# Patient Record
Sex: MALE | Race: OTHER | HISPANIC OR LATINO | Employment: OTHER | URBAN - METROPOLITAN AREA
[De-identification: names, ages, dates, MRNs, and addresses within clinical notes are randomized per-mention and may not be internally consistent; named-entity substitution may affect disease eponyms.]

---

## 2017-01-19 ENCOUNTER — APPOINTMENT (OUTPATIENT)
Dept: LAB | Facility: CLINIC | Age: 72
End: 2017-01-19
Payer: COMMERCIAL

## 2017-01-19 DIAGNOSIS — R80.9 PROTEINURIA: ICD-10-CM

## 2017-01-19 DIAGNOSIS — E66.9 OBESITY: ICD-10-CM

## 2017-01-19 DIAGNOSIS — E11.65 TYPE 2 DIABETES MELLITUS WITH HYPERGLYCEMIA (HCC): ICD-10-CM

## 2017-01-19 DIAGNOSIS — I10 ESSENTIAL (PRIMARY) HYPERTENSION: ICD-10-CM

## 2017-01-19 DIAGNOSIS — R91.1 SOLITARY PULMONARY NODULE: ICD-10-CM

## 2017-01-19 LAB
ALBUMIN SERPL BCP-MCNC: 3.7 G/DL (ref 3.5–5)
ALP SERPL-CCNC: 55 U/L (ref 46–116)
ALT SERPL W P-5'-P-CCNC: 38 U/L (ref 12–78)
ANION GAP SERPL CALCULATED.3IONS-SCNC: 5 MMOL/L (ref 4–13)
AST SERPL W P-5'-P-CCNC: 18 U/L (ref 5–45)
BILIRUB SERPL-MCNC: 0.32 MG/DL (ref 0.2–1)
BUN SERPL-MCNC: 16 MG/DL (ref 5–25)
CALCIUM SERPL-MCNC: 9.1 MG/DL (ref 8.3–10.1)
CHLORIDE SERPL-SCNC: 102 MMOL/L (ref 100–108)
CO2 SERPL-SCNC: 32 MMOL/L (ref 21–32)
CREAT SERPL-MCNC: 0.71 MG/DL (ref 0.6–1.3)
EST. AVERAGE GLUCOSE BLD GHB EST-MCNC: 166 MG/DL
GFR SERPL CREATININE-BSD FRML MDRD: >60 ML/MIN/1.73SQ M
GLUCOSE SERPL-MCNC: 164 MG/DL (ref 65–140)
HBA1C MFR BLD: 7.4 % (ref 4.2–6.3)
POTASSIUM SERPL-SCNC: 3.8 MMOL/L (ref 3.5–5.3)
PROT SERPL-MCNC: 7.8 G/DL (ref 6.4–8.2)
SODIUM SERPL-SCNC: 139 MMOL/L (ref 136–145)

## 2017-01-19 PROCEDURE — 80053 COMPREHEN METABOLIC PANEL: CPT

## 2017-01-19 PROCEDURE — 36415 COLL VENOUS BLD VENIPUNCTURE: CPT

## 2017-01-19 PROCEDURE — 83036 HEMOGLOBIN GLYCOSYLATED A1C: CPT

## 2017-01-26 ENCOUNTER — ALLSCRIPTS OFFICE VISIT (OUTPATIENT)
Dept: OTHER | Facility: OTHER | Age: 72
End: 2017-01-26

## 2017-01-26 DIAGNOSIS — E11.65 TYPE 2 DIABETES MELLITUS WITH HYPERGLYCEMIA (HCC): ICD-10-CM

## 2017-01-26 DIAGNOSIS — E66.9 OBESITY: ICD-10-CM

## 2017-01-26 DIAGNOSIS — R80.9 PROTEINURIA: ICD-10-CM

## 2017-01-26 DIAGNOSIS — I10 ESSENTIAL (PRIMARY) HYPERTENSION: ICD-10-CM

## 2017-03-06 ENCOUNTER — ALLSCRIPTS OFFICE VISIT (OUTPATIENT)
Dept: OTHER | Facility: OTHER | Age: 72
End: 2017-03-06

## 2017-03-28 ENCOUNTER — GENERIC CONVERSION - ENCOUNTER (OUTPATIENT)
Dept: OTHER | Facility: OTHER | Age: 72
End: 2017-03-28

## 2017-04-21 ENCOUNTER — TRANSCRIBE ORDERS (OUTPATIENT)
Dept: LAB | Facility: CLINIC | Age: 72
End: 2017-04-21

## 2017-04-21 ENCOUNTER — APPOINTMENT (OUTPATIENT)
Dept: LAB | Facility: CLINIC | Age: 72
End: 2017-04-21
Payer: COMMERCIAL

## 2017-04-21 DIAGNOSIS — I10 ESSENTIAL (PRIMARY) HYPERTENSION: ICD-10-CM

## 2017-04-21 DIAGNOSIS — E66.9 OBESITY: ICD-10-CM

## 2017-04-21 DIAGNOSIS — E11.65 TYPE 2 DIABETES MELLITUS WITH HYPERGLYCEMIA (HCC): ICD-10-CM

## 2017-04-21 DIAGNOSIS — E78.5 OTHER AND UNSPECIFIED HYPERLIPIDEMIA: Primary | ICD-10-CM

## 2017-04-21 DIAGNOSIS — R80.9 PROTEINURIA: ICD-10-CM

## 2017-04-21 DIAGNOSIS — I69.990 APRAXIA, LATE EFFECT OF CARDIOVASCULAR DISEASE: ICD-10-CM

## 2017-04-21 DIAGNOSIS — I61.9 NONTRAUMATIC INTRACEREBRAL HEMORRHAGE, UNSPECIFIED CEREBRAL LOCATION, UNSPECIFIED LATERALITY (HCC): ICD-10-CM

## 2017-04-21 LAB
ALBUMIN SERPL BCP-MCNC: 3.8 G/DL (ref 3.5–5)
ALP SERPL-CCNC: 57 U/L (ref 46–116)
ALT SERPL W P-5'-P-CCNC: 36 U/L (ref 12–78)
ANION GAP SERPL CALCULATED.3IONS-SCNC: 5 MMOL/L (ref 4–13)
AST SERPL W P-5'-P-CCNC: 18 U/L (ref 5–45)
BILIRUB SERPL-MCNC: 0.53 MG/DL (ref 0.2–1)
BUN SERPL-MCNC: 12 MG/DL (ref 5–25)
CALCIUM SERPL-MCNC: 9 MG/DL (ref 8.3–10.1)
CHLORIDE SERPL-SCNC: 102 MMOL/L (ref 100–108)
CHOLEST SERPL-MCNC: 187 MG/DL (ref 50–200)
CO2 SERPL-SCNC: 30 MMOL/L (ref 21–32)
CREAT SERPL-MCNC: 0.75 MG/DL (ref 0.6–1.3)
EST. AVERAGE GLUCOSE BLD GHB EST-MCNC: 169 MG/DL
GFR SERPL CREATININE-BSD FRML MDRD: >60 ML/MIN/1.73SQ M
GLUCOSE P FAST SERPL-MCNC: 167 MG/DL (ref 65–99)
HBA1C MFR BLD: 7.5 % (ref 4.2–6.3)
HDLC SERPL-MCNC: 37 MG/DL (ref 40–60)
LDLC SERPL CALC-MCNC: 129 MG/DL (ref 0–100)
POTASSIUM SERPL-SCNC: 4 MMOL/L (ref 3.5–5.3)
PROT SERPL-MCNC: 7.7 G/DL (ref 6.4–8.2)
SODIUM SERPL-SCNC: 137 MMOL/L (ref 136–145)
TRIGL SERPL-MCNC: 106 MG/DL

## 2017-04-21 PROCEDURE — 80061 LIPID PANEL: CPT

## 2017-04-21 PROCEDURE — 36415 COLL VENOUS BLD VENIPUNCTURE: CPT

## 2017-04-21 PROCEDURE — 83036 HEMOGLOBIN GLYCOSYLATED A1C: CPT

## 2017-04-21 PROCEDURE — 80053 COMPREHEN METABOLIC PANEL: CPT

## 2017-04-24 ENCOUNTER — GENERIC CONVERSION - ENCOUNTER (OUTPATIENT)
Dept: OTHER | Facility: OTHER | Age: 72
End: 2017-04-24

## 2017-05-01 ENCOUNTER — ALLSCRIPTS OFFICE VISIT (OUTPATIENT)
Dept: OTHER | Facility: OTHER | Age: 72
End: 2017-05-01

## 2017-07-07 ENCOUNTER — GENERIC CONVERSION - ENCOUNTER (OUTPATIENT)
Dept: OTHER | Facility: OTHER | Age: 72
End: 2017-07-07

## 2017-08-07 ENCOUNTER — APPOINTMENT (OUTPATIENT)
Dept: LAB | Facility: CLINIC | Age: 72
End: 2017-08-07
Payer: COMMERCIAL

## 2017-08-07 ENCOUNTER — TRANSCRIBE ORDERS (OUTPATIENT)
Dept: LAB | Facility: CLINIC | Age: 72
End: 2017-08-07

## 2017-08-07 DIAGNOSIS — E11.8 UNCONTROLLED TYPE 2 DIABETES MELLITUS WITH COMPLICATION, UNSPECIFIED LONG TERM INSULIN USE STATUS: ICD-10-CM

## 2017-08-07 DIAGNOSIS — E11.8 UNCONTROLLED TYPE 2 DIABETES MELLITUS WITH COMPLICATION, UNSPECIFIED LONG TERM INSULIN USE STATUS: Primary | ICD-10-CM

## 2017-08-07 DIAGNOSIS — R80.9 PROTEINURIA, UNSPECIFIED TYPE: ICD-10-CM

## 2017-08-07 DIAGNOSIS — E11.65 UNCONTROLLED TYPE 2 DIABETES MELLITUS WITH COMPLICATION, UNSPECIFIED LONG TERM INSULIN USE STATUS: Primary | ICD-10-CM

## 2017-08-07 DIAGNOSIS — I10 ESSENTIAL HYPERTENSION, MALIGNANT: ICD-10-CM

## 2017-08-07 DIAGNOSIS — E11.65 UNCONTROLLED TYPE 2 DIABETES MELLITUS WITH COMPLICATION, UNSPECIFIED LONG TERM INSULIN USE STATUS: ICD-10-CM

## 2017-08-07 DIAGNOSIS — E66.9 OBESITY, UNSPECIFIED: ICD-10-CM

## 2017-08-07 LAB
ALBUMIN SERPL BCP-MCNC: 3.7 G/DL (ref 3.5–5)
ALP SERPL-CCNC: 58 U/L (ref 46–116)
ALT SERPL W P-5'-P-CCNC: 43 U/L (ref 12–78)
ANION GAP SERPL CALCULATED.3IONS-SCNC: 6 MMOL/L (ref 4–13)
AST SERPL W P-5'-P-CCNC: 32 U/L (ref 5–45)
BILIRUB SERPL-MCNC: 0.38 MG/DL (ref 0.2–1)
BUN SERPL-MCNC: 12 MG/DL (ref 5–25)
CALCIUM SERPL-MCNC: 9.3 MG/DL (ref 8.3–10.1)
CHLORIDE SERPL-SCNC: 99 MMOL/L (ref 100–108)
CO2 SERPL-SCNC: 32 MMOL/L (ref 21–32)
CREAT SERPL-MCNC: 0.7 MG/DL (ref 0.6–1.3)
CREAT UR-MCNC: 106 MG/DL
EST. AVERAGE GLUCOSE BLD GHB EST-MCNC: 157 MG/DL
GFR SERPL CREATININE-BSD FRML MDRD: 110 ML/MIN/1.73SQ M
GLUCOSE P FAST SERPL-MCNC: 173 MG/DL (ref 65–99)
HBA1C MFR BLD: 7.1 % (ref 4.2–6.3)
MICROALBUMIN UR-MCNC: 111 MG/L (ref 0–20)
MICROALBUMIN/CREAT 24H UR: 105 MG/G CREATININE (ref 0–30)
POTASSIUM SERPL-SCNC: 3.7 MMOL/L (ref 3.5–5.3)
PROT SERPL-MCNC: 7.6 G/DL (ref 6.4–8.2)
SODIUM SERPL-SCNC: 137 MMOL/L (ref 136–145)

## 2017-08-07 PROCEDURE — 82570 ASSAY OF URINE CREATININE: CPT

## 2017-08-07 PROCEDURE — 83036 HEMOGLOBIN GLYCOSYLATED A1C: CPT

## 2017-08-07 PROCEDURE — 82043 UR ALBUMIN QUANTITATIVE: CPT

## 2017-08-07 PROCEDURE — 80053 COMPREHEN METABOLIC PANEL: CPT

## 2017-08-07 PROCEDURE — 36415 COLL VENOUS BLD VENIPUNCTURE: CPT

## 2017-08-14 ENCOUNTER — ALLSCRIPTS OFFICE VISIT (OUTPATIENT)
Dept: OTHER | Facility: OTHER | Age: 72
End: 2017-08-14

## 2017-09-05 ENCOUNTER — HOSPITAL ENCOUNTER (OUTPATIENT)
Facility: HOSPITAL | Age: 72
Setting detail: OBSERVATION
DRG: 219 | End: 2017-09-06
Attending: EMERGENCY MEDICINE | Admitting: INTERNAL MEDICINE
Payer: COMMERCIAL

## 2017-09-05 ENCOUNTER — APPOINTMENT (EMERGENCY)
Dept: RADIOLOGY | Facility: HOSPITAL | Age: 72
DRG: 219 | End: 2017-09-05
Payer: COMMERCIAL

## 2017-09-05 DIAGNOSIS — I16.0 HYPERTENSIVE URGENCY: ICD-10-CM

## 2017-09-05 DIAGNOSIS — I71.00 AORTIC ANEURYSM WITH DISSECTION (HCC): ICD-10-CM

## 2017-09-05 DIAGNOSIS — R07.9 CHEST PAIN: Primary | ICD-10-CM

## 2017-09-05 DIAGNOSIS — I71.9 AORTIC ANEURYSM WITH DISSECTION (HCC): ICD-10-CM

## 2017-09-05 LAB
ALBUMIN SERPL BCP-MCNC: 3.5 G/DL (ref 3.5–5)
ALP SERPL-CCNC: 57 U/L (ref 46–116)
ALT SERPL W P-5'-P-CCNC: 33 U/L (ref 12–78)
ANION GAP SERPL CALCULATED.3IONS-SCNC: 4 MMOL/L (ref 4–13)
APTT PPP: 24 SECONDS (ref 23–35)
AST SERPL W P-5'-P-CCNC: 22 U/L (ref 5–45)
BASOPHILS # BLD AUTO: 0.02 THOUSANDS/ΜL (ref 0–0.1)
BASOPHILS NFR BLD AUTO: 0 % (ref 0–1)
BILIRUB SERPL-MCNC: 0.63 MG/DL (ref 0.2–1)
BUN SERPL-MCNC: 14 MG/DL (ref 5–25)
CALCIUM SERPL-MCNC: 9.5 MG/DL (ref 8.3–10.1)
CHLORIDE SERPL-SCNC: 100 MMOL/L (ref 100–108)
CO2 SERPL-SCNC: 36 MMOL/L (ref 21–32)
CREAT SERPL-MCNC: 0.88 MG/DL (ref 0.6–1.3)
EOSINOPHIL # BLD AUTO: 0.22 THOUSAND/ΜL (ref 0–0.61)
EOSINOPHIL NFR BLD AUTO: 3 % (ref 0–6)
ERYTHROCYTE [DISTWIDTH] IN BLOOD BY AUTOMATED COUNT: 13.6 % (ref 11.6–15.1)
GFR SERPL CREATININE-BSD FRML MDRD: 86 ML/MIN/1.73SQ M
GLUCOSE SERPL-MCNC: 197 MG/DL (ref 65–140)
GLUCOSE SERPL-MCNC: 210 MG/DL (ref 65–140)
GLUCOSE SERPL-MCNC: 251 MG/DL (ref 65–140)
HCT VFR BLD AUTO: 41.6 % (ref 36.5–49.3)
HGB BLD-MCNC: 14.2 G/DL (ref 12–17)
INR PPP: 0.93 (ref 0.86–1.16)
LYMPHOCYTES # BLD AUTO: 1.94 THOUSANDS/ΜL (ref 0.6–4.47)
LYMPHOCYTES NFR BLD AUTO: 23 % (ref 14–44)
MCH RBC QN AUTO: 30.2 PG (ref 26.8–34.3)
MCHC RBC AUTO-ENTMCNC: 34.1 G/DL (ref 31.4–37.4)
MCV RBC AUTO: 89 FL (ref 82–98)
MONOCYTES # BLD AUTO: 0.58 THOUSAND/ΜL (ref 0.17–1.22)
MONOCYTES NFR BLD AUTO: 7 % (ref 4–12)
NEUTROPHILS # BLD AUTO: 5.55 THOUSANDS/ΜL (ref 1.85–7.62)
NEUTS SEG NFR BLD AUTO: 67 % (ref 43–75)
NRBC BLD AUTO-RTO: 0 /100 WBCS
NT-PROBNP SERPL-MCNC: 989 PG/ML
PLATELET # BLD AUTO: 178 THOUSANDS/UL (ref 149–390)
PMV BLD AUTO: 10.3 FL (ref 8.9–12.7)
POTASSIUM SERPL-SCNC: 4.1 MMOL/L (ref 3.5–5.3)
PROT SERPL-MCNC: 7.7 G/DL (ref 6.4–8.2)
PROTHROMBIN TIME: 12.5 SECONDS (ref 12.1–14.4)
RBC # BLD AUTO: 4.7 MILLION/UL (ref 3.88–5.62)
SODIUM SERPL-SCNC: 140 MMOL/L (ref 136–145)
SPECIMEN SOURCE: NORMAL
TROPONIN I BLD-MCNC: 0 NG/ML (ref 0–0.08)
TROPONIN I SERPL-MCNC: <0.02 NG/ML
TROPONIN I SERPL-MCNC: <0.02 NG/ML
WBC # BLD AUTO: 8.31 THOUSAND/UL (ref 4.31–10.16)

## 2017-09-05 PROCEDURE — 96374 THER/PROPH/DIAG INJ IV PUSH: CPT

## 2017-09-05 PROCEDURE — 84484 ASSAY OF TROPONIN QUANT: CPT

## 2017-09-05 PROCEDURE — 84484 ASSAY OF TROPONIN QUANT: CPT | Performed by: PHYSICIAN ASSISTANT

## 2017-09-05 PROCEDURE — 80053 COMPREHEN METABOLIC PANEL: CPT | Performed by: EMERGENCY MEDICINE

## 2017-09-05 PROCEDURE — 96375 TX/PRO/DX INJ NEW DRUG ADDON: CPT

## 2017-09-05 PROCEDURE — 36415 COLL VENOUS BLD VENIPUNCTURE: CPT | Performed by: EMERGENCY MEDICINE

## 2017-09-05 PROCEDURE — 99285 EMERGENCY DEPT VISIT HI MDM: CPT

## 2017-09-05 PROCEDURE — 85610 PROTHROMBIN TIME: CPT | Performed by: EMERGENCY MEDICINE

## 2017-09-05 PROCEDURE — 85025 COMPLETE CBC W/AUTO DIFF WBC: CPT | Performed by: EMERGENCY MEDICINE

## 2017-09-05 PROCEDURE — 93005 ELECTROCARDIOGRAM TRACING: CPT | Performed by: EMERGENCY MEDICINE

## 2017-09-05 PROCEDURE — 85730 THROMBOPLASTIN TIME PARTIAL: CPT | Performed by: EMERGENCY MEDICINE

## 2017-09-05 PROCEDURE — 71020 HB CHEST X-RAY 2VW FRONTAL&LATL: CPT

## 2017-09-05 PROCEDURE — 83880 ASSAY OF NATRIURETIC PEPTIDE: CPT | Performed by: PHYSICIAN ASSISTANT

## 2017-09-05 PROCEDURE — 82948 REAGENT STRIP/BLOOD GLUCOSE: CPT

## 2017-09-05 PROCEDURE — 93005 ELECTROCARDIOGRAM TRACING: CPT | Performed by: PHYSICIAN ASSISTANT

## 2017-09-05 RX ORDER — ASPIRIN 81 MG/1
81 TABLET, CHEWABLE ORAL DAILY
Status: DISCONTINUED | OUTPATIENT
Start: 2017-09-06 | End: 2017-09-06

## 2017-09-05 RX ORDER — ASPIRIN 81 MG/1
324 TABLET, CHEWABLE ORAL ONCE
Status: COMPLETED | OUTPATIENT
Start: 2017-09-05 | End: 2017-09-05

## 2017-09-05 RX ORDER — NITROGLYCERIN 0.4 MG/1
0.4 TABLET SUBLINGUAL
Status: DISCONTINUED | OUTPATIENT
Start: 2017-09-05 | End: 2017-09-06 | Stop reason: HOSPADM

## 2017-09-05 RX ORDER — HYDRALAZINE HYDROCHLORIDE 20 MG/ML
10 INJECTION INTRAMUSCULAR; INTRAVENOUS ONCE
Status: COMPLETED | OUTPATIENT
Start: 2017-09-05 | End: 2017-09-05

## 2017-09-05 RX ORDER — ATENOLOL 50 MG/1
100 TABLET ORAL DAILY
Status: DISCONTINUED | OUTPATIENT
Start: 2017-09-06 | End: 2017-09-06 | Stop reason: HOSPADM

## 2017-09-05 RX ORDER — ATORVASTATIN CALCIUM 40 MG/1
40 TABLET, FILM COATED ORAL
Status: DISCONTINUED | OUTPATIENT
Start: 2017-09-05 | End: 2017-09-06 | Stop reason: HOSPADM

## 2017-09-05 RX ORDER — TRIAMTERENE CAPSULES 50 MG/1
50 CAPSULE ORAL DAILY
COMMUNITY
End: 2017-09-14 | Stop reason: HOSPADM

## 2017-09-05 RX ORDER — ATENOLOL 100 MG/1
100 TABLET ORAL DAILY
COMMUNITY
End: 2017-09-14 | Stop reason: HOSPADM

## 2017-09-05 RX ORDER — HYDROCHLOROTHIAZIDE 25 MG/1
25 TABLET ORAL DAILY
Status: DISCONTINUED | OUTPATIENT
Start: 2017-09-06 | End: 2017-09-06 | Stop reason: HOSPADM

## 2017-09-05 RX ORDER — VALSARTAN 160 MG/1
160 TABLET ORAL DAILY
Status: DISCONTINUED | OUTPATIENT
Start: 2017-09-06 | End: 2017-09-06 | Stop reason: HOSPADM

## 2017-09-05 RX ORDER — GLIMEPIRIDE 4 MG/1
4 TABLET ORAL
COMMUNITY
End: 2017-09-14 | Stop reason: HOSPADM

## 2017-09-05 RX ORDER — NITROGLYCERIN 0.4 MG/1
0.4 TABLET SUBLINGUAL ONCE
Status: COMPLETED | OUTPATIENT
Start: 2017-09-05 | End: 2017-09-05

## 2017-09-05 RX ORDER — HYDRALAZINE HYDROCHLORIDE 20 MG/ML
10 INJECTION INTRAMUSCULAR; INTRAVENOUS EVERY 6 HOURS PRN
Status: DISCONTINUED | OUTPATIENT
Start: 2017-09-05 | End: 2017-09-06 | Stop reason: HOSPADM

## 2017-09-05 RX ORDER — MORPHINE SULFATE 4 MG/ML
1 INJECTION, SOLUTION INTRAMUSCULAR; INTRAVENOUS EVERY 4 HOURS PRN
Status: DISCONTINUED | OUTPATIENT
Start: 2017-09-05 | End: 2017-09-06 | Stop reason: HOSPADM

## 2017-09-05 RX ORDER — AMLODIPINE BESYLATE AND BENAZEPRIL HYDROCHLORIDE 10; 40 MG/1; MG/1
1 CAPSULE ORAL DAILY
COMMUNITY
End: 2017-09-14 | Stop reason: HOSPADM

## 2017-09-05 RX ORDER — GLIMEPIRIDE 2 MG/1
4 TABLET ORAL
Status: DISCONTINUED | OUTPATIENT
Start: 2017-09-06 | End: 2017-09-06 | Stop reason: HOSPADM

## 2017-09-05 RX ORDER — ACETAMINOPHEN 325 MG/1
650 TABLET ORAL EVERY 4 HOURS PRN
Status: DISCONTINUED | OUTPATIENT
Start: 2017-09-05 | End: 2017-09-06 | Stop reason: HOSPADM

## 2017-09-05 RX ORDER — CHLORAL HYDRATE 500 MG
1000 CAPSULE ORAL DAILY
Status: DISCONTINUED | OUTPATIENT
Start: 2017-09-06 | End: 2017-09-06

## 2017-09-05 RX ORDER — MAGNESIUM HYDROXIDE/ALUMINUM HYDROXICE/SIMETHICONE 120; 1200; 1200 MG/30ML; MG/30ML; MG/30ML
30 SUSPENSION ORAL EVERY 4 HOURS PRN
Status: DISCONTINUED | OUTPATIENT
Start: 2017-09-05 | End: 2017-09-06 | Stop reason: HOSPADM

## 2017-09-05 RX ORDER — MORPHINE SULFATE 4 MG/ML
4 INJECTION, SOLUTION INTRAMUSCULAR; INTRAVENOUS ONCE
Status: COMPLETED | OUTPATIENT
Start: 2017-09-05 | End: 2017-09-05

## 2017-09-05 RX ORDER — VALSARTAN AND HYDROCHLOROTHIAZIDE 160; 25 MG/1; MG/1
1 TABLET ORAL DAILY
Status: DISCONTINUED | OUTPATIENT
Start: 2017-09-06 | End: 2017-09-05 | Stop reason: SDUPTHER

## 2017-09-05 RX ADMIN — MORPHINE SULFATE 4 MG: 4 INJECTION INTRAVENOUS at 13:53

## 2017-09-05 RX ADMIN — ATORVASTATIN CALCIUM 40 MG: 40 TABLET, FILM COATED ORAL at 18:28

## 2017-09-05 RX ADMIN — ALUMINUM HYDROXIDE, MAGNESIUM HYDROXIDE, AND SIMETHICONE 30 ML: 200; 200; 20 SUSPENSION ORAL at 23:18

## 2017-09-05 RX ADMIN — MORPHINE SULFATE 1 MG: 4 INJECTION, SOLUTION INTRAMUSCULAR; INTRAVENOUS at 18:29

## 2017-09-05 RX ADMIN — ASPIRIN 81 MG 324 MG: 81 TABLET ORAL at 12:24

## 2017-09-05 RX ADMIN — HYDRALAZINE HYDROCHLORIDE 10 MG: 20 INJECTION INTRAMUSCULAR; INTRAVENOUS at 19:25

## 2017-09-05 RX ADMIN — NITROGLYCERIN 0.4 MG: 0.4 TABLET SUBLINGUAL at 12:26

## 2017-09-05 RX ADMIN — HYDRALAZINE HYDROCHLORIDE 10 MG: 20 INJECTION INTRAMUSCULAR; INTRAVENOUS at 12:27

## 2017-09-05 RX ADMIN — INSULIN LISPRO 3 UNITS: 100 INJECTION, SOLUTION INTRAVENOUS; SUBCUTANEOUS at 21:56

## 2017-09-05 RX ADMIN — METFORMIN HYDROCHLORIDE 1000 MG: 500 TABLET, FILM COATED ORAL at 18:28

## 2017-09-05 NOTE — H&P
History and Physical - [facility]    Patient Information: Kiah Romeo  71 y o  male MRN: [id number]  Unit/Bed#: E4 -01 Encounter: [id number]  Admitting Physician: JAMES Potts PA-C  PCP: Moris Dawson DO  Date of Admission:  09/05/17    Assessment/Plan  Patient is a pleasant 78-year-old male with a history of hypertension diabetes and recent diagnosis of edema  The patient had a sudden episode of chest pain that started this afternoon  He denies any diaphoresis or nausea she points more to his epigastrium then his chest but he says it is radiating up to his chest     Upon arrival in the ER the patient was noted to be mildly bradycardic with a heart rate of 58 and a blood pressure of 234/105  The patient also had elevated random glucose at 210  The patient's troponin was negative and EKG showed sinus bradycardia with nonspecific ST changes  Chest x-ray showed no active Cardio pulmonary disease    Hospital Problem List:     Principal Problem:    Chest pain  Active Problems:    Prostate cancer    Hypertension    Diabetes    Hypertensive urgency      Plan for the Primary Problem(s):  1  Chest pain-rule out acute coronary syndrome  Fortunately his troponin is negative  His EKG has some nonspecific ST changes  We will continue to trend cardiac enzymes  Check a lipid profile and start a statin  The patient was already given aspirin which we will continue  We will also check a TSH  Given the chest pain in the setting of accelerated hypertension  We will also check an echocardiogram to rule out any heart strain    Plan for Additional Problems:   2  Hypertensive urgency-the patient's blood pressure was over 551 systolic present on admission  It came down with hydralazine  It is currently in the 170sThe patient is under the care of Dr Syeda Villegas    The patient had a routine office visit on 08/14/2017 it was noted that the patient was having edema particularly in his lower extremities  Dr Herber Sam thought it may have been due to his Norvasc and he discontinued that and also discontinued the benazepril HCTZ in favor of valsartan HCTZ 160/25  The patient tells me he still been taking his other prescribed medications of Triameterne 50 mg and atenolol 100 mg  We will cautiously continue the atenolol on in light of the mild bradycardia  As mentioned check an echo  Hold did triametere for now may consider Lasix pending the echocardiogram   And add hydralazine 10 mg IV q 6 hours p r n   3   Diabetes type 2 with hyperglycemia-the patient is on metformin and Amaryl  We will continue those medications check an A1c and add sliding scale insulin  4  History of prostate cancer-the patient tells me he is voiding without any dysuria urgency hesitation or retention  5  Edema-patient tells me it is improving but he still does have a 1+ pitting edema  No prior mention of congestive heart failure  As mentioned check an echo  Will also add a BNP    This case and plan was discussed in detail with Dr Annemarie Garcia internal Medicine attending and he agrees the plan    VTE Prophylaxis: Heparin   Code Status: full code    Anticipated Length of Stay:  Patient will be admitted on an Observation basis with an anticipated length of stay of  greter than 2 midnights  Total Time for Visit, including Counseling / Coordination of Care: 45 minutes  Greater than 50% of this total time spent on direct patient counseling and coordination of care  Chief Complaint:     Chest pain    History of Present Illness:    [place] Severe  is a 71 y o  male who presents with chest pain came on suddenly  No nausea vomiting diaphoresis  He does states radiating to his left arm  He denies any numbness or tingling  He is currently complaining of a headache  He claims that he has not been having ongoing headaches and he denies any sudden vision changes    The patient denies any recent illnesses no fevers chills coughing abdominal pain  Denies any dysuria or urinary retention constipation or diarrhea  Review of Systems:  A 12-point review of systems was done  Please see the HPI for the full details  All other systems negative  Past Medical and Surgical History:     Past Medical History:   Diagnosis Date    Diabetes     Hypertension     Prostate cancer        History reviewed  No pertinent surgical history  Meds/Allergies:    Current Facility-Administered Medications   Medication Dose Route Frequency Provider Last Rate Last Dose    [START ON 9/6/2017] fish oil capsule 1,000 mg  1,000 mg Oral Daily Nell Aguirre PA-C        [START ON 9/6/2017] glimepiride (AMARYL) tablet 4 mg  4 mg Oral Daily With Breakfast Nell Aguirre PA-C        metFORMIN (GLUCOPHAGE) tablet 1,000 mg  1,000 mg Oral BID With Meals JEREMIAS Cruz        [START ON 9/6/2017] valsartan-hydrochlorothiazide (DIOVAN-HCT) 160-25 MG per tablet 1 tablet  1 tablet Oral Daily Nell Aguirre PA-C         No Known Allergies    Social History:     Marital Status:      Substance Use History:   History   Alcohol Use No     History   Smoking Status    Never Smoker   Smokeless Tobacco    Not on file     History   Drug Use No       Family History:    History reviewed  No pertinent family history      Physical Exam:   Vitals:   Blood Pressure: 170/90 (09/05/17 1633)  Pulse: 57 (09/05/17 1633)  Temperature: (!) 96 7 °F (35 9 °C) (09/05/17 1633)  Temp Source: Tympanic (09/05/17 1633)  Respirations: 18 (09/05/17 1633)  Weight - Scale: 106 kg (234 lb 5 6 oz) (09/05/17 1633)  SpO2: 96 % (09/05/17 1633)    General Appearance:    Alert, cooperative, no distress, appears stated age   HEENT:    Atraumatic, EOMs intact, Mucous membranes moist without   exudates   Neck:   Supple, symmetrical, trachea midline, no adenopathy;     thyroid:  no enlargement/tenderness/nodules; no carotid    bruit or JVD   Lungs:     Clear to auscultation bilaterally, respirations unlabored   Chest Wall:    No tenderness or deformity    Heart:    Regular rate and rhythm, S1 and S2 normal, no murmur, rub    or gallop   Abdomen:     Soft, non-tender, bowel sounds active all four quadrants, rotund    no masses, no organomegaly   Extremities:   1+ pitting b/l LE edema   Pulses:   2+ and symmetric all extremities   Skin:   Skin color, texture, turgor normal, no rashes or lesions   Lymph nodes:   Cervical, supraclavicular, and axillary nodes normal   Neurologic:   CNII-XII intact, normal strength, sensation and reflexes     throughout       Lab Results: I have personally reviewed pertinent reports  Results from last 7 days  Lab Units 09/05/17  1219   WBC Thousand/uL 8 31   HEMOGLOBIN g/dL 14 2   HEMATOCRIT % 41 6   PLATELETS Thousands/uL 178   NEUTROS PCT % 67   LYMPHS PCT % 23   MONOS PCT % 7   EOS PCT % 3       Results from last 7 days  Lab Units 09/05/17  1219   SODIUM mmol/L 140   POTASSIUM mmol/L 4 1   CHLORIDE mmol/L 100   CO2 mmol/L 36*   BUN mg/dL 14   CREATININE mg/dL 0 88   CALCIUM mg/dL 9 5   TOTAL PROTEIN g/dL 7 7   BILIRUBIN TOTAL mg/dL 0 63   ALK PHOS U/L 57   ALT U/L 33   AST U/L 22   GLUCOSE RANDOM mg/dL 210*       Results from last 7 days  Lab Units 09/05/17  1219   INR  0 93     Trop 0 00    Imaging: I have personally reviewed pertinent reports  Xr Chest 2 Views    Result Date: 9/5/2017  Narrative: CHEST - DUAL ENERGY INDICATION:  Hypertension  COMPARISON:  April 18, 2014 VIEWS:  PA (including soft tissue/bone algorithms) and lateral projections IMAGES:  5 FINDINGS:     The heart remains mildly enlarged  Aorta is ectatic  There is widening of the superior mediastinum however it is similar to prior exam  The lungs are clear  No pneumothorax or pleural effusion  Generative changes seen throughout the thoracic spine  Impression: No active pulmonary disease   Workstation performed: [id number]       EKG, Pathology, and Other Studies Reviewed on Admission:   53bpm sinus bradycardia nonspecific st change    Allscripts Records Reviewed:  Yes

## 2017-09-05 NOTE — ED PROVIDER NOTES
History  Chief Complaint   Patient presents with    Chest Pain     per pt, chest pain started approx 30 minutes ago, reports left sided chest, denies any significant events precepitating it     Hypertension     pt took blood pressure at home and reports "it was just high"     C/o L sided chest pain for about 1/2 hour before he came to the ER  H/o diabetes and HTN - his bp upon arrival to ED was 234/105 - he said he took his bp meds this am and is taking them regularly  He's had a stress test in the past but last one was > 5 years ago  Nothing makes cp better or worse             Prior to Admission Medications   Prescriptions Last Dose Informant Patient Reported? Taking? Omega-3 Fatty Acids (FISH OIL OMEGA-3 PO)   Yes Yes   Sig: Take 1 tablet by mouth daily   Valsartan-Hydrochlorothiazide (DIOVAN HCT PO)   Yes Yes   Sig: Take 1 tablet by mouth daily   amLODIPine-benazepril (LOTREL) 10-40 MG per capsule   Yes Yes   Sig: Take 1 capsule by mouth daily   atenolol (TENORMIN) 100 mg tablet   Yes Yes   Sig: Take 100 mg by mouth daily   glimepiride (AMARYL) 4 mg tablet   Yes Yes   Sig: Take 4 mg by mouth every morning before breakfast   metFORMIN (GLUCOPHAGE) 1000 MG tablet   Yes Yes   Sig: Take 1,000 mg by mouth 2 (two) times a day with meals   triamterene (DYRENIUM) 50 mg capsule   Yes Yes   Sig: Take 50 mg by mouth daily      Facility-Administered Medications: None       Past Medical History:   Diagnosis Date    Diabetes     Hypertension     Prostate cancer        History reviewed  No pertinent surgical history  History reviewed  No pertinent family history  I have reviewed and agree with the history as documented  Social History   Substance Use Topics    Smoking status: Never Smoker    Smokeless tobacco: Not on file    Alcohol use No        Review of Systems   Constitutional: Negative for appetite change, fatigue and fever  HENT: Negative for sore throat      Respiratory: Negative for cough, shortness of breath and wheezing  Cardiovascular: Positive for chest pain  Negative for leg swelling  Gastrointestinal: Negative for abdominal pain, diarrhea and vomiting  Genitourinary: Negative for dysuria and flank pain  Musculoskeletal: Negative for back pain and neck pain  Skin: Negative for rash  Neurological: Negative for syncope and headaches  Psychiatric/Behavioral:        Mood normal       Physical Exam  ED Triage Vitals   Temperature Pulse Respirations Blood Pressure SpO2   09/05/17 1633 09/05/17 1208 09/05/17 1208 09/05/17 1208 09/05/17 1208   (!) 96 7 °F (35 9 °C) 58 18 (!) 234/105 96 %      Temp Source Heart Rate Source Patient Position BP Location FiO2 (%)   09/05/17 1633 09/05/17 1208 09/05/17 1208 09/05/17 1208 --   Tympanic Monitor Sitting Left arm       Pain Score       09/05/17 1216       6           Physical Exam   Constitutional: He is oriented to person, place, and time  He appears well-developed and well-nourished  HENT:   Head: Normocephalic and atraumatic  Neck: Normal range of motion  Neck supple  Cardiovascular: Normal rate and regular rhythm  Pulmonary/Chest: Effort normal and breath sounds normal    Abdominal: Soft  There is no tenderness  Musculoskeletal: Normal range of motion  Neurological: He is alert and oriented to person, place, and time  Skin: Skin is warm and dry  Nursing note and vitals reviewed        ED Medications  Medications   valsartan-hydrochlorothiazide (DIOVAN-HCT) 160-25 MG per tablet 1 tablet (not administered)   glimepiride (AMARYL) tablet 4 mg (not administered)   metFORMIN (GLUCOPHAGE) tablet 1,000 mg (not administered)   fish oil capsule 1,000 mg (not administered)   aspirin chewable tablet 324 mg (324 mg Oral Given 9/5/17 1224)   nitroglycerin (NITROSTAT) SL tablet 0 4 mg (0 4 mg Sublingual Given 9/5/17 1226)   hydrALAZINE (APRESOLINE) injection 10 mg (10 mg Intravenous Given 9/5/17 1227)   morphine (PF) 4 mg/mL injection 4 mg (4 mg Intravenous Given 9/5/17 4084)       Diagnostic Studies  Labs Reviewed   COMPREHENSIVE METABOLIC PANEL - Abnormal        Result Value Ref Range Status    CO2 36 (*) 21 - 32 mmol/L Final    Glucose 210 (*) 65 - 140 mg/dL Final    Comment:   If the patient is fasting, the ADA then defines impaired fasting glucose as > 100 mg/dL and diabetes as > or equal to 123 mg/dL  Specimen collection should occur prior to Sulfasalazine administration due to the potential for falsely depressed results  Specimen collection should occur prior to Sulfapyridine administration due to the potential for falsely elevated results  Sodium 140  136 - 145 mmol/L Final    Potassium 4 1  3 5 - 5 3 mmol/L Final    Chloride 100  100 - 108 mmol/L Final    Anion Gap 4  4 - 13 mmol/L Final    BUN 14  5 - 25 mg/dL Final    Creatinine 0 88  0 60 - 1 30 mg/dL Final    Comment: Standardized to IDMS reference method    Calcium 9 5  8 3 - 10 1 mg/dL Final    AST 22  5 - 45 U/L Final    Comment:   Specimen collection should occur prior to Sulfasalazine administration due to the potential for falsely depressed results  ALT 33  12 - 78 U/L Final    Comment:   Specimen collection should occur prior to Sulfasalazine administration due to the potential for falsely depressed results  Alkaline Phosphatase 57  46 - 116 U/L Final    Total Protein 7 7  6 4 - 8 2 g/dL Final    Albumin 3 5  3 5 - 5 0 g/dL Final    Total Bilirubin 0 63  0 20 - 1 00 mg/dL Final    eGFR 86  ml/min/1 73sq m Final    Narrative:     National Kidney Disease Education Program recommendations are as follows:  GFR calculation is accurate only with a steady state creatinine  Chronic Kidney disease less than 60 ml/min/1 73 sq  meters  Kidney failure less than 15 ml/min/1 73 sq  meters     CBC AND DIFFERENTIAL - Normal    WBC 8 31  4 31 - 10 16 Thousand/uL Final    RBC 4 70  3 88 - 5 62 Million/uL Final    Hemoglobin 14 2  12 0 - 17 0 g/dL Final    Hematocrit 41 6  36 5 - 49 3 % Final MCV 89  82 - 98 fL Final    MCH 30 2  26 8 - 34 3 pg Final    MCHC 34 1  31 4 - 37 4 g/dL Final    RDW 13 6  11 6 - 15 1 % Final    MPV 10 3  8 9 - 12 7 fL Final    Platelets 543  798 - 390 Thousands/uL Final    nRBC 0  /100 WBCs Final    Neutrophils Relative 67  43 - 75 % Final    Lymphocytes Relative 23  14 - 44 % Final    Monocytes Relative 7  4 - 12 % Final    Eosinophils Relative 3  0 - 6 % Final    Basophils Relative 0  0 - 1 % Final    Neutrophils Absolute 5 55  1 85 - 7 62 Thousands/µL Final    Lymphocytes Absolute 1 94  0 60 - 4 47 Thousands/µL Final    Monocytes Absolute 0 58  0 17 - 1 22 Thousand/µL Final    Eosinophils Absolute 0 22  0 00 - 0 61 Thousand/µL Final    Basophils Absolute 0 02  0 00 - 0 10 Thousands/µL Final   PROTIME-INR - Normal    Protime 12 5  12 1 - 14 4 seconds Final    INR 0 93  0 86 - 1 16 Final   APTT - Normal    PTT 24  23 - 35 seconds Final    Narrative: Therapeutic Heparin Range = 60-90 seconds   POCT TROPONIN - Normal    POC Troponin I 0 00  0 00 - 0 08 ng/ml Final    Specimen Type VENOUS   Final    Narrative:     [employer] i-Stat handheld analyzer 99% cutoff is > 0 08ng/mL in [facility]    o cTnI 99% cutoff is useful only when applied to patients in the clinical setting of myocardial ischemia  o cTnI 99% cutoff should be interpreted in the context of clinical history, ECG findings and possibly cardiac imaging to establish correct diagnosis  o cTnI 99% cutoff may be suggestive but clearly not indicative of a coronary event without the clinical setting of myocardial ischemia  XR chest 2 views   Final Result      No active pulmonary disease           Workstation performed: [id number]             Procedures  ECG 12 Lead Documentation  Date/Time: 9/5/2017 1:47 PM  Performed by: SALAZAR Angel  Authorized by: SALAZAR Angel     Rate:     ECG rate:  53    ECG rate assessment: normal    Rhythm:     Rhythm: sinus rhythm    ST segments: ST segments:  Non-specific          Phone Contacts  ED Phone Contact    ED Course  ED Course                       YOGESH Risk Score    Flowsheet Row Most Recent Value   Age >= 72  1 Filed at: 09/05/2017 1729   Known CAD (stenosis >= 50%)  0 Filed at: 09/05/2017 1729   Recent (<=24 hrs) Service Angina  1 Filed at: 09/05/2017 1729   ST Deviation >= 0 5 mm  0 Filed at: 09/05/2017 1729   3+ CAD Risk Factors (FHx, HTN, HLP, DM, Smoker)  1 Filed at: 09/05/2017 1729   Aspirin Use Past 7 Days  1 Filed at: 09/05/2017 1729   Elevated Cardiac Markers  0 Filed at: 09/05/2017 1729   YOGESH Risk Score (Calculated)  4 Filed at: 09/05/2017 1729              MDM  Number of Diagnoses or Management Options  Chest pain:   Hypertensive urgency:      Amount and/or Complexity of Data Reviewed  Clinical lab tests: ordered and reviewed  Tests in the radiology section of CPT®: ordered and reviewed    Risk of Complications, Morbidity, and/or Mortality  Presenting problems: moderate  General comments: bp improved, chest pain improved with asa, nitro, hydralazine, morphine    Admitted for further work up      [facility] Time    Disposition  Final diagnoses:   Chest pain   Hypertensive urgency     ED Disposition     ED Disposition Condition Comment    Admit  Case was discussed with CELINA and the patient's admission status was agreed to be observation/tele      Follow-up Information    None       Current Discharge Medication List      CONTINUE these medications which have NOT CHANGED    Details   amLODIPine-benazepril (LOTREL) 10-40 MG per capsule Take 1 capsule by mouth daily      atenolol (TENORMIN) 100 mg tablet Take 100 mg by mouth daily      glimepiride (AMARYL) 4 mg tablet Take 4 mg by mouth every morning before breakfast      metFORMIN (GLUCOPHAGE) 1000 MG tablet Take 1,000 mg by mouth 2 (two) times a day with meals      Omega-3 Fatty Acids (FISH OIL OMEGA-3 PO) Take 1 tablet by mouth daily      triamterene (DYRENIUM) 50 mg capsule Take 50 mg by mouth daily      Valsartan-Hydrochlorothiazide (DIOVAN HCT PO) Take 1 tablet by mouth daily           No discharge procedures on file      ED Provider  Electronically Signed by       Sendy Abraham MD  09/05/17 7818

## 2017-09-06 ENCOUNTER — APPOINTMENT (OUTPATIENT)
Dept: NON INVASIVE DIAGNOSTICS | Facility: HOSPITAL | Age: 72
DRG: 219 | End: 2017-09-06
Attending: INTERNAL MEDICINE
Payer: COMMERCIAL

## 2017-09-06 ENCOUNTER — APPOINTMENT (OUTPATIENT)
Dept: NON INVASIVE DIAGNOSTICS | Facility: HOSPITAL | Age: 72
DRG: 219 | End: 2017-09-06
Attending: ANESTHESIOLOGY
Payer: COMMERCIAL

## 2017-09-06 ENCOUNTER — ANESTHESIA (INPATIENT)
Dept: PERIOP | Facility: HOSPITAL | Age: 72
DRG: 219 | End: 2017-09-06
Payer: COMMERCIAL

## 2017-09-06 ENCOUNTER — HOSPITAL ENCOUNTER (INPATIENT)
Facility: HOSPITAL | Age: 72
LOS: 8 days | Discharge: HOME HEALTH | DRG: 219 | End: 2017-09-14
Attending: THORACIC SURGERY (CARDIOTHORACIC VASCULAR SURGERY) | Admitting: THORACIC SURGERY (CARDIOTHORACIC VASCULAR SURGERY)
Payer: COMMERCIAL

## 2017-09-06 ENCOUNTER — ANESTHESIA EVENT (INPATIENT)
Dept: PERIOP | Facility: HOSPITAL | Age: 72
DRG: 219 | End: 2017-09-06
Payer: COMMERCIAL

## 2017-09-06 ENCOUNTER — GENERIC CONVERSION - ENCOUNTER (OUTPATIENT)
Dept: OTHER | Facility: OTHER | Age: 72
End: 2017-09-06

## 2017-09-06 ENCOUNTER — APPOINTMENT (INPATIENT)
Dept: RADIOLOGY | Facility: HOSPITAL | Age: 72
DRG: 219 | End: 2017-09-06
Payer: COMMERCIAL

## 2017-09-06 ENCOUNTER — APPOINTMENT (OUTPATIENT)
Dept: CT IMAGING | Facility: HOSPITAL | Age: 72
DRG: 219 | End: 2017-09-06
Payer: COMMERCIAL

## 2017-09-06 VITALS
SYSTOLIC BLOOD PRESSURE: 115 MMHG | HEART RATE: 90 BPM | WEIGHT: 234.35 LBS | OXYGEN SATURATION: 100 % | RESPIRATION RATE: 34 BRPM | TEMPERATURE: 98.7 F | DIASTOLIC BLOOD PRESSURE: 71 MMHG

## 2017-09-06 DIAGNOSIS — I71.01 DISSECTION OF AORTA, THORACIC (HCC): Primary | ICD-10-CM

## 2017-09-06 DIAGNOSIS — I71.9 AORTIC ANEURYSM WITH DISSECTION (HCC): ICD-10-CM

## 2017-09-06 DIAGNOSIS — I71.00 AORTIC ANEURYSM WITH DISSECTION (HCC): ICD-10-CM

## 2017-09-06 DIAGNOSIS — Z98.890 HISTORY OF AORTIC ARCH REPAIR: ICD-10-CM

## 2017-09-06 DIAGNOSIS — E11.9 DIABETES (HCC): Chronic | ICD-10-CM

## 2017-09-06 DIAGNOSIS — I71.02 DISSECTION OF ABDOMINAL AORTA (HCC): ICD-10-CM

## 2017-09-06 PROBLEM — D62 ACUTE BLOOD LOSS ANEMIA: Status: ACTIVE | Noted: 2017-09-06

## 2017-09-06 PROBLEM — I71.019 ACUTE THORACIC AORTIC DISSECTION: Status: ACTIVE | Noted: 2017-09-06

## 2017-09-06 PROBLEM — E87.6 HYPOKALEMIA: Status: ACTIVE | Noted: 2017-09-06

## 2017-09-06 PROBLEM — E87.0 HYPERNATREMIA: Status: ACTIVE | Noted: 2017-09-06

## 2017-09-06 PROBLEM — D68.9 COAGULOPATHY (HCC): Status: ACTIVE | Noted: 2017-09-06

## 2017-09-06 PROBLEM — Z99.11 ON MECHANICALLY ASSISTED VENTILATION (HCC): Status: ACTIVE | Noted: 2017-09-06

## 2017-09-06 PROBLEM — N17.9 ACUTE KIDNEY INJURY (HCC): Status: ACTIVE | Noted: 2017-09-06

## 2017-09-06 LAB
ABO GROUP BLD: NORMAL
ABO GROUP BLD: NORMAL
ADDITIONAL SETTING: 0
ADDITIONAL SETTING: 0
ANCILLARY VALUES: ABNORMAL
ANCILLARY VALUES: ABNORMAL
ANION GAP SERPL CALCULATED.3IONS-SCNC: 10 MMOL/L (ref 4–13)
ANION GAP SERPL CALCULATED.3IONS-SCNC: 16 MMOL/L (ref 4–13)
ANION GAP SERPL CALCULATED.3IONS-SCNC: 20 MMOL/L (ref 4–13)
APTT PPP: 28 SECONDS (ref 23–35)
APTT PPP: 28 SECONDS (ref 23–35)
APTT PPP: 31 SECONDS (ref 23–35)
ARTERIAL PATENCY WRIST A: 0
ARTERIAL PATENCY WRIST A: 0
ATRIAL RATE: 53 BPM
ATRIAL RATE: 55 BPM
ATRIAL RATE: 79 BPM
BASE EXCESS BLDA CALC-SCNC: -1 MMOL/L (ref -2–3)
BASE EXCESS BLDA CALC-SCNC: -10 MMOL/L (ref -2–3)
BASE EXCESS BLDA CALC-SCNC: -2 MMOL/L (ref -2–3)
BASE EXCESS BLDA CALC-SCNC: -3 MMOL/L (ref -2–3)
BASE EXCESS BLDA CALC-SCNC: -8 MMOL/L (ref -2–3)
BASE EXCESS BLDA CALC-SCNC: 0 MMOL/L (ref -2–3)
BASE EXCESS BLDA CALC-SCNC: 1 MMOL/L (ref -2–3)
BASE EXCESS BLDA CALC-SCNC: 2 MMOL/L (ref -2–3)
BASE EXCESS BLDA CALC-SCNC: 2 MMOL/L (ref -2–3)
BASE EXCESS BLDA CALC-SCNC: 3 MMOL/L (ref -2–3)
BASE EXCESS BLDA CALC-SCNC: 6 MMOL/L (ref -2–3)
BASOPHILS # BLD AUTO: 0 THOUSANDS/ΜL (ref 0–0.1)
BASOPHILS NFR BLD AUTO: 0 % (ref 0–1)
BLD GP AB SCN SERPL QL: NEGATIVE
BLD GP AB SCN SERPL QL: NEGATIVE
BUN SERPL-MCNC: 13 MG/DL (ref 5–25)
CA-I BLD-SCNC: 0.67 MMOL/L (ref 1.12–1.32)
CA-I BLD-SCNC: 0.82 MMOL/L (ref 1.12–1.32)
CA-I BLD-SCNC: 0.83 MMOL/L (ref 1.12–1.32)
CA-I BLD-SCNC: 0.83 MMOL/L (ref 1.12–1.32)
CA-I BLD-SCNC: 0.91 MMOL/L (ref 1.12–1.32)
CA-I BLD-SCNC: 0.93 MMOL/L (ref 1.12–1.32)
CA-I BLD-SCNC: 0.94 MMOL/L (ref 1.12–1.32)
CA-I BLD-SCNC: 0.95 MMOL/L (ref 1.12–1.32)
CA-I BLD-SCNC: 0.96 MMOL/L (ref 1.12–1.32)
CA-I BLD-SCNC: 0.97 MMOL/L (ref 1.12–1.32)
CA-I BLD-SCNC: 0.99 MMOL/L (ref 1.12–1.32)
CA-I BLD-SCNC: 0.99 MMOL/L (ref 1.12–1.32)
CA-I BLD-SCNC: 1.02 MMOL/L (ref 1.12–1.32)
CA-I BLD-SCNC: 1.03 MMOL/L (ref 1.12–1.32)
CA-I BLD-SCNC: 1.03 MMOL/L (ref 1.12–1.32)
CA-I BLD-SCNC: 1.07 MMOL/L (ref 1.12–1.32)
CALCIUM SERPL-MCNC: 8 MG/DL (ref 8.3–10.1)
CALCIUM SERPL-MCNC: 8 MG/DL (ref 8.3–10.1)
CALCIUM SERPL-MCNC: 9 MG/DL (ref 8.3–10.1)
CHLORIDE SERPL-SCNC: 106 MMOL/L (ref 100–108)
CHLORIDE SERPL-SCNC: 110 MMOL/L (ref 100–108)
CHLORIDE SERPL-SCNC: 94 MMOL/L (ref 100–108)
CHOLEST SERPL-MCNC: 169 MG/DL (ref 50–200)
CO2 SERPL-SCNC: 21 MMOL/L (ref 21–32)
CO2 SERPL-SCNC: 21 MMOL/L (ref 21–32)
CO2 SERPL-SCNC: 28 MMOL/L (ref 21–32)
CREAT SERPL-MCNC: 1.05 MG/DL (ref 0.6–1.3)
CREAT SERPL-MCNC: 1.41 MG/DL (ref 0.6–1.3)
CREAT SERPL-MCNC: 1.61 MG/DL (ref 0.6–1.3)
DS:DELIVERY SYSTEM: AC
DS:DELIVERY SYSTEM: AC
EOSINOPHIL # BLD AUTO: 0 THOUSAND/ΜL (ref 0–0.61)
EOSINOPHIL NFR BLD AUTO: 0 % (ref 0–6)
ERYTHROCYTE [DISTWIDTH] IN BLOOD BY AUTOMATED COUNT: 14.5 % (ref 11.6–15.1)
ERYTHROCYTE [DISTWIDTH] IN BLOOD BY AUTOMATED COUNT: 14.6 % (ref 11.6–15.1)
ERYTHROCYTE [DISTWIDTH] IN BLOOD BY AUTOMATED COUNT: 14.9 % (ref 11.6–15.1)
ERYTHROCYTE [DISTWIDTH] IN BLOOD BY AUTOMATED COUNT: 14.9 % (ref 11.6–15.1)
EST. AVERAGE GLUCOSE BLD GHB EST-MCNC: 157 MG/DL
FIBRINOGEN PPP-MCNC: 316 MG/DL (ref 227–495)
FIBRINOGEN PPP-MCNC: 324 MG/DL (ref 227–495)
FIBRINOGEN PPP-MCNC: 382 MG/DL (ref 227–495)
FIO2 GAS DIL.REBREATH: 100 L
FIO2 GAS DIL.REBREATH: 80 L
GFR SERPL CREATININE-BSD FRML MDRD: 42 ML/MIN/1.73SQ M
GFR SERPL CREATININE-BSD FRML MDRD: 50 ML/MIN/1.73SQ M
GFR SERPL CREATININE-BSD FRML MDRD: 71 ML/MIN/1.73SQ M
GLUCOSE P FAST SERPL-MCNC: 334 MG/DL (ref 65–99)
GLUCOSE SERPL-MCNC: 123 MG/DL (ref 65–140)
GLUCOSE SERPL-MCNC: 128 MG/DL (ref 65–140)
GLUCOSE SERPL-MCNC: 130 MG/DL (ref 65–140)
GLUCOSE SERPL-MCNC: 140 MG/DL (ref 65–140)
GLUCOSE SERPL-MCNC: 147 MG/DL (ref 65–140)
GLUCOSE SERPL-MCNC: 148 MG/DL (ref 65–140)
GLUCOSE SERPL-MCNC: 160 MG/DL (ref 65–140)
GLUCOSE SERPL-MCNC: 207 MG/DL (ref 65–140)
GLUCOSE SERPL-MCNC: 211 MG/DL (ref 65–140)
GLUCOSE SERPL-MCNC: 216 MG/DL (ref 65–140)
GLUCOSE SERPL-MCNC: 216 MG/DL (ref 65–140)
GLUCOSE SERPL-MCNC: 217 MG/DL (ref 65–140)
GLUCOSE SERPL-MCNC: 233 MG/DL (ref 65–140)
GLUCOSE SERPL-MCNC: 236 MG/DL (ref 65–140)
GLUCOSE SERPL-MCNC: 238 MG/DL (ref 65–140)
GLUCOSE SERPL-MCNC: 250 MG/DL (ref 65–140)
GLUCOSE SERPL-MCNC: 257 MG/DL (ref 65–140)
GLUCOSE SERPL-MCNC: 269 MG/DL (ref 65–140)
GLUCOSE SERPL-MCNC: 270 MG/DL (ref 65–140)
GLUCOSE SERPL-MCNC: 273 MG/DL (ref 65–140)
GLUCOSE SERPL-MCNC: 286 MG/DL (ref 65–140)
GLUCOSE SERPL-MCNC: 296 MG/DL (ref 65–140)
GLUCOSE SERPL-MCNC: 297 MG/DL (ref 65–140)
GLUCOSE SERPL-MCNC: 305 MG/DL (ref 65–140)
GLUCOSE SERPL-MCNC: 334 MG/DL (ref 65–140)
GLUCOSE SERPL-MCNC: 359 MG/DL (ref 65–140)
GLUCOSE SERPL-MCNC: 366 MG/DL (ref 65–140)
HBA1C MFR BLD: 7.1 % (ref 4.2–6.3)
HCO3 BLDA-SCNC: 15.8 MMOL/L (ref 22–28)
HCO3 BLDA-SCNC: 18.3 MMOL/L (ref 22–28)
HCO3 BLDA-SCNC: 21.9 MMOL/L (ref 22–28)
HCO3 BLDA-SCNC: 22.5 MMOL/L (ref 22–28)
HCO3 BLDA-SCNC: 22.5 MMOL/L (ref 22–28)
HCO3 BLDA-SCNC: 22.8 MMOL/L (ref 22–28)
HCO3 BLDA-SCNC: 23 MMOL/L (ref 22–28)
HCO3 BLDA-SCNC: 23.8 MMOL/L (ref 22–28)
HCO3 BLDA-SCNC: 24.5 MMOL/L (ref 22–28)
HCO3 BLDA-SCNC: 24.9 MMOL/L (ref 22–28)
HCO3 BLDA-SCNC: 25.8 MMOL/L (ref 22–28)
HCO3 BLDA-SCNC: 26 MMOL/L (ref 24–30)
HCO3 BLDA-SCNC: 26.1 MMOL/L (ref 22–28)
HCO3 BLDA-SCNC: 26.2 MMOL/L (ref 22–28)
HCO3 BLDA-SCNC: 28.7 MMOL/L (ref 22–28)
HCO3 BLDA-SCNC: 30.1 MMOL/L (ref 22–28)
HCT VFR BLD AUTO: 27.7 % (ref 36.5–49.3)
HCT VFR BLD AUTO: 28.8 % (ref 36.5–49.3)
HCT VFR BLD AUTO: 29.6 % (ref 36.5–49.3)
HCT VFR BLD AUTO: 31.7 % (ref 36.5–49.3)
HCT VFR BLD CALC: 22 % (ref 36.5–49.3)
HCT VFR BLD CALC: 23 % (ref 36.5–49.3)
HCT VFR BLD CALC: 23 % (ref 36.5–49.3)
HCT VFR BLD CALC: 24 % (ref 36.5–49.3)
HCT VFR BLD CALC: 25 % (ref 36.5–49.3)
HCT VFR BLD CALC: 26 % (ref 36.5–49.3)
HCT VFR BLD CALC: 26 % (ref 36.5–49.3)
HCT VFR BLD CALC: 27 % (ref 36.5–49.3)
HCT VFR BLD CALC: 27 % (ref 36.5–49.3)
HCT VFR BLD CALC: 28 % (ref 36.5–49.3)
HCT VFR BLD CALC: 28 % (ref 36.5–49.3)
HCT VFR BLD CALC: 29 % (ref 36.5–49.3)
HCT VFR BLD CALC: 31 % (ref 36.5–49.3)
HCT VFR BLD CALC: 32 % (ref 36.5–49.3)
HCT VFR BLD CALC: 33 % (ref 36.5–49.3)
HCT VFR BLD CALC: 39 % (ref 36.5–49.3)
HDLC SERPL-MCNC: 40 MG/DL (ref 40–60)
HGB BLD-MCNC: 10.1 G/DL (ref 12–17)
HGB BLD-MCNC: 10.8 G/DL (ref 12–17)
HGB BLD-MCNC: 9.4 G/DL (ref 12–17)
HGB BLD-MCNC: 9.8 G/DL (ref 12–17)
HGB BLDA-MCNC: 10.5 G/DL (ref 12–17)
HGB BLDA-MCNC: 10.9 G/DL (ref 12–17)
HGB BLDA-MCNC: 11.2 G/DL (ref 12–17)
HGB BLDA-MCNC: 13.3 G/DL (ref 12–17)
HGB BLDA-MCNC: 7.5 G/DL (ref 12–17)
HGB BLDA-MCNC: 7.8 G/DL (ref 12–17)
HGB BLDA-MCNC: 7.8 G/DL (ref 12–17)
HGB BLDA-MCNC: 8.2 G/DL (ref 12–17)
HGB BLDA-MCNC: 8.5 G/DL (ref 12–17)
HGB BLDA-MCNC: 8.8 G/DL (ref 12–17)
HGB BLDA-MCNC: 8.8 G/DL (ref 12–17)
HGB BLDA-MCNC: 9.2 G/DL (ref 12–17)
HGB BLDA-MCNC: 9.2 G/DL (ref 12–17)
HGB BLDA-MCNC: 9.5 G/DL (ref 12–17)
HGB BLDA-MCNC: 9.5 G/DL (ref 12–17)
HGB BLDA-MCNC: 9.9 G/DL (ref 12–17)
INR PPP: 0.75 (ref 0.86–1.16)
INR PPP: 0.88 (ref 0.86–1.16)
INR PPP: 1.66 (ref 0.86–1.16)
KCT BLD-ACNC: 109 SEC (ref 89–137)
KCT BLD-ACNC: 115 SEC (ref 89–137)
KCT BLD-ACNC: 126 SEC (ref 89–137)
KCT BLD-ACNC: 126 SEC (ref 89–137)
KCT BLD-ACNC: 537 SEC (ref 89–137)
KCT BLD-ACNC: 565 SEC (ref 89–137)
KCT BLD-ACNC: 571 SEC (ref 89–137)
KCT BLD-ACNC: 627 SEC (ref 89–137)
KCT BLD-ACNC: 699 SEC (ref 89–137)
KCT BLD-ACNC: 70 SEC (ref 89–137)
KCT BLD-ACNC: 93 SEC (ref 89–137)
KCT BLD-ACNC: 93 SEC (ref 89–137)
LDLC SERPL CALC-MCNC: 108 MG/DL (ref 0–100)
LYMPHOCYTES # BLD AUTO: 0.63 THOUSANDS/ΜL (ref 0.6–4.47)
LYMPHOCYTES NFR BLD AUTO: 4 % (ref 14–44)
MCH RBC QN AUTO: 29.7 PG (ref 26.8–34.3)
MCH RBC QN AUTO: 29.8 PG (ref 26.8–34.3)
MCH RBC QN AUTO: 29.8 PG (ref 26.8–34.3)
MCH RBC QN AUTO: 30 PG (ref 26.8–34.3)
MCHC RBC AUTO-ENTMCNC: 33.9 G/DL (ref 31.4–37.4)
MCHC RBC AUTO-ENTMCNC: 34 G/DL (ref 31.4–37.4)
MCHC RBC AUTO-ENTMCNC: 34.1 G/DL (ref 31.4–37.4)
MCHC RBC AUTO-ENTMCNC: 34.1 G/DL (ref 31.4–37.4)
MCV RBC AUTO: 87 FL (ref 82–98)
MCV RBC AUTO: 88 FL (ref 82–98)
MONOCYTES # BLD AUTO: 1.91 THOUSAND/ΜL (ref 0.17–1.22)
MONOCYTES NFR BLD AUTO: 11 % (ref 4–12)
NEUTROPHILS # BLD AUTO: 14.93 THOUSANDS/ΜL (ref 1.85–7.62)
NEUTS SEG NFR BLD AUTO: 85 % (ref 43–75)
NRBC BLD AUTO-RTO: 0 /100 WBCS
P AXIS: 24 DEGREES
P AXIS: 34 DEGREES
P AXIS: 46 DEGREES
PCO2 BLD: 17 MMOL/L (ref 21–32)
PCO2 BLD: 19 MMOL/L (ref 21–32)
PCO2 BLD: 23 MMOL/L (ref 21–32)
PCO2 BLD: 24 MMOL/L (ref 21–32)
PCO2 BLD: 25 MMOL/L (ref 21–32)
PCO2 BLD: 26 MMOL/L (ref 21–32)
PCO2 BLD: 26 MMOL/L (ref 21–32)
PCO2 BLD: 27 MMOL/L (ref 21–32)
PCO2 BLD: 28 MMOL/L (ref 21–32)
PCO2 BLD: 30 MMOL/L (ref 21–32)
PCO2 BLD: 31.9 MM HG (ref 36–44)
PCO2 BLD: 32 MMOL/L (ref 21–32)
PCO2 BLD: 34.3 MM HG (ref 36–44)
PCO2 BLD: 35.2 MM HG (ref 36–44)
PCO2 BLD: 37 MM HG (ref 36–44)
PCO2 BLD: 37.5 MM HG (ref 36–44)
PCO2 BLD: 37.6 MM HG (ref 36–44)
PCO2 BLD: 38.4 MM HG (ref 36–44)
PCO2 BLD: 39.2 MM HG (ref 36–44)
PCO2 BLD: 40 MM HG (ref 36–44)
PCO2 BLD: 41.8 MM HG (ref 36–44)
PCO2 BLD: 41.8 MM HG (ref 36–44)
PCO2 BLD: 43.1 MM HG (ref 36–44)
PCO2 BLD: 45.1 MM HG (ref 36–44)
PCO2 BLD: 47.2 MM HG (ref 36–44)
PCO2 BLD: 50.4 MM HG (ref 42–50)
PCO2 BLD: 66.1 MM HG (ref 36–44)
PH BLD: 7.27 [PH] (ref 7.35–7.45)
PH BLD: 7.29 [PH] (ref 7.35–7.45)
PH BLD: 7.3 [PH] (ref 7.35–7.45)
PH BLD: 7.32 [PH] (ref 7.35–7.45)
PH BLD: 7.32 [PH] (ref 7.3–7.4)
PH BLD: 7.33 [PH] (ref 7.35–7.45)
PH BLD: 7.34 [PH] (ref 7.35–7.45)
PH BLD: 7.35 [PH] (ref 7.35–7.45)
PH BLD: 7.35 [PH] (ref 7.35–7.45)
PH BLD: 7.36 [PH] (ref 7.35–7.45)
PH BLD: 7.4 [PH] (ref 7.35–7.45)
PH BLD: 7.43 [PH] (ref 7.35–7.45)
PH BLD: 7.43 [PH] (ref 7.35–7.45)
PH BLD: 7.45 [PH] (ref 7.35–7.45)
PH BLD: 7.46 [PH] (ref 7.35–7.45)
PH BLD: 7.52 [PH] (ref 7.35–7.45)
PLATELET # BLD AUTO: 130 THOUSANDS/UL (ref 149–390)
PLATELET # BLD AUTO: 143 THOUSANDS/UL (ref 149–390)
PLATELET # BLD AUTO: 146 THOUSANDS/UL (ref 149–390)
PLATELET # BLD AUTO: 213 THOUSANDS/UL (ref 149–390)
PLATELET # BLD AUTO: 74 THOUSANDS/UL (ref 149–390)
PMV BLD AUTO: 10 FL (ref 8.9–12.7)
PMV BLD AUTO: 10 FL (ref 8.9–12.7)
PMV BLD AUTO: 9.3 FL (ref 8.9–12.7)
PMV BLD AUTO: 9.6 FL (ref 8.9–12.7)
PMV BLD AUTO: 9.7 FL (ref 8.9–12.7)
PO2 BLD: 125 MM HG (ref 75–129)
PO2 BLD: 146 MM HG (ref 75–129)
PO2 BLD: 178 MM HG (ref 75–129)
PO2 BLD: 216 MM HG (ref 75–129)
PO2 BLD: 243 MM HG (ref 75–129)
PO2 BLD: 246 MM HG (ref 75–129)
PO2 BLD: 259 MM HG (ref 75–129)
PO2 BLD: 290 MM HG (ref 75–129)
PO2 BLD: 296 MM HG (ref 75–129)
PO2 BLD: 306 MM HG (ref 75–129)
PO2 BLD: 319 MM HG (ref 75–129)
PO2 BLD: 327 MM HG (ref 75–129)
PO2 BLD: 67 MM HG (ref 75–129)
PO2 BLD: 71 MM HG (ref 35–45)
PO2 BLD: 94 MM HG (ref 75–129)
PO2 BLD: 99 MM HG (ref 75–129)
POTASSIUM BLD-SCNC: 2.7 MMOL/L (ref 3.5–5.3)
POTASSIUM BLD-SCNC: 2.8 MMOL/L (ref 3.5–5.3)
POTASSIUM BLD-SCNC: 2.8 MMOL/L (ref 3.5–5.3)
POTASSIUM BLD-SCNC: 3.1 MMOL/L (ref 3.5–5.3)
POTASSIUM BLD-SCNC: 3.3 MMOL/L (ref 3.5–5.3)
POTASSIUM BLD-SCNC: 3.3 MMOL/L (ref 3.5–5.3)
POTASSIUM BLD-SCNC: 3.4 MMOL/L (ref 3.5–5.3)
POTASSIUM BLD-SCNC: 3.6 MMOL/L (ref 3.5–5.3)
POTASSIUM BLD-SCNC: 3.8 MMOL/L (ref 3.5–5.3)
POTASSIUM BLD-SCNC: 4 MMOL/L (ref 3.5–5.3)
POTASSIUM BLD-SCNC: 5.2 MMOL/L (ref 3.5–5.3)
POTASSIUM SERPL-SCNC: 2.9 MMOL/L (ref 3.5–5.3)
POTASSIUM SERPL-SCNC: 3.4 MMOL/L (ref 3.5–5.3)
POTASSIUM SERPL-SCNC: 4.2 MMOL/L (ref 3.5–5.3)
PR INTERVAL: 180 MS
PR INTERVAL: 186 MS
PR INTERVAL: 188 MS
PRESSURE SETTING: 10
PRESSURE SETTING: 5
PROTHROMBIN TIME: 10.5 SECONDS (ref 12.1–14.4)
PROTHROMBIN TIME: 11.9 SECONDS (ref 12.1–14.4)
PROTHROMBIN TIME: 19.7 SECONDS (ref 12.1–14.4)
QRS AXIS: -17 DEGREES
QRS AXIS: -18 DEGREES
QRS AXIS: 74 DEGREES
QRSD INTERVAL: 88 MS
QRSD INTERVAL: 90 MS
QRSD INTERVAL: 98 MS
QT INTERVAL: 410 MS
QT INTERVAL: 452 MS
QT INTERVAL: 456 MS
QTC INTERVAL: 427 MS
QTC INTERVAL: 432 MS
QTC INTERVAL: 470 MS
RBC # BLD AUTO: 3.17 MILLION/UL (ref 3.88–5.62)
RBC # BLD AUTO: 3.29 MILLION/UL (ref 3.88–5.62)
RBC # BLD AUTO: 3.37 MILLION/UL (ref 3.88–5.62)
RBC # BLD AUTO: 3.62 MILLION/UL (ref 3.88–5.62)
RESPIRATORY RATE: 12
RESPIRATORY RATE: 17
RH BLD: POSITIVE
RH BLD: POSITIVE
SAMPLE SITE: 11
SAMPLE SITE: 13
SAO2 % BLD FROM PO2: 100 % (ref 95–98)
SAO2 % BLD FROM PO2: 92 % (ref 95–98)
SAO2 % BLD FROM PO2: 95 % (ref 95–98)
SAO2 % BLD FROM PO2: 97 % (ref 95–98)
SAO2 % BLD FROM PO2: 98 % (ref 95–98)
SAO2 % BLD FROM PO2: 99 % (ref 95–98)
SODIUM BLD-SCNC: 132 MMOL/L (ref 136–145)
SODIUM BLD-SCNC: 133 MMOL/L (ref 136–145)
SODIUM BLD-SCNC: 134 MMOL/L (ref 136–145)
SODIUM BLD-SCNC: 134 MMOL/L (ref 136–145)
SODIUM BLD-SCNC: 136 MMOL/L (ref 136–145)
SODIUM BLD-SCNC: 136 MMOL/L (ref 136–145)
SODIUM BLD-SCNC: 138 MMOL/L (ref 136–145)
SODIUM BLD-SCNC: 140 MMOL/L (ref 136–145)
SODIUM BLD-SCNC: 143 MMOL/L (ref 136–145)
SODIUM BLD-SCNC: 147 MMOL/L (ref 136–145)
SODIUM BLD-SCNC: 148 MMOL/L (ref 136–145)
SODIUM BLD-SCNC: 148 MMOL/L (ref 136–145)
SODIUM BLD-SCNC: 149 MMOL/L (ref 136–145)
SODIUM SERPL-SCNC: 131 MMOL/L (ref 136–145)
SODIUM SERPL-SCNC: 147 MMOL/L (ref 136–145)
SODIUM SERPL-SCNC: 148 MMOL/L (ref 136–145)
SPECIMEN EXPIRATION DATE: NORMAL
SPECIMEN EXPIRATION DATE: NORMAL
SPECIMEN SOURCE: ABNORMAL
SPECIMEN SOURCE: NORMAL
T WAVE AXIS: 16 DEGREES
T WAVE AXIS: 16 DEGREES
T WAVE AXIS: 19 DEGREES
TRIGL SERPL-MCNC: 103 MG/DL
TROPONIN I SERPL-MCNC: 0.02 NG/ML
TROPONIN I SERPL-MCNC: 0.03 NG/ML
TSH SERPL DL<=0.05 MIU/L-ACNC: 2.97 UIU/ML (ref 0.36–3.74)
VENT TYPE: 1
VENT TYPE: ABNORMAL
VENTILATION VALUE: 700
VENTILATION VALUE: 700
VENTRICULAR RATE: 53 BPM
VENTRICULAR RATE: 55 BPM
VENTRICULAR RATE: 79 BPM
WBC # BLD AUTO: 12.03 THOUSAND/UL (ref 4.31–10.16)
WBC # BLD AUTO: 16.24 THOUSAND/UL (ref 4.31–10.16)
WBC # BLD AUTO: 17.55 THOUSAND/UL (ref 4.31–10.16)
WBC # BLD AUTO: 9.07 THOUSAND/UL (ref 4.31–10.16)

## 2017-09-06 PROCEDURE — 85384 FIBRINOGEN ACTIVITY: CPT | Performed by: PHYSICIAN ASSISTANT

## 2017-09-06 PROCEDURE — 30233R1 TRANSFUSION OF NONAUTOLOGOUS PLATELETS INTO PERIPHERAL VEIN, PERCUTANEOUS APPROACH: ICD-10-PCS | Performed by: ANESTHESIOLOGY

## 2017-09-06 PROCEDURE — 85610 PROTHROMBIN TIME: CPT | Performed by: PHYSICIAN ASSISTANT

## 2017-09-06 PROCEDURE — 74177 CT ABD & PELVIS W/CONTRAST: CPT

## 2017-09-06 PROCEDURE — 85730 THROMBOPLASTIN TIME PARTIAL: CPT | Performed by: PHYSICIAN ASSISTANT

## 2017-09-06 PROCEDURE — 93005 ELECTROCARDIOGRAM TRACING: CPT

## 2017-09-06 PROCEDURE — 85027 COMPLETE CBC AUTOMATED: CPT | Performed by: NURSE PRACTITIONER

## 2017-09-06 PROCEDURE — 85347 COAGULATION TIME ACTIVATED: CPT

## 2017-09-06 PROCEDURE — 84295 ASSAY OF SERUM SODIUM: CPT

## 2017-09-06 PROCEDURE — 93005 ELECTROCARDIOGRAM TRACING: CPT | Performed by: PHYSICIAN ASSISTANT

## 2017-09-06 PROCEDURE — 88304 TISSUE EXAM BY PATHOLOGIST: CPT | Performed by: THORACIC SURGERY (CARDIOTHORACIC VASCULAR SURGERY)

## 2017-09-06 PROCEDURE — 5A1223Z PERFORMANCE OF CARDIAC PACING, CONTINUOUS: ICD-10-PCS | Performed by: THORACIC SURGERY (CARDIOTHORACIC VASCULAR SURGERY)

## 2017-09-06 PROCEDURE — 30233K1 TRANSFUSION OF NONAUTOLOGOUS FROZEN PLASMA INTO PERIPHERAL VEIN, PERCUTANEOUS APPROACH: ICD-10-PCS | Performed by: ANESTHESIOLOGY

## 2017-09-06 PROCEDURE — 02HV33Z INSERTION OF INFUSION DEVICE INTO SUPERIOR VENA CAVA, PERCUTANEOUS APPROACH: ICD-10-PCS | Performed by: ANESTHESIOLOGY

## 2017-09-06 PROCEDURE — 02RX0JZ REPLACEMENT OF THORACIC AORTA, ASCENDING/ARCH WITH SYNTHETIC SUBSTITUTE, OPEN APPROACH: ICD-10-PCS | Performed by: THORACIC SURGERY (CARDIOTHORACIC VASCULAR SURGERY)

## 2017-09-06 PROCEDURE — 85384 FIBRINOGEN ACTIVITY: CPT | Performed by: THORACIC SURGERY (CARDIOTHORACIC VASCULAR SURGERY)

## 2017-09-06 PROCEDURE — 82948 REAGENT STRIP/BLOOD GLUCOSE: CPT

## 2017-09-06 PROCEDURE — P9012 CRYOPRECIPITATE EACH UNIT: HCPCS

## 2017-09-06 PROCEDURE — 88311 DECALCIFY TISSUE: CPT | Performed by: THORACIC SURGERY (CARDIOTHORACIC VASCULAR SURGERY)

## 2017-09-06 PROCEDURE — C1894 INTRO/SHEATH, NON-LASER: HCPCS | Performed by: THORACIC SURGERY (CARDIOTHORACIC VASCULAR SURGERY)

## 2017-09-06 PROCEDURE — 83036 HEMOGLOBIN GLYCOSYLATED A1C: CPT | Performed by: PHYSICIAN ASSISTANT

## 2017-09-06 PROCEDURE — 82803 BLOOD GASES ANY COMBINATION: CPT

## 2017-09-06 PROCEDURE — 88342 IMHCHEM/IMCYTCHM 1ST ANTB: CPT | Performed by: THORACIC SURGERY (CARDIOTHORACIC VASCULAR SURGERY)

## 2017-09-06 PROCEDURE — 86920 COMPATIBILITY TEST SPIN: CPT

## 2017-09-06 PROCEDURE — 71010 HB CHEST X-RAY 1 VIEW FRONTAL (PORTABLE): CPT

## 2017-09-06 PROCEDURE — 85610 PROTHROMBIN TIME: CPT | Performed by: THORACIC SURGERY (CARDIOTHORACIC VASCULAR SURGERY)

## 2017-09-06 PROCEDURE — 30233M1 TRANSFUSION OF NONAUTOLOGOUS PLASMA CRYOPRECIPITATE INTO PERIPHERAL VEIN, PERCUTANEOUS APPROACH: ICD-10-PCS | Performed by: ANESTHESIOLOGY

## 2017-09-06 PROCEDURE — 80048 BASIC METABOLIC PNL TOTAL CA: CPT | Performed by: PHYSICIAN ASSISTANT

## 2017-09-06 PROCEDURE — 84443 ASSAY THYROID STIM HORMONE: CPT | Performed by: PHYSICIAN ASSISTANT

## 2017-09-06 PROCEDURE — 86901 BLOOD TYPING SEROLOGIC RH(D): CPT | Performed by: NURSE PRACTITIONER

## 2017-09-06 PROCEDURE — 80061 LIPID PANEL: CPT | Performed by: PHYSICIAN ASSISTANT

## 2017-09-06 PROCEDURE — 82947 ASSAY GLUCOSE BLOOD QUANT: CPT

## 2017-09-06 PROCEDURE — 94760 N-INVAS EAR/PLS OXIMETRY 1: CPT

## 2017-09-06 PROCEDURE — 5A1221Z PERFORMANCE OF CARDIAC OUTPUT, CONTINUOUS: ICD-10-PCS | Performed by: THORACIC SURGERY (CARDIOTHORACIC VASCULAR SURGERY)

## 2017-09-06 PROCEDURE — 84484 ASSAY OF TROPONIN QUANT: CPT | Performed by: PHYSICIAN ASSISTANT

## 2017-09-06 PROCEDURE — 86900 BLOOD TYPING SEROLOGIC ABO: CPT | Performed by: THORACIC SURGERY (CARDIOTHORACIC VASCULAR SURGERY)

## 2017-09-06 PROCEDURE — 71260 CT THORAX DX C+: CPT

## 2017-09-06 PROCEDURE — 88313 SPECIAL STAINS GROUP 2: CPT | Performed by: THORACIC SURGERY (CARDIOTHORACIC VASCULAR SURGERY)

## 2017-09-06 PROCEDURE — 85027 COMPLETE CBC AUTOMATED: CPT | Performed by: PHYSICIAN ASSISTANT

## 2017-09-06 PROCEDURE — 86850 RBC ANTIBODY SCREEN: CPT | Performed by: NURSE PRACTITIONER

## 2017-09-06 PROCEDURE — 85730 THROMBOPLASTIN TIME PARTIAL: CPT | Performed by: THORACIC SURGERY (CARDIOTHORACIC VASCULAR SURGERY)

## 2017-09-06 PROCEDURE — 0W3D0ZZ CONTROL BLEEDING IN PERICARDIAL CAVITY, OPEN APPROACH: ICD-10-PCS | Performed by: THORACIC SURGERY (CARDIOTHORACIC VASCULAR SURGERY)

## 2017-09-06 PROCEDURE — 86901 BLOOD TYPING SEROLOGIC RH(D): CPT | Performed by: THORACIC SURGERY (CARDIOTHORACIC VASCULAR SURGERY)

## 2017-09-06 PROCEDURE — 84132 ASSAY OF SERUM POTASSIUM: CPT

## 2017-09-06 PROCEDURE — 85025 COMPLETE CBC W/AUTO DIFF WBC: CPT | Performed by: NURSE PRACTITIONER

## 2017-09-06 PROCEDURE — 30233N1 TRANSFUSION OF NONAUTOLOGOUS RED BLOOD CELLS INTO PERIPHERAL VEIN, PERCUTANEOUS APPROACH: ICD-10-PCS | Performed by: ANESTHESIOLOGY

## 2017-09-06 PROCEDURE — P9016 RBC LEUKOCYTES REDUCED: HCPCS

## 2017-09-06 PROCEDURE — 93312 ECHO TRANSESOPHAGEAL: CPT

## 2017-09-06 PROCEDURE — 85014 HEMATOCRIT: CPT

## 2017-09-06 PROCEDURE — 94002 VENT MGMT INPAT INIT DAY: CPT

## 2017-09-06 PROCEDURE — 86900 BLOOD TYPING SEROLOGIC ABO: CPT | Performed by: NURSE PRACTITIONER

## 2017-09-06 PROCEDURE — 0WJC0ZZ INSPECTION OF MEDIASTINUM, OPEN APPROACH: ICD-10-PCS | Performed by: THORACIC SURGERY (CARDIOTHORACIC VASCULAR SURGERY)

## 2017-09-06 PROCEDURE — 30233N0 TRANSFUSION OF AUTOLOGOUS RED BLOOD CELLS INTO PERIPHERAL VEIN, PERCUTANEOUS APPROACH: ICD-10-PCS | Performed by: ANESTHESIOLOGY

## 2017-09-06 PROCEDURE — 0WCD0ZZ EXTIRPATION OF MATTER FROM PERICARDIAL CAVITY, OPEN APPROACH: ICD-10-PCS | Performed by: THORACIC SURGERY (CARDIOTHORACIC VASCULAR SURGERY)

## 2017-09-06 PROCEDURE — P9035 PLATELET PHERES LEUKOREDUCED: HCPCS

## 2017-09-06 PROCEDURE — C1768 GRAFT, VASCULAR: HCPCS | Performed by: THORACIC SURGERY (CARDIOTHORACIC VASCULAR SURGERY)

## 2017-09-06 PROCEDURE — 82330 ASSAY OF CALCIUM: CPT

## 2017-09-06 PROCEDURE — 86927 PLASMA FRESH FROZEN: CPT

## 2017-09-06 PROCEDURE — 86850 RBC ANTIBODY SCREEN: CPT | Performed by: THORACIC SURGERY (CARDIOTHORACIC VASCULAR SURGERY)

## 2017-09-06 PROCEDURE — 85027 COMPLETE CBC AUTOMATED: CPT | Performed by: THORACIC SURGERY (CARDIOTHORACIC VASCULAR SURGERY)

## 2017-09-06 PROCEDURE — P9017 PLASMA 1 DONOR FRZ W/IN 8 HR: HCPCS

## 2017-09-06 PROCEDURE — 85049 AUTOMATED PLATELET COUNT: CPT | Performed by: THORACIC SURGERY (CARDIOTHORACIC VASCULAR SURGERY)

## 2017-09-06 PROCEDURE — 93306 TTE W/DOPPLER COMPLETE: CPT

## 2017-09-06 DEVICE — BARD® PTFE FELT, 2.5 CM X 15.2 CM
Type: IMPLANTABLE DEVICE | Site: AORTA | Status: FUNCTIONAL
Brand: BARD® PTFE FELT

## 2017-09-06 DEVICE — GRAFT VASCULAR AROTIC ARCH 26MM TERUMO: Type: IMPLANTABLE DEVICE | Site: AORTA | Status: FUNCTIONAL

## 2017-09-06 RX ORDER — ROCURONIUM BROMIDE 10 MG/ML
INJECTION, SOLUTION INTRAVENOUS AS NEEDED
Status: DISCONTINUED | OUTPATIENT
Start: 2017-09-06 | End: 2017-09-06 | Stop reason: SURG

## 2017-09-06 RX ORDER — MAGNESIUM HYDROXIDE 1200 MG/15ML
LIQUID ORAL AS NEEDED
Status: DISCONTINUED | OUTPATIENT
Start: 2017-09-06 | End: 2017-09-06 | Stop reason: HOSPADM

## 2017-09-06 RX ORDER — METHYLPREDNISOLONE SODIUM SUCCINATE 125 MG/2ML
INJECTION, POWDER, LYOPHILIZED, FOR SOLUTION INTRAMUSCULAR; INTRAVENOUS AS NEEDED
Status: DISCONTINUED | OUTPATIENT
Start: 2017-09-06 | End: 2017-09-06 | Stop reason: SURG

## 2017-09-06 RX ORDER — EPHEDRINE SULFATE 50 MG/ML
INJECTION, SOLUTION INTRAVENOUS AS NEEDED
Status: DISCONTINUED | OUTPATIENT
Start: 2017-09-06 | End: 2017-09-06 | Stop reason: SURG

## 2017-09-06 RX ORDER — FUROSEMIDE 10 MG/ML
40 INJECTION INTRAMUSCULAR; INTRAVENOUS EVERY 6 HOURS PRN
Status: DISCONTINUED | OUTPATIENT
Start: 2017-09-06 | End: 2017-09-06

## 2017-09-06 RX ORDER — PROTAMINE SULFATE 10 MG/ML
INJECTION, SOLUTION INTRAVENOUS AS NEEDED
Status: DISCONTINUED | OUTPATIENT
Start: 2017-09-06 | End: 2017-09-06 | Stop reason: SURG

## 2017-09-06 RX ORDER — POLYETHYLENE GLYCOL 3350 17 G/17G
17 POWDER, FOR SOLUTION ORAL DAILY
Status: DISCONTINUED | OUTPATIENT
Start: 2017-09-07 | End: 2017-09-11

## 2017-09-06 RX ORDER — AMIODARONE HYDROCHLORIDE 200 MG/1
200 TABLET ORAL EVERY 8 HOURS SCHEDULED
Status: DISCONTINUED | OUTPATIENT
Start: 2017-09-07 | End: 2017-09-11

## 2017-09-06 RX ORDER — FENTANYL CITRATE 50 UG/ML
50 INJECTION, SOLUTION INTRAMUSCULAR; INTRAVENOUS ONCE
Status: DISCONTINUED | OUTPATIENT
Start: 2017-09-06 | End: 2017-09-06

## 2017-09-06 RX ORDER — ATORVASTATIN CALCIUM 40 MG/1
40 TABLET, FILM COATED ORAL
Status: CANCELLED | OUTPATIENT
Start: 2017-09-06

## 2017-09-06 RX ORDER — POTASSIUM CHLORIDE 14.9 MG/ML
20 INJECTION INTRAVENOUS ONCE AS NEEDED
Status: DISCONTINUED | OUTPATIENT
Start: 2017-09-06 | End: 2017-09-09

## 2017-09-06 RX ORDER — PROPOFOL 10 MG/ML
INJECTION, EMULSION INTRAVENOUS CONTINUOUS PRN
Status: DISCONTINUED | OUTPATIENT
Start: 2017-09-06 | End: 2017-09-06

## 2017-09-06 RX ORDER — CALCIUM CHLORIDE 100 MG/ML
INJECTION INTRAVENOUS; INTRAVENTRICULAR AS NEEDED
Status: DISCONTINUED | OUTPATIENT
Start: 2017-09-06 | End: 2017-09-06 | Stop reason: SURG

## 2017-09-06 RX ORDER — SODIUM CHLORIDE 9 MG/ML
INJECTION, SOLUTION INTRAVENOUS CONTINUOUS PRN
Status: DISCONTINUED | OUTPATIENT
Start: 2017-09-06 | End: 2017-09-06 | Stop reason: SURG

## 2017-09-06 RX ORDER — BISACODYL 10 MG
10 SUPPOSITORY, RECTAL RECTAL DAILY PRN
Status: DISCONTINUED | OUTPATIENT
Start: 2017-09-06 | End: 2017-09-11

## 2017-09-06 RX ORDER — OXYCODONE HYDROCHLORIDE AND ACETAMINOPHEN 5; 325 MG/1; MG/1
1 TABLET ORAL EVERY 4 HOURS PRN
Status: DISCONTINUED | OUTPATIENT
Start: 2017-09-06 | End: 2017-09-11

## 2017-09-06 RX ORDER — ACETAMINOPHEN 650 MG/1
650 SUPPOSITORY RECTAL EVERY 4 HOURS PRN
Status: DISCONTINUED | OUTPATIENT
Start: 2017-09-06 | End: 2017-09-10

## 2017-09-06 RX ORDER — FENTANYL CITRATE 50 UG/ML
50 INJECTION, SOLUTION INTRAMUSCULAR; INTRAVENOUS
Status: DISCONTINUED | OUTPATIENT
Start: 2017-09-06 | End: 2017-09-10

## 2017-09-06 RX ORDER — MAGNESIUM SULFATE HEPTAHYDRATE 40 MG/ML
2 INJECTION, SOLUTION INTRAVENOUS ONCE
Status: COMPLETED | OUTPATIENT
Start: 2017-09-06 | End: 2017-09-06

## 2017-09-06 RX ORDER — ACETAMINOPHEN 325 MG/1
650 TABLET ORAL EVERY 4 HOURS PRN
Status: DISCONTINUED | OUTPATIENT
Start: 2017-09-06 | End: 2017-09-11

## 2017-09-06 RX ORDER — DESMOPRESSIN ACETATE 4 UG/ML
1 INJECTION, SOLUTION INTRAVENOUS; SUBCUTANEOUS ONCE
Status: DISCONTINUED | OUTPATIENT
Start: 2017-09-06 | End: 2017-09-06

## 2017-09-06 RX ORDER — AMIODARONE HYDROCHLORIDE 200 MG/1
200 TABLET ORAL EVERY 8 HOURS SCHEDULED
Status: DISCONTINUED | OUTPATIENT
Start: 2017-09-06 | End: 2017-09-06

## 2017-09-06 RX ORDER — MIDAZOLAM HYDROCHLORIDE 1 MG/ML
INJECTION INTRAMUSCULAR; INTRAVENOUS AS NEEDED
Status: DISCONTINUED | OUTPATIENT
Start: 2017-09-06 | End: 2017-09-06 | Stop reason: SURG

## 2017-09-06 RX ORDER — SUCCINYLCHOLINE CHLORIDE 20 MG/ML
INJECTION INTRAMUSCULAR; INTRAVENOUS AS NEEDED
Status: DISCONTINUED | OUTPATIENT
Start: 2017-09-06 | End: 2017-09-06 | Stop reason: SURG

## 2017-09-06 RX ORDER — FENTANYL CITRATE 50 UG/ML
INJECTION, SOLUTION INTRAMUSCULAR; INTRAVENOUS AS NEEDED
Status: DISCONTINUED | OUTPATIENT
Start: 2017-09-06 | End: 2017-09-06 | Stop reason: SURG

## 2017-09-06 RX ORDER — PROPOFOL 10 MG/ML
INJECTION, EMULSION INTRAVENOUS AS NEEDED
Status: DISCONTINUED | OUTPATIENT
Start: 2017-09-06 | End: 2017-09-06 | Stop reason: SURG

## 2017-09-06 RX ORDER — OXYCODONE HYDROCHLORIDE AND ACETAMINOPHEN 5; 325 MG/1; MG/1
2 TABLET ORAL EVERY 6 HOURS PRN
Status: DISCONTINUED | OUTPATIENT
Start: 2017-09-06 | End: 2017-09-11

## 2017-09-06 RX ORDER — HEPARIN SODIUM 1000 [USP'U]/ML
INJECTION, SOLUTION INTRAVENOUS; SUBCUTANEOUS AS NEEDED
Status: DISCONTINUED | OUTPATIENT
Start: 2017-09-06 | End: 2017-09-06 | Stop reason: SURG

## 2017-09-06 RX ORDER — POTASSIUM CHLORIDE 14.9 MG/ML
20 INJECTION INTRAVENOUS
Status: DISCONTINUED | OUTPATIENT
Start: 2017-09-06 | End: 2017-09-09

## 2017-09-06 RX ORDER — MILRINONE LACTATE 0.2 MG/ML
INJECTION, SOLUTION INTRAVENOUS CONTINUOUS PRN
Status: DISCONTINUED | OUTPATIENT
Start: 2017-09-06 | End: 2017-09-06 | Stop reason: SURG

## 2017-09-06 RX ORDER — PANTOPRAZOLE SODIUM 40 MG/1
40 TABLET, DELAYED RELEASE ORAL
Status: DISCONTINUED | OUTPATIENT
Start: 2017-09-07 | End: 2017-09-07

## 2017-09-06 RX ORDER — FENTANYL CITRATE 50 UG/ML
50 INJECTION, SOLUTION INTRAMUSCULAR; INTRAVENOUS ONCE
Status: COMPLETED | OUTPATIENT
Start: 2017-09-06 | End: 2017-09-06

## 2017-09-06 RX ORDER — CHLORHEXIDINE GLUCONATE 0.12 MG/ML
15 RINSE ORAL EVERY 12 HOURS SCHEDULED
Status: DISCONTINUED | OUTPATIENT
Start: 2017-09-06 | End: 2017-09-09

## 2017-09-06 RX ORDER — CALCIUM CHLORIDE 100 MG/ML
1 INJECTION INTRAVENOUS; INTRAVENTRICULAR ONCE
Status: DISCONTINUED | OUTPATIENT
Start: 2017-09-06 | End: 2017-09-06

## 2017-09-06 RX ORDER — ATENOLOL 50 MG/1
100 TABLET ORAL DAILY
Status: CANCELLED | OUTPATIENT
Start: 2017-09-06

## 2017-09-06 RX ORDER — POTASSIUM CHLORIDE 29.8 MG/ML
40 INJECTION INTRAVENOUS ONCE
Status: COMPLETED | OUTPATIENT
Start: 2017-09-06 | End: 2017-09-06

## 2017-09-06 RX ORDER — SODIUM CHLORIDE, SODIUM LACTATE, POTASSIUM CHLORIDE, CALCIUM CHLORIDE 600; 310; 30; 20 MG/100ML; MG/100ML; MG/100ML; MG/100ML
INJECTION, SOLUTION INTRAVENOUS CONTINUOUS PRN
Status: DISCONTINUED | OUTPATIENT
Start: 2017-09-06 | End: 2017-09-06 | Stop reason: SURG

## 2017-09-06 RX ORDER — ALBUMIN, HUMAN INJ 5% 5 %
SOLUTION INTRAVENOUS CONTINUOUS PRN
Status: DISCONTINUED | OUTPATIENT
Start: 2017-09-06 | End: 2017-09-06 | Stop reason: SURG

## 2017-09-06 RX ORDER — DEXTROSE MONOHYDRATE 25 G/50ML
INJECTION, SOLUTION INTRAVENOUS
Status: DISCONTINUED
Start: 2017-09-06 | End: 2017-09-06 | Stop reason: WASHOUT

## 2017-09-06 RX ORDER — SODIUM CHLORIDE 450 MG/100ML
20 INJECTION, SOLUTION INTRAVENOUS CONTINUOUS
Status: DISCONTINUED | OUTPATIENT
Start: 2017-09-06 | End: 2017-09-11

## 2017-09-06 RX ORDER — ATORVASTATIN CALCIUM 80 MG/1
80 TABLET, FILM COATED ORAL
Status: DISCONTINUED | OUTPATIENT
Start: 2017-09-06 | End: 2017-09-11

## 2017-09-06 RX ORDER — FONDAPARINUX SODIUM 2.5 MG/.5ML
2.5 INJECTION SUBCUTANEOUS DAILY
Status: DISCONTINUED | OUTPATIENT
Start: 2017-09-07 | End: 2017-09-10

## 2017-09-06 RX ORDER — POTASSIUM CHLORIDE 29.8 MG/ML
INJECTION INTRAVENOUS
Status: COMPLETED
Start: 2017-09-06 | End: 2017-09-06

## 2017-09-06 RX ORDER — ACETAMINOPHEN 325 MG/1
650 TABLET ORAL EVERY 4 HOURS PRN
Status: CANCELLED | OUTPATIENT
Start: 2017-09-06

## 2017-09-06 RX ORDER — ASPIRIN 325 MG
325 TABLET ORAL DAILY
Status: DISCONTINUED | OUTPATIENT
Start: 2017-09-07 | End: 2017-09-11

## 2017-09-06 RX ORDER — POTASSIUM CHLORIDE 14.9 MG/ML
INJECTION INTRAVENOUS CONTINUOUS PRN
Status: DISCONTINUED | OUTPATIENT
Start: 2017-09-06 | End: 2017-09-06 | Stop reason: SURG

## 2017-09-06 RX ORDER — ONDANSETRON 2 MG/ML
4 INJECTION INTRAMUSCULAR; INTRAVENOUS EVERY 6 HOURS PRN
Status: DISCONTINUED | OUTPATIENT
Start: 2017-09-06 | End: 2017-09-11

## 2017-09-06 RX ORDER — LABETALOL HYDROCHLORIDE 5 MG/ML
10 INJECTION, SOLUTION INTRAVENOUS ONCE
Status: COMPLETED | OUTPATIENT
Start: 2017-09-06 | End: 2017-09-06

## 2017-09-06 RX ADMIN — PROPOFOL 100 MG: 10 INJECTION, EMULSION INTRAVENOUS at 16:04

## 2017-09-06 RX ADMIN — INSULIN HUMAN 20 UNITS: 100 INJECTION, SOLUTION PARENTERAL at 13:16

## 2017-09-06 RX ADMIN — VASOPRESSIN 0.02 UNITS/MIN: 20 INJECTION INTRAVENOUS at 14:13

## 2017-09-06 RX ADMIN — SODIUM BICARBONATE 50 MEQ: 84 INJECTION INTRAVENOUS at 15:51

## 2017-09-06 RX ADMIN — ALBUMIN HUMAN: 0.05 INJECTION, SOLUTION INTRAVENOUS at 09:20

## 2017-09-06 RX ADMIN — EPHEDRINE SULFATE 5 MG: 50 INJECTION, SOLUTION INTRAMUSCULAR; INTRAVENOUS; SUBCUTANEOUS at 19:39

## 2017-09-06 RX ADMIN — ROCURONIUM BROMIDE 100 MG: 10 INJECTION, SOLUTION INTRAVENOUS at 19:28

## 2017-09-06 RX ADMIN — SODIUM CHLORIDE 15 UNITS/HR: 9 INJECTION, SOLUTION INTRAVENOUS at 10:10

## 2017-09-06 RX ADMIN — IOHEXOL 100 ML: 350 INJECTION, SOLUTION INTRAVENOUS at 07:05

## 2017-09-06 RX ADMIN — SODIUM CHLORIDE: 0.9 INJECTION, SOLUTION INTRAVENOUS at 19:27

## 2017-09-06 RX ADMIN — HEPARIN SODIUM 42000 UNITS: 1000 INJECTION INTRAVENOUS; SUBCUTANEOUS at 10:42

## 2017-09-06 RX ADMIN — POTASSIUM CHLORIDE 40 MEQ: 400 INJECTION, SOLUTION INTRAVENOUS at 17:02

## 2017-09-06 RX ADMIN — ROCURONIUM BROMIDE 50 MG: 10 INJECTION, SOLUTION INTRAVENOUS at 09:16

## 2017-09-06 RX ADMIN — FENTANYL CITRATE 50 MCG: 50 INJECTION INTRAMUSCULAR; INTRAVENOUS at 19:20

## 2017-09-06 RX ADMIN — ALUMINUM HYDROXIDE, MAGNESIUM HYDROXIDE, AND SIMETHICONE 30 ML: 200; 200; 20 SUSPENSION ORAL at 06:11

## 2017-09-06 RX ADMIN — FENTANYL CITRATE 500 MCG: 50 INJECTION, SOLUTION INTRAMUSCULAR; INTRAVENOUS at 09:09

## 2017-09-06 RX ADMIN — AMINOCAPROIC ACID 1 G/HR: 250 INJECTION, SOLUTION INTRAVENOUS at 17:00

## 2017-09-06 RX ADMIN — MIDAZOLAM HYDROCHLORIDE 10 MG: 1 INJECTION, SOLUTION INTRAMUSCULAR; INTRAVENOUS at 09:09

## 2017-09-06 RX ADMIN — SODIUM CHLORIDE 10 MG/HR: 0.9 INJECTION, SOLUTION INTRAVENOUS at 10:09

## 2017-09-06 RX ADMIN — MIDAZOLAM HYDROCHLORIDE 5 MG: 1 INJECTION, SOLUTION INTRAMUSCULAR; INTRAVENOUS at 19:42

## 2017-09-06 RX ADMIN — INSULIN HUMAN 10 UNITS: 100 INJECTION, SOLUTION PARENTERAL at 10:10

## 2017-09-06 RX ADMIN — SODIUM BICARBONATE 50 MEQ: 84 INJECTION INTRAVENOUS at 15:49

## 2017-09-06 RX ADMIN — ALBUMIN HUMAN: 0.05 INJECTION, SOLUTION INTRAVENOUS at 09:05

## 2017-09-06 RX ADMIN — PROPOFOL 100 MG: 10 INJECTION, EMULSION INTRAVENOUS at 10:07

## 2017-09-06 RX ADMIN — POTASSIUM CHLORIDE: 200 INJECTION, SOLUTION INTRAVENOUS at 14:57

## 2017-09-06 RX ADMIN — MORPHINE SULFATE 1 MG: 4 INJECTION, SOLUTION INTRAMUSCULAR; INTRAVENOUS at 06:12

## 2017-09-06 RX ADMIN — DESMOPRESSIN ACETATE 1 MCG: 4 SOLUTION INTRAVENOUS at 16:30

## 2017-09-06 RX ADMIN — AMINOCAPROIC ACID 1000 MG/HR: 250 INJECTION, SOLUTION INTRAVENOUS at 09:51

## 2017-09-06 RX ADMIN — FENTANYL CITRATE 500 MCG: 50 INJECTION, SOLUTION INTRAMUSCULAR; INTRAVENOUS at 19:42

## 2017-09-06 RX ADMIN — DESMOPRESSIN ACETATE 32 MCG: 4 SOLUTION INTRAVENOUS at 17:15

## 2017-09-06 RX ADMIN — VASOPRESSIN 0.4 UNITS: 20 INJECTION, SOLUTION INTRAMUSCULAR; SUBCUTANEOUS at 09:15

## 2017-09-06 RX ADMIN — FENTANYL CITRATE 50 MCG: 50 INJECTION INTRAMUSCULAR; INTRAVENOUS at 23:38

## 2017-09-06 RX ADMIN — SODIUM CHLORIDE 11 MG/HR: 0.9 INJECTION, SOLUTION INTRAVENOUS at 19:50

## 2017-09-06 RX ADMIN — FENTANYL CITRATE 50 MCG: 50 INJECTION INTRAMUSCULAR; INTRAVENOUS at 19:09

## 2017-09-06 RX ADMIN — SODIUM CHLORIDE, SODIUM LACTATE, POTASSIUM CHLORIDE, AND CALCIUM CHLORIDE: .6; .31; .03; .02 INJECTION, SOLUTION INTRAVENOUS at 08:50

## 2017-09-06 RX ADMIN — FENTANYL CITRATE 100 MCG: 50 INJECTION INTRAMUSCULAR; INTRAVENOUS at 17:55

## 2017-09-06 RX ADMIN — SODIUM CHLORIDE, SODIUM LACTATE, POTASSIUM CHLORIDE, AND CALCIUM CHLORIDE: .6; .31; .03; .02 INJECTION, SOLUTION INTRAVENOUS at 19:27

## 2017-09-06 RX ADMIN — SODIUM BICARBONATE 50 MEQ: 84 INJECTION INTRAVENOUS at 15:05

## 2017-09-06 RX ADMIN — MILRINONE LACTATE IN DEXTROSE 0.5 MCG/KG/MIN: 200 INJECTION, SOLUTION INTRAVENOUS at 13:36

## 2017-09-06 RX ADMIN — INSULIN HUMAN 10 UNITS: 100 INJECTION, SOLUTION PARENTERAL at 11:25

## 2017-09-06 RX ADMIN — SODIUM BICARBONATE 50 MEQ: 84 INJECTION INTRAVENOUS at 15:50

## 2017-09-06 RX ADMIN — AMINOCAPROIC ACID 1000 MG/HR: 250 INJECTION, SOLUTION INTRAVENOUS at 19:27

## 2017-09-06 RX ADMIN — SODIUM CHLORIDE 5 MG/HR: 0.9 INJECTION, SOLUTION INTRAVENOUS at 16:00

## 2017-09-06 RX ADMIN — AMINOCAPROIC ACID 5 G: 250 INJECTION, SOLUTION INTRAVENOUS at 15:54

## 2017-09-06 RX ADMIN — COAGULATION FACTOR VIIA (RECOMBINANT) 5 MG: KIT at 17:45

## 2017-09-06 RX ADMIN — SODIUM CHLORIDE 20 ML/HR: 0.45 INJECTION, SOLUTION INTRAVENOUS at 18:00

## 2017-09-06 RX ADMIN — CEFAZOLIN SODIUM 2000 MG: 2 SOLUTION INTRAVENOUS at 19:45

## 2017-09-06 RX ADMIN — EPHEDRINE SULFATE 5 MG: 50 INJECTION, SOLUTION INTRAMUSCULAR; INTRAVENOUS; SUBCUTANEOUS at 19:38

## 2017-09-06 RX ADMIN — ALBUMIN HUMAN: 0.05 INJECTION, SOLUTION INTRAVENOUS at 09:14

## 2017-09-06 RX ADMIN — AMINOCAPROIC ACID 5 G: 250 INJECTION, SOLUTION INTRAVENOUS at 09:51

## 2017-09-06 RX ADMIN — PROTAMINE SULFATE 420 MG: 10 INJECTION, SOLUTION INTRAVENOUS at 14:00

## 2017-09-06 RX ADMIN — ROCURONIUM BROMIDE 50 MG: 10 INJECTION, SOLUTION INTRAVENOUS at 09:49

## 2017-09-06 RX ADMIN — LABETALOL HYDROCHLORIDE 10 MG: 5 INJECTION, SOLUTION INTRAVENOUS at 23:26

## 2017-09-06 RX ADMIN — MIDAZOLAM HYDROCHLORIDE 5 MG: 1 INJECTION, SOLUTION INTRAMUSCULAR; INTRAVENOUS at 19:28

## 2017-09-06 RX ADMIN — SUCCINYLCHOLINE CHLORIDE 200 MG: 20 INJECTION, SOLUTION INTRAMUSCULAR; INTRAVENOUS at 09:09

## 2017-09-06 RX ADMIN — EPHEDRINE SULFATE 5 MG: 50 INJECTION, SOLUTION INTRAMUSCULAR; INTRAVENOUS; SUBCUTANEOUS at 19:37

## 2017-09-06 RX ADMIN — INSULIN HUMAN 10 UNITS: 100 INJECTION, SOLUTION PARENTERAL at 10:55

## 2017-09-06 RX ADMIN — CEFAZOLIN SODIUM 2000 MG: 2 SOLUTION INTRAVENOUS at 09:36

## 2017-09-06 RX ADMIN — EPINEPHRINE 3 MCG/MIN: 1 INJECTION PARENTERAL at 16:00

## 2017-09-06 RX ADMIN — EPINEPHRINE 4 MCG/MIN: 1 INJECTION PARENTERAL at 13:36

## 2017-09-06 RX ADMIN — METHYLPREDNISOLONE SODIUM SUCCINATE 1000 MG: 1 INJECTION, POWDER, FOR SOLUTION INTRAMUSCULAR; INTRAVENOUS at 09:51

## 2017-09-06 RX ADMIN — CALCIUM CHLORIDE 1 G: 100 INJECTION INTRAVENOUS; INTRAVENTRICULAR at 15:47

## 2017-09-06 RX ADMIN — FENTANYL CITRATE 500 MCG: 50 INJECTION, SOLUTION INTRAMUSCULAR; INTRAVENOUS at 19:28

## 2017-09-06 RX ADMIN — CEFAZOLIN SODIUM 2000 MG: 2 SOLUTION INTRAVENOUS at 13:35

## 2017-09-06 RX ADMIN — SODIUM CHLORIDE: 0.9 INJECTION, SOLUTION INTRAVENOUS at 08:50

## 2017-09-06 RX ADMIN — SODIUM CHLORIDE 20 UNITS/HR: 9 INJECTION, SOLUTION INTRAVENOUS at 16:00

## 2017-09-06 RX ADMIN — POTASSIUM CHLORIDE 40 MEQ: 29.8 INJECTION INTRAVENOUS at 17:02

## 2017-09-06 RX ADMIN — MAGNESIUM SULFATE HEPTAHYDRATE 2 G: 40 INJECTION, SOLUTION INTRAVENOUS at 16:53

## 2017-09-06 RX ADMIN — VASOPRESSIN 0.04 UNITS/MIN: 20 INJECTION INTRAVENOUS at 09:13

## 2017-09-06 RX ADMIN — SODIUM BICARBONATE 50 MEQ: 84 INJECTION INTRAVENOUS at 15:03

## 2017-09-06 RX ADMIN — NOREPINEPHRINE BITARTRATE 4 MCG/MIN: 1 INJECTION, SOLUTION, CONCENTRATE INTRAVENOUS at 09:09

## 2017-09-06 RX ADMIN — VASOPRESSIN 0.2 UNITS: 20 INJECTION, SOLUTION INTRAMUSCULAR; SUBCUTANEOUS at 09:13

## 2017-09-06 RX ADMIN — FENTANYL CITRATE 500 MCG: 50 INJECTION, SOLUTION INTRAMUSCULAR; INTRAVENOUS at 09:11

## 2017-09-06 NOTE — PROGRESS NOTES
Case discussed with MAUREEN Alvarenga  72 yo M with chest/back/abd pain and CT scan showing acute ascending aortic (Type A) dissection, with significant pericardial effusion indicative of impending tamponade  PMHx notable for prostate CA, DM, HTN  Last A1C was 7 x  Other lab values are WNL's  Will arrange for emergent transfer to [facility] for operative repair      Patricia Montero MD  09/06/17  8:07 AM

## 2017-09-06 NOTE — PROGRESS NOTES
Around 0615 this morning the patient's bed alarm went off  I found the patient trying to sit up in bed  He stated "I don't feel good, I just need to sit up"  He was very diaphoretic, dizzy, and complaining of L lower abdominal pain  I palpated his abdomen and felt some tenderness and the patient complained of pain upon palpation  I grabbed his vital signs and checked his blood sugar as I paged the [facility]  His vitals were stable at the time /73, , O2 97% Blood sugar 286  She advised to give him his pain med and a fluid bolus and she would be up later on to examine him  His blood pressure then dropped to 95/76 and I got [facility] admitting Johanne Cruz MD to come and examine him while Marlon Love stayed with him in the room  Dr Derek Curiel ordered an EKG, troponins, CBC, Magnesium, and Phosphorus to be done and we started fluids at 125mL/hr per Dr Gio Manzanares orders  She was then concerned that he may have an abdominal anyerusm or dissection and ordered a CT of his abdomen  Myself and Domenico Appiah transported the patient down to CT and then to the ICU

## 2017-09-06 NOTE — PROGRESS NOTES
Patient presented with chest pain yesterday, overnight had left lower abdominal quadrant pain  Due to this nocturnist sent patient for a CT chest/abdomen to rule out dissection  On CT revealed a type A aortic dissection  Patient was emergently transferred to ICU  Call was placed to Cardiac surgeon Dr Silvana Lucas, plan is to send patient emergently to B  CT shows evidence of cardiac tamponade, will order stat echocardiogram and call cardiology

## 2017-09-06 NOTE — SIGNIFICANT EVENT
Called to see patient said to be diaphoretic and restless  Per nurse, patient's bed alarm went off and when she came to patients bed side, hewasvery restless, diaphoretic and complaining of abdominal pain  Patient was then given 1 mg of morphine IV by nurse  Pt seen and examined at bedside  Denies any more abdominal pain  BP 87/56  EKG shows some T wave flattening    General: Restless, diaphoretic  Abdomen: Obese, no TTP  Heart: RRR    A/P  Acute abdominal pain:   Pt hada CT A/P done earlier which was unremarkable for any acute finding  Will repeat CT A/P with and without contrast    Hypotension  IV fluid NSS bolus  Check troponin, BMP    Will want patient transferred to the ICU  Discussed with ICU NP about evaluation for possible transfer to ICU   She states she will see the patient immediately and evaluate

## 2017-09-06 NOTE — CONSULTS
Consultation - Cardiothoracic Surgery   Ivonne Marcial  71 y o  male MRN: [id number]  Unit/Bed#: OR [place] Encounter: [id number]    History of Present Illness   Physician Requesting Consult: Gregg Loja MD  Reason for Consult / Principal Problem: acute ascending aortic (type A) dissection    HPI: Ivonne Marcial  is a 71y o  year old male with a past medical history notable for hypertension and diabetes who presented to [facility] with complaints of chest/back/abdominal pain  In the ER he was found to be hypertensive with /105 and elevated glucose in the 200s  He had a CT scan of the chest, abdomen and pelvis noting a type A aortic dissection he was emergently transferred to [facility] in [place] for surgical intervention  Consults    Review of Systems: not able to be obtained, taken emergently to OR    Historical Information   Past Medical History:   Diagnosis Date    Diabetes     Hypertension     Prostate cancer      No past surgical history on file    History   Alcohol Use No     History   Drug Use No     History   Smoking Status    Never Smoker   Smokeless Tobacco    Not on file     Family History: non-contributory    Meds/Allergies   all current active meds have been reviewed  No Known Allergies    Objective   Vitals (preprocedure)  /71, HR 90, SpO2 100      Invasive Devices     Central Venous Catheter Line            CVC Central Lines 09/06/17 Triple less than 1 day    Introducer 09/06/17 less than 1 day          Peripheral Intravenous Line            Peripheral IV 09/05/17 Left Antecubital 1 day    Peripheral IV 09/06/17 Left Forearm less than 1 day    Peripheral IV 09/06/17 Right Forearm less than 1 day          Arterial Line            Arterial Line 09/06/17 Radial less than 1 day    Arterial Line 09/06/17 Right Femoral less than 1 day    Arterial Line 09/06/17 Right Femoral less than 1 day          Line            Pacer Wires less than 1 day    Pacer Wires less than 1 day Drain            Chest Tube 1 Posterior Mediastinal 32 Fr  less than 1 day    Chest Tube 2 Anterior Mediastinal 32 Fr  less than 1 day    Urethral Catheter Non-latex; Temperature probe 16 Fr  less than 1 day          Airway            ETT  Cuffed;Oral 8 mm less than 1 day                Physical Exam   Constitutional: He is oriented to person, place, and time  He appears well-developed and well-nourished  HENT:   Head: Normocephalic and atraumatic  Right Ear: External ear normal    Left Ear: External ear normal    Nose: Nose normal    Mouth/Throat: Oropharynx is clear and moist  No oropharyngeal exudate  Eyes: Conjunctivae are normal  Pupils are equal, round, and reactive to light  Right eye exhibits no discharge  Left eye exhibits no discharge  No scleral icterus  Neck: Normal range of motion  Neck supple  No tracheal deviation present  Cardiovascular: Normal rate and regular rhythm  Pulmonary/Chest: Effort normal and breath sounds normal  No respiratory distress  Abdominal: Soft  Bowel sounds are normal    Musculoskeletal: Normal range of motion  He exhibits edema  Neurological: He is alert and oriented to person, place, and time  Lab Results:   Results from last 7 days  Lab Units 09/06/17  1546 09/06/17  1516 09/06/17  1457 09/06/17  1408  09/05/17  1219   WBC Thousand/uL  --   --   --   --   --  8 31   HEMOGLOBIN g/dL  --   --   --   --   --  14 2   I STAT HEMOGLOBIN g/dl 7 5*  --  9 2* 9 2*  < >  --    HEMATOCRIT %  --   --   --   --   --  41 6   PLATELETS Thousands/uL  --  74*  --   --   --  178   < > = values in this interval not displayed      Results from last 7 days  Lab Units 09/06/17  1546  09/06/17  0633 09/05/17  1219   SODIUM mmol/L  --   --  131* 140   POTASSIUM mmol/L  --   --  2 9* 4 1   CHLORIDE mmol/L  --   --  94* 100   CO2 mmol/L  --   --  21 36*   BUN mg/dL  --   --  13 14   CREATININE mg/dL  --   --  1 05 0 88   GLUCOSE RANDOM mg/dL  --   --  334* 210*   GLUCOSE, ISTAT mg/dl 207*  < >  --   --    CALCIUM mg/dL  --   --  9 0 9 5   < > = values in this interval not displayed  Results from last 7 days  Lab Units 09/05/17  1219   INR  0 93   PTT seconds 24     Lab Results   Component Value Date    HGBA1C 7 1 (H) 09/06/2017     Lab Results   Component Value Date    CKTOTAL 72 01/15/2014    TROPONINI 0 03 09/06/2017       Imaging Studies:  Ct chest abdomen pelvis  Impression:  [place] A ([place] I) aortic dissection  Please see full discussion for details  Moderate probably bloody pericardial effusion  Edema in the middle mediastinal fat extending into the right pulmonary hilum, could not definitively exclude small amounts of blood in the mediastinum although no point of extravasation is seen  Narrowing of the right main pulmonary artery most likely due to mass effect from pericardial fluid rather than pulmonary embolism  Left renal cortex enhances very slightly less well than the right renal cortex, likely because the left renal artery arises from the false lumen of the dissected aorta      Assessment:  Patient Active Problem List    Diagnosis Date Noted    Acute thoracic aortic dissection 09/06/2017    Chest pain 09/05/2017    Hypertensive urgency 09/05/2017    Prostate cancer     Hypertension     Diabetes        Plan:  Emergent Aortic Surgery    Yessica Sanchez PA-C  4:01 PM  09/06/17

## 2017-09-06 NOTE — OP NOTE
OPERATIVE REPORT  PATIENT NAME: Ivonne Marcial  :  1945  MRN: [id number]  Pt Location: BE OR ROOM 10    SURGERY DATE: 2017    SURGEON: Gregg Loja MD     ASSISTANT: Halley Morgan PA-C    ADDITIONAL ASSISTANT: None    PREOPERATIVE DIAGNOSIS: Acute type A aortic dissection, Ascending aortic aneurysm    POSTOPERATIVE DIAGNOSIS: Acute type A aortic dissection, Ascending aortic aneurysm     NYHA CLASS: 4    CCS CLASS: 4    PROCEDURE: Replacement of ascending aorta with aggressive hemiarch reconstruction with a 26 mm Dacron graft  ANESTHESIA: General endotracheal anesthesia with transesophageal echocardiogram guidance, Dr Nba Sanford BLOOD LOSS: 200 mL  TRANSFUSIONS: 2u Plts, 2u FFP, 10u Cryoprecipitate     SPECIMENS: Ascending aorta  CARDIOPULMONARY BYPASS TIME: 170 minutes  CROSSCLAMP TIME: 142 minutes  ANTEGRADE CEREBRAL PERFUSION/CIRCULATORY ARREST TIME: 32 minutes    CHEST TUBES;  X 2     PACING WIRES: A x1, v x1  OPERATIVE TECHNIQUE: The patient was taken to the operating room and placed supine on the operating table  Following the satisfactory induction of general anesthesia and placement of monitoring lines, the patient was prepped and draped in the usual sterile fashion  A time-out procedure was performed  The patient underwent median sternotomy, pericardiotomy, and systemic heparinization  A large amount of free-flowing blood and clot was encountered in the pericardial space and was drained  The hemodynamics immediately improved  The ascending aorta was obviously dissected and had extensive adventitial hematoma  The innominate vein was mobilized and the innominate artery was identified and encircled with an umbilical tape  A 12 FR cannula was placed in the innominate artery  Using SIMON and epiaortic guidance, a needle and wire were passed into the true lumen of the distal ascending aorta    The position of the wire in the true lumen of the descending aorta was confirmed  The tract was serially dilated and an 18 FR cannula was placed  The two arterial cannulae were connected to the arterial side of the cardiopulmonary bypass circuit in "Y" fashion  The patient underwent right atrial cannulation and was initiated on bypass  A left ventricular vent was placed through the right superior pulmonary vein  Cooling was initiated to 28 degrees Celsius  The ascending aorta was crossclamped  Aortotomy was performed  The coronary ostia were directly visualized and antegrade [place] cardioplegia was delivered directly down the coronary ostia with an excellent arrest  Two additional doses of antegrade [place] cardioplegia were delivered throughout the remainder of the crossclamp period  The ascending aorta was transected and resected up to the level of the crossclamp  The proximal extent of the intimal tear could just be visualized at the level of the crossclamp  The aorta was mobilized above the level of the crossclamp to the extent possible with the cross-clamp in position  Attention was turned to the root  The aorta was resected down to the level of the sinotubular junction  The dissection flap did extend down into the root, however, there was no involvement of the coronary arteries and there was no intimal involvement in the root  The valve was a structurally normal trileaflet valve  I felt that the valve could be spared  The commissures were not involved and therefore did not require resuspension  A piece of felt was placed as a bobby-media between the intima and the adventitia and secured in place with a running 4-0 Prolene suture  At this point, the patient had reached a core temperature of 28 degrees  A 26 mm graft with a side-arm for perfusion was selected  The patient was placed in Trendelenburg position   The proximal innominate artery was snared, the aortic cannula was clamped, and the cardiopulmonary bypass flow was turned down to 1 liter per minute, thus establishing a period of systemic circulatory arrest with antegrade cerebral perfusion  The crossclamp and aortic cannula were removed from the ascending aorta  A very large intimal tear was identified on the lesser curve of the aortic arch which extended well into the lesser curve of the arch  The remaining ascending aorta and proximal arch were aggressively resected and beveled into the undersurface of the arch in order to completely resect the intimal tear  A piece of felt was placed as a bobby-media between the intima and the adventitia and secured in place with running 4-0 Prolene suture  The 26 mm graft was brought to the field, beveled appropriately, and sutured to the ascending aorta in an end-to-end fashion using running 4-0 Prolene suture  The side-arm of the graft was cannulated with the aortic cannula, the graft was de-aired and crossclamped  The snare was removed from the proximal innominate artery and the cardiopulmonary bypass flow was returned to normal  The circulatory arrest time with antegrade cerebral perfusion was 32 minutes  Bleeding points were secured with pledgeted 3-0 sutures  The graft was trimmed to the appropriate length  Rewarming was initiated  Attention was turned to the root  The 26 mm graft was anastomosed to the sinotubular junction in an end-to-end fashion using running 4-0 Prolene suture  The bobby-root was filled with antegrade cardioplegia and the anastomosis and the valve were tested  The anastomosis appeared to be hemostatic  The valve appeared to be competent  The proximal graft was trimmed to the appropriate length and anastomosed to the distal graft in end-to-end fashion using running 4-0 Prolene suture  A de-airing vent was placed in the neoaorta  The heart was extensively de-aired and the crossclamp was removed  Atrial and ventricular pacing wires were placed   Following a period of reperfusion, the patient was weaned from cardiopulmonary bypass and decannulated  Protamine was administered with normalization of the ACT  There was significant coagulopathy requiring the transfusion of multiple blood products  Hemostasis was then adequate  Thoracostomy tubes were placed  The sternum was closed with stainless steel wires  The fascia, subdermis, and skin were closed with multiple layers of running absorbable suture  As the attending surgeon, I was present and scrubbed for all critical portions of this procedure  There was no qualified surgical resident available  Sponge, needle, and instrument counts were reported as correct by the nursing staff  The assistance of a PA was required to complete this case, specifically for assistance with cannulation, decannulation, retraction, and exposure during ascending aorta replacement  Final transesophageal echocardiogram demonstrated normal biventricular function and normal aortic valve function         John Paul Bruno MD  DATE: September 6, 2017  TIME: 3:12 PM

## 2017-09-06 NOTE — PLAN OF CARE
CARDIOVASCULAR - ADULT     Maintains optimal cardiac output and hemodynamic stability Progressing     Absence of cardiac dysrhythmias or at baseline rhythm Progressing        DISCHARGE PLANNING     Discharge to home or other facility with appropriate resources Progressing        INFECTION - ADULT     Absence or prevention of progression during hospitalization Progressing     Absence of fever/infection during neutropenic period Progressing        Knowledge Deficit     Patient/family/caregiver demonstrates understanding of disease process, treatment plan, medications, and discharge instructions Progressing        METABOLIC, FLUID AND ELECTROLYTES - ADULT     Electrolytes maintained within normal limits Progressing     Fluid balance maintained Progressing        PAIN - ADULT     Verbalizes/displays adequate comfort level or baseline comfort level Progressing        Potential for Falls     Patient will remain free of falls Progressing        SAFETY ADULT     Maintain or return to baseline ADL function Progressing     Maintain or return mobility status to optimal level Progressing

## 2017-09-06 NOTE — CONSULTS
Consult - Cardiothoracic Surgery Critical Care    Ava Romeo  71 y o  male MRN: [id number]  Unit/Bed#: [facility] 416-01 Encounter: [id number]      Physician Requesting Consult: Kwaku Heart MD    Reason for Consult / Principal Problem: Acute thoracic aortic dissection    HPI: Ava Romeo  is a 71 y o  male  with PMH significant for HTN, HLD, DM II, obesity, microalbuminuria, LE edema, L basal ganglia hemorrhagic stroke in 2014, prostate CA, Bell's palsy that presented to [facility] ED on 9/5/2017 with sudden onset CP with radiation to his left arm and was found to be hypertensive 234/105 and bradycardic in the 50's (pt reported compliance to home antihypertensive medications)  On the morning of 9/6/2017 the patient had acute episode of feeling unwell and hypotension and sent for CT scan which showed: Type A dissection, moderate pericardial effusion     Pt presents to ICU post op Replacement of ascending aorta with aggressive hemiarch reconstruction with a 26 mm Dacron graft    CARDIOPULMONARY BYPASS TIME: 170 minutes  CROSSCLAMP TIME: 142 minutes  ANTEGRADE CEREBRAL PERFUSION/CIRCULATORY ARREST TIME: 32 minutes    Intraop  Easy intubation, grade 1 view  SIMON 80%  2 PLTS/2 FFP/ 2 Cryo/ 2 PRBC  1500 cellsaver/1500 NS/600 LR/  Bicarb 5 amps  CaCl 2 grams  Epi/Milrinone/NE/ Vasopressin    On arrival to ICU: 2 FFP/ 2 Plts/ 2 Cryo/ 1 PRBC/ DDAVP/ Novo7  100 ml via CT in 15 mins, 550 in first 1 hour pleuravac reconnected  Cardene started    History obtained from chart review due to patient being intubated and sedated  PMH:   Past Medical History:   Diagnosis Date    Diabetes     Hypertension     Prostate cancer        PSH: History reviewed  No pertinent surgical history  Family History: History reviewed  No pertinent family history      Social History:   History   Smoking Status    Never Smoker   Smokeless Tobacco    Not on file      History   Alcohol Use No      Marital Status:     ROS: ROS unable to be obtained secondary to intubation and sedation  Allergies: No Known Allergies    Home Medications:   Prior to Admission medications    Medication Sig Start Date End Date Taking?  Authorizing Provider   amLODIPine-benazepril (LOTREL) 10-40 MG per capsule Take 1 capsule by mouth daily    Historical Provider, MD   atenolol (TENORMIN) 100 mg tablet Take 100 mg by mouth daily    Historical Provider, MD   glimepiride (AMARYL) 4 mg tablet Take 4 mg by mouth every morning before breakfast    Historical Provider, MD   metFORMIN (GLUCOPHAGE) 1000 MG tablet Take 1,000 mg by mouth 2 (two) times a day with meals    Historical Provider, MD   Omega-3 Fatty Acids (FISH OIL OMEGA-3 PO) Take 1 tablet by mouth daily    Historical Provider, MD   triamterene (DYRENIUM) 50 mg capsule Take 50 mg by mouth daily    Historical Provider, MD   Valsartan-Hydrochlorothiazide (DIOVAN HCT PO) Take 1 tablet by mouth daily    Historical Provider, MD       Inpatient Medications:  Scheduled Meds:    amiodarone 200 mg Oral Q8H [facility] & assisted   [START ON 9/7/2017] aspirin 325 mg Oral Daily   atorvastatin 80 mg Oral HS   calcium chloride 1 g Intravenous Once   cefazolin 3,000 mg Intravenous Q8H   chlorhexidine 15 mL Swish & Spit Q12H [facility] & assisted   coagulation factor VIIa recombinant 5 mg Intravenous Once   desmopressin 0 3 mcg/kg Intravenous Once   fentanyl citrate (PF) 50 mcg Intravenous Once   [START ON 9/7/2017] fondaparinux 2 5 mg Subcutaneous Daily   magnesium sulfate 2 g Intravenous Once   mupirocin 1 application Nasal T67Y [facility] & assisted   [START ON 9/7/2017] pantoprazole 40 mg Oral Early Morning   [START ON 9/7/2017] polyethylene glycol 17 g Oral Daily   potassium chloride 40 mEq Intravenous Once     Continuous Infusions:    aminocaproic acid (AMICAR) IV 1 g/hr Last Rate: 1 g/hr (09/06/17 1700)   epinephrine 1-20 mcg/min    insulin regular (HumuLIN R,NovoLIN R) infusion 0 3-21 Units/hr    niCARdipine 1-15 mg/hr    sodium chloride 20 mL/hr PRN Meds:    acetaminophen 650 mg Q4H PRN   acetaminophen 650 mg Q4H PRN   bisacodyl 10 mg Daily PRN   fentanyl citrate (PF) 50 mcg Q1H PRN   furosemide 40 mg Q6H PRN   HYDROmorphone 0 5 mg Q1H PRN   HYDROmorphone 1 mg Q1H PRN   lactated ringers 500 mL Q30 Min PRN   lidocaine (cardiac) 100 mg Q30 Min PRN   ondansetron 4 mg Q6H PRN   oxyCODONE-acetaminophen 1 tablet Q4H PRN   oxyCODONE-acetaminophen 2 tablet Q6H PRN   potassium chloride 20 mEq Once PRN   potassium chloride 20 mEq Q1H PRN   potassium chloride 20 mEq Q30 Min PRN       VTE Pharmacologic Prophylaxis: Reason for no pharmacologic prophylaxis bleeding  VTE Mechanical Prophylaxis: sequential compression device    Invasive lines and devices: Invasive Devices     Central Venous Catheter Line            CVC Central Lines 17 Triple less than 1 day    Introducer 17 less than 1 day          Peripheral Intravenous Line            Peripheral IV 17 Left Antecubital 1 day    Peripheral IV 17 Left Forearm less than 1 day    Peripheral IV 17 Right Forearm less than 1 day          Arterial Line            Arterial Line 17 Radial less than 1 day    Arterial Line 17 Right Femoral less than 1 day    Arterial Line 17 Right Femoral less than 1 day          Line            Pacer Wires less than 1 day    Pacer Wires less than 1 day          Drain            Chest Tube 1 Posterior Mediastinal 32 Fr  less than 1 day    Chest Tube 2 Anterior Mediastinal 32 Fr  less than 1 day    Urethral Catheter Non-latex; Temperature probe 16 Fr  less than 1 day          Airway            ETT  Cuffed;Oral 8 mm less than 1 day                Vitals:   Vitals:    17 1620   Pulse: 82   Resp: 12   SpO2: 100%             Temperature: Temp (24hrs), Av 7 °F (36 5 °C), Min:96 4 °F (35 8 °C), Max:98 7 °F (37 1 °C)  Current:      Weights: There is no height or weight on file to calculate BMI      Hemodynamic Monitoring:    PA 34/20 CVP 16 CI 3 3,   Respiratory/Ventilator Settings:   Vent Mode: VC/AC  Resp Rate (Set): 12  Total Rate: 12  Vt (mech): 700 mL  Vt (observed, mL): 700 mL  FiO2 (%): 80  PEEP/CPAP (cm H2O): 10  SpO2: SpO2: 100 %    Physical Exam:    Constitutional: Appears stated age   HENT: Atraumatic  Intubated, pupils pinpoint sluggish reactive  Neck: Neck supple  Patient intubated  Cardiovascular:RRR no MRG  Pulmonary/Chest: Rhonchi, decreased bases, Mediastinal CT with large sanginous drainage, no airleak  Abdominal: Soft  No distension  PRICE tenderness, BS absent  Musculoskeletal: No edema  Neurological: Patient is sedated  Skin: Skin is warm and dry  Psychiatric: Patient is sedated and intubated  Labs/Imaging:     Results from last 7 days  Lab Units 09/06/17  1648 09/06/17  1624 09/06/17  1601  09/06/17  1536 09/06/17  1516  09/05/17  1219   WBC Thousand/uL  --  16 24*  --   --  12 03*  --   --  8 31   HEMOGLOBIN g/dL  --  10 1*  --   --  9 4*  --   --  14 2   I STAT HEMOGLOBIN g/dl 10 5*  --  8 8*  < >  --   --   < >  --    HEMATOCRIT %  --  29 6*  --   --  27 7*  --   --  41 6   PLATELETS Thousands/uL  --  130*  --   --  143* 74*  --  178   NEUTROS PCT %  --   --   --   --   --   --   --  67   MONOS PCT %  --   --   --   --   --   --   --  7   < > = values in this interval not displayed      Results from last 7 days  Lab Units 09/06/17  1648 09/06/17  1624 09/06/17  1601  09/06/17  6604 09/05/17  1219   SODIUM mmol/L  --  147*  --   --  131* 140   POTASSIUM mmol/L  --  3 4*  --   --  2 9* 4 1   CHLORIDE mmol/L  --  106  --   --  94* 100   CO2 mmol/L  --  21  --   --  21 36*   BUN mg/dL  --  13  --   --  13 14   CREATININE mg/dL  --  1 61*  --   --  1 05 0 88   CALCIUM mg/dL  --  8 0*  --   --  9 0 9 5   TOTAL PROTEIN g/dL  --   --   --   --   --  7 7   BILIRUBIN TOTAL mg/dL  --   --   --   --   --  0 63   ALK PHOS U/L  --   --   --   --   --  57   ALT U/L  --   --   --   --   --  33   AST U/L  --   --   --   --   -- 22   GLUCOSE RANDOM mg/dL  --  211*  --   --  334* 210*   GLUCOSE, ISTAT mg/dl 216*  --  216*  < >  --   --    < > = values in this interval not displayed  Results from last 7 days  Lab Units 17  0633   HEMOGLOBIN A1C % 7 1*     Component      Latest Ref Rng & Units 2017           4:00 PM   Activated Clotting Time, i-STAT      89 - 137 sec 93     Component      Latest Ref Rng & Units 2017   Protime      12 1 - 14 4 seconds 19 7 (H)   INR      0 86 - 1 16 1 66 (H)     Component      Latest Ref Rng & Units 2017   FIBRINOGEN LEVEL      227 - 495 mg/dL 316     Post-op AB ///  Post-op CXR: no PTX, tubes and lines in appropriate position; I have personally reviewed pertinent films in PACS  Post-op EKG: SR at 80 BPM, QTc 601 This was personally reviewed by myself  Impression:  Patient Active Problem List    Diagnosis Date Noted    Acute thoracic aortic dissection 2017    Acute blood loss anemia 2017    s/p aortic arch repair 2017    On mechanically assisted ventilation 2017    Coagulopathy 2017    Hypokalemia 2017    Chest pain 2017    Hypertensive urgency 2017    Prostate cancer     Hypertension     Diabetes        Plan:    Neuro: D/C sedation  PRN dilaudid and percocet for pain  CV: MAP goal 65-75  CI>2 2  Post-op medications: Epinephrine, 1 mcg/min  Cardene gtt Wean Epi and Cardene as able  Volume resuscitation as needed  Monitor rhythm  Epicardial pacing wires in place  Will pace as needed for bradycardia  Intra-op SIMON LVEF 80%    Lung: Check STAT post-op ABG and CXR  Wean vent to extubate once CT output decreased    GI: GI prophylaxis/PPI  Bowel regimen  FEN: NPO  Replete K >4 0 and Mag >2 0  Replete calcium as needed  : Wilson  Monitor UOP with goal >40/hour  Lasix prn  Volume resuscitate as needed  ID: Prophylactic post-op abx  Maintain normothermia  Heme: Check STAT post-op H/H and platelets  Send coags if CT outputs high  Monitor incision site, invasive lines, and chest tube outputs for bleeding  Transfused upon arrival to ICU PRBC/ FFP/Cryo/ Plts  Give DDAVP and Novo7 now and repeat labs    Endo: Insulin gtt for blood sugar control  Disposition: ICU Care    Counseling / Coordination of Care  Total time spent today 36 minutes  Greater than 50% of total time was spent with the patient and / or family counseling and / or coordination of care   A description of the counseling / coordination of care: anemia acute blood loss, blood transfusions, titration of vasoactive agents    SIGNATURE: MAUREEN Solo  DATE: September 6, 2017  TIME: 5:34 PM

## 2017-09-06 NOTE — DISCHARGE SUMMARY
Discharge Summary - Danay Napoles  71 y o  male MRN: [id number]    Unit/Bed#: ICU 02 Encounter: [id number]    Admission Date: 9/5/2017     Admitting Diagnosis: Chest pain [R07 9]  Hypertensive urgency [I16 0]    HPI:  71year old male that presented to the emergency department with a past medical history of hypertension, diabetes, and prostate cancer  The patient began with sudden episode of chest pain/epigastric pain that radiated up his chest   On presentation to the emergency department as found to be bradycardic with a systolic blood pressure in the 200's  Troponins were negative on admission    Procedures Performed:   Orders Placed This Encounter   Procedures    ED ECG Documentation Only       Hospital Course:  Was admitted to the floor with chest pain to rule out acute coronary syndrome with hypertensive urgency  Was placed on his home blood pressure medications in additional to PRN hydralazine  This morning the patient began complaining of left upper quadrant/lower chest pain that was new this morning  Blood pressure overnight systolicaly was in the 729-537'U, this morning blood pressure dropped to 110  Due to these findings the patient was sent for a stat CT chest/abdomen to rule out dissection  On CT was found to have a type A dissection  Spoke with Dr Katelynn Hooper from [facility], will arrange emergent transfer to [place]  Patient does show evidence of tamponade  Significant Findings, Care, Treatment and Services Provided:   9/6 CT abdomen pelvis- type a dissection  9/6 echocardiogram    Complications: None, found to have a type a dissection    Discharge Diagnosis: Type a aortic dissection    Condition at Discharge: critical     Discharge instructions/Information to patient and family:   See after visit summary for information provided to patient and family  Provisions for Follow-Up Care:  See after visit summary for information related to follow-up care and any pertinent home health orders  Disposition: SLB    Planned Readmission: No    Discharge Statement   I spent 27 minutes discharging the patient  This time was spent on the day of discharge  I had direct contact with the patient on the day of discharge  Additional documentation is required if more than 30 minutes were spent on discharge  Discharge Medications:  See after visit summary for reconciled discharge medications provided to patient and family

## 2017-09-06 NOTE — PROCEDURES
Arterial Line Insertion  Date/Time: 9/6/2017 7:49 AM  Performed by: Orion Silveira  Authorized by: Orion Silveira     Patient location:  Bedside  Consent:     Consent obtained:  Verbal    Consent given by:  Patient    Risks discussed:  Bleeding, infection and ischemia  Universal protocol:     Procedure explained and questions answered to patient or proxy's satisfaction: yes      Site/side marked: yes      Immediately prior to procedure a time out was called: yes      Patient identity confirmed:  Verbally with patient  Indications:     Indications: hemodynamic monitoring and continuous blood pressure monitoring    Pre-procedure details:     Skin preparation:  Chlorhexidine    Preparation: Patient was prepped and draped in sterile fashion    Anesthesia (see MAR for exact dosages): Anesthesia method:  None  Procedure details:     Location / Tip of Catheter:  Radial    Laterality:  Right    Ajit's test performed: yes      Ajit's test abnormal: no      Needle gauge:  20 G    Placement technique:  Percutaneous    Number of attempts:  1    Successful placement: yes      Transducer: waveform confirmed    Post-procedure details:     Post-procedure:  Secured with tape, sterile dressing applied and sutured    CMS:  Normal    Patient tolerance of procedure:   Tolerated well, no immediate complications

## 2017-09-07 ENCOUNTER — APPOINTMENT (INPATIENT)
Dept: RADIOLOGY | Facility: HOSPITAL | Age: 72
DRG: 219 | End: 2017-09-07
Payer: COMMERCIAL

## 2017-09-07 PROBLEM — I16.0 HYPERTENSIVE URGENCY: Status: RESOLVED | Noted: 2017-09-05 | Resolved: 2017-09-07

## 2017-09-07 PROBLEM — D68.9 COAGULOPATHY (HCC): Status: RESOLVED | Noted: 2017-09-06 | Resolved: 2017-09-07

## 2017-09-07 PROBLEM — G93.40 ENCEPHALOPATHY: Status: ACTIVE | Noted: 2017-09-07

## 2017-09-07 PROBLEM — N17.9 ACUTE KIDNEY INJURY (HCC): Status: RESOLVED | Noted: 2017-09-06 | Resolved: 2017-09-07

## 2017-09-07 PROBLEM — R07.9 CHEST PAIN: Status: RESOLVED | Noted: 2017-09-05 | Resolved: 2017-09-07

## 2017-09-07 PROBLEM — J98.4 PULMONARY INSUFFICIENCY: Status: ACTIVE | Noted: 2017-09-07

## 2017-09-07 PROBLEM — Z99.11 ON MECHANICALLY ASSISTED VENTILATION (HCC): Status: RESOLVED | Noted: 2017-09-06 | Resolved: 2017-09-07

## 2017-09-07 PROBLEM — E87.6 HYPOKALEMIA: Status: RESOLVED | Noted: 2017-09-06 | Resolved: 2017-09-07

## 2017-09-07 LAB
ABO GROUP BLD BPU: NORMAL
ALBUMIN SERPL BCP-MCNC: 2.8 G/DL (ref 3.5–5)
ALP SERPL-CCNC: 35 U/L (ref 46–116)
ALT SERPL W P-5'-P-CCNC: 40 U/L (ref 12–78)
ANION GAP SERPL CALCULATED.3IONS-SCNC: 5 MMOL/L (ref 4–13)
AST SERPL W P-5'-P-CCNC: 75 U/L (ref 5–45)
ATRIAL RATE: 62 BPM
ATRIAL RATE: 86 BPM
BASE EXCESS BLDA CALC-SCNC: 7.8 MMOL/L
BILIRUB DIRECT SERPL-MCNC: 1.1 MG/DL (ref 0–0.2)
BILIRUB SERPL-MCNC: 1.78 MG/DL (ref 0.2–1)
BPU ID: NORMAL
BUN SERPL-MCNC: 14 MG/DL (ref 5–25)
CALCIUM SERPL-MCNC: 7.7 MG/DL (ref 8.3–10.1)
CHLORIDE SERPL-SCNC: 111 MMOL/L (ref 100–108)
CO2 SERPL-SCNC: 33 MMOL/L (ref 21–32)
CREAT SERPL-MCNC: 1.14 MG/DL (ref 0.6–1.3)
CROSSMATCH: NORMAL
ERYTHROCYTE [DISTWIDTH] IN BLOOD BY AUTOMATED COUNT: 14.7 % (ref 11.6–15.1)
GFR SERPL CREATININE-BSD FRML MDRD: 64 ML/MIN/1.73SQ M
GLUCOSE SERPL-MCNC: 109 MG/DL (ref 65–140)
GLUCOSE SERPL-MCNC: 110 MG/DL (ref 65–140)
GLUCOSE SERPL-MCNC: 111 MG/DL (ref 65–140)
GLUCOSE SERPL-MCNC: 113 MG/DL (ref 65–140)
GLUCOSE SERPL-MCNC: 120 MG/DL (ref 65–140)
GLUCOSE SERPL-MCNC: 133 MG/DL (ref 65–140)
GLUCOSE SERPL-MCNC: 134 MG/DL (ref 65–140)
GLUCOSE SERPL-MCNC: 135 MG/DL (ref 65–140)
GLUCOSE SERPL-MCNC: 138 MG/DL (ref 65–140)
GLUCOSE SERPL-MCNC: 143 MG/DL (ref 65–140)
GLUCOSE SERPL-MCNC: 155 MG/DL (ref 65–140)
GLUCOSE SERPL-MCNC: 90 MG/DL (ref 65–140)
GLUCOSE SERPL-MCNC: 96 MG/DL (ref 65–140)
HCO3 BLDA-SCNC: 30.8 MMOL/L (ref 22–28)
HCT VFR BLD AUTO: 31.7 % (ref 36.5–49.3)
HGB BLD-MCNC: 10.8 G/DL (ref 12–17)
HOROWITZ INDEX BLDA+IHG-RTO: 60 MM[HG]
MAGNESIUM SERPL-MCNC: 2.6 MG/DL (ref 1.6–2.6)
MAGNESIUM SERPL-MCNC: 2.8 MG/DL (ref 1.6–2.6)
MCH RBC QN AUTO: 29.5 PG (ref 26.8–34.3)
MCHC RBC AUTO-ENTMCNC: 34.1 G/DL (ref 31.4–37.4)
MCV RBC AUTO: 87 FL (ref 82–98)
O2 CT BLDA-SCNC: 14.9 ML/DL (ref 16–23)
OXYHGB MFR BLDA: 95.3 % (ref 94–97)
P AXIS: 32 DEGREES
P AXIS: 51 DEGREES
PCO2 BLDA: 36.9 MM HG (ref 36–44)
PEEP RESPIRATORY: 8 CM[H2O]
PH BLDA: 7.54 [PH] (ref 7.35–7.45)
PLATELET # BLD AUTO: 131 THOUSANDS/UL (ref 149–390)
PMV BLD AUTO: 10.4 FL (ref 8.9–12.7)
PO2 BLDA: 76.3 MM HG (ref 75–129)
POTASSIUM SERPL-SCNC: 3.1 MMOL/L (ref 3.5–5.3)
POTASSIUM SERPL-SCNC: 4.3 MMOL/L (ref 3.5–5.3)
PR INTERVAL: 171 MS
PR INTERVAL: 178 MS
PROT SERPL-MCNC: 5.6 G/DL (ref 6.4–8.2)
QRS AXIS: -14 DEGREES
QRS AXIS: 75 DEGREES
QRSD INTERVAL: 92 MS
QRSD INTERVAL: 98 MS
QT INTERVAL: 413 MS
QT INTERVAL: 422 MS
QTC INTERVAL: 428 MS
QTC INTERVAL: 494 MS
RBC # BLD AUTO: 3.66 MILLION/UL (ref 3.88–5.62)
SODIUM SERPL-SCNC: 149 MMOL/L (ref 136–145)
SPECIMEN SOURCE: ABNORMAL
T WAVE AXIS: 14 DEGREES
T WAVE AXIS: 46 DEGREES
UNIT DISPENSE STATUS: NORMAL
UNIT PRODUCT CODE: NORMAL
UNIT RH: NORMAL
VENT AC: 12
VENT- AC: AC
VENTRICULAR RATE: 62 BPM
VENTRICULAR RATE: 86 BPM
VT SETTING VENT: 700 ML
WBC # BLD AUTO: 11.8 THOUSAND/UL (ref 4.31–10.16)

## 2017-09-07 PROCEDURE — 93005 ELECTROCARDIOGRAM TRACING: CPT | Performed by: PHYSICIAN ASSISTANT

## 2017-09-07 PROCEDURE — 82948 REAGENT STRIP/BLOOD GLUCOSE: CPT

## 2017-09-07 PROCEDURE — 93005 ELECTROCARDIOGRAM TRACING: CPT

## 2017-09-07 PROCEDURE — C9113 INJ PANTOPRAZOLE SODIUM, VIA: HCPCS | Performed by: NURSE PRACTITIONER

## 2017-09-07 PROCEDURE — 94003 VENT MGMT INPAT SUBQ DAY: CPT

## 2017-09-07 PROCEDURE — 80048 BASIC METABOLIC PNL TOTAL CA: CPT | Performed by: PHYSICIAN ASSISTANT

## 2017-09-07 PROCEDURE — 80076 HEPATIC FUNCTION PANEL: CPT | Performed by: NURSE PRACTITIONER

## 2017-09-07 PROCEDURE — 83735 ASSAY OF MAGNESIUM: CPT | Performed by: PHYSICIAN ASSISTANT

## 2017-09-07 PROCEDURE — 85027 COMPLETE CBC AUTOMATED: CPT | Performed by: PHYSICIAN ASSISTANT

## 2017-09-07 PROCEDURE — 94660 CPAP INITIATION&MGMT: CPT

## 2017-09-07 PROCEDURE — 94760 N-INVAS EAR/PLS OXIMETRY 1: CPT

## 2017-09-07 PROCEDURE — 84132 ASSAY OF SERUM POTASSIUM: CPT | Performed by: PHYSICIAN ASSISTANT

## 2017-09-07 PROCEDURE — 71010 HB CHEST X-RAY 1 VIEW FRONTAL (PORTABLE): CPT

## 2017-09-07 PROCEDURE — 82805 BLOOD GASES W/O2 SATURATION: CPT | Performed by: PHYSICIAN ASSISTANT

## 2017-09-07 RX ORDER — QUETIAPINE FUMARATE 25 MG/1
25 TABLET, FILM COATED ORAL
Status: DISCONTINUED | OUTPATIENT
Start: 2017-09-07 | End: 2017-09-08

## 2017-09-07 RX ORDER — PANTOPRAZOLE SODIUM 40 MG/1
40 INJECTION, POWDER, FOR SOLUTION INTRAVENOUS
Status: DISCONTINUED | OUTPATIENT
Start: 2017-09-07 | End: 2017-09-10

## 2017-09-07 RX ORDER — FENTANYL CITRATE 50 UG/ML
50 INJECTION, SOLUTION INTRAMUSCULAR; INTRAVENOUS ONCE
Status: COMPLETED | OUTPATIENT
Start: 2017-09-07 | End: 2017-09-07

## 2017-09-07 RX ORDER — FUROSEMIDE 10 MG/ML
20 INJECTION INTRAMUSCULAR; INTRAVENOUS ONCE
Status: COMPLETED | OUTPATIENT
Start: 2017-09-07 | End: 2017-09-07

## 2017-09-07 RX ORDER — FENTANYL CITRATE 50 UG/ML
INJECTION, SOLUTION INTRAMUSCULAR; INTRAVENOUS
Status: COMPLETED
Start: 2017-09-07 | End: 2017-09-07

## 2017-09-07 RX ADMIN — ATORVASTATIN CALCIUM 80 MG: 80 TABLET, FILM COATED ORAL at 21:33

## 2017-09-07 RX ADMIN — FENTANYL CITRATE 50 MCG: 50 INJECTION INTRAMUSCULAR; INTRAVENOUS at 04:48

## 2017-09-07 RX ADMIN — CEFAZOLIN SODIUM 3000 MG: 1 INJECTION, POWDER, FOR SOLUTION INTRAMUSCULAR; INTRAVENOUS at 03:11

## 2017-09-07 RX ADMIN — METOPROLOL TARTRATE 12.5 MG: 25 TABLET ORAL at 21:33

## 2017-09-07 RX ADMIN — CHLORHEXIDINE GLUCONATE 15 ML: 1.2 RINSE ORAL at 21:33

## 2017-09-07 RX ADMIN — FUROSEMIDE 20 MG: 10 INJECTION, SOLUTION INTRAMUSCULAR; INTRAVENOUS at 23:54

## 2017-09-07 RX ADMIN — AMIODARONE HYDROCHLORIDE 200 MG: 200 TABLET ORAL at 05:20

## 2017-09-07 RX ADMIN — DEXMEDETOMIDINE HYDROCHLORIDE 0.5 MCG/KG/HR: 100 INJECTION, SOLUTION INTRAVENOUS at 12:04

## 2017-09-07 RX ADMIN — CHLORHEXIDINE GLUCONATE 15 ML: 1.2 RINSE ORAL at 08:29

## 2017-09-07 RX ADMIN — CEFAZOLIN SODIUM 3000 MG: 1 INJECTION, POWDER, FOR SOLUTION INTRAMUSCULAR; INTRAVENOUS at 20:00

## 2017-09-07 RX ADMIN — MUPIROCIN 1 APPLICATION: 20 OINTMENT TOPICAL at 21:33

## 2017-09-07 RX ADMIN — SODIUM CHLORIDE 7 MG/HR: 0.9 INJECTION, SOLUTION INTRAVENOUS at 17:18

## 2017-09-07 RX ADMIN — AMIODARONE HYDROCHLORIDE 1 MG/MIN: 50 INJECTION, SOLUTION INTRAVENOUS at 22:55

## 2017-09-07 RX ADMIN — HYDROMORPHONE HYDROCHLORIDE 1 MG: 1 INJECTION, SOLUTION INTRAMUSCULAR; INTRAVENOUS; SUBCUTANEOUS at 00:22

## 2017-09-07 RX ADMIN — QUETIAPINE FUMARATE 25 MG: 25 TABLET, FILM COATED ORAL at 22:59

## 2017-09-07 RX ADMIN — FENTANYL CITRATE 50 MCG: 50 INJECTION INTRAMUSCULAR; INTRAVENOUS at 01:05

## 2017-09-07 RX ADMIN — FENTANYL CITRATE 50 MCG: 50 INJECTION INTRAMUSCULAR; INTRAVENOUS at 03:08

## 2017-09-07 RX ADMIN — AMIODARONE HYDROCHLORIDE 150 MG: 50 INJECTION, SOLUTION INTRAVENOUS at 22:40

## 2017-09-07 RX ADMIN — PANTOPRAZOLE SODIUM 40 MG: 40 TABLET, DELAYED RELEASE ORAL at 05:20

## 2017-09-07 RX ADMIN — CEFAZOLIN SODIUM 3000 MG: 1 INJECTION, POWDER, FOR SOLUTION INTRAMUSCULAR; INTRAVENOUS at 12:04

## 2017-09-07 RX ADMIN — SODIUM CHLORIDE 15 MG/HR: 0.9 INJECTION, SOLUTION INTRAVENOUS at 23:02

## 2017-09-07 RX ADMIN — DEXMEDETOMIDINE HYDROCHLORIDE 0.6 MCG/KG/HR: 100 INJECTION, SOLUTION INTRAVENOUS at 02:00

## 2017-09-07 RX ADMIN — AMIODARONE HYDROCHLORIDE 200 MG: 200 TABLET ORAL at 21:33

## 2017-09-07 RX ADMIN — FENTANYL CITRATE 50 MCG: 50 INJECTION INTRAMUSCULAR; INTRAVENOUS at 06:49

## 2017-09-07 RX ADMIN — FONDAPARINUX SODIUM 2.5 MG: 2.5 INJECTION, SOLUTION SUBCUTANEOUS at 08:29

## 2017-09-07 RX ADMIN — PANTOPRAZOLE SODIUM 40 MG: 40 INJECTION, POWDER, FOR SOLUTION INTRAVENOUS at 08:30

## 2017-09-07 RX ADMIN — FENTANYL CITRATE 50 MCG: 50 INJECTION INTRAMUSCULAR; INTRAVENOUS at 00:04

## 2017-09-07 RX ADMIN — SODIUM CHLORIDE 5 MG/HR: 0.9 INJECTION, SOLUTION INTRAVENOUS at 00:57

## 2017-09-07 RX ADMIN — ASPIRIN 325 MG: 325 TABLET ORAL at 08:29

## 2017-09-07 RX ADMIN — METOPROLOL TARTRATE 12.5 MG: 25 TABLET ORAL at 08:30

## 2017-09-07 RX ADMIN — MUPIROCIN 1 APPLICATION: 20 OINTMENT TOPICAL at 08:30

## 2017-09-07 RX ADMIN — DEXMEDETOMIDINE HYDROCHLORIDE 0.5 MCG/KG/HR: 100 INJECTION, SOLUTION INTRAVENOUS at 00:58

## 2017-09-07 NOTE — CASE MANAGEMENT
[address] [place] [address] in the [facility] by Brett Canales for 2017  Network Utilization Review Department  Phone: [phone]; Fax [phone]  ATTENTION: The Network Utilization Review Department is now centralized for our 7 Facilities  Make a note that we have a new phone and fax numbers for our Department  Please call with any questions or concerns to [phone] and carefully follow the prompts so that you are directed to the right person  All voicemails are confidential  Fax any determinations, approvals, denials, and requests for initial or continue stay review clinical to [phone]  Due to HIGH CALL volume, it would be easier if you could please send faxed requests to expedite your requests and in part, help us provide discharge notifications faster     ===================================================================================    Presented to the ED @ [address] (admitted to ICU), transferred to [facility] via [place] [facility] Course:  Was admitted to the floor with chest pain to rule out acute coronary syndrome with hypertensive urgency  Was placed on his home blood pressure medications in additional to PRN hydralazine  This morning the patient began complaining of left upper quadrant/lower chest pain that was new this morning  Blood pressure overnight systolicaly was in the 666-254'I, this morning blood pressure dropped to 110  Due to these findings the patient was sent for a stat CT chest/abdomen to rule out dissection  On CT was found to have a type A dissection  Spoke with Dr Kimberly Shanks from [facility], will arrange emergent transfer to [place]   Patient does show evidence of tamponade         Initial Clinical Review    Admission: Date/Time/Statement: 9/6/17 @ 1403  Orders Placed This Encounter   Procedures    Inpatient Admission     Standing Status:   Standing     Number of Occurrences:   1     Order Specific Question:   Admitting Physician Answer: Jerome Robbins [[id number]]     Order Specific Question:   Level of Care     Answer:   Critical Care [15]     Order Specific Question:   Estimated length of stay     Answer:   More than 2 Midnights     Order Specific Question:   Certification     Answer:   I certify that inpatient services are medically necessary for this patient for a duration of greater than two midnights  See H&P and MD Progress Notes for additional information about the patient's course of treatment  Chief Complaint: Chest Pain > tamponade / type A dissection  History of Illness:   71year old male that presented to the emergency department with a past medical history of hypertension, diabetes, and prostate cancer  The patient began with sudden episode of chest pain/epigastric pain that radiated up his chest     On presentation to the emergency department as found to be bradycardic with a systolic blood pressure in the 200's  Troponins were negative on admission       Vital Signs:   Temperature Pulse Respirations Blood Pressure SpO2   09/06/17 1620 09/06/17 1620 09/06/17 1620 09/06/17 1636 09/06/17 1620   (!) 94 4 °F (34 7 °C) 82 12 120/58 100 %      Temp Source Heart Rate Source Patient Position BP Location FiO2 (%)   09/06/17 1620 09/06/17 1620 -- -- 09/06/17 1620   Probe Monitor   80      Pain Score       09/06/17 1620       No Pain        Wt Readings from Last 1 Encounters:   09/07/17 113 kg (248 lb 10 9 oz)     Abnormal Labs/Diagnostic Test Results:     Past Medical/Surgical History:    Active Ambulatory Problems     Diagnosis Date Noted    Prostate cancer     Hypertension     Diabetes      Resolved Ambulatory Problems     Diagnosis Date Noted    Chest pain 09/05/2017    Hypertensive urgency 09/05/2017     Past Medical History:   Diagnosis Date    Diabetes     Hypertension     Prostate cancer        Admitting Diagnosis: Aortic dissection [I71 00]    Age/Sex: 71 y o  male    Assessment/Plan:    Acute thoracic aortic dissection 09/06/2017    Chest pain 09/05/2017    Hypertensive urgency 09/05/2017    Prostate cancer      Hypertension      Diabetes           Plan:  Emergent Aortic Surgery    Admission Orders:  Scheduled Meds:   amiodarone 200 mg Oral Q8H [address]   aspirin 325 mg Oral Daily   atorvastatin 80 mg Oral HS   cefazolin 3,000 mg Intravenous Q8H   chlorhexidine 15 mL Swish & Spit Q12H [address]   fondaparinux 2 5 mg Subcutaneous Daily   metoprolol tartrate 12 5 mg Oral Q12H [address]   mupirocin 1 application Nasal Y26I [address]   pantoprazole 40 mg Intravenous Q24H DEMETRIA   polyethylene glycol 17 g Oral Daily     Continuous Infusions:   dexmedetomidine 0 1-0 7 mcg/kg/hr Last Rate: 0 7 mcg/kg/hr (09/07/17 0600)   insulin regular (HumuLIN R,NovoLIN R) infusion 0 3-21 Units/hr Last Rate: 2 Units/hr (09/07/17 0400)   niCARdipine 1-15 mg/hr Last Rate: 2 mg/hr (09/07/17 1000)   sodium chloride 20 mL/hr Last Rate: 20 mL/hr (09/06/17 1800)     PRN Meds:    acetaminophen    bisacodyl    fentanyl citrate (PF)    HYDROmorphone    lactated ringers    lidocaine (cardiac)    ondansetron    oxyCODONE-acetaminophen    potassium chloride    A  Line right Femoral / A  Line radial / Triple CVC Line / Introducer - Lenorah  Chest Tubes #1,2, (-20cm)  Mediastinal Tubes #1,2, (-20cm)  NG Tube  Intubated / vented / weaning  Epicardial pacing wires A/V    --------------------------------------------------------------------------------------------------------------------------------------------------------    SURGERY DATE: 9/6/2017  PROCEDURE: Replacement of ascending aorta with aggressive hemiarch reconstruction with a 26 mm Dacron graft     ANESTHESIA: General endotracheal anesthesia with transesophageal echocardiogram guidance, Dr Mara Cerna     ------------------------------------------------------------------------------------------------------------------------------------------------------    SURGERY DATE: 9/6/2017  2nd trip  PROCEDURE Mediastinal reexploration and control of postoperative hemorrhage  ANESTHESIA General endotracheal anesthesia with transesophageal echocardiogram guidance, Dr Knight Sites

## 2017-09-07 NOTE — PROGRESS NOTES
Progress Note - Medical Critical Care   Leonela Huynh  71 y o  male MRN: [id number]  Unit/Bed#: [facility] 416-01 Encounter: [id number]      Attending Physician: Micha Do MD     24 Hour Events: POD # 1 s/p Replacement of ascending aorta with hemiarch reconstruction with 26mm Dacron graft with subsequent return to OR for mediastinal re-exploration    Precedex 0 7  Cardene 3mg/min for goal MAP 65-75  Insulin    Allergies: No Known Allergies    Medications:   Scheduled Meds:    amiodarone 200 mg Oral Q8H [address]   aspirin 325 mg Oral Daily   atorvastatin 80 mg Oral HS   cefazolin 3,000 mg Intravenous Q8H   chlorhexidine 15 mL Swish & Spit Q12H [address]   fondaparinux 2 5 mg Subcutaneous Daily   mupirocin 1 application Nasal A13P DEMETRIA   pantoprazole 40 mg Intravenous Q24H DEMETRIA   polyethylene glycol 17 g Oral Daily       VTE Pharmacologic Prophylaxis: Fondaparinux (Arixtra)  VTE Mechanical Prophylaxis: sequential compression device    Continuous Infusions:    dexmedetomidine 0 1-0 7 mcg/kg/hr Last Rate: 0 7 mcg/kg/hr (09/07/17 0600)   insulin regular (HumuLIN R,NovoLIN R) infusion 0 3-21 Units/hr Last Rate: 2 Units/hr (09/07/17 0400)   niCARdipine 1-15 mg/hr Last Rate: 3 mg/hr (09/07/17 0600)   sodium chloride 20 mL/hr Last Rate: 20 mL/hr (09/06/17 1800)     PRN Meds:    acetaminophen 650 mg Q4H PRN   acetaminophen 650 mg Q4H PRN   bisacodyl 10 mg Daily PRN   fentanyl citrate (PF) 50 mcg Q1H PRN   HYDROmorphone 0 5 mg Q1H PRN   HYDROmorphone 1 mg Q1H PRN   lactated ringers 500 mL Q30 Min PRN   lidocaine (cardiac) 100 mg Q30 Min PRN   ondansetron 4 mg Q6H PRN   oxyCODONE-acetaminophen 1 tablet Q4H PRN   oxyCODONE-acetaminophen 2 tablet Q6H PRN   potassium chloride 20 mEq Once PRN   potassium chloride 20 mEq Q1H PRN   potassium chloride 20 mEq Q30 Min PRN       Home Medications:   Prior to Admission medications    Medication Sig Start Date End Date Taking?  Authorizing Provider   amLODIPine-benazepril (LOTREL) 10-40 MG per capsule Take 1 capsule by mouth daily    Historical Provider, MD   atenolol (TENORMIN) 100 mg tablet Take 100 mg by mouth daily    Historical Provider, MD   glimepiride (AMARYL) 4 mg tablet Take 4 mg by mouth every morning before breakfast    Historical Provider, MD   metFORMIN (GLUCOPHAGE) 1000 MG tablet Take 1,000 mg by mouth 2 (two) times a day with meals    Historical Provider, MD   Omega-3 Fatty Acids (FISH OIL OMEGA-3 PO) Take 1 tablet by mouth daily    Historical Provider, MD   triamterene (DYRENIUM) 50 mg capsule Take 50 mg by mouth daily    Historical Provider, MD   Valsartan-Hydrochlorothiazide (DIOVAN HCT PO) Take 1 tablet by mouth daily    Historical Provider, MD     Temperature: Temp (24hrs), Av 5 °F (35 8 °C), Min:94 4 °F (34 7 °C), Max:99 °F (37 2 °C)  Current: Temperature: 98 6 °F (37 °C)    Vitals:   Vitals:    17 0300 17 0400 17 0500 17 0600   BP:       Pulse: 70 68 76 72   Resp: 12 12 15 12   Temp: 98 4 °F (36 9 °C) 98 1 °F (36 7 °C) 98 4 °F (36 9 °C) 98 6 °F (37 °C)   TempSrc: Probe Probe Probe Probe   SpO2: 95% 95% 98% 95%   Weight:    113 kg (248 lb 10 9 oz)     Arterial Line BP: 122/58  Arterial Line MAP (mmHg): 76 mmHg    Tele Rhythm: NSR This was personally reviewed by myself  Respiratory:   Vent Mode: VC/AC  FiO2 (%):  [] 60  S RR:  [12] 12  S VT:  [600 mL-700 mL] 600 mL  PEEP/CPAP (cm H2O):  [5-10] 5  SpO2: SpO2: 95 %         Weights:   Weight (last 2 days)     Date/Time   Weight    17 0600  113 (248 68)            IBW: -88 kg  There is no height or weight on file to calculate BMI      Hemodynamic Monitoring:  PAP: PAP: 30/15   CVP 8  PAP: (26-47)/(15-23) 30/15  CO:  [4 9 L/min-8 6 L/min] 6 5 L/min  CI:  [2 2 L/min/m2-3 8 L/min/m2] 2 9 L/min/m2 (2 3-3 7)    Intake and Outputs:    Intake/Output Summary (Last 24 hours) at 17 0726  Last data filed at 17 0600   Gross per 24 hour   Intake         18238 31 ml   Output             6200 ml   Net 5035 31 ml     I/O last 24 hours: In: 60958 3 [I V :4885 3; Blood:4000; IV Piggyback:850]  Out: 7171 [Urine:3400; Blood:1500; Chest Tube:1300]    UOP: /hour     Chest tube Output:  Mediastinal tubes: 200 mL/8 hours  300 mL/24 hours   Pleural tubes:        Labs:    Results from last 7 days  Lab Units 09/07/17  0408 09/06/17 2140 09/06/17 2130 09/06/17  1802  09/05/17  1219   WBC Thousand/uL 11 80*  --  9 07  --  17 55*  < > 8 31   HEMOGLOBIN g/dL 10 8*  --  10 8*  --  9 8*  < > 14 2   I STAT HEMOGLOBIN g/dl  --  9 9*  --   < >  --   < >  --    HEMATOCRIT % 31 7*  --  31 7*  --  28 8*  < > 41 6   PLATELETS Thousands/uL 131*  --  146*  --  213  < > 178   NEUTROS PCT %  --   --   --   --  85*  --  67   MONOS PCT %  --   --   --   --  11  --  7   < > = values in this interval not displayed  Results from last 7 days  Lab Units 09/07/17  0408 09/06/17 2140 09/06/17 2130 09/06/17  1624  09/05/17  1219   SODIUM mmol/L 149*  --  148*  --  147*  < > 140   POTASSIUM mmol/L 4 3  --  4 2  --  3 4*  < > 4 1   CHLORIDE mmol/L 111*  --  110*  --  106  < > 100   CO2 mmol/L 33*  --  28  --  21  < > 36*   BUN mg/dL 14  --  13  --  13  < > 14   CREATININE mg/dL 1 14  --  1 41*  --  1 61*  < > 0 88   CALCIUM mg/dL 7 7*  --  8 0*  --  8 0*  < > 9 5   TOTAL PROTEIN g/dL  --   --   --   --   --   --  7 7   BILIRUBIN TOTAL mg/dL  --   --   --   --   --   --  0 63   ALK PHOS U/L  --   --   --   --   --   --  57   ALT U/L  --   --   --   --   --   --  33   AST U/L  --   --   --   --   --   --  22   GLUCOSE RANDOM mg/dL 120  --  128  --  211*  < > 210*   GLUCOSE, ISTAT mg/dl  --  123  --   < >  --   < >  --    < > = values in this interval not displayed            Results from last 7 days  Lab Units 09/07/17  0408   MAGNESIUM mg/dL 2 8*       Results from last 7 days  Lab Units 09/06/17  2130 09/06/17  1801 09/06/17  1536   INR  0 88 0 75* 1 66*   PTT seconds 28 31 28       Results from last 7 days  Lab Units 09/06/17  0633   HEMOGLOBIN A1C % 7 1*     CXR: Rotated, ETT 4 cm above the geovanna, no PTX, slight vascular congestion This was personally reviewed by myself in PACS  EKG: SR at 69 bpm, QTc 509 This was personally reviewed by myself  Physical Exam:     Neck:  Supple  No JVD     Cardiac: RRR no MRG     Pulmonary:  Breath sounds scattered Rhonchi, decreased bases bilaterally     Abdomen:  Soft  ND, no grimace on palpation, hypoactive BS     Incisions: Sternum is stable  Incision dressed with Acticoat  No erythema or drainage  Lower extremities: No edema B/L  DP pulses 2+ bilaterally, warm, cap refill <3 seconds     Neuro: Pt opens eyes to voice, ALLAN spontaneously, does not follow commands, does not track examiner     Skin: Warm and dry  Invasive lines and devices: Invasive Devices     Central Venous Catheter Line            CVC Central Lines 09/06/17 Triple less than 1 day    Introducer 09/06/17 less than 1 day          Peripheral Intravenous Line            Peripheral IV 09/05/17 Left Antecubital 1 day    Peripheral IV 09/06/17 Left Forearm 1 day    Peripheral IV 09/06/17 Right Forearm 1 day          Arterial Line            Arterial Line 09/06/17 Radial less than 1 day    Arterial Line 09/06/17 Right Femoral less than 1 day          Line            Pacer Wires less than 1 day    Pacer Wires less than 1 day          Drain            Chest Tube 1 Posterior Mediastinal 36 Fr  less than 1 day    Chest Tube 2 Anterior Mediastinal 40 Fr  less than 1 day    NG/OG/Enteral Tube Orogastric less than 1 day    Urethral Catheter Non-latex; Temperature probe 16 Fr  less than 1 day          Airway            ETT  Cuffed;Oral 8 mm less than 1 day                Assessment:  Patient Active Problem List    Diagnosis Date Noted    Pulmonary insufficiency 09/07/2017    Encephalopathy 09/07/2017    Acute thoracic aortic dissection 09/06/2017    Acute blood loss anemia 09/06/2017    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/2017  Hypernatremia 09/06/2017    Prostate cancer     Hypertension     Diabetes    Encephalopathy    Plan:    Cardiac:   Cardiac infusions: Cardene, 3 mg/hour  Hemodynamic goals:   CI > 2 2 and MAP 65-75   Continue Kelso-Tanya catheter   D/C Femoral arterial line, maintain radial hayder  NSR; HR/BP well-controlled  Start lopressor 12 5 mg PO BID  Continue ASA, PO amiodarone, and statin therapy  Maintain epicardial pacing wires for PRN pacing  Continue DVT prophylaxis  Consult cardiology for postoperative medical management upon transfer to floor    Pulmonary:   Failed SBT this AM, reassess  Chest tube output remains persistently high; Continue chest tubes to suction today  Acute post-op hypoxic respiratory insufficiency:      Renal:   Discontinue IV potassium replacement scales  Post-op volume overload:    Lasix 40 IV x 1 dose for decreased UO    Maintain fry catheter     Start FWF 100ml Q4 hours for hypernatremia    Neuro:   Serial neuro exams   Continue precedex infusion   If neuro status not improved in next 24 hours consider head CT   Hold on haldol secondary to prolong QTC  Continue  IV narcotic therapy and continue with PO pain regimen    GI:  NPO, consider TF if not extubated in next 24 hours  Continue stool softeners and prn suppository  Continue GI prophylaxis with PPI          Endo: History of diabetes: Continue insulin infusion today  Consult Endocrinology for management    Hematology:   Post-operative acute blood loss anemia:   Trend hemoglobin and platelets  Transfuse as needed  Disposition:   Continue in ICU today    Counseling / Coordination of Care  Total time spent today 32 minutes  Greater than 50% of total time was spent with the patient and / or family counseling and / or coordination of care   A description of the counseling / coordination of care: sedation management, weaning trial from ventilator    Collaborative bedside rounds performed with cardiac surgery attending and bedside MAUREEN Mixon  DATE: September 7, 2017  TIME: 7:26 AM

## 2017-09-07 NOTE — PROGRESS NOTES
Progress Note - Cardiothoracic Surgery   Peg Ohs  71 y o  male MRN: [id number]  Unit/Bed#: [facility] 416-01 Encounter: [id number]      POD # 1 s/p Type A dissection repair with ascending aorta/hemiarch replacement  Pt seen/examined  Interval history and data reviewed with critical care team   Pt doing well  No specific complaints  Nicardipine gtt  Precedex gtt  Remains intubated  Medications:   Scheduled Meds:  amiodarone 200 mg Oral Q8H [address]   aspirin 325 mg Oral Daily   atorvastatin 80 mg Oral HS   cefazolin 3,000 mg Intravenous Q8H   chlorhexidine 15 mL Swish & Spit Q12H [address]   fondaparinux 2 5 mg Subcutaneous Daily   mupirocin 1 application Nasal H68X [address]   pantoprazole 40 mg Intravenous Q24H DEMETRIA   polyethylene glycol 17 g Oral Daily     Continuous Infusions:  dexmedetomidine 0 1-0 7 mcg/kg/hr Last Rate: 0 7 mcg/kg/hr (09/07/17 0600)   insulin regular (HumuLIN R,NovoLIN R) infusion 0 3-21 Units/hr Last Rate: 2 Units/hr (09/07/17 0400)   niCARdipine 1-15 mg/hr Last Rate: 3 mg/hr (09/07/17 0600)   sodium chloride 20 mL/hr Last Rate: 20 mL/hr (09/06/17 1800)     PRN Meds:   acetaminophen    acetaminophen    bisacodyl    fentanyl citrate (PF)    HYDROmorphone    HYDROmorphone    lactated ringers    lidocaine (cardiac)    ondansetron    oxyCODONE-acetaminophen    oxyCODONE-acetaminophen    potassium chloride    potassium chloride    potassium chloride    Vitals: Blood pressure 121/63, pulse 72, temperature 98 6 °F (37 °C), temperature source Probe, resp  rate 12, weight 113 kg (248 lb 10 9 oz), SpO2 95 %  ,There is no height or weight on file to calculate BMI  I/O last 24 hours: In: 85371 3 [I V :4885 3; Blood:4000; IV Piggyback:850]  Out: 2733 [Urine:3400; Blood:1500;  Chest Tube:1300]  Invasive Devices     Central Venous Catheter Line            CVC Central Lines 09/06/17 Triple less than 1 day    Introducer 09/06/17 less than 1 day          Peripheral Intravenous Line Peripheral IV 09/05/17 Left Antecubital 1 day    Peripheral IV 09/06/17 Left Forearm 1 day    Peripheral IV 09/06/17 Right Forearm 1 day          Arterial Line            Arterial Line 09/06/17 Radial 1 day    Arterial Line 09/06/17 Right Femoral less than 1 day          Line            Pacer Wires less than 1 day    Pacer Wires less than 1 day          Drain            Chest Tube 1 Posterior Mediastinal 36 Fr  less than 1 day    Chest Tube 2 Anterior Mediastinal 40 Fr  less than 1 day    NG/OG/Enteral Tube Orogastric less than 1 day    Urethral Catheter Non-latex; Temperature probe 16 Fr  less than 1 day          Airway            ETT  Cuffed;Oral 8 mm less than 1 day                  Lab, Imaging and other studies:     Results from last 7 days  Lab Units 09/07/17 0408 09/06/17 2140 09/06/17 2130 09/06/17  1802   WBC Thousand/uL 11 80*  --  9 07  --  17 55*   HEMOGLOBIN g/dL 10 8*  --  10 8*  --  9 8*   I STAT HEMOGLOBIN g/dl  --  9 9*  --   < >  --    HEMATOCRIT % 31 7*  --  31 7*  --  28 8*   PLATELETS Thousands/uL 131*  --  146*  --  213   < > = values in this interval not displayed  Results from last 7 days  Lab Units 09/07/17 0408 09/06/17 2130 09/06/17  1624   SODIUM mmol/L 149*  --  148*  --  147*   POTASSIUM mmol/L 4 3  --  4 2  --  3 4*   CHLORIDE mmol/L 111*  --  110*  --  106   CO2 mmol/L 33*  --  28  --  21   BUN mg/dL 14  --  13  --  13   CREATININE mg/dL 1 14  --  1 41*  --  1 61*   GLUCOSE RANDOM mg/dL 120  --  128  --  211*   GLUCOSE, ISTAT   --   < >  --   < >  --    CALCIUM mg/dL 7 7*  --  8 0*  --  8 0*   < > = values in this interval not displayed  Results from last 7 days  Lab Units 09/06/17 2130 09/06/17  1801 09/06/17  1536   INR  0 88 0 75* 1 66*   PTT seconds 28 31 28     Recent Labs      09/07/17   0408   PHART  7 539*   IRG9ILY  30 8*   PO2ART  76 3   [id number]  36 9   BEART  7 8           Plan:    Wean sedation, wean vent to hopeful extubation      Cont Rosalino/Cordis/Emily/Katie for now  Continue chest tubes, pacing wires  Gentle Diuresis  PO ASA/Statin/B blocker  DC femoral A-line  Continue ICU care        SIGNATUREAbe Prader, MD  DATE: September 7, 2017  TIME: 7:49 AM

## 2017-09-07 NOTE — OP NOTE
OPERATIVE REPORT  PATIENT NAME: Vipin Ledesma  :  1945  MRN: [id number]  Pt Location:  OR ROOM 16    SURGERY DATE: 2017    SURGEON: Selwyn Mendoza MD     ASSISTANT: Vic Johnson PA-C    PREOPERATIVE DIAGNOSIS: Postoperative mediastinal hemorrhage, status post ascending aorta/hemiarch (Type A dissection repair)    POSTOPERATIVE DIAGNOSIS: Postoperative mediastinal hemorrhage, status post ascending aorta/hemiarch (Type A dissection repair)     PROCEDURE Mediastinal reexploration and control of postoperative hemorrhage  ANESTHESIA General endotracheal anesthesia with transesophageal echocardiogram guidance, Dr Gal Meza  ESTIMATED BLOOD LOSS 200 mL    TRANSFUSIONS 3u RBC's     SPECIMENS None  FINDINGS Bleeding was identified at the distal aortic anastomosis  This was repaired with a pledgeted 3-0 Prolene suture  All other sites were explored and no other active bleeding sites were identified  The entire operative field was evacuated of clot and thoroughly irrigated  The patient tolerated the procedure well  OPERATIVE TECHNIQUE The patient was taken to the operating room and placed supine on the operating table  Following the satisfactory induction of general anesthesia and with monitoring lines already in place, the patient was prepped and draped in the usual sterile fashion  A time-out procedure was performed  The median sternotomy incision was reopened and the sternal wires were removed and the retractor was placed  Thorough exploration of the chest was performed  There was bleeding coming from the distal aortic anastomosis  This was controlled with a pledgeted 3-0 Prolene suture  An extensive amount of clot was removed from the entire chest including the anterior mediastinum and pericardial space  The chest was washed out and irrigated thoroughly  A thorough search of all surgical sites was performed  No other active bleeding sites were identified   The chest tubes had been removed prior to reopening the sternotomy and, therefore, new chest tubes were replaced  The sternum was subsequently closed with stainless steel wires  The fascia, subdermis, and skin were closed with multiple layers of running absorbable sutures  As the attending surgeon, I was present and scrubbed for all critical portions of this procedure  There was no qualified surgical resident available  Sponge, needle, and instrument counts were reported as correct by the nursing staff  Final transesophageal echocardiogram demonstrated no change from the prior study  The assistance of a PA was required to complete this case for assistance with exposure and evacuation of the postoperative hemorrhage         SIGNATURESamson Callas, MD  DATE: September 6, 2017  TIME: 8:51 PM

## 2017-09-07 NOTE — PROGRESS NOTES
Patient evaluated postoperatively take back to the OR secondary to bleeding  Status post mediastinal reexploration and control of postoperative hemorrhage  Patient received an additional 3 units of packed red blood cells  Postoperatively hemoglobin is appropriate  Patient does not have significant bleeding from chest tubes  Patient is intubated  He became agitated moved all extremities but has not followed commands yet  Presently patient is on Cardene  Continue to monitor hemodynamic parameters and attempt to wean the ventilator with spontaneous breathing trial when mental status has improved

## 2017-09-08 ENCOUNTER — APPOINTMENT (INPATIENT)
Dept: RADIOLOGY | Facility: HOSPITAL | Age: 72
DRG: 219 | End: 2017-09-08
Payer: COMMERCIAL

## 2017-09-08 ENCOUNTER — APPOINTMENT (INPATIENT)
Dept: NON INVASIVE DIAGNOSTICS | Facility: HOSPITAL | Age: 72
DRG: 219 | End: 2017-09-08
Payer: COMMERCIAL

## 2017-09-08 PROBLEM — R41.0 DELIRIUM: Status: ACTIVE | Noted: 2017-09-08

## 2017-09-08 PROBLEM — R45.1 AGITATION: Status: ACTIVE | Noted: 2017-09-08

## 2017-09-08 LAB
ABO GROUP BLD BPU: NORMAL
ANION GAP SERPL CALCULATED.3IONS-SCNC: 10 MMOL/L (ref 4–13)
ANION GAP SERPL CALCULATED.3IONS-SCNC: 8 MMOL/L (ref 4–13)
ANION GAP SERPL CALCULATED.3IONS-SCNC: 8 MMOL/L (ref 4–13)
ATRIAL RATE: 139 BPM
ATRIAL RATE: 69 BPM
ATRIAL RATE: 80 BPM
ATRIAL RATE: 87 BPM
ATRIAL RATE: 96 BPM
BPU ID: NORMAL
BUN SERPL-MCNC: 19 MG/DL (ref 5–25)
BUN SERPL-MCNC: 20 MG/DL (ref 5–25)
BUN SERPL-MCNC: 28 MG/DL (ref 5–25)
CALCIUM SERPL-MCNC: 7.2 MG/DL (ref 8.3–10.1)
CALCIUM SERPL-MCNC: 7.3 MG/DL (ref 8.3–10.1)
CALCIUM SERPL-MCNC: 7.7 MG/DL (ref 8.3–10.1)
CHLORIDE SERPL-SCNC: 108 MMOL/L (ref 100–108)
CHLORIDE SERPL-SCNC: 110 MMOL/L (ref 100–108)
CHLORIDE SERPL-SCNC: 111 MMOL/L (ref 100–108)
CO2 SERPL-SCNC: 25 MMOL/L (ref 21–32)
CO2 SERPL-SCNC: 28 MMOL/L (ref 21–32)
CO2 SERPL-SCNC: 29 MMOL/L (ref 21–32)
CREAT SERPL-MCNC: 0.97 MG/DL (ref 0.6–1.3)
CREAT SERPL-MCNC: 0.98 MG/DL (ref 0.6–1.3)
CREAT SERPL-MCNC: 1.08 MG/DL (ref 0.6–1.3)
ERYTHROCYTE [DISTWIDTH] IN BLOOD BY AUTOMATED COUNT: 15.3 % (ref 11.6–15.1)
GFR SERPL CREATININE-BSD FRML MDRD: 69 ML/MIN/1.73SQ M
GFR SERPL CREATININE-BSD FRML MDRD: 77 ML/MIN/1.73SQ M
GFR SERPL CREATININE-BSD FRML MDRD: 78 ML/MIN/1.73SQ M
GLUCOSE SERPL-MCNC: 101 MG/DL (ref 65–140)
GLUCOSE SERPL-MCNC: 133 MG/DL (ref 65–140)
GLUCOSE SERPL-MCNC: 136 MG/DL (ref 65–140)
GLUCOSE SERPL-MCNC: 138 MG/DL (ref 65–140)
GLUCOSE SERPL-MCNC: 147 MG/DL (ref 65–140)
GLUCOSE SERPL-MCNC: 148 MG/DL (ref 65–140)
GLUCOSE SERPL-MCNC: 150 MG/DL (ref 65–140)
GLUCOSE SERPL-MCNC: 151 MG/DL (ref 65–140)
GLUCOSE SERPL-MCNC: 152 MG/DL (ref 65–140)
GLUCOSE SERPL-MCNC: 155 MG/DL (ref 65–140)
GLUCOSE SERPL-MCNC: 163 MG/DL (ref 65–140)
GLUCOSE SERPL-MCNC: 165 MG/DL (ref 65–140)
GLUCOSE SERPL-MCNC: 77 MG/DL (ref 65–140)
GLUCOSE SERPL-MCNC: 99 MG/DL (ref 65–140)
GLUCOSE SERPL-MCNC: 99 MG/DL (ref 65–140)
HCT VFR BLD AUTO: 32.2 % (ref 36.5–49.3)
HGB BLD-MCNC: 10.8 G/DL (ref 12–17)
MAGNESIUM SERPL-MCNC: 2.5 MG/DL (ref 1.6–2.6)
MCH RBC QN AUTO: 29.7 PG (ref 26.8–34.3)
MCHC RBC AUTO-ENTMCNC: 33.5 G/DL (ref 31.4–37.4)
MCV RBC AUTO: 89 FL (ref 82–98)
P AXIS: 29 DEGREES
P AXIS: 29 DEGREES
P AXIS: 39 DEGREES
P AXIS: 46 DEGREES
PHOSPHATE SERPL-MCNC: 1.7 MG/DL (ref 2.3–4.1)
PLATELET # BLD AUTO: 139 THOUSANDS/UL (ref 149–390)
PMV BLD AUTO: 10.3 FL (ref 8.9–12.7)
POTASSIUM SERPL-SCNC: 3.1 MMOL/L (ref 3.5–5.3)
POTASSIUM SERPL-SCNC: 3.3 MMOL/L (ref 3.5–5.3)
POTASSIUM SERPL-SCNC: 4.1 MMOL/L (ref 3.5–5.3)
PR INTERVAL: 146 MS
PR INTERVAL: 150 MS
PR INTERVAL: 166 MS
PR INTERVAL: 171 MS
QRS AXIS: 13 DEGREES
QRS AXIS: 15 DEGREES
QRS AXIS: 28 DEGREES
QRS AXIS: 3 DEGREES
QRS AXIS: 52 DEGREES
QRSD INTERVAL: 83 MS
QRSD INTERVAL: 88 MS
QRSD INTERVAL: 88 MS
QRSD INTERVAL: 90 MS
QRSD INTERVAL: 92 MS
QT INTERVAL: 313 MS
QT INTERVAL: 388 MS
QT INTERVAL: 475 MS
QT INTERVAL: 496 MS
QT INTERVAL: 521 MS
QTC INTERVAL: 476 MS
QTC INTERVAL: 490 MS
QTC INTERVAL: 509 MS
QTC INTERVAL: 597 MS
QTC INTERVAL: 601 MS
RBC # BLD AUTO: 3.64 MILLION/UL (ref 3.88–5.62)
SODIUM SERPL-SCNC: 145 MMOL/L (ref 136–145)
SODIUM SERPL-SCNC: 145 MMOL/L (ref 136–145)
SODIUM SERPL-SCNC: 147 MMOL/L (ref 136–145)
T WAVE AXIS: 35 DEGREES
T WAVE AXIS: 37 DEGREES
T WAVE AXIS: 52 DEGREES
T WAVE AXIS: 54 DEGREES
T WAVE AXIS: 63 DEGREES
UNIT DISPENSE STATUS: NORMAL
UNIT PRODUCT CODE: NORMAL
UNIT RH: NORMAL
VENTRICULAR RATE: 139 BPM
VENTRICULAR RATE: 69 BPM
VENTRICULAR RATE: 80 BPM
VENTRICULAR RATE: 87 BPM
VENTRICULAR RATE: 96 BPM
WBC # BLD AUTO: 19.4 THOUSAND/UL (ref 4.31–10.16)

## 2017-09-08 PROCEDURE — 71010 HB CHEST X-RAY 1 VIEW FRONTAL (PORTABLE): CPT

## 2017-09-08 PROCEDURE — 94660 CPAP INITIATION&MGMT: CPT

## 2017-09-08 PROCEDURE — 83735 ASSAY OF MAGNESIUM: CPT | Performed by: PHYSICIAN ASSISTANT

## 2017-09-08 PROCEDURE — 93975 VASCULAR STUDY: CPT

## 2017-09-08 PROCEDURE — 80048 BASIC METABOLIC PNL TOTAL CA: CPT | Performed by: PHYSICIAN ASSISTANT

## 2017-09-08 PROCEDURE — 76770 US EXAM ABDO BACK WALL COMP: CPT

## 2017-09-08 PROCEDURE — 94760 N-INVAS EAR/PLS OXIMETRY 1: CPT

## 2017-09-08 PROCEDURE — 80048 BASIC METABOLIC PNL TOTAL CA: CPT | Performed by: NURSE PRACTITIONER

## 2017-09-08 PROCEDURE — 84100 ASSAY OF PHOSPHORUS: CPT | Performed by: PHYSICIAN ASSISTANT

## 2017-09-08 PROCEDURE — 85027 COMPLETE CBC AUTOMATED: CPT | Performed by: PHYSICIAN ASSISTANT

## 2017-09-08 PROCEDURE — C9113 INJ PANTOPRAZOLE SODIUM, VIA: HCPCS | Performed by: NURSE PRACTITIONER

## 2017-09-08 PROCEDURE — 82948 REAGENT STRIP/BLOOD GLUCOSE: CPT

## 2017-09-08 RX ORDER — OLANZAPINE 5 MG/1
5 TABLET, ORALLY DISINTEGRATING ORAL EVERY 12 HOURS
Status: DISCONTINUED | OUTPATIENT
Start: 2017-09-08 | End: 2017-09-08

## 2017-09-08 RX ORDER — FUROSEMIDE 10 MG/ML
20 SYRINGE (ML) INJECTION CONTINUOUS
Status: DISCONTINUED | OUTPATIENT
Start: 2017-09-08 | End: 2017-09-10

## 2017-09-08 RX ORDER — FUROSEMIDE 10 MG/ML
20 INJECTION INTRAMUSCULAR; INTRAVENOUS ONCE
Status: COMPLETED | OUTPATIENT
Start: 2017-09-08 | End: 2017-09-08

## 2017-09-08 RX ORDER — FUROSEMIDE 10 MG/ML
60 INJECTION INTRAMUSCULAR; INTRAVENOUS ONCE
Status: COMPLETED | OUTPATIENT
Start: 2017-09-08 | End: 2017-09-08

## 2017-09-08 RX ORDER — POTASSIUM CHLORIDE 29.8 MG/ML
40 INJECTION INTRAVENOUS ONCE
Status: COMPLETED | OUTPATIENT
Start: 2017-09-08 | End: 2017-09-08

## 2017-09-08 RX ORDER — FUROSEMIDE 10 MG/ML
40 INJECTION INTRAMUSCULAR; INTRAVENOUS DAILY
Status: DISCONTINUED | OUTPATIENT
Start: 2017-09-08 | End: 2017-09-08

## 2017-09-08 RX ORDER — OLANZAPINE 5 MG/1
2.5 TABLET, ORALLY DISINTEGRATING ORAL EVERY 12 HOURS
Status: DISCONTINUED | OUTPATIENT
Start: 2017-09-08 | End: 2017-09-10

## 2017-09-08 RX ORDER — ALBUMIN, HUMAN INJ 5% 5 %
12.5 SOLUTION INTRAVENOUS ONCE
Status: COMPLETED | OUTPATIENT
Start: 2017-09-08 | End: 2017-09-08

## 2017-09-08 RX ORDER — FUROSEMIDE 10 MG/ML
40 INJECTION INTRAMUSCULAR; INTRAVENOUS ONCE
Status: COMPLETED | OUTPATIENT
Start: 2017-09-08 | End: 2017-09-08

## 2017-09-08 RX ORDER — ALBUMIN, HUMAN INJ 5% 5 %
SOLUTION INTRAVENOUS
Status: COMPLETED
Start: 2017-09-08 | End: 2017-09-08

## 2017-09-08 RX ADMIN — DEXMEDETOMIDINE HYDROCHLORIDE 0.2 MCG/KG/HR: 100 INJECTION, SOLUTION INTRAVENOUS at 02:32

## 2017-09-08 RX ADMIN — PANTOPRAZOLE SODIUM 40 MG: 40 INJECTION, POWDER, FOR SOLUTION INTRAVENOUS at 08:55

## 2017-09-08 RX ADMIN — FUROSEMIDE 40 MG: 10 INJECTION, SOLUTION INTRAMUSCULAR; INTRAVENOUS at 08:55

## 2017-09-08 RX ADMIN — POTASSIUM CHLORIDE 40 MEQ: 400 INJECTION, SOLUTION INTRAVENOUS at 00:54

## 2017-09-08 RX ADMIN — FONDAPARINUX SODIUM 2.5 MG: 2.5 INJECTION, SOLUTION SUBCUTANEOUS at 08:55

## 2017-09-08 RX ADMIN — FUROSEMIDE 60 MG: 10 INJECTION, SOLUTION INTRAMUSCULAR; INTRAVENOUS at 19:30

## 2017-09-08 RX ADMIN — MUPIROCIN 1 APPLICATION: 20 OINTMENT TOPICAL at 08:56

## 2017-09-08 RX ADMIN — SODIUM CHLORIDE 7 MG/HR: 0.9 INJECTION, SOLUTION INTRAVENOUS at 03:28

## 2017-09-08 RX ADMIN — ALBUMIN HUMAN 12.5 G: 0.05 INJECTION, SOLUTION INTRAVENOUS at 12:11

## 2017-09-08 RX ADMIN — POTASSIUM CHLORIDE 40 MEQ: 400 INJECTION, SOLUTION INTRAVENOUS at 07:13

## 2017-09-08 RX ADMIN — SODIUM CHLORIDE 1 UNITS/HR: 9 INJECTION, SOLUTION INTRAVENOUS at 17:11

## 2017-09-08 RX ADMIN — ALBUMIN, HUMAN INJ 5% 12.5 G: 5 SOLUTION at 12:11

## 2017-09-08 RX ADMIN — ATORVASTATIN CALCIUM 80 MG: 80 TABLET, FILM COATED ORAL at 21:00

## 2017-09-08 RX ADMIN — PHENYLEPHRINE HYDROCHLORIDE 20 MCG/MIN: 10 INJECTION INTRAVENOUS at 14:52

## 2017-09-08 RX ADMIN — METOPROLOL TARTRATE 25 MG: 25 TABLET ORAL at 09:53

## 2017-09-08 RX ADMIN — ASPIRIN 325 MG: 325 TABLET ORAL at 09:53

## 2017-09-08 RX ADMIN — AMIODARONE HYDROCHLORIDE 200 MG: 200 TABLET ORAL at 21:00

## 2017-09-08 RX ADMIN — Medication 20 MG/HR: at 19:21

## 2017-09-08 RX ADMIN — METOPROLOL TARTRATE 25 MG: 25 TABLET ORAL at 20:58

## 2017-09-08 RX ADMIN — FUROSEMIDE 40 MG: 10 INJECTION, SOLUTION INTRAMUSCULAR; INTRAVENOUS at 14:54

## 2017-09-08 RX ADMIN — OLANZAPINE 5 MG: 5 TABLET, ORALLY DISINTEGRATING ORAL at 09:53

## 2017-09-08 RX ADMIN — FUROSEMIDE 20 MG: 10 INJECTION, SOLUTION INTRAMUSCULAR; INTRAVENOUS at 00:54

## 2017-09-08 RX ADMIN — OLANZAPINE 2.5 MG: 5 TABLET, ORALLY DISINTEGRATING ORAL at 20:36

## 2017-09-08 RX ADMIN — ALBUMIN HUMAN 12.5 G: 0.05 INJECTION, SOLUTION INTRAVENOUS at 03:22

## 2017-09-08 RX ADMIN — AMIODARONE HYDROCHLORIDE 0.5 MG/MIN: 50 INJECTION, SOLUTION INTRAVENOUS at 17:11

## 2017-09-08 RX ADMIN — MUPIROCIN 1 APPLICATION: 20 OINTMENT TOPICAL at 20:31

## 2017-09-08 RX ADMIN — CHLORHEXIDINE GLUCONATE 15 ML: 1.2 RINSE ORAL at 20:31

## 2017-09-08 NOTE — PROGRESS NOTES
Called by nursing patient went into rapid AFib, rate  130-160, blood pressure 119/54, appears asymptomatic resting at this time not agitated  Ordered electrolytes  Amiodarone bolus and drip  Filling pressures are up    CVP 18 pa 35/22   Increasing oxygen demand on high-flow up to 80%  Start gentle diuresis with Lasix 20 mg IV

## 2017-09-08 NOTE — PROGRESS NOTES
Procedure: Epicardial Wire Removal    09/08/17    Patient was returned to bed  EPW x 2 d/c'd in typical fashion  No immediate complications  Site dressed with dry sterile dressing  Patient and nurse aware of mandatory 1 hour bedrest protocol  Vital signs ordered Q 15 minutes for one hour as per protocol      MAUREEN Harris

## 2017-09-08 NOTE — SOCIAL WORK
CM attempted to meet w/ pt to obtain info, however pt presents as lethargic  CM attempted to contact pt's daughter [place] (c: [phone]) to obtain info, however the voicemail inbox for her phone is full and new messages cannot be left  CM attempted to contact pt's sister Anne Marie Monsalve (c: [phone]) to obtain info  No answer  CM left detailed message requesting a call back  CM to follow

## 2017-09-08 NOTE — CONSULTS
Consultation - Kanwal Wheeler  71 y o  male MRN: [id number]    Unit/Bed#: [facility] 416-01 Encounter: [id number]      Assessment/Plan     Assessment:  DM 2 with hyperglycemia  S/P Type A dissection repair    Plan:  DM 2 with hyperglycemia: Currently on insulin drip with blood sugars listed below  Continues on phenylephrine drip  Minimal PO intake as patient in 4 point restraints and confused and cannot provide history  Will suggest continue insulin drip for now and will continue to follow  S/P Type A dissection repair: POD 2  Per primary and CT surgery  CC: Diabetes Consult    History of Present Illness     HPI: Kanwal Wheeler  is a 71y o  year old male with hx of DM per chart and takes Glimeperide and Metformin  Patient currently encephalopathic and agitated and unable to provide further history  Consults    Review of Systems   Unable to perform ROS: Mental status change       Historical Information   Past Medical History:   Diagnosis Date    Diabetes     Hypertension     Prostate cancer      Past Surgical History:   Procedure Laterality Date    ND ASCEND AORTA GRAFT W ROOT REPLACMENT  VALVE CONDUIT/CORON RECONSTRUCT N/A 9/6/2017    Procedure: ASCENDING AORTIC REPAIR WITH A 26MM  GELWEAVE STRAIGHT GRAFT AND HEMIARCH WITH 8MM SIDE ARM BRANCH;  Surgeon: Cody Hernandez MD;  Location: [facility];  Service: Cardiac Surgery    ND EXPLOR POSTOP BLEED,INFEC,CLOT-CHST N/A 9/6/2017    Procedure: RE-EXPLORATION MEDIASTERNAL CAVITY FOR POST-OP BLEED (BRING BACK); Surgeon: Cody Hernandez MD;  Location: [facility];  Service: Cardiac Surgery     Social History   History   Alcohol Use No     History   Drug Use No     History   Smoking Status    Never Smoker   Smokeless Tobacco    Not on file     Family History: History reviewed  No pertinent family history      Meds/Allergies   Current Facility-Administered Medications   Medication Dose Route Frequency Provider Last Rate Last Dose    acetaminophen (TYLENOL) rectal suppository 650 mg  650 mg Rectal Q4H PRN [place] Coma, PA-C        acetaminophen (TYLENOL) tablet 650 mg  650 mg Oral Q4H PRN [place] Coma, PA-C        amiodarone (CORDARONE) 900 mg in dextrose 5 % 500 mL infusion  0 5 mg/min Intravenous Continuous Betty Marquez PA-C 16 7 mL/hr at 09/08/17 0500 0 5 mg/min at 09/08/17 0500    amiodarone tablet 200 mg  200 mg Oral [facility] Saint Coma, PA-C   200 mg at 09/07/17 2133    aspirin tablet 325 mg  325 mg Oral Daily Saint Coma, PA-C   325 mg at 09/08/17 8681    atorvastatin (LIPITOR) tablet 80 mg  80 mg Oral HS Saint Coma, PA-C   80 mg at 09/07/17 2133    bisacodyl (DULCOLAX) rectal suppository 10 mg  10 mg Rectal Daily PRN Saint Coma, PA-C        chlorhexidine (PERIDEX) 0 12 % oral rinse 15 mL  15 mL Swish & Spit Q12H [facility] [place], [place]   15 mL at 09/07/17 2133    dexmedetomidine (PRECEDEX) 200 mcg in sodium chloride 0 9 % 50 mL infusion  0 1-0 7 mcg/kg/hr Intravenous Titrated Betty Marquez PA-C   Stopped at 09/08/17 1235    fentanyl citrate (PF) 100 MCG/2ML 50 mcg  50 mcg Intravenous Q1H PRN [place] Coma, PA-C   50 mcg at 09/07/17 5148    fondaparinux (ARIXTRA) subcutaneous injection 2 5 mg  2 5 mg Subcutaneous Daily Saint Coma, PA-C   2 5 mg at 09/08/17 0855    furosemide (LASIX) injection 40 mg  40 mg Intravenous Daily MAUREEN Oneill   40 mg at 09/08/17 0855    insulin regular (HumuLIN R,NovoLIN R) 1 Units/mL in sodium chloride 0 9 % 100 mL infusion  0 3-21 Units/hr Intravenous Titrated Saint Coma, PA-C 0 5 mL/hr at 09/08/17 0631 0 5 Units/hr at 09/08/17 0631    lidocaine (cardiac) injection 100 mg  100 mg Intravenous Q30 Min PRN Saint Coma, PA-C        metoprolol tartrate (LOPRESSOR) tablet 25 mg  25 mg Oral Q12H [address] MAUREEN Oneill   25 mg at 09/08/17 0953    mupirocin (BACTROBAN) 2 % nasal ointment 1 application  1 application Nasal R12N [address] [place] [address]   1 application at 09/08/17 0856    niCARdipine (CARDENE) 50 mg (DOUBLE CONCENTRATION) IV in sodium chloride 0 9% 250 mL  1-15 mg/hr Intravenous Titrated Adam Lim PA-C 20 mL/hr at 09/08/17 0500 4 mg/hr at 09/08/17 0500    OLANZapine (ZyPREXA ZYDIS) dispersible tablet 2 5 mg  2 5 mg Oral Q12H ElMAUREEN Linares        [facility]etron [facility]) injection 4 mg  4 mg Intravenous Q6H PRN Devyn Baxter PA-C        oxyCODONE-acetaminophen (PERCOCET) 5-325 mg per tablet 1 tablet  1 tablet Oral Q4H PRN Devyn Baxter PA-C        oxyCODONE-acetaminophen (PERCOCET) 5-325 mg per tablet 2 tablet  2 tablet Oral Q6H PRN Devyn Baxter PA-C        pantoprazole (PROTONIX) injection 40 mg  40 mg Intravenous Q24H [address] El BEATRICE KiddNP   40 mg at 09/08/17 0855    phenylephrine (HUMERA-SYNEPHRINE) 50 mg (STANDARD CONCENTRATION) in sodium chloride 0 9% 250 mL   mcg/min Intravenous Titrated MAUREEN Castelan 6 mL/hr at 09/08/17 1452 20 mcg/min at 09/08/17 1452    polyethylene glycol (MIRALAX) packet 17 g  17 g Oral Daily Shane Anna        potassium chloride 20 mEq IVPB (premix)  20 mEq Intravenous Once PRN Devyn Baxter PA-C        potassium chloride 20 mEq IVPB (premix)  20 mEq Intravenous Q1H PRN Devyn Baxter PA-C        potassium chloride 20 mEq IVPB (premix)  20 mEq Intravenous Q30 Min PRN Devyn Baxter PA-C        sodium chloride infusion 0 45 %  20 mL/hr Intravenous Continuous Devyn Baxter PA-C 20 mL/hr at 09/06/17 1800 20 mL/hr at 09/06/17 1800     No Known Allergies    Objective   Vitals: Blood pressure 110/64, pulse 82, temperature 98 4 °F (36 9 °C), resp  rate 15, weight 115 kg (253 lb 4 9 oz), SpO2 98 %      Intake/Output Summary (Last 24 hours) at 09/08/17 1606  Last data filed at 09/08/17 1400   Gross per 24 hour   Intake          2703 18 ml   Output             1680 ml   Net          1023 18 ml     Invasive Devices     Central Venous Catheter Line            CVC Central Lines 09/06/17 Triple 2 days Peripheral Intravenous Line            Peripheral IV 09/05/17 Left Antecubital 3 days    Peripheral IV 09/06/17 Left Forearm 2 days    Peripheral IV 09/06/17 Right Forearm 2 days          Arterial Line            Arterial Line 09/06/17 Radial 2 days          Drain            Urethral Catheter Non-latex; Temperature probe 16 Fr  2 days    Chest Tube 1 Posterior Mediastinal 36 Fr  1 day    Chest Tube 2 Anterior Mediastinal 40 Fr  1 day          Airway            ETT  Cuffed;Oral 8 mm 2 days                Physical Exam   Constitutional: He appears well-developed and well-nourished  He appears distressed  HENT:   Head: Normocephalic and atraumatic  Eyes: EOM are normal  No scleral icterus  Neck: Neck supple  Cardiovascular: Normal rate and regular rhythm  Pulmonary/Chest: Effort normal  No respiratory distress  Abdominal: There is no tenderness  Musculoskeletal: He exhibits no edema  Lymphadenopathy:     He has no cervical adenopathy  Neurological:   Agitated  Skin: Skin is warm and dry  No rash noted  The history was obtained from the review of the chart      Lab Results:     Results from last 7 days  Lab Units 09/06/17  0633   HEMOGLOBIN A1C % 7 1*     Lab Results   Component Value Date    WBC 19 40 (H) 09/08/2017    HGB 10 8 (L) 09/08/2017    HCT 32 2 (L) 09/08/2017    MCV 89 09/08/2017     (L) 09/08/2017     Lab Results   Component Value Date/Time    BUN 20 09/08/2017 05:04 AM    BUN 12 10/07/2015 09:34 AM     (H) 09/08/2017 05:04 AM     10/07/2015 09:34 AM    K 3 3 (L) 09/08/2017 05:04 AM    K 3 6 10/07/2015 09:34 AM     (H) 09/08/2017 05:04 AM     10/07/2015 09:34 AM    CO2 28 09/08/2017 05:04 AM    CO2 32 10/07/2015 09:34 AM    CREATININE 1 08 09/08/2017 05:04 AM    CREATININE 0 65 10/07/2015 09:34 AM    AST 75 (H) 09/07/2017 04:08 AM    AST 30 10/07/2015 09:34 AM    ALT 40 09/07/2017 04:08 AM    ALT 57 10/07/2015 09:34 AM     Recent Labs 09/06/17   0633   CHOL  169   HDL  40   TRIG  103     No results found for: RULAALSHEREEN, [id number]  POC Glucose (mg/dl)   Date Value   09/08/2017 77   09/08/2017 152 (H)   09/08/2017 133   09/08/2017 138   09/08/2017 101   09/08/2017 136   09/08/2017 151 (H)   09/07/2017 143 (H)   09/07/2017 133   09/07/2017 138       Imaging Studies: I have personally reviewed pertinent reports  XR chest portable ICU [[id number]] Collected: 09/08/17 0845   Order Status: Completed Updated: 09/08/17 0853   Narrative:     CHEST     INDICATION:  Hypoxemia    COMPARISON:  Chest radiograph 9/7/2017    VIEWS:   AP frontal    IMAGES:  1    FINDINGS:  Right internal jugular [place] catheter tip is in the main pulmonary artery   Mediastinal drain is unchanged   Right internal jugular central venous catheter tip is obscured    Status post extubation   Nasogastric tube removed  Moderate cardiomegaly is unchanged   Width of the superior mediastinum is unchanged  Shallow inspiration   Left base opacity is unchanged   Slightly improved right lower lobe opacity         Visualized osseous structures appear within normal limits for the patient's age  Impression:     1   Lines and tubes as above  2   Unchanged left base opacity likely reflecting small effusion and atelectasis   Slightly improved right base opacity

## 2017-09-08 NOTE — PROGRESS NOTES
Progress Note - Medical Critical Care   Kwaku Rodriguez  71 y o  male MRN: [id number]  Unit/Bed#: [facility] 416-01 Encounter: [id number]      Attending Physician: Sanjay Bills MD    24 Hour Events:   POD # 2 s/p Type A dissection with replacement of ascending aorta with hemiarch reconstruction with 26mm Dacron graft and subsequent mediastinal re-exploration    Afib with RVR-> amio bolus and gtt  Hypoxemia->Bipap-> HFNC FiO2 increased, 20mg lasix given (MN at 1am)  Agitated, combative, Precedex, 4 point restraints, seroquel, 1:1 initiated  Refused to take PO amio this AM    Cardene 4mg/hr  Precedex 0 7  Amio 0 5  Insulin    Allergies: No Known Allergies    Medications:   Scheduled Meds:    amiodarone 200 mg Oral Q8H [facility]   aspirin 325 mg Oral Daily   atorvastatin 80 mg Oral HS   chlorhexidine 15 mL Swish & Spit Q12H [facility]   fondaparinux 2 5 mg Subcutaneous Daily   metoprolol tartrate 12 5 mg Oral Q12H [facility]   mupirocin 1 application Nasal X27F DEMETRIA   pantoprazole 40 mg Intravenous Q24H DEMETRIA   polyethylene glycol 17 g Oral Daily   potassium chloride 40 mEq Intravenous Once   QUEtiapine 25 mg Oral HS       VTE Pharmacologic Prophylaxis: Fondaparinux (Arixtra)  VTE Mechanical Prophylaxis: sequential compression device    Continuous Infusions:    amiodarone 0 5 mg/min Last Rate: 0 5 mg/min (09/08/17 0500)   dexmedetomidine 0 1-0 7 mcg/kg/hr Last Rate: 0 7 mcg/kg/hr (09/08/17 0400)   insulin regular (HumuLIN R,NovoLIN R) infusion 0 3-21 Units/hr Last Rate: 0 5 Units/hr (09/08/17 0631)   niCARdipine 1-15 mg/hr Last Rate: 10 mg/hr (09/08/17 0328)   sodium chloride 20 mL/hr Last Rate: 20 mL/hr (09/06/17 1800)     PRN Meds:    acetaminophen 650 mg Q4H PRN   acetaminophen 650 mg Q4H PRN   bisacodyl 10 mg Daily PRN   fentanyl citrate (PF) 50 mcg Q1H PRN   HYDROmorphone 0 5 mg Q1H PRN   HYDROmorphone 1 mg Q1H PRN   lidocaine (cardiac) 100 mg Q30 Min PRN   ondansetron 4 mg Q6H PRN   oxyCODONE-acetaminophen 1 tablet Q4H PRN oxyCODONE-acetaminophen 2 tablet Q6H PRN   potassium chloride 20 mEq Once PRN   potassium chloride 20 mEq Q1H PRN   potassium chloride 20 mEq Q30 Min PRN       Home Medications:   Prior to Admission medications    Medication Sig Start Date End Date Taking? Authorizing Provider   amLODIPine-benazepril (LOTREL) 10-40 MG per capsule Take 1 capsule by mouth daily    Historical Provider, MD   atenolol (TENORMIN) 100 mg tablet Take 100 mg by mouth daily    Historical Provider, MD   glimepiride (AMARYL) 4 mg tablet Take 4 mg by mouth every morning before breakfast    Historical Provider, MD   metFORMIN (GLUCOPHAGE) 1000 MG tablet Take 1,000 mg by mouth 2 (two) times a day with meals    Historical Provider, MD   Omega-3 Fatty Acids (FISH OIL OMEGA-3 PO) Take 1 tablet by mouth daily    Historical Provider, MD   triamterene (DYRENIUM) 50 mg capsule Take 50 mg by mouth daily    Historical Provider, MD   Valsartan-Hydrochlorothiazide (DIOVAN HCT PO) Take 1 tablet by mouth daily    Historical Provider, MD       Temperature: Temp (24hrs), Av 7 °F (37 1 °C), Min:97 9 °F (36 6 °C), Max:99 9 °F (37 7 °C)  Current: Temperature: 99 9 °F (37 7 °C)    Vitals:   Vitals:    17 0038 17 0100 17 0327 17 0446   BP:  120/73     Pulse:  (!) 118     Resp:  16     Temp:       TempSrc:       SpO2: 96% 96% 100% 100%   Weight:         Arterial Line BP: 104/56  Arterial Line MAP (mmHg): 70 mmHg    Tele Rhythm: Atrial Fibrillation This was personally reviewed by myself  Respiratory:  HFNC  SpO2: SpO2: 100 %  FiO2 (%): 60%  50 LPM        Weights:   Weight (last 2 days)     Date/Time   Weight    17 0600  113 (248 68)            IBW: -88 kg  There is no height or weight on file to calculate BMI      Hemodynamic Monitoring:  CVP: CVP (mean): 14 mmHg, SVR: SVR (dyne*sec)/cm5: 1908 (dyne*sec)/cm5  PAP: (26-39)/(9-20) 32/19  CO:  [5 9 L/min-11 6 L/min] 6 2 L/min  CI:  [2 6 L/min/m2-5 1 L/min/m2] 2 7 L/min/m2 (2 7-5 1)    Intake and Outputs:    Intake/Output Summary (Last 24 hours) at 09/08/17 0658  Last data filed at 09/08/17 0600   Gross per 24 hour   Intake          1923 37 ml   Output             1975 ml   Net           -51 63 ml     I/O last 24 hours: In: 4803 1 [I V :3403 1; Blood:700; IV Piggyback:700]  Out: 3636 [Urine:2450; Chest Tube:1370]    UOP: 40-75/hour     Chest tube Output:  Mediastinal tubes: 340 mL/8 hours  790 mL/24 hours   Pleural tubes:        Labs:    Results from last 7 days  Lab Units 09/08/17  0504 09/07/17  0408 09/06/17  2140 09/06/17  2130  09/06/17  1802  09/05/17  1219   WBC Thousand/uL 19 40* 11 80*  --  9 07  --  17 55*  < > 8 31   HEMOGLOBIN g/dL 10 8* 10 8*  --  10 8*  --  9 8*  < > 14 2   I STAT HEMOGLOBIN g/dl  --   --  9 9*  --   < >  --   < >  --    HEMATOCRIT % 32 2* 31 7*  --  31 7*  --  28 8*  < > 41 6   PLATELETS Thousands/uL 139* 131*  --  146*  --  213  < > 178   NEUTROS PCT %  --   --   --   --   --  85*  --  67   MONOS PCT %  --   --   --   --   --  11  --  7   < > = values in this interval not displayed  Results from last 7 days  Lab Units 09/08/17  0504 09/07/17  2325 09/07/17  0408  09/05/17  1219   SODIUM mmol/L 147* 145 149*  < > 140   POTASSIUM mmol/L 3 3* 3 1*  3 1* 4 3  < > 4 1   CHLORIDE mmol/L 111* 108 111*  < > 100   CO2 mmol/L 28 29 33*  < > 36*   BUN mg/dL 20 19 14  < > 14   CREATININE mg/dL 1 08 0 98 1 14  < > 0 88   CALCIUM mg/dL 7 3* 7 7* 7 7*  < > 9 5   ALBUMIN g/dL  --   --  2 8*  --  3 5   TOTAL PROTEIN g/dL  --   --  5 6*  --  7 7   BILIRUBIN TOTAL mg/dL  --   --  1 78*  --  0 63   ALK PHOS U/L  --   --  35*  --  57   ALT U/L  --   --  40  --  33   AST U/L  --   --  75*  --  22   GLUCOSE RANDOM mg/dL 99 155* 120  < > 210*   GLUCOSE, ISTAT   --   --   --   < >  --    < > = values in this interval not displayed            Results from last 7 days  Lab Units 09/08/17  0504 09/07/17  2325 09/07/17  0408   MAGNESIUM mg/dL 2 5 2 6 2 8*       Results from last 7 days  Lab Units 09/06/17  0633   HEMOGLOBIN A1C % 7 1*     CXR: Under penetrated and rotated, slight blunting of CPA This was personally reviewed by myself in PACS  EKG: afib rate 110 this AM    Physical Exam:     Neck:  Supple, no JVD     Cardiac: IRR, no mrg     Pulmonary:  Breath sounds rhonchi, decreased bases, no air leak on Chest tubes     Abdomen:  Soft, ND, NT, positive  bowel sounds     Incisions: Sternum is stable  Incision dressed with Acticoat  No erythema or drainage  Lower extremities: Trace edema B/L  DP pulses 2+     Neuro: Alert  Follows commands  Combative and refusing medications at times     Skin: Warm and dry  Invasive lines and devices: Invasive Devices     Central Venous Catheter Line            CVC Central Lines 09/06/17 Triple 1 day    Introducer 09/06/17 1 day          Peripheral Intravenous Line            Peripheral IV 09/05/17 Left Antecubital 2 days    Peripheral IV 09/06/17 Left Forearm 1 day    Peripheral IV 09/06/17 Right Forearm 1 day          Arterial Line            Arterial Line 09/06/17 Radial 1 day          Line            Pacer Wires 1 day    Pacer Wires 1 day          Drain            Chest Tube 1 Posterior Mediastinal 36 Fr  1 day    Chest Tube 2 Anterior Mediastinal 40 Fr  1 day    Urethral Catheter Non-latex; Temperature probe 16 Fr  1 day          Airway            ETT  Cuffed;Oral 8 mm 1 day                Assessment:  Principal Problem:    Acute thoracic aortic dissection  Active Problems:    Hypertension    Diabetes    Acute blood loss anemia    s/p aortic arch repair    Hypernatremia    Pulmonary insufficiency    Encephalopathy    Hypokalemia  Resolved Problems:     On mechanically assisted ventilation    Coagulopathy    Acute kidney injury      Plan:    Cardiac:   Cardiac infusions: Cardene, 4 mg/hour  Hemodynamic goals:   CI > 2 2 and MAP >65   D/c [place] catheter   continue arterial line  Atrial Fibrillation; HR/BP well-controlled  Increase lopressor 25 mg PO BID   Consider adding norvasc 5mg daily  Continue ASA, PO amiodarone, and statin therapy  Maintain epicardial pacing wires for PRN pacing  Continue DVT prophylaxis  Consult cardiology for postoperative medical management    Pulmonary:   Extubated  Chest tube output remains persistently high; Continue chest tubes to suction today  Acute post-op hypoxic respiratory insufficiency:  Continue incentive spirometry/coughing/deep breathing exercises  Wean supplemental oxygen as tolerated  Renal:   Discontinue IV potassium replacement scales  Post-op volume overload: Add Lasix 40 mg IV q daily  Add K-dur 20 mEq q Daily  Maintain  fry catheter    Change IVF to D5w at 30ml/hr for FWD    Neuro:   Continue Precedex, Seroquel 25mg HS given last night, consider BID dosing  Neurologically intact with no active issues  Incisional pain well-controlled  Discontinue IV narcotic therapy and continue with PO pain regimen    GI:  Initiate TLC 2 3 gm sodium diet with consistent carbohydrate modifier and 1800 mL fluid restriction  Continue stool softeners and prn suppository  Continue GI prophylaxis with PPI          Endo: History of diabetes: Continue insulin infusion today  Consult Endocrinology for management    Hematology:   Post-operative acute blood loss anemia:   Trend hemoglobin and platelets  Transfuse as needed  Disposition:   Continue ICU level of care for hypoxemia, agitation and precedex    Counseling / Coordination of Care  Total time spent today 34 minutes  Greater than 50% of total time was spent with the patient and / or family counseling and / or coordination of care  Collaborative bedside rounds performed with cardiac surgery attending and bedside RN      SIGNATURE: MAUREEN Bautista  DATE: September 8, 2017  TIME: 6:58 AM

## 2017-09-08 NOTE — PROGRESS NOTES
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  Pt seen/examined  Interval history and data reviewed with critical care team   Pt doing well  No specific complaints  Nicardipine gtt  Precedex gtt  Extubated to high flow nasal cannula  Rapid afib  Hemodynamics otherwise stable  Waxing and waning postop delirium  Medications:   Scheduled Meds:    amiodarone 200 mg Oral Q8H [address]   aspirin 325 mg Oral Daily   atorvastatin 80 mg Oral HS   chlorhexidine 15 mL Swish & Spit Q12H [address]   fondaparinux 2 5 mg Subcutaneous Daily   metoprolol tartrate 12 5 mg Oral Q12H [address]   mupirocin 1 application Nasal U36B [address]   pantoprazole 40 mg Intravenous Q24H DEMETRIA   polyethylene glycol 17 g Oral Daily   potassium chloride 40 mEq Intravenous Once   QUEtiapine 25 mg Oral HS     Continuous Infusions:    amiodarone 0 5 mg/min Last Rate: 0 5 mg/min (09/08/17 0500)   dexmedetomidine 0 1-0 7 mcg/kg/hr Last Rate: 0 7 mcg/kg/hr (09/08/17 0400)   insulin regular (HumuLIN R,NovoLIN R) infusion 0 3-21 Units/hr Last Rate: 0 5 Units/hr (09/08/17 0631)   niCARdipine 1-15 mg/hr Last Rate: 4 mg/hr (09/08/17 0500)   sodium chloride 20 mL/hr Last Rate: 20 mL/hr (09/06/17 1800)     PRN Meds:   acetaminophen    acetaminophen    bisacodyl    fentanyl citrate (PF)    HYDROmorphone    HYDROmorphone    lidocaine (cardiac)    ondansetron    oxyCODONE-acetaminophen    oxyCODONE-acetaminophen    potassium chloride    potassium chloride    potassium chloride    Vitals: Blood pressure 110/64, pulse (!) 114, temperature 99 °F (37 2 °C), temperature source Probe, resp  rate 17, weight 115 kg (253 lb 4 9 oz), SpO2 99 %  ,There is no height or weight on file to calculate BMI  I/O last 24 hours:   In: 2290 6 [I V :1690 6; IV Piggyback:600]  Out: 2105 [Urine:1315; Chest Tube:790]  Invasive Devices     Central Venous Catheter Line            CVC Central Lines 09/06/17 Triple 1 day    Introducer 09/06/17 1 day          Peripheral Intravenous Line            Peripheral IV 09/05/17 Left Antecubital 2 days    Peripheral IV 09/06/17 Left Forearm 2 days    Peripheral IV 09/06/17 Right Forearm 2 days          Arterial Line            Arterial Line 09/06/17 Radial 2 days          Line            Pacer Wires 1 day    Pacer Wires 1 day          Drain            Chest Tube 1 Posterior Mediastinal 36 Fr  1 day    Chest Tube 2 Anterior Mediastinal 40 Fr  1 day    Urethral Catheter Non-latex; Temperature probe 16 Fr  1 day          Airway            ETT  Cuffed;Oral 8 mm 1 day                  Lab, Imaging and other studies:     Results from last 7 days  Lab Units 09/08/17  0504 09/07/17  0408 09/06/17  2140 09/06/17  2130   WBC Thousand/uL 19 40* 11 80*  --  9 07   HEMOGLOBIN g/dL 10 8* 10 8*  --  10 8*   I STAT HEMOGLOBIN g/dl  --   --  9 9*  --    HEMATOCRIT % 32 2* 31 7*  --  31 7*   PLATELETS Thousands/uL 139* 131*  --  146*       Results from last 7 days  Lab Units 09/08/17  0504 09/07/17  2325 09/07/17  0408   SODIUM mmol/L 147* 145 149*   POTASSIUM mmol/L 3 3* 3 1*  3 1* 4 3   CHLORIDE mmol/L 111* 108 111*   CO2 mmol/L 28 29 33*   BUN mg/dL 20 19 14   CREATININE mg/dL 1 08 0 98 1 14   GLUCOSE RANDOM mg/dL 99 155* 120   CALCIUM mg/dL 7 3* 7 7* 7 7*       Results from last 7 days  Lab Units 09/06/17  2130 09/06/17  1801 09/06/17  1536   INR  0 88 0 75* 1 66*   PTT seconds 28 31 28     Recent Labs      09/07/17   0408   PHART  7 539*   [id number]  30 8*   PO2ART  76 3   [id number]  36 9   BEART  7 8           Plan:    DC [place]  Cont Emily/Wilson for now  Continue chest tubes  DC pacing wires  Gentle Diuresis  Check renal ultrasound if oliguria  PO ASA/Statin/B blocker  Seroquel  Wean nicardipine to off  Continue ICU care        Emma Giron MD  DATE: September 8, 2017  TIME: 8:00 AM

## 2017-09-09 LAB
ABO GROUP BLD BPU: NORMAL
ANION GAP SERPL CALCULATED.3IONS-SCNC: 11 MMOL/L (ref 4–13)
BPU ID: NORMAL
BUN SERPL-MCNC: 28 MG/DL (ref 5–25)
CALCIUM SERPL-MCNC: 7.3 MG/DL (ref 8.3–10.1)
CHLORIDE SERPL-SCNC: 107 MMOL/L (ref 100–108)
CO2 SERPL-SCNC: 27 MMOL/L (ref 21–32)
CREAT SERPL-MCNC: 0.93 MG/DL (ref 0.6–1.3)
CROSSMATCH: NORMAL
ERYTHROCYTE [DISTWIDTH] IN BLOOD BY AUTOMATED COUNT: 15.3 % (ref 11.6–15.1)
GFR SERPL CREATININE-BSD FRML MDRD: 82 ML/MIN/1.73SQ M
GLUCOSE SERPL-MCNC: 108 MG/DL (ref 65–140)
GLUCOSE SERPL-MCNC: 142 MG/DL (ref 65–140)
GLUCOSE SERPL-MCNC: 163 MG/DL (ref 65–140)
GLUCOSE SERPL-MCNC: 173 MG/DL (ref 65–140)
GLUCOSE SERPL-MCNC: 176 MG/DL (ref 65–140)
GLUCOSE SERPL-MCNC: 180 MG/DL (ref 65–140)
GLUCOSE SERPL-MCNC: 182 MG/DL (ref 65–140)
GLUCOSE SERPL-MCNC: 191 MG/DL (ref 65–140)
GLUCOSE SERPL-MCNC: 192 MG/DL (ref 65–140)
GLUCOSE SERPL-MCNC: 197 MG/DL (ref 65–140)
GLUCOSE SERPL-MCNC: 209 MG/DL (ref 65–140)
GLUCOSE SERPL-MCNC: 218 MG/DL (ref 65–140)
GLUCOSE SERPL-MCNC: 98 MG/DL (ref 65–140)
HCT VFR BLD AUTO: 35.9 % (ref 36.5–49.3)
HGB BLD-MCNC: 11.9 G/DL (ref 12–17)
INR PPP: 1.25 (ref 0.86–1.16)
MAGNESIUM SERPL-MCNC: 2.1 MG/DL (ref 1.6–2.6)
MCH RBC QN AUTO: 29.8 PG (ref 26.8–34.3)
MCHC RBC AUTO-ENTMCNC: 33.1 G/DL (ref 31.4–37.4)
MCV RBC AUTO: 90 FL (ref 82–98)
PLATELET # BLD AUTO: 177 THOUSANDS/UL (ref 149–390)
PMV BLD AUTO: 10.7 FL (ref 8.9–12.7)
POTASSIUM SERPL-SCNC: 2.7 MMOL/L (ref 3.5–5.3)
POTASSIUM SERPL-SCNC: 3.1 MMOL/L (ref 3.5–5.3)
POTASSIUM SERPL-SCNC: 3.1 MMOL/L (ref 3.5–5.3)
POTASSIUM SERPL-SCNC: 3.6 MMOL/L (ref 3.5–5.3)
PROTHROMBIN TIME: 15.8 SECONDS (ref 12.1–14.4)
RBC # BLD AUTO: 3.99 MILLION/UL (ref 3.88–5.62)
SODIUM SERPL-SCNC: 145 MMOL/L (ref 136–145)
UNIT DISPENSE STATUS: NORMAL
UNIT PRODUCT CODE: NORMAL
UNIT RH: NORMAL
WBC # BLD AUTO: 23.88 THOUSAND/UL (ref 4.31–10.16)

## 2017-09-09 PROCEDURE — 85027 COMPLETE CBC AUTOMATED: CPT | Performed by: NURSE PRACTITIONER

## 2017-09-09 PROCEDURE — 84132 ASSAY OF SERUM POTASSIUM: CPT | Performed by: PHYSICIAN ASSISTANT

## 2017-09-09 PROCEDURE — 83735 ASSAY OF MAGNESIUM: CPT | Performed by: NURSE PRACTITIONER

## 2017-09-09 PROCEDURE — 85610 PROTHROMBIN TIME: CPT | Performed by: NURSE PRACTITIONER

## 2017-09-09 PROCEDURE — 94760 N-INVAS EAR/PLS OXIMETRY 1: CPT

## 2017-09-09 PROCEDURE — C9113 INJ PANTOPRAZOLE SODIUM, VIA: HCPCS | Performed by: NURSE PRACTITIONER

## 2017-09-09 PROCEDURE — 84132 ASSAY OF SERUM POTASSIUM: CPT | Performed by: NURSE PRACTITIONER

## 2017-09-09 PROCEDURE — 80048 BASIC METABOLIC PNL TOTAL CA: CPT | Performed by: NURSE PRACTITIONER

## 2017-09-09 PROCEDURE — 82948 REAGENT STRIP/BLOOD GLUCOSE: CPT

## 2017-09-09 RX ORDER — WARFARIN SODIUM 2.5 MG/1
2.5 TABLET ORAL
Status: COMPLETED | OUTPATIENT
Start: 2017-09-09 | End: 2017-09-09

## 2017-09-09 RX ORDER — MAGNESIUM SULFATE HEPTAHYDRATE 40 MG/ML
2 INJECTION, SOLUTION INTRAVENOUS ONCE
Status: COMPLETED | OUTPATIENT
Start: 2017-09-09 | End: 2017-09-09

## 2017-09-09 RX ORDER — METOPROLOL TARTRATE 5 MG/5ML
5 INJECTION INTRAVENOUS ONCE
Status: COMPLETED | OUTPATIENT
Start: 2017-09-09 | End: 2017-09-09

## 2017-09-09 RX ORDER — POTASSIUM CHLORIDE 20 MEQ/1
40 TABLET, EXTENDED RELEASE ORAL ONCE
Status: COMPLETED | OUTPATIENT
Start: 2017-09-09 | End: 2017-09-09

## 2017-09-09 RX ORDER — POTASSIUM CHLORIDE 29.8 MG/ML
40 INJECTION INTRAVENOUS ONCE
Status: COMPLETED | OUTPATIENT
Start: 2017-09-09 | End: 2017-09-09

## 2017-09-09 RX ORDER — POTASSIUM CHLORIDE 29.8 MG/ML
INJECTION INTRAVENOUS
Status: COMPLETED
Start: 2017-09-09 | End: 2017-09-09

## 2017-09-09 RX ORDER — INSULIN GLARGINE 100 [IU]/ML
30 INJECTION, SOLUTION SUBCUTANEOUS
Status: DISCONTINUED | OUTPATIENT
Start: 2017-09-09 | End: 2017-09-10

## 2017-09-09 RX ORDER — METOPROLOL TARTRATE 50 MG/1
50 TABLET, FILM COATED ORAL EVERY 12 HOURS SCHEDULED
Status: DISCONTINUED | OUTPATIENT
Start: 2017-09-09 | End: 2017-09-10

## 2017-09-09 RX ADMIN — ASPIRIN 325 MG: 325 TABLET ORAL at 08:00

## 2017-09-09 RX ADMIN — MUPIROCIN 1 APPLICATION: 20 OINTMENT TOPICAL at 08:00

## 2017-09-09 RX ADMIN — ATORVASTATIN CALCIUM 80 MG: 80 TABLET, FILM COATED ORAL at 21:46

## 2017-09-09 RX ADMIN — INSULIN GLARGINE 30 UNITS: 100 INJECTION, SOLUTION SUBCUTANEOUS at 21:46

## 2017-09-09 RX ADMIN — OXYCODONE HYDROCHLORIDE AND ACETAMINOPHEN 1 TABLET: 5; 325 TABLET ORAL at 06:37

## 2017-09-09 RX ADMIN — PANTOPRAZOLE SODIUM 40 MG: 40 INJECTION, POWDER, FOR SOLUTION INTRAVENOUS at 08:00

## 2017-09-09 RX ADMIN — POTASSIUM CHLORIDE 40 MEQ: 400 INJECTION, SOLUTION INTRAVENOUS at 12:11

## 2017-09-09 RX ADMIN — POTASSIUM CHLORIDE 40 MEQ: 400 INJECTION, SOLUTION INTRAVENOUS at 19:26

## 2017-09-09 RX ADMIN — POTASSIUM CHLORIDE 40 MEQ: 400 INJECTION, SOLUTION INTRAVENOUS at 04:18

## 2017-09-09 RX ADMIN — POTASSIUM CHLORIDE 40 MEQ: 1500 TABLET, EXTENDED RELEASE ORAL at 13:01

## 2017-09-09 RX ADMIN — METOPROLOL TARTRATE 50 MG: 50 TABLET ORAL at 21:46

## 2017-09-09 RX ADMIN — AMIODARONE HYDROCHLORIDE 200 MG: 200 TABLET ORAL at 06:37

## 2017-09-09 RX ADMIN — AMIODARONE HYDROCHLORIDE 200 MG: 200 TABLET ORAL at 21:46

## 2017-09-09 RX ADMIN — OLANZAPINE 2.5 MG: 5 TABLET, ORALLY DISINTEGRATING ORAL at 21:45

## 2017-09-09 RX ADMIN — POTASSIUM CHLORIDE 40 MEQ: 400 INJECTION, SOLUTION INTRAVENOUS at 01:23

## 2017-09-09 RX ADMIN — FONDAPARINUX SODIUM 2.5 MG: 2.5 INJECTION, SOLUTION SUBCUTANEOUS at 08:00

## 2017-09-09 RX ADMIN — WARFARIN SODIUM 2.5 MG: 2.5 TABLET ORAL at 17:33

## 2017-09-09 RX ADMIN — METOPROLOL TARTRATE 5 MG: 5 INJECTION INTRAVENOUS at 02:36

## 2017-09-09 RX ADMIN — AMIODARONE HYDROCHLORIDE 200 MG: 200 TABLET ORAL at 13:01

## 2017-09-09 RX ADMIN — MAGNESIUM SULFATE HEPTAHYDRATE 2 G: 40 INJECTION, SOLUTION INTRAVENOUS at 07:20

## 2017-09-09 RX ADMIN — METOPROLOL TARTRATE 25 MG: 25 TABLET ORAL at 08:00

## 2017-09-09 RX ADMIN — POTASSIUM CHLORIDE 40 MEQ: 29.8 INJECTION INTRAVENOUS at 12:11

## 2017-09-09 RX ADMIN — POTASSIUM CHLORIDE 40 MEQ: 400 INJECTION, SOLUTION INTRAVENOUS at 07:20

## 2017-09-09 RX ADMIN — CHLORHEXIDINE GLUCONATE 15 ML: 1.2 RINSE ORAL at 08:00

## 2017-09-09 RX ADMIN — MUPIROCIN 1 APPLICATION: 20 OINTMENT TOPICAL at 21:45

## 2017-09-09 NOTE — PROGRESS NOTES
Procedure: Chest Tube Removal    09/09/17    Mediastinal CT x 2  d/c'd in typical fashion  Patient tolerated procedure well  No immediate complications  Site dressed with xeroform and dry sterile dressing  Patient's nurse was made aware       MAUREEN Balderrama

## 2017-09-09 NOTE — PROGRESS NOTES
Progress Note - Cardiothoracic Surgery   Vipin Ledesma  71 y o  male MRN: [id number]  Unit/Bed#: [facility] 416-01 Encounter: [id number]      POD # 3 s/p Type A dissection repair with ascending aorta/hemiarch replacement  Pt seen/examined  Interval history and data reviewed with critical care team   Pt doing well  No specific complaints  Delirium is improved this AM      Medications:   Scheduled Meds:    amiodarone 200 mg Oral Q8H [address]   aspirin 325 mg Oral Daily   atorvastatin 80 mg Oral HS   fondaparinux 2 5 mg Subcutaneous Daily   metoprolol tartrate 50 mg Oral Q12H [address]   mupirocin 1 application Nasal [id number] [address]   OLANZapine 2 5 mg Oral Q12H   pantoprazole 40 mg Intravenous Q24H [address]   polyethylene glycol 17 g Oral Daily   warfarin 2 5 mg Oral Once (warfarin)     Continuous Infusions:    amiodarone 0 5 mg/min Last Rate: 0 5 mg/min (09/08/17 1711)   furosemide 20 mg/hr Last Rate: 10 mg/hr (09/08/17 2200)   insulin regular (HumuLIN R,NovoLIN R) infusion 0 3-21 Units/hr Last Rate: 4 Units/hr (09/09/17 9908)   niCARdipine 1-15 mg/hr Last Rate: Stopped (09/09/17 0813)   sodium chloride 20 mL/hr Last Rate: 20 mL/hr (09/06/17 1800)     PRN Meds:   acetaminophen    acetaminophen    bisacodyl    fentanyl citrate (PF)    lidocaine (cardiac)    ondansetron    oxyCODONE-acetaminophen    oxyCODONE-acetaminophen    Vitals: Blood pressure 141/71, pulse (!) 124, temperature 100 4 °F (38 °C), resp  rate (!) 45, weight 115 kg (253 lb 4 9 oz), SpO2 95 %  ,There is no height or weight on file to calculate BMI  I/O last 24 hours: In: 2004 9 [I V :1504 9; IV Piggyback:500]  Out: 0476 [Urine:4330;  Chest Tube:330]  Invasive Devices     Central Venous Catheter Line            CVC Central Lines 09/06/17 Triple 3 days          Peripheral Intravenous Line            Peripheral IV 09/05/17 Left Antecubital 3 days    Peripheral IV 09/06/17 Left Forearm 3 days    Peripheral IV 09/06/17 Right Forearm 3 days          Arterial Line Arterial Line 09/06/17 Radial 3 days          Drain            Urethral Catheter Non-latex; Temperature probe 16 Fr  3 days    Chest Tube 1 Posterior Mediastinal 36 Fr  2 days    Chest Tube 2 Anterior Mediastinal 40 Fr  2 days          Airway            ETT  Cuffed;Oral 8 mm 3 days                  Lab, Imaging and other studies:     Results from last 7 days  Lab Units 09/09/17  0420 09/08/17  0504 09/07/17  0408   WBC Thousand/uL 23 88* 19 40* 11 80*   HEMOGLOBIN g/dL 11 9* 10 8* 10 8*   HEMATOCRIT % 35 9* 32 2* 31 7*   PLATELETS Thousands/uL 177 139* 131*       Results from last 7 days  Lab Units 09/09/17  0420 09/09/17  0026 09/08/17  1708 09/08/17  0504   SODIUM mmol/L 145  --  145 147*   POTASSIUM mmol/L 3 1* 2 7* 4 1 3 3*   CHLORIDE mmol/L 107  --  110* 111*   CO2 mmol/L 27  --  25 28   BUN mg/dL 28*  --  28* 20   CREATININE mg/dL 0 93  --  0 97 1 08   GLUCOSE RANDOM mg/dL 176*  --  148* 99   CALCIUM mg/dL 7 3*  --  7 2* 7 3*       Results from last 7 days  Lab Units 09/06/17  2130 09/06/17  1801 09/06/17  1536   INR  0 88 0 75* 1 66*   PTT seconds 28 31 28     Recent Labs      09/07/17   0408   PHART  7 539*   [id number]  30 8*   PO2ART  76 3   [id number]  36 9   BEART  7 8           Plan:    D/C Emily/Katie  D/C chest tubes  Diuresis with lasix drip for negative fluid balance again this 24 hours  PO ASA/Statin/B blocker  Seroquel  Continue ICU care        SIGNATURE: Terri Zuleta DO  DATE: September 9, 2017  TIME: 9:32 AM

## 2017-09-09 NOTE — SOCIAL WORK
78yo male was admitted for emergent thoracic aorta dissection  He was initially very confused but is much clearer and cooperative today, ambulated in preciado with assistance  He is alert and oriented, independent ADLS, retired  He lives alone in [facility] in 2 story row home with 3 NADIA and bathroom on second floor  His daughter is Marisel Stokes, [phone], and lives 2 doors away  She will transport at discharge  He reports no DME, no hx HHC, no hx rehab  [facility] [address]  Discussed available HHC with his insurance and he chose [facility], referral sent for nursing  Anticipate d/c in about 3 days  CM will follow for additional needs  CM reviewed d/c planning process including the following: identifying help at home, patient preference for d/c planning needs, Discharge Lounge, [facility] Meds to Bed program, availability of treatment team to discuss questions or concerns patient and/or family may have regarding understanding medications and recognizing signs and symptoms once discharged  CM also encouraged patient to follow up with all recommended appointments after discharge  Patient advised of importance for patient and family to participate in managing patients medical well being      Accepted by [facility]

## 2017-09-09 NOTE — PROGRESS NOTES
Progress Note - Jacy Section  71 y o  male MRN: [id number]    Unit/Bed#: [facility] 416-01 Encounter: [id number]      CC: diabetes f/u    Subjective:   Jacy Section  is a 71y o  year old male with type 2 diabetes  Feels well  No complaints  No hypoglycemia  Mental status has improved significantly  Objective:     Vitals: Blood pressure 141/71, pulse (!) 112, temperature 100 4 °F (38 °C), resp  rate 22, weight 115 kg (253 lb 4 9 oz), SpO2 92 %  ,There is no height or weight on file to calculate BMI  Intake/Output Summary (Last 24 hours) at 09/09/17 1540  Last data filed at 09/09/17 1400   Gross per 24 hour   Intake           1777 6 ml   Output             4945 ml   Net          -3167 4 ml       Physical Exam:  General Appearance: awake, appears stated age and cooperative  Head: Normocephalic, without obvious abnormality, atraumatic  Extremities: moves all extremities  Skin: Skin color and temperature normal    Pulm: no labored breathing    Lab, Imaging and other studies: I have personally reviewed pertinent reports  POC Glucose (mg/dl)   Date Value   09/09/2017 197 (H)   09/09/2017 182 (H)   09/09/2017 191 (H)   09/09/2017 209 (H)   09/09/2017 218 (H)   09/09/2017 180 (H)   09/09/2017 173 (H)   09/09/2017 163 (H)   09/08/2017 165 (H)   09/08/2017 147 (H)       Assessment:  diabetes with hyperglycemia and status post type a dissection repair    Plan:  1  Type 2 diabetes with hyperglycemia-transition to subcu insulin with Lantus 30 units at night and Humalog 10 units with meals  Discontinue IV insulin 1 hour after the Lantus injection  Continue to monitor blood sugar over time and make adjustments to his regimen if necessary  While on IV insulin, continue to monitor blood sugar every 2 hours  Due to being on intensive insulin therapy, the patient is at high risk of severe hypoglycemia and potential death  2   Status post type a dissection repair-management per CT surgery        Portions of the record may have been created with voice recognition software  Occasional wrong word or "sound a like" substitutions may have occurred due to the inherent limitations of voice recognition software  Read the chart carefully and recognize, using context, where substitutions have occurred

## 2017-09-09 NOTE — PROGRESS NOTES
Progress Note - Medical Critical Care   Carisa Cruz  71 y o  male MRN: [id number]  Unit/Bed#: [facility] 416-01 Encounter: [id number]      Attending Physician: Pearl Montoya MD    24 Hour Events:   POD # 3 Type A dissection with replacement of ascending aorta with hemiarch reconstruction with 26mm Dacron graft and subsequent mediastinal re-exploration    · EPW d/c'd yesterday  · Bblocker increased yesterday  · NIPPM overnight-> HFNC this AM  · Agitated, belligerent, towards staff earlier this AM, now improved  · Oliguira unresponsive to intermittent lasix dosing, volume challenge, Harry gtt to increase in MAP to 70-75, Lasix infusion started in evening with good response  · Renal duplex: limited study, unable to visualize right kidney or right renal artery; L kidney, parechymal flow noted to lower and mid pole of the kidney, unable to visualize the abdominal aorta    Harry off  Precedex off  Lasix gtt 10mg/hr  Amio gtt 0 5mg/min  Cardene 10mg/hr    Allergies: No Known Allergies    Medications:   Scheduled Meds:  amiodarone 200 mg Oral Q8H [address]   aspirin 325 mg Oral Daily   atorvastatin 80 mg Oral HS   chlorhexidine 15 mL Swish & Spit Q12H [address]   fondaparinux 2 5 mg Subcutaneous Daily   magnesium sulfate 2 g Intravenous Once   metoprolol tartrate 25 mg Oral Q12H [address]   mupirocin 1 application Nasal H61Z [address]   OLANZapine 2 5 mg Oral Q12H   pantoprazole 40 mg Intravenous Q24H [address]   polyethylene glycol 17 g Oral Daily   potassium chloride 40 mEq Intravenous Once   potassium chloride 40 mEq Intravenous Once       VTE Pharmacologic Prophylaxis: Fondaparinux (Arixtra)  VTE Mechanical Prophylaxis: sequential compression device    Continuous Infusions:    amiodarone 0 5 mg/min Last Rate: 0 5 mg/min (09/08/17 1711)   dexmedetomidine 0 1-0 7 mcg/kg/hr Last Rate: Stopped (09/08/17 1235)   furosemide 20 mg/hr Last Rate: 10 mg/hr (09/08/17 2200)   insulin regular (HumuLIN R,NovoLIN R) infusion 0 3-21 Units/hr Last Rate: 2 Units/hr (17 0419)   niCARdipine 1-15 mg/hr Last Rate: 5 mg/hr (17 0242)   phenylephine  mcg/min Last Rate: Stopped (17 0000)   sodium chloride 20 mL/hr Last Rate: 20 mL/hr (17 1800)     PRN Meds:    acetaminophen 650 mg Q4H PRN   acetaminophen 650 mg Q4H PRN   bisacodyl 10 mg Daily PRN   fentanyl citrate (PF) 50 mcg Q1H PRN   lidocaine (cardiac) 100 mg Q30 Min PRN   ondansetron 4 mg Q6H PRN   oxyCODONE-acetaminophen 1 tablet Q4H PRN   oxyCODONE-acetaminophen 2 tablet Q6H PRN   potassium chloride 20 mEq Once PRN   potassium chloride 20 mEq Q1H PRN   potassium chloride 20 mEq Q30 Min PRN       Home Medications:   Prior to Admission medications    Medication Sig Start Date End Date Taking?  Authorizing Provider   amLODIPine-benazepril (LOTREL) 10-40 MG per capsule Take 1 capsule by mouth daily    Historical Provider, MD   atenolol (TENORMIN) 100 mg tablet Take 100 mg by mouth daily    Historical Provider, MD   glimepiride (AMARYL) 4 mg tablet Take 4 mg by mouth every morning before breakfast    Historical Provider, MD   metFORMIN (GLUCOPHAGE) 1000 MG tablet Take 1,000 mg by mouth 2 (two) times a day with meals    Historical Provider, MD   Omega-3 Fatty Acids (FISH OIL OMEGA-3 PO) Take 1 tablet by mouth daily    Historical Provider, MD   triamterene (DYRENIUM) 50 mg capsule Take 50 mg by mouth daily    Historical Provider, MD   Valsartan-Hydrochlorothiazide (DIOVAN HCT PO) Take 1 tablet by mouth daily    Historical Provider, MD       Temperature: Temp (24hrs), Av 2 °F (36 8 °C), Min:97 8 °F (36 6 °C), Max:98 6 °F (37 °C)  Current: Temperature: 98 6 °F (37 °C)    Vitals:   Vitals:    17 0000 17 0100 17 0200 17 0319   BP:       Pulse: 96 94 96    Resp: 16 15 16    Temp: 98 2 °F (36 8 °C) 98 2 °F (36 8 °C) 98 6 °F (37 °C)    TempSrc:       SpO2: 98% 98% 98% 96%   Weight:         Arterial Line BP: 150/68  Arterial Line MAP (mmHg): 92 mmHg    Tele Rhythm: NSR This was personally reviewed by myself  Respiratory:  SpO2: SpO2: 96 %   HFNC    FiO2 (%): 40   50LPM          Weights:   Weight (last 2 days)     Date/Time   Weight    09/08/17 0600  115 (253 31)    09/07/17 0600  113 (248 68)            IBW: -88 kg  There is no height or weight on file to calculate BMI  Intake and Outputs:  Intake/Output Summary (Last 24 hours) at 09/09/17 0652  Last data filed at 09/09/17 0000   Gross per 24 hour   Intake          1358 32 ml   Output             3210 ml   Net         -1851 68 ml     I/O last 24 hours: In: 3003 [I V :2103; IV Piggyback:900]  Out: 5087 [PHKQF:9238; Chest Tube:740]    UOP: 100-200/hour     Chest tube Output:  Mediastinal tubes: 0 mL/8 hours (prior to getting up and ambulating)  280 mL/24 hours           Labs:    Results from last 7 days  Lab Units 09/09/17  0420 09/08/17  0504 09/07/17  0408  09/06/17  1802  09/05/17  1219   WBC Thousand/uL 23 88* 19 40* 11 80*  < > 17 55*  < > 8 31   HEMOGLOBIN g/dL 11 9* 10 8* 10 8*  < > 9 8*  < > 14 2   I STAT HEMOGLOBIN   --   --   --   < >  --   < >  --    HEMATOCRIT % 35 9* 32 2* 31 7*  < > 28 8*  < > 41 6   PLATELETS Thousands/uL 177 139* 131*  < > 213  < > 178   NEUTROS PCT %  --   --   --   --  85*  --  67   MONOS PCT %  --   --   --   --  11  --  7   < > = values in this interval not displayed      Results from last 7 days  Lab Units 09/09/17  0420 09/09/17  0026 09/08/17  1708 09/08/17  0504  09/07/17  0408  09/05/17  1219   SODIUM mmol/L 145  --  145 147*  < > 149*  < > 140   POTASSIUM mmol/L 3 1* 2 7* 4 1 3 3*  < > 4 3  < > 4 1   CHLORIDE mmol/L 107  --  110* 111*  < > 111*  < > 100   CO2 mmol/L 27  --  25 28  < > 33*  < > 36*   BUN mg/dL 28*  --  28* 20  < > 14  < > 14   CREATININE mg/dL 0 93  --  0 97 1 08  < > 1 14  < > 0 88   CALCIUM mg/dL 7 3*  --  7 2* 7 3*  < > 7 7*  < > 9 5   ALBUMIN g/dL  --   --   --   --   --  2 8*  --  3 5   TOTAL PROTEIN g/dL  --   --   --   --   --  5 6*  --  7 7   BILIRUBIN TOTAL mg/dL  -- --   --   --   --  1 78*  --  0 63   ALK PHOS U/L  --   --   --   --   --  35*  --  57   ALT U/L  --   --   --   --   --  40  --  33   AST U/L  --   --   --   --   --  75*  --  22   GLUCOSE RANDOM mg/dL 176*  --  148* 99  < > 120  < > 210*   GLUCOSE, ISTAT   --   --   --   --   --   --   < >  --    < > = values in this interval not displayed  Results from last 7 days  Lab Units 09/09/17  0420 09/08/17  0504 09/07/17  2325   MAGNESIUM mg/dL 2 1 2 5 2 6       Results from last 7 days  Lab Units 09/06/17  2130 09/06/17  1801 09/06/17  1536   INR  0 88 0 75* 1 66*   PTT seconds 28 31 28       Results from last 7 days  Lab Units 09/06/17  0633   HEMOGLOBIN A1C % 7 1*       Physical Exam:     Neck:  Supple, no JVD     Cardiac: IRR , no MRA     Pulmonary:  Breath sounds CTA regular, unlabored, CT to -20 suction, no air lead     Abdomen:  Soft, ND, NT, positive bowel sounds     Incisions: Sternum is stable  Incision dressed with Acticoat  No erythema or drainage  Lower extremities: No edema B/L  Neuro: AO x 3 CAM ICU negative  Screening neurologic exam reveals no obvious deficits  Skin: Warm and dry  Invasive lines and devices: Invasive Devices     Central Venous Catheter Line            CVC Central Lines 09/06/17 Triple 2 days          Peripheral Intravenous Line            Peripheral IV 09/05/17 Left Antecubital 3 days    Peripheral IV 09/06/17 Left Forearm 2 days    Peripheral IV 09/06/17 Right Forearm 2 days          Arterial Line            Arterial Line 09/06/17 Radial 2 days          Drain            Chest Tube 1 Posterior Mediastinal 36 Fr  2 days    Chest Tube 2 Anterior Mediastinal 40 Fr  2 days    Urethral Catheter Non-latex; Temperature probe 16 Fr  2 days          Airway            ETT  Cuffed;Oral 8 mm 2 days                Assessment:  Principal Problem:    Acute thoracic aortic dissection s/p aortic arch repair  Active Problems:    Hypertension    Diabetes    Acute blood loss anemia    Hypokalemia    Hypernatremia    Pulmonary insufficiency    Encephalopathy    Delirium    Agitation  Resolved Problems: On mechanically assisted ventilation    Coagulopathy    Acute kidney injury      Plan:    Cardiac:   Cardiac infusions: Cardene, 10 mg/hour-> held this AM after PO metoprolol  Hemodynamic goals:    D/c arterial line  Atrial Fibrillation; tachycardic and hypertensive  Increase Metoprolol to 50mg BID    Home medication: Atenolol 100 daily, Amlodipine-benazepril 10-40 daily, Diovan daily  Continue ASA, PO amiodarone, and statin therapy  Epicardial pacing wires out  Continue DVT prophylaxis  Consult cardiology for postoperative medical management    Pulmonary:   Extubated  D/c chest tubes today  Acute post-op hypoxic respiratory insufficiency:  Continue incentive spirometry/coughing/deep breathing exercises  Wean supplemental oxygen as tolerated  NIPPV 30 mins Q4 hours and HS->HFNC during the day-> trial NC wean    Renal:   Discontinue IV potassium replacement scales  Post-op volume overload:    Lasix gtt at 10mg/hr-consider transition to Lasix 40 Q8 later today   Maintain fry catheter     Neuro:  Neurologically intact with no active issues  Incisional pain well-controlled  Discontinue IV narcotic therapy and continue with PO pain regimen   Continue Olanzapine 2 5 mg BID    GI:  Advance diet to  TLC 2 3 gm sodium diet with consistent carbohydrate modifier and 1800 mL fluid restriction  Continue stool softeners and prn suppository  Continue GI prophylaxis with PPI          Endo: History of diabetes: Continue insulin infusion today  Endocrine following patient    Hematology:   Post-operative acute blood loss anemia:   Trend hemoglobin and platelets  Transfuse as needed       Coumadin 2 5 mg HS, repeat INR in AM    Disposition:   Continue ICU level of care for hypoxemia    Counseling / Coordination of Care    Collaborative bedside rounds performed with cardiac surgery attending and bedside RN      SIGNATURE: MAUREEN Padron  DATE: September 9, 2017  TIME: 6:52 AM

## 2017-09-10 LAB
ANION GAP SERPL CALCULATED.3IONS-SCNC: 11 MMOL/L (ref 4–13)
APTT PPP: 30 SECONDS (ref 23–35)
APTT PPP: 48 SECONDS (ref 23–35)
APTT PPP: 61 SECONDS (ref 23–35)
BUN SERPL-MCNC: 25 MG/DL (ref 5–25)
CALCIUM SERPL-MCNC: 6.9 MG/DL (ref 8.3–10.1)
CHLORIDE SERPL-SCNC: 104 MMOL/L (ref 100–108)
CO2 SERPL-SCNC: 29 MMOL/L (ref 21–32)
CREAT SERPL-MCNC: 0.94 MG/DL (ref 0.6–1.3)
ERYTHROCYTE [DISTWIDTH] IN BLOOD BY AUTOMATED COUNT: 15.2 % (ref 11.6–15.1)
GFR SERPL CREATININE-BSD FRML MDRD: 81 ML/MIN/1.73SQ M
GLUCOSE SERPL-MCNC: 169 MG/DL (ref 65–140)
GLUCOSE SERPL-MCNC: 193 MG/DL (ref 65–140)
GLUCOSE SERPL-MCNC: 197 MG/DL (ref 65–140)
GLUCOSE SERPL-MCNC: 201 MG/DL (ref 65–140)
GLUCOSE SERPL-MCNC: 202 MG/DL (ref 65–140)
GLUCOSE SERPL-MCNC: 242 MG/DL (ref 65–140)
HCT VFR BLD AUTO: 36.4 % (ref 36.5–49.3)
HGB BLD-MCNC: 12.3 G/DL (ref 12–17)
INR PPP: 1.26 (ref 0.86–1.16)
MAGNESIUM SERPL-MCNC: 1.8 MG/DL (ref 1.6–2.6)
MCH RBC QN AUTO: 30 PG (ref 26.8–34.3)
MCHC RBC AUTO-ENTMCNC: 33.8 G/DL (ref 31.4–37.4)
MCV RBC AUTO: 89 FL (ref 82–98)
PHOSPHATE SERPL-MCNC: 1.2 MG/DL (ref 2.3–4.1)
PLATELET # BLD AUTO: 187 THOUSANDS/UL (ref 149–390)
PMV BLD AUTO: 10.6 FL (ref 8.9–12.7)
POTASSIUM SERPL-SCNC: 2.8 MMOL/L (ref 3.5–5.3)
POTASSIUM SERPL-SCNC: 3.1 MMOL/L (ref 3.5–5.3)
POTASSIUM SERPL-SCNC: 3.4 MMOL/L (ref 3.5–5.3)
POTASSIUM SERPL-SCNC: 3.5 MMOL/L (ref 3.5–5.3)
PROTHROMBIN TIME: 15.9 SECONDS (ref 12.1–14.4)
RBC # BLD AUTO: 4.1 MILLION/UL (ref 3.88–5.62)
SODIUM SERPL-SCNC: 144 MMOL/L (ref 136–145)
WBC # BLD AUTO: 20.51 THOUSAND/UL (ref 4.31–10.16)

## 2017-09-10 PROCEDURE — 85730 THROMBOPLASTIN TIME PARTIAL: CPT | Performed by: THORACIC SURGERY (CARDIOTHORACIC VASCULAR SURGERY)

## 2017-09-10 PROCEDURE — 84132 ASSAY OF SERUM POTASSIUM: CPT | Performed by: NURSE PRACTITIONER

## 2017-09-10 PROCEDURE — 80048 BASIC METABOLIC PNL TOTAL CA: CPT | Performed by: NURSE PRACTITIONER

## 2017-09-10 PROCEDURE — 85730 THROMBOPLASTIN TIME PARTIAL: CPT | Performed by: PHYSICIAN ASSISTANT

## 2017-09-10 PROCEDURE — 85027 COMPLETE CBC AUTOMATED: CPT | Performed by: NURSE PRACTITIONER

## 2017-09-10 PROCEDURE — 85610 PROTHROMBIN TIME: CPT | Performed by: NURSE PRACTITIONER

## 2017-09-10 PROCEDURE — 83735 ASSAY OF MAGNESIUM: CPT | Performed by: NURSE PRACTITIONER

## 2017-09-10 PROCEDURE — 82948 REAGENT STRIP/BLOOD GLUCOSE: CPT

## 2017-09-10 PROCEDURE — 84100 ASSAY OF PHOSPHORUS: CPT | Performed by: NURSE PRACTITIONER

## 2017-09-10 RX ORDER — INSULIN GLARGINE 100 [IU]/ML
40 INJECTION, SOLUTION SUBCUTANEOUS
Status: DISCONTINUED | OUTPATIENT
Start: 2017-09-10 | End: 2017-09-11

## 2017-09-10 RX ORDER — LISINOPRIL 5 MG/1
5 TABLET ORAL DAILY
Status: DISCONTINUED | OUTPATIENT
Start: 2017-09-10 | End: 2017-09-11

## 2017-09-10 RX ORDER — POTASSIUM CHLORIDE 20 MEQ/1
40 TABLET, EXTENDED RELEASE ORAL ONCE
Status: COMPLETED | OUTPATIENT
Start: 2017-09-10 | End: 2017-09-10

## 2017-09-10 RX ORDER — POTASSIUM CHLORIDE 14.9 MG/ML
20 INJECTION INTRAVENOUS ONCE
Status: COMPLETED | OUTPATIENT
Start: 2017-09-10 | End: 2017-09-10

## 2017-09-10 RX ORDER — MAGNESIUM SULFATE HEPTAHYDRATE 40 MG/ML
2 INJECTION, SOLUTION INTRAVENOUS ONCE
Status: COMPLETED | OUTPATIENT
Start: 2017-09-10 | End: 2017-09-10

## 2017-09-10 RX ORDER — WARFARIN SODIUM 2.5 MG/1
2.5 TABLET ORAL
Status: COMPLETED | OUTPATIENT
Start: 2017-09-10 | End: 2017-09-10

## 2017-09-10 RX ORDER — PANTOPRAZOLE SODIUM 40 MG/1
40 TABLET, DELAYED RELEASE ORAL
Status: DISCONTINUED | OUTPATIENT
Start: 2017-09-10 | End: 2017-09-11

## 2017-09-10 RX ORDER — POTASSIUM CHLORIDE 29.8 MG/ML
40 INJECTION INTRAVENOUS ONCE
Status: COMPLETED | OUTPATIENT
Start: 2017-09-10 | End: 2017-09-10

## 2017-09-10 RX ORDER — POTASSIUM CHLORIDE 29.8 MG/ML
40 INJECTION INTRAVENOUS ONCE
Status: COMPLETED | OUTPATIENT
Start: 2017-09-10 | End: 2017-09-11

## 2017-09-10 RX ORDER — FUROSEMIDE 10 MG/ML
40 INJECTION INTRAMUSCULAR; INTRAVENOUS EVERY 8 HOURS
Status: DISCONTINUED | OUTPATIENT
Start: 2017-09-10 | End: 2017-09-11

## 2017-09-10 RX ORDER — POTASSIUM CHLORIDE 29.8 MG/ML
INJECTION INTRAVENOUS
Status: COMPLETED
Start: 2017-09-10 | End: 2017-09-11

## 2017-09-10 RX ORDER — METOPROLOL TARTRATE 50 MG/1
50 TABLET, FILM COATED ORAL EVERY 8 HOURS SCHEDULED
Status: DISCONTINUED | OUTPATIENT
Start: 2017-09-10 | End: 2017-09-11

## 2017-09-10 RX ORDER — OLANZAPINE 5 MG/1
2.5 TABLET, ORALLY DISINTEGRATING ORAL
Status: DISCONTINUED | OUTPATIENT
Start: 2017-09-10 | End: 2017-09-11

## 2017-09-10 RX ORDER — HEPARIN SODIUM 10000 [USP'U]/100ML
3-20 INJECTION, SOLUTION INTRAVENOUS
Status: DISCONTINUED | OUTPATIENT
Start: 2017-09-10 | End: 2017-09-13

## 2017-09-10 RX ADMIN — WARFARIN SODIUM 2.5 MG: 2.5 TABLET ORAL at 17:02

## 2017-09-10 RX ADMIN — PANTOPRAZOLE SODIUM 40 MG: 40 TABLET, DELAYED RELEASE ORAL at 08:27

## 2017-09-10 RX ADMIN — HEPARIN SODIUM 11.1 UNITS/KG/HR: 10000 INJECTION, SOLUTION INTRAVENOUS at 09:05

## 2017-09-10 RX ADMIN — INSULIN LISPRO 2 UNITS: 100 INJECTION, SOLUTION INTRAVENOUS; SUBCUTANEOUS at 08:24

## 2017-09-10 RX ADMIN — AMIODARONE HYDROCHLORIDE 200 MG: 200 TABLET ORAL at 05:56

## 2017-09-10 RX ADMIN — POTASSIUM CHLORIDE 40 MEQ: 400 INJECTION, SOLUTION INTRAVENOUS at 04:21

## 2017-09-10 RX ADMIN — MUPIROCIN 1 APPLICATION: 20 OINTMENT TOPICAL at 20:10

## 2017-09-10 RX ADMIN — ATORVASTATIN CALCIUM 80 MG: 80 TABLET, FILM COATED ORAL at 21:07

## 2017-09-10 RX ADMIN — LISINOPRIL 5 MG: 5 TABLET ORAL at 09:15

## 2017-09-10 RX ADMIN — POTASSIUM CHLORIDE 40 MEQ: 1500 TABLET, EXTENDED RELEASE ORAL at 16:57

## 2017-09-10 RX ADMIN — INSULIN LISPRO 10 UNITS: 100 INJECTION, SOLUTION INTRAVENOUS; SUBCUTANEOUS at 08:23

## 2017-09-10 RX ADMIN — ACETAMINOPHEN 650 MG: 325 TABLET, FILM COATED ORAL at 15:29

## 2017-09-10 RX ADMIN — ASPIRIN 325 MG: 325 TABLET ORAL at 08:22

## 2017-09-10 RX ADMIN — POTASSIUM CHLORIDE 40 MEQ: 400 INJECTION, SOLUTION INTRAVENOUS at 01:09

## 2017-09-10 RX ADMIN — FUROSEMIDE 40 MG: 10 INJECTION, SOLUTION INTRAMUSCULAR; INTRAVENOUS at 12:14

## 2017-09-10 RX ADMIN — METOPROLOL TARTRATE 50 MG: 50 TABLET ORAL at 13:36

## 2017-09-10 RX ADMIN — POTASSIUM CHLORIDE 40 MEQ: 29.8 INJECTION INTRAVENOUS at 23:46

## 2017-09-10 RX ADMIN — INSULIN GLARGINE 40 UNITS: 100 INJECTION, SOLUTION SUBCUTANEOUS at 21:06

## 2017-09-10 RX ADMIN — POLYETHYLENE GLYCOL 3350 17 G: 17 POWDER, FOR SOLUTION ORAL at 08:27

## 2017-09-10 RX ADMIN — AMIODARONE HYDROCHLORIDE 200 MG: 200 TABLET ORAL at 13:36

## 2017-09-10 RX ADMIN — AMIODARONE HYDROCHLORIDE 200 MG: 200 TABLET ORAL at 21:07

## 2017-09-10 RX ADMIN — POTASSIUM PHOSPHATE, MONOBASIC AND POTASSIUM PHOSPHATE, DIBASIC 21 MMOL: 224; 236 INJECTION, SOLUTION INTRAVENOUS at 09:13

## 2017-09-10 RX ADMIN — INSULIN LISPRO 4 UNITS: 100 INJECTION, SOLUTION INTRAVENOUS; SUBCUTANEOUS at 16:41

## 2017-09-10 RX ADMIN — METOPROLOL TARTRATE 50 MG: 50 TABLET ORAL at 08:22

## 2017-09-10 RX ADMIN — METOPROLOL TARTRATE 50 MG: 50 TABLET ORAL at 21:07

## 2017-09-10 RX ADMIN — POTASSIUM CHLORIDE 40 MEQ: 400 INJECTION, SOLUTION INTRAVENOUS at 23:46

## 2017-09-10 RX ADMIN — MUPIROCIN 1 APPLICATION: 20 OINTMENT TOPICAL at 08:22

## 2017-09-10 RX ADMIN — MAGNESIUM SULFATE HEPTAHYDRATE 2 G: 40 INJECTION, SOLUTION INTRAVENOUS at 07:41

## 2017-09-10 RX ADMIN — POTASSIUM CHLORIDE 40 MEQ: 400 INJECTION, SOLUTION INTRAVENOUS at 05:53

## 2017-09-10 RX ADMIN — INSULIN LISPRO 10 UNITS: 100 INJECTION, SOLUTION INTRAVENOUS; SUBCUTANEOUS at 12:15

## 2017-09-10 RX ADMIN — FUROSEMIDE 40 MG: 10 INJECTION, SOLUTION INTRAMUSCULAR; INTRAVENOUS at 20:10

## 2017-09-10 RX ADMIN — POTASSIUM CHLORIDE 20 MEQ: 200 INJECTION, SOLUTION INTRAVENOUS at 09:13

## 2017-09-10 RX ADMIN — INSULIN LISPRO 1 UNITS: 100 INJECTION, SOLUTION INTRAVENOUS; SUBCUTANEOUS at 21:06

## 2017-09-10 RX ADMIN — OLANZAPINE 2.5 MG: 5 TABLET, ORALLY DISINTEGRATING ORAL at 21:07

## 2017-09-10 RX ADMIN — INSULIN LISPRO 4 UNITS: 100 INJECTION, SOLUTION INTRAVENOUS; SUBCUTANEOUS at 12:15

## 2017-09-10 RX ADMIN — ACETAMINOPHEN 650 MG: 325 TABLET, FILM COATED ORAL at 07:41

## 2017-09-10 NOTE — PROGRESS NOTES
Progress Note - Ken Peoples  71 y o  male MRN: [id number]    Unit/Bed#: [facility] 416-01 Encounter: [id number]      CC: diabetes f/u    Subjective:   Ken Peoples  is a 71y o  year old male with type 2 diabetes  Feels well  No complaints  No hypoglycemia  Transitioned to subcutaneous insulin  He is having some hyperglycemia  Objective:     Vitals: Blood pressure 123/90, pulse (!) 108, temperature 98 4 °F (36 9 °C), temperature source Oral, resp  rate (!) 36, weight 108 kg (238 lb 5 1 oz), SpO2 93 %  ,There is no height or weight on file to calculate BMI  Intake/Output Summary (Last 24 hours) at 09/10/17 1420  Last data filed at 09/10/17 1200   Gross per 24 hour   Intake          1536 37 ml   Output             6145 ml   Net         -4608 63 ml       Physical Exam:  General Appearance: awake, appears stated age and cooperative  Head: Normocephalic, without obvious abnormality, atraumatic  Extremities: moves all extremities  Skin: Skin color and temperature normal    Pulm: no labored breathing    Lab, Imaging and other studies: I have personally reviewed pertinent reports  POC Glucose (mg/dl)   Date Value   09/10/2017 242 (H)   09/10/2017 197 (H)   09/10/2017 193 (H)   09/09/2017 98   09/09/2017 108   09/09/2017 192 (H)   09/09/2017 142 (H)   09/09/2017 197 (H)   09/09/2017 182 (H)   09/09/2017 191 (H)       Assessment:  diabetes with hyperglycemia and type a dissection repair    Plan:  1  Type 2 diabetes with hyperglycemia-increase Lantus to 40 units and Humalog 13 units with meals  Continue to monitor blood sugar over time and make adjustments to his regimen if necessary  Due to being on intensive insulin therapy, the patient is at high risk of severe hypoglycemia and potential death  2   Type a dissection repair-he is recovering well  Portions of the record may have been created with voice recognition software   Occasional wrong word or "sound a like" substitutions may have occurred due to the inherent limitations of voice recognition software  Read the chart carefully and recognize, using context, where substitutions have occurred

## 2017-09-10 NOTE — CONSULTS
Consultation - Electrophysiology-Cardiology (EP)   Gabby Delaney  71 y o  male MRN: [id number]  Unit/Bed#: [facility] 416-01 Encounter: [id number]    [address]    Physician Requesting Consult: Rema Booth MD  Reason for Consult / Principal Problem:  Post aortic dissection repair        Assessment/Plan     Assessment:  1  Type a aortic dissection status post repair  2  Re-exploration for bleeding  3  Tamponade physiology pre surgery  4  Atrial fibrillation with RVR-postop and new  5  Significant hypertension  6  Diabetes  7  Obesity  8  History of hemorrhagic stroke in 2014  9  Preserved LV function by echo    Plan:  -continue to closely monitor blood pressures  Discussed with patient that he needs to monitor his blood pressure very closely at home  Suggest to bring his blood pressure cuff to his next office visit to calibrate them  He will need cardiac follow-up as an outpatient   -regarding new atrial fibrillation, heart rates are relatively well controlled on Amiodarone  Would recommend continuing this for 3 months postoperatively  Continue warfarin anticoagulation  -will continue to follow along with CT surgery team once patient on the floor  Further plan per attending            History of Present Illness : Gabby Delaney  is a 71y o  year old male with history of hypertension, diabetes, obesity who [facility] with chest discomfort that started 30 minutes prior to presentation associated with left shoulder pain  Blood pressure initially was 234/105  At approximately 6:15 a m  in the morning patient's bed alarm went off and patient was trying to sit up stating that he did not feel good and needed to sit up  He was very diaphoretic, dizzy and complaining of lower abdominal pain  Patient had tenderness in his abdomen  Heart rate was elevated and blood pressure dropped to 95/76    Initial CT scan of abdomen and pelvis had been done upon presentation was negative for any acute findings  Repeat CT was ordered and patient was found to have a type a aortic dissection and was transferred to ICU and cardiac surgeon, Dr Maxx Bernard, was called emergently to [facility]  CT also showed evidence of cardiac tamponade  September 6, 2017 patient was taken urgently to the OR and underwent replacement of an ascending aorta with aggressive celia arch reconstruction with a 26 mm Dacron graft  Patient was found to have continued high chest tube output and was taken back to the OR  He was found to have bleeding coming from the distal aortic anastomosis  September 7, 2017 patient went into rapid atrial fibrillation with heart rate 130 to 160 and was given amiodarone bolus and placed on a drip  Cardiology has been consulted postop day 4 for cardiac management as patient is going to be transferred out of the unit  The patient tells me that approximately 2-3 weeks ago patient was taken off of his amlodipine because it was thought to be causing lower extremity edema and was put on a mild water pill  Patient currently is on lisinopril, Lopressor, amiodarone drip and warfarin  Review of Systems   Constitutional: Negative for chills, fatigue and fever  Respiratory: Negative for cough, chest tightness, shortness of breath and wheezing  Cardiovascular: Negative for chest pain, palpitations and leg swelling  No orthopnea, PND or weight gain  Gastrointestinal: Negative for diarrhea, nausea and vomiting  Skin: Negative for rash  Neurological: Negative for dizziness, syncope, weakness, light-headedness and headaches  All other 10 system ROS negative except what is stated in the HPI  Historical Information     Past Medical History:   Diagnosis Date    Diabetes     Hypertension     Prostate cancer        Past Surgical History:   Procedure Laterality Date    AR ASCEND AORTA GRAFT W ROOT REPLACMENT  VALVE CONDUIT/CORON RECONSTRUCT N/A 9/6/2017    Procedure: ASCENDING AORTIC REPAIR WITH A 26MM  GELWEAVE STRAIGHT GRAFT AND HEMIARCH WITH 8MM SIDE ARM BRANCH;  Surgeon: Sachi Houser MD;  Location: [facility];  Service: Cardiac Surgery    RI EXPLOR POSTOP BLEED,INFEC,CLOT-CHST N/A 9/6/2017    Procedure: RE-EXPLORATION MEDIASTERNAL CAVITY FOR POST-OP BLEED (BRING BACK);   Surgeon: Sachi Houser MD;  Location: [facility];  Service: Cardiac Surgery       Family History:  Negative for cardiac issues    History   Alcohol Use No     History   Drug Use No     History   Smoking Status    Never Smoker   Smokeless Tobacco    Not on file         Current Facility-Administered Medications   Medication Dose Route Frequency    acetaminophen (TYLENOL) tablet 650 mg  650 mg Oral Q4H PRN    amiodarone (CORDARONE) 900 mg in dextrose 5 % 500 mL infusion  0 5 mg/min Intravenous Continuous    amiodarone tablet 200 mg  200 mg Oral Q8H [address]    aspirin tablet 325 mg  325 mg Oral Daily    atorvastatin (LIPITOR) tablet 80 mg  80 mg Oral HS    bisacodyl (DULCOLAX) rectal suppository 10 mg  10 mg Rectal Daily PRN    furosemide (LASIX) injection 40 mg  40 mg Intravenous Q8H    heparin (porcine) 25,000 units in 250 mL infusion (premix)  3-20 Units/kg/hr (Order-Specific) Intravenous Titrated    insulin glargine (LANTUS) subcutaneous injection 30 Units  30 Units Subcutaneous HS    insulin lispro (HumaLOG) 100 units/mL subcutaneous injection 1-6 Units  1-6 Units Subcutaneous HS    insulin lispro (HumaLOG) 100 units/mL subcutaneous injection 10 Units  10 Units Subcutaneous TID With Meals    insulin lispro (HumaLOG) 100 units/mL subcutaneous injection 2-12 Units  2-12 Units Subcutaneous TID AC    lidocaine (cardiac) injection 100 mg  100 mg Intravenous Q30 Min PRN    lisinopril (ZESTRIL) tablet 5 mg  5 mg Oral Daily    metoprolol tartrate (LOPRESSOR) tablet 50 mg  50 mg Oral Q8H [address]    mupirocin (BACTROBAN) 2 % nasal ointment 1 application  1 application Nasal T67E [address]    OLANZapine (ZyPREXA ZYDIS) dispersible tablet 2 5 mg  2 5 mg Oral HS    ondansetron (ZOFRAN) injection 4 mg  4 mg Intravenous Q6H PRN    oxyCODONE-acetaminophen (PERCOCET) 5-325 mg per tablet 1 tablet  1 tablet Oral Q4H PRN    oxyCODONE-acetaminophen (PERCOCET) 5-325 mg per tablet 2 tablet  2 tablet Oral Q6H PRN    pantoprazole (PROTONIX) EC tablet 40 mg  40 mg Oral Early Morning    polyethylene glycol (MIRALAX) packet 17 g  17 g Oral Daily    potassium phosphate 21 mmol in sodium chloride 0 9 % 250 mL infusion  21 mmol Intravenous Once    sodium chloride infusion 0 45 %  20 mL/hr Intravenous Continuous    warfarin (COUMADIN) tablet 2 5 mg  2 5 mg Oral Once (warfarin)     Prescriptions Prior to Admission   Medication    amLODIPine-benazepril (LOTREL) 10-40 MG per capsule    atenolol (TENORMIN) 100 mg tablet    glimepiride (AMARYL) 4 mg tablet    metFORMIN (GLUCOPHAGE) 1000 MG tablet    Omega-3 Fatty Acids (FISH OIL OMEGA-3 PO)    triamterene (DYRENIUM) 50 mg capsule    Valsartan-Hydrochlorothiazide (DIOVAN HCT PO)     No Known Allergies    Objective   Vitals: Blood pressure 123/90, pulse 82, temperature 98 5 °F (36 9 °C), temperature source Oral, resp  rate (!) 26, weight 108 kg (238 lb 5 1 oz), SpO2 95 %  Orthostatic Blood Pressures    Flowsheet Row Most Recent Value   Blood Pressure  123/90 filed at 09/10/2017 1000   Patient Position  Lying filed at 09/08/2017 0400          Physical Exam  VS: Blood pressure 123/90, pulse 82, temperature 98 5 °F (36 9 °C), temperature source Oral, resp  rate (!) 26, weight 108 kg (238 lb 5 1 oz), SpO2 95 %    GEN: in NAD  Skin: warm and dry  HEENT: ATNC, eyes anicteric, mucous membranes moist and pink  Neck: supple  CV: RRR, +S1, S2, distant heart tones  Resp : CTA b/l, bronchial breath sounds on left  Abd: soft, NT, ND, +BS  Extr :  Mild bilateral lower extremity edema  Neuro : AAOX3, no focal deficits  Psych : mood and affect are normal          Intake/Output Summary (Last 24 hours) at 09/10/17 1131  Last data filed at 09/10/17 0800   Gross per 24 hour   Intake          1763 17 ml   Output             6460 ml   Net         -4696 83 ml       Invasive Devices     Central Venous Catheter Line            CVC Central Lines 17 Triple 4 days          Peripheral Intravenous Line            Peripheral IV 17 Left Forearm 4 days    Peripheral IV 17 Right Forearm 4 days          Drain            Urethral Catheter Non-latex; Temperature probe 16 Fr  4 days                  Lab Results:   Results from last 7 days  Lab Units 09/10/17  0533   SODIUM mmol/L 144   POTASSIUM mmol/L 3 4*   CHLORIDE mmol/L 104   CO2 mmol/L 29   BUN mg/dL 25   CREATININE mg/dL 0 94   GLUCOSE RANDOM mg/dL 202*   CALCIUM mg/dL 6 9*     Results from last 7 days  Lab Units 09/10/17  0533   MAGNESIUM mg/dL 1 8     Results from last 7 days  Lab Units 09/10/17  0502   WBC Thousand/uL 20 51*   HEMOGLOBIN g/dL 12 3   HEMATOCRIT % 36 4*   PLATELETS Thousands/uL 187     Results from last 7 days  Lab Units 09/10/17  0905 09/10/17  0502   INR   --  1 26*   PTT seconds 30  --      Results from last 7 days  Lab Units 17  0633   CHOLESTEROL mg/dL 169   TRIGLYCERIDES mg/dL 103   HDL mg/dL 40         Invalid input(s): TSH        Imaging: I have personally reviewed pertinent reports  Echo:   Results for orders placed during the hospital encounter of 17   Echo complete with contrast if indicated    Narrative [address]  [address]  [place], [address]  [phone]    Transthoracic Echocardiogram  Limited 2D, Doppler, and Color Doppler    Study date:  06-Sep-2017    Patient: Margaret Deleon  MR number: [id number]  Account number: [de-identified]  : 1945  Age: 71 years  Gender: Male  Status: Outpatient  Location: Bedside  Height:  Weight: 234 lb  BP: 111/ 73 mmHg    Indications: Evaluate suspected aortic dissection      Diagnoses: I71 8 - Aortic aneurysm of unspecified site, ruptured    Sonographer:  MOISES Darling  Primary Physician:  Yuval Willams DO  Referring Physician:  Coni Olivre NP  Group:  [facility]  Interpreting Physician:  Adi Kaur DO    SUMMARY    SUMMARY:  Limited study performed due to emergent nature  Limited valvular doppler was done  LEFT VENTRICLE:  Systolic function was normal  Ejection fraction was estimated in the range of 60 % to 65 %  Although no diagnostic regional wall motion abnormality was identified, this possibility cannot be completely excluded on the basis of this study  AORTIC VALVE:  The valve was probably trileaflet  Leaflets exhibited normal thickness and normal cuspal separation  There was trace regurgitation  The valve was not well visualized  PULMONIC VALVE:  Leaflets exhibited normal thickness, no calcification, and normal cuspal separation  There was flow acceleration at the level of the pulmonic valve  This could be from external compression  Less likely valvular stenosis  AORTA:  There was dilatation of the ascending aorta  4 7cm  A Amilcar type A dissection was seen  Flap visulaized in the aortic arch  PERICARDIUM:  A moderate to large pericardial effusion was identified circumferential to the heart  The fluid contained focal strands  There is early RV diastolic collapse and RA collapse  Respiratory variation is present with overall low LV stroke  volume  Tamponade physiology is seen  Fibrinous material in effusion likely thrombus given known aortic dissection  HISTORY: PRIOR HISTORY: Risk factors: hypertension, diabetes, and morbid obesity  PROCEDURE: The procedure was performed at the bedside  This was a stat study  The transthoracic approach was used  The study included limited 2D imaging, limited spectral Doppler, and color Doppler  Image quality was adequate      LEFT VENTRICLE: Size was normal  Systolic function was normal  Ejection fraction was estimated in the range of 60 % to 65 %  Although no diagnostic regional wall motion abnormality was identified, this possibility cannot be completely  excluded on the basis of this study  DOPPLER: The study was not technically sufficient to allow evaluation of LV diastolic function  RIGHT VENTRICLE: The size was normal  Systolic function was normal  Wall thickness was normal     LEFT ATRIUM: Size was normal     RIGHT ATRIUM: Size was normal     MITRAL VALVE: Valve structure was normal  There was normal leaflet separation  DOPPLER: The transmitral velocity was within the normal range  There was no evidence for stenosis  There was no significant regurgitation  AORTIC VALVE: The valve was probably trileaflet  Leaflets exhibited normal thickness and normal cuspal separation  The valve was not well visualized  DOPPLER: Transaortic velocity was within the normal range  There was no evidence for  stenosis  There was trace regurgitation  TRICUSPID VALVE: Not well visualized  PULMONIC VALVE: Leaflets exhibited normal thickness, no calcification, and normal cuspal separation  There was flow acceleration at the level of the pulmonic valve  This could be from external compression  Less likely valvular stenosis  DOPPLER: There was no regurgitation  PERICARDIUM: A moderate to large pericardial effusion was identified circumferential to the heart  The fluid contained focal strands  There is early RV diastolic collapse and RA collapse  Respiratory variation is present with overall low  LV stroke volume  Tamponade physiology is seen  Fibrinous material in effusion likely thrombus given known aortic dissection  AORTA: There was dilatation of the ascending aorta  4 7cm A Amilcar type A dissection was seen  Flap visulaized in the aortic arch  SYSTEMIC VEINS: IVC: The inferior vena cava was not well visualized      [address] Accredited Echocardiography Laboratory    Prepared and electronically signed by    Sydney Morris DO Rhianna  Signed 06-Sep-2017 09:00:29       EKG:       Code Status: Level 1 - Full Code    Counseling / Coordination of Care  Total floor / unit time spent today 45 minutes minutes  Greater than 50% of total time was spent with the patient and / or family counseling and / or coordination of care

## 2017-09-10 NOTE — PROGRESS NOTES
Progress Note - Medical Critical Care   Alicia Omalley  71 y o  male MRN: [id number]  Unit/Bed#: [facility] 416-01 Encounter: [id number]      Attending Physician: Lauren Cantu MD    24 Hour Events: POD # 4 s/p Type A dissection with replacement of ascending aorta with hemiarch reconstruction with 26mm dacron graft and mediastinal re-exploration  Lasix drip weaned down to 5mg/hr given high UOP  Continues with atrial fibrillation on amiodarone drip  Allergies: No Known Allergies    Medications:   Scheduled Meds:  amiodarone 200 mg Oral Q8H [address]   aspirin 325 mg Oral Daily   atorvastatin 80 mg Oral HS   fondaparinux 2 5 mg Subcutaneous Daily   insulin glargine 30 Units Subcutaneous HS   insulin lispro 1-6 Units Subcutaneous HS   insulin lispro 10 Units Subcutaneous TID With Meals   insulin lispro 2-12 Units Subcutaneous TID AC   metoprolol tartrate 50 mg Oral Q12H DEMETRIA   mupirocin 1 application Nasal D10B DEMETRIA   OLANZapine 2 5 mg Oral Q12H   pantoprazole 40 mg Intravenous Q24H DEMETRIA   polyethylene glycol 17 g Oral Daily   potassium chloride 20 mEq Intravenous Once   potassium chloride 40 mEq Intravenous Once       VTE Pharmacologic Prophylaxis: Fondaparinux (Arixtra)  VTE Mechanical Prophylaxis: sequential compression device    Continuous Infusions:  amiodarone 0 5 mg/min Last Rate: 0 5 mg/min (09/08/17 1711)   furosemide 20 mg/hr Last Rate: 5 mg/hr (09/10/17 0200)   sodium chloride 20 mL/hr Last Rate: 20 mL/hr (09/06/17 1800)     PRN Meds:    acetaminophen 650 mg Q4H PRN   acetaminophen 650 mg Q4H PRN   bisacodyl 10 mg Daily PRN   fentanyl citrate (PF) 50 mcg Q1H PRN   lidocaine (cardiac) 100 mg Q30 Min PRN   ondansetron 4 mg Q6H PRN   oxyCODONE-acetaminophen 1 tablet Q4H PRN   oxyCODONE-acetaminophen 2 tablet Q6H PRN       Home Medications:   Prior to Admission medications    Medication Sig Start Date End Date Taking?  Authorizing Provider   amLODIPine-benazepril (LOTREL) 10-40 MG per capsule Take 1 capsule by mouth daily Historical Provider, MD   atenolol (TENORMIN) 100 mg tablet Take 100 mg by mouth daily    Historical Provider, MD   glimepiride (AMARYL) 4 mg tablet Take 4 mg by mouth every morning before breakfast    Historical Provider, MD   metFORMIN (GLUCOPHAGE) 1000 MG tablet Take 1,000 mg by mouth 2 (two) times a day with meals    Historical Provider, MD   Omega-3 Fatty Acids (FISH OIL OMEGA-3 PO) Take 1 tablet by mouth daily    Historical Provider, MD   triamterene (DYRENIUM) 50 mg capsule Take 50 mg by mouth daily    Historical Provider, MD   Valsartan-Hydrochlorothiazide (DIOVAN HCT PO) Take 1 tablet by mouth daily    Historical Provider, MD       Vitals:   Vitals:    09/10/17 0400 09/10/17 0500 09/10/17 0553 09/10/17 0600   BP: 166/99 141/93  137/90   Pulse: (!) 116 (!) 114  (!) 114   Resp: 22 (!) 25  (!) 32   Temp: 98 5 °F (36 9 °C)      TempSrc:       SpO2: 93% 95%  92%   Weight:   108 kg (238 lb 5 1 oz)      Tele Rhythm: Atrial Fibrillation This was personally reviewed by myself  Respiratory:  SpO2: SpO2: 92 %, SpO2 Device: O2 Device: None (Room air)    Temperature: Temp (24hrs), Av 6 °F (37 6 °C), Min:98 5 °F (36 9 °C), Max:100 8 °F (38 2 °C)  Current: Temperature: 98 5 °F (36 9 °C)    Weights:   Weight (last 2 days)     Date/Time   Weight    09/10/17 0553  108 (238 32)    17 0600  115 (253 31)            IBW: -88 kg  There is no height or weight on file to calculate BMI  Hemodynamic Monitoring:  N/A       Intake and Outputs:  Intake/Output Summary (Last 24 hours) at 09/10/17 0645  Last data filed at 09/10/17 06   Gross per 24 hour   Intake          2138 67 ml   Output             6770 ml   Net         -4631 33 ml     I/O last 24 hours: In: 2767 7 [P O :560; I V :1657 7;  IV Piggyback:550]  [facility]: 94410 [[facility]; Chest Tube:150]    UOP: 200ml/hour     Labs:    Results from last 7 days  Lab Units 09/10/17  0502 17  0420 17  0504  17  1802  17  1219   WBC Thousand/uL 20 51* 23 88* 19 40*  < > 17 55*  < > 8 31   HEMOGLOBIN g/dL 12 3 11 9* 10 8*  < > 9 8*  < > 14 2   I STAT HEMOGLOBIN   --   --   --   < >  --   < >  --    HEMATOCRIT % 36 4* 35 9* 32 2*  < > 28 8*  < > 41 6   PLATELETS Thousands/uL 187 177 139*  < > 213  < > 178   NEUTROS PCT %  --   --   --   --  85*  --  67   MONOS PCT %  --   --   --   --  11  --  7   < > = values in this interval not displayed  Results from last 7 days  Lab Units 09/10/17  0533 09/10/17  0022 09/09/17  1734  09/09/17  0420  09/08/17  1708  09/07/17  0408  09/05/17  1219   SODIUM mmol/L 144  --   --   --  145  --  145  < > 149*  < > 140   POTASSIUM mmol/L 3 4* 2 8* 3 6  < > 3 1*  < > 4 1  < > 4 3  < > 4 1   CHLORIDE mmol/L 104  --   --   --  107  --  110*  < > 111*  < > 100   CO2 mmol/L 29  --   --   --  27  --  25  < > 33*  < > 36*   BUN mg/dL 25  --   --   --  28*  --  28*  < > 14  < > 14   CREATININE mg/dL 0 94  --   --   --  0 93  --  0 97  < > 1 14  < > 0 88   CALCIUM mg/dL 6 9*  --   --   --  7 3*  --  7 2*  < > 7 7*  < > 9 5   TOTAL PROTEIN g/dL  --   --   --   --   --   --   --   --  5 6*  --  7 7   BILIRUBIN TOTAL mg/dL  --   --   --   --   --   --   --   --  1 78*  --  0 63   ALK PHOS U/L  --   --   --   --   --   --   --   --  35*  --  57   ALT U/L  --   --   --   --   --   --   --   --  40  --  33   AST U/L  --   --   --   --   --   --   --   --  75*  --  22   GLUCOSE RANDOM mg/dL 202*  --   --   --  176*  --  148*  < > 120  < > 210*   GLUCOSE, ISTAT   --   --   --   --   --   --   --   --   --   < >  --    < > = values in this interval not displayed        Results from last 7 days  Lab Units 09/10/17  0533 09/09/17  0420 09/08/17  0504   MAGNESIUM mg/dL 1 8 2 1 2 5       Results from last 7 days  Lab Units 09/10/17  0502 09/09/17  1150 09/06/17  2130 09/06/17  1801 09/06/17  1536   INR  1 26* 1 25* 0 88 0 75* 1 66*   PTT seconds  --   --  28 31 28       Results from last 7 days  Lab Units 09/06/17  0633   HEMOGLOBIN A1C % 7 1* Physical Exam:     Neck:  Supple  Cardiac: Irregular rate and irregular rhythm  No rubs  No murmur  Pulmonary:  Breath sounds clear to auscaltaion bilaterally  Abdomen:  Soft  No distension  Non tender  Decreased BS     Incisions: Sternum is stable  Incision is clean, dry, and intact           Lower extremities: 1+ edema B/L  Neuro: Alert  Follows commands  Screening neurologic exam reveals no obvious deficits  AAOx3     Skin: Warm and dry  Invasive lines and devices: Invasive Devices     Central Venous Catheter Line            CVC Central Lines 09/06/17 Triple 3 days          Peripheral Intravenous Line            Peripheral IV 09/06/17 Left Forearm 3 days    Peripheral IV 09/06/17 Right Forearm 3 days          Drain            Urethral Catheter Non-latex; Temperature probe 16 Fr  3 days                Assessment:  Principal Problem:    Acute thoracic aortic dissection  Active Problems:    Hypertension    Diabetes    Acute blood loss anemia    s/p aortic arch repair    Hypokalemia    Hypernatremia    Pulmonary insufficiency    Encephalopathy    Delirium    Agitation  Resolved Problems: On mechanically assisted ventilation    Coagulopathy    Acute kidney injury    Plan:    Cardiac:   Cardiac infusions: Amiodarone 0 5mg/min  Hemodynamic goals:    MAP >65   Shafter-Tanya catheter D/C'd   Arterial line D/C'd  Atrial Fibrillation; Hypertensive  Lopressor 50 mg PO BID    Start norvasc 5mg PO daily  Continue ASA, PO amiodarone, and statin therapy  Epicardial pacing wires out  Continue DVT prophylaxis   Dose coumadin 5mg tonight   Consult cardiology for postoperative medical management    Pulmonary:   Chest tubes have been discontinued  Acute post-op hypoxic respiratory insufficiency:  Continue incentive spirometry/coughing/deep breathing exercises  Wean supplemental oxygen as tolerated      Renal:   Post-op volume overload:    Lasix drip - d/c and start intermittent bolus dosing  Continue q6h potassium checks   Maintain fry catheter     Neuro:  Neurologically intact with no active issues  Incisional pain well-controlled  Continue with PO pain regimen   Decrease zyprexa to 2 5mg qhs     GI:  TLC 2 3 gm sodium diet with consistent carbohydrate modifier and 1800 mL fluid restriction  Continue stool softeners and prn suppository  Continue GI prophylaxis with PPI          Endo: History of diabetes: Endocrinology managing  Hematology:   Post-operative acute blood loss anemia:   Trend hemoglobin and platelets  Transfuse as needed  Disposition:   Transfer from Step Down level 1 to telemetry today    Collaborative bedside rounds performed with cardiac surgery attending and bedside RN      SIGNATURE: Darrell Singh PA-C  DATE: September 10, 2017  TIME: 6:45 AM

## 2017-09-10 NOTE — PROGRESS NOTES
Progress Note - Cardiothoracic Surgery   Steve Casillas  71 y o  male MRN: [id number]  Unit/Bed#: [facility] 416-01 Encounter: [id number]      POD # 4 s/p Type A dissection repair with ascending aorta/hemiarch replacement  Pt seen/examined  Interval history and data reviewed with critical care team   Pt doing well  No specific complaints  Net negative with Lasix drip  Medications:   Scheduled Meds:    amiodarone 200 mg Oral Q8H [facility]   aspirin 325 mg Oral Daily   atorvastatin 80 mg Oral HS   furosemide 40 mg Intravenous Q8H   insulin glargine 30 Units Subcutaneous HS   insulin lispro 1-6 Units Subcutaneous HS   insulin lispro 10 Units Subcutaneous TID With Meals   insulin lispro 2-12 Units Subcutaneous TID AC   lisinopril 5 mg Oral Daily   magnesium sulfate 2 g Intravenous Once   metoprolol tartrate 50 mg Oral Q8H [facility]   mupirocin 1 application Nasal G79F DEMETRIA   OLANZapine 2 5 mg Oral HS   pantoprazole 40 mg Oral Early Morning   polyethylene glycol 17 g Oral Daily   potassium chloride 20 mEq Intravenous Once   potassium chloride 40 mEq Intravenous Once   potassium phosphate 21 mmol Intravenous Once   warfarin 2 5 mg Oral Once (warfarin)     Continuous Infusions:    amiodarone 0 5 mg/min Last Rate: 0 5 mg/min (09/08/17 1711)   heparin (porcine) 3-20 Units/kg/hr (Order-Specific)    sodium chloride 20 mL/hr Last Rate: 20 mL/hr (09/06/17 1800)     PRN Meds:   acetaminophen    bisacodyl    lidocaine (cardiac)    ondansetron    oxyCODONE-acetaminophen    oxyCODONE-acetaminophen    Vitals: Blood pressure 137/90, pulse (!) 114, temperature 98 5 °F (36 9 °C), resp  rate (!) 32, weight 108 kg (238 lb 5 1 oz), SpO2 92 %  ,There is no height or weight on file to calculate BMI  I/O last 24 hours: In: 2138 7 [P O :560; I V :1028 7; IV Piggyback:550]  Out: 6770 [BNHDK:8449;  Chest Tube:50]  Invasive Devices     Central Venous Catheter Line            CVC Central Lines 09/06/17 Triple 3 days          Peripheral Intravenous Line            Peripheral IV 09/06/17 Left Forearm 4 days    Peripheral IV 09/06/17 Right Forearm 4 days          Drain            Urethral Catheter Non-latex; Temperature probe 16 Fr  3 days                  Lab, Imaging and other studies:     Results from last 7 days  Lab Units 09/10/17  0502 09/09/17  0420 09/08/17  0504   WBC Thousand/uL 20 51* 23 88* 19 40*   HEMOGLOBIN g/dL 12 3 11 9* 10 8*   HEMATOCRIT % 36 4* 35 9* 32 2*   PLATELETS Thousands/uL 187 177 139*       Results from last 7 days  Lab Units 09/10/17  0533 09/10/17  0022 09/09/17  1734  09/09/17  0420  09/08/17  1708   SODIUM mmol/L 144  --   --   --  145  --  145   POTASSIUM mmol/L 3 4* 2 8* 3 6  < > 3 1*  < > 4 1   CHLORIDE mmol/L 104  --   --   --  107  --  110*   CO2 mmol/L 29  --   --   --  27  --  25   BUN mg/dL 25  --   --   --  28*  --  28*   CREATININE mg/dL 0 94  --   --   --  0 93  --  0 97   GLUCOSE RANDOM mg/dL 202*  --   --   --  176*  --  148*   CALCIUM mg/dL 6 9*  --   --   --  7 3*  --  7 2*   < > = values in this interval not displayed  Results from last 7 days  Lab Units 09/10/17  0502 09/09/17  1150 09/06/17  2130 09/06/17  1801 09/06/17  1536   INR  1 26* 1 25* 0 88 0 75* 1 66*   PTT seconds  --   --  28 31 28     No results for input(s): PHART, MPL3BTF, PO2ART, RYZ0FGK, L8PLEZGC, BEART in the last 72 hours  Plan:    Diuresis with lasix bolus for negative fluid balance again this 24 hours  PO ASA/Statin/B blocker  Seroquel wean    Transfer to stepdown      SIGNATURE: Sanaz Cueva DO  DATE: September 10, 2017  TIME: 9:06 AM

## 2017-09-11 PROBLEM — I48.91 ATRIAL FIBRILLATION (HCC): Status: ACTIVE | Noted: 2017-09-11

## 2017-09-11 PROBLEM — G93.40 ENCEPHALOPATHY: Status: RESOLVED | Noted: 2017-09-07 | Resolved: 2017-09-11

## 2017-09-11 PROBLEM — R45.1 AGITATION: Status: RESOLVED | Noted: 2017-09-08 | Resolved: 2017-09-11

## 2017-09-11 PROBLEM — R41.0 DELIRIUM: Status: RESOLVED | Noted: 2017-09-08 | Resolved: 2017-09-11

## 2017-09-11 PROBLEM — E87.0 HYPERNATREMIA: Status: RESOLVED | Noted: 2017-09-06 | Resolved: 2017-09-11

## 2017-09-11 PROBLEM — J98.4 PULMONARY INSUFFICIENCY: Status: RESOLVED | Noted: 2017-09-07 | Resolved: 2017-09-11

## 2017-09-11 LAB
ANION GAP SERPL CALCULATED.3IONS-SCNC: 9 MMOL/L (ref 4–13)
APTT PPP: 72 SECONDS (ref 23–35)
APTT PPP: 83 SECONDS (ref 23–35)
BUN SERPL-MCNC: 32 MG/DL (ref 5–25)
CALCIUM SERPL-MCNC: 6.8 MG/DL (ref 8.3–10.1)
CHLORIDE SERPL-SCNC: 107 MMOL/L (ref 100–108)
CO2 SERPL-SCNC: 29 MMOL/L (ref 21–32)
CREAT SERPL-MCNC: 0.96 MG/DL (ref 0.6–1.3)
ERYTHROCYTE [DISTWIDTH] IN BLOOD BY AUTOMATED COUNT: 15 % (ref 11.6–15.1)
GFR SERPL CREATININE-BSD FRML MDRD: 79 ML/MIN/1.73SQ M
GLUCOSE SERPL-MCNC: 122 MG/DL (ref 65–140)
GLUCOSE SERPL-MCNC: 157 MG/DL (ref 65–140)
GLUCOSE SERPL-MCNC: 179 MG/DL (ref 65–140)
GLUCOSE SERPL-MCNC: 226 MG/DL (ref 65–140)
GLUCOSE SERPL-MCNC: 76 MG/DL (ref 65–140)
HCT VFR BLD AUTO: 36.1 % (ref 36.5–49.3)
HGB BLD-MCNC: 12.2 G/DL (ref 12–17)
INR PPP: 1.36 (ref 0.86–1.16)
MAGNESIUM SERPL-MCNC: 2.2 MG/DL (ref 1.6–2.6)
MCH RBC QN AUTO: 30.4 PG (ref 26.8–34.3)
MCHC RBC AUTO-ENTMCNC: 33.8 G/DL (ref 31.4–37.4)
MCV RBC AUTO: 90 FL (ref 82–98)
PHOSPHATE SERPL-MCNC: 1.6 MG/DL (ref 2.3–4.1)
PLATELET # BLD AUTO: 205 THOUSANDS/UL (ref 149–390)
PMV BLD AUTO: 10.4 FL (ref 8.9–12.7)
POTASSIUM SERPL-SCNC: 3.4 MMOL/L (ref 3.5–5.3)
POTASSIUM SERPL-SCNC: 4.1 MMOL/L (ref 3.5–5.3)
PROTHROMBIN TIME: 16.8 SECONDS (ref 12.1–14.4)
RBC # BLD AUTO: 4.01 MILLION/UL (ref 3.88–5.62)
SODIUM SERPL-SCNC: 145 MMOL/L (ref 136–145)
WBC # BLD AUTO: 15.91 THOUSAND/UL (ref 4.31–10.16)

## 2017-09-11 PROCEDURE — 84132 ASSAY OF SERUM POTASSIUM: CPT | Performed by: NURSE PRACTITIONER

## 2017-09-11 PROCEDURE — G8988 SELF CARE GOAL STATUS: HCPCS

## 2017-09-11 PROCEDURE — 83735 ASSAY OF MAGNESIUM: CPT | Performed by: PHYSICIAN ASSISTANT

## 2017-09-11 PROCEDURE — G8979 MOBILITY GOAL STATUS: HCPCS

## 2017-09-11 PROCEDURE — 84100 ASSAY OF PHOSPHORUS: CPT | Performed by: PHYSICIAN ASSISTANT

## 2017-09-11 PROCEDURE — 82948 REAGENT STRIP/BLOOD GLUCOSE: CPT

## 2017-09-11 PROCEDURE — 85027 COMPLETE CBC AUTOMATED: CPT | Performed by: PHYSICIAN ASSISTANT

## 2017-09-11 PROCEDURE — 97163 PT EVAL HIGH COMPLEX 45 MIN: CPT

## 2017-09-11 PROCEDURE — 80048 BASIC METABOLIC PNL TOTAL CA: CPT | Performed by: PHYSICIAN ASSISTANT

## 2017-09-11 PROCEDURE — G8987 SELF CARE CURRENT STATUS: HCPCS

## 2017-09-11 PROCEDURE — G8978 MOBILITY CURRENT STATUS: HCPCS

## 2017-09-11 PROCEDURE — 85610 PROTHROMBIN TIME: CPT | Performed by: PHYSICIAN ASSISTANT

## 2017-09-11 PROCEDURE — 97166 OT EVAL MOD COMPLEX 45 MIN: CPT

## 2017-09-11 PROCEDURE — 85730 THROMBOPLASTIN TIME PARTIAL: CPT | Performed by: THORACIC SURGERY (CARDIOTHORACIC VASCULAR SURGERY)

## 2017-09-11 RX ORDER — WARFARIN SODIUM 7.5 MG/1
7.5 TABLET ORAL
Status: COMPLETED | OUTPATIENT
Start: 2017-09-11 | End: 2017-09-11

## 2017-09-11 RX ORDER — BISACODYL 10 MG
10 SUPPOSITORY, RECTAL RECTAL DAILY PRN
Status: DISCONTINUED | OUTPATIENT
Start: 2017-09-11 | End: 2017-09-14 | Stop reason: HOSPADM

## 2017-09-11 RX ORDER — ASPIRIN 81 MG/1
81 TABLET, CHEWABLE ORAL DAILY
Status: DISCONTINUED | OUTPATIENT
Start: 2017-09-11 | End: 2017-09-14 | Stop reason: HOSPADM

## 2017-09-11 RX ORDER — POTASSIUM CHLORIDE 29.8 MG/ML
40 INJECTION INTRAVENOUS ONCE
Status: COMPLETED | OUTPATIENT
Start: 2017-09-11 | End: 2017-09-11

## 2017-09-11 RX ORDER — DOCUSATE SODIUM 100 MG/1
100 CAPSULE, LIQUID FILLED ORAL 2 TIMES DAILY
Status: DISCONTINUED | OUTPATIENT
Start: 2017-09-11 | End: 2017-09-14 | Stop reason: HOSPADM

## 2017-09-11 RX ORDER — PANTOPRAZOLE SODIUM 40 MG/1
40 TABLET, DELAYED RELEASE ORAL
Status: DISCONTINUED | OUTPATIENT
Start: 2017-09-12 | End: 2017-09-14 | Stop reason: HOSPADM

## 2017-09-11 RX ORDER — OXYCODONE HYDROCHLORIDE AND ACETAMINOPHEN 5; 325 MG/1; MG/1
2 TABLET ORAL EVERY 6 HOURS PRN
Status: DISCONTINUED | OUTPATIENT
Start: 2017-09-11 | End: 2017-09-14 | Stop reason: HOSPADM

## 2017-09-11 RX ORDER — AMIODARONE HYDROCHLORIDE 200 MG/1
200 TABLET ORAL
Status: DISCONTINUED | OUTPATIENT
Start: 2017-09-11 | End: 2017-09-14 | Stop reason: HOSPADM

## 2017-09-11 RX ORDER — METOPROLOL TARTRATE 50 MG/1
50 TABLET, FILM COATED ORAL EVERY 8 HOURS
Status: DISCONTINUED | OUTPATIENT
Start: 2017-09-11 | End: 2017-09-12

## 2017-09-11 RX ORDER — POLYETHYLENE GLYCOL 3350 17 G/17G
17 POWDER, FOR SOLUTION ORAL DAILY
Status: DISCONTINUED | OUTPATIENT
Start: 2017-09-12 | End: 2017-09-14 | Stop reason: HOSPADM

## 2017-09-11 RX ORDER — LISINOPRIL 5 MG/1
5 TABLET ORAL DAILY
Status: DISCONTINUED | OUTPATIENT
Start: 2017-09-12 | End: 2017-09-13

## 2017-09-11 RX ORDER — FUROSEMIDE 10 MG/ML
40 INJECTION INTRAMUSCULAR; INTRAVENOUS EVERY 8 HOURS
Status: DISCONTINUED | OUTPATIENT
Start: 2017-09-11 | End: 2017-09-14 | Stop reason: HOSPADM

## 2017-09-11 RX ORDER — DOCUSATE SODIUM 100 MG/1
100 CAPSULE, LIQUID FILLED ORAL 2 TIMES DAILY
Status: DISCONTINUED | OUTPATIENT
Start: 2017-09-11 | End: 2017-09-11

## 2017-09-11 RX ORDER — OLANZAPINE 2.5 MG/1
2.5 TABLET ORAL
Status: DISCONTINUED | OUTPATIENT
Start: 2017-09-11 | End: 2017-09-14 | Stop reason: HOSPADM

## 2017-09-11 RX ORDER — OXYCODONE HYDROCHLORIDE AND ACETAMINOPHEN 5; 325 MG/1; MG/1
1 TABLET ORAL EVERY 4 HOURS PRN
Status: DISCONTINUED | OUTPATIENT
Start: 2017-09-11 | End: 2017-09-14 | Stop reason: HOSPADM

## 2017-09-11 RX ORDER — ONDANSETRON 2 MG/ML
4 INJECTION INTRAMUSCULAR; INTRAVENOUS EVERY 6 HOURS PRN
Status: DISCONTINUED | OUTPATIENT
Start: 2017-09-11 | End: 2017-09-14 | Stop reason: HOSPADM

## 2017-09-11 RX ORDER — ACETAMINOPHEN 325 MG/1
650 TABLET ORAL EVERY 6 HOURS PRN
Status: DISCONTINUED | OUTPATIENT
Start: 2017-09-11 | End: 2017-09-11

## 2017-09-11 RX ORDER — ASPIRIN 81 MG/1
81 TABLET, CHEWABLE ORAL DAILY
Status: DISCONTINUED | OUTPATIENT
Start: 2017-09-11 | End: 2017-09-11

## 2017-09-11 RX ORDER — INSULIN GLARGINE 100 [IU]/ML
40 INJECTION, SOLUTION SUBCUTANEOUS
Status: DISCONTINUED | OUTPATIENT
Start: 2017-09-11 | End: 2017-09-12

## 2017-09-11 RX ORDER — WARFARIN SODIUM 7.5 MG/1
7.5 TABLET ORAL
Status: DISCONTINUED | OUTPATIENT
Start: 2017-09-11 | End: 2017-09-11

## 2017-09-11 RX ORDER — ACETAMINOPHEN 325 MG/1
650 TABLET ORAL EVERY 6 HOURS PRN
Status: DISCONTINUED | OUTPATIENT
Start: 2017-09-11 | End: 2017-09-14 | Stop reason: HOSPADM

## 2017-09-11 RX ORDER — ATORVASTATIN CALCIUM 80 MG/1
80 TABLET, FILM COATED ORAL
Status: DISCONTINUED | OUTPATIENT
Start: 2017-09-11 | End: 2017-09-14 | Stop reason: HOSPADM

## 2017-09-11 RX ORDER — POTASSIUM CHLORIDE 20 MEQ/1
20 TABLET, EXTENDED RELEASE ORAL 2 TIMES DAILY
Status: DISCONTINUED | OUTPATIENT
Start: 2017-09-11 | End: 2017-09-11

## 2017-09-11 RX ORDER — POTASSIUM CHLORIDE 20 MEQ/1
20 TABLET, EXTENDED RELEASE ORAL 2 TIMES DAILY
Status: DISCONTINUED | OUTPATIENT
Start: 2017-09-11 | End: 2017-09-12

## 2017-09-11 RX ORDER — WARFARIN SODIUM 5 MG/1
5 TABLET ORAL
Status: DISCONTINUED | OUTPATIENT
Start: 2017-09-11 | End: 2017-09-11

## 2017-09-11 RX ADMIN — POTASSIUM CHLORIDE 40 MEQ: 400 INJECTION, SOLUTION INTRAVENOUS at 01:56

## 2017-09-11 RX ADMIN — PANTOPRAZOLE SODIUM 40 MG: 40 TABLET, DELAYED RELEASE ORAL at 06:00

## 2017-09-11 RX ADMIN — POTASSIUM CHLORIDE 20 MEQ: 1500 TABLET, EXTENDED RELEASE ORAL at 17:27

## 2017-09-11 RX ADMIN — FUROSEMIDE 40 MG: 10 INJECTION, SOLUTION INTRAMUSCULAR; INTRAVENOUS at 13:55

## 2017-09-11 RX ADMIN — ASPIRIN 81 MG 81 MG: 81 TABLET ORAL at 08:12

## 2017-09-11 RX ADMIN — WARFARIN SODIUM 7.5 MG: 7.5 TABLET ORAL at 17:27

## 2017-09-11 RX ADMIN — MUPIROCIN 1 APPLICATION: 20 OINTMENT TOPICAL at 08:13

## 2017-09-11 RX ADMIN — POLYETHYLENE GLYCOL 3350 17 G: 17 POWDER, FOR SOLUTION ORAL at 08:12

## 2017-09-11 RX ADMIN — ASPIRIN 81 MG 81 MG: 81 TABLET ORAL at 13:55

## 2017-09-11 RX ADMIN — ATORVASTATIN CALCIUM 80 MG: 80 TABLET, FILM COATED ORAL at 16:13

## 2017-09-11 RX ADMIN — METOPROLOL TARTRATE 50 MG: 50 TABLET ORAL at 13:55

## 2017-09-11 RX ADMIN — DOCUSATE SODIUM 100 MG: 100 CAPSULE, LIQUID FILLED ORAL at 08:13

## 2017-09-11 RX ADMIN — POTASSIUM CHLORIDE 40 MEQ: 400 INJECTION, SOLUTION INTRAVENOUS at 07:17

## 2017-09-11 RX ADMIN — INSULIN LISPRO 2 UNITS: 100 INJECTION, SOLUTION INTRAVENOUS; SUBCUTANEOUS at 06:00

## 2017-09-11 RX ADMIN — INSULIN LISPRO 4 UNITS: 100 INJECTION, SOLUTION INTRAVENOUS; SUBCUTANEOUS at 11:58

## 2017-09-11 RX ADMIN — INSULIN GLARGINE 40 UNITS: 100 INJECTION, SOLUTION SUBCUTANEOUS at 21:25

## 2017-09-11 RX ADMIN — POTASSIUM PHOSPHATE, MONOBASIC AND POTASSIUM PHOSPHATE, DIBASIC 21 MMOL: 224; 236 INJECTION, SOLUTION INTRAVENOUS at 07:30

## 2017-09-11 RX ADMIN — FUROSEMIDE 40 MG: 10 INJECTION, SOLUTION INTRAMUSCULAR; INTRAVENOUS at 12:02

## 2017-09-11 RX ADMIN — FUROSEMIDE 40 MG: 10 INJECTION, SOLUTION INTRAMUSCULAR; INTRAVENOUS at 21:25

## 2017-09-11 RX ADMIN — METOPROLOL TARTRATE 50 MG: 50 TABLET ORAL at 06:00

## 2017-09-11 RX ADMIN — AMIODARONE HYDROCHLORIDE 0.5 MG/MIN: 50 INJECTION, SOLUTION INTRAVENOUS at 00:21

## 2017-09-11 RX ADMIN — OLANZAPINE 2.5 MG: 2.5 TABLET, FILM COATED ORAL at 21:30

## 2017-09-11 RX ADMIN — AMIODARONE HYDROCHLORIDE 200 MG: 200 TABLET ORAL at 16:13

## 2017-09-11 RX ADMIN — HEPARIN SODIUM 13.1 UNITS/KG/HR: 10000 INJECTION, SOLUTION INTRAVENOUS at 02:00

## 2017-09-11 RX ADMIN — METOPROLOL TARTRATE 50 MG: 50 TABLET ORAL at 21:25

## 2017-09-11 RX ADMIN — FUROSEMIDE 40 MG: 10 INJECTION, SOLUTION INTRAMUSCULAR; INTRAVENOUS at 04:09

## 2017-09-11 RX ADMIN — DOCUSATE SODIUM 100 MG: 100 CAPSULE, LIQUID FILLED ORAL at 17:28

## 2017-09-11 RX ADMIN — LISINOPRIL 5 MG: 5 TABLET ORAL at 08:13

## 2017-09-11 RX ADMIN — HEPARIN SODIUM 13.1 UNITS/KG/HR: 10000 INJECTION, SOLUTION INTRAVENOUS at 21:27

## 2017-09-11 RX ADMIN — POTASSIUM CHLORIDE 20 MEQ: 1500 TABLET, EXTENDED RELEASE ORAL at 08:12

## 2017-09-11 RX ADMIN — AMIODARONE HYDROCHLORIDE 200 MG: 200 TABLET ORAL at 06:00

## 2017-09-11 RX ADMIN — INSULIN LISPRO 15 UNITS: 100 INJECTION, SOLUTION INTRAVENOUS; SUBCUTANEOUS at 16:19

## 2017-09-11 NOTE — PLAN OF CARE
Problem: PHYSICAL THERAPY ADULT  Goal: Performs mobility at highest level of function for planned discharge setting  See evaluation for individualized goals  Treatment/Interventions: Functional transfer training, LE strengthening/ROM, Elevations, Endurance training, Gait training, Bed mobility, Equipment eval/education  Equipment Recommended: Walker (PATIENT HAS RW AT HOME )       See flowsheet documentation for full assessment, interventions and recommendations  Bryan Stagers, PT    Prognosis: Good  Problem List: Decreased endurance, Impaired balance, Decreased mobility, Pain  Assessment: PT COMPLETED EVALUATION OF 71YEAR OLD MALE WHO PRESENTED TO [facility] WITH CHEST PAIN WHERE HE WAS IDENTIFIED ON CT IMAGING TO HAVE AN ACUTE AORTIC DISSECTION AND WAS TRANSFERRED TO [facility] FOR EMERGENT SURGERY  PATIENT IS NOW S/P DISSECTION REPAIR W/ ASCENDING AORTIC HEMIARCH REPAIR 9/6/17  CURRENT MEDICAL AND PHYSICAL INSTABILITIES INCLUDE PAIN (INCISIONAL), CONTINUOUS O2/HR/TELEMETRY MONITORING, UTILIZATION OF SUPPLEMENTAL O2 (2 L) AND IV FLUIDS, FALLS RISK, AND A REGRESSION IN FUNCTIONAL STATUS FROM BASELINE  PMH IS SIGNIFICANT FOR DM, HTN, AND CANCER  PRIOR TO THIS ADMISSION PATIENT RESIDED ALONE IN TWO LEVEL HOME (3 NADIA, 13 STEPS TO 2ND FLOOR FULL BATH AND BEDROOM)  AT BASELINE PATIENT IS I WITH MOBILITY (W/O AD), ADLS, AND IADLS  CURRENT IMPAIRMENTS INCLUDE PAIN, SOB (O2 SAT 82%-90% WHILE AMBULATING), DECREASED ACTIVITY TOLERANCE AND LIMITED PARTICIPATION IN AMBLUATION AND STAIR NAVIGATION  DURING PT EVALUATION PATIENT REQUIRED S FOR BED MOBILITY AND SIT<-->STAND TRANSFER  HE AMBULATED 240 FEET W/ CONTACT GUARD ASSISTANCE UTILIZING RW AND REQUIRING 3 STANDING REST BREAKS SECONDARY TO [place]  IN FUTURE SESSIONS PLAN TO TRIAL AMBLUATION W/ LEAST RESTRICTIVE DEVICE AND STAIR NAVIGATION  ANTICIPATE WITH CONTINUED MOBILITY AND ACHIEVEMENT OF GOALS PATIENT WILL D/C HOME W/ HOME PT AND FAMILY SUPPORT PRN   HE WILL BENEFIT FROM CONTINUED SKILLED INPT PT THIS ADMISSION IN ORDER TO ACHIEVE MAXIMAL FUNCTION AND SAFETY  Recommendation: (S) Home PT     PT - OK to Discharge: (S) No (IMPROVE MOBILITY AND CLEAR STAIRS )    See flowsheet documentation for full assessment     Gabriel Bsutos, PT

## 2017-09-11 NOTE — PROGRESS NOTES
Progress Note - Medical Critical Care   Clegenesis Casillas  71 y o  male MRN: [id number]  Unit/Bed#: [facility] 416-01 Encounter: [id number]      Attending Physician: Garfield Rocha MD    24 Hour Events: POD # 5 s/p Type A dissection with replacement of ascending aorta with hemiarch reconstruction with 26mm dacron graft and mediastinal re-exploration  No major overnight events  Allergies: No Known Allergies    Medications:   Scheduled Meds:    amiodarone 200 mg Oral Q8H [facility]   aspirin 325 mg Oral Daily   atorvastatin 80 mg Oral HS   furosemide 40 mg Intravenous Q8H   insulin glargine 40 Units Subcutaneous HS   insulin lispro 1-6 Units Subcutaneous HS   insulin lispro 13 Units Subcutaneous TID With Meals   insulin lispro 2-12 Units Subcutaneous TID AC   lisinopril 5 mg Oral Daily   metoprolol tartrate 50 mg Oral Q8H DEMETRIA   mupirocin 1 application Nasal V72E DEMETRIA   OLANZapine 2 5 mg Oral HS   pantoprazole 40 mg Oral Early Morning   polyethylene glycol 17 g Oral Daily   Warfarin 2 5mg    VTE Pharmacologic Prophylaxis: Heparin drip  VTE Mechanical Prophylaxis: sequential compression device    Continuous Infusions:    heparin (porcine) 3-20 Units/kg/hr (Order-Specific) Last Rate: 13 1 Units/kg/hr (09/11/17 0200)   sodium chloride 20 mL/hr Last Rate: Stopped (09/10/17 1900)     PRN Meds:    acetaminophen 650 mg Q4H PRN   bisacodyl 10 mg Daily PRN   lidocaine (cardiac) 100 mg Q30 Min PRN   ondansetron 4 mg Q6H PRN   oxyCODONE-acetaminophen 1 tablet Q4H PRN   oxyCODONE-acetaminophen 2 tablet Q6H PRN       Home Medications:   Prior to Admission medications    Medication Sig Start Date End Date Taking?  Authorizing Provider   amLODIPine-benazepril (LOTREL) 10-40 MG per capsule Take 1 capsule by mouth daily    Historical Provider, MD   atenolol (TENORMIN) 100 mg tablet Take 100 mg by mouth daily    Historical Provider, MD   glimepiride (AMARYL) 4 mg tablet Take 4 mg by mouth every morning before breakfast    Historical Provider, MD metFORMIN (GLUCOPHAGE) 1000 MG tablet Take 1,000 mg by mouth 2 (two) times a day with meals    Historical Provider, MD   Omega-3 Fatty Acids (FISH OIL OMEGA-3 PO) Take 1 tablet by mouth daily    Historical Provider, MD   triamterene (DYRENIUM) 50 mg capsule Take 50 mg by mouth daily    Historical Provider, MD   Valsartan-Hydrochlorothiazide (DIOVAN HCT PO) Take 1 tablet by mouth daily    Historical Provider, MD       Vitals:   Vitals:    17 0200 17 0400 17 0500 17 0600   BP: 134/80 140/97  127/97   Pulse: 99 98  104   Resp:    Temp:  98 °F (36 7 °C)     TempSrc:  Oral     SpO2: 93% 96%  95%   Weight:   110 kg (242 lb 11 6 oz)      Tele Rhythm: Atrial Fibrillation This was personally reviewed by myself  Respiratory:  SpO2: SpO2: 95 %, SpO2 Device: O2 Device: None (Room air)  O2 Flow Rate (L/min): 2 L/min    Temperature: Temp (24hrs), Av 2 °F (36 8 °C), Min:97 9 °F (36 6 °C), Max:98 5 °F (36 9 °C)  Current: Temperature: 98 °F (36 7 °C)    Weights:   Weight (last 2 days)     Date/Time   Weight    17 0500  110 (242 73)    09/10/17 0553  108 (238 32)            IBW: -88 kg  There is no height or weight on file to calculate BMI  Hemodynamic Monitoring:  N/A       Intake and Outputs:    Intake/Output Summary (Last 24 hours) at 17 0622  Last data filed at 17 0600   Gross per 24 hour   Intake          2414 67 ml   Output             2455 ml   Net           -40 33 ml     I/O last 24 hours:   In: 3424 2 [P O :1080; I V :1494 2; IV Piggyback:850]  Out: 1945 [Urine:7650]    UOP: 200ml/hour     Labs:    Results from last 7 days  Lab Units 17  0508 09/10/17  0502 17  0420  17  1802  17  1219   WBC Thousand/uL 15 91* 20 51* 23 88*  < > 17 55*  < > 8 31   HEMOGLOBIN g/dL 12 2 12 3 11 9*  < > 9 8*  < > 14 2   I STAT HEMOGLOBIN   --   --   --   < >  --   < >  --    HEMATOCRIT % 36 1* 36 4* 35 9*  < > 28 8*  < > 41 6   PLATELETS Thousands/uL 205 187 177  < > 213  < > 178   NEUTROS PCT %  --   --   --   --  85*  --  67   MONOS PCT %  --   --   --   --  11  --  7   < > = values in this interval not displayed  Results from last 7 days  Lab Units 09/11/17  0508 09/10/17  2256 09/10/17  1518 09/10/17  0533  09/09/17  0420  09/07/17  0408  09/05/17  1219   SODIUM mmol/L 145  --   --  144  --  145  < > 149*  < > 140   POTASSIUM mmol/L 3 4* 3 1* 3 5 3 4*  < > 3 1*  < > 4 3  < > 4 1   CHLORIDE mmol/L 107  --   --  104  --  107  < > 111*  < > 100   CO2 mmol/L 29  --   --  29  --  27  < > 33*  < > 36*   BUN mg/dL 32*  --   --  25  --  28*  < > 14  < > 14   CREATININE mg/dL 0 96  --   --  0 94  --  0 93  < > 1 14  < > 0 88   CALCIUM mg/dL 6 8*  --   --  6 9*  --  7 3*  < > 7 7*  < > 9 5   TOTAL PROTEIN g/dL  --   --   --   --   --   --   --  5 6*  --  7 7   BILIRUBIN TOTAL mg/dL  --   --   --   --   --   --   --  1 78*  --  0 63   ALK PHOS U/L  --   --   --   --   --   --   --  35*  --  57   ALT U/L  --   --   --   --   --   --   --  40  --  33   AST U/L  --   --   --   --   --   --   --  75*  --  22   GLUCOSE RANDOM mg/dL 157*  --   --  202*  --  176*  < > 120  < > 210*   GLUCOSE, ISTAT   --   --   --   --   --   --   --   --   < >  --    < > = values in this interval not displayed  Results from last 7 days  Lab Units 09/11/17  0508 09/10/17  0533 09/09/17  0420   MAGNESIUM mg/dL 2 2 1 8 2 1       Results from last 7 days  Lab Units 09/11/17  0508 09/10/17  2256 09/10/17  1518  09/10/17  0502 09/09/17  1150   INR  1 36*  --   --   --  1 26* 1 25*   PTT seconds 83* 61* 48*  < >  --   --    < > = values in this interval not displayed  Results from last 7 days  Lab Units 09/06/17  0633   HEMOGLOBIN A1C % 7 1*     Physical Exam:     Neck:  Supple  Cardiac: Irregular rate and irregular rhythm  No rubs  No murmur  Pulmonary:  Breath sounds CTA B/L  Abdomen:  Soft  No distension  Non tender  + BS  Obese     Incisions: Sternum is stable    Incision is clean, dry, and intact           Lower extremities: Trace edema B/L  Neuro: Alert  Follows commands  Screening neurologic exam reveals no obvious deficits  Skin: Warm and dry  Invasive lines and devices: Invasive Devices     Central Venous Catheter Line            CVC Central Lines 09/06/17 Triple 4 days          Drain            Urethral Catheter Non-latex; Temperature probe 16 Fr  4 days                Assessment:  Principal Problem:    Acute thoracic aortic dissection  Active Problems:    Hypertension    Diabetes    Acute blood loss anemia    s/p aortic arch repair    Hypokalemia    Atrial fibrillation  Resolved Problems: On mechanically assisted ventilation    Coagulopathy    Acute kidney injury    Hypernatremia    Pulmonary insufficiency    Encephalopathy    Delirium    Agitation    Plan:    Cardiac:   Cardiac infusions: none  Hemodynamic goals:          MAP >65   [place] catheter discontinued    Arterial line discontinued   Atrial Fibrillation; BP well controlled  Lopressor 50 mg PO q8h    Lisinopril 5 mg PO daily   Continue ASA, PO amiodarone, and statin therapy  Epicardial pacing wires out  Continue heparin drip, dose coumadin 5 mg tonight   Cardiology consulted for postoperative medical management    Pulmonary:   Chest tubes have been discontinued  Acute post-op hypoxic respiratory insufficiency:  Continue incentive spirometry/coughing/deep breathing exercises  Wean supplemental oxygen as tolerated      Renal:   Post-op volume overload:    Lasix 40 mg IV q 8  Add K-dur 20 mEq q 12  D/C fry catheter     Neuro:  Neurologically intact with no active issues  Incisional pain well-controlled  Continue with PO pain regimen   Continue zyprexa 2 5 mg PO qHS    GI:  TLC 2 3 gm sodium diet with consistent carbohydrate modifier and 1800 mL fluid restriction  Continue stool softeners and prn suppository  Continue GI prophylaxis with PPI          Endo: History of diabetes: Endocrinology consulted for management, blood sugars well controlled on current regimen    Hematology:   Post-operative acute blood loss anemia:   Trend hemoglobin and platelets  Transfuse as needed  Disposition:   Written to SD level II    Collaborative bedside rounds performed with cardiac surgery attending and bedside RN      SIGNATURE: Michael Pittman PA-C  DATE: September 11, 2017  TIME: 6:22 AM

## 2017-09-11 NOTE — CASE MANAGEMENT
[address] [place] [address] in the [facility] by Brett Canales for 2017  Network Utilization Review Department  Phone: [phone]; Fax [phone]  ATTENTION: The Network Utilization Review Department is now centralized for our 7 Facilities  Make a note that we have a new phone and fax numbers for our Department  Please call with any questions or concerns to [phone] and carefully follow the prompts so that you are directed to the right person  All voicemails are confidential  Fax any determinations, approvals, denials, and requests for initial or continue stay review clinical to [phone]  Due to HIGH CALL volume, it would be easier if you could please send faxed requests to expedite your requests and in part, help us provide discharge notifications faster     ==============================================================================    Continued Stay Review    Date: 9/11/2017  POD # 5 s/p Type A dissection with replacement of ascending aorta with hemiarch reconstruction with 26mm dacron graft and mediastinal re-exploration  POD # 5 s/p Type A dissection repair with ascending aorta/hemiarch replacement      POD # 5 s/p Type A dissection repair with ascending aorta/hemiarch replacement         Vital Signs: /79   Pulse (!) 112   Temp 97 6 °F (36 4 °C) (Oral)   Resp 20   Wt 110 kg (242 lb 11 6 oz)   SpO2 96%     Medications:   Scheduled Meds:     amiodarone 200 mg Oral Q8H [address]   aspirin 325 mg Oral Daily   atorvastatin 80 mg Oral HS   furosemide 40 mg Intravenous Q8H   insulin glargine 40 Units Subcutaneous HS   insulin lispro 1-6 Units Subcutaneous HS   insulin lispro 13 Units Subcutaneous TID With Meals   insulin lispro 2-12 Units Subcutaneous TID AC   lisinopril 5 mg Oral Daily   metoprolol tartrate 50 mg Oral Q8H DEMETRIA   mupirocin 1 application Nasal I91D DEMETRIA   OLANZapine 2 5 mg Oral HS   pantoprazole 40 mg Oral Early Morning   polyethylene glycol 17 g Oral Daily   Warfarin 2 5mg     VTE Pharmacologic Prophylaxis: Heparin drip  VTE Mechanical Prophylaxis: sequential compression device     Continuous Infusions:     heparin (porcine) 3-20 Units/kg/hr (Order-Specific) Last Rate: 13 1 Units/kg/hr (09/11/17 0200)   sodium chloride 20 mL/hr Last Rate: Stopped (09/10/17 1900)      PRN Meds:     acetaminophen 650 mg Q4H PRN   bisacodyl 10 mg Daily PRN   lidocaine (cardiac) 100 mg Q30 Min PRN   ondansetron 4 mg Q6H PRN   oxyCODONE-acetaminophen 1 tablet Q4H PRN   oxyCODONE-acetaminophen 2 tablet Q6H PRN         Abnormal Labs/Diagnostic Results:     Age/Sex: 71 y o  male     Assessment/Plan:   Diuresis for negative fluid balance again this 24 hours  PO ASA/Statin/B blocker  Seroquel wean  Transfer to stepdown  Heparin/Coumadin for AFib  Ambulate, PT/OT      Discharge Plan: TBD

## 2017-09-11 NOTE — PHYSICAL THERAPY NOTE
Physical Therapy Evaluation    Patient's Name: Sofia Bishop  Admitting Diagnosis  Aortic dissection [I71 00]    Problem List  Patient Active Problem List   Diagnosis    Prostate cancer    Hypertension    Diabetes    Acute thoracic aortic dissection    Acute blood loss anemia    s/p aortic arch repair    Hypokalemia    Atrial fibrillation       Past Medical History  Past Medical History:   Diagnosis Date    Diabetes     Hypertension     Prostate cancer        Past Surgical History  Past Surgical History:   Procedure Laterality Date    WY ASCEND AORTA GRAFT W ROOT REPLACMENT  VALVE CONDUIT/CORON RECONSTRUCT N/A 9/6/2017    Procedure: ASCENDING AORTIC REPAIR WITH A 26MM  GELWEAVE STRAIGHT GRAFT AND HEMIARCH WITH 8MM SIDE ARM BRANCH;  Surgeon: Martha Ellis MD;  Location: [facility];  Service: Cardiac Surgery    WY EXPLOR POSTOP BLEED,INFEC,CLOT-CHST N/A 9/6/2017    Procedure: RE-EXPLORATION MEDIASTERNAL CAVITY FOR POST-OP BLEED (BRING BACK); Surgeon: Martha Ellis MD;  Location: [facility];  Service: Cardiac Surgery      09/11/17 1448   Note Type   Note type Eval/Treat   Pain Assessment   Pain Assessment 0-10   Pain Score 4   Pain Type Acute pain   Pain Location Incision   Home Living   Type of [address] Multi-level;1/2 bath on main level;Bed/bath upstairs;Stairs to enter with rails  (3 NADIA)   Bathroom Shower/Tub Tub/shower unit   Bathroom Toilet Standard   Bathroom Equipment Grab bars in shower;Commode; Shower chair   Bathroom Accessibility Accessible   Home Equipment Walker;Cane   Additional Comments PER PATIENT HE RESIDES ALONE IN MULTILEVEL HOME  3 NADIA + 13 STEPS TO 2ND FLOOR FULL BATH AND BED  1/2 BATH FIRST FLOOR  SUPPORTIVE [address] RESIDES 3 HOMES DOWN      Prior Function   Level of [place] Independent with ADLs and functional mobility   Lives With Alone   ADL Assistance Independent   IADLs Independent   Falls in the last 6 months 0   Vocational Retired   Comments [facility] PATIENT AT BASELINE HE IS I WITH MOBILITY (W/O AD), ADLS, AND IADLS  Restrictions/Precautions   Weight Bearing Precautions Per Order No   Braces or Orthoses (NONE)   Other Precautions Cardiac/sternal;Pain; Fall Risk;O2;Telemetry;Multiple lines   General   Family/Caregiver Present Yes  (DGHT AND SON IN LAW )   Cognition   Overall Cognitive Status WFL   RUE Assessment   RUE Assessment WFL   LUE Assessment   LUE Assessment WFL   RLE Assessment   RLE Assessment WFL   LLE Assessment   LLE Assessment WFL   Bed Mobility   Supine to Sit 5  Supervision   Additional items Increased time required   Transfers   Sit to Stand 5  Supervision   Additional items Increased time required   Stand to Sit 5  Supervision   Ambulation/Elevation   Gait pattern Excessively slow;Decreased foot clearance   Gait Assistance 4  Minimal assist  (CONTACT GUARD)   Additional items Assist x 1   Assistive Device Rolling walker   Distance 240 FEET W/ 3 STANDING REST BREAKS SECONDARY TO DECREASE IN O2 SATS   Balance   Static Sitting Good   Static Standing Fair   Ambulatory Fair -   Endurance Deficit   Endurance Deficit Yes   Endurance Deficit Description SOB (MEDIAN O2 SAT WHILE AMBULATING ON 2 L O2 91%-->DID DECREASE TO 82% REQUIRING STANDING REST BREAK W/ EFFICIENT BREATHING) TO INCREASE BACK TO 90S    Activity Tolerance   Activity Tolerance Patient tolerated treatment well;Patient limited by fatigue   Nurse Made Aware LANRE TO SEE PER RN JESSIKA    Assessment   Prognosis Good   Problem List Decreased endurance; Impaired balance;Decreased mobility;Pain   Assessment PT COMPLETED EVALUATION OF 71YEAR OLD MALE WHO PRESENTED TO [facility] WITH CHEST PAIN WHERE HE WAS IDENTIFIED ON CT IMAGING TO HAVE AN ACUTE AORTIC DISSECTION AND WAS TRANSFERRED TO [facility] FOR EMERGENT SURGERY  PATIENT IS NOW S/P DISSECTION REPAIR W/ ASCENDING AORTIC HEMIARCH REPAIR 9/6/17   CURRENT MEDICAL AND PHYSICAL INSTABILITIES INCLUDE PAIN (INCISIONAL), CONTINUOUS O2/HR/TELEMETRY MONITORING, UTILIZATION OF SUPPLEMENTAL O2 (2 L) AND IV FLUIDS, FALLS RISK, AND A REGRESSION IN FUNCTIONAL STATUS FROM BASELINE  PMH IS SIGNIFICANT FOR DM, HTN, AND CANCER  PRIOR TO THIS ADMISSION PATIENT RESIDED ALONE IN TWO LEVEL HOME (3 NADIA, 13 STEPS TO 2ND FLOOR FULL BATH AND BEDROOM)  AT BASELINE PATIENT IS I WITH MOBILITY (W/O AD), ADLS, AND IADLS  CURRENT IMPAIRMENTS INCLUDE PAIN, SOB (O2 SAT 82%-90% WHILE AMBULATING), DECREASED ACTIVITY TOLERANCE AND LIMITED PARTICIPATION IN AMBLUATION AND STAIR NAVIGATION  DURING PT EVALUATION PATIENT REQUIRED S FOR BED MOBILITY AND SIT<-->STAND TRANSFER  HE AMBULATED 240 FEET W/ CONTACT GUARD ASSISTANCE UTILIZING RW AND REQUIRING 3 STANDING REST BREAKS SECONDARY TO [place]  IN FUTURE SESSIONS PLAN TO TRIAL AMBLUATION W/ LEAST RESTRICTIVE DEVICE AND STAIR NAVIGATION  ANTICIPATE WITH CONTINUED MOBILITY AND ACHIEVEMENT OF GOALS PATIENT WILL D/C HOME W/ HOME PT AND FAMILY SUPPORT PRN  HE WILL BENEFIT FROM CONTINUED SKILLED INPT PT THIS ADMISSION IN ORDER TO ACHIEVE MAXIMAL FUNCTION AND SAFETY  Goals   Patient Goals AGREEABLE TO WALK    [facility] Expiration Date 09/18/17   Short Term Goal #1 3-7 DAYS: 1) COMPLETE BED MOBILITY MOD-I; 2) PERFORM SIT<-->STAND TRANSFER MOD-I; 3) AMBULATE 400 FEET I/MOD-I W/ LEAST RESTRICTIVE DEVICE; 4) NAVIGATE 13 STEPS MOD-I; 5) IMPROVE ACTIVITY TOLERANCE TO 60 MINUTES    Treatment Day 0   Plan   Treatment/Interventions Functional transfer training;LE strengthening/ROM; Elevations; Endurance training;Gait training;Bed mobility; Equipment eval/education   PT Frequency 5x/wk   Recommendation   Recommendation Home PT   Equipment Recommended Walker  (PATIENT HAS RW AT HOME )   PT - OK to Discharge No  (IMPROVE MOBILITY AND CLEAR STAIRS )   Barthel Index   Feeding 10   Bathing 0   Grooming Score 0   Dressing Score 5   Bladder Score 10   Bowels Score 10   Toilet Use Score 5   Transfers (Bed/Chair) Score 10   Mobility (Level Surface) Score 10   Stairs Score 0 Barthel Index Score 60       Aubrey Sahni, PT

## 2017-09-11 NOTE — PLAN OF CARE
Problem: OCCUPATIONAL THERAPY ADULT  Goal: Performs self-care activities at highest level of function for planned discharge setting  See evaluation for individualized goals  Treatment Interventions: ADL retraining, Functional transfer training, Endurance training, Patient/family training, Compensatory technique education, Energy conservation          See flowsheet documentation for full assessment, interventions and recommendations  Limitation: Decreased endurance, Decreased self-care trans, Decreased high-level ADLs, Decreased ADL status     Assessment: Pt is a 71 y o  male seen for OT evaluation s/p admit to [facility] on 9/6/2017 w/ Acute thoracic aortic dissection  He presented to the ED at [address] w c/o Chest pain  Was found to have bradycardia as well as BP of 234/105  Pt admitted to acute care  His condition worsened, and a CT scan revealed type A aortic dissection  He was transferred here to [facility], and underwent emergent surgery  He has now been cleared for OOB as well as OT evaluation  Comorbidities affecting pt's functional performance at time of assessment include: DM, HTN, obesity and cancer history  Personal factors affecting pt at time of IE include:steps to enter environment, difficulty performing ADLS and difficulty performing IADLS   Prior to admission, pt was fully independent w all self care as well as IADL's  Upon evaluation: Pt requires min assist w LB self care, functional mobility 2* the following deficits impacting occupational performance: weakness, decreased strength, decreased balance and decreased tolerance  Pt to benefit from continued skilled OT tx while in the hospital to address deficits as defined above and maximize level of functional independence w ADL's and functional mobility   Occupational Performance areas to address include: grooming, bathing/shower, toilet hygiene, dressing, socialization, functional mobility, clothing management and household maintenance  From OT standpoint, recommendation at time of d/c would be home w family support         Discharge Recommendation: [address]

## 2017-09-11 NOTE — PROGRESS NOTES
Cardiology Progress Note - Sofia Bishop  71 y o  male MRN: [id number]    Unit/Bed#: [facility] 401-01 Encounter: [id number]      Assessment:  Principal Problem:    Acute thoracic aortic dissection  Active Problems:    Hypertension    Diabetes    Acute blood loss anemia    s/p aortic arch repair    Hypokalemia    Atrial fibrillation      Plan:  Acute type A aortic dissection, Ascending aortic aneurysm   s/p Replacement of ascending aorta with aggressive hemiarch reconstruction with a 26 mm Dacron graft  On 09/06/17, then had bleeding in distal aortic anastomosis, repaired with suture  Continue BP control to keep SBP< 120/80, HR < 100    Atrial fibrillation  Was on amio infusion, now switched to PO amio 200 q8h  Afib on tele at rate 100-120s  Could be from overdiuresis, does not appear clinically volume overloaded, is on lasix 40 q8h, would reduced to 40 mg daily  Moniotr electrolytes and replete      Hypertension/ hyperlipidemia  Continue metoprolol 50 q8h, lisinopril 5 mg daily  Adjust to keep BP around 120/80 mmhg  Would reduce atorvastatin to 20 mg    KARIN, reports loud snoring at night  Could be one of the etiologies for uncontrolled Bp  Will need outpatient sleep study to evaluate for CPAP    Subjective:   Patient seen and examined bedside, notes soreness at sternotomy site but no other complaints  Objective:     Vitals: Blood pressure 128/79, pulse (!) 112, temperature 97 6 °F (36 4 °C), temperature source Oral, resp  rate 20, weight 110 kg (242 lb 11 6 oz), SpO2 96 %  , There is no height or weight on file to calculate BMI , Orthostatic Blood Pressures    Flowsheet Row Most Recent Value   Blood Pressure  128/79 filed at 09/11/2017 1200   Patient Position  Sitting filed at 09/11/2017 1200            Intake/Output Summary (Last 24 hours) at 09/11/17 1310  Last data filed at 09/11/17 1201   Gross per 24 hour   Intake          2715 67 ml   Output             2360 ml   Net           355 67 ml Physical Exam:    GEN: Peg Ohs   appears well, alert and oriented x 3, pleasant and cooperative   HEENT: anicteric, mucous membranes moist  NECK: no jvd, carotid bruits   HEART: Irregular rhythm, no murmers  LUNGS: clear to auscultation bilaterally; no wheezes, rales, or rhonchi   ABDOMEN: normal bowel sounds, soft, no tenderness, no distention  EXTREMITIES: trace edema in lower extremities  NEURO: no focal findings   SKIN: normal without suspicious lesions on exposed skin      Current Facility-Administered Medications:     [MAR Hold] acetaminophen (TYLENOL) tablet 650 mg, 650 mg, Oral, Q6H PRN, Laura Hardy PA-C    [facility] Hold] amiodarone tablet 200 mg, 200 mg, Oral, Q8H [address], Emelia Campos PA-C, 200 mg at 09/11/17 0600    [MAR Hold] aspirin chewable tablet 81 mg, 81 mg, Oral, Daily, Laura Hardy PA-C, 81 mg at 09/11/17 0812    [MAR Hold] atorvastatin (LIPITOR) tablet 80 mg, 80 mg, Oral, HS, Emelia Mckeon PA-C, 80 mg at 09/10/17 2107    [MAR Hold] bisacodyl (DULCOLAX) rectal suppository 10 mg, 10 mg, Rectal, Daily PRN, Sukhi Ibrahim PA-C    [facility] Hold] docusate sodium (COLACE) capsule 100 mg, 100 mg, Oral, BID, Laura Hardy PA-C, 100 mg at 09/11/17 0813    [MAR Hold] furosemide (LASIX) injection 40 mg, 40 mg, Intravenous, Q8H, Cherelle Townsend PA-C, 40 mg at 09/11/17 1202    heparin (porcine) 25,000 units in 250 mL infusion (premix), 3-20 Units/kg/hr (Order-Specific), Intravenous, Titrated, Laura Hardy PA-C, Last Rate: 11 8 mL/hr at 09/11/17 1201, 13 1 Units/kg/hr at 09/11/17 1201    [MAR Hold] insulin glargine (LANTUS) subcutaneous injection 40 Units, 40 Units, Subcutaneous, HS, Gracie Moreno MD, 40 Units at 09/10/17 2106    [MAR Hold] insulin lispro (HumaLOG) 100 units/mL subcutaneous injection 1-6 Units, 1-6 Units, Subcutaneous, HS, Gracie Moreno MD, 1 Units at 09/10/17 2106    [MAR Hold] insulin lispro (HumaLOG) 100 units/mL subcutaneous injection 13 Units, 13 Units, Subcutaneous, TID With Meals, Ari Luis MD, 13 Units at 09/11/17 1158    [MAR Hold] insulin lispro (HumaLOG) 100 units/mL subcutaneous injection 2-12 Units, 2-12 Units, Subcutaneous, TID AC, 4 Units at 09/11/17 1158 **AND** Fingerstick Glucose (POCT), , , TID AC, Ari Luis MD    [facility] Hold] lidocaine (cardiac) injection 100 mg, 100 mg, Intravenous, Q30 Min PRN, Saint Coma, PA-C    [MAR Hold] lisinopril (ZESTRIL) tablet 5 mg, 5 mg, Oral, Daily, Buckleypamela Soares PA-C, 5 mg at 09/11/17 0813    [MAR Hold] metoprolol tartrate (LOPRESSOR) tablet 50 mg, 50 mg, Oral, Q8H [facility], Cherelle Townsend PA-C, 50 mg at 09/11/17 0600    mupirocin (BACTROBAN) 2 % nasal ointment 1 application, 1 application, Nasal, H74O [facility], Saint Coma, PA-C, 1 application at 19/87/65 0813    [MAR Hold] OLANZapine (ZyPREXA ZYDIS) dispersible tablet 2 5 mg, 2 5 mg, Oral, HS, Hemanth Soares PA-C, 2 5 mg at 09/10/17 2107    [MAR Hold] ondansetron (ZOFRAN) injection 4 mg, 4 mg, Intravenous, Q6H PRN, Saint Coma, PA-C    [facility] Hold] oxyCODONE-acetaminophen (PERCOCET) 5-325 mg per tablet 1 tablet, 1 tablet, Oral, Q4H PRN, Saint Coma, PA-C, 1 tablet at 09/09/17 0637    [MAR Hold] oxyCODONE-acetaminophen (PERCOCET) 5-325 mg per tablet 2 tablet, 2 tablet, Oral, Q6H PRN, Saint Coma, PA-C    [MAR Hold] pantoprazole (PROTONIX) EC tablet 40 mg, 40 mg, Oral, Early Morning, Cherelle Townsend PA-C, 40 mg at 09/11/17 0600    [MAR Hold] polyethylene glycol (MIRALAX) packet 17 g, 17 g, Oral, Daily, Saint Coma, PA-C, 17 g at 09/11/17 0812    [MAR Hold] potassium chloride (K-DUR,KLOR-CON) CR tablet 20 mEq, 20 mEq, Oral, BID, Hemanth Soares PA-C, 20 mEq at 09/11/17 0812    [MAR Hold] warfarin (COUMADIN) tablet 7 5 mg, 7 5 mg, Oral, Once (warfarin), Hemanth Soares PA-C    Labs & Results:    Lab Results   Component Value Date    CKTOTAL 72 01/15/2014    TROPONINI 0 03 09/06/2017    TROPONINI 0 02 09/05/2017    TROPONINI <0 02 09/05/2017       Lab Results   Component Value Date    GLUCOSE 157 (H) 09/11/2017    CALCIUM 6 8 (L) 09/11/2017     09/11/2017    K 4 1 09/11/2017    CO2 29 09/11/2017     09/11/2017    BUN 32 (H) 09/11/2017    CREATININE 0 96 09/11/2017       Lab Results   Component Value Date    WBC 15 91 (H) 09/11/2017    HGB 12 2 09/11/2017    HCT 36 1 (L) 09/11/2017    MCV 90 09/11/2017     09/11/2017       Results from last 7 days  Lab Units 09/11/17  0508   INR  1 36*       Lab Results   Component Value Date    CHOL 169 09/06/2017    CHOL 187 04/21/2017     Lab Results   Component Value Date    HDL 40 09/06/2017    HDL 37 (L) 04/21/2017     Lab Results   Component Value Date    LDLCALC 108 (H) 09/06/2017    LDLCALC 129 (H) 04/21/2017     Lab Results   Component Value Date    TRIG 103 09/06/2017    TRIG 106 04/21/2017       Lab Results   Component Value Date    ALT 40 09/07/2017    AST 75 (H) 09/07/2017

## 2017-09-11 NOTE — PROGRESS NOTES
Progress Note - Cardiothoracic Surgery   Mariam Grant  71 y o  male MRN: [id number]  Unit/Bed#: [facility] 416-01 Encounter: [id number]      POD # 5 s/p Type A dissection repair with ascending aorta/hemiarch replacement  Pt seen/examined  Interval history and data reviewed with critical care team   Pt doing well  No specific complaints  Continues to make a lot of progress  Medications:   Scheduled Meds:    amiodarone 200 mg Oral Q8H [address]   aspirin 81 mg Oral Daily   atorvastatin 80 mg Oral HS   furosemide 40 mg Intravenous Q8H   insulin glargine 40 Units Subcutaneous HS   insulin lispro 1-6 Units Subcutaneous HS   insulin lispro 13 Units Subcutaneous TID With Meals   insulin lispro 2-12 Units Subcutaneous TID AC   lisinopril 5 mg Oral Daily   metoprolol tartrate 50 mg Oral Q8H [address]   mupirocin 1 application Nasal P81K DEMETRIA   OLANZapine 2 5 mg Oral HS   pantoprazole 40 mg Oral Early Morning   polyethylene glycol 17 g Oral Daily   potassium chloride 20 mEq Oral BID   potassium chloride 40 mEq Intravenous Once   potassium phosphate 21 mmol Intravenous Once   warfarin 5 mg Oral Once (warfarin)     Continuous Infusions:    heparin (porcine) 3-20 Units/kg/hr (Order-Specific) Last Rate: 13 1 Units/kg/hr (09/11/17 0200)     PRN Meds:   acetaminophen    bisacodyl    lidocaine (cardiac)    ondansetron    oxyCODONE-acetaminophen    oxyCODONE-acetaminophen    Vitals: Blood pressure 127/97, pulse 104, temperature 98 °F (36 7 °C), temperature source Oral, resp  rate 21, weight 110 kg (242 lb 11 6 oz), SpO2 95 %  ,There is no height or weight on file to calculate BMI  I/O last 24 hours: In: 2414 7 [P O :840; I V :1024 7; IV Piggyback:550]  Out: 6052 [Urine:2455]  Invasive Devices     Central Venous Catheter Line            CVC Central Lines 09/06/17 Triple 4 days          Drain            Urethral Catheter Non-latex; Temperature probe 16 Fr  4 days                  Lab, Imaging and other studies:     Results from last 7 days  Lab Units 09/11/17  0508 09/10/17  0502 09/09/17  0420   WBC Thousand/uL 15 91* 20 51* 23 88*   HEMOGLOBIN g/dL 12 2 12 3 11 9*   HEMATOCRIT % 36 1* 36 4* 35 9*   PLATELETS Thousands/uL 205 187 177       Results from last 7 days  Lab Units 09/11/17  0508 09/10/17  2256 09/10/17  1518 09/10/17  0533  09/09/17  0420   SODIUM mmol/L 145  --   --  144  --  145   POTASSIUM mmol/L 3 4* 3 1* 3 5 3 4*  < > 3 1*   CHLORIDE mmol/L 107  --   --  104  --  107   CO2 mmol/L 29  --   --  29  --  27   BUN mg/dL 32*  --   --  25  --  28*   CREATININE mg/dL 0 96  --   --  0 94  --  0 93   GLUCOSE RANDOM mg/dL 157*  --   --  202*  --  176*   CALCIUM mg/dL 6 8*  --   --  6 9*  --  7 3*   < > = values in this interval not displayed  Results from last 7 days  Lab Units 09/11/17  0508 09/10/17  2256 09/10/17  1518  09/10/17  0502 09/09/17  1150   INR  1 36*  --   --   --  1 26* 1 25*   PTT seconds 83* 61* 48*  < >  --   --    < > = values in this interval not displayed  No results for input(s): PHART, DLQ9SVG, PO2ART, WDH5ABA, E1LNLXRA, BEART in the last 72 hours  Plan:    Diuresis for negative fluid balance again this 24 hours  PO ASA/Statin/B blocker  Seroquel wean  Transfer to stepdown  Heparin/Coumadin for AFib  Ambulate, PT/OT        Gita Malhotra MD  DATE: September 11, 2017  TIME: 7:36 AM

## 2017-09-11 NOTE — OCCUPATIONAL THERAPY NOTE
Occupational Therapy Evaluation      Kylie Martinez     9/11/2017    Patient Active Problem List   Diagnosis    Prostate cancer    Hypertension    Diabetes    Acute thoracic aortic dissection    Acute blood loss anemia    s/p aortic arch repair    Hypokalemia    Atrial fibrillation       Past Medical History:   Diagnosis Date    Diabetes     Hypertension     Prostate cancer        Past Surgical History:   Procedure Laterality Date    IL ASCEND AORTA GRAFT W ROOT REPLACMENT  VALVE CONDUIT/CORON RECONSTRUCT N/A 9/6/2017    Procedure: ASCENDING AORTIC REPAIR WITH A 26MM  GELWEAVE STRAIGHT GRAFT AND HEMIARCH WITH 8MM SIDE ARM BRANCH;  Surgeon: Meliza Robertson MD;  Location: [facility];  Service: Cardiac Surgery    IL EXPLOR POSTOP BLEED,INFEC,CLOT-CHST N/A 9/6/2017    Procedure: RE-EXPLORATION MEDIASTERNAL CAVITY FOR POST-OP BLEED (BRING BACK); Surgeon: Meliza Robertson MD;  Location: [facility];  Service: Cardiac Surgery      09/11/17 1105   Note Type   Note type Eval/Treat   Restrictions/Precautions   Other Precautions Cardiac/sternal   Pain Assessment   Pain Assessment No/denies pain   Pain Score No Pain   Home Living   Type of [address] Multi-level;1/2 bath on main level;Bed/bath upstairs   Bathroom Shower/Tub Tub/shower unit   Bathroom Toilet Standard   Bathroom Equipment Grab bars in shower; Shower chair;Commode   Home Equipment Walker;Cane   Prior Function   Level of Page Independent with ADLs and functional mobility   Lives With Alone   Receives Help From Family   ADL Assistance Independent   IADLs Independent   Falls in the last 6 months 0   Vocational Retired   Comments 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  Pt now lives alone      Lifestyle   Autonomy independent w all self care, mobiltiy and home management   Reciprocal Relationships supportive daughter who lives 2 doors down   Intrinsic Gratification watch prize is right on TV, house chores   Psychosocial Psychosocial (WDL) WDL   ADL   Eating Assistance 7  Independent   Eating Deficit Setup   Grooming Assistance 5  Supervision/Setup   Grooming Deficit Setup   UB Bathing Assistance 5  Supervision/Setup   UB Bathing Deficit Setup   LB Bathing Assistance 4  Minimal Assistance   UB Dressing Assistance 5  Supervision/Setup   LB Dressing Assistance 4  Minimal Assistance   Toileting Assistance  4  Minimal Assistance   Additional Comments currenlty needs min assist w LB self care due to gen  weakness, wires/lines and large body habitus   Transfers   Sit to Stand 5  Supervision   Stand to Sit 5  Supervision   Stand pivot 5  Supervision   Functional Mobility   Functional Mobility 4  Minimal assistance  (CGA )   Additional items Rolling walker   Balance   Static Sitting Fair +   Dynamic Sitting Fair +   Static Standing Fair -   Dynamic Standing Poor +   Activity Tolerance   Activity Tolerance Patient tolerated treatment well   RUE Assessment   RUE Assessment WFL   LUE Assessment   LUE Assessment WFL   Hand Function   Gross Motor Coordination Functional   Fine Motor Coordination Functional   Cognition   Overall Cognitive Status WFL   Arousal/Participation Alert; Cooperative   Attention Within functional limits   Orientation Level Oriented X4   Memory Within functional limits   Following Commands Follows all commands and directions without difficulty   Assessment   Limitation Decreased endurance;Decreased self-care trans;Decreased high-level ADLs; Decreased ADL status   Assessment Pt is a 71 y o  male seen for OT evaluation s/p admit to [facility] on 9/6/2017 w/ Acute thoracic aortic dissection  He presented to the ED at [facility] w c/o Chest pain  Was found to have bradycardia as well as BP of 234/105  Pt admitted to acute care  His condition worsened, and a CT scan revealed type A aortic dissection  He was transferred here to [facility], and underwent emergent surgery   He has now been cleared for OOB as well as OT evaluation  Comorbidities affecting pt's functional performance at time of assessment include: DM, HTN, obesity and cancer history  Personal factors affecting pt at time of IE include:steps to enter environment, difficulty performing ADLS and difficulty performing IADLS   Prior to admission, pt was fully independent w all self care as well as IADL's  Upon evaluation: Pt requires min assist w LB self care, functional mobility 2* the following deficits impacting occupational performance: weakness, decreased strength, decreased balance and decreased tolerance  Pt to benefit from continued skilled OT tx while in the hospital to address deficits as defined above and maximize level of functional independence w ADL's and functional mobility  Occupational Performance areas to address include: grooming, bathing/shower, toilet hygiene, dressing, socialization, functional mobility, clothing management and household maintenance  From OT standpoint, recommendation at time of d/c would be home w family support  Goals   Patient Goals to go home   STG Time Frame 3-5   Short Term Goal #1 pt will complete bedmobiltiy mod I w good safety    Short Term Goal #2 good balance sitting at EOB x 10 min for completion of grooming   Short Term Goal  good understanding of ECT/breathing technqiues and overall do's and don't s follwing surgery   LTG Time Frame 7-10   Long Term Goal #1 pt will tolerate standing at sinkside x 10 min w fair + balance/good safety for completion of hygiene and simulated home management   Functional Transfer Goals   Pt Will Perform All Functional Transfers With mod indep; With good judgment/safety   ADL Goals   Pt Will Perform Grooming Independently   Pt Will Perform Bathing Independently   Pt Will Perform UE Dressing Independently   Pt Will Perform LE Dressing With mod indep   Pt Will Perform Toileting With mod indep   Plan   Treatment Interventions ADL retraining;Functional transfer training; Endurance training;Patient/family training; Compensatory technique education; Energy conservation   Goal Expiration Date 09/21/17   OT Frequency 3-5x/wk   Recommendation   Discharge Recommendation Home Health Agency   Barthel Index   Feeding 10   Bathing 0   Grooming Score 0   Dressing Score 5   Bladder Score 10   Bowels Score 10   Toilet Use Score 5   Transfers (Bed/Chair) Score 10   Mobility (Level Surface) Score 10   Stairs Score 0   Barthel Index Score 60

## 2017-09-11 NOTE — PROGRESS NOTES
Progress Note - Steve Casillas  71 y o  male MRN: [id number]    Unit/Bed#: [facility] 401-01 Encounter: [id number]      CC: diabetes f/u    Subjective:   Steve Casillas  is a 71y o  year old male with type 2 diabetes  Feels well  No complaints  No hypoglycemia  Objective:     Vitals: Blood pressure 128/79, pulse (!) 112, temperature 97 6 °F (36 4 °C), temperature source Oral, resp  rate 20, weight 110 kg (242 lb 11 6 oz), SpO2 96 %  ,There is no height or weight on file to calculate BMI  Intake/Output Summary (Last 24 hours) at 09/11/17 1345  Last data filed at 09/11/17 1201   Gross per 24 hour   Intake          2715 67 ml   Output             2360 ml   Net           355 67 ml       Physical Exam:  General: No acute distress  Alert, awake, and oriented x3  HEENT: Normocephalic, atraumatic  Heart: Regular rate and rhythm  Lungs: Clear  No respiratory distress  Extremities: No edema  Skin: Warm, dry  No rash  Neuro: Moves all 4 extremities spontaneously  Psych: Appropriate mood and affect  Cooperative  Lab, Imaging and other studies: I have personally reviewed pertinent reports  POC Glucose (mg/dl)   Date Value   09/11/2017 226 (H)   09/11/2017 179 (H)   09/10/2017 169 (H)   09/10/2017 201 (H)   09/10/2017 242 (H)   09/10/2017 197 (H)   09/10/2017 193 (H)   09/09/2017 98   09/09/2017 108   09/09/2017 192 (H)       Assessment:  DM 2 with hyperglycemia    Type A Aortic Dissection    Plan:  DM 2 with hyperglycemia:  Blood glucose as above  Continue Lantus 40 units  Increase Humalog to 15 units ac  Will continue to monitor blood sugars and adjust as needed  Type A Aortic Dissection: S/P Repeair

## 2017-09-12 ENCOUNTER — TRANSCRIBE ORDERS (OUTPATIENT)
Dept: ADMINISTRATIVE | Facility: HOSPITAL | Age: 72
End: 2017-09-12

## 2017-09-12 DIAGNOSIS — I71.00 MEDIONECROSIS OF AORTA (HCC): Primary | ICD-10-CM

## 2017-09-12 PROBLEM — Z79.01 ANTICOAGULATED ON COUMADIN: Status: ACTIVE | Noted: 2017-09-12

## 2017-09-12 LAB
ANION GAP SERPL CALCULATED.3IONS-SCNC: 8 MMOL/L (ref 4–13)
APTT PPP: 113 SECONDS (ref 23–35)
APTT PPP: 72 SECONDS (ref 23–35)
APTT PPP: 83 SECONDS (ref 23–35)
BUN SERPL-MCNC: 28 MG/DL (ref 5–25)
CALCIUM SERPL-MCNC: 7.2 MG/DL (ref 8.3–10.1)
CHLORIDE SERPL-SCNC: 108 MMOL/L (ref 100–108)
CO2 SERPL-SCNC: 30 MMOL/L (ref 21–32)
CREAT SERPL-MCNC: 0.86 MG/DL (ref 0.6–1.3)
ERYTHROCYTE [DISTWIDTH] IN BLOOD BY AUTOMATED COUNT: 15.1 % (ref 11.6–15.1)
GFR SERPL CREATININE-BSD FRML MDRD: 87 ML/MIN/1.73SQ M
GLUCOSE SERPL-MCNC: 138 MG/DL (ref 65–140)
GLUCOSE SERPL-MCNC: 224 MG/DL (ref 65–140)
GLUCOSE SERPL-MCNC: 69 MG/DL (ref 65–140)
GLUCOSE SERPL-MCNC: 93 MG/DL (ref 65–140)
GLUCOSE SERPL-MCNC: 96 MG/DL (ref 65–140)
GLUCOSE SERPL-MCNC: 96 MG/DL (ref 65–140)
HCT VFR BLD AUTO: 35 % (ref 36.5–49.3)
HGB BLD-MCNC: 11.4 G/DL (ref 12–17)
INR PPP: 1.5 (ref 0.86–1.16)
MCH RBC QN AUTO: 29.4 PG (ref 26.8–34.3)
MCHC RBC AUTO-ENTMCNC: 32.6 G/DL (ref 31.4–37.4)
MCV RBC AUTO: 90 FL (ref 82–98)
PHOSPHATE SERPL-MCNC: 2.1 MG/DL (ref 2.3–4.1)
PLATELET # BLD AUTO: 235 THOUSANDS/UL (ref 149–390)
PMV BLD AUTO: 10.2 FL (ref 8.9–12.7)
POTASSIUM SERPL-SCNC: 3.3 MMOL/L (ref 3.5–5.3)
PROTHROMBIN TIME: 18.2 SECONDS (ref 12.1–14.4)
RBC # BLD AUTO: 3.88 MILLION/UL (ref 3.88–5.62)
SODIUM SERPL-SCNC: 146 MMOL/L (ref 136–145)
WBC # BLD AUTO: 14.06 THOUSAND/UL (ref 4.31–10.16)

## 2017-09-12 PROCEDURE — 80048 BASIC METABOLIC PNL TOTAL CA: CPT | Performed by: PHYSICIAN ASSISTANT

## 2017-09-12 PROCEDURE — 82948 REAGENT STRIP/BLOOD GLUCOSE: CPT

## 2017-09-12 PROCEDURE — 85610 PROTHROMBIN TIME: CPT | Performed by: PHYSICIAN ASSISTANT

## 2017-09-12 PROCEDURE — 97110 THERAPEUTIC EXERCISES: CPT

## 2017-09-12 PROCEDURE — 85730 THROMBOPLASTIN TIME PARTIAL: CPT | Performed by: THORACIC SURGERY (CARDIOTHORACIC VASCULAR SURGERY)

## 2017-09-12 PROCEDURE — 84100 ASSAY OF PHOSPHORUS: CPT | Performed by: PHYSICIAN ASSISTANT

## 2017-09-12 PROCEDURE — 85027 COMPLETE CBC AUTOMATED: CPT | Performed by: PHYSICIAN ASSISTANT

## 2017-09-12 PROCEDURE — 97530 THERAPEUTIC ACTIVITIES: CPT

## 2017-09-12 RX ORDER — WARFARIN SODIUM 7.5 MG/1
7.5 TABLET ORAL
Status: COMPLETED | OUTPATIENT
Start: 2017-09-12 | End: 2017-09-12

## 2017-09-12 RX ORDER — INSULIN GLARGINE 100 [IU]/ML
32 INJECTION, SOLUTION SUBCUTANEOUS
Status: DISCONTINUED | OUTPATIENT
Start: 2017-09-12 | End: 2017-09-13

## 2017-09-12 RX ORDER — POTASSIUM CHLORIDE 29.8 MG/ML
40 INJECTION INTRAVENOUS ONCE
Status: COMPLETED | OUTPATIENT
Start: 2017-09-12 | End: 2017-09-12

## 2017-09-12 RX ORDER — WARFARIN SODIUM 5 MG/1
5 TABLET ORAL
Status: DISCONTINUED | OUTPATIENT
Start: 2017-09-12 | End: 2017-09-12

## 2017-09-12 RX ORDER — POTASSIUM CHLORIDE 20 MEQ/1
20 TABLET, EXTENDED RELEASE ORAL
Status: DISCONTINUED | OUTPATIENT
Start: 2017-09-12 | End: 2017-09-14 | Stop reason: HOSPADM

## 2017-09-12 RX ORDER — POTASSIUM CHLORIDE 20 MEQ/1
40 TABLET, EXTENDED RELEASE ORAL ONCE
Status: COMPLETED | OUTPATIENT
Start: 2017-09-12 | End: 2017-09-12

## 2017-09-12 RX ORDER — METOPROLOL TARTRATE 50 MG/1
100 TABLET, FILM COATED ORAL EVERY 12 HOURS SCHEDULED
Status: DISCONTINUED | OUTPATIENT
Start: 2017-09-12 | End: 2017-09-14 | Stop reason: HOSPADM

## 2017-09-12 RX ADMIN — FUROSEMIDE 40 MG: 10 INJECTION, SOLUTION INTRAMUSCULAR; INTRAVENOUS at 22:13

## 2017-09-12 RX ADMIN — INSULIN GLARGINE 32 UNITS: 100 INJECTION, SOLUTION SUBCUTANEOUS at 22:13

## 2017-09-12 RX ADMIN — METOPROLOL TARTRATE 100 MG: 50 TABLET ORAL at 22:12

## 2017-09-12 RX ADMIN — POTASSIUM CHLORIDE 20 MEQ: 1500 TABLET, EXTENDED RELEASE ORAL at 08:22

## 2017-09-12 RX ADMIN — DOCUSATE SODIUM 100 MG: 100 CAPSULE, LIQUID FILLED ORAL at 17:31

## 2017-09-12 RX ADMIN — ATORVASTATIN CALCIUM 80 MG: 80 TABLET, FILM COATED ORAL at 17:31

## 2017-09-12 RX ADMIN — LISINOPRIL 5 MG: 5 TABLET ORAL at 08:28

## 2017-09-12 RX ADMIN — FUROSEMIDE 40 MG: 10 INJECTION, SOLUTION INTRAMUSCULAR; INTRAVENOUS at 05:50

## 2017-09-12 RX ADMIN — ASPIRIN 81 MG 81 MG: 81 TABLET ORAL at 08:22

## 2017-09-12 RX ADMIN — POTASSIUM CHLORIDE 40 MEQ: 1500 TABLET, EXTENDED RELEASE ORAL at 11:58

## 2017-09-12 RX ADMIN — POTASSIUM CHLORIDE 20 MEQ: 1500 TABLET, EXTENDED RELEASE ORAL at 11:58

## 2017-09-12 RX ADMIN — HEPARIN SODIUM 10.1 UNITS/KG/HR: 10000 INJECTION, SOLUTION INTRAVENOUS at 22:38

## 2017-09-12 RX ADMIN — METOPROLOL TARTRATE 100 MG: 50 TABLET ORAL at 11:58

## 2017-09-12 RX ADMIN — FUROSEMIDE 40 MG: 10 INJECTION, SOLUTION INTRAMUSCULAR; INTRAVENOUS at 14:20

## 2017-09-12 RX ADMIN — INSULIN LISPRO 15 UNITS: 100 INJECTION, SOLUTION INTRAVENOUS; SUBCUTANEOUS at 11:44

## 2017-09-12 RX ADMIN — PANTOPRAZOLE SODIUM 40 MG: 40 TABLET, DELAYED RELEASE ORAL at 05:50

## 2017-09-12 RX ADMIN — OXYCODONE HYDROCHLORIDE AND ACETAMINOPHEN 2 TABLET: 5; 325 TABLET ORAL at 17:37

## 2017-09-12 RX ADMIN — POTASSIUM CHLORIDE 40 MEQ: 400 INJECTION, SOLUTION INTRAVENOUS at 12:03

## 2017-09-12 RX ADMIN — AMIODARONE HYDROCHLORIDE 200 MG: 200 TABLET ORAL at 11:53

## 2017-09-12 RX ADMIN — METOPROLOL TARTRATE 50 MG: 50 TABLET ORAL at 05:50

## 2017-09-12 RX ADMIN — DOCUSATE SODIUM 100 MG: 100 CAPSULE, LIQUID FILLED ORAL at 08:22

## 2017-09-12 RX ADMIN — OXYCODONE HYDROCHLORIDE AND ACETAMINOPHEN 1 TABLET: 5; 325 TABLET ORAL at 00:48

## 2017-09-12 RX ADMIN — AMIODARONE HYDROCHLORIDE 200 MG: 200 TABLET ORAL at 17:31

## 2017-09-12 RX ADMIN — AMIODARONE HYDROCHLORIDE 200 MG: 200 TABLET ORAL at 08:21

## 2017-09-12 RX ADMIN — WARFARIN SODIUM 7.5 MG: 7.5 TABLET ORAL at 17:31

## 2017-09-12 RX ADMIN — POTASSIUM CHLORIDE 20 MEQ: 1500 TABLET, EXTENDED RELEASE ORAL at 17:30

## 2017-09-12 RX ADMIN — POLYETHYLENE GLYCOL 3350 17 G: 17 POWDER, FOR SOLUTION ORAL at 08:28

## 2017-09-12 RX ADMIN — OLANZAPINE 2.5 MG: 2.5 TABLET, FILM COATED ORAL at 22:12

## 2017-09-12 NOTE — PHYSICAL THERAPY NOTE
Physical Therapy Cancellation Note    PT ATTEMPTED TREATMENT SESSION  PATIENT DECLINING MOBILITY AT THIS TIME HAVING JUST AMBULATED WITH RESTORATIVE THERAPIST  PT WILL CHECK BACK IF TIME      Marcela Miller, PT

## 2017-09-12 NOTE — OCCUPATIONAL THERAPY NOTE
Occupational Therapy Treatment Note:       09/12/17 9344   Pain Assessment   Pain Assessment 0-10   Pain Score 3  (anterior right hip/groin area with movement)   ADL   Where Assessed Chair   LB Dressing Assistance 4  Minimal Assistance   LB Dressing Deficit Verbal cueing; Increased time to complete; Don/doff R sock; Don/doff L sock   LB Dressing Comments dressing bedside with assistance to initiate    Transfers   Sit to Stand 5  Supervision   Additional items Armrests; Increased time required   Stand to Sit 5  Supervision   Additional items Armrests; Increased time required   Toilet Transfers   Toilet Transfer to Standard toilet   Toilet Transfer Technique Ambulating   Toilet Transfers Contact guard   Cognition   Overall Cognitive Status [facility]   Arousal/Participation Alert; Cooperative   Attention Within functional limits   Orientation Level Oriented X4   Memory Decreased short term memory;Decreased recall of recent events;Decreased recall of precautions   Following Commands Follows all commands and directions without difficulty   Comments Patient commented on difficulty remembering "short term" events ("old age")   Activity Tolerance   Activity Tolerance Patient tolerated treatment well   Assessment   Assessment Patient seen for skilled OT with focus on functional mobility, dressing techniques, activity endurance/tolearnce, energy conservation techniques, DME recommendations  Patient reports that he has family that "lives 2 doors down"  Patient reports owning "tub seat" , however, "grab bar is currently not attached"  Patient is able to transfer to regular height toilet without difficulty  Plan   Treatment Interventions ADL retraining;Functional transfer training; Endurance training;Equipment evaluation/education; Compensatory technique education; Energy conservation   Goal Expiration Date 09/21/17   Treatment Day 1   OT Frequency 3-5x/wk   Recommendation   Discharge Recommendation [address], VLAD

## 2017-09-12 NOTE — PLAN OF CARE
Problem: OCCUPATIONAL THERAPY ADULT  Goal: Performs self-care activities at highest level of function for planned discharge setting  See evaluation for individualized goals  Treatment Interventions: ADL retraining, Functional transfer training, Endurance training, Patient/family training, Compensatory technique education, Energy conservation          See flowsheet documentation for full assessment, interventions and recommendations  Outcome: Progressing  Limitation: Decreased endurance, Decreased self-care trans, Decreased high-level ADLs, Decreased ADL status     Assessment: Patient seen for skilled OT with focus on functional mobility, dressing techniques, activity endurance/tolearnce, energy conservation techniques, DME recommendations  Patient reports that he has family that "lives 2 doors down"  Patient reports owning "tub seat" , however, "grab bar is currently not attached"  Patient is able to transfer to regular height toilet without difficulty        Discharge Recommendation: [address], VLAD

## 2017-09-12 NOTE — DISCHARGE INSTRUCTIONS
A-fib (Atrial Fibrillation)   WHAT YOU NEED TO KNOW:   A-fib may come and go, or it may be a long-term condition  A-fib can cause blood clots, stroke, or heart failure  These conditions may become life-threatening  It is important to treat and manage a-fib to help prevent a blood clot, stroke, or heart failure  DISCHARGE INSTRUCTIONS:   Call 911 for any of the following:   · You have any of the following signs of a heart attack:      ¨ Squeezing, pressure, or pain in your chest that lasts longer than 5 minutes or returns    ¨ Discomfort or pain in your back, neck, jaw, stomach, or arm     ¨ Trouble breathing    ¨ Nausea or vomiting    ¨ Lightheadedness or a sudden cold sweat, especially with chest pain or trouble breathing    · You have any of the following signs of a stroke:      ¨ Numbness or drooping on one side of your face     ¨ Weakness in an arm or leg    ¨ Confusion or difficulty speaking    ¨ Dizziness, a severe headache, or vision loss  Return to the emergency department if:  You have any of the following signs of a blood clot:  · You feel lightheaded, are short of breath, and have chest pain  · You cough up blood  · You have swelling, redness, pain, or warmth in your arm or leg  Contact your cardiologist or healthcare provider if:   · Your target heart rate is not in the range it should be  · You have new or worsening swelling in your legs, feet, ankles, or abdomen  · You are short of breath, even at rest      · You have questions or concerns about your condition or care  Medicines: You may need any of the following:  · Heart medicines  help control your heart rate and rhythm  You may need more than one medicine to treat your symptoms  · Blood thinners    help prevent blood clots  Examples of blood thinners include heparin and warfarin  Clots can cause strokes, heart attacks, and death   The following are general safety guidelines to follow while you are taking a blood thinner:    ¨ Watch for bleeding and bruising while you take blood thinners  Watch for bleeding from your gums or nose  Watch for blood in your urine and bowel movements  Use a soft washcloth on your skin, and a soft toothbrush to brush your teeth  This can keep your skin and gums from bleeding  If you shave, use an electric shaver  Do not play contact sports  ¨ Tell your dentist and other healthcare providers that you take anticoagulants  Wear a bracelet or necklace that says you take this medicine  ¨ Do not start or stop any medicines unless your healthcare provider tells you to  Many medicines cannot be used with blood thinners  ¨ Tell your healthcare provider right away if you forget to take the medicine, or if you take too much  ¨ Warfarin  is a blood thinner that you may need to take  The following are things you should be aware of if you take warfarin  § Foods and medicines can affect the amount of warfarin in your blood  Do not make major changes to your diet while you take warfarin  Warfarin works best when you eat about the same amount of vitamin K every day  Vitamin K is found in green leafy vegetables and certain other foods  Ask for more information about what to eat when you are taking warfarin  § You will need to see your healthcare provider for follow-up visits when you are on warfarin  You will need regular blood tests  These tests are used to decide how much medicine you need  · Antiplatelets , such as aspirin, help prevent blood clots  Take your antiplatelet medicine exactly as directed  These medicines make it more likely for you to bleed or bruise  If you are told to take aspirin, do not take acetaminophen or ibuprofen instead  · Take your medicine as directed  Contact your healthcare provider if you think your medicine is not helping or if you have side effects  Tell him or her if you are allergic to any medicine   Keep a list of the medicines, vitamins, and herbs you take  Include the amounts, and when and why you take them  Bring the list or the pill bottles to follow-up visits  Carry your medicine list with you in case of an emergency  Follow up with your cardiologist as directed: You will need regular blood tests and monitoring  Write down your questions so you remember to ask them during your visits  Manage A-fib:   · Know your target heart rate  Learn how to take your pulse and monitor your heart rate  · Manage other health conditions  This includes high blood pressure, sleep apnea, thyroid disease, diabetes, and other heart conditions  Take medicine as directed and follow your treatment plan  · Limit or do not drink alcohol  Alcohol can make a-fib hard to manage  Ask your healthcare provider if it is safe for you to drink alcohol  A drink of alcohol is 12 ounces of beer, 5 ounces of wine, or 1½ ounces of liquor  · Do not smoke  Nicotine and other chemicals in cigarettes and cigars can cause heart and lung damage  Ask your healthcare provider for information if you currently smoke and need help to quit  E-cigarettes or smokeless tobacco still contain nicotine  Talk to your healthcare provider before you use these products  · Eat heart-healthy foods  Heart healthy foods will help keep your cholesterol low  These include fruits, vegetables, whole-grain breads, low-fat dairy products, beans, lean meats, and fish  Replace butter and margarine with heart-healthy oils such as olive oil and canola oil  · Maintain a healthy weight  Ask your healthcare provider how much you should weigh  Ask him to help you create a weight loss plan if you are overweight  · Exercise for 30 minutes  most days of the week  Ask your healthcare provider about the best exercise plan for you  © 2017 [facility] Information is for End User's use only and may not be sold, redistributed or otherwise used for commercial purposes   All illustrations and images included in CareNotes® are the copyrighted property of ROBEL HUSTON M , Inc  or Brett Canales  The above information is an  only  It is not intended as medical advice for individual conditions or treatments  Talk to your doctor, nurse or pharmacist before following any medical regimen to see if it is safe and effective for you  Sternal Precautions   AMBULATORY CARE:   Sternal precautions  are used to help protect your sternum (breastbone) after open chest surgery  Wires are placed during surgery to hold the sternum together as it heals  Sternal precautions help prevent the wires from cutting through the sternum  The precautions also help prevent the sternum from coming apart from an injury, and prevent pain and bleeding  You may need to use the precautions for up to 12 weeks after surgery  Your surgeon will give you specific instructions based on the type of surgery you had  It is important to follow the instructions carefully  An injury to the healing sternum can be life-threatening  General sternal precautions:  Start slowly and do more as you get stronger  Pain medicine might make it harder for you to know when to slow down or be careful  Stop immediately if you hear a crunch or pop in your sternum  · Protect your sternum  Hug a pillow to your chest or cross your arms over your chest when you laugh, sneeze, or cough  · Be careful when you get into or out of a chair or bed  Hug a pillow or cross your arms when you stand or sit  Do not twist as you move  Use only your legs to sit and stand  You may need to use a raised toilet seat if you have trouble standing up without using your arms  Your healthcare provider may teach you to use your elbow for support as you move from lying to sitting  · Ask when you may take a bath or shower  You may need to use a bath chair if you have trouble getting into or out of the tub  Do not use a grab bar      · Do not lift or carry anything heavier than 5 pounds  For example, a gallon of milk weighs 8 pounds  · Keep your arms down as much as possible  Do not put your arms out to the side, behind you, or over your head  Do not let anyone pull your arms to help you move or dress  Do not reach for items  · Do not push or pull anything  Examples include a car door or a vacuum   · Do not drive while you are healing  Your surgeon will tell you when it is safe for you to start driving again  Call 911 for any of the following:   · You have sudden pain in your sternum and hear a crunch or pop  · You have bleeding that does not stop even after you apply pressure for 5 minutes  Seek care immediately if:   · You hear crunching or grinding in your sternum  · You have signs of an infection, such as a fever, red or warm skin, or pus in the surgery wound  Contact your healthcare provider if:   · You continue to feel pain, even after you take your pain medicine  · You have new or worsening pain, or any pain with movement  · You have questions or concerns about your condition or care  Care for your surgery wound:  Always wash your hands before you care for your wound  Wash your wound as directed  Do not rub the wound as you wash or dry the area  Pat the area dry with a clean towel  Change the bandages as directed and when they get wet or dirty  Do not smoke:  Nicotine can damage blood vessels and make it more difficult to heal  Do not use e-cigarettes or smokeless tobacco in place of cigarettes or to help you quit  They still contain nicotine  Ask your healthcare provider for information if you currently smoke and need help quitting  Follow up with your healthcare provider as directed:  Write down your questions so you remember to ask them during your visits     © 2017 [address] Information is for End User's use only and may not be sold, redistributed or otherwise used for commercial purposes  All illustrations and images included in CareNotes® are the copyrighted property of A D A M , [employer]  or Brett Canales  The above information is an  only  It is not intended as medical advice for individual conditions or treatments  Talk to your doctor, nurse or pharmacist before following any medical regimen to see if it is safe and effective for you  Warfarina (Por la boca)   Previene y trata los coágulos sanguíneos  Podría disminuir el riesgo de complicaciones serias después de un ataque cardíaco  Tess medicamento es un anticoagulante  Miles(s) : Coumadin, Jantoven   Existen muchas otras marcas de Raghu  Tess medicamento no debe ser usado cuando:   Tess medicamento no es adecuado para todas las personas  No lo use si usted ha tenido konstantin reacción alérgica a la warfarina, si está embarazada o si tiene problemas de eusebio que pudieran causar sangrado  Forma de usar tess medicamento:   Tableta  · [place] hellen medicamentos tarsha se le haya indicado  Es probable que sea necesario cambiar colbert dosis varias veces hasta encontrar la que funciona mejor para usted  · Raghu debe venir con [facility] Guía del medicamento  Solicite konstantin copia con colbert farmacéutico en diana de no tener la guía  · Si olvida konstantin dosis: Si olvida konstantin dosis de colbert medicamento, tómelo lo más pronto posible  Si es gwendolyn la hora para colbert próxima dosis, espere hasta entonces para radha colbert dosis regular  No use medicamento adicional para reponer la dosis olvidada  · Guarde el medicamento en un recipiente cerrado a temperatura ambiente y alejado del calor, la humedad y la emerald directa  Medicamentos y [employer] que debe evitar:   Consulte con colbert médico o farmacéutico antes de usar cualquier medicamento, incluyendo los que compra sin receta médica, las vitaminas y los productos herbales    · Hay muchos medicamentos y alimentos que pueden afectar el funcionamiento de la warfarina  y que podrían [employer] resultados de los exámenes de jayla PT / INR  Informe a colbert médico antes de empezar o dejar de radha cualquier medicamento, especialmente los siguientes:   ¨ Ginkgo, equinácea, sello virgen, o hierba de [address] [place] [address] anticoagulante, incluyendo apixaban, argatroban, bivalirudina, cilostazol, clopidogrel, dabigatran, desirudin, dipiridamol, heparina, lepirudina, prasugrel, rivaroxaban, ticlopidina  ¨ Medicamentos para tratar la depresión o la ansiedad, incluyendo citalopram, desvenlafaxina, duloxetina, escitalopram, fluoxetina, fluvoxamina, milnaciprán, paroxetina, sertralina, venlafaxina, vilazodona  ¨ Medicamentos para tratar konstantin infección  ¨ BOB para el dolor o medicamento para la artritis, incluyendo aspirina, celecoxib, diclofenaco, diflunisal, fenoprofeno, ibuprofeno, indometacina, ketoprofeno, ketorolaco, ácido mefenámico, naproxeno, oxaprozina, piroxicam, sulindac  Revise las [employer] de los medicamentos de venta oren para saber si contienen un Yanelis  ¨ Medicamentos esteroideos, incluyendo dexametasona, hidrocortisona, metilprednisolona, prednisolona, prednisona  · La warfarina funciona mejor si usted come aproximadamente la misma cantidad de vitamina K todos los días  Los alimentos ricos en vitamina K son los espdoriss, brócoli, rafael Taylor, Vik, veredelmiraras de hojas verdes, ciruelas, ruibarbo y aceite de canola  Consulte a colbert médico antes de hacer cambios en colbert dieta normal   · No coma toronja ni tome jugo de toronja mientras esté   Precauciones joey el uso de nova medicamento:   · No es seguro radha nova medicamento joey el [facility]  Podría dañar al feto  Dígale a colbert médico de inmediato si usted [place]  Use un método anticonceptivo eficaz para evitar el embarazo joey el tratamiento y joey por lo menos 1 mes después de colbert última dosis    · Informe a colbert médico si está amamantando o si tiene enfermedad renal, enfermedad hepática, diabetes, cardiopatía, insuficiencia cardíaca, presión arterial anselmo, konstantin infección, konstantin úlcera de estómago o deficiencia de proteína C  Eino Axel a colbert médico si usted tuvo cirugía reciente o lesión o antecedentes de accidente cerebrovascular, anemia, sangrado severo o moretones, o problemas causados por heparina utilizan  · Nova medicamento puede provocarle los siguientes problemas:   ¨ Sangrado, que puede ser mortal  ¨ Gangrena (daño en la piel o en los tejidos)  ¨ Calcifilaxis o arteriolopatía urémica calcificante  ¨ Síndrome de los dedos del pie morados  · Usted necesita un examen de Ruby PT / INR (por hellen siglas en inglés) mientras [place] nova medicamento para revisar lo krzysztof que está coagulando colbert Ruby  Colbert médico usará los [employer] del examen para asegurarse de que el medicamento esté funcionando correctamente  Mantenga todas las citas para los análisis de Ruby PT / INR  · Es posible que usted jayla o tenga moretones con mayor facilidad con [place]  Para evitar lesiones o cortaduras, no juegue deportes bruscos, tenga cuidado con los [employer], y use el cepillo y el hilo dental con suavidad  Sople la nariz suavemente y no se la toque [place]  · Lleve siempre konstantin tarjeta de identificación o un brazalete de alerta para informar a los médicos que usted [place] warfarina  · Dígale a todo médico o dentista encargado de atenderle que usted está usando [employer]  Es posible que tenga que suspender el uso de nova medicamento varios días antes de someterse a konstantin cirugía o exámenes médicos  · Guarde todos los medicamentos fuera del alcance de los niños  Nunca comparta hellen medicamentos con [employer]    Efectos secundarios que pueden presentarse joey el uso de nova medicamento:   Consulte inmediatamente con el médico si nota cualquiera de estos efectos secundarios:  · Reacción alérgica: Comezón o ronchas, hinchazón del ivon o las zoë, hinchazón u hormigueo en la boca o garganta, opresión en el pecho, dificultad para respirar  · [employer] o la Bruce, tos con Ana, periodos menstruales chrystal  · Sangrado que no se detiene, moretones o debilidad  · [place], desmayos, desvanecimiento  · Dolor, piel café o tracy, o piel que se siente fría al tacto  · Pies o dedos de los pies morados, o dolor nuevo en la pierna, pie o dedos de los pies  · Manchas en la piel púrpuras, enrojecidas, y en forma de chaya  · Orina de color armstrong o marrón o evacuaciones intestinales ryder, negras o alquitranadas  · Vómito con jayla o con apariencia de café molido  Consulte con el médico si nota otros efectos secundarios que neil son causados por nova medicamento  Llame a pappas médico para consultarle Arelis Galvan puede notificar hellen efectos secundarios al FDA al [phone]  © 2017 [address] Malik Information is for End User's use only and may not be sold, redistributed or otherwise used for commercial purposes  Esta información es sólo para uso en educación  Pappas intención no es darle un consejo médico sobre enfermedades o tratamientos  Colsulte con pappas [place] farmacéutico antes de seguir cualquier régimen médico para saber si es seguro y efectivo para usted  International normalized ratio   GENERAL INFORMATION:  What is this test?  International normalized ratio (INR) is a calculation made to standardize prothrombin time  INR is based on the ratio of the patient's prothrombin time and the normal mean prothrombin time  Prothrombin time is a test to learn how fast the blood clots in patients receiving oral anticoagulant medication  This test may be used in patients with prosthetic heart valves, venous thromboembolism, or antiphospholipid syndrome  A blood sample is collected for this test   What are other names for this test?  · INR - International normalized ratio  What are related tests?   · Clotting factor VII assay  · Clotting factor II assay  Why do I need this test?  Laboratory tests may be done for many reasons  Tests are performed for routine health screenings or if a disease or toxicity is suspected  Lab tests may be used to determine if a medical condition is improving or worsening  Lab tests may also be used to measure the success or failure of a medication or treatment plan  Lab tests may be ordered for professional or legal reasons  You may need this test if you have:   · Antiphospholipid syndrome  · Atrial fibrillation  · Heart valve replacement - prosthesis  · Thrombophilia  · Venous thromboembolism  When and how often should I have this test?  When and how often laboratory tests are done may depend on many factors  The timing of laboratory tests may rely on the results or completion of other tests, procedures, or treatments  Lab tests may be performed immediately in an emergency, or tests may be delayed as a condition is treated or monitored  A test may be suggested or become necessary when certain signs or symptoms appear  Due to changes in the way your body naturally functions through the course of a day, lab tests may need to be performed at a certain time of day  If you have prepared for a test by changing your food or fluid intake, lab tests may be timed in accordance with those changes  Timing of tests may be based on increased and decreased levels of medications, drugs or other substances in the body  The age or gender of the person being tested may affect when and how often a lab test is required  Chronic or progressive conditions may need ongoing monitoring through the use of lab tests  Conditions that worsen and improve may also need frequent monitoring  Certain tests may be repeated to obtain a series of results, or tests may need to be repeated to confirm or disprove results  Timing and frequency of lab tests may vary if they are performed for professional or legal reasons    Tell your healthcare worker if you start or stop using a herbal supplement, as this test may need to be done  How should I get ready for the test?  Before having blood collected, tell the person drawing your blood if you are allergic to latex  Tell the healthcare worker if you have a medical condition or are using a medication or supplement that causes excessive bleeding  Also tell the healthcare worker if you have felt nauseated, lightheaded, or have fainted while having blood drawn in the past   How is the test done? A sample of blood from a vein or an artery may be collected for this test    When a blood sample from a vein is needed, a vein in your arm is usually selected  A tourniquet (large rubber strap) may be secured above the vein  The skin over the vein will be cleaned, and a needle will be inserted  You will be asked to hold very still while your blood is collected  Blood will be collected into one or more tubes, and the tourniquet will be removed  When enough blood has been collected, the healthcare worker will take the needle out  Before an arterial blood draw, the healthcare worker may check blood circulation to your arm or leg  Tell the healthcare worker if you use supplemental oxygen as this may affect the test results  An artery in the wrist is the most common site to draw arterial blood  However, an artery at the inside bend of the arm or groin may be used  The area will be swabbed clean with antiseptic  Anesthetic may be used to numb the skin over the area where the blood will be drawn  A needle is used to puncture the skin  You will be asked to hold very still while your blood is collected  When enough blood is collected, the needle will be removed  If you have an existing arterial catheter, a blood sample may be collected from that catheter  Ask your healthcare worker if an INR testing device that can be used at home is an option for you  How will the test feel?   The amount of discomfort you feel will depend on many factors, including your sensitivity to pain  Communicate how you are feeling with the person doing the test  Inform the person doing the test if you feel that you cannot continue with the test   During a blood draw, you may feel mild discomfort at the location where the blood sample is being collected  If you receive anesthetic to numb the skin before an arterial blood draw, you may feel discomfort at the location where the anesthetic needle is inserted into the skin  You may feel discomfort as the needle used to draw your blood is inserted into the skin, and you may have cramping at the site during the procedure  Inform the person doing the test if you feel faint or nauseated or if the discomfort is severe  If blood is collected from an existing catheter inserted into an artery, you will feel little or no discomfort  What should I do after the test?  After a blood sample is collected from your vein, a bandage, cotton ball, or gauze may be placed on the area where the needle was inserted  You may be asked to apply pressure to the area  Avoid strenuous exercise immediately after your blood draw  Contact your healthcare worker if you feel pain or see redness, swelling, or discharge from the puncture site  If blood was drawn from an artery in your arm or leg, cotton will be placed over the site and held firmly for at least five minutes to stop the bleeding  Bleeding may continue beyond five minutes if you are using medications or supplements that thin your blood or have a medical condition that causes excessive bleeding  When bleeding has stopped, a bandage will be placed firmly over the site and should be left on for 30 to 60 minutes  You should rest for at least 15 minutes after the test  Avoid heavy use of the arm or leg from which the blood was drawn for 24 hours after the blood is collected    If blood was drawn from an existing catheter inserted into an artery, there are no special instructions for you after this test   What are the risks? Blood: During a blood draw, a hematoma (blood-filled bump under the skin) or slight bleeding from the puncture site may occur  After a blood draw, a bruise or infection may occur at the puncture site  The person doing this test may need to perform it more than once  Talk to your healthcare worker if you have any concerns about the risks of this test   Arterial blood:  During an arterial blood draw, a hematoma (blood-filled bump under the skin) or slight bleeding from the puncture site may occur  After a blood draw, a bruise or infection may occur at the puncture site  The person doing this test may need to perform it more than once  If you have a medical condition, or are using a medication or supplement that causes excessive bleeding, you are at a higher risk of bleeding from the puncture site  Rarely, damage to the artery that affects blood flow to the arm or the leg may occur  Talk to your healthcare worker if you have any concerns about the risks of this test   What are normal results for this test?  Laboratory test results may vary depending on your age, gender, health history, the method used for the test, and many other factors  If your results are different from the results suggested below, this may not mean that you have a disease  Contact your healthcare worker if you have any questions  The following are considered to be normal results for this test:  · Therapeutic Range :  ¨ Prevention of venous thromboembolism (VTE): 2-3  ¨ Treatment of VTE, pulmonary embolus, valvular heart disease: 2-3  ¨ Treatment of arterial thromboembolism, mechanical heart valves, recurrent systemic embolism: 3-4 5  What might affect my test results?   Drug Therapy Use:  Some medications may affect the results of the test  These medications include:  · Oritavancin (Intravenous route, Powder for Solution)  · Hydroxocobalamin (Intravenous route, Powder for Solution)  · Daptomycin (Intravenous route, Powder for Solution)  What follow up should I do after this test?  Ask your healthcare worker how you will be informed of the test results  You may be asked to call for results, schedule an appointment to discuss results, or notified of results by mail  Follow up care varies depending on many factors related to your test  Sometimes there is no follow up after you have been notified of test results  At other times follow up may be suggested or necessary  Some examples of follow up care include changes to medication or treatment plans, referral to a specialist, more or less frequent monitoring, and additional tests or procedures  Talk with your healthcare worker about any concerns or questions you have regarding follow up care or instructions  Where can I get more information? Related [place] for Thrombosis and Thrombophilia - [employer] [place]  org  ¨ American Heart Association - https://www [employer]/  [employer] [facility] [address]  org  Related Drugs   ¨ Warfarin (Oral route, Tablet)    CARE AGREEMENT:   You have the right to help plan your care  To help with this plan, you must learn about your health condition and how it may be treated  You can then discuss treatment options with your caregivers  Work with them to decide what care may be used to treat you  You always have the right to refuse treatment  © 2017 [address] [facility]  Information is for End User's use only and may not be sold, redistributed or otherwise used for commercial purposes  The above information is an  only  It is not intended as a substitute for medical advice for your individual conditions or treatments  Talk to your doctor, nurse or pharmacist before following any medical regimen to see if it is safe and effective for you  How to Give an Insulin Injection   AMBULATORY CARE:   Insulin syringes  come in different sizes depending on the dose of insulin you need   Your healthcare provider or pharmacist will help you find the right size syringe  Use the correct size insulin syringe to make sure you get the right dose of insulin  Contact your healthcare provider if:   · You feel or see hard lumps in your skin where you inject your insulin  · You think you gave yourself too much or not enough insulin  · Your injections are very painful  · You see blood or clear fluid on your injection site more than once after you inject insulin  · You have questions about how to give the injection  · You cannot afford to buy your diabetes supplies  · You have questions or concerns about your condition or care  Where to inject insulin:   · You can inject insulin into your abdomen, upper arm, buttocks, hip, and the front or side of the thigh  Insulin works fastest when it is injected into the abdomen  · Do not inject insulin into areas where you have a wound or bruising  Insulin injected into wounds or bruises may not get into your body correctly  · Use a different area within the site each time you inject insulin  For example, inject insulin into different areas in your abdomen  Insulin injected into the same area can cause lumps, swelling, or thickened skin  How to inject insulin with a syringe:   · Clean the skin where you will inject the insulin  You can use an alcohol pad or a cotton swab dipped in alcohol  · Grab a fold of your skin  Gently pinch the skin and fat between your thumb and first finger  · Insert the needle straight into your skin  Do not hold the syringe at an angle  Make sure the needle is all the way into the skin  Let go of the pinched tissue  · Push down on the plunger to inject the insulin  Press on the plunger until the insulin is gone  Keep the needle in place for 5 seconds after you inject the insulin  · Pull out the needle  Press on your injection site for 5 to 10 seconds  Do not rub  This will keep insulin from leaking out  · Throw away your used insulin syringe as directed  Do not recap the syringe before you throw it away  Decrease pain when you inject insulin:   · Inject insulin at room temperature  If the insulin has been stored in the refrigerator, remove it 30 minutes before you inject it  · Remove all air bubbles from the syringe before the injection  · If you clean your skin with an alcohol pad, wait until it has dried before you inject insulin  · Relax the muscles at the injection site  · Do not change the direction of the needle during insertion or removal   Reuse your syringe only as directed: You may increase your risk for a bacterial infection when you reuse syringes  Ask your healthcare provider if it is safe for you to reuse a syringe  Do not reuse a syringe if you have an open wound, trouble seeing, or have an infection  The following are tips on how to safely reuse a syringe:  · Recap the needle as soon as you are done using it  Place the cap on a table or hard surface and slide the needle into the cap  · Do not let the needle touch anything but clean skin or the top of the insulin bottle  · Never  share syringes with anyone  · Do not clean your needle with alcohol  This will remove the coating that helps your needle slide easily into your skin  · Throw out any syringe that bends or touches anything other than clean skin  Where to get rid of used syringes:  Ask your healthcare provider where to get rid of your syringes  He may tell you to place the syringe in a heavy-duty laundry detergent bottle or a metal coffee can  The container should have a cap that fits securely  Ask your local waste authority if you need to follow certain rules for getting rid of your syringes  Bring your used syringes home with you when you travel  Pack them in a plastic or metal container with a secure lid     Follow up with your healthcare provider as directed:  Write down your questions so you remember to ask them during your visits  © 2017 [address] [employer] Information is for End User's use only and may not be sold, redistributed or otherwise used for commercial purposes  All illustrations and images included in CareNotes® are the copyrighted property of A D A M [employer]  or Brett Canales  The above information is an  only  It is not intended as medical advice for individual conditions or treatments  Talk to your doctor, nurse or pharmacist before following any medical regimen to see if it is safe and effective for you  Insulin Pens   AMBULATORY CARE:   An insulin pen  is a device used to inject insulin  The pen contains a cartridge of insulin  The pen may be reusable or disposable  You may need a different pen for each type of insulin you use  Contact your healthcare provider if:   · You feel or see hard lumps in your skin where you inject your insulin  · You think you gave yourself too much or not enough insulin  · Your injections are very painful  · You see blood or clear fluid on your injection site more than once after you inject insulin  · You have questions about how to give the injection  · You cannot afford to buy your diabetes supplies  · You have questions or concerns about your condition or care  How to get the insulin ready to use:   · Check the label and color of the insulin  Check that you have the correct type and strength of insulin  Insulin pens are available as U100 and U500 strengths  Also check the expiration date on the label  Use a new cartridge or pen if the expiration date has passed  Short or rapid-acting insulin should be clear, colorless, and free of particles or clumps  Use a new cartridge or pen if the insulin does not look right  Follow the pen 's instructions for inserting a new cartridge into a reusable pen  · Mix cloudy insulin  Gently roll the pen back and forth between the palms of your hands   Repeat this 10 times  Do not shake the pen  This can make the insulin clump together  Next, gently tip the pen up and down 10 times  Do not use the insulin if there are clumps in it after you mix it  How to get the pen ready to use:   · Remove a new pen from the refrigerator 30 minutes before you use it  Insulin should be injected at room temperature  · Wash your hands  Use soap and water or an alcohol-based hand rub  This will help decrease your risk for an infection  · Remove the cap from the pen  Wipe the needle attachment area with an alcohol swab  · Attach a needle to the pen  Remove the tab from the needle  Do not remove the outer cap on the needle  Push the needle straight onto the pen  Turn the needle clockwise until you cannot turn it more  Make sure the needle is straight  · Remove the needle caps  Remove the outer cap and save  Remove the inner cap and throw it away  · Remove air from the pen  Air may cause pain during injection  Turn the dial to 2 units  For most insulin pens, you will hear a click for each unit of insulin that you dial  Hold the pen and point the needle up  Gently tap the pen to move air bubbles to the top of the pen  Press the injection button  You should see a drop of insulin on the tip of the pen  If you do not see a drop, change the needle and repeat this step  If you do not see a drop after you repeat this step 3 times, use a new pen  · Select the correct dose on the pen  Turn the dial to the number of units you need to inject  The pointer on the side of your pen should line up with your dose  The dial can be turned in either direction to choose the correct dose  You cannot choose a dose larger than the number of units left in the cartridge  Insert another cartridge or use another disposable pen if there is not enough insulin  Instead you can inject part of your dose with the insulin that is left   Next, you can use a new cartridge or pen to inject the rest of your dose  ·        Where to inject insulin:   · You can inject insulin into your abdomen, upper arm, buttocks, hip, and the front or side of the thigh  Insulin works fastest when it is injected into the abdomen  · Do not inject insulin into areas where you have a wound or bruising  Insulin injected into wounds or bruises may not get into your body correctly  · Use a different area within the site each time you inject insulin  For example, inject insulin into different area in your abdomen  Insulin injected into the same area can cause lumps, swelling, or thickened skin  How to inject insulin with a pen:   · Clean the skin where you will inject the insulin  You can use an alcohol pad or a cotton swab dipped in alcohol  Let the area dry before you inject  This will decrease pain  · Grab a fold of your skin  Gently pinch the skin and fat between your thumb and first finger  · Insert the needle straight into your skin  Do not hold the syringe at an angle  Make sure the needle is all the way into the skin  Let go of the pinched tissue  · Push the injection button to inject the insulin  Continue to press on the injection button  Keep the needle in place for 10 seconds  · Pull out the needle  Replace the needle cap  Press on your injection site for 5 to 10 seconds  Do not rub  This will keep insulin from leaking out  · Remove the needle from the pen  Twist the capped needle counter clockwise  Place the needle in a heavy-duty laundry detergent bottle or a metal coffee can  The container should have a cap or lid that fits securely  · Replace the pen cap  Store the pen as directed  What to do with used needles:  Ask your local waste authority if you need to follow certain rules for getting rid of your needles  Bring your used needles home with you when you travel  Pack them in a plastic or metal container with a secure lid     How to store an insulin pen:  Do not store your pen with a needle attached  Follow the storage directions on the label or package insert that came with the insulin  Unopened pens can be stored in the refrigerator until you are ready to use them  Most insulin pens can be opened and kept at room temperature  Store your pen in a cool, dry place  Do not keep your pen in direct sunlight or in your car  Throw away pens that have been frozen or exposed to temperatures above 85° F (30° C)  If you travel, keep the pen in a cool pack  Follow up with your healthcare provider as directed:  Write down your questions so you remember to ask them during your visits  © 2017 [address] [employer]  Information is for End User's use only and may not be sold, redistributed or otherwise used for commercial purposes  All illustrations and images included in CareNotes® are the copyrighted property of [employer] M [employer] Inc  or Brett Canales  The above information is an  only  It is not intended as medical advice for individual conditions or treatments  Talk to your doctor, nurse or pharmacist before following any medical regimen to see if it is safe and effective for you  Type 2 Diabetes in Adults   AMBULATORY CARE:   Type 2 diabetes  is a disease that affects how your body uses glucose (sugar)  Normally, when the blood sugar level increases, the pancreas makes more insulin  Insulin helps move sugar out of the blood so it can be used for energy  Type 2 diabetes develops because either the body cannot make enough insulin, or it cannot use the insulin correctly  After many years, your pancreas may stop making insulin     Common symptoms include the following:   · More hunger or thirst than usual     · Frequent urination     · Weight loss without trying     · Blurred vision  Call 911 if you have any of the following:   · You have any of the following signs of a stroke:      ¨ Numbness or drooping on one side of your face     ¨ Weakness in an arm or leg    ¨ Confusion or difficulty speaking    ¨ Dizziness, a severe headache, or vision loss    · You have any of the following signs of a heart attack:      ¨ Squeezing, pressure, or pain in your chest that lasts longer than 5 minutes or returns    ¨ Discomfort or pain in your back, neck, jaw, stomach, or arm     ¨ Trouble breathing    ¨ Nausea or vomiting    ¨ Lightheadedness or a sudden cold sweat, especially with chest pain or trouble breathing  Seek care immediately if:   · You have severe abdominal pain, or the pain spreads to your back  You may also be vomiting  · You have trouble staying awake or focusing  · You are shaking or sweating  · You have blurred or double vision  · Your breath has a fruity, sweet smell  · Your breathing is deep and labored, or rapid and shallow  · Your heartbeat is fast and weak  Contact your healthcare provider if:   · You are vomiting or have diarrhea  · You have an upset stomach and cannot eat the foods on your meal plan  · You feel weak or more tired than usual      · You feel dizzy, have headaches, or are easily irritated  · Your skin is red, warm, dry, or swollen  · You have a wound that does not heal      · You have numbness in your arms or legs  · You have trouble coping with your illness, or you feel anxious or depressed  · You have questions or concerns about your condition or care  Treatment for type 2 diabetes  includes keeping your blood sugar at a normal level  You must eat the right foods, and exercise regularly  You may need medicine if you cannot control your blood sugar level with nutrition and exercise  You may also need medicine to prevent heart disease, a complication of type 2 diabetes  You may  need any of the following:  · Hypoglycemic medicines or insulin  may be given to decrease the amount of sugar in your blood  · Blood pressure medicine  may be given to lower your blood pressure   Your blood pressure should be less than 140/90  · Cholesterol lowering medicine  may be given to prevent heart disease  · Antiplatelets , such as aspirin, help prevent blood clots  Take your antiplatelet medicine exactly as directed  These medicines make it more likely for you to bleed or bruise  If you are told to take aspirin, do not take acetaminophen or ibuprofen instead  · Take your medicine as directed  Contact your healthcare provider if you think your medicine is not helping or if you have side effects  Tell him or her if you are allergic to any medicine  Keep a list of the medicines, vitamins, and herbs you take  Include the amounts, and when and why you take them  Bring the list or the pill bottles to follow-up visits  Carry your medicine list with you in case of an emergency  Check your blood sugar level: You will be taught how to check a small drop of blood in a glucose monitor  You will need to check your blood sugar level at least 3 times each day if you are on insulin  Ask your healthcare provider when and how often to check during the day  If you check your blood sugar level before a meal , it should be between 80 and 130 mg/dL  If you check your blood sugar level 1 to 2 hours after a meal , it should be less than 180 mg/dL  Ask your healthcare provider if these are good goals for you  Write down your results, and show them to your healthcare provider  He may use the results to make changes to your medicine, food, and exercise schedules  If your blood sugar level is too low: Your blood sugar level is too low if it goes below 70 mg/dL  If the level is too low, eat or drink 15 grams of fast-acting carbohydrate  These are found naturally in fruits  Fast-acting carbohydrates will raise your blood sugar level quickly  Examples of 15 grams of fast-acting carbohydrate are 4 ounces (½ cup) of fruit juice or 4 ounces of regular soda  Other examples are 2 tablespoons of raisins or 3 to 4 glucose tablets  Check your blood sugar level 15 minutes later  If the level is still low (less than 100 mg/dL), eat another 15 grams of carbohydrate  When the level returns to 100 mg/dL, eat a snack or meal that contains carbohydrates  This will help prevent another drop in blood sugar  Always carefully follow your healthcare provider's instructions on how to treat low blood sugar levels  Check your feet each day for sores:  Wear shoes and socks that fit correctly  Do not trim your toenails  Ask your healthcare provider for more information about foot care  Follow your meal plan:  A dietitian will help you make a meal plan to keep your blood sugar level steady  Do not skip meals  Your blood sugar level may drop too low if you have taken diabetes medicine and do not eat  · Keep track of carbohydrates (sugar and starchy foods)  Your blood sugar level can get too high if you eat too many carbohydrates  Your dietitian will help you plan meals and snacks that have the right amount of carbohydrates  · Eat low-fat foods , such as skinless chicken and low-fat milk  · Eat less sodium (salt)  Limit high-sodium foods, such as soy sauce, potato chips, and soup  Do not add salt to food you cook  Limit your use of table salt  You should have less than 2,300 mg of sodium per day  · Eat high-fiber foods , such as vegetables, whole grain breads, and beans  · Limit alcohol  Alcohol affects your blood sugar level and can make it harder to manage your diabetes  Limit alcohol to 1 drink a day if you are a woman  Limit alcohol to 2 drinks a day if you are a man  A drink of alcohol is 12 ounces of beer, 5 ounces of wine, or 1½ ounces of liquor  Maintain a healthy weight:  Ask your healthcare provider how much you should weigh  A healthy weight can help you control your diabetes  Ask your provider to help you create a weight loss plan if you are overweight  Together you can set manageable weight loss goals    Exercise as directed:  Exercise can help keep your blood sugar level steady, decrease your risk of heart disease, and help you lose weight  Stretch before and after you exercise  Exercise for at least 150 minutes every week  Spread this amount of exercise over at least 3 days a week  Do not skip exercise more than 2 days in a row  Include muscle strengthening activities 2 to 3 days each week  Older adults should include balance training 2 to 3 times each week  Activities that help increase balance include yoga and arnold chi  Work with your healthcare provider to create an exercise plan  · Check your blood sugar level before and after exercise  Healthcare providers may tell you to change the amount of insulin you take or food you eat  If your blood sugar level is high, check your blood or urine for ketones before you exercise  Do not exercise if your blood sugar level is high and you have ketones  · If your blood sugar level is less than 100 mg/dL, have a carbohydrate snack before you exercise  Examples are 4 to 6 crackers, ½ banana, 8 ounces (1 cup) of milk, or 4 ounces (½ cup) of juice  Drink water or liquids that do not contain sugar before, during, and after exercise  Ask your dietitian or healthcare provider which liquids you should drink when you exercise  · Do not sit for longer than 30 minutes  If you cannot walk around, at least stand up  This will help you stay active and keep your blood circulating  Do not smoke:  Nicotine and other chemicals in cigarettes and cigars can cause lung damage and make it more difficult to manage your diabetes  Ask your healthcare provider for information if you currently smoke and need help to quit  Do not use e-cigarettes or smokeless tobacco in place of cigarettes or to help you quit  They still contain nicotine  Check your blood pressure as directed:  Ask your healthcare provider what your blood pressure should be   Most adults with diabetes and high blood pressure should have a systolic blood pressure (first number) less than 140  Your diastolic blood pressure (second number) should be less than 90  Wear medical alert identification:  Wear medical alert jewelry or carry a card that says you have diabetes  Ask your healthcare provider where to get these items  Ask about vaccines: You have a higher risk for serious illness if you get the flu, pneumonia, or hepatitis  Ask your healthcare provider if you should get a flu, pneumonia, or hepatitis B vaccine, and when to get the vaccine  Follow up with your healthcare provider as directed: You may need to return to have your A1c checked every 3 months  You will need to return at least once each year to have your feet checked  You will need an eye exam once a year to check for retinopathy  You will also need urine tests every year to check for kidney problems  You may need tests to monitor for heart disease such as an EKG, stress test, blood pressure monitoring, and blood tests  Write down your questions so you remember to ask them during your visits  © 2017 [address] [facility] Information is for End User's use only and may not be sold, redistributed or otherwise used for commercial purposes  All illustrations and images included in CareNotes® are the copyrighted property of [employer] M [employer]  or Brett Parker  The above information is an  only  It is not intended as medical advice for individual conditions or treatments  Talk to your doctor, nurse or pharmacist before following any medical regimen to see if it is safe and effective for you  Heart Healthy Diet   AMBULATORY CARE:   A heart healthy diet  is an eating plan low in total fat, unhealthy fats, and sodium (salt)  A heart healthy diet helps decrease your risk for heart disease and stroke  Limit the amount of fat you eat to 25% to 35% of your total daily calories  Limit sodium to less than 2,300 mg each day     Healthy fats:  Healthy fats can help improve cholesterol levels  The risk for heart disease is decreased when cholesterol levels are normal  Choose healthy fats, such as the following:  · Unsaturated fat  is found in foods such as soybean, canola, olive, corn, and safflower oils  It is also found in soft tub margarine that is made with liquid vegetable oil  · Omega-3 fat  is found in certain fish, such as salmon, tuna, and trout, and in walnuts and flaxseed  Unhealthy fats:  Unhealthy fats can cause unhealthy cholesterol levels in your blood and increase your risk of heart disease  Limit unhealthy fats, such as the following:  · Cholesterol  is found in animal foods, such as eggs and lobster, and in dairy products made from whole milk  Limit cholesterol to less than 300 milligrams (mg) each day  You may need to limit cholesterol to 200 mg each day if you have heart disease  · Saturated fat  is found in meats, such as dia and hamburger  It is also found in chicken or turkey skin, whole milk, and butter  Limit saturated fat to less than 7% of your total daily calories  Limit saturated fat to less than 6% if you have heart disease or are at increased risk for it  · Trans fat  is found in packaged foods, such as potato chips and cookies  It is also in hard margarine, some fried foods, and shortening  Avoid trans fats as much as possible    Heart healthy foods and drinks to include:  Ask your dietitian or healthcare provider how many servings to have from each of the following food groups:  · Grains:      ¨ Whole-wheat breads, cereals, and pastas, and brown rice    ¨ Low-fat, low-sodium crackers and chips    · Vegetables:      ¨ Broccoli, green beans, green peas, and spinach    ¨ Collards, kale, and lima beans    ¨ Carrots, sweet potatoes, tomatoes, and peppers    ¨ Canned vegetables with no salt added    · Fruits:      ¨ Bananas, peaches, pears, and pineapple    ¨ Grapes, raisins, and dates    ¨ Oranges, tangerines, grapefruit, orange juice, and grapefruit juice    ¨ Apricots, mangoes, melons, and papaya    ¨ Raspberries and strawberries    ¨ Canned fruit with no added sugar    · Low-fat dairy products:      ¨ Nonfat (skim) milk, 1% milk, and low-fat almond, cashew, or soy milks fortified with calcium    ¨ Low-fat cheese, regular or frozen yogurt, and cottage cheese    · Meats and proteins , such as lean cuts of beef and pork (loin, leg, round), skinless chicken and turkey, legumes, soy products, egg whites, and nuts  Foods and drinks to limit or avoid:  Ask your dietitian or healthcare provider about these and other foods that are high in unhealthy fat, sodium, and sugar:  · Snack or packaged foods , such as frozen dinners, cookies, macaroni and cheese, and cereals with more than 300 mg of sodium per serving    · Canned or dry mixes  for cakes, soups, sauces, or gravies    · Vegetables with added sodium , such as instant potatoes, vegetables with added sauces, or regular canned vegetables    · Other foods high in sodium , such as ketchup, barbecue sauce, salad dressing, pickles, olives, soy sauce, and miso    · High-fat dairy foods  such as whole or 2% milk, cream cheese, or sour cream, and cheeses     · High-fat protein foods  such as high-fat cuts of beef (T-bone steaks, ribs), chicken or turkey with skin, and organ meats, such as liver    · Cured or smoked meats , such as hot dogs, dia, and sausage    · Unhealthy fats and oils , such as butter, stick margarine, shortening, and cooking oils such as coconut or palm oil    · Food and drinks high in sugar , such as soft drinks (soda), sports drinks, sweetened tea, candy, cake, cookies, pies, and doughnuts  Other diet guidelines to follow:   · Eat more foods containing omega-3 fats  Eat fish high in omega-3 fats at least 2 times a week  · Limit alcohol  Too much alcohol can damage your heart and raise your blood pressure  Women should limit alcohol to 1 drink a day   Men should limit alcohol to 2 drinks a day  A drink of alcohol is 12 ounces of beer, 5 ounces of wine, or 1½ ounces of liquor  · Choose low-sodium foods  High-sodium foods can lead to high blood pressure  Add little or no salt to food you prepare  Use herbs and spices in place of salt  · Eat more fiber  to help lower cholesterol levels  Eat at least 5 servings of fruits and vegetables each day  Eat 3 ounces of whole-grain foods each day  Legumes (beans) are also a good source of fiber  Lifestyle guidelines:   · Do not smoke  Nicotine and other chemicals in cigarettes and cigars can cause lung and heart damage  Ask your healthcare provider for information if you currently smoke and need help to quit  E-cigarettes or smokeless tobacco still contain nicotine  Talk to your healthcare provider before you use these products  · Exercise regularly  to help you maintain a healthy weight and improve your blood pressure and cholesterol levels  Ask your healthcare provider about the best exercise plan for you  Do not start an exercise program without asking your healthcare provider  Follow up with your healthcare provider as directed:  Write down your questions so you remember to ask them during your visits  © 2017 [facility] Information is for End User's use only and may not be sold, redistributed or otherwise used for commercial purposes  All illustrations and images included in CareNotes® are the copyrighted property of [employer]  or Brett Canales  The above information is an  only  It is not intended as medical advice for individual conditions or treatments  Talk to your doctor, nurse or pharmacist before following any medical regimen to see if it is safe and effective for you

## 2017-09-12 NOTE — PROGRESS NOTES
Cardiology Progress Note - Ivonne Marcial  71 y o  male MRN: [id number]    Unit/Bed#: [facility] 401-01 Encounter: [id number]      Assessment/Recommendations:  1) Acute type A aortic dissection with Ascending aortic aneurysm: s/p hemiarch reconstruction with 26mm Dacron graft, complicated post-op by bleeding at distal anastomosis s/p repair  Continue BP control  2) Afib: paroxysmal, currently SR, continue amiodarone and metoprolol  Started on coumadin for anticoagulation  3) HTN: acceptable, but likely better with slightly improved values  Would increase lisinopril to 10mg  4) HLD: continue statin    Subjective:   Patient seen and examined  No significant events overnight   ; pertinent negatives - chest pain, chest pressure/discomfort, dyspnea, irregular heart beat and palpitations  Objective:     Vitals: Blood pressure 135/71, pulse (!) 126, temperature 97 8 °F (36 6 °C), temperature source Oral, resp  rate 18, weight 109 kg (240 lb 4 8 oz), SpO2 95 %  , There is no height or weight on file to calculate BMI , Orthostatic Blood Pressures    Flowsheet Row Most Recent Value   Blood Pressure  135/71 [Map 95] filed at 09/12/2017 1116   Patient Position - Orthostatic VS  Sitting filed at 09/12/2017 1116            Intake/Output Summary (Last 24 hours) at 09/12/17 1509  Last data filed at 09/12/17 1438   Gross per 24 hour   Intake          1374 46 ml   Output             1525 ml   Net          -150 54 ml       TELE: No significant arrhythmias seen - maintaining SR  Physical Exam:    GEN: Ivonne Marcial   appears well, alert and oriented x 3, pleasant and cooperative   HEENT: pupils equal, round, and reactive to light; extraocular muscles intact  NECK: supple, no carotid bruits   HEART: irregular rhythm, normal S1 and S2, no murmurs, clicks, gallops or rubs   LUNGS: clear to auscultation bilaterally; no wheezes, rales, or rhonchi   ABDOMEN: normal bowel sounds, soft, no tenderness, no distention  EXTREMITIES: peripheral pulses normal; no clubbing, cyanosis, or edema  NEURO: no focal findings   SKIN: normal without suspicious lesions on exposed skin    Medications:      Current Facility-Administered Medications:     acetaminophen (TYLENOL) tablet 650 mg, 650 mg, Oral, Q6H PRN, Pete Chau PA-C    amiodarone tablet 200 mg, 200 mg, Oral, TID With Meals, Pete Chau PA-C, 200 mg at 09/12/17 1153    aspirin chewable tablet 81 mg, 81 mg, Oral, Daily, Pete Chau PA-C, 81 mg at 09/12/17 5030    atorvastatin (LIPITOR) tablet 80 mg, 80 mg, Oral, Daily With Sravani Ramirez PA-C, 80 mg at 09/11/17 1613    bisacodyl (DULCOLAX) rectal suppository 10 mg, 10 mg, Rectal, Daily PRN, Pete Chau PA-C    docusate sodium (COLACE) capsule 100 mg, 100 mg, Oral, BID, Pete Chau PA-C, 100 mg at 09/12/17 2509    furosemide (LASIX) injection 40 mg, 40 mg, Intravenous, Q8H, Emelia Campos PA-C, 40 mg at 09/12/17 1420    heparin (porcine) 25,000 units in 250 mL infusion (premix), 3-20 Units/kg/hr (Order-Specific), Intravenous, Titrated, Patsy Hi PA-C, Last Rate: 9 1 mL/hr at 09/12/17 0700, 10 1 Units/kg/hr at 09/12/17 0700    insulin glargine (LANTUS) subcutaneous injection 32 Units, 32 Units, Subcutaneous, HS, Madan Fitch, DO    insulin lispro (HumaLOG) 100 units/mL subcutaneous injection 1-6 Units, 1-6 Units, Subcutaneous, HS, Pete Chau PA-C, 1 Units at 09/10/17 2106    insulin lispro (HumaLOG) 100 units/mL subcutaneous injection 10 Units, 10 Units, Subcutaneous, TID With Meals, Madan Fitch, DO    insulin lispro (HumaLOG) 100 units/mL subcutaneous injection 2-12 Units, 2-12 Units, Subcutaneous, TID AC, 4 Units at 09/12/17 1144 **AND** Fingerstick Glucose (POCT), , , TID AC, Emelia Campos PA-C    lisinopril (ZESTRIL) tablet 5 mg, 5 mg, Oral, Daily, Emelia Campos PA-C, 5 mg at 09/12/17 1122    metoprolol tartrate (LOPRESSOR) tablet 100 mg, 100 mg, Oral, Q12H [address], Emelia NUHA CamposC, 100 mg at 09/12/17 1158    OLANZapine (ZyPREXA) tablet 2 5 mg, 2 5 mg, Oral, HS, Vic Johnson PA-C, 2 5 mg at 09/11/17 2130    ondansetron (ZOFRAN) injection 4 mg, 4 mg, Intravenous, Q6H PRN, Vic Johnson PA-C    oxyCODONE-acetaminophen (PERCOCET) 5-325 mg per tablet 1 tablet, 1 tablet, Oral, Q4H PRN, Vic Johnson PA-C, 1 tablet at 09/12/17 0048    oxyCODONE-acetaminophen (PERCOCET) 5-325 mg per tablet 2 tablet, 2 tablet, Oral, Q6H PRN, Vic Johnson, PA-C    pantoprazole (PROTONIX) EC tablet 40 mg, 40 mg, Oral, Early Morning, ISABELLE Nur-LEANNE, 40 mg at 09/12/17 0550    polyethylene glycol (MIRALAX) packet 17 g, 17 g, Oral, Daily, Vic Johnson PA-C, 17 g at 09/12/17 6673    potassium chloride (K-DUR,KLOR-CON) CR tablet 20 mEq, 20 mEq, Oral, TID With Meals, Vic Johnson PA-C, 20 mEq at 09/12/17 1158    potassium chloride 40 mEq IVPB (premix), 40 mEq, Intravenous, Once, Vic Johnson PA-C, Last Rate: 25 mL/hr at 09/12/17 1203, 40 mEq at 09/12/17 1203    warfarin (COUMADIN) tablet 5 mg, 5 mg, Oral, Once (warfarin), Vic Johnson, PA-C     Labs & Results:      Results from last 7 days  Lab Units 09/06/17  0639 09/05/17  2351 09/05/17  2120   TROPONIN I ng/mL 0 03 0 02 <0 02       Results from last 7 days  Lab Units 09/12/17  0459 09/11/17  0508 09/10/17  0502   WBC Thousand/uL 14 06* 15 91* 20 51*   HEMOGLOBIN g/dL 11 4* 12 2 12 3   HEMATOCRIT % 35 0* 36 1* 36 4*   PLATELETS Thousands/uL 235 205 187       Results from last 7 days  Lab Units 09/06/17  0633   CHOLESTEROL mg/dL 169   TRIGLYCERIDES mg/dL 103   HDL mg/dL 40       Results from last 7 days  Lab Units 09/12/17  0459 09/11/17  1154 09/11/17  0508  09/10/17  0533  09/07/17  0408   SODIUM mmol/L 146*  --  145  --  144  < > 149*   POTASSIUM mmol/L 3 3* 4 1 3 4*  < > 3 4*  < > 4 3   CHLORIDE mmol/L 108  --  107  --  104  < > 111*   CO2 mmol/L 30  --  29  --  29  < > 33*   BUN mg/dL 28*  --  32*  --  25  < > 14 CREATININE mg/dL 0 86  --  0 96  --  0 94  < > 1 14   CALCIUM mg/dL 7 2*  --  6 8*  --  6 9*  < > 7 7*   TOTAL PROTEIN g/dL  --   --   --   --   --   --  5 6*   BILIRUBIN TOTAL mg/dL  --   --   --   --   --   --  1 78*   ALK PHOS U/L  --   --   --   --   --   --  35*   ALT U/L  --   --   --   --   --   --  40   AST U/L  --   --   --   --   --   --  75*   GLUCOSE RANDOM mg/dL 96  --  157*  --  202*  < > 120   < > = values in this interval not displayed  Results from last 7 days  Lab Units 09/12/17  1354 09/12/17  0459 09/11/17  1154 09/11/17  0508  09/10/17  0502   INR   --  1 50*  --  1 36*  --  1 26*   PTT seconds 72* 113* 72* 83*  < >  --    < > = values in this interval not displayed  Results from last 7 days  Lab Units 09/11/17  0508 09/10/17  0533 09/09/17  0420   MAGNESIUM mg/dL 2 2 1 8 2 1       Echo:  SUMMARY:  Limited study performed due to emergent nature  Limited valvular doppler was done      LEFT VENTRICLE:  Systolic function was normal  Ejection fraction was estimated in the range of 60 % to 65 %  Although no diagnostic regional wall motion abnormality was identified, this possibility cannot be completely excluded on the basis of this study      AORTIC VALVE:  The valve was probably trileaflet  Leaflets exhibited normal thickness and normal cuspal separation  There was trace regurgitation  The valve was not well visualized      PULMONIC VALVE:  Leaflets exhibited normal thickness, no calcification, and normal cuspal separation      There was flow acceleration at the level of the pulmonic valve  This could be from external compression  Less likely valvular stenosis      AORTA:  There was dilatation of the ascending aorta  4 7cm  A [place] type A dissection was seen  Flap visulaized in the aortic arch      PERICARDIUM:  A moderate to large pericardial effusion was identified circumferential to the heart  The fluid contained focal strands  There is early RV diastolic collapse and RA collapse  Respiratory variation is present with overall low LV stroke  volume  Tamponade physiology is seen  Fibrinous material in effusion likely thrombus given known aortic dissection  EKG personally reviewed by Romayne Holter, MD      Counseling / Coordination of Care  Total floor / unit time spent today 30 minutes  Greater than 50% of total time was spent with the patient and / or family counseling and / or coordination of care

## 2017-09-12 NOTE — PROGRESS NOTES
Progress Note - Kanwal Wheeler  71 y o  male MRN: [id number]    Unit/Bed#: [facility] 401-01 Encounter: [id number]      CC: diabetes f/u    Subjective:   Kanwal Wheeler  is a 71y o  year old male with type 2 diabetes  Feels well  No complaints  No hypoglycemia  Objective:     Vitals: Blood pressure 135/71, pulse (!) 126, temperature 97 8 °F (36 6 °C), temperature source Oral, resp  rate 18, weight 109 kg (240 lb 4 8 oz), SpO2 95 %  ,There is no height or weight on file to calculate BMI  Intake/Output Summary (Last 24 hours) at 09/12/17 1222  Last data filed at 09/12/17 1001   Gross per 24 hour   Intake          1374 46 ml   Output             1800 ml   Net          -425 54 ml       Physical Exam:  General: No acute distress  Alert, awake, and oriented x3  HEENT: Normocephalic, atraumatic  Heart: Regular rate and rhythm  Lungs: Clear  No respiratory distress  Extremities: No edema  Skin: Warm, dry  No rash  Neuro: Moves all 4 extremities spontaneously  Psych: Appropriate mood and affect  Cooperative  Lab, Imaging and other studies: I have personally reviewed pertinent reports  POC Glucose (mg/dl)   Date Value   09/12/2017 224 (H)   09/12/2017 93   09/11/2017 76   09/11/2017 122   09/11/2017 226 (H)   09/11/2017 179 (H)   09/10/2017 169 (H)   09/10/2017 201 (H)   09/10/2017 242 (H)   09/10/2017 197 (H)       Assessment:  DM 2 with hyperglycemia    Type A Aortic Dissection    Plan:  DM 2 with hyperglycemia:  Blood glucose as above  High at lunch 2/2 held Humalog for breakfast   Decrease Lantus to 32 units qhs, Humalog to 10 ac  Continue to follow and adjust insulin as needed  Type A Aortic Dissection: S/P Repair  Recovering well

## 2017-09-13 LAB
ANION GAP SERPL CALCULATED.3IONS-SCNC: 5 MMOL/L (ref 4–13)
APTT PPP: 97 SECONDS (ref 23–35)
BUN SERPL-MCNC: 26 MG/DL (ref 5–25)
CALCIUM SERPL-MCNC: 7.3 MG/DL (ref 8.3–10.1)
CHLORIDE SERPL-SCNC: 107 MMOL/L (ref 100–108)
CO2 SERPL-SCNC: 29 MMOL/L (ref 21–32)
CREAT SERPL-MCNC: 0.92 MG/DL (ref 0.6–1.3)
GFR SERPL CREATININE-BSD FRML MDRD: 83 ML/MIN/1.73SQ M
GLUCOSE SERPL-MCNC: 103 MG/DL (ref 65–140)
GLUCOSE SERPL-MCNC: 107 MG/DL (ref 65–140)
GLUCOSE SERPL-MCNC: 159 MG/DL (ref 65–140)
GLUCOSE SERPL-MCNC: 173 MG/DL (ref 65–140)
GLUCOSE SERPL-MCNC: 97 MG/DL (ref 65–140)
INR PPP: 2.38 (ref 0.86–1.16)
MAGNESIUM SERPL-MCNC: 1.9 MG/DL (ref 1.6–2.6)
POTASSIUM SERPL-SCNC: 3.8 MMOL/L (ref 3.5–5.3)
PROTHROMBIN TIME: 26.3 SECONDS (ref 12.1–14.4)
SODIUM SERPL-SCNC: 141 MMOL/L (ref 136–145)

## 2017-09-13 PROCEDURE — 85730 THROMBOPLASTIN TIME PARTIAL: CPT | Performed by: THORACIC SURGERY (CARDIOTHORACIC VASCULAR SURGERY)

## 2017-09-13 PROCEDURE — 97530 THERAPEUTIC ACTIVITIES: CPT

## 2017-09-13 PROCEDURE — 85610 PROTHROMBIN TIME: CPT | Performed by: PHYSICIAN ASSISTANT

## 2017-09-13 PROCEDURE — 82948 REAGENT STRIP/BLOOD GLUCOSE: CPT

## 2017-09-13 PROCEDURE — 83735 ASSAY OF MAGNESIUM: CPT | Performed by: PHYSICIAN ASSISTANT

## 2017-09-13 PROCEDURE — 97116 GAIT TRAINING THERAPY: CPT

## 2017-09-13 PROCEDURE — 80048 BASIC METABOLIC PNL TOTAL CA: CPT | Performed by: PHYSICIAN ASSISTANT

## 2017-09-13 RX ORDER — LISINOPRIL 10 MG/1
10 TABLET ORAL DAILY
Status: DISCONTINUED | OUTPATIENT
Start: 2017-09-13 | End: 2017-09-14 | Stop reason: HOSPADM

## 2017-09-13 RX ORDER — INSULIN GLARGINE 100 [IU]/ML
28 INJECTION, SOLUTION SUBCUTANEOUS
Status: DISCONTINUED | OUTPATIENT
Start: 2017-09-13 | End: 2017-09-14 | Stop reason: HOSPADM

## 2017-09-13 RX ORDER — WARFARIN SODIUM 5 MG/1
5 TABLET ORAL
Status: COMPLETED | OUTPATIENT
Start: 2017-09-13 | End: 2017-09-13

## 2017-09-13 RX ADMIN — LISINOPRIL 10 MG: 10 TABLET ORAL at 09:52

## 2017-09-13 RX ADMIN — ATORVASTATIN CALCIUM 80 MG: 80 TABLET, FILM COATED ORAL at 17:18

## 2017-09-13 RX ADMIN — POTASSIUM CHLORIDE 20 MEQ: 1500 TABLET, EXTENDED RELEASE ORAL at 11:39

## 2017-09-13 RX ADMIN — ASPIRIN 81 MG 81 MG: 81 TABLET ORAL at 08:10

## 2017-09-13 RX ADMIN — PANTOPRAZOLE SODIUM 40 MG: 40 TABLET, DELAYED RELEASE ORAL at 05:07

## 2017-09-13 RX ADMIN — POTASSIUM CHLORIDE 20 MEQ: 1500 TABLET, EXTENDED RELEASE ORAL at 08:11

## 2017-09-13 RX ADMIN — METOPROLOL TARTRATE 100 MG: 50 TABLET ORAL at 08:10

## 2017-09-13 RX ADMIN — FUROSEMIDE 40 MG: 10 INJECTION, SOLUTION INTRAMUSCULAR; INTRAVENOUS at 14:29

## 2017-09-13 RX ADMIN — AMIODARONE HYDROCHLORIDE 200 MG: 200 TABLET ORAL at 08:11

## 2017-09-13 RX ADMIN — FUROSEMIDE 40 MG: 10 INJECTION, SOLUTION INTRAMUSCULAR; INTRAVENOUS at 22:14

## 2017-09-13 RX ADMIN — OXYCODONE HYDROCHLORIDE AND ACETAMINOPHEN 1 TABLET: 5; 325 TABLET ORAL at 20:57

## 2017-09-13 RX ADMIN — DOCUSATE SODIUM 100 MG: 100 CAPSULE, LIQUID FILLED ORAL at 08:10

## 2017-09-13 RX ADMIN — AMIODARONE HYDROCHLORIDE 200 MG: 200 TABLET ORAL at 11:39

## 2017-09-13 RX ADMIN — OLANZAPINE 2.5 MG: 2.5 TABLET, FILM COATED ORAL at 21:02

## 2017-09-13 RX ADMIN — AMIODARONE HYDROCHLORIDE 200 MG: 200 TABLET ORAL at 17:17

## 2017-09-13 RX ADMIN — POLYETHYLENE GLYCOL 3350 17 G: 17 POWDER, FOR SOLUTION ORAL at 08:11

## 2017-09-13 RX ADMIN — INSULIN GLARGINE 28 UNITS: 100 INJECTION, SOLUTION SUBCUTANEOUS at 21:03

## 2017-09-13 RX ADMIN — FUROSEMIDE 40 MG: 10 INJECTION, SOLUTION INTRAMUSCULAR; INTRAVENOUS at 05:07

## 2017-09-13 RX ADMIN — METOPROLOL TARTRATE 100 MG: 50 TABLET ORAL at 21:02

## 2017-09-13 RX ADMIN — WARFARIN SODIUM 5 MG: 5 TABLET ORAL at 17:18

## 2017-09-13 RX ADMIN — POTASSIUM CHLORIDE 20 MEQ: 1500 TABLET, EXTENDED RELEASE ORAL at 17:17

## 2017-09-13 RX ADMIN — INSULIN LISPRO 1 UNITS: 100 INJECTION, SOLUTION INTRAVENOUS; SUBCUTANEOUS at 21:02

## 2017-09-13 NOTE — PLAN OF CARE
Problem: PHYSICAL THERAPY ADULT  Goal: Performs mobility at highest level of function for planned discharge setting  See evaluation for individualized goals  Treatment/Interventions: Functional transfer training, LE strengthening/ROM, Elevations, Endurance training, Gait training, Bed mobility, Equipment eval/education  Equipment Recommended: Walker (PATIENT HAS RW AT HOME )       See flowsheet documentation for full assessment, interventions and recommendations  Keke Carvajal, PT     Outcome: Progressing  Prognosis: Good  Problem List: Decreased strength, Decreased endurance, Decreased mobility, Pain  Assessment: Reviwed cardiac precautions with the patient  Patient able to recall precaution partially  Patient noted with pain in the right groin during STS transfers, stair negotiation and minimal pain during ambulation  Reported the same to QASIM Wilkins  and cardiac team  Noted no LOB t/o session  patient's O2 dropped to 80s however improved to the 90s with seated rest of ~45 sec and HR mid 80s post ambulation  in RA  Patient noted with no visible distress and was talking while ambulating  Patient noted with decreased endurance and will continue to follow during the stay to improve activity tolerance  Recommendation: Home with family support, Home PT     PT - OK to Discharge: Yes    See flowsheet documentation for full assessment

## 2017-09-13 NOTE — PHYSICAL THERAPY NOTE
Physical Therapy Progress Note     09/13/17 0855   Pain Assessment   Pain Assessment 0-10   Pain Score 5   Pain Type Acute pain   Pain Location Groin   Pain Orientation Right   Restrictions/Precautions   Weight Bearing Precautions Per Order No   Other Precautions Cardiac/sternal;Pain; Fall Risk   General   Chart Reviewed Yes   Family/Caregiver Present Yes   Cognition   Overall Cognitive Status WFL   Subjective   Subjective I am doing okay  It is this pain in this area pointing to R groin area  Reported about the pain to QASIM Ortiz  Transfers   Sit to Stand 5  Supervision   Stand to Sit 5  Supervision   Stand pivot 5  Supervision   Ambulation/Elevation   Gait pattern Decreased foot clearance; Wide CARLI   Gait Assistance 5  Supervision   Assistive Device Rolling walker   Distance 200ft, 220 ft   Stair Management Assistance 5  Supervision   Additional items Verbal cues; Assist x 1; Increased time required   Stair Management Technique One rail R;Alternating pattern; Step to pattern; Foreward;Reciprocal;Nonreciprocal   Number of Stairs 12   Balance   Static Sitting Good   Dynamic Sitting Good   Static Standing Fair +   Dynamic Standing Fair   Ambulatory Fair   Endurance Deficit   Endurance Deficit Yes   Endurance Deficit Description Fatigue   Activity Tolerance   Activity Tolerance Patient tolerated treatment well   Nurse Made Aware Yes   Assessment   Prognosis Good   Problem List Decreased strength;Decreased endurance;Decreased mobility;Pain   Assessment Reviwed cardiac precautions with the patient  Patient able to recall precaution partially  Patient noted with pain in the right groin during STS transfers, stair negotiation and minimal pain during ambulation  Reported the same to QASIM Ortiz  and cardiac team  Noted no LOB t/o session  patient's O2 dropped to 80s however improved to the 90s with seated rest of ~45 sec and HR mid 80s post ambulation  in RA  Patient noted with no visible distress and was talking while ambulating  Patient noted with decreased endurance and will continue to follow during the stay to improve activity tolerance  Goals   Patient Goals None stated   Plan   Treatment/Interventions Functional transfer training;Elevations; Endurance training;Patient/family training;Equipment eval/education;Gait training;Spoke to nursing;Spoke to advanced practitioner   PT Frequency 5x/wk   Recommendation   Recommendation Home with family support;Home PT   PT - OK to Discharge Yes         Dahlia Aldrich, PTA

## 2017-09-13 NOTE — PROGRESS NOTES
Progress Note - Kanwal Wheeler  71 y o  male MRN: [id number]    Unit/Bed#: [facility] 401-01 Encounter: [id number]      CC: diabetes f/u    Subjective:   Kanwal Wheeler  is a 71y o  year old male with type 2 diabetes  Feels well  No complaints  Hypoglycemia last evening  Objective:     Vitals: Blood pressure 119/73, pulse 82, temperature (!) 97 1 °F (36 2 °C), temperature source Tympanic, resp  rate 18, height 5' 6 5" (1 689 m), weight 110 kg (242 lb 1 oz), SpO2 93 %  ,Body mass index is 38 48 kg/m²  Intake/Output Summary (Last 24 hours) at 09/13/17 1320  Last data filed at 09/13/17 1222   Gross per 24 hour   Intake             1720 ml   Output             2315 ml   Net             -595 ml       Physical Exam:  General: No acute distress  Alert, awake, and oriented x3  HEENT: Normocephalic, atraumatic  Heart: Regular rate and rhythm  Lungs: Clear  No respiratory distress  Extremities: No edema  Skin: Warm, dry  No rash  Neuro: Moves all 4 extremities spontaneously  Psych: Appropriate mood and affect  Cooperative  Lab, Imaging and other studies: I have personally reviewed pertinent reports  POC Glucose (mg/dl)   Date Value   09/13/2017 159 (H)   09/13/2017 97   09/12/2017 96   09/12/2017 69   09/12/2017 138   09/12/2017 224 (H)   09/12/2017 93   09/11/2017 76   09/11/2017 122   09/11/2017 226 (H)       Assessment:  DM 2 with hyperglycemia  Type A Aortic Dissection    Plan:  DM 2 with hyperglycemia: Blood glucose as above  Decrease Lantus to 28 units qhs  Decrease Humalog to 8 units ac  Continue to follow and adjust insulin as needed  Type A Aortic Dissection: Recovering well

## 2017-09-13 NOTE — PROGRESS NOTES
Progress Note - Cardiothoracic Surgery   Kylie Martinez  71 y o  male MRN: [id number]  Unit/Bed#: [facility] 401-01 Encounter: [id number]  Tyep A dissection  S/P replacement of  ascending aorta/ hemiarch; POD # 7    24 Hour Events: no overnight events  No complaints  Ambulating 360 feet  Pain controlled  Tolerating diet  Medications:   Scheduled Meds:  amiodarone 200 mg Oral TID With Meals   aspirin 81 mg Oral Daily   atorvastatin 80 mg Oral Daily With Dinner   docusate sodium 100 mg Oral BID   furosemide 40 mg Intravenous Q8H   insulin glargine 32 Units Subcutaneous HS   insulin lispro 1-6 Units Subcutaneous HS   insulin lispro 10 Units Subcutaneous TID With Meals   insulin lispro 2-12 Units Subcutaneous TID AC   lisinopril 5 mg Oral Daily   metoprolol tartrate 100 mg Oral Q12H DEMETRIA   OLANZapine 2 5 mg Oral HS   pantoprazole 40 mg Oral Early Morning   polyethylene glycol 17 g Oral Daily   potassium chloride 20 mEq Oral TID With Meals     Continuous Infusions:  heparin (porcine) 3-20 Units/kg/hr (Order-Specific) Last Rate: Stopped (09/13/17 0731)     PRN Meds:   acetaminophen    bisacodyl    ondansetron    oxyCODONE-acetaminophen    oxyCODONE-acetaminophen    Vitals:   Vitals:    09/13/17 0202 09/13/17 0744 09/13/17 0745 09/13/17 0746   BP: 145/81  166/74 137/70   Pulse: 65 82     Resp: 18 18     Temp: 97 7 °F (36 5 °C) 97 7 °F (36 5 °C)     TempSrc: Tympanic Tympanic     SpO2: 96% 96%     Weight:    110 kg (242 lb 1 oz)   Height:    5' 6 5" (1 689 m)       Telemetry: NSR; Heart Rate: 80    Respiratory:   SpO2: SpO2: 96 %; Room Air    Intake/Output:   I/O       09/11 0701 - 09/12 0700 09/12 0701 - 09/13 0700 09/13 0701 - 09/14 0700    P  O  1135 1480     I V  (mL/kg) 118 (1 1) 192 5 (1 8)     IV Piggyback 350      Total Intake(mL/kg) 1603 (14 7) 1672 5 (15 3)     Urine (mL/kg/hr) 1605 (0 6) 1590 (0 6) 350 (3 4)    Stool  0 (0)     Total Output 1605 1590 350    Net -2 +82 5 -350           Unmeasured Urine Occurrence  2 x     Unmeasured Stool Occurrence  1 x         Weights:   Weight (last 2 days)     Date/Time   Weight    17 0746  110 (242 07)    17 0632  109 (240 3)    17 0500  110 (242 73)            Admit weight: 113    Results:     Results from last 7 days  Lab Units 17  0459 17  0508 09/10/17  0502   WBC Thousand/uL 14 06* 15 91* 20 51*   HEMOGLOBIN g/dL 11 4* 12 2 12 3   HEMATOCRIT % 35 0* 36 1* 36 4*   PLATELETS Thousands/uL 235 205 187       Results from last 7 days  Lab Units 17  0518 17  0459 17  1154 17  0508   SODIUM mmol/L 141 146*  --  145   POTASSIUM mmol/L 3 8 3 3* 4 1 3 4*   CHLORIDE mmol/L 107 108  --  107   CO2 mmol/L 29 30  --  29   BUN mg/dL 26* 28*  --  32*   CREATININE mg/dL 0 92 0 86  --  0 96   GLUCOSE RANDOM mg/dL 103 96  --  157*   CALCIUM mg/dL 7 3* 7 2*  --  6 8*       Results from last 7 days  Lab Units 17  0518 17  2011 17  1354 17  0459  17  0508   INR  2 38*  --   --  1 50*  --  1 36*   PTT seconds 97* 83* 72* 113*  < > 83*   < > = values in this interval not displayed  Coumadin dosin/12 -7 5   - 7 5  9/10 - 2 5   - 2 5    Point of care glucose:     Studies:  No new studies     Invasive Lines/Tubes:  Invasive Devices     Central Venous Catheter Line            CVC Central Lines 17 Triple 6 days                Physical Exam:    HEENT/NECK:  PERRLA  No jugular venous distention  Cardiac: Regular rate and rhythm  No rubs/murmurs/gallops  Pulmonary:  Breath sounds slightly diminished at the bases bilaterally  Abdomen:  Non-tender, Non-distended  Positive bowel sounds  Incisions: Sternum is stable  Incision is clean, dry, and intact  Lower extremities: Extremities warm/dry  Radial/PT/DP pulses 2+ bilaterally  1+ edema B/L  Neuro: Alert and oriented X 3  Sensation is grossly intact  No focal deficits    Skin: Warm/Dry, without rashes or lesions  Assessment:  Patient Active Problem List   Diagnosis    Prostate cancer    Hypertension    Diabetes    Acute thoracic aortic dissection    Acute blood loss anemia    s/p aortic arch repair    Hypokalemia    Atrial fibrillation    Anticoagulated on Coumadin       Tyep A dissection  S/P replacement of  ascending aorta/ hemiarch; POD # 7    Plan:    1  Cardiac:   NSR; Hypertensive  Lopressor 100 mg PO BID   Increase lisinopril to 10 mg per Cardiology recommendations  Continue ASA and Statin therapy  Epicardial pacing wires out  Patient no longer has central IV access   Continue DVT prophylaxis    2  Pulmonary:   Good Room air oxygen saturation: Continue incentive spirometry/Coughing/Deep breathing exercises  Chest tubes have been discontinued    3  Renal:   Intake/Output net: +84 mL/24 hours  Current diuretic  Lasix 40 mg IV q 8 - may decrease patient more euvolemic  Potassium Chloride 20 mEq PO q 8  Post op Creatinine stable; Follow up labs prn    4  Neuro:  Neurologically intact; No active issues  Incisional pain well-controlled; Continue prn Percocet    5  GI:  Tolerating TLC 2 3 gm sodium diet, with consistent carbohydrate modifier  Maintain 1800 mL daily fluid restriction   Continue stool softeners and prn suppository  Continue GI prophylaxis    6  Endo: Preoperative A1c 7 1 - endocrinology following patient   New to insulin - teaching underway     7  Hematology: Coumadin dosing for atrial fibrillation (therapeutic INR) will stop IV heparin  Post-operative acute blood loss anemia; Hemoglobin and hematocrit stable; trend prn    8   Disposition:  Anticipate discharge to home 24 - 48 h     VTE Pharmacologic Prophylaxis: Warfarin (Coumadin)  VTE Mechanical Prophylaxis: sequential compression device    Collaborative rounds completed with RN    SIGNATURE: Tania Barnes PA-C  DATE: September 13, 2017  TIME: 7:57 AM

## 2017-09-13 NOTE — PROGRESS NOTES
Cardiology Progress Note - Tomasa Delgado  71 y o  male MRN: [id number]    Unit/Bed#: [facility] 401-01 Encounter: [id number]      Assessment/Recommendations:  1) Acute type A aortic dissection with Ascending aortic aneurysm: s/p hemiarch reconstruction with 26mm Dacron graft, complicated post-op by bleeding at distal anastomosis s/p repair  Continue BP control - uptitrated lisinopril, continue to monitor on this  2) Afib: paroxysmal, currently SR, continue amiodarone and metoprolol  Started on coumadin for anticoagulation  3) HTN: acceptable, watch on lisinopril 10mg  4) HLD: continue statin    Subjective:   Patient seen and examined  No significant events overnight   ; pertinent negatives - chest pain, chest pressure/discomfort, irregular heart beat and palpitations  Objective:     Vitals: Blood pressure 119/73, pulse 82, temperature (!) 97 1 °F (36 2 °C), temperature source Tympanic, resp  rate 18, height 5' 6 5" (1 689 m), weight 110 kg (242 lb 1 oz), SpO2 93 %  , Body mass index is 38 48 kg/m² , Orthostatic Blood Pressures    Flowsheet Row Most Recent Value   Blood Pressure  119/73 filed at 09/13/2017 1032   Patient Position - Orthostatic VS  Sitting filed at 09/13/2017 1032            Intake/Output Summary (Last 24 hours) at 09/13/17 1058  Last data filed at 09/13/17 0903   Gross per 24 hour   Intake             1480 ml   Output             2240 ml   Net             -760 ml       TELE: No significant arrhythmias seen - maintaining SR    Physical Exam:    GEN: Tomasa Delgado   appears well, alert and oriented x 3, pleasant and cooperative   HEENT: pupils equal, round, and reactive to light; extraocular muscles intact  NECK: supple, no carotid bruits   HEART: regular rhythm, normal S1 and S2, no murmurs, clicks, gallops or rubs   LUNGS: clear to auscultation bilaterally; no wheezes, rales, or rhonchi   ABDOMEN: normal bowel sounds, soft, no tenderness, no distention  EXTREMITIES: peripheral pulses normal; no clubbing, cyanosis, or edema  NEURO: no focal findings   SKIN: normal without suspicious lesions on exposed skin    Medications:      Current Facility-Administered Medications:     acetaminophen (TYLENOL) tablet 650 mg, 650 mg, Oral, Q6H PRN, Erlinda Land PA-C    amiodarone tablet 200 mg, 200 mg, Oral, TID With Meals, Erlinda Land PA-C, 200 mg at 09/13/17 7880    aspirin chewable tablet 81 mg, 81 mg, Oral, Daily, Erlinda Land PA-C, 81 mg at 09/13/17 0810    atorvastatin (LIPITOR) tablet 80 mg, 80 mg, Oral, Daily With Edu Almaraz PA-C, 80 mg at 09/12/17 1731    bisacodyl (DULCOLAX) rectal suppository 10 mg, 10 mg, Rectal, Daily PRN, Erlinda Land PA-C    docusate sodium (COLACE) capsule 100 mg, 100 mg, Oral, BID, Erlinda Land PA-C, 100 mg at 09/13/17 0810    furosemide (LASIX) injection 40 mg, 40 mg, Intravenous, Q8H, Emelia Campos PA-C, 40 mg at 09/13/17 0507    insulin glargine (LANTUS) subcutaneous injection 32 Units, 32 Units, Subcutaneous, HS, Long Allegra, DO, 32 Units at 09/12/17 2213    insulin lispro (HumaLOG) 100 units/mL subcutaneous injection 1-6 Units, 1-6 Units, Subcutaneous, HS, Erlinda Land PA-C, 1 Units at 09/10/17 2106    insulin lispro (HumaLOG) 100 units/mL subcutaneous injection 10 Units, 10 Units, Subcutaneous, TID With Meals, Long Allegra, DO, 10 Units at 09/13/17 0814    insulin lispro (HumaLOG) 100 units/mL subcutaneous injection 2-12 Units, 2-12 Units, Subcutaneous, TID AC, 4 Units at 09/12/17 1144 **AND** Fingerstick Glucose (POCT), , , TID AC, Emelia Campos PA-C    lisinopril (ZESTRIL) tablet 10 mg, 10 mg, Oral, Daily, Jeff Jarrett PA-C, 10 mg at 09/13/17 9058    metoprolol tartrate (LOPRESSOR) tablet 100 mg, 100 mg, Oral, Q12H [address], Erlinda Land PA-C, 100 mg at 09/13/17 0810    OLANZapine (ZyPREXA) tablet 2 5 mg, 2 5 mg, Oral, HS, Emelia Campos PA-C, 2 5 mg at 09/12/17 2212    ondansetron (ZOFRAN) injection 4 mg, 4 mg, Intravenous, Q6H PRN, Shae Reyes PA-C    oxyCODONE-acetaminophen (PERCOCET) 5-325 mg per tablet 1 tablet, 1 tablet, Oral, Q4H PRN, Shae Reyes PA-C, 1 tablet at 09/12/17 0048    oxyCODONE-acetaminophen (PERCOCET) 5-325 mg per tablet 2 tablet, 2 tablet, Oral, Q6H PRN, Shae Reyes PA-C, 2 tablet at 09/12/17 1737    pantoprazole (PROTONIX) EC tablet 40 mg, 40 mg, Oral, Early Morning, Emelia Campos PA-C, 40 mg at 09/13/17 0507    polyethylene glycol (MIRALAX) packet 17 g, 17 g, Oral, Daily, Shae Reyes PA-C, 17 g at 09/13/17 9198    potassium chloride (K-DUR,KLOR-CON) CR tablet 20 mEq, 20 mEq, Oral, TID With Meals, Shae Reyes PA-C, 20 mEq at 09/13/17 1008    warfarin (COUMADIN) tablet 5 mg, 5 mg, Oral, Once (warfarin), Lulu Lake PA-C     Labs & Results:          Results from last 7 days  Lab Units 09/12/17  0459 09/11/17  0508 09/10/17  0502   WBC Thousand/uL 14 06* 15 91* 20 51*   HEMOGLOBIN g/dL 11 4* 12 2 12 3   HEMATOCRIT % 35 0* 36 1* 36 4*   PLATELETS Thousands/uL 235 205 187           Results from last 7 days  Lab Units 09/13/17  0518 09/12/17  0459 09/11/17  1154 09/11/17  0508  09/07/17  0408   SODIUM mmol/L 141 146*  --  145  < > 149*   POTASSIUM mmol/L 3 8 3 3* 4 1 3 4*  < > 4 3   CHLORIDE mmol/L 107 108  --  107  < > 111*   CO2 mmol/L 29 30  --  29  < > 33*   BUN mg/dL 26* 28*  --  32*  < > 14   CREATININE mg/dL 0 92 0 86  --  0 96  < > 1 14   CALCIUM mg/dL 7 3* 7 2*  --  6 8*  < > 7 7*   TOTAL PROTEIN g/dL  --   --   --   --   --  5 6*   BILIRUBIN TOTAL mg/dL  --   --   --   --   --  1 78*   ALK PHOS U/L  --   --   --   --   --  35*   ALT U/L  --   --   --   --   --  40   AST U/L  --   --   --   --   --  75*   GLUCOSE RANDOM mg/dL 103 96  --  157*  < > 120   < > = values in this interval not displayed      Results from last 7 days  Lab Units 09/13/17  0518 09/12/17 2011 09/12/17  1354 09/12/17  0459  09/11/17  0508   INR  2 38*  --   --  1 50*  --  1 36*   PTT seconds 97* 83* 72* 113*  < > 83*   < > = values in this interval not displayed  Results from last 7 days  Lab Units 09/13/17  0518 09/11/17  0508 09/10/17  0533   MAGNESIUM mg/dL 1 9 2 2 1 8       Echo:  SUMMARY:  Limited study performed due to emergent nature  Limited valvular doppler was done      LEFT VENTRICLE:  Systolic function was normal  Ejection fraction was estimated in the range of 60 % to 65 %  Although no diagnostic regional wall motion abnormality was identified, this possibility cannot be completely excluded on the basis of this study      AORTIC VALVE:  The valve was probably trileaflet  Leaflets exhibited normal thickness and normal cuspal separation  There was trace regurgitation  The valve was not well visualized      PULMONIC VALVE:  Leaflets exhibited normal thickness, no calcification, and normal cuspal separation      There was flow acceleration at the level of the pulmonic valve  This could be from external compression  Less likely valvular stenosis      AORTA:  There was dilatation of the ascending aorta  4 7cm  A Hunt type A dissection was seen  Flap visulaized in the aortic arch      PERICARDIUM:  A moderate to large pericardial effusion was identified circumferential to the heart  The fluid contained focal strands  There is early RV diastolic collapse and RA collapse  Respiratory variation is present with overall low LV stroke  volume  Tamponade physiology is seen  Fibrinous material in effusion likely thrombus given known aortic dissection        EKG personally reviewed by Anurag aCrter MD      Counseling / Coordination of Care  Total floor / unit time spent today 30 minutes  Greater than 50% of total time was spent with the patient and / or family counseling and / or coordination of care

## 2017-09-13 NOTE — PROGRESS NOTES
Patients blood sugar at 20:47 was 69  Gave the patient a snack and rechecked his blood sugar at 21:51 and was 96  Paged endocrinology and spoke with Dr Gloria Acosta and said it was okay give the scheduled dose of 32 units of Lantus at bedtime  Will continue to monitor

## 2017-09-14 VITALS
RESPIRATION RATE: 20 BRPM | OXYGEN SATURATION: 93 % | DIASTOLIC BLOOD PRESSURE: 80 MMHG | HEIGHT: 67 IN | WEIGHT: 240.74 LBS | HEART RATE: 77 BPM | TEMPERATURE: 98.1 F | BODY MASS INDEX: 37.79 KG/M2 | SYSTOLIC BLOOD PRESSURE: 135 MMHG

## 2017-09-14 LAB
GLUCOSE SERPL-MCNC: 195 MG/DL (ref 65–140)
GLUCOSE SERPL-MCNC: 91 MG/DL (ref 65–140)
INR PPP: 2.38 (ref 0.86–1.16)
PROTHROMBIN TIME: 26.3 SECONDS (ref 12.1–14.4)

## 2017-09-14 PROCEDURE — 85610 PROTHROMBIN TIME: CPT | Performed by: PHYSICIAN ASSISTANT

## 2017-09-14 PROCEDURE — 82948 REAGENT STRIP/BLOOD GLUCOSE: CPT

## 2017-09-14 RX ORDER — ASPIRIN 81 MG/1
81 TABLET, CHEWABLE ORAL DAILY
Qty: 30 TABLET | Refills: 2 | Status: SHIPPED | OUTPATIENT
Start: 2017-09-14 | End: 2017-09-14

## 2017-09-14 RX ORDER — ACETAMINOPHEN 325 MG/1
650 TABLET ORAL EVERY 6 HOURS PRN
Qty: 30 TABLET | Refills: 0 | Status: SHIPPED | OUTPATIENT
Start: 2017-09-14 | End: 2018-04-20

## 2017-09-14 RX ORDER — AMIODARONE HYDROCHLORIDE 200 MG/1
TABLET ORAL
Qty: 42 TABLET | Refills: 0 | Status: SHIPPED | OUTPATIENT
Start: 2017-09-14 | End: 2017-09-14

## 2017-09-14 RX ORDER — OMEPRAZOLE 20 MG/1
20 TABLET, DELAYED RELEASE ORAL DAILY
Qty: 30 TABLET | Refills: 0 | Status: SHIPPED | OUTPATIENT
Start: 2017-09-14 | End: 2018-04-20

## 2017-09-14 RX ORDER — LISINOPRIL 10 MG/1
10 TABLET ORAL DAILY
Qty: 30 TABLET | Refills: 0 | Status: SHIPPED | OUTPATIENT
Start: 2017-09-14 | End: 2018-03-09 | Stop reason: SDUPTHER

## 2017-09-14 RX ORDER — DOCUSATE SODIUM 100 MG/1
100 CAPSULE, LIQUID FILLED ORAL 2 TIMES DAILY
Qty: 30 CAPSULE | Refills: 0 | Status: SHIPPED | OUTPATIENT
Start: 2017-09-14 | End: 2017-09-14

## 2017-09-14 RX ORDER — ACETAMINOPHEN 325 MG/1
650 TABLET ORAL EVERY 6 HOURS PRN
Qty: 30 TABLET | Refills: 0 | Status: SHIPPED | OUTPATIENT
Start: 2017-09-14 | End: 2017-09-14

## 2017-09-14 RX ORDER — LISINOPRIL 10 MG/1
10 TABLET ORAL DAILY
Qty: 30 TABLET | Refills: 2 | Status: SHIPPED | OUTPATIENT
Start: 2017-09-14 | End: 2017-09-14

## 2017-09-14 RX ORDER — ATORVASTATIN CALCIUM 80 MG/1
80 TABLET, FILM COATED ORAL
Qty: 30 TABLET | Refills: 0 | Status: SHIPPED | OUTPATIENT
Start: 2017-09-14 | End: 2018-04-20

## 2017-09-14 RX ORDER — WARFARIN SODIUM 5 MG/1
5 TABLET ORAL
Qty: 60 TABLET | Refills: 2 | Status: SHIPPED | OUTPATIENT
Start: 2017-09-14 | End: 2017-09-14

## 2017-09-14 RX ORDER — INSULIN GLARGINE 100 [IU]/ML
28 INJECTION, SOLUTION SUBCUTANEOUS
Qty: 10 ML | Refills: 0 | Status: SHIPPED | OUTPATIENT
Start: 2017-09-14

## 2017-09-14 RX ORDER — TORSEMIDE 20 MG/1
20 TABLET ORAL DAILY
Qty: 14 TABLET | Refills: 2 | Status: SHIPPED | OUTPATIENT
Start: 2017-09-14 | End: 2017-09-14

## 2017-09-14 RX ORDER — AMIODARONE HYDROCHLORIDE 200 MG/1
TABLET ORAL
Qty: 42 TABLET | Refills: 0 | Status: SHIPPED | OUTPATIENT
Start: 2017-09-14 | End: 2018-04-20

## 2017-09-14 RX ORDER — ATORVASTATIN CALCIUM 80 MG/1
80 TABLET, FILM COATED ORAL
Qty: 30 TABLET | Refills: 2 | Status: SHIPPED | OUTPATIENT
Start: 2017-09-14 | End: 2017-09-14

## 2017-09-14 RX ORDER — OMEPRAZOLE 20 MG/1
20 TABLET, DELAYED RELEASE ORAL DAILY
Qty: 30 TABLET | Refills: 0 | Status: SHIPPED | OUTPATIENT
Start: 2017-09-14 | End: 2017-09-14

## 2017-09-14 RX ORDER — WARFARIN SODIUM 5 MG/1
5 TABLET ORAL
Qty: 60 TABLET | Refills: 2 | Status: SHIPPED | OUTPATIENT
Start: 2017-09-14 | End: 2018-04-20

## 2017-09-14 RX ORDER — TORSEMIDE 20 MG/1
20 TABLET ORAL DAILY
Qty: 14 TABLET | Refills: 0 | Status: SHIPPED | OUTPATIENT
Start: 2017-09-14 | End: 2018-04-20

## 2017-09-14 RX ORDER — POLYETHYLENE GLYCOL 3350 17 G/17G
17 POWDER, FOR SOLUTION ORAL DAILY PRN
Qty: 510 G | Refills: 0 | Status: SHIPPED | OUTPATIENT
Start: 2017-09-14 | End: 2018-04-20

## 2017-09-14 RX ORDER — DOCUSATE SODIUM 100 MG/1
100 CAPSULE, LIQUID FILLED ORAL 2 TIMES DAILY
Qty: 30 CAPSULE | Refills: 0 | Status: SHIPPED | OUTPATIENT
Start: 2017-09-14 | End: 2018-04-20

## 2017-09-14 RX ORDER — POTASSIUM CHLORIDE 20 MEQ/1
20 TABLET, EXTENDED RELEASE ORAL DAILY
Qty: 14 TABLET | Refills: 2 | Status: SHIPPED | OUTPATIENT
Start: 2017-09-14 | End: 2017-09-14

## 2017-09-14 RX ORDER — POTASSIUM CHLORIDE 20 MEQ/1
20 TABLET, EXTENDED RELEASE ORAL DAILY
Qty: 14 TABLET | Refills: 0 | Status: SHIPPED | OUTPATIENT
Start: 2017-09-14 | End: 2018-04-20

## 2017-09-14 RX ORDER — OXYCODONE HYDROCHLORIDE AND ACETAMINOPHEN 5; 325 MG/1; MG/1
TABLET ORAL
Qty: 60 TABLET | Refills: 0 | Status: SHIPPED | OUTPATIENT
Start: 2017-09-14 | End: 2018-04-20

## 2017-09-14 RX ORDER — POLYETHYLENE GLYCOL 3350 17 G/17G
17 POWDER, FOR SOLUTION ORAL DAILY PRN
Qty: 510 G | Refills: 0 | Status: SHIPPED | OUTPATIENT
Start: 2017-09-14 | End: 2017-09-14

## 2017-09-14 RX ORDER — METOPROLOL TARTRATE 100 MG/1
100 TABLET ORAL EVERY 12 HOURS SCHEDULED
Qty: 60 TABLET | Refills: 2 | Status: SHIPPED | OUTPATIENT
Start: 2017-09-14 | End: 2017-09-14

## 2017-09-14 RX ORDER — METOPROLOL TARTRATE 100 MG/1
100 TABLET ORAL EVERY 12 HOURS SCHEDULED
Qty: 60 TABLET | Refills: 0 | Status: SHIPPED | OUTPATIENT
Start: 2017-09-14 | End: 2018-05-04 | Stop reason: SDUPTHER

## 2017-09-14 RX ORDER — ASPIRIN 81 MG/1
81 TABLET, CHEWABLE ORAL DAILY
Qty: 30 TABLET | Refills: 0 | Status: SHIPPED | OUTPATIENT
Start: 2017-09-14

## 2017-09-14 RX ORDER — INSULIN GLARGINE 100 [IU]/ML
28 INJECTION, SOLUTION SUBCUTANEOUS
Qty: 10 ML | Refills: 2 | Status: SHIPPED | OUTPATIENT
Start: 2017-09-14 | End: 2017-09-14

## 2017-09-14 RX ADMIN — AMIODARONE HYDROCHLORIDE 200 MG: 200 TABLET ORAL at 11:48

## 2017-09-14 RX ADMIN — DOCUSATE SODIUM 100 MG: 100 CAPSULE, LIQUID FILLED ORAL at 08:08

## 2017-09-14 RX ADMIN — POTASSIUM CHLORIDE 20 MEQ: 1500 TABLET, EXTENDED RELEASE ORAL at 08:08

## 2017-09-14 RX ADMIN — ASPIRIN 81 MG 81 MG: 81 TABLET ORAL at 08:08

## 2017-09-14 RX ADMIN — POTASSIUM CHLORIDE 20 MEQ: 1500 TABLET, EXTENDED RELEASE ORAL at 11:48

## 2017-09-14 RX ADMIN — POLYETHYLENE GLYCOL 3350 17 G: 17 POWDER, FOR SOLUTION ORAL at 08:08

## 2017-09-14 RX ADMIN — LISINOPRIL 10 MG: 10 TABLET ORAL at 08:08

## 2017-09-14 RX ADMIN — METOPROLOL TARTRATE 100 MG: 50 TABLET ORAL at 08:08

## 2017-09-14 RX ADMIN — PANTOPRAZOLE SODIUM 40 MG: 40 TABLET, DELAYED RELEASE ORAL at 05:08

## 2017-09-14 RX ADMIN — FUROSEMIDE 40 MG: 10 INJECTION, SOLUTION INTRAMUSCULAR; INTRAVENOUS at 05:08

## 2017-09-14 RX ADMIN — AMIODARONE HYDROCHLORIDE 200 MG: 200 TABLET ORAL at 08:08

## 2017-09-14 NOTE — DISCHARGE SUMMARY
Discharge Summary - Cardiothoracic Surgery   Carisa Cruz  71 y o  male MRN: [id number]  Unit/Bed#: [facility] 401-01 Encounter: [id number]    Admission Date: 9/6/2017     Discharge Date: 09/14/17    Admitting Diagnosis: Aortic dissection [I71 00]    Primary Discharge Diagnosis:   Acute Type A aortic dissection, ascending aortic aneurysm  S/P  Replacement of ascending aorta with aggressive hemiarch reconstruction with a 26 mm Dacron graft  Secondary Discharge Diagnosis:   Prostate CA, NIDDM, HTN, obesity, arthritis, HL, hypokalemia, ICH (in 2014 with no residual symptoms), thyroid nodule, BPH, Hope Palsy (right facial weakness), microalbuminuria (follows with [facility])        Attending: ALPHONSE Mcgarry  Consulting Physician(s):   Cardiology  Medical/Critical Care  Endocrinology     Procedures Performed:     9/6: Replacement of ascending aorta with aggressive hemiarch reconstruction with a 26 mm Dacron graft  Hospital Course:     9/6:  Patient found to have acute type A dissection and taken for Emergent Replacement of ascending aorta with hemiarch reconstruction  Gtts: epi initially weaned off, Cardene  Intraoperative blood products: 2 plt, 2 PRBCs, 2 cryo, 2 FFP  Postoperative coagulopathy additional 2 cryo, 2 plt, 2 FFP, 1 prbc, Amicar, Factor VII, and DDAVP  Patient with continued bleeding with persistent hypotension, taken back to OR for Mediastinal exploration and evacuation of clot  Given 3 U PRBCs intraop  Transferred to ICU hemodynamically stable on Amicar and insulin gtt  Started on cardene @ 15  Not bleeding  QTc 597, hold amio x 1 day  Wean to extubate  9/7: Remains intubated 2/2 agitation  Moves all 4 ext, but not following commands  Wean ventilator  Lopressor 25 5 BID  Femoral arterial removed  Continue ICU level of care  Wean cardene off  Extubated to HFNC  Precedex weaned down  Neurologic exam improving    9/8:Patient agitated overnight on precedex 0 7, seroquil 25, on 1:1 and in 4 x restraints  Overnight hypoxic required HFNC, and given 1 x dose lasix  Went into Afib with RVR given bolus and started on amio gtt and electrolytes were repleated  Today, start lopressor 25 if taking PO, if not 5 lopressor IV q 6, 40 IV lasix  DC swan and EPW  Check Renal US as R kidney is in the true lumen and L in false lumen  L kidney with flow on US  Given 40 lasix and volume challenge, juan booker need to start lasix gtt overnight for low UOP  Patient more comfortable on BiPAP  continue though night  9/9: Amio infusion at 0 5; D/C this AM   HFNC weaned to 2 L NC  Lasix infusion at 10  Harry weaned off  Remains in AF; Coumadin 2 5 mg  Chest tubes and arterial line removed  Lopressor increased to 5 BID   9/10: Lasix infusion decreased to 5; Transition to intermittent IV dosing  Remains in AF  Heparin infusion started; Coumadin dosed  Lopressor increased to 50 TID  Lisinopril 5 mg QD started  Transferred from ICU to stepdown  9/11: Remains in rate-controlled afib  Heparin gtt, Coumadin 7 5mg tonight  INR 1 36  D/C ang, PT/OT eval  Transfer to Stepdown status  Frequent PVCs or aberrantly conducted atrial fibrillation complexes  Cardiology recommends considering decreasing duiretic, which may be contributing to tachycardia  discuss during rounds tomorrow  PT recommends home PT  9/12: Patient remains in a fib rates 110s, up to 140-160s overnight  Increase lopressor to 100mg BID  Remains on heparing gtt, INR 1 5 today, dose 5 mg coumadin tonight  Continue lasix 40 TID as net even x 24 hours  K 3 3 today, aggressively replete  Mag 2 2 yesterday, recheck tomorrow  Start insulin teaching today, cont to follow endo recommendations  Update level of care to tele  9/13: In NSR  INR 2 3  stop IV heparin, Coumadin 5 mg tonight  Increased lisinopril to 10 mg due to hypertension  D/C TLC  Insulin teaching  Ambulating well  On RA   9/14:Home today  INR thereapeutic on 7 5 mg coumadin  Diuretics x 2 weeks  with daily weights, salt restriction and fluid restriction  Amiodarone taper at discharge  New to insulin and does well with self administration  Home  with [facility] and home PT ([facility])    Condition at Discharge:   stable     Discharge Physical Exam:  HEENT/NECK:  PERRLA  No jugular venous distention  Cardiac: Regular rate and rhythm  No rubs/murmurs/gallops  Pulmonary:  Breath sounds slightly diminished at the bases bilaterally  Abdomen:  Non-tender, Non-distended  Positive bowel sounds  Incisions: Sternum is stable  Incision is clean, dry, and intact  Lower extremities: Extremities warm/dry  Radial/PT/DP pulses 2+ bilaterally  Trace edema B/L  Neuro: Alert and oriented X 3  Sensation is grossly intact  No focal deficits  Skin: Warm/Dry, without rashes or lesions  Discharge Data:    Results from last 7 days  Lab Units 09/12/17  0459 09/11/17  0508 09/10/17  0502   WBC Thousand/uL 14 06* 15 91* 20 51*   HEMOGLOBIN g/dL 11 4* 12 2 12 3   HEMATOCRIT % 35 0* 36 1* 36 4*   PLATELETS Thousands/uL 235 205 187       Results from last 7 days  Lab Units 09/13/17  0518 09/12/17  0459 09/11/17  1154 09/11/17  0508   SODIUM mmol/L 141 146*  --  145   POTASSIUM mmol/L 3 8 3 3* 4 1 3 4*   CHLORIDE mmol/L 107 108  --  107   CO2 mmol/L 29 30  --  29   BUN mg/dL 26* 28*  --  32*   CREATININE mg/dL 0 92 0 86  --  0 96   GLUCOSE RANDOM mg/dL 103 96  --  157*   CALCIUM mg/dL 7 3* 7 2*  --  6 8*       Results from last 7 days  Lab Units 09/14/17  0504 09/13/17  0518 09/12/17 2011 09/12/17  1354 09/12/17  0459   INR  2 38* 2 38*  --   --  1 50*   PTT seconds  --  97* 83* 72* 113*     Coumadin mg 5 mg daily  5 7 5  7 5 2 5, 2 5         Discharge instructions/Information to patient and family:   See after visit summary for information provided to patient and family        Gabby Delaney  was educated on restrictions regarding driving and lifting, and techniques of proper incisional care   They were specifically counselled on signs and symptoms of a sternal wound infection, and advised to contact our service immediately should they develop fevers, sweats, chill, redness or drainage at the site of any incisions  Provisions for Follow-Up Care:  See after visit summary for information related to follow-up care and any pertinent home health orders  Disposition:  Home    Planned Readmission:   No    Discharge Medications:  See after visit summary for reconciled discharge medications provided to patient and family  Kiah Romeo  was provided contact information and scheduled a follow up appointment with ALPHONSE Beltrán  Additionally, they were advised to schedule follow up appointments to be seen by their primary care physician within 7-10 days, and their cardiologist within 2-3 weeks  Contact information was provided  CTA C/ A/P Scheduled on 10/4/17 @ 8:30AM AT [facility]  * 3 HOUR FASTING, MAY HAVE CLEAR LIQUIDS *      The patient was discharged on ongoing diuretic therapy with Torsemide 20 mg, PO Daily and Potassium Chloride 20 mEq, PO Daily  They were advised to continue these medications for 14 days, unless otherwise directed  Narcotic pain medication was prescribed in the form of Percocet  Prior to prescribing, their prescription profile was reviewed on the [place] department of health prescription drug monitoring program       The patient was prescribed injectable insulin for management of their pre-existing diabetes  Call Dr Jessenia De Dios office to make an appointment to be seen in 2-3 weeks  Take insulin glargine (Lantus) 28 Units at bedtime each night, and take insulin lispro (Humalog) 8 Units three times a day before meals delivered via injectable pen system, unless otherwise directed  Keep a log of your blood sugars and call the office in 1 week to report glucose   Measure your blood sugar levels 3-4 times a day    Call your endocrinology office with any questions or concerns about your insulin  Patient has own glucose monitor at home    Insulin teaching was completed in the hospital and the patient was able to demonstrate competency with the pen needle system and felt comfortable doing so  Alayna Madden  is being discharged on anticoagulation therapy for treatment of PAF  As they are new to Coumadin, arrangements have been made for Dr Marc Ramos office to monitor INR levels and provide dosing instructions  Their office was notified by phone call and facsimile report which itemized the patients daily INRs and correlating Coumadin doses during their hospitalization  The patient has been provided a prescription for PT/INR to be drawn every Monday and Thursday, with results to Dr Marc Ramos office  They have been prescribed Coumadin, 5 mg tabs, with 60 tablets being dispensed  Finally, they have been advised to take 5 mg daily, unless otherwise directed  The patient was informed that following their postoperative surgical evaluation, they will be referred to outpatient cardiac rehabilitation  They were counseled that this program is run by specialists who will help them safely strengthen their heart and prevent more heart disease  Cardiac rehabilitation will include exercise, relaxation, stress management, and heart-healthy nutrition  Caregivers will also check to make sure their medication regimen is working  I spent 30 minutes discharging the patient  This time was spent on the day of discharge  I had direct contact with the patient on the day of discharge  Additional documentation is required if more than 30 minutes were spent on discharge       SIGNATURE: Glendy Colon PA-C  DATE: September 14, 2017  TIME: 1:22 PM

## 2017-09-14 NOTE — PROGRESS NOTES
Cardiology Progress Note - Norma Mondragon  71 y o  male MRN: [id number]    Unit/Bed#: [facility] 401-01 Encounter: [id number]      Assessment/Recommendations:  1) Acute type A aortic dissection with Ascending aortic aneurysm: s/p hemiarch reconstruction with 26mm Dacron graft, complicated post-op by bleeding at distal anastomosis s/p repair  Continue BP control - uptitrated lisinopril, improved  2) Afib: paroxysmal, currently SR, continue amiodarone and metoprolol   Started on coumadin for anticoagulation  3) HTN: acceptable, watch on lisinopril 10mg  4) HLD: continue statin  5) Stable for discharge from cardiac standpoint  Follow-up in the office with me in 2 weeks  Subjective:   Patient seen and examined  No significant events overnight   ; pertinent negatives - chest pain, chest pressure/discomfort, dyspnea, fatigue, irregular heart beat and palpitations  Objective:     Vitals: Blood pressure 135/80, pulse 77, temperature 98 1 °F (36 7 °C), temperature source Oral, resp  rate 20, height 5' 6 5" (1 689 m), weight 109 kg (240 lb 11 9 oz), SpO2 93 %  , Body mass index is 38 27 kg/m² , Orthostatic Blood Pressures    Flowsheet Row Most Recent Value   Blood Pressure  135/80 [Map 102] filed at 09/14/2017 1029   Patient Position - Orthostatic VS  Lying filed at 09/14/2017 1029            Intake/Output Summary (Last 24 hours) at 09/14/17 1340  Last data filed at 09/14/17 1231   Gross per 24 hour   Intake              760 ml   Output             1900 ml   Net            -1140 ml       TELE: No significant arrhythmias seen  Physical Exam:    GEN: Norma Mondragon   appears well, alert and oriented x 3, pleasant and cooperative   HEENT: pupils equal, round, and reactive to light; extraocular muscles intact  NECK: supple, no carotid bruits   HEART: regular rhythm, normal S1 and S2, no murmurs, clicks, gallops or rubs   LUNGS: clear to auscultation bilaterally; no wheezes, rales, or rhonchi   ABDOMEN: normal bowel sounds, soft, no tenderness, no distention  EXTREMITIES: peripheral pulses normal; no clubbing, cyanosis, or edema  NEURO: no focal findings   SKIN: normal without suspicious lesions on exposed skin    Medications:      Current Facility-Administered Medications:     acetaminophen (TYLENOL) tablet 650 mg, 650 mg, Oral, Q6H PRN, Ronn Mullen PA-C    amiodarone tablet 200 mg, 200 mg, Oral, TID With Meals, Ronn Mullen PA-C, 200 mg at 09/14/17 1148    aspirin chewable tablet 81 mg, 81 mg, Oral, Daily, Ronn Mullen PA-C, 81 mg at 09/14/17 0808    atorvastatin (LIPITOR) tablet 80 mg, 80 mg, Oral, Daily With Jessenia Mirza PA-C, 80 mg at 09/13/17 1718    bisacodyl (DULCOLAX) rectal suppository 10 mg, 10 mg, Rectal, Daily PRN, Ronn Mullen PA-C    docusate sodium (COLACE) capsule 100 mg, 100 mg, Oral, BID, Ronn Mullen PA-C, 100 mg at 09/14/17 0808    furosemide (LASIX) injection 40 mg, 40 mg, Intravenous, Q8H, Emelia Campos PA-C, 40 mg at 09/14/17 0508    insulin glargine (LANTUS) subcutaneous injection 28 Units, 28 Units, Subcutaneous, HS, Cleve Srinivasan DO, 28 Units at 09/13/17 2103    insulin lispro (HumaLOG) 100 units/mL subcutaneous injection 1-6 Units, 1-6 Units, Subcutaneous, HS, Ronn Mullen PA-C, 1 Units at 09/13/17 2102    insulin lispro (HumaLOG) 100 units/mL subcutaneous injection 2-12 Units, 2-12 Units, Subcutaneous, TID AC, 2 Units at 09/14/17 1148 **AND** Fingerstick Glucose (POCT), , , TID ACEmelia PA-C    insulin lispro (HumaLOG) 100 units/mL subcutaneous injection 8 Units, 8 Units, Subcutaneous, TID With Meals, Cleve Srinivasan DO, 8 Units at 09/14/17 1148    lisinopril (ZESTRIL) tablet 10 mg, 10 mg, Oral, Daily, Pedro Jarrett PA-C, 10 mg at 09/14/17 0808    metoprolol tartrate (LOPRESSOR) tablet 100 mg, 100 mg, Oral, Q12H [address], Ronn Mullen PA-C, 100 mg at 09/14/17 0808    OLANZapine (ZyPREXA) tablet 2 5 mg, 2 5 mg, Oral, HS, Ronn Mullen, 126 more[address] Liu, 2 5 mg at 09/13/17 2102    ondansetron (ZOFRAN) injection 4 mg, 4 mg, Intravenous, Q6H PRN, Justyna Hill PA-C    oxyCODONE-acetaminophen (PERCOCET) 5-325 mg per tablet 1 tablet, 1 tablet, Oral, Q4H PRN, Justyna Hill PA-C, 1 tablet at 09/13/17 2057    oxyCODONE-acetaminophen (PERCOCET) 5-325 mg per tablet 2 tablet, 2 tablet, Oral, Q6H PRN, Justyna Peto PA-C, 2 tablet at 09/12/17 1737    pantoprazole (PROTONIX) EC tablet 40 mg, 40 mg, Oral, Early Morning, ISABELLE Nur-C, 40 mg at 09/14/17 0508    polyethylene glycol (MIRALAX) packet 17 g, 17 g, Oral, Daily, Justyna Hill PA-C, 17 g at 09/14/17 0808    potassium chloride (K-DUR,KLOR-CON) CR tablet 20 mEq, 20 mEq, Oral, TID With Meals, Justyna Hill PA-C, 20 mEq at 09/14/17 1148     Labs & Results:          Results from last 7 days  Lab Units 09/12/17  0459 09/11/17  0508 09/10/17  0502   WBC Thousand/uL 14 06* 15 91* 20 51*   HEMOGLOBIN g/dL 11 4* 12 2 12 3   HEMATOCRIT % 35 0* 36 1* 36 4*   PLATELETS Thousands/uL 235 205 187           Results from last 7 days  Lab Units 09/13/17  0518 09/12/17  0459 09/11/17  1154 09/11/17  0508   SODIUM mmol/L 141 146*  --  145   POTASSIUM mmol/L 3 8 3 3* 4 1 3 4*   CHLORIDE mmol/L 107 108  --  107   CO2 mmol/L 29 30  --  29   BUN mg/dL 26* 28*  --  32*   CREATININE mg/dL 0 92 0 86  --  0 96   CALCIUM mg/dL 7 3* 7 2*  --  6 8*   GLUCOSE RANDOM mg/dL 103 96  --  157*       Results from last 7 days  Lab Units 09/14/17  0504 09/13/17  0518 09/12/17 2011 09/12/17  1354 09/12/17  0459   INR  2 38* 2 38*  --   --  1 50*   PTT seconds  --  97* 83* 72* 113*       Results from last 7 days  Lab Units 09/13/17  0518 09/11/17  0508 09/10/17  0533   MAGNESIUM mg/dL 1 9 2 2 1 8       Echo:  SUMMARY:  Limited study performed due to emergent nature  Limited valvular doppler was done      LEFT VENTRICLE:  Systolic function was normal  Ejection fraction was estimated in the range of 60 % to 65 %    Although no diagnostic regional wall motion abnormality was identified, this possibility cannot be completely excluded on the basis of this study      AORTIC VALVE:  The valve was probably trileaflet  Leaflets exhibited normal thickness and normal cuspal separation  There was trace regurgitation  The valve was not well visualized      PULMONIC VALVE:  Leaflets exhibited normal thickness, no calcification, and normal cuspal separation      There was flow acceleration at the level of the pulmonic valve  This could be from external compression  Less likely valvular stenosis      AORTA:  There was dilatation of the ascending aorta  4 7cm  A [place] type A dissection was seen  Flap visulaized in the aortic arch      PERICARDIUM:  A moderate to large pericardial effusion was identified circumferential to the heart  The fluid contained focal strands  There is early RV diastolic collapse and RA collapse  Respiratory variation is present with overall low LV stroke  volume  Tamponade physiology is seen  Fibrinous material in effusion likely thrombus given known aortic dissection  EKG personally reviewed by Cintia Elizondo MD      Counseling / Coordination of Care  Total floor / unit time spent today 30 minutes  Greater than 50% of total time was spent with the patient and / or family counseling and / or coordination of care

## 2017-09-14 NOTE — SOCIAL WORK
Plan return home today, Samara Leavitt 79 informed for nsg and PT, doing well with insulin, no visit needed today  Also new to Coumadin and they are aware INR on 9/18  Pt is aware that his 16 Rxs cost total is $137 66  Includes 2 insulins at $25 each  Pt  To  Rxs at [facility]  Daughter to transport home

## 2017-09-14 NOTE — PROGRESS NOTES
Progress Note - Cardiothoracic Surgery   Jacy Section  71 y o  male MRN: [id number]  Unit/Bed#: [facility] 401-01 Encounter: [id number]  Type A dissection  S/P replacement of  ascending aorta/ hemiarch; POD # 8    24 Hour Events: No events overnight  No complaints  Ambulating well  Pain controlled  Tolerating diet  Medications:   Scheduled Meds:  amiodarone 200 mg Oral TID With Meals   aspirin 81 mg Oral Daily   atorvastatin 80 mg Oral Daily With Dinner   docusate sodium 100 mg Oral BID   furosemide 40 mg Intravenous Q8H   insulin glargine 28 Units Subcutaneous HS   insulin lispro 1-6 Units Subcutaneous HS   insulin lispro 2-12 Units Subcutaneous TID AC   insulin lispro 8 Units Subcutaneous TID With Meals   lisinopril 10 mg Oral Daily   metoprolol tartrate 100 mg Oral Q12H DEMETRIA   OLANZapine 2 5 mg Oral HS   pantoprazole 40 mg Oral Early Morning   polyethylene glycol 17 g Oral Daily   potassium chloride 20 mEq Oral TID With Meals     Continuous Infusions:   PRN Meds:   acetaminophen    bisacodyl    ondansetron    oxyCODONE-acetaminophen    oxyCODONE-acetaminophen    Vitals:   Vitals:    09/14/17 0023 09/14/17 0308 09/14/17 0618 09/14/17 0703   BP: 146/74 134/72  136/75   Pulse: 66 69  80   Resp: 18 20  20   Temp: 98 7 °F (37 1 °C) 98 1 °F (36 7 °C)  98 1 °F (36 7 °C)   TempSrc: Oral Oral  Oral   SpO2: 92% 92%  92%   Weight:   109 kg (240 lb 11 9 oz)    Height:           Telemetry: NSR; Heart Rate: 84    Respiratory:   SpO2: SpO2: 92 %; Room Air    Intake/Output:   I/O       09/12 0701 - 09/13 0700 09/13 0701 - 09/14 0700 09/14 0701 - 09/15 0700    P  O  1480 880 120    I V  (mL/kg) 192 5 (1 8) 195 7 (1 8)     IV Piggyback       Total Intake(mL/kg) 1672 5 (15 3) 1075 7 (9 9) 120 (1 1)    Urine (mL/kg/hr) 1590 (0 6) 2825 (1 1) 0 (0)    Stool 0 (0) 0 (0)     Total Output 1590 2825 0    Net +82 5 -1749 4 +120           Unmeasured Urine Occurrence 2 x 0 x     Unmeasured Stool Occurrence 1 x 0 x         Weights: Weight (last 2 days)     Date/Time   Weight    17 0618  109 (240 74)    17 0746  110 (242 07)    17 0632  109 (240 3)            Admit weight: 113    Results:     Results from last 7 days  Lab Units 17  0459 17  0508 09/10/17  0502   WBC Thousand/uL 14 06* 15 91* 20 51*   HEMOGLOBIN g/dL 11 4* 12 2 12 3   HEMATOCRIT % 35 0* 36 1* 36 4*   PLATELETS Thousands/uL 235 205 187       Results from last 7 days  Lab Units 17  0518 17  0459 17  1154 17  0508   SODIUM mmol/L 141 146*  --  145   POTASSIUM mmol/L 3 8 3 3* 4 1 3 4*   CHLORIDE mmol/L 107 108  --  107   CO2 mmol/L 29 30  --  29   BUN mg/dL 26* 28*  --  32*   CREATININE mg/dL 0 92 0 86  --  0 96   GLUCOSE RANDOM mg/dL 103 96  --  157*   CALCIUM mg/dL 7 3* 7 2*  --  6 8*       Results from last 7 days  Lab Units 17  0504 17  0518 17  1354 17  0459   INR  2 38* 2 38*  --   --  1 50*   PTT seconds  --  97* 83* 72* 113*   Coumadin dosin/13 - 7 5   -7 5   - 7 5  9/10 - 2 5   - 2 5    Point of care glucose:     Studies:  No new studies     Invasive Lines/Tubes:  Invasive Devices     Central Venous Catheter Line            CVC Central Lines 17 Triple 7 days                Physical Exam:    HEENT/NECK:  PERRLA  No jugular venous distention  Cardiac: Regular rate and rhythm  No rubs/murmurs/gallops  Pulmonary:  Breath sounds slightly diminished at the bases bilaterally  Abdomen:  Non-tender, Non-distended  Positive bowel sounds  Incisions: Sternum is stable  Incision is clean, dry, and intact  Lower extremities: Extremities warm/dry  Radial/PT/DP pulses 2+ bilaterally  Trace edema B/L  Neuro: Alert and oriented X 3  Sensation is grossly intact  No focal deficits  Skin: Warm/Dry, without rashes or lesions      Assessment:  Patient Active Problem List   Diagnosis    Prostate cancer    Hypertension    Diabetes    Acute thoracic aortic dissection    Acute blood loss anemia    s/p aortic arch repair    Hypokalemia    Atrial fibrillation    Anticoagulated on Coumadin       Plan:    1  Cardiac:   NSR; HR/BP well-controlled  Lopressor 100 mg PO BID   Lisinopril 10 daily with better BP control  Continue ASA and Statin therapy  Epicardial pacing wires out  Central IV access no longer required; Remove central venous catheter today  Continue DVT prophylaxis    2  Pulmonary:   Good Room air oxygen saturation: Continue incentive spirometry/Coughing/Deep breathing exercises  Chest tubes have been discontinued    3  Renal:   Intake/Output net: -1749 mL/24 hours  Continue diuresis   Lasix 40 mg IV q 8  Potassium Chloride 20 mEq PO q 8  Post op Creatinine stable; Follow up labs prn    4  Neuro:  Neurologically intact; No active issues  Incisional pain well-controlled; Continue prn Percocet    5  GI:  Tolerating TLC 2 3 gm sodium diet, with consistent carbohydrate modifier  Maintain 1800 mL daily fluid restriction   Continue stool softeners and prn suppository  Continue GI prophylaxis    6  Endo:    No history of diabetes; Glucose well-controlled with sliding scale coverage    7  Hematology: Coumadin dosing for PAF - INR therapeutic   Post-operative acute blood loss anemia; Hemoglobin and hematocrit stable; trend prn    8   Disposition:  Anticipate discharge to home today     VTE Pharmacologic Prophylaxis: Warfarin (Coumadin)  VTE Mechanical Prophylaxis: sequential compression device    Collaborative rounds completed with RN    SIGNATURE: Yang Theodore PA-C  DATE: September 14, 2017  TIME: 8:13 AM

## 2017-09-18 ENCOUNTER — GENERIC CONVERSION - ENCOUNTER (OUTPATIENT)
Dept: OTHER | Facility: OTHER | Age: 72
End: 2017-09-18

## 2017-09-21 ENCOUNTER — ALLSCRIPTS OFFICE VISIT (OUTPATIENT)
Dept: OTHER | Facility: OTHER | Age: 72
End: 2017-09-21

## 2017-09-21 ENCOUNTER — GENERIC CONVERSION - ENCOUNTER (OUTPATIENT)
Dept: OTHER | Facility: OTHER | Age: 72
End: 2017-09-21

## 2017-09-26 ENCOUNTER — GENERIC CONVERSION - ENCOUNTER (OUTPATIENT)
Dept: OTHER | Facility: OTHER | Age: 72
End: 2017-09-26

## 2017-09-27 ENCOUNTER — ALLSCRIPTS OFFICE VISIT (OUTPATIENT)
Dept: OTHER | Facility: OTHER | Age: 72
End: 2017-09-27

## 2017-09-27 DIAGNOSIS — I48.0 PAROXYSMAL ATRIAL FIBRILLATION (HCC): ICD-10-CM

## 2017-09-30 ENCOUNTER — HOSPITAL ENCOUNTER (OUTPATIENT)
Dept: RADIOLOGY | Facility: HOSPITAL | Age: 72
Discharge: HOME | End: 2017-09-30
Payer: COMMERCIAL

## 2017-09-30 DIAGNOSIS — I48.0 PAROXYSMAL ATRIAL FIBRILLATION (HCC): ICD-10-CM

## 2017-09-30 PROCEDURE — 71020 HB CHEST X-RAY 2VW FRONTAL&LATL: CPT

## 2017-10-04 ENCOUNTER — HOSPITAL ENCOUNTER (OUTPATIENT)
Dept: CT IMAGING | Facility: HOSPITAL | Age: 72
Discharge: HOME | End: 2017-10-04
Attending: THORACIC SURGERY (CARDIOTHORACIC VASCULAR SURGERY)
Payer: COMMERCIAL

## 2017-10-04 DIAGNOSIS — I71.00 DISSECTION OF AORTA (HCC): ICD-10-CM

## 2017-10-04 PROCEDURE — 74174 CTA ABD&PLVS W/CONTRAST: CPT

## 2017-10-04 PROCEDURE — 71275 CT ANGIOGRAPHY CHEST: CPT

## 2017-10-04 RX ADMIN — IOHEXOL 100 ML: 350 INJECTION, SOLUTION INTRAVENOUS at 08:41

## 2017-10-12 ENCOUNTER — ALLSCRIPTS OFFICE VISIT (OUTPATIENT)
Dept: OTHER | Facility: OTHER | Age: 72
End: 2017-10-12

## 2017-10-13 ENCOUNTER — TRANSCRIBE ORDERS (OUTPATIENT)
Dept: ADMINISTRATIVE | Facility: HOSPITAL | Age: 72
End: 2017-10-13

## 2017-10-13 ENCOUNTER — GENERIC CONVERSION - ENCOUNTER (OUTPATIENT)
Dept: OTHER | Facility: OTHER | Age: 72
End: 2017-10-13

## 2017-10-13 DIAGNOSIS — I71.00 MEDIONECROSIS OF AORTA (HCC): Primary | ICD-10-CM

## 2017-10-13 NOTE — PROGRESS NOTES
Assessment  1  Benign essential hypertension (401 1) (I10)   2  Microalbuminuria (791 0) (R80 9)    Plan  Microalbuminuria    · (1) BASIC METABOLIC PROFILE; Status:Active; Requested for:01Oct2018; Perform:[facility]; PBT:[id number];RAEBSRM; For:Microalbuminuria; Ordered By:Taqueria Soto;   · (1) MICROALBUMIN CREATININE RATIO, RANDOM URINE; Status:Active; Requested  for:01Oct2018; Perform:[facility]; HPI:[id number];KZANITB; For:Microalbuminuria; Ordered By:Taqueria Soto;    Your creatinine is normal and this is the measure of your kidney funcyion  Protein in urine still very low so excellent  Colntinue sugar control, BP and Lisinopril and cholesterol treatment  Discussion/Summary   the patient is here for yearly follow-up to monitor kidney function  He was initially referred for proteinuria and given that he has diabetic we have been monitoring this  He is doing very well and he did undergo successful repair of his thoracic aorta  He is recovering from that  Latest creatinine is normal at 0 7 and protein estimation is still below 300 mg so he has microalbuminuria  There has been no progression or worsening of his kidney issues so discontinue with glycemic control, blood pressure control, lipid control and lisinopril to help reduce proteinuria and will continue to monitor yearly  I asked him to call me if there is any problems or he needs anything between visits  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  He has recovered beautifully from that and looks great  He says that he has a little bit tired but is improving  Review of Systems    Constitutional: fatigue, but-no fever,-no chills-and-no anorexia  Integumentary: no rashes  Gastrointestinal: no abdominal pain,-no nausea,-no diarrhea-and-no vomiting  Respiratory: no shortness of breath,-no cough-and-not coughing up sputum  Cardiovascular: no orthopnea,-no chest pain-and-no lower extremity edema  Neurological: No complaints of headache, no lightheadedness or dizziness  Genitourinary: no dysuria,-no hematuria-and-no incomplete emptying of bladder  Eyes: No complaints of eyesight problems or dryness of eyes  ENT: no complaints of hearing loss, no nasal discharge  Current Meds   1  Amiodarone HCl - 200 MG Oral Tablet; take 1 tab 3x day for 7 days then 1 tab 2x day   for 7 days then 1 tab daily for 7 days then stop; Therapy: [id number] to Recorded   2  Aspirin EC 81 MG Oral Tablet Delayed Release; TAKE 1 TABLET DAILY; Therapy: 54UEN7536 to (Evaluate:25Apr2018) Recorded   3  Atorvastatin Calcium 80 MG Oral Tablet; TAKE 1 TABLET DAILY; Therapy: 15FPO1494 to (Evaluate:25Apr2018) Recorded   4  Docusate Sodium 100 MG Oral Tablet; Take 1 tablet twice daily; Therapy: 77UBL6078 to ([phone]) Recorded   5  HumaLOG KwikPen 100 UNIT/ML Subcutaneous Solution Pen-injector; INJECT 5 UNITS   BEFORE MEALS; Therapy: 61IYN9816 to Recorded   6  Hydrocodone-Acetaminophen 5-325 MG Oral Tablet; take 1-2tab q6h as needed for pain; Therapy: 09JJW0727 to Recorded   7  Klor-Con M20 20 MEQ Oral Tablet Extended Release; take 1 tablet for 14 days; Therapy: 95RTY1400 to Recorded   8  Lantus SoloStar 100 UNIT/ML Subcutaneous Solution Pen-injector; INJECT 10 UNIT   Bedtime; Therapy: 46GRY4321 to Recorded   9  Lisinopril 10 MG Oral Tablet; TAKE 1 TABLET DAILY; Therapy: 60KPU9938 to ([phone]) Recorded   10  Metoprolol Tartrate 100 MG Oral Tablet; Take 1 tablet twice daily; Therapy: 69FWX7697 to (Evaluate:59Glm6325) Recorded   11  Omeprazole 20 MG Oral Capsule Delayed Release; TAKE 1 CAPSULE DAILY EVERY    MORNING BEFORE BREAKFAST;     Therapy: 95VNB0645 to (JXKYKPIV:15ZQU0074) Recorded   12  Torsemide 20 MG Oral Tablet; take 1 tablet daily for 14 days; Therapy: 30YLY6614 to Recorded   13  Warfarin Sodium 5 MG Oral Tablet; take as directed; Therapy: 11FXQ5313 to ([phone]) Recorded    The medication list was reviewed and updated today  Allergies  1  No Known Drug Allergies  2  No Known Environmental Allergies   3  No Known Food Allergies    Vitals  Vital Signs    Recorded: 93VRA4160 10:26AM Recorded: 97BRH0075 09:58AM   Heart Rate 68    Respiration 16    Systolic 030    Diastolic 80    Height  5 ft 5 5 in   Weight  228 lb    BMI Calculated  37 36   BSA Calculated  2 1     Physical Exam    Constitutional: General appearance: No acute distress, well appearing and well nourished  ENT: External ears and nose appear normal      Eyes: Anicteric sclerae  JVD:  No JVD present  Pulmonary: Respiratory effort: No increased work of breathing or signs of respiratory distress  -Auscultation of lungs: Clear to auscultation  Cardiovascular: Auscultation of heart: Normal rate and rhythm, normal S1 and S2, without murmurs  Abdomen: Non-tender, no masses  Extremities: No cyanosis, clubbing or edema  Rash:  Healing chest scars  Neurologic: Non Focal      Psychiatric: Orientation to person, place, and time: Normal        Results/Data  CTA CHEST ABDOMEN PELVIS W WO CONTRAST [id number] [facility] Order Number: [id number]    - Patient Instructions: SCHEDULED ON 10/4/17 @@ 8:30AM AT 140[facility], MAY HAVE CLEAR LIQUIDS *     Test Name Result Flag Reference   CTA CHEST ABDOMEN PELVIS W WO CONTRAST (Report)     CT ANGIOGRAM OF THE CHEST, ABDOMEN AND PELVIS WITH AND WITHOUT IV CONTRAST     INDICATION: Follow-up aortic dissection  History of prostate cancer       COMPARISON: September 6, 2017     TECHNIQUE: CT angiogram examination of the chest, abdomen and pelvis was performed according to standard protocol  This examination, like all CT scans performed in the [facility], was performed utilizing techniques to minimize    radiation dose exposure, including the use of iterative reconstruction and automated exposure control  Contrast as well as noncontrast images were obtained  3D reconstructions were performed an independent workstation, and are supplied for review  Rad dose 3005 mGy-cm      IV Contrast: 100 mL of iohexol (OMNIPAQUE)    Enteric Contrast: Not administered     FINDINGS:     VASCULAR STRUCTURES: The patient is status post repair of a [place] a dissection  The ascending aortic graft is widely patent with surrounding fluid, likely postoperative  A dissection remains extending from the distal anastomosis of the ascending    aortic graft distal abdominal aorta at the level of the inferior mesenteric artery  There is classic branching anatomy of the aortic arch without stenoses at the origins of the great vessels  The vessels arise from the true lumen  The dissection extends into the celiac artery and proximal splenic artery which is unchanged  The right renal artery and superior mesenteric artery arise from the true lumen  The left renal artery arises from the false lumen  The inferior mesenteric    artery is patent  The right and left iliofemoral segments are moderately tortuous and ectatic, but patent  Multiple dilated vessels are noted within the mediastinum with multiple aneurysms  The largest aneurysm is anterior to the main pulmonary artery and measures 26 mm demonstrating no calcifications  A smaller circumferentially calcified aneurysm adjacent   to the left pulmonary artery is 12 mm  A 3rd aneurysm adjacent to the upper left pulmonary artery branch is 14 mm without associated calcifications   Multiple dilated tortuous vessels appear to receive blood supply from the conus branch of the right    coronary artery the mid left anterior descending coronary artery, and from a bronchial artery arising from the distal aortic arch  These findings are most consistent with an arterial venous malformation  Comparison is made to the older study of April 25, 2014 which demonstrated the above findings which appear stable  OTHER FINDINGS:      CHEST:      HEART: Normal cardiac size  Small pericardial effusion decreased from the previous study  LUNGS: Bibasilar subsegmental atelectasis  [place] PLEURA: Moderate right and small left pleural effusion  MEDIASTINUM AND MARII: Expected postoperative changes  Small amount of fluid inferior to the median sternotomy site appears simple  CHEST WALL AND LOWER NECK: 19 x 17 mm heterogeneously enhancing thyroid nodule in the right lobe which is unchanged from 2014  ABDOMEN     LIVER/BILIARY TREE: Unremarkable  GALLBLADDER: No calcified gallstones  No pericholecystic inflammatory change  SPLEEN: Unremarkable  Normal size  PANCREAS: Unremarkable  ADRENAL GLANDS: Unremarkable  KIDNEYS/URETERS: No solid renal mass  No hydronephrosis  No urinary tract calculi  PELVIS     REPRODUCTIVE ORGANS: Unremarkable for patient's age  URINARY BLADDER: Unremarkable  ADDITIONAL ABDOMINAL AND PELVIC STRUCTURES:      STOMACH AND BOWEL: Unremarkable  ABDOMINOPELVIC CAVITY: No pathologically enlarged mesenteric or retroperitoneal lymph nodes  No ascites or free intraperitoneal air  ABDOMINAL WALL/INGUINAL REGIONS: Unremarkable  OSSEOUS STRUCTURES: No acute fracture or destructive osseous lesion  IMPRESSION:     Status post surgical repair of a [place] a dissection with expected postoperative changes  The dissection, which begins at the distal anastomosis of the ascending aortic graft, is unchanged in its extent and branch vessel involvement  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, the largest measuring 26 mm   Although evaluation is difficult, the supply appears to be from a bronchial artery, left anterior descending artery, and right conus artery with outflow into    the left atrial appendage  Further evaluation with gated CT of the heart versus cardiac catheterization including imaging of the thoracic aorta can be considered  The size of the aneurysm is concerning for increased risk of rupture  Considerations    related to the patient's age and/or comorbidities may be used to alter these recommendations  Small pericardial effusion with bilateral pleural effusions and subsegmental atelectasis  Stable right thyroid nodule        * I personally telephoned this result to Evelyncasandra Valerio on 10/4/2017 1:38 PM         Workstation performed: [id number]     Signed by:   Dragan Blood MD   10/4/17     (1) PT WITH INR [id number] 04:16PM Andreschristopher Headi     Test Name Result Flag Reference   INR 2 10  2 000 - 3 000   REPORT NAME: Progress Notes Report  VISIT DATE: 10/2/2017  VISIT TIME: 4:16 PM EDT  PATIENT NAME: Gordon Huizar   MEDICAL RECORD NUMBER: [id number]  YOB: 1945  AGE: 71  REFERRING PHYSICIAN: Mehdi Meade DO  SUPERVISING CLINICIAN: Mehdi Meade DO  HEALTH CARE PROVIDER: Guillermo Vasques  PATIENT HOME ADDRESS: [address] [place] [address]  PATIENT HOME PHONE: [phone]  SOCIAL SECURITY NUMBER:   DIAGNOSIS 1: Unspecified atrial fibrillation / I48 91  DIAGNOSIS 2:   INR RANGE: 2 - 3  INR GOAL: 2 5  TREATMENT START DATE:   TREATMENT END DATE:   NEXT VISIT: 10/9/2017  [facility]  VISIT RESULTS  ENCOUNTER NUMBER:   TEST LOCATION: [facility]  TEST TYPE: Outside Lab (Venipuncture)  VISIT TYPE: Phone Consult  CURRENT INR: 2 1 PROTIME: 23  SPECIMEN COL AND RPT DATE: 10/2/2017 4:16 PM  EDT  VITAL SIGNS  PULSE:  BP: / WEIGHT:  HEIGHT:  TEMP:   CURRENT ANTICOAGULANT DOSING SCHEDULE  DOSE SIZE: 5mg    ANTICOAGULANT TYPE: WARFARIN SOD  DOSING REGIMEN  Sun       Mon Tues Wed       Miranda Savin     Fri Sat  Total/Wk  2 5       2 5       2 5       2 5       2 5       2 5       2 5       17 5  PATIENT MEDICATION INSTRUCTION: Yes  PATIENT NUTRITIONAL COUNSELING: No  PATIENT BRUISING INSTRUCTION: No  LAST EDUCATION DATE:   PREVIOUS VISIT INFORMATION  VISITDATE  INRGoal INR   Sun    Mon    Tues Wed Thurs  Fri    Sat  Total/wk  10/2/2017   2 5     2 1   2 5    2 5    2 5    2 5    2 5    2 5    2 5  17 5  9/29/2017   2 5     2 5   2 5    2 5    0      0      0      2 5    2 5  10  9/26/2017   2 5     3 1   0      0      HOLD   2 5    2 5    0      0  5  9/22/2017   2 5     3     2 5    2 5    0      0      5      2 5    5  17 5  ADDITIONAL PREVIOUS VISIT INFORMATION  VISITDATE   PRIMARY RX               DOSE      CrCl  10/2/2017   WARFARIN SOD             5mg  9/29/2017   WARFARIN SOD             5mg  9/26/2017   WARFARIN SOD             5mg  9/22/2017   WARFARIN SOD             5mg  OTHER CURRENT MEDICATIONS:  WARFARIN SOD  PROGRESS NOTES:   PATIENT INSTRUCTIONS: 10/2/17, tc pt, cont 2 5 mg daily, next pt/ inr due 1  week, 10/9   will fax to [facility]  TEST LOCATION: [facility] ,   INBOUND LAB DATA:  Lab       Lab Value Col Date                 Rpt Date                 Lab  Reference Range  Electronically signed by:  Aleks Sam on 10/2/2017 4:16 PM EDT   PT 23 00     REPORT NAME: Progress Notes Report  VISIT DATE: 10/2/2017  VISIT TIME: 4:16 PM EDT  PATIENT NAME: Rajinder Romero   MEDICAL RECORD NUMBER: [id number]  YOB: 1945  AGE: 72  REFERRING PHYSICIAN: Eleni Galdamez DO  SUPERVISING CLINICIAN: Eleni Galdamez DO  HEALTH CARE PROVIDER: [facility]  PATIENT HOME ADDRESS: [address] [place] [address]  PATIENT HOME PHONE: [phone]  SOCIAL SECURITY NUMBER:   DIAGNOSIS 1: Unspecified atrial fibrillation / I48 91  DIAGNOSIS 2:   INR RANGE: 2 - 3  INR GOAL: 2 5  TREATMENT START DATE:   TREATMENT END DATE:   NEXT VISIT: 10/9/2017  [facility]  VISIT RESULTS  ENCOUNTER NUMBER:   TEST LOCATION: [facility]  TEST TYPE: Outside Lab (Venipuncture)  VISIT TYPE: Phone Consult  CURRENT INR: 2 1 PROTIME: 23  SPECIMEN COL AND RPT DATE: 10/2/2017 4:16 PM  EDT  VITAL SIGNS  PULSE:  BP: / WEIGHT:  HEIGHT:  TEMP:   CURRENT ANTICOAGULANT DOSING SCHEDULE  DOSE SIZE: 5mg    ANTICOAGULANT TYPE: WARFARIN SOD  DOSING REGIMEN  Sun       Mon Tues Wed Thurs Fri       Sat  Total/Wk  2 5       2 5       2 5       2 5       2 5       2 5       2 5       17 5  PATIENT MEDICATION INSTRUCTION: Yes  PATIENT NUTRITIONAL COUNSELING: No  PATIENT BRUISING INSTRUCTION: No  LAST EDUCATION DATE:   PREVIOUS VISIT INFORMATION  VISITDATE  INRGoal INR   Sun    Mon Tues Wed Thurs Fri    Sat  Total/wk  10/2/2017   2 5     2 1   2 5    2 5    2 5    2 5    2 5    2 5    2 5  17 5  9/29/2017   2 5     2 5   2 5    2 5    0      0      0      2 5    2 5  10  9/26/2017   2 5     3 1   0      0      HOLD   2 5    2 5    0      0  5  9/22/2017   2 5     3     2 5    2 5    0      0      5      2 5    5  17 5  ADDITIONAL PREVIOUS VISIT INFORMATION  VISITDATE   PRIMARY RX               DOSE      CrCl  10/2/2017   WARFARIN SOD             5mg  9/29/2017   WARFARIN SOD             5mg  9/26/2017   WARFARIN SOD             5mg  9/22/2017   WARFARIN SOD             5mg  OTHER CURRENT MEDICATIONS:  WARFARIN SOD  PROGRESS NOTES:   PATIENT INSTRUCTIONS: 10/2/17, tc pt, cont 2 5 mg daily, next pt/ inr due 1  week, 10/9   will fax to [facility]  lorin  TEST LOCATION: [facility] , ,   INBOUND LAB DATA:  Lab       Lab Value Col Date                 Rpt Date                 Lab  Reference Range  Electronically signed by:  Catarino Sam on 10/2/2017 4:16 PM EDT     * XR CHEST PA & LATERAL [id number] 11:45AM Jeannie Jorge Order Number: [id number]     Test Name Result Flag Reference   XR CHEST PA & LATERAL (Report)     CHEST - DUAL ENERGY     INDICATION: Status post open heart surgery with cardiac arrhythmia  COMPARISON: 9/8/2017  VIEWS: PA (including soft tissue/bone algorithms) and lateral projections     IMAGES: 4     FINDINGS: There are median sternotomy wires indicating prior cardiac surgery  Heart shadow is enlarged but unchanged from prior exam      Improved aeration with with small residual pleural effusions noted and mild bibasilar atelectasis  No pneumothorax at either apex  Visualized osseous structures appear within normal limits for the patient's age  IMPRESSION:       1  Improved aeration status post CABG with small pleural effusions and bibasilar atelectasis  2  Stable cardiomegaly         Workstation performed: [id number]     Signed by:   Areil Hardy MD   10/3/17     (1) PT WITH INR [id number] 01:46PM Gino Robles     Test Name Result Flag Reference   INR 2 50  2 000 - 3 000   REPORT NAME: Progress Notes Report  VISIT DATE: 9/29/2017  VISIT TIME: 1:46 PM EDT  PATIENT NAME: Patricia Rosenthal   MEDICAL RECORD NUMBER: [id number]  YOB: 1945  AGE: 71  REFERRING PHYSICIAN: Nichole Garcia DO  SUPERVISING CLINICIAN: Nichole Garcia DO  HEALTH CARE PROVIDER: [facility]  PATIENT HOME ADDRESS: [address] [place] [address], [address] [place] [address]  PATIENT HOME PHONE: [phone]  SOCIAL SECURITY NUMBER:   DIAGNOSIS 1: Unspecified atrial fibrillation / I48 91  DIAGNOSIS 2:   INR RANGE: 2 - 3  INR GOAL: 2 5  TREATMENT START DATE:   TREATMENT END DATE:   NEXT VISIT: 10/2/2017  [facility]  VISIT RESULTS  ENCOUNTER NUMBER:   TEST LOCATION: [facility]  TEST TYPE: Outside Lab (Venipuncture)  VISIT TYPE: Phone Consult  CURRENT INR: 2 5 PROTIME: 26 1  SPECIMEN COL AND RPT DATE: 9/29/2017 1:46  PM EDT  VITAL SIGNS  PULSE:  BP: / WEIGHT:  HEIGHT:  TEMP:   CURRENT ANTICOAGULANT DOSING SCHEDULE  DOSE SIZE: 5mg    ANTICOAGULANT TYPE: WARFARIN SOD  DOSING REGIMEN  Sun       Mon Tues Wed Thurs Fri       Sat  Total/Wk  2 5       2 5       0         0         0         2 5       2 5       10  PATIENT MEDICATION INSTRUCTION: Yes  PATIENT NUTRITIONAL COUNSELING: No  PATIENT BRUISING INSTRUCTION: No  LAST EDUCATION DATE:   PREVIOUS VISIT INFORMATION  VISITDATE  INRGoal INR   Sun    Mon Tues Wed Thurs Fri    Sat  Total/wk  9/29/2017   2 5     2 5   2 5    2 5    0      0      0      2 5    2 5  10  9/26/2017   2 5     3 1   0      0      HOLD   2 5    2 5    0      0  5  9/22/2017   2 5     3     2 5    2 5    0      0      5      2 5    5  17 5  9/18/2017   2 5     4     5      5      HOLD   2 5    0      0      0  12 5  ADDITIONAL PREVIOUS VISIT INFORMATION  VISITDATE   PRIMARY RX               DOSE      CrCl  9/29/2017   WARFARIN SOD             5mg  9/26/2017   WARFARIN SOD             5mg  9/22/2017   WARFARIN SOD             5mg  9/18/2017   WARFARIN SOD             5mg  OTHER CURRENT MEDICATIONS:  WARFARIN SOD  PROGRESS NOTES:   PATIENT INSTRUCTIONS: 9/29/17,tc pt, cont 2 5 mg warfarin daily  next pt/  inr due mon, 10/2   will fax to [facility]  TEST LOCATION: [facility] ,   INBOUND LAB DATA:  Lab       Lab Value Col Date                 Rpt Date                 Lab  Reference Range  Electronically signed by:  Whit Sam on 9/29/2017 1:46 PM EDT   PT 26 10     REPORT NAME: Progress Notes Report  VISIT DATE: 9/29/2017  VISIT TIME: 1:46 PM EDT  PATIENT NAME: Lalo Villagran   MEDICAL RECORD NUMBER: [id number]  YOB: 1945  AGE: 71  REFERRING PHYSICIAN: Benny Morrison DO  SUPERVISING CLINICIAN: Benny Morrison DO  HEALTH CARE PROVIDER: Whit Vasques  PATIENT HOME ADDRESS: [address] [place] [address]  PATIENT HOME PHONE: [phone]  SOCIAL SECURITY NUMBER:   DIAGNOSIS 1: Unspecified atrial fibrillation / I48 91  DIAGNOSIS 2:   INR RANGE: 2 - 3  INR GOAL: 2 5  TREATMENT START DATE:   TREATMENT END DATE:   NEXT VISIT: 10/2/2017  [facility]  VISIT RESULTS  ENCOUNTER NUMBER:   TEST LOCATION: [facility]  TEST TYPE: Outside Lab (Venipuncture)  VISIT TYPE: Phone Consult  CURRENT INR: 2 5 PROTIME: 26 1  SPECIMEN COL AND RPT DATE: 9/29/2017 1:46  PM EDT  VITAL SIGNS  PULSE:  BP: / WEIGHT:  HEIGHT:  TEMP:   CURRENT ANTICOAGULANT DOSING SCHEDULE  DOSE SIZE: 5mg    ANTICOAGULANT TYPE: WARFARIN SOD  DOSING REGIMEN  Sun       Mon Tues Wed Thurs Fri       Sat  Total/Wk  2 5       2 5       0         0         0         2 5       2 5       10  PATIENT MEDICATION INSTRUCTION: Yes  PATIENT NUTRITIONAL COUNSELING: No  PATIENT BRUISING INSTRUCTION: No  LAST EDUCATION DATE:   PREVIOUS VISIT INFORMATION  VISITDATE  INRGoal INR   Sun    Mon Tues Wed Thurs Fri    Sat  Total/wk  9/29/2017   2 5     2 5   2 5    2 5    0      0      0      2 5    2 5  10  9/26/2017   2 5     3 1   0      0      HOLD   2 5    2 5    0      0  5  9/22/2017   2 5     3     2 5    2 5    0      0      5      2 5    5  17 5  9/18/2017   2 5     4     5      5      HOLD   2 5    0      0      0  12 5  ADDITIONAL PREVIOUS VISIT INFORMATION  VISITDATE   PRIMARY RX               DOSE      CrCl  9/29/2017   WARFARIN SOD             5mg  9/26/2017   WARFARIN SOD             5mg  9/22/2017   WARFARIN SOD             5mg  9/18/2017   WARFARIN SOD             5mg  OTHER CURRENT MEDICATIONS:  WARFARIN SOD  PROGRESS NOTES:   PATIENT INSTRUCTIONS: 9/29/17,tc pt, cont 2 5 mg warfarin daily  next pt/  inr due mon, 10/2   will fax to [facility]  lorin  TEST LOCATION: [facility] ,   INBOUND LAB DATA:  Lab       Lab Value Col Date                 Rpt Date                 Lab  Reference Range  Electronically signed by:  Janna Meigs Perinotti on 9/29/2017 1:46 PM EDT       Future Appointments    Date/Time Provider Specialty Site   11/21/2017 01:30 PM Cherry Thomas, [address] [place] [address] 10/13/2017 11:00 AM ALPHONSE Baltazar  Cardiac Surgery [facility]   10/19/2017 01:00 PM Kings Cheema DO Cardiology [address] [place] [address]     Signatures   Electronically signed by :  ALPHONSE Lindquist ; Oct 12 2017 10:28AM EST                       (Author)

## 2017-10-19 ENCOUNTER — TRANSCRIBE ORDERS (OUTPATIENT)
Dept: ADMINISTRATIVE | Facility: HOSPITAL | Age: 72
End: 2017-10-19

## 2017-10-19 ENCOUNTER — ALLSCRIPTS OFFICE VISIT (OUTPATIENT)
Dept: OTHER | Facility: OTHER | Age: 72
End: 2017-10-19

## 2017-10-19 DIAGNOSIS — R60.9 EDEMA, UNSPECIFIED TYPE: ICD-10-CM

## 2017-10-19 DIAGNOSIS — I48.0 PAROXYSMAL ATRIAL FIBRILLATION (HCC): Primary | ICD-10-CM

## 2017-10-19 DIAGNOSIS — I10 ESSENTIAL HYPERTENSION, MALIGNANT: ICD-10-CM

## 2017-10-19 DIAGNOSIS — E78.49 OTHER HYPERLIPIDEMIA: ICD-10-CM

## 2017-10-19 DIAGNOSIS — R06.83 SNORING: ICD-10-CM

## 2017-10-20 NOTE — PROGRESS NOTES
Assessment  Assessed    1  Aortic aneurysm and dissection (441 00) (I71 00)   2  Hyperlipidemia (272 4) (E78 5)   3  Hypertension, essential (401 9) (I10)   4  Paroxysmal atrial fibrillation (427 31) (I48 0)   5  S/P aortic dissection repair (V45 89) (Z98 890)   6  Snoring (786 09) (R06 83)    Plan  Hypertension, essential    · Lisinopril-Hydrochlorothiazide 20-12 5 MG Oral Tablet; TAKE 1 TABLET DAILY   Rx By: Lei Castillo; Dispense: 30 Days ; #:30 Tablet; Refill: 3;For: Hypertension, essential; FLORIN = N; Verified Transmission to [facility] #[id number]; Last Updated By: System, SureScript[employer]; 10/19/2017 1:49:20 PM   · ECHO FOLLOW UP/LIMITED; Status:Need Information - Financial Authorization; Requested for: With next appointment;    Perform:[facility] Radiology; [id number]:[id number]; Ordered;For:Hypertension, essential; Ordered By:Lei Grove;   · Follow-up visit in 1 month Evaluation and Treatment  Follow-up  Status: Complete  Done:  40MPV0468 04:00PM   Ordered; For: Hypertension, essential; Ordered By: Umm Pineda Performed:  Due: 33IUK7685; Last Updated By: Lux Gabriel; 10/19/2017 2:06:24 PM   · Follow-up visit in 2 weeks Evaluation and Treatment  Follow-up  Status: Complete  Done:  67KGG2329 01:00PM   Ordered; For: Hypertension, essential; Ordered By: Umm Pineda Performed:  Due: 21AKC1878; Last Updated By: Lux Gabriel; 10/19/2017 2:05:28 PM  Hypertension, essential, Paroxysmal atrial fibrillation, Snoring    · *Polysomnography, Sleep Study, Diagnostic; Status:Need Information - Financial  Authorization; Requested [id number];    Perform:[facility]; Due:54Fxb4147; Ordered;For:Hypertension, essential, Paroxysmal atrial fibrillation, Snoring; Ordered By:Lei Grove;  there any other medical conditions or medications that would explain these      symptoms? : No  is the sleep disturbance affecting the patient's ability to function? : snoring, htn  History/Symptoms: : snoring, htn, aortic dissectoin  Study Only or Consult : Sleep Study and Consult and F/U with Sleep Specialist  Hypertension, essential, S/P aortic dissection repair    · Lisinopril 10 MG Oral Tablet   Rx By: Lei Garcia; Dispense: 30 Days ; #:30 Tablet; Refill: 6;For: Hypertension, essential, S/P aortic dissection repair; FLORIN = N; Sent To: [facility] #[id number]; Msg to Pharmacy: pt allowed 90day with 1 refill if insurance allows  Snoring    · Begin or continue regular aerobic exercise  Gradually work up to at least {count1}  sessions of {dur1} of exercise a week ; Status:Complete;   Done: 40RDN9962   Ordered; For:Snoring; Ordered By:Lei Grove;   · Eat a low fat and low cholesterol diet ; Status:Complete;   Done: 57NWV0527   Ordered; For:Snoring; Ordered By:Lei Grove;   · Restrict the salt in your diet by avoiding highly salted foods ; Status:Complete;   Done:  99XZE8765   Ordered; For:Snoring; Ordered By:Lei Grove;   · Take your blood pressure twice a day  Record the numbers and bring them with you to  your appointments ; Status:Complete;   Done: 35DHV4586   Ordered; For:Snoring; Ordered By:Lei Grove;    Discussion/Summary  Cardiology Discussion Summary Free Text Note Form [facility]:   1  history of type a aortic dissection, status post ascending aortic replacement/celia arch reconstruction: Feels well, no significant chest pain, continue strict blood pressure control  CT chest report reviewed from 10/04/2017, which reported 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 largest measuring 26 millimeters, difficult to evaluate  F/U with CT Surgery as scheduled  Hypertension: Elevated on today's visit  Will increase lisinopril to 20 milligrams daily and add hydrochlorothiazide 12 5 milligrams daily  Patient will return in 2 weeks for blood pressure check  Have instructed him to check his blood pressures twice daily and keep a log and bring in log in 2 weeks during his visit   If blood pressure remains elevated at home, have encouraged to call me right away so that we may adjust his medications over the phone  He should avoid NSAIDs and alcohol  I will schedule him for a sleep study as he does snore, and has some daytime fatigue  Amlodipine may be added in the future  Metoprolol is optimized  Obesity: Heart healthy diet, encouraged exercise and weight loss  Dyslipidemia: Continue high-dose statin, will check lipid panel in the future    5  Paroxysmally atrial fibrillation: Regular rhythm by exam today, will check ECG with next visit, continue warfarin to reduce risk of CVA   Patient to call right away if any abnormal bleeding or significant bruising  Chief Complaint  Chief Complaint Free Text Note Form: HTN      History of Present Illness  Cardiology HPI Free Text Note Form [facility]: This is a 80-year-old male, with a significant history of hypertension, who presented to [address] [place] [address] on 9/05/2017 with chest pain and accelerated hypertension  Troponin level was negative on admission and he was admitted to the floor with chest pain  In light of continued symptoms and labile hypertension, he was sent for a stat CT of his chest/abdomen, revealing a type a dissection  He was transferred to [address] [place] [address], and echocardiogram revealed a moderate to large pericardial effusion with signs of tamponade  On 09/06/2017 he underwent emergency surgery with replacement of the ascending aorta with celia arch reconstruction with a 26 millimeter Dacron graft    Postoperatively, he had mediastinal hemorrhage and went back to the operating room on 09/06/2017 with evacuation and correction of his hemorrhage  On postoperative day 1 , 09/07/2017 he had rapid atrial fibrillation and was initiated on amiodarone, and thereafter on warfarin for anticoagulation and stroke prevention  presents today for follow-up and generally feels well  He denies exertional chest pain or shortness of breath  He has some incisional pain when he coughs   He denies lower extremity edema  He admits to snoring and call somebody at 1 point told he probably had sleep apnea but has never had a sleep study  He does not eat much in the way of fast food, generally cooks at home, or gets fluid from his daughter's home nearby  He does not drink alcohol and uses NSAIDs infrequently  He has a blood pressure monitor at home, but does not regularly check his blood pressures   He is tolerating warfarin well without signs of abnormal bleeding or bruising  He underwent a CT of his chest in early October 2017 revealing a small pericardial effusion  He underwent a chest x-ray last month revealing small pleural effusions  Review of Systems  Cardiology Male ROS:     Cardiac: No complaints of chest pain, no palpitations, no fainiting ,-- no syncope/fainting,-- no AM fatigue-- and-- no witnessed apnea episodes  Genitourinary: frequent urination at night, but-- no recurrent urinary tract infections,-- no difficult urination,-- no blood in urine,-- no kidney stones,-- no loss of bladder control,-- no kidney problems,-- no prostate problems-- and-- no erectile dysfunction   Psychological: No complaints of feeling depressed, anxiety, panic attacks, or difficulty concentrating  General: lack of energy/fatigue, but-- no trouble sleeping,-- no appetite changes,-- no changes in weight,-- no fever,-- no night sweats-- and-- no frequent infections  Respiratory: shortness of breath, but-- no cough/sputum,-- no wheezing,-- no phlegm-- and-- no hemoptysis--   sometimes when bending over  HEENT: No complaints of serious problems, hearing problems, nose problems, throat problems, or snoring  Gastrointestinal: No complaints of liver problems, nausea, vomiting, heartburn, constipation, bloody stools, diarrhea, problems swallowing, adbominal pain, or rectal bleeding  Hematologic: No complaints of bleeding disorders, anemia, blood clots, or excessive brusing     Neurological: no numbness,-- no tingling,-- no weakness,-- no seizures,-- no headaches,-- no dizziness,-- no diplopia-- and-- no daytime sleepiness   Musculoskeletal: arthritis-- and-- back pain, but-- no swelling/pain       ROS Reviewed:   ROS reviewed  Active Problems  Problems    1  Allergic rhinitis (477 9) (J30 9)   2  Aortic aneurysm and dissection (441 00) (I71 00)   3  Arthritis (V13 4)   4  Bell's palsy (351 0) (G51 0)   5  Benign essential hypertension (401 1) (I10)   6  Diabetic peripheral neuropathy (250 60,357 2) (E11 42)   7  Dizziness (780 4) (R42)   8  Dizziness and giddiness (780 4) (R42)   9  Dry Skin (782 9)   10  Edema (782 3) (R60 9)   11  Elevated prostate specific antigen (PSA) (790 93) (R97 20)   12  Glaucoma screening (V80 1) (Z13 5)   13  H/O ascending aortic replacement (V15 1,V43 4) (Z95 828)   14  Head trauma (959 01) (S09 90XA)   15  Hearing Loss Bilaterally   16  Hyperlipidemia (272 4) (E78 5)   17  Hypertension, essential (401 9) (I10)   18  Hypokalemia (276 8) (E87 6)   19  Intracerebral hemorrhage (431) (I61 9)   20  Joint pain, knee (719 46) (M25 569)   21  Microalbuminuria (791 0) (R80 9)   22  Need for influenza vaccination (V04 81) (Z23)   23  Obesity (278 00) (E66 9)   24  Paroxysmal atrial fibrillation (427 31) (I48 0)   25  Postop check (V67 00) (Z09)   26  Prostate cancer (185) (C61)   27  S/P aortic dissection repair (V45 89) (Z98 890)   28  Stroke syndrome (434 91) (I63 9)   29  Thyroid nodule (241 0) (E04 1)   30  Tinnitus, unspecified laterality (388 30) (H93 19)   31  Type 2 diabetes mellitus with hyperglycemia (250 00) (E11 65)   32  Urinary Stream Starts And Stops (788 61)    Past Medical History  Problems    1  History of Arthritis (716 90) (M19 90)   2  History of Benign essential hypertension (401 1) (I10)   3  History of Cervical strain (847 0) (S16 1XXA)   4  History of Currently Wearing Eyeglasses   5  History of Diabetes Mellitus (250 00)   6  History of Hand pain (729 5) (M17 643)   7   History of sinusitis (V12 69) (Z87 09)   8  History of Need for influenza vaccination (V04 81) (Z23)   9  History of Pulmonary nodule seen on imaging study (793 11) (R91 1)   10  History of Screening for depression (V79 0) (Z13 89)   11  History of Screening for genitourinary condition (V81 6) (Z13 89)   12  History of Special screening for other neurological conditions (V80 09) (Z13 89)   13  History of Stroke (434 91) (I63 9)  Active Problems And Past Medical History Reviewed: The active problems and past medical history were reviewed and updated today  Surgical History  Problems    1  History of Aortic Aneurysm Repair Ascending Aorta   2  Denied: History of Recent Surgery    Family History  Mother    1  Family history of Stroke Complications  Family History    2  Family history of Currently Wearing Eyeglasses   3  Family history of Denial Of Any Significant Medical History   4  Family history of Family Health Status 2  Children Living   5  Family history of Family Health Status Of Father -    10  Family history of Family Health Status Of Mother -    9  Family history of Maternal Grandfather Is    6  Family history of Maternal Grandmother Is    5  Family history of Paternal Grandfather Is    8  Family history of Paternal Grandmother Is   Family History Reviewed: The family history was reviewed and updated today  Social History  Problems    · Being A Social Drinker   · Daily caffeine consumption, 1 serving a day   · Denied: History of Drug Use   · Education History   · Marital History -    · Never A Smoker   · Occupation: Retired   · Person living alone (V60 3) (Z60 2)   · Physical Activity Tolerance   · Denied: History of Sexually Active   · Denied: History of Sexually Active High-risk  Social History Reviewed: The social history was reviewed and updated today  Current Meds   1   Aspirin EC 81 MG Oral Tablet Delayed Release; TAKE 1 TABLET DAILY; Therapy: 73JRO2762 to (Evaluate:25Apr2018) Recorded   2  Atorvastatin Calcium 80 MG Oral Tablet; TAKE 1 TABLET DAILY; Therapy: 43JLH3565 to (Evaluate:78Vgw0081)  Requested for: 87CEW4471; Last   Rx:17Oct2017 Ordered   3  Docusate Sodium 100 MG Oral Tablet; Take 1 tablet twice daily; Therapy: 27MWT7489 to [phone]) Recorded   4  HumaLOG KwikPen 100 UNIT/ML Subcutaneous Solution Pen-injector; INJECT 5 UNITS   BEFORE MEALS; Therapy: 34VPL7840 to Recorded   5  Hydrocodone-Acetaminophen 5-325 MG Oral Tablet; take 1-2tab q6h as needed for pain; Therapy: 07RGW1319 to Recorded   6  Lantus SoloStar 100 UNIT/ML Subcutaneous Solution Pen-injector; INJECT 10 UNIT   Bedtime; Therapy: 43MOU3621 to Recorded   7  Lisinopril 10 MG Oral Tablet; TAKE 1 TABLET DAILY; Therapy: 22VYP8440 to (Evaluate:15May2018)  Requested for: 83LQH0945; Last   Rx:17Oct2017 Ordered   8  Metoprolol Tartrate 100 MG Oral Tablet; Take 1 tablet twice daily; Therapy: 47AEX2164 to (Evaluate:15Apr2018)  Requested for: 52XUQ8839; Last   Rx:17Oct2017 Ordered   9  Omeprazole 20 MG Oral Capsule Delayed Release; TAKE 1 CAPSULE DAILY EVERY   MORNING BEFORE BREAKFAST; Therapy: 98ZXB0266 to (Evaluate:28Geb7949)  Requested for: 92ZVQ0929; Last   Rx:17Oct2017 Ordered   10  Warfarin Sodium 5 MG Oral Tablet; take as directed; Therapy: 03VYK6555 to (Evaluate:25Apr2018) Recorded  Medication List Reviewed: The medication list was reviewed and updated today  Allergies  Medication    1  No Known Drug Allergies  Non-Medication    2  No Known Environmental Allergies   3  No Known Food Allergies    Vitals  Vital Signs    Recorded: 60BIG5234 01:08PM   Heart Rate 67   Respiration 15   Systolic 694   Diastolic 600   Weight 237 lb 4 oz   BMI Calculated 36 75   BSA Calculated 2 09     Physical Exam    Constitutional - General appearance: No acute distress, well appearing and well nourished     Eyes - Conjunctiva and Sclera examination: Conjunctiva pink, sclera anicteric  Neck - Normal, no JVD   Pulmonary - Respiratory effort: No signs of respiratory distress  -- Auscultation of lungs: Clear to auscultation  Cardiovascular - Auscultation of heart: Normal rate and rhythm, normal S1 and S2, no murmurs  -- Pedal pulses: Normal, 2+ bilaterally  -- Examination of extremities for edema and/or varicosities: Normal     Abdomen - Soft  Musculoskeletal - Gait and station: Normal gait  Inspection/palpation of joints, bones, and muscles: Abnormal   Skin -   Skin and subcutaneous tissue: Abnormal   Neurologic - Speech normal  No focal deficits  Psychiatric - Orientation to person, place, and time: Normal       Future Appointments    Date/Time Provider Specialty Site   11/02/2017 01:00 PM Cardiology [place], Nurse Schedule  [facility]  [facility]   11/21/2017 01:30 PM [address]   04/20/2018 09:45 AM ALPHONSE Maher   Cardiac Surgery CT SURGERY [facility]     Signatures   Electronically signed by : Cristy Zhu DO; Oct 19 2017  3:12PM EST                       (Author)

## 2017-10-23 ENCOUNTER — TRANSCRIBE ORDERS (OUTPATIENT)
Dept: SLEEP CENTER | Facility: CLINIC | Age: 72
End: 2017-10-23

## 2017-10-23 DIAGNOSIS — R06.83 SNORING: Primary | ICD-10-CM

## 2017-10-27 NOTE — PROGRESS NOTES
Assessment  Assessed    1  Aortic aneurysm and dissection (441 00) (I71 00)   2  Benign essential hypertension (401 1) (I10)   3  H/O ascending aortic replacement (V15 1,V43 4) (Z95 828)   4  Paroxysmal atrial fibrillation (427 31) (I48 0)    Plan  H/O ascending aortic replacement    · (1) BASIC METABOLIC PROFILE; Status:Active; Requested IFF:[id number];    Perform:[facility]; BLM:[id number]; Ordered;For:H/O ascending aortic replacement; Ordered By:Marylin Whiting;   · (1) CBC/ PLT (NO DIFF); Status:Active; Requested IHC:[id number];    Perform:[facility]; DBJ:[id number]; Ordered;For:H/O ascending aortic replacement; Ordered By:Marylin Whiting;   · (1) MAGNESIUM; Status:Active; Requested IGC:[id number];    Perform:[facility]; [id number]; Ordered;For:H/O ascending aortic replacement; Ordered By:Marylin Whiting;   · Eat a low fat and low cholesterol diet ; Status:Complete;   Done: 52ICE0780   Ordered;For:H/O ascending aortic replacement; Ordered By:Marylin Whiting;   · Restrict the salt in your diet by avoiding highly salted foods ; Status:Complete;   Done:  25BZE2631   Ordered;For:H/O ascending aortic replacement; Ordered By:Marylin Whiting;  Paroxysmal atrial fibrillation    · * XR CHEST PA & LATERAL; Status:Active; Requested FHX:[id number];    Perform:[facility]; JQB:[id number]; Ordered; For:Paroxysmal atrial fibrillation; Ordered By:Fela Whiting;   · EKG/ECG- POC; Status:Complete;   Done: 13IUY1801 11:57AM   Perform: In Office; Last Updated By:Summer Mathur; 2017 11:57:36 AM;Ordered; For:Paroxysmal atrial fibrillation; Ordered By:Fela Whiting;    Discussion/Summary  Cardiology Discussion Summary Free Text Note Form  [place]:   Mr Carlos Azul presents to our office today for a recent hospitalization with an ascending aortic dissection sp replacement of ascending aorta with aggressive hemiarch reconstruction with 26mm Dacron graft  He is eu volemic  /80 and HR 63 BPM  He is in NSR   No changes in medical regimen today  I have reviewed all his medications with Armand Malik  He is aware when to stop certain medications  I have ordered a CXR PA/Lat due to diminished breath sounds left base with co of dyspnea  I have ordered a CBC, BMP and mag to be done in the near future  Armand Malik remains on Coumadin for stroke prevention due to PAF  He will continue on a 2 gm sodium diet  Armand Malik will follow up with one of our cardiologist in one month  Chief Complaint  Chief Complaint Free Text Note Form: f/u ph aortic dissection      History of Present Illness  Cardiology HPI Free Text Note Form [facility]: Mr Sachi Glover is a 71year old male with a known past medical history of HTN, DM2, Prostate CA, morbid obesity, HLD, thyroid nodule, bells palsy, microalbuminia followed by Dr Aki Romero, and basal ganglia hemorrhagic stroke in 2014 no residual  Last TTE done 1/19/21 LV systolic funciton normal EF 60-65%, aorta with dilatation of the ascending aorta 4 7cm, augustine type A dissection was seen  mod large pericardial effusion Mr Ted Lugo presented to [address] [address] on 9/05-9/06/17 with chest pain and HTN urgency  He presented with sudden CP and epigastric pain  SBP in 200's with SB  CT of chest abdomen done showed type A dissection  He was transferred emergently to [facility]  He was admitted from 9/06-9/14/17 to [facility]  On 9/06/17 Armand Malik underwent an emergent replacement of ascending aorta with hemiarch reconstruction  He received blood products post op for bleeding  He was taken back to the OR for mediastinal exploration and evacuation of blood clots  He was extubated on POD#1 to High flow [place] oxygen  On POD #2 he experienced atrial fibrillation with RVR and was started on IV Amiodarone  POD#3 He was placed on IV Lasix drip  Amiodarone stopped  He remained in controlled atrial fibrillation  He was started on IV Heparin and transitioned to Coumadin  Armand Malik was discharged home on POD #8     Armand Malik presents to our office for a recent hospitalization follow up visit  He admits to fatigue and dyspnea with walking up stairs  He admits to occasional lightheadedness with bending over  Eunice Weiner is taking all medications as prescribed  He continues on Coumadin follwed by the [address] [facility]  Eunice Weiner is followed by N[facility]INDER  9/14/17 INR 2 38  NO other lab studies done  Review of Systems  Cardiology Male ROS:     Cardiac: No complaints of chest pain, no palpitations, no fainiting ,-- no syncope/fainting,-- no AM fatigue-- and-- no witnessed apnea episodes  Skin: No complaints of nonhealing sores or skin rash  Genitourinary: kidney problems, but-- no recurrent urinary tract infections,-- no frequent urination at night,-- no difficult urination,-- no blood in urine,-- no kidney stones,-- no loss of bladder control,-- no prostate problems-- and-- no erectile dysfunction-- says he has pain in his kidney  Psychological: No complaints of feeling depressed, anxiety, panic attacks, or difficulty concentrating  General: lack of energy/fatigue, but-- no trouble sleeping,-- no appetite changes,-- no changes in weight,-- no fever,-- no night sweats-- and-- no frequent infections  Respiratory: shortness of breath, but-- no cough/sputum,-- no wheezing,-- no phlegm-- and-- no hemoptysis--   a little bit sob  HEENT: No complaints of serious problems, hearing problems, nose problems, throat problems, or snoring  Gastrointestinal: No complaints of liver problems, nausea, vomiting, heartburn, constipation, bloody stools, diarrhea, problems swallowing, adbominal pain, or rectal bleeding  Hematologic: No complaints of bleeding disorders, anemia, blood clots, or excessive brusing     Neurological: weakness, but-- no numbness,-- no tingling,-- no seizures,-- no headaches,-- no dizziness,-- no diplopia-- and-- no daytime sleepiness   Musculoskeletal: arthritis-- and-- back pain, but-- no swelling/pain      Active Problems  Problems    1  Allergic rhinitis (477 9) (J30 9)   2  Aortic aneurysm and dissection (441 00) (I71 00)   3  Arthritis (V13 4)   4  Bell's palsy (351 0) (G51 0)   5  Benign essential hypertension (401 1) (I10)   6  Diabetic peripheral neuropathy (250 60,357 2) (E11 42)   7  Dizziness (780 4) (R42)   8  Dizziness and giddiness (780 4) (R42)   9  Dry Skin (782 9)   10  Edema (782 3) (R60 9)   11  Elevated prostate specific antigen (PSA) (790 93) (R97 20)   12  Glaucoma screening (V80 1) (Z13 5)   13  Head trauma (959 01) (S09 90XA)   14  Hearing Loss Bilaterally   15  Hyperlipidemia (272 4) (E78 5)   16  Hypertension, essential (401 9) (I10)   17  Hypokalemia (276 8) (E87 6)   18  Intracerebral hemorrhage (431) (I61 9)   19  Joint pain, knee (719 46) (M25 569)   20  Microalbuminuria (791 0) (R80 9)   21  Need for influenza vaccination (V04 81) (Z23)   22  Obesity (278 00) (E66 9)   23  Prostate cancer (185) (C61)   24  Stroke syndrome (434 91) (I63 9)   25  Thyroid nodule (241 0) (E04 1)   26  Tinnitus, unspecified laterality (388 30) (H93 19)   27  Type 2 diabetes mellitus with hyperglycemia (250 00) (E11 65)   28  Urinary Stream Starts And Stops (788 61)    Past Medical History  Problems    1  History of Arthritis (716 90) (M19 90)   2  History of Benign essential hypertension (401 1) (I10)   3  History of Cervical strain (847 0) (S16 1XXA)   4  History of Currently Wearing Eyeglasses   5  History of Diabetes Mellitus (250 00)   6  History of Hand pain (729 5) (M79 643)   7  History of sinusitis (V12 69) (Z87 09)   8  History of Need for influenza vaccination (V04 81) (Z23)   9  History of Pulmonary nodule seen on imaging study (793 11) (R91 1)   10  History of Screening for depression (V79 0) (Z13 89)   11  History of Screening for genitourinary condition (V81 6) (Z13 89)   12  History of Special screening for other neurological conditions (V80 09) (Z13 89)   13   History of Stroke (434 91) (I63 9)  Active Problems And Past Medical History Reviewed: The active problems and past medical history were reviewed and updated today  Surgical History  Problems    1  Denied: History of Recent Surgery  Surgical History Reviewed: The surgical history was reviewed and updated today  Family History  Mother    1  Family history of Stroke Complications  Family History    2  Family history of Currently Wearing Eyeglasses   3  Family history of Denial Of Any Significant Medical History   4  Family history of Family Health Status 2  Children Living   5  Family history of Family Health Status Of Father -    10  Family history of Family Health Status Of Mother -    9  Family history of Maternal Grandfather Is    6  Family history of Maternal Grandmother Is    5  Family history of Paternal Grandfather Is    8  Family history of Paternal Grandmother Is   Family History Reviewed: The family history was reviewed and updated today  Social History  Problems    · Being A Social Drinker   · Daily caffeine consumption, 1 serving a day   · Denied: History of Drug Use   · Education History   · Marital History -    · Never A Smoker   · Occupation: Retired   · Person living alone (V60 3) (Z60 2)   · Physical Activity Tolerance   · Denied: History of Sexually Active   · Denied: History of Sexually Active High-risk  Social History Reviewed: The social history was reviewed and updated today  The social history was reviewed and is unchanged  Current Meds   1  Amiodarone HCl - 200 MG Oral Tablet; take 1 tab 3x day for 7 days then 1 tab 2x day   for 7 days then 1 tab daily for 7 days then stop; Therapy: 98KRW1356 to Recorded   2  Aspirin EC 81 MG Oral Tablet Delayed Release; TAKE 1 TABLET DAILY; Therapy: 75DZG2346 to (Evaluate:2018) Recorded   3  Atorvastatin Calcium 80 MG Oral Tablet; TAKE 1 TABLET DAILY; Therapy: 68OUB4628 to (Evaluate:2018) Recorded   4  Docusate Sodium 100 MG Oral Tablet; Take 1 tablet twice daily; Therapy: 73IHQ9305 to (14 593 135) Recorded   5  HumaLOG KwikPen 100 UNIT/ML Subcutaneous Solution Pen-injector; INJECT 5 UNITS   BEFORE MEALS; Therapy: 98AGE7447 to Recorded   6  Hydrocodone-Acetaminophen 5-325 MG Oral Tablet; take 1-2tab q6h as needed for pain; Therapy: 01HLI2576 to Recorded   7  Klor-Con M20 20 MEQ Oral Tablet Extended Release; take 1 tablet for 14 days; Therapy: 10HQE4047 to Recorded   8  Lantus SoloStar 100 UNIT/ML Subcutaneous Solution Pen-injector; INJECT 10 UNIT   Bedtime; Therapy: 48MFN2419 to Recorded   9  Lisinopril 10 MG Oral Tablet; TAKE 1 TABLET DAILY; Therapy: 23AIG4185 to ([phone]) Recorded   10  Metoprolol Tartrate 100 MG Oral Tablet; Take 1 tablet twice daily; Therapy: 67QGD0891 to (Evaluate:17Dgd3980) Recorded   11  Omeprazole 20 MG Oral Capsule Delayed Release; TAKE 1 CAPSULE DAILY EVERY    MORNING BEFORE BREAKFAST; Therapy: 95AQI3817 to (Evaluate:25Jan2018) Recorded   12  Torsemide 20 MG Oral Tablet; take 1 tablet daily for 14 days; Therapy: 68ZOZ0730 to Recorded   13  Warfarin Sodium 5 MG Oral Tablet; take as directed; Therapy: 60FFR5164 to (Evaluate:25Apr2018) Recorded  Medication List Reviewed: The medication list was reviewed and updated today  Allergies  Medication    1  No Known Drug Allergies  Non-Medication    2  No Known Environmental Allergies   3  No Known Food Allergies    Vitals  Vital Signs    Recorded: 12MVI3716 11:07AM   Heart Rate 59   Respiration 15   Systolic 276   Diastolic 80   Weight 663 lb 6 oz   BMI Calculated 36 93   BSA Calculated 2 09     Physical Exam    Constitutional   General appearance: No acute distress, well appearing and well nourished  Neck   Neck and thyroid: Normal, supple, trachea midline, no thyromegaly  Pulmonary   Respiratory effort: No increased work of breathing or signs of respiratory distress      Auscultation of lungs: Abnormal  -- diminished breath sounds left base  Cardiovascular   Auscultation of heart: Normal rate and rhythm, normal S1 and S2, no murmurs  Peripheral vascular exam: Abnormal  -- venous stasis changes  Examination of extremities for edema and/or varicosities: Normal     Chest -   Sternum: Normal  -- no click noted  Abdomen   Abdomen: Abnormal  -- obese  Musculoskeletal Gait and station: Normal gait  Skin - Skin and subcutaneous tissue: Normal without rashes or lesions  Skin is warm and well perfused, normal turgor  -- sternal incision line clean dry intact without erythema or ecchymosis  Neurologic - Speech: Normal     Psychiatric - Orientation to person, place, and time: Normal -- Mood and affect: Normal       Results/Data  ECG Report:   Rhythm and rate:  ventricular rate is 63 beats per minute  -- normal sinus rhythm  NC Interval: 188 seconds  QRS: QRS interval: 96 seconds   ST segment: QTc interval: 493 PVC      Future Appointments    Date/Time Provider Specialty Site   10/12/2017 10:00 AM ALPHONSE Archibald  [facility]   11/21/2017 01:30 PM Piper Pena, [address] [place] [address]   10/13/2017 11:00 AM ALPHONSE Wills   Cardiac Surgery [facility]     Signatures   Electronically signed by : [facility]te [facility]; Sep 27 2017 12:21PM EST                       (Author)    Electronically signed by : Anthony Dixon DO; Sep 27 2017  1:09PM EST

## 2017-10-31 ENCOUNTER — GENERIC CONVERSION - ENCOUNTER (OUTPATIENT)
Dept: OTHER | Facility: OTHER | Age: 72
End: 2017-10-31

## 2017-11-09 ENCOUNTER — GENERIC CONVERSION - ENCOUNTER (OUTPATIENT)
Dept: OTHER | Facility: OTHER | Age: 72
End: 2017-11-09

## 2017-11-15 ENCOUNTER — HOSPITAL ENCOUNTER (OUTPATIENT)
Dept: SLEEP CENTER | Facility: CLINIC | Age: 72
Discharge: HOME | End: 2017-11-15
Payer: COMMERCIAL

## 2017-11-15 DIAGNOSIS — I48.0 PAROXYSMAL ATRIAL FIBRILLATION (HCC): ICD-10-CM

## 2017-11-15 DIAGNOSIS — R06.83 SNORING: ICD-10-CM

## 2017-11-15 DIAGNOSIS — G47.33 OSA (OBSTRUCTIVE SLEEP APNEA): ICD-10-CM

## 2017-11-15 DIAGNOSIS — I10 ESSENTIAL HYPERTENSION, MALIGNANT: ICD-10-CM

## 2017-11-15 PROCEDURE — 95810 POLYSOM 6/> YRS 4/> PARAM: CPT

## 2017-11-17 ENCOUNTER — APPOINTMENT (OUTPATIENT)
Dept: LAB | Facility: CLINIC | Age: 72
End: 2017-11-17
Payer: COMMERCIAL

## 2017-11-17 ENCOUNTER — TRANSCRIBE ORDERS (OUTPATIENT)
Dept: LAB | Facility: CLINIC | Age: 72
End: 2017-11-17

## 2017-11-17 DIAGNOSIS — E78.5 HYPERLIPIDEMIA, UNSPECIFIED HYPERLIPIDEMIA TYPE: ICD-10-CM

## 2017-11-17 DIAGNOSIS — I71.00 MEDIONECROSIS OF AORTA (HCC): ICD-10-CM

## 2017-11-17 DIAGNOSIS — I10 ESSENTIAL HYPERTENSION, MALIGNANT: ICD-10-CM

## 2017-11-17 DIAGNOSIS — I71.00 MEDIONECROSIS OF AORTA (HCC): Primary | ICD-10-CM

## 2017-11-17 LAB
ANION GAP SERPL CALCULATED.3IONS-SCNC: 4 MMOL/L (ref 4–13)
BUN SERPL-MCNC: 21 MG/DL (ref 5–25)
CALCIUM SERPL-MCNC: 8.9 MG/DL (ref 8.3–10.1)
CHLORIDE SERPL-SCNC: 101 MMOL/L (ref 100–108)
CO2 SERPL-SCNC: 33 MMOL/L (ref 21–32)
CREAT SERPL-MCNC: 0.9 MG/DL (ref 0.6–1.3)
GFR SERPL CREATININE-BSD FRML MDRD: 99 ML/MIN/1.73SQ M
GLUCOSE SERPL-MCNC: 161 MG/DL (ref 65–140)
POTASSIUM SERPL-SCNC: 3.7 MMOL/L (ref 3.5–5.3)
SODIUM SERPL-SCNC: 138 MMOL/L (ref 136–145)

## 2017-11-17 PROCEDURE — 36415 COLL VENOUS BLD VENIPUNCTURE: CPT

## 2017-11-17 PROCEDURE — 80048 BASIC METABOLIC PNL TOTAL CA: CPT

## 2017-11-21 ENCOUNTER — ALLSCRIPTS OFFICE VISIT (OUTPATIENT)
Dept: OTHER | Facility: OTHER | Age: 72
End: 2017-11-21

## 2017-11-21 ENCOUNTER — TRANSCRIBE ORDERS (OUTPATIENT)
Dept: SLEEP CENTER | Facility: CLINIC | Age: 72
End: 2017-11-21

## 2017-11-21 DIAGNOSIS — G47.33 OSA (OBSTRUCTIVE SLEEP APNEA): Primary | ICD-10-CM

## 2017-11-22 ENCOUNTER — HOSPITAL ENCOUNTER (OUTPATIENT)
Dept: NON INVASIVE DIAGNOSTICS | Facility: CLINIC | Age: 72
Discharge: HOME | End: 2017-11-22
Payer: COMMERCIAL

## 2017-11-22 ENCOUNTER — GENERIC CONVERSION - ENCOUNTER (OUTPATIENT)
Dept: OTHER | Facility: OTHER | Age: 72
End: 2017-11-22

## 2017-11-22 DIAGNOSIS — I10 ESSENTIAL (PRIMARY) HYPERTENSION: ICD-10-CM

## 2017-11-22 DIAGNOSIS — I48.0 PAROXYSMAL ATRIAL FIBRILLATION (HCC): ICD-10-CM

## 2017-11-22 PROCEDURE — 93308 TTE F-UP OR LMTD: CPT

## 2017-11-22 NOTE — PROGRESS NOTES
Assessment    1  Type 2 diabetes mellitus with hyperglycemia (250 00) (E11 65)  2  Benign essential hypertension (401 1) (I10)  3  Aortic aneurysm and dissection (441 00) (I71 00)  4  Obesity (278 00) (E66 9)  5  Hyperlipidemia (272 4) (E78 5)  6  Paroxysmal atrial fibrillation (427 31) (I48 0)  7  S/P aortic dissection repair (V45 89) (H99 355)    Plan  Aortic aneurysm and dissection, Benign essential hypertension, Hyperlipidemia, Obesity,Paroxysmal atrial fibrillation, S/P aortic dissection repair, Type 2 diabetesmellitus with hyperglycemia    · (1) COMPREHENSIVE METABOLIC PANEL; Status:Active; Requested for:21Nov2017;    · (1) HEMOGLOBIN A1C; Status:Active; Requested for:21Nov2017;    · (1) MICROALBUMIN CREATININE RATIO, RANDOM URINE; Status:Active; Requestedfor:21Nov2017;    · Follow-up visit in 3 months Evaluation and Treatment  Follow-up  Status: Hold For -Scheduling  Requested for: [id number]  Need for influenza vaccination    · Administered: Fluzone High-Dose 0 5 ML Intramuscular Suspension Prefilled Syringe1      1 Amended By: Dillon Wilde; Nov 21 2017 1:52 PM EST    Discussion/Summary    Here for routine office visit  Has yet to see Endo as directed for DM  He is to f-up with Cardiology as directed for hx of a-fib and also s/p aortic dissection repair, and call if any problems  Flu shot UTD  Take all meds as directed  Recheck 3 months with labs  Low sugar and low cholesterol diet encouraged  1    The  patient1  was counseled regarding1   Possible side effects of new medications were reviewed with the patient/guardian today1  The treatment plan was reviewed with the patient/guardian  The patient/guardian understands and agrees with the treatment plan1        1 Amended By: Dillon Wilde; Nov 21 2017 1:50 PM EST    Chief Complaint  Pt here for a 2 month f/u for diabetes  No other concerns  Patient is here today for follow up of chronic conditions described in HPI        History of Present Illness  Here for f-up and for hx of aortic dissection and repair  Hx of DM and HTN 1  and obesity  he is doing well today  No sob or ha  He has no lower extremity edema  He is taking all his other meds as directed from last 1  discharge from hospital 1  and is suppose to follow his blood sugars as directed and log them and f-up with Dr Pina Powell as directed  1  He has yet to make this appt    1        1 Amended By: Antoinette Langstno; Nov 21 2017 1:47 PM EST    Review of Systems   Constitutional: No fever or chills, feels well, no tiredness, no recent weight gain or weight loss  Eyes: No complaints of eye pain, no red eyes, no discharge from eyes, no itchy eyes  ENT: no complaints of earache, no hearing loss, no nosebleeds, no nasal discharge, no sore throat, no hoarseness  Cardiovascular: as noted in HPI  Respiratory: No complaints of shortness of breath, no wheezing, no cough, no SOB on exertion, no orthopnea or PND  Gastrointestinal: No complaints of abdominal pain, no constipation, no nausea or vomiting, no diarrhea or bloody stools-- and-- as noted in HPI  Genitourinary: No complaints of dysuria, no incontinence, no hesitancy, no nocturia, no genital lesion, no testicular pain-- and-- as noted in HPI  Musculoskeletal: as noted in HPI  Integumentary: as noted in HPI  Neurological: No compliants of headache, no confusion, no convulsions, no numbness or tingling, no dizziness or fainting, no limb weakness, no difficulty walking  Psychiatric: Is not suicidal, no sleep disturbances, no anxiety or depression, no change in personality, no emotional problems-- and-- as noted in HPI  Endocrine: as noted in HPI  Hematologic/Lymphatic: No complaints of swollen glands, no swollen glands in the neck, does not bleed easily, no easy bruising  Active Problems  1  Allergic rhinitis (477 9) (J30 9)  2  Aortic aneurysm and dissection (441 00) (I71 00)  3  Arthritis (V13 4)  4  Bell's palsy (351 0) (G51 0)  5   Benign essential hypertension (401 1) (I10)  6  Diabetic peripheral neuropathy (250 60,357 2) (E11 42)  7  Dizziness (780 4) (R42)  8  Dizziness and giddiness (780 4) (R42)  9  Dry Skin (782 9)  10  Edema (782 3) (R60 9)  11  Elevated prostate specific antigen (PSA) (790 93) (R97 20)  12  Glaucoma screening (V80 1) (Z13 5)  13  H/O ascending aortic replacement (V15 1,V43 4) (Z95 828)  14  Head trauma (959 01) (S09 90XA)  15  Hearing Loss Bilaterally  16  Hyperlipidemia (272 4) (E78 5)  17  Hypertension, essential (401 9) (I10)  18  Hypokalemia (276 8) (E87 6)  19  Intracerebral hemorrhage (431) (I61 9)  20  Joint pain, knee (719 46) (M25 569)  21  Microalbuminuria (791 0) (R80 9)  22  Need for influenza vaccination (V04 81) (Z23)  23  Obesity (278 00) (E66 9)  24  Paroxysmal atrial fibrillation (427 31) (I48 0)  25  Postop check (V67 00) (Z09)  26  Prostate cancer (185) (C61)  27  S/P aortic dissection repair (V45 89) (Z98 890)  28  Snoring (786 09) (R06 83)  29  Stroke syndrome (434 91) (I63 9)  30  Thyroid nodule (241 0) (E04 1)  31  Tinnitus, unspecified laterality (388 30) (H93 19)  32  Type 2 diabetes mellitus with hyperglycemia (250 00) (E11 65)  33  Urinary Stream Starts And Stops (788 61)    Past Medical History  1  History of Arthritis (716 90) (M19 90)  2  History of Benign essential hypertension (401 1) (I10)  3  History of Cervical strain (847 0) (S16 1XXA)  4  History of Currently Wearing Eyeglasses  5  History of Diabetes Mellitus (250 00)  6  History of Hand pain (729 5) (M79 643)  7  History of sinusitis (V12 69) (Z87 09)  8  History of Need for influenza vaccination (V04 81) (Z23)  9  History of Pulmonary nodule seen on imaging study (793 11) (R91 1)  10  History of Screening for depression (V79 0) (Z13 89)  11  History of Screening for genitourinary condition (V81 6) (Z13 89)  12  History of Special screening for other neurological conditions (V80 09) (Z13 89)  13   History of Stroke (434 91) (I63 9)    Surgical History  1  History of Aortic Aneurysm Repair Ascending Aorta  2  Denied: History of Recent Surgery    Family History  Mother   1  Family history of Stroke Complications  Family History   2  Family history of Currently Wearing Eyeglasses  3  Family history of Denial Of Any Significant Medical History  4  Family history of Family Health Status 2  Children Living  5  Family history of Family Health Status Of Father -   10  Family history of Family Health Status Of Mother -   9  Family history of Maternal Grandfather Is   6  Family history of Maternal Grandmother Is   5  Family history of Paternal Grandfather Is   8  Family history of Paternal Grandmother Is     Social History     · Being A Social Drinker   · Daily caffeine consumption, 1 serving a day   · Denied: History of Drug Use   · Education History   · Marital History -    · Never A Smoker   · Occupation: Retired   · Person living alone (V60 3) (Z60 2)   · Physical Activity Tolerance   · Denied: History of Sexually Active   · Denied: History of Sexually Active High-risk    Current Meds  1  AmLODIPine Besylate 5 MG Oral Tablet; Take 1 tablet daily; Therapy: 38ZZG1069 to (Noralee Schwab)  Requested for: 47ADY2032; Last Rx:2017 Ordered  2  Aspirin EC 81 MG Oral Tablet Delayed Release; TAKE 1 TABLET DAILY; Therapy: 46GRN7513 to (Evaluate:2018) Recorded  3  Atorvastatin Calcium 80 MG Oral Tablet; TAKE 30 TABLET Daily; Therapy: 68GHN0264 to (Evaluate:2018)  Requested for: 21IHD0912; Last Rx:2017 Ordered  4  BD Pen Needle Mini U/F 31G X 5 MM Miscellaneous; USE AS DIRECTED; Therapy: 29GIT9687 to (Last QA:32JHZ3741)  Requested for: 2017 Ordered  5  Docusate Sodium 100 MG Oral Tablet; Take 1 tablet twice daily; Therapy: 13KCE6585 to () Recorded  6   HumaLOG KwikPen 100 UNIT/ML Subcutaneous Solution Pen-injector; INJECT 8 UNIT 3 times daily Before Meals; Therapy: 22YVC9757 to (Last Rx:75Zel1743)  Requested for: 61XIP0047 Ordered  7  Hydrocodone-Acetaminophen 5-325 MG Oral Tablet; take 1-2tab q6h as needed for pain; Therapy: 90VNA4949 to Recorded  8  Lantus SoloStar 100 UNIT/ML Subcutaneous Solution Pen-injector; INJECT 28 UNIT Bedtime; Therapy: 77IGV2303 to (Last Rx:91Kgm7697)  Requested for: 95ZGX0230 Ordered  9  Lisinopril-Hydrochlorothiazide 20-12 5 MG Oral Tablet; TAKE 2 TABLETS DAILY; Therapy: 97EHZ9345 to (97 540307)  Requested for: 00CAV7571; Last Rx:76Bft6164 Ordered  10  Metoprolol Tartrate 100 MG Oral Tablet; Take 1 tablet twice daily; Therapy: 32TXN5027 to (Evaluate:93Ejn1188)  Requested for: 99TXS4727; Last  Rx:79Den7952 Ordered  11  Omeprazole 20 MG Oral Capsule Delayed Release; take 1 capsule daily; Therapy: 17REQ9839 to (Evaluate:14Mar2018)  Requested for: 80MDL1208; Last  Rx:72Ung1931 Ordered  12  Warfarin Sodium 5 MG Oral Tablet; take as directed; Therapy: 64EVC4830 to (Evaluate:25Apr2018) Recorded    Allergies  1  No Known Drug Allergies  2  No Known Environmental Allergies  3  No Known Food Allergies    Vitals  Vital Signs    Recorded: 02NYU1819 28:44GM   Systolic 540   Diastolic 72   Height 5 ft 5 5 in   Weight 217 lb 2 oz   BMI Calculated 35 58   BSA Calculated 2 06       Physical Exam   Constitutional  General appearance: Abnormal  -- obesity  Eyes  Conjunctiva and lids: No swelling, erythema, or discharge  Pupils and irises: Equal, round and reactive to light  Ears, Nose, Mouth, and Throat  External inspection of ears and nose: Normal    Otoscopic examination: Tympanic membrance translucent with normal light reflex  Canals patent without erythema  Nasal mucosa, septum, and turbinates: Normal without edema or erythema  Oropharynx: Normal with no erythema, edema, exudate or lesions  Pulmonary  Respiratory effort: No increased work of breathing or signs of respiratory distress     Auscultation of lungs: Clear to auscultation, equal breath sounds bilaterally, no wheezes, no rales, no rhonci  Cardiovascular  Palpation of heart: Normal PMI, no thrills  Auscultation of heart: Normal rate and rhythm, normal S1 and S2, without murmurs  Examination of extremities for edema and/or varicosities: Normal    Carotid pulses: Normal    Abdomen  Abdomen: Non-tender, no masses  Liver and spleen: No hepatomegaly or splenomegaly  Lymphatic  Palpation of lymph nodes in neck: No lymphadenopathy  Musculoskeletal  Gait and station: Normal    Digits and nails: Normal without clubbing or cyanosis  Inspection/palpation of joints, bones, and muscles: Normal    Skin  Skin and subcutaneous tissue: Normal without rashes or lesions  Neurologic  Cranial nerves: Cranial nerves 2-12 intact  Reflexes: 2+ and symmetric  Sensation: No sensory loss  Psychiatric  Orientation to person, place and time: Normal    Mood and affect: Normal    Diabetic Foot Screen: Normal      Socks and shoes removed, Right Foot Findings: normal foot, no swelling, no erythema  The right toes were normal-- and-- had full range of motion  The sensory exam showed normal vibratory sensation at the level of the toes on the right  Normal tactile sensation with monofilament testing throughout the right foot  Socks and shoes removed, Left Foot Findings: normal foot, no swelling, no erythema  The left toes were normal-- and-- had full range of motion  The sensory exam showed normal vibratory sensation at the level of the toes on the left  Normal tactile sensation with monofilament testing throughout the left foot  Pulses:  2+ in the dorsalis pedis on the right  Pulses:  2+ in the dorsalis pedis on the left  Future Appointments    Date/Time Provider Specialty Site   04/20/2018 09:45 AM [facility] [place]   Cardiac Surgery CT SURGERY THORACIC/AORTIC   11/22/2017 04:00 PM Vera Navarrete DO Cardiology  CARDIOLOGY  [place]       Signatures Electronically signed by : Toño Cruz DO; Nov 21 2017  1:45PM EST                       (Author)    Electronically signed by : Toño Cruz DO; Nov 21 2017  1:52PM EST                       (Author)

## 2017-12-05 ENCOUNTER — GENERIC CONVERSION - ENCOUNTER (OUTPATIENT)
Dept: OTHER | Facility: OTHER | Age: 72
End: 2017-12-05

## 2017-12-05 ENCOUNTER — HOSPITAL ENCOUNTER (OUTPATIENT)
Dept: NON INVASIVE DIAGNOSTICS | Facility: CLINIC | Age: 72
Discharge: HOME | End: 2017-12-05
Payer: COMMERCIAL

## 2017-12-05 DIAGNOSIS — I48.0 PAROXYSMAL ATRIAL FIBRILLATION (HCC): ICD-10-CM

## 2017-12-05 PROCEDURE — 93226 XTRNL ECG REC<48 HR SCAN A/R: CPT

## 2017-12-05 PROCEDURE — 93225 XTRNL ECG REC<48 HRS REC: CPT

## 2017-12-11 ENCOUNTER — TRANSCRIBE ORDERS (OUTPATIENT)
Dept: SLEEP CENTER | Facility: CLINIC | Age: 72
End: 2017-12-11

## 2017-12-11 ENCOUNTER — HOSPITAL ENCOUNTER (OUTPATIENT)
Dept: SLEEP CENTER | Facility: CLINIC | Age: 72
Discharge: HOME | End: 2017-12-11
Payer: COMMERCIAL

## 2017-12-11 DIAGNOSIS — R06.83 SNORING: ICD-10-CM

## 2017-12-11 DIAGNOSIS — G47.33 OSA (OBSTRUCTIVE SLEEP APNEA): Primary | ICD-10-CM

## 2017-12-11 NOTE — CONSULTS
Consultation - [address]  71 y o  male MRN: [id number]          Reason for Consult / Principal Problem:    1  Loud snoring  2  History of paroxysmal atrial fibrillation  3  History of hypertension  4  History of aortic arch dissection and repair  5  History of chronic sinusitis  6  Diabetic peripheral neuropathy  7  Adult onset diabetes mellitus  8  Severe obstructive sleep apnea     HPI: Praveena Koenig  is a 71y o  year old male  He presents today with a history of loud snoring, paroxysmal atrial fibrillation and possible obstructive sleep apnea  Salient aspects of his past medical history include hypertension, 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, paroxysmal atrial fibrillation, loud snoring and daytime fatigue  Employment:  [employer]     Sleep Schedule:       Bedtime:  Midnight       Latency:  Relatively brief      Wakeup time:  7:00 a m  Awakenings:       Frequency:  3 times per night      Causes:  Typically to urinate      Duration:  Relatively brief    Daytime Sleepiness / Inappropriate Sleep:       Most severe:  9:00 a m  Naps :  Not intentional      Time:  9:00 a m        Duration:  Approximately 1 hour daily      Inappropriate drowsiness / sleep:  Sitting and reading, watching television, as a passenger in a car, lying down to rest in the afternoon, sitting quietly after lunch    Snoring:  Reportedly loud and associated with snorting    Apnea:  None reported    Change in Weight:  There has been a weight loss of approximately 27 lb over the past year    RLS:  No clinical symptoms consistent with this diagnosis     Other Complaints:  Headache, prior head injury, feelings of anxiety     Social History:      Caffeine:  8 oz of coffee and 8 oz of soda daily       Tobacco:  Denied       Alcohol:  36 oz of light beer per week       Drugs:  Denied     Past medical, past surgical,and family history are recorded in the patient's chart  Allergies and medications are recorded in the patient's chart  Signs         Weight:    224 2 lb (101 9 kg)  Height:    65 5 inches  BMI:     36 4   Blood Pressure:   120/64  Heart Rate:    72 and regular  Neck Circumference:  45 cm  ESS:     7    Physical Exam  General Appearance:   Alert, cooperative, no distress, appears stated age, moderately obese     Head:   Normocephalic, without obvious abnormality, atraumatic     Eyes:   PERRL, conjunctiva/corneas clear, EOM's intact          Nose:  Nares normal, septum midline, mucosa normal, no drainage or sinus tenderness     Throat:  Lips, teeth and gums normal; tongue normal size and  shape and midline in position; mucosa redundant bilaterally, uvula appears relatively normal, tonsils not visualized, Mallampati class 3 to 4       Neck:  Supple, symmetrical, trachea midline, no adenopathy; Thyroid: No enlargement, tenderness or nodules; no carotid bruit or JVD   Lungs:    Clear to auscultation bilaterally, respirations unlabored     Heart:   Regular rate and rhythm, S1 and S2 normal, no murmur, rub or gallop       Extremities:  Extremities normal, atraumatic, no cyanosis or edema     Pulses:  2+ and symmetric all extremities     Skin:  Skin color, texture, turgor normal, no rashes or lesions     Lymph nodes:      Cervical and supraclavicular normal       Neurologic:    CNII-XII intact  Normal strength, decreased sensation for light touch, vibration and pinprick throughout distal aspects of upper and lower extremities     Sleep Study Results:  Diagnostic polysomnography completed on 11/15/2007 shows evidence of severe obstructive sleep apnea with an AHI of 72 4 abnormal breathing events per hour  This number increases to 108 4 in the supine position  Lowest measured oxygen saturation was 50%  To large number of arousals appeared to be related to abnormal breathing events  Snoring was graded at very loud        ASSESSMENT / PLAN Assessment:  1  Severe obstructive sleep apnea  2  History of paroxysmal atrial fibrillation  3  History of paroxysmal atrial fibrillation  4  History of hypertension  5  History of aortic arch dissection and repair  6  History of chronic sinusitis  7  Diabetic peripheral neuropathy  8  Adult onset diabetes mellitus    Plan:  1  Begin AutoPAP set to deliver a minimum pressure of 5 and maximum pressure of 20 cm of water  2  Fit for fullface interface device due to chronic sinus congestion  3  Compliance check in 4 to 6 weeks  4  Adjust treatment parameters and interface equipment as necessary for comfort    Counseling / Coordination of Care  Total clinic time spent today 45 minutes  Greater than 50% of total time was spent with the patient and / or family counseling and / or coordination of care  A description of the counseling / coordination of care: I spent quite a bit of time talking the patient about his cardiac diagnoses and diagnosis of severe obstructive sleep apnea     We talked about the relationship between medical issues such as hypertension and atrial fibrillation in the presence of obstructive sleep apnea  I also reviewed data from the diagnostic polysomnogram and discussed the abnormalities found there  The diagnosis of severe obstructive sleep apnea was based on the number of events as well as the severe oxygen desaturation identified during testing  The patient understands these results and forces a willingness to proceed with treatment  He is somewhat hesitant to undergo repeat testing in the laboratory  He states that he had a difficult experience the 1st time and would like to avoid a 2nd study if possible  I have told him that we might begin using AutoPAP as treatment for his obstructive sleep apnea  This would not require a titration study    It would also make treatment somewhat more tolerable since he only receives the pressure necessary to maintain upper airway patency during any portion of his sleep  His chronic sinus congestion may result in serious difficulties with either a nasal mask or nasal pillows  For this reason he will be fit with a full face mask  Hopefully, confined a device that is comfortable for him to wear  He will use this device for 4 to 6 weeks and then return for a review of compliance data  I have told him that he should contact me at the [facility] if any problems arise prior to that time              Corey Barrios DO    Board Certified in Sleep Disorders Medicine

## 2017-12-21 ENCOUNTER — HOSPITAL ENCOUNTER (OUTPATIENT)
Dept: SLEEP CENTER | Facility: CLINIC | Age: 72
Discharge: HOME | End: 2017-12-22

## 2017-12-21 DIAGNOSIS — G47.33 OSA (OBSTRUCTIVE SLEEP APNEA): ICD-10-CM

## 2017-12-28 ENCOUNTER — ALLSCRIPTS OFFICE VISIT (OUTPATIENT)
Dept: OTHER | Facility: OTHER | Age: 72
End: 2017-12-28

## 2017-12-29 NOTE — PROGRESS NOTES
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 largest measuring 26 millimeters, difficult to evaluate  F/U with CT Surgery as scheduled  Small posterior pericardial effusion on prior echocardiogram 11/22/2017  Hypertension: Reviewed blood pressure readings from home, and noted to be significantly improved with systolic blood pressures mostly ranging in the 120s  Continue current antihypertensive regimen  If increased lower extremity edema, may consider changing amlodipine to something else, or starting furosemide  Patient to call if any worsening edema  Left ventricle ejection fraction normal by echocardiogram 11/22/2017  Obesity: Heart healthy diet, encouraged exercise and weight loss   Dyslipidemia: Continue high-dose statin, will check lipid panel in the future   Paroxysmally atrial fibrillation : Atrial fibrillation was only 1 day postop after aortic dissection repair and converted into SR w/ IV amiodarone  No symptoms of palpitations  In light of history of prior basal ganglia hemorrhage in 2014, suspect risk of ongoing anticoagulation, especially in the setting of hypertension exceeds its benefit at this time  Therefore will remain off warfarin  Continue aspirin only  48 hour Holter monitor from early December 2017 with no episodes of paroxysmal atrial fibrillation   Patient has been agreeable with remaining off anticoagulation  in 4 months  Chief Complaint   Chief Complaint Free Text Note Form: HTN      History of Present Illness   Cardiology HPI Free Text Note Form [facility]: This is a 61-year-old male, with a significant history of Basal ganglia hemorrhage in 2014 secondary to uncontrolled hypertension, who presented to [address] [place] [address] on 9/05/2017 with chest pain and accelerated hypertension  Troponin level was negative on admission and he was admitted to the floor with chest pain  In light of continued symptoms and labile hypertension, he was sent for a stat CT of his chest/abdomen, revealing a type a dissection  He was transferred to [address] [place] [address], and echocardiogram revealed a moderate to large pericardial effusion with signs of tamponade  On 09/06/2017 he underwent emergency surgery with replacement of the ascending aorta with celia arch reconstruction with a 26 millimeter Dacron graft    Postoperatively, he had mediastinal hemorrhage and went back to the operating room on 09/06/2017 with evacuation and correction of his hemorrhage  On postoperative day 1 , 09/07/2017 he had rapid atrial fibrillation and was initiated on amiodarone, and thereafter on warfarin for anticoagulation and stroke prevention  underwent a sleep study in early November 2017 and has been diagnosed with severe sleep apnea syndromE  antihypertensives have been adjusted during his prior to visits  His blood pressure log revealed a significant improvement in systolic blood pressures, now ranging mostly in the 120s  He denies any chest pain, shortness of breath  He has chronic but stable lower extremity edema which appears mostly toward the end the day   He underwent a Holter monitor in early December 2017, which did not reveal any evidence of atrial fibrillation  He has been off anticoagulation secondary to his history of intracranial hemorrhage  Review of Systems   ROS Reviewed:    ROS reviewed  Active Problems   Problems    1  Allergic rhinitis (477 9) (J30 9)   2  Aortic aneurysm and dissection (441 00) (I71 00)   3  Arthritis (V13 4)   4  Bell's palsy (351 0) (G51 0)   5  Benign essential hypertension (401 1) (I10)   6  Diabetic peripheral neuropathy (250 60,357 2) (E11 42)   7  Dizziness (780 4) (R42)   8  Dizziness and giddiness (780 4) (R42)   9  Dry Skin (782 9)   10  Edema (782 3) (R60 9)   11  Elevated prostate specific antigen (PSA) (790 93) (R97 20)   12  Glaucoma screening (V80 1) (Z13 5)   13  H/O ascending aortic replacement (V15 1,V43 4) (Z98 890,Z95 828)   14  Head trauma (959 01) (S09 90XA)   15  Hearing Loss Bilaterally   16  Hyperlipidemia (272 4) (E78 5)   17  Hypertension, essential (401 9) (I10)   18  Hypokalemia (276 8) (E87 6)   19  Intracerebral hemorrhage (431) (I61 9)   20  Joint pain, knee (719 46) (M25 569)   21  Microalbuminuria (791 0) (R80 9)   22  Need for influenza vaccination (V04 81) (Z23)   23  Obesity (278 00) (E66 9)   24  Paroxysmal atrial fibrillation (427 31) (I48 0)   25  Postop check (V67 00) (Z09)   26  Prostate cancer (185) (C61)   27  S/P aortic dissection repair (V45 89) (Z98 890)   28  Snoring (786 09) (R06 83)   29  Stroke syndrome (434 91) (I63 9)   30  Thyroid nodule (241 0) (E04 1)   31  Tinnitus, unspecified laterality (388 30) (H93 19)   32  Type 2 diabetes mellitus with hyperglycemia (250 00) (E11 65)   33  Urinary Stream Starts And Stops (788 61)    Past Medical History   Problems    1  History of Arthritis (716 90) (M19 90)   2  History of Benign essential hypertension (401 1) (I10)   3  History of Cervical strain (847 0) (S16 1XXA)   4  History of Currently Wearing Eyeglasses   5  History of Diabetes Mellitus (250 00)   6  History of Hand pain (729 5) (M79 643)   7  History of sinusitis (V12 69) (Z87 09)   8  History of Need for influenza vaccination (V04 81) (Z23)   9  History of Pulmonary nodule seen on imaging study (793 11) (R91 1)   10  History of Screening for depression (V79 0) (Z13 89)   11  History of Screening for genitourinary condition (V81 6) (Z13 89)   12  History of Special screening for other neurological conditions (V80 09) (Z13 89)   13  History of Stroke (434 91) (I63 9)  Active Problems And Past Medical History Reviewed: The active problems and past medical history were reviewed and updated today  Surgical History   Problems    1  History of Aortic Aneurysm Repair Ascending Aorta   2  Denied: History of Recent Surgery  Surgical History Reviewed: The surgical history was reviewed and updated today  Family History   Mother    1  Family history of Stroke Complications  Family History    2  Family history of Currently Wearing Eyeglasses   3  Family history of Denial Of Any Significant Medical History   4  Family history of Family Health Status 2  Children Living   5  Family history of Family Health Status Of Father -    10  Family history of Family Health Status Of Mother -    9  Family history of Maternal Grandfather Is    6  Family history of Maternal Grandmother Is    5  Family history of Paternal Grandfather Is    8  Family history of Paternal Grandmother Is   Family History Reviewed: The family history was reviewed and updated today  Social History   Problems    · Being A Social Drinker   · Daily caffeine consumption, 1 serving a day   · Denied: History of Drug Use   · Education History   · Marital History -    · Never A Smoker   · Occupation: Retired   · Person living alone (V60 3) (Z60 2)   · Physical Activity Tolerance   · Denied: History of Sexually Active   · Denied: History of Sexually Active High-risk  Social History Reviewed: The social history was reviewed and updated today  Current Meds    1  AmLODIPine Besylate 10 MG Oral Tablet; TAKE 1 TABLET TWICE DAILY, per pt       (2017);      Therapy: 84ELQ4717 to  Requested for: 34Rbr3759 Recorded   2  Aspirin EC 81 MG Oral Tablet Delayed Release; TAKE 1 TABLET DAILY; Therapy: 66EKE8581 to (Evaluate:2018) Recorded   3  Atorvastatin Calcium 80 MG Oral Tablet; TAKE 30 TABLET Daily; Therapy: 94PAY0789 to (Evaluate:2018)  Requested for: 76TXO1190; Last     Rx:2017 Ordered   4  BD Pen Needle Mini U/F 31G X 5 MM Miscellaneous; USE AS DIRECTED; Therapy: 49KCQ8203 to (Last D90QIO2018)  Requested for: 2017 Ordered   5  Docusate Sodium 100 MG Oral Tablet; Take 1 tablet twice daily; Therapy: 87VTJ1518 to ( 30) Recorded   6  HumaLOG KwikPen 100 UNIT/ML Subcutaneous Solution Pen-injector; INJECT 8 UNIT 3     times daily Before Meals; Therapy: 29HZF7788 to (Last Rx:52Vhf2797)  Requested for: 16YUT5423 Ordered   7  Hydrocodone-Acetaminophen 5-325 MG Oral Tablet; take 1-2tab q6h as needed for pain; Therapy: 13TOS8450 to Recorded   8  Lantus SoloStar 100 UNIT/ML Subcutaneous Solution Pen-injector; INJECT 28 UNIT     Bedtime; Therapy: 73ZEQ3073 to (Last Rx:90Okg3965)  Requested for: 53ZCB6867 Ordered   9  Lisinopril-Hydrochlorothiazide 20-12 5 MG Oral Tablet; TAKE 2 TABLETS DAILY; Therapy: 45AZG6750 to (([phone] [phone])  Requested for: 56DUH4270; Last     Rx:39Qdt0882 Ordered   10  Metoprolol Tartrate 100 MG Oral Tablet; Take 1 tablet twice daily; Therapy: 36PYI4034 to (Evaluate:2018)  Requested for: 42DAE5566; Last      Rx:81Wuy1170 Ordered   11  Omeprazole 20 MG Oral Capsule Delayed Release; take 1 capsule daily; Therapy: 70QTL3409 to (Evaluate:2018)  Requested for: 83IXH7374; Last      Rx:2017 Ordered  Medication List Reviewed: The medication list was reviewed and updated today  Allergies   Medication    1  No Known Drug Allergies  Non-Medication    2  No Known Environmental Allergies   3   No Known Food Allergies    Vitals   Vital Signs    Recorded: 2017 01:39PM   Heart Rate 64, Apical   Pulse Quality Regular, Apical   Respiration 16   Systolic 185, RUE, Sitting   Diastolic 60, RUE, Sitting   BP CUFF SIZE Large   Height 5 ft 5 5 in   Weight 227 lb 2 oz   BMI Calculated 37 22   BSA Calculated 2 1     Physical Exam        Constitutional - General appearance: No acute distress, well appearing and well nourished  Eyes - Conjunctiva and Sclera examination: Conjunctiva pink, sclera anicteric  Neck - Normal, no JVD   Pulmonary - Respiratory effort: No signs of respiratory distress  -- Auscultation of lungs: Clear to auscultation  Cardiovascular - Auscultation of heart: Normal rate and rhythm, normal S1 and S2, no murmurs  -- Pedal pulses: Normal, 2+ bilaterally  -- Examination of extremities for edema and/or varicosities: Normal        Abdomen - Soft  Musculoskeletal - Gait and station: Normal gait  Inspection/palpation of joints, bones, and muscles: Abnormal      Skin -      Skin and subcutaneous tissue: Abnormal      Neurologic - Speech normal  No focal deficits  Psychiatric - Orientation to person, place, and time: Normal       Future Appointments      Date/Time Provider Specialty Site   02/21/2018 01:15 PM [address]   04/20/2018 09:45 AM ALPHONSE Barnett   Cardiac Surgery CT SURGERY [facility]     Signatures    Electronically signed by : Reinier Lovell DO; Dec 28 2017  2:20PM EST                       (Author)

## 2018-01-09 NOTE — PROCEDURES
Procedures by Elizabeth Campos  at 9/6/2017  7:49 AM      Author:  MAUREEN Amin Service:  Critical Care/ICU Author Type:  Nurse Practitioner    Filed:  9/6/2017  7:50 AM Date of Service:  9/6/2017  7:49 AM Status:  Signed    :  Elizabeth Campos (Nurse Practitioner)  Cosigner:  Hermes Rodas MD at 9/6/2017  3:04 PM      Procedure Orders:       1  Insert arterial line [[id number]] ordered by MAUREEN Amin at 09/06/17 3875                 Post-procedure Diagnoses:       1  Chest pain [R07 9]                 Arterial Line  Insertion  Date/Time: 9/6/2017 7:49 AM  Performed by: Synack  Authorized by: Synack     Patient location:  Bedside  Consent:     Consent obtained:  Verbal    Consent given by:  Patient    Risks discussed:  Bleeding, infection and ischemia  Universal protocol:     Procedure explained and questions answered to patient or proxy's satisfaction: yes      Site/side marked: yes      Immediately prior to procedure a time out was called: yes      Patient identity confirmed:  Verbally with patient  Indications:     Indications: hemodynamic monitoring and continuous blood pressure monitoring    Pre-procedure details:     Skin preparation:  Chlorhexidine    Preparation: Patient was prepped and draped in sterile fashion    Anesthesia (see MAR for exact dosages): Anesthesia method:  None  Procedure details:     Location / Tip of Catheter:  Radial    Laterality:  Right    Ajit's test performed: yes      Ajit's test abnormal: no      Needle gauge:  20 G    Placement technique:  Percutaneous    Number of attempts:  1    Successful placement: yes      Transducer: waveform confirmed    Post-procedure details:     Post-procedure:  Secured with tape, sterile dressing applied and sutured    CMS:  Normal    Patient tolerance of procedure:   Tolerated well, no immediate complications                     Received for:Provider  [facility]   Sep  6 2017  3:05PM [facility] Standard Time

## 2018-01-11 NOTE — PROGRESS NOTES
REPORT NAME: Progress Notes Report  VISIT DATE: 9/26/2017  VISIT TIME: 3:11 PM EDT  PATIENT NAME: Rangel Mcnulty   MEDICAL RECORD NUMBER: [id number]  YOB: 1945  AGE: 71  REFERRING PHYSICIAN: Severiano Bach DO  SUPERVISING CLINICIAN: Severiano Bach DO  HEALTH CARE PROVIDER: Gladys Vasques  PATIENT HOME ADDRESS: [address] [place] [address], [address] [place] [address]  PATIENT HOME PHONE: [phone]  SOCIAL SECURITY NUMBER:   DIAGNOSIS 1: Unspecified atrial fibrillation / I48 91  DIAGNOSIS 2:   INR RANGE: 2 - 3  INR GOAL: 2 5  TREATMENT START DATE:   TREATMENT END DATE:   NEXT VISIT: 9/28/2017  [facility]  VISIT RESULTS  ENCOUNTER NUMBER:   TEST LOCATION: [facility]  TEST TYPE: Outside Lab (Venipuncture)  VISIT TYPE: Phone Consult  CURRENT INR: 3 1 PROTIME: 31  SPECIMEN COL AND RPT DATE: 9/26/2017 3:11 PM  EDT  VITAL SIGNS  PULSE:  BP: / WEIGHT:  HEIGHT:  TEMP:   CURRENT ANTICOAGULANT DOSING SCHEDULE  DOSE SIZE: 5mg    ANTICOAGULANT TYPE: WARFARIN SOD  DOSING REGIMEN  Sun       Mon       Tues      Wed       Thurs     Fri       Sat  Total/Wk  0         0         HOLD      2 5       2 5       0         0         5  PATIENT MEDICATION INSTRUCTION: Yes  PATIENT NUTRITIONAL COUNSELING: No  PATIENT BRUISING INSTRUCTION: No  LAST EDUCATION DATE:   PREVIOUS VISIT INFORMATION  VISITDATE  INRGoal INR   Sun    Mon    Tues   Wed    Thurs  Fri    Sat  Total/wk  9/26/2017   2 5     3 1   0      0      HOLD   2 5    2 5    0      0  5  9/22/2017   2 5     3     2 5    2 5    0      0      5      2 5    5  17 5  9/18/2017   2 5     4     5      5      HOLD   2 5    0      0      0  12 5  ADDITIONAL PREVIOUS VISIT INFORMATION  VISITDATE   PRIMARY RX               DOSE      CrCl  9/26/2017   WARFARIN SOD             5mg  9/22/2017   WARFARIN SOD             5mg  9/18/2017   WARFARIN SOD             5mg  OTHER CURRENT MEDICATIONS:  WARFARIN SOD  PROGRESS NOTES:   PATIENT INSTRUCTIONS: 9/26/17, tc pt, per PG hold x 1 day, then 2 5 mg  w/thNancy 9/29  amiodarone now decreased to 200 mg bid x 7 days, then  daily  lorin  ---- Additional Instructions entered on 9/26/2017 3:15 PM EDT by Monica Sam :  faxed to [facility]  lorin  TEST LOCATION: [facility] ,   INBOUND LAB DATA:  Lab       Lab Value Col Date                 Rpt Date                 Lab  Reference Range  Electronically signed by:  Monica Sam on 9/26/2017 3:15 PM EDT

## 2018-01-11 NOTE — PROGRESS NOTES
REPORT NAME: Progress Notes Report  VISIT DATE: 9/18/2017  VISIT TIME: 11:17 AM EDT  PATIENT NAME: Vesna Franco   MEDICAL RECORD NUMBER: [id number]  YOB: 1945  AGE: 71  REFERRING PHYSICIAN: Julian Mendoza DO  SUPERVISING CLINICIAN: Julian Mendoza DO  [facility] C[facility] PROVIDER: [facility]  PATIENT HOME ADDRESS: [address] [place] [address], [address] [place] [address]  PATIENT HOME PHONE: [phone]  SOCIAL SECURITY NUMBER:   DIAGNOSIS 1: Unspecified atrial fibrillation / I48 91  DIAGNOSIS 2:   INR RANGE: 2 - 3  INR GOAL: 2 5  TREATMENT START DATE:   TREATMENT END DATE:   NEXT VISIT: 9/21/2017  [facility]  VISIT RESULTS  ENCOUNTER NUMBER:   TEST LOCATION:   TEST TYPE:   VISIT TYPE:   CURRENT INR: 4 PROTIME:   SPECIMEN COL AND RPT DATE: 9/18/2017 11:17 AM EDT  VITAL SIGNS  PULSE:  BP: / WEIGHT:  HEIGHT:  TEMP:   CURRENT ANTICOAGULANT DOSING SCHEDULE  DOSE SIZE: 5mg    ANTICOAGULANT TYPE: WARFARIN SOD  DOSING REGIMEN  Sun       Mon       Tues      Wed       Thurs     Fri       Sat  Total/Wk  5         5         HOLD      2 5       0         0         0         12 5  PATIENT MEDICATION INSTRUCTION: Yes  PATIENT NUTRITIONAL COUNSELING: No  PATIENT BRUISING INSTRUCTION: No  LAST EDUCATION DATE:   PREVIOUS VISIT INFORMATION  VISITDATE  INRGoal INR   Sun    Mon    Tues   Wed    Thurs  Fri    Sat  Total/wk  9/18/2017   2 5     4     5      5      HOLD   2 5    0      0      0  12 5  ADDITIONAL PREVIOUS VISIT INFORMATION  VISITDATE   PRIMARY RX               DOSE      CrCl  9/18/2017   WARFARIN SOD             5mg  OTHER CURRENT MEDICATIONS:  WARFARIN SOD  PROGRESS NOTES: gave dosage to pt  pt states he has a 5mg tablet, pt will  hold dose tonight, 2 5mg on Wednesday  pt is taking warfarin in the  evening  pt states he doses not want to see a physician in [place]  pt  denies any bleeding  pt is on amio TID which will be tapering down over the  next 3 wks  pt has [facility]   they will see pt on Thursday,  faxed orders to them   ---- Additional Notes entered on 9/19/2017 11:24 AM EDT by Nick Moore :  pt was discharged on 5mg daily and has been taking this dose since sept 14  PATIENT INSTRUCTIONS:   TEST LOCATION: , , ,   INBOUND LAB DATA:  Lab       Lab Value Col Date                 Rpt Date                 Lab  Reference Range  Electronically signed Maite Friday on 9/19/2017 11:24 AM EDT

## 2018-01-11 NOTE — MISCELLANEOUS
Message   Date: 09 Nov 2017 2:24 PM EST, Recorded By: Lisa Lee patient while checking charts, to see if he had the sleep study done, as ordered by Dr Urbano Stewart  Patient states he "does not have time for all these appointments!"  He had not called for an appt as of 11/9/17, and "not so sure I will "        Active Problems    1  Allergic rhinitis (477 9) (J30 9)   2  Aortic aneurysm and dissection (441 00) (I71 00)   3  Arthritis (V13 4)   4  Bell's palsy (351 0) (G51 0)   5  Benign essential hypertension (401 1) (I10)   6  Diabetic peripheral neuropathy (250 60,357 2) (E11 42)   7  Dizziness (780 4) (R42)   8  Dizziness and giddiness (780 4) (R42)   9  Dry Skin (782 9)   10  Edema (782 3) (R60 9)   11  Elevated prostate specific antigen (PSA) (790 93) (R97 20)   12  Glaucoma screening (V80 1) (Z13 5)   13  H/O ascending aortic replacement (V15 1,V43 4) (Z95 828)   14  Head trauma (959 01) (S09 90XA)   15  Hearing Loss Bilaterally   16  Hyperlipidemia (272 4) (E78 5)   17  Hypertension, essential (401 9) (I10)   18  Hypokalemia (276 8) (E87 6)   19  Intracerebral hemorrhage (431) (I61 9)   20  Joint pain, knee (719 46) (M25 569)   21  Microalbuminuria (791 0) (R80 9)   22  Need for influenza vaccination (V04 81) (Z23)   23  Obesity (278 00) (E66 9)   24  Paroxysmal atrial fibrillation (427 31) (I48 0)   25  Postop check (V67 00) (Z09)   26  Prostate cancer (185) (C61)   27  S/P aortic dissection repair (V45 89) (Z98 890)   28  Snoring (786 09) (R06 83)   29  Stroke syndrome (434 91) (I63 9)   30  Thyroid nodule (241 0) (E04 1)   31  Tinnitus, unspecified laterality (388 30) (H93 19)   32  Type 2 diabetes mellitus with hyperglycemia (250 00) (E11 65)   33  Urinary Stream Starts And Stops (788 61)    Current Meds   1  AmLODIPine Besylate 5 MG Oral Tablet; Take 1 tablet daily; Therapy: 26TNU3876 to (Mayela Cooper)  Requested for: 05MPG4987; Last   Rx:31Oct2017 Ordered   2   Aspirin EC 81 MG Oral Tablet Delayed Release; TAKE 1 TABLET DAILY; Therapy: 28RSH7057 to (Evaluate:35Peh5661) Recorded   3  Atorvastatin Calcium 80 MG Oral Tablet; TAKE 1 TABLET DAILY; Therapy: 65CWW2136 to (Evaluate:43Uiz2938)  Requested for: 92TEQ8662; Last   Rx:17Oct2017 Ordered   4  BD Pen Needle Mini U/F 31G X 5 MM Miscellaneous; USE AS DIRECTED; Therapy: 64BFC0628 to (Last XS:29NNS9606)  Requested for: 24Oct2017 Ordered   5  Docusate Sodium 100 MG Oral Tablet; Take 1 tablet twice daily; Therapy: 61HUC5416 to (96 099410) Recorded   6  HumaLOG KwikPen 100 UNIT/ML Subcutaneous Solution Pen-injector; INJECT 5   UNITS BEFORE MEALS; Therapy: 24QHT0417 to Recorded   7  Hydrocodone-Acetaminophen 5-325 MG Oral Tablet; take 1-2tab q6h as needed for   pain; Therapy: 06VYR0604 to Recorded   8  Lantus SoloStar 100 UNIT/ML Subcutaneous Solution Pen-injector; INJECT 10 UNIT   Bedtime; Therapy: 45MIZ1797 to Recorded   9  Lisinopril-Hydrochlorothiazide 20-12 5 MG Oral Tablet; TAKE 1 TABLET DAILY; Therapy: 30VPK9850 to [phone])  Requested for: 86KTU1619; Last   Rx:19Oct2017 Ordered   10  Metoprolol Tartrate 100 MG Oral Tablet; Take 1 tablet twice daily; Therapy: 83YHJ1196 to (Evaluate:05Bci3504)  Requested for: 83YPJ3153; Last    Rx:86Frk5247 Ordered   11  Omeprazole 20 MG Oral Capsule Delayed Release; TAKE 1 CAPSULE DAILY EVERY    MORNING BEFORE BREAKFAST; Therapy: 61RDE0771 to (Evaluate:12Opi2078)  Requested for: 42OJX4009; Last    Rx:40Sxu3849 Ordered   12  Warfarin Sodium 5 MG Oral Tablet; take as directed; Therapy: 34DPF8421 to (Evaluate:86Byj9317) Recorded    Allergies    1  No Known Drug Allergies    2  No Known Environmental Allergies   3   No Known Food Allergies    Signatures   Electronically signed by : Hermilo Horton, ; Nov 9 2017  2:27PM EST                       (Administrative)

## 2018-01-12 VITALS
WEIGHT: 239 LBS | BODY MASS INDEX: 39.82 KG/M2 | DIASTOLIC BLOOD PRESSURE: 74 MMHG | HEIGHT: 65 IN | SYSTOLIC BLOOD PRESSURE: 140 MMHG

## 2018-01-12 NOTE — RESULT NOTES
Message   HGA1C stable at 7 4, continue with low sugar diet and recheck as directed in 3 months with labs  Verified Results  (1) COMPREHENSIVE METABOLIC PANEL 53FGY7541 20:86LP Russell Maldonado Order Number: [id number]  TW Order Number: [id number]     Test Name Result Flag Reference   GLUCOSE,RANDM 201 mg/dL H    If the patient is fasting, the ADA then defines impaired fasting glucose as > 100 mg/dL and diabetes as > or equal to 123 mg/dL  SODIUM 137 mmol/L  136-145   POTASSIUM 4 0 mmol/L  3 5-5 3   CHLORIDE 100 mmol/L  100-108   CARBON DIOXIDE 32 mmol/L  21-32   ANION GAP (CALC) 5 mmol/L  4-13   BLOOD UREA NITROGEN 9 mg/dL  5-25   CREATININE 0 78 mg/dL  0 60-1 30   Standardized to IDMS reference method   CALCIUM 8 9 mg/dL  8 3-10 1   BILI, TOTAL 0 45 mg/dL  0 20-1 00   ALK PHOSPHATAS 58 U/L     ALT (SGPT) 36 U/L  12-78   AST(SGOT) 16 U/L  5-45   ALBUMIN 4 1 g/dL  3 5-5 0   TOTAL PROTEIN 8 1 g/dL  6 4-8 2   eGFR Non-African American      >60 0 ml/min/1 73sq [facility] Disease Education Program recommendations are as follows:  GFR calculation is accurate only with a steady state creatinine  Chronic Kidney disease less than 60 ml/min/1 73 sq  meters  Kidney failure less than 15 ml/min/1 73 sq  meters  (1) HEMOGLOBIN A1C 26Oct2016 10:19AM [place]   TW Order Number: [id number]     Test Name Result Flag Reference   HEMOGLOBIN A1C 7 4 % H 4 2-6 3   EST  AVG   GLUCOSE 166 mg/dl

## 2018-01-13 VITALS
BODY MASS INDEX: 39.2 KG/M2 | SYSTOLIC BLOOD PRESSURE: 134 MMHG | DIASTOLIC BLOOD PRESSURE: 80 MMHG | HEIGHT: 65 IN | WEIGHT: 235.25 LBS

## 2018-01-13 VITALS
WEIGHT: 237 LBS | BODY MASS INDEX: 38.09 KG/M2 | HEIGHT: 66 IN | SYSTOLIC BLOOD PRESSURE: 152 MMHG | DIASTOLIC BLOOD PRESSURE: 88 MMHG

## 2018-01-13 VITALS
BODY MASS INDEX: 40.24 KG/M2 | RESPIRATION RATE: 16 BRPM | SYSTOLIC BLOOD PRESSURE: 150 MMHG | WEIGHT: 241.5 LBS | HEIGHT: 65 IN | HEART RATE: 62 BPM | DIASTOLIC BLOOD PRESSURE: 76 MMHG

## 2018-01-13 VITALS
WEIGHT: 228 LBS | RESPIRATION RATE: 16 BRPM | BODY MASS INDEX: 36.64 KG/M2 | DIASTOLIC BLOOD PRESSURE: 80 MMHG | HEART RATE: 68 BPM | SYSTOLIC BLOOD PRESSURE: 130 MMHG | HEIGHT: 66 IN

## 2018-01-14 VITALS
BODY MASS INDEX: 35.83 KG/M2 | RESPIRATION RATE: 15 BRPM | SYSTOLIC BLOOD PRESSURE: 128 MMHG | DIASTOLIC BLOOD PRESSURE: 80 MMHG | WEIGHT: 225.38 LBS | HEART RATE: 59 BPM

## 2018-01-14 VITALS
BODY MASS INDEX: 36.75 KG/M2 | RESPIRATION RATE: 15 BRPM | DIASTOLIC BLOOD PRESSURE: 106 MMHG | SYSTOLIC BLOOD PRESSURE: 150 MMHG | WEIGHT: 224.25 LBS | HEART RATE: 67 BPM

## 2018-01-14 VITALS
HEIGHT: 66 IN | WEIGHT: 217.13 LBS | SYSTOLIC BLOOD PRESSURE: 150 MMHG | DIASTOLIC BLOOD PRESSURE: 72 MMHG | BODY MASS INDEX: 34.9 KG/M2

## 2018-01-15 NOTE — RESULT NOTES
Verified Results  (1) LIPID PANEL, FASTING 21Apr2017 09:28AM Adeola Pride     Test Name Result Flag Reference   CHOLESTEROL 187 mg/dL     HDL,DIRECT 37 mg/dL L 40-60   Specimen collection should occur prior to Metamizole administration due to the potential for falsely depressed results  LDL CHOLESTEROL CALCULATED 129 mg/dL H 0-100   This is a fasting blood test  Water,black tea or black  coffee only after 9:00pm the night before test  Drink 2 glasses of water the morning of test         Triglyceride:         Normal              <150 mg/dl       Borderline High    150-199 mg/dl       High               200-499 mg/dl       Very High          >499 mg/dl  Cholesterol:         Desirable        <200 mg/dl      Borderline High  200-239 mg/dl      High             >239 mg/dl  HDL Cholesterol:        High    >59 mg/dL      Low     <41 mg/dL  LDL CALCULATED:    This screening LDL is a calculated result  It does not have the accuracy of the Direct Measured LDL in the monitoring of patients with hyperlipidemia and/or statin therapy  Direct Measure LDL (IDS666) must be ordered separately in these patients  TRIGLYCERIDES 106 mg/dL  <=150   Specimen collection should occur prior to N-Acetylcysteine or Metamizole administration due to the potential for falsely depressed results

## 2018-01-15 NOTE — CONSULTS
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  He has recovered beautifully from that and looks great  He says that he has a little bit tired but is improving  Review of Systems    Constitutional: fatigue, but no fever, no chills and no anorexia  Integumentary: no rashes  Gastrointestinal: no abdominal pain, no nausea, no diarrhea and no vomiting  Respiratory: no shortness of breath, no cough and not coughing up sputum  Cardiovascular: no orthopnea, no chest pain and no lower extremity edema  Neurological: No complaints of headache, no lightheadedness or dizziness  Genitourinary: no dysuria, no hematuria and no incomplete emptying of bladder  Eyes: No complaints of eyesight problems or dryness of eyes  ENT: no complaints of hearing loss, no nasal discharge  Current Meds   1  Amiodarone HCl - 200 MG Oral Tablet; take 1 tab 3x day for 7 days then 1 tab 2x day   for 7 days then 1 tab daily for 7 days then stop; Therapy: [id number] to Recorded   2  Aspirin EC 81 MG Oral Tablet Delayed Release; TAKE 1 TABLET DAILY; Therapy: [id number] to (Evaluate:25Apr2018) Recorded   3  Atorvastatin Calcium 80 MG Oral Tablet; TAKE 1 TABLET DAILY; Therapy: 71CSJ4839 to (Evaluate:25Apr2018) Recorded   4  Docusate Sodium 100 MG Oral Tablet; Take 1 tablet twice daily; Therapy: 09ZPV9810 to (95 [phone]) Recorded   5  HumaLOG KwikPen 100 UNIT/ML Subcutaneous Solution Pen-injector; INJECT 5 UNITS   BEFORE MEALS; Therapy: 04YLO7672 to Recorded   6  Hydrocodone-Acetaminophen 5-325 MG Oral Tablet; take 1-2tab q6h as needed for pain; Therapy: 02WMM7463 to Recorded   7   Klor-Con M20 20 MEQ Oral Tablet Extended Release; take 1 tablet for 14 days;   Therapy: 71VPY8162 to Recorded   8  Lantus SoloStar 100 UNIT/ML Subcutaneous Solution Pen-injector; INJECT 10 UNIT   Bedtime; Therapy: 91UOV3496 to Recorded   9  Lisinopril 10 MG Oral Tablet; TAKE 1 TABLET DAILY; Therapy: 66QQZ0538 to (78 [phone]) Recorded   10  Metoprolol Tartrate 100 MG Oral Tablet; Take 1 tablet twice daily; Therapy: 23LEQ9581 to (Evaluate:98Djz2933) Recorded   11  Omeprazole 20 MG Oral Capsule Delayed Release; TAKE 1 CAPSULE DAILY EVERY    MORNING BEFORE BREAKFAST; Therapy: 06EPL4856 to (Evaluate:25Jan2018) Recorded   12  Torsemide 20 MG Oral Tablet; take 1 tablet daily for 14 days; Therapy: 71IWL1180 to Recorded   13  Warfarin Sodium 5 MG Oral Tablet; take as directed; Therapy: 99KRQ0345 to (78 603 806) Recorded    The medication list was reviewed and updated today  Allergies    1  No Known Drug Allergies    2  No Known Environmental Allergies   3  No Known Food Allergies    Vitals   Recorded: 68SHW3575 10:26AM Recorded: 12IEU6565 09:58AM   Heart Rate 68    Respiration 16    Systolic 226    Diastolic 80    Height  5 ft 5 5 in   Weight  228 lb    BMI Calculated  37 36   BSA Calculated  2 1     Physical Exam    Constitutional: General appearance: No acute distress, well appearing and well nourished  ENT: External ears and nose appear normal      Eyes: Anicteric sclerae  JVD:  No JVD present  Pulmonary: Respiratory effort: No increased work of breathing or signs of respiratory distress  Auscultation of lungs: Clear to auscultation  Cardiovascular: Auscultation of heart: Normal rate and rhythm, normal S1 and S2, without murmurs  Abdomen: Non-tender, no masses  Extremities: No cyanosis, clubbing or edema  Rash:  Healing chest scars     Neurologic: Non Focal      Psychiatric: Orientation to person, place, and time: Normal        Results/Data  CTA CHEST ABDOMEN PELVIS W WO CONTRAST [id number] 07:56AM [facility] Order Number: [id number]    - Patient Instructions: SCHEDULED ON 10/4/17 @@ 8:30AM AT [facility], MAY HAVE CLEAR LIQUIDS *     Test Name Result Flag Reference   CTA CHEST ABDOMEN PELVIS W WO CONTRAST (Report)     CT ANGIOGRAM OF THE CHEST, ABDOMEN AND PELVIS WITH AND WITHOUT IV CONTRAST     INDICATION: Follow-up aortic dissection  History of prostate cancer  COMPARISON: September 6, 2017     TECHNIQUE: CT angiogram examination of the chest, abdomen and pelvis was performed according to standard protocol  This examination, like all CT scans performed in the [facility], was performed utilizing techniques to minimize    radiation dose exposure, including the use of iterative reconstruction and automated exposure control  Contrast as well as noncontrast images were obtained  3D reconstructions were performed an independent workstation, and are supplied for review  Rad dose 3005 mGy-cm      IV Contrast: 100 mL of iohexol (OMNIPAQUE)    Enteric Contrast: Not administered     FINDINGS:     VASCULAR STRUCTURES: The patient is status post repair of a Amilcar a dissection  The ascending aortic graft is widely patent with surrounding fluid, likely postoperative  A dissection remains extending from the distal anastomosis of the ascending    aortic graft distal abdominal aorta at the level of the inferior mesenteric artery  There is classic branching anatomy of the aortic arch without stenoses at the origins of the great vessels  The vessels arise from the true lumen  The dissection extends into the celiac artery and proximal splenic artery which is unchanged  The right renal artery and superior mesenteric artery arise from the true lumen  The left renal artery arises from the false lumen  The inferior mesenteric    artery is patent  The right and left iliofemoral segments are moderately tortuous and ectatic, but patent       Multiple dilated vessels are noted within the mediastinum with multiple aneurysms  The largest aneurysm is anterior to the main pulmonary artery and measures 26 mm demonstrating no calcifications  A smaller circumferentially calcified aneurysm adjacent   to the left pulmonary artery is 12 mm  A 3rd aneurysm adjacent to the upper left pulmonary artery branch is 14 mm without associated calcifications  Multiple dilated tortuous vessels appear to receive blood supply from the conus branch of the right    coronary artery the mid left anterior descending coronary artery, and from a bronchial artery arising from the distal aortic arch  These findings are most consistent with an arterial venous malformation  Comparison is made to the older study of April 25, 2014 which demonstrated the above findings which appear stable  OTHER FINDINGS:      CHEST:      HEART: Normal cardiac size  Small pericardial effusion decreased from the previous study  LUNGS: Bibasilar subsegmental atelectasis  Dorathy [place] PLEURA: Moderate right and small left pleural effusion  MEDIASTINUM AND MARII: Expected postoperative changes  Small amount of fluid inferior to the median sternotomy site appears simple  CHEST WALL AND LOWER NECK: 19 x 17 mm heterogeneously enhancing thyroid nodule in the right lobe which is unchanged from 2014  ABDOMEN     LIVER/BILIARY TREE: Unremarkable  GALLBLADDER: No calcified gallstones  No pericholecystic inflammatory change  SPLEEN: Unremarkable  Normal size  PANCREAS: Unremarkable  ADRENAL GLANDS: Unremarkable  KIDNEYS/URETERS: No solid renal mass  No hydronephrosis  No urinary tract calculi  PELVIS     REPRODUCTIVE ORGANS: Unremarkable for patient's age  URINARY BLADDER: Unremarkable  ADDITIONAL ABDOMINAL AND PELVIC STRUCTURES:      STOMACH AND BOWEL: Unremarkable  ABDOMINOPELVIC CAVITY: No pathologically enlarged mesenteric or retroperitoneal lymph nodes  No ascites or free intraperitoneal air       ABDOMINAL WALL/INGUINAL REGIONS: Unremarkable  OSSEOUS STRUCTURES: No acute fracture or destructive osseous lesion  IMPRESSION:     Status post surgical repair of a [place] a dissection with expected postoperative changes  The dissection, which begins at the distal anastomosis of the ascending aortic graft, is unchanged in its extent and branch vessel involvement  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, the largest measuring 26 mm  Although evaluation is difficult, the supply appears to be from a bronchial artery, left anterior descending artery, and right conus artery with outflow into    the left atrial appendage  Further evaluation with gated CT of the heart versus cardiac catheterization including imaging of the thoracic aorta can be considered  The size of the aneurysm is concerning for increased risk of rupture  Considerations    related to the patient's age and/or comorbidities may be used to alter these recommendations  Small pericardial effusion with bilateral pleural effusions and subsegmental atelectasis  Stable right thyroid nodule        * I personally telephoned this result to Iker Mullen on 10/4/2017 1:38 PM         Workstation performed: [id number]     Signed by:   Kala Lewis MD   10/4/17     (1) PT WITH INR [id number] 04:16PM David Nugent     Test Name Result Flag Reference   INR 2 10  2 000 - 3 000   REPORT NAME: Progress Notes Report  VISIT DATE: 10/2/2017  VISIT TIME: 4:16 PM EDT  PATIENT NAME: Vesna Franco   MEDICAL RECORD NUMBER: [id number]  YOB: 1945  AGE: 71  REFERRING PHYSICIAN: Julian Mendoza DO  SUPERVISING CLINICIAN: Julian Mendoza DO  HEALTH CARE PROVIDER: Fredy Vasques  PATIENT HOME ADDRESS: [address] [place] [address], [address] [place] [address]  PATIENT HOME PHONE: [phone]  SOCIAL SECURITY NUMBER:   DIAGNOSIS 1: Unspecified atrial fibrillation / I48 91  DIAGNOSIS 2:   INR RANGE: 2 - 3  INR GOAL: 2 5  TREATMENT START DATE: TREATMENT END DATE:   NEXT VISIT: 10/9/2017  [facility]  VISIT RESULTS  ENCOUNTER NUMBER:   TEST LOCATION: [facility]  TEST TYPE: Outside Lab (Venipuncture)  VISIT TYPE: Phone Consult  CURRENT INR: 2 1 PROTIME: 23  SPECIMEN COL AND RPT DATE: 10/2/2017 4:16 PM  EDT  VITAL SIGNS  PULSE:  BP: / WEIGHT:  HEIGHT:  TEMP:   CURRENT ANTICOAGULANT DOSING SCHEDULE  DOSE SIZE: 5mg    ANTICOAGULANT TYPE: WARFARIN SOD  DOSING REGIMEN  Sun       Mon       Tues      Wed       Thurs     Fri       Sat  Total/Wk  2 5       2 5       2 5       2 5       2 5       2 5       2 5       17 5  PATIENT MEDICATION INSTRUCTION: Yes  PATIENT NUTRITIONAL COUNSELING: No  PATIENT BRUISING INSTRUCTION: No  LAST EDUCATION DATE:   PREVIOUS VISIT INFORMATION  VISITDATE  INRGoal INR   Sun    Mon Tues Wed Thurs Fri    Sat  Total/wk  10/2/2017   2 5     2 1   2 5    2 5    2 5    2 5    2 5    2 5    2 5  17 5  9/29/2017   2 5     2 5   2 5    2 5    0      0      0      2 5    2 5  10  9/26/2017   2 5     3 1   0      0      HOLD   2 5    2 5    0      0  5  9/22/2017   2 5     3     2 5    2 5    0      0      5      2 5    5  17 5  ADDITIONAL PREVIOUS VISIT INFORMATION  VISITDATE   PRIMARY RX               DOSE      CrCl  10/2/2017   WARFARIN SOD             5mg  9/29/2017   WARFARIN SOD             5mg  9/26/2017   WARFARIN SOD             5mg  9/22/2017   WARFARIN SOD             5mg  OTHER CURRENT MEDICATIONS:  WARFARIN SOD  PROGRESS NOTES:   PATIENT INSTRUCTIONS: 10/2/17, tc pt, cont 2 5 mg daily, next pt/ inr due 1  week, 10/9   will fax to [facility]  TEST LOCATION: [facility], , ,   INBOUND LAB DATA:  Lab       Lab Value Col Date                 Rpt Date                 Lab  Reference Range  Electronically signed by:  Janna Meigs Perinotti on 10/2/2017 4:16 PM EDT   PT 23 00     REPORT NAME: Progress Notes Report  VISIT DATE: 10/2/2017  VISIT TIME: 4:16 PM EDT  PATIENT NAME: Fadia Hogan MEDICAL RECORD NUMBER: [id number]  YOB: 1945  AGE: 71  REFERRING PHYSICIAN: Virginia Villatoro DO  SUPERVISING CLINICIAN: Virginia Villatoro DO  HEALTH CARE PROVIDER: Hetal Vasques  PATIENT HOME [address]ESS[address], [address] [place] [address]  PATIENT HOME PHONE: [phone]  SOCIAL SECURITY NUMBER:   DIAGNOSIS 1: Unspecified atrial fibrillation / I48 91  DIAGNOSIS 2:   INR RANGE: 2 - 3  INR GOAL: 2 5  TREATMENT START DATE:   TREATMENT END DATE:   NEXT VISIT: 10/9/2017  [facility]  VISIT RESULTS  ENCOUNTER NUMBER:   TEST LOCATION: [facility]  TEST TYPE: Outside Lab (Venipuncture)  VISIT TYPE: Phone Consult  CURRENT INR: 2 1 PROTIME: 23  SPECIMEN COL AND RPT DATE: 10/2/2017 4:16 PM  EDT  VITAL SIGNS  PULSE:  BP: / WEIGHT:  HEIGHT:  TEMP:   CURRENT ANTICOAGULANT DOSING SCHEDULE  DOSE SIZE: 5mg    ANTICOAGULANT TYPE: WARFARIN SOD  DOSING REGIMEN  Sun       Mon Tues Wed Thurs Fri       Sat  Total/Wk  2 5       2 5       2 5       2 5       2 5       2 5       2 5       17 5  PATIENT MEDICATION INSTRUCTION: Yes  PATIENT NUTRITIONAL COUNSELING: No  PATIENT BRUISING INSTRUCTION: No  LAST EDUCATION DATE:   PREVIOUS VISIT INFORMATION  VISITDATE  INRGoal INR   Sun    Mon Tues Wed Thurs Fri    Sat  Total/wk  10/2/2017   2 5     2 1   2 5    2 5    2 5    2 5    2 5    2 5    2 5  17 5  9/29/2017   2 5     2 5   2 5    2 5    0      0      0      2 5    2 5  10  9/26/2017   2 5     3 1   0      0      HOLD   2 5    2 5    0      0  5  9/22/2017   2 5     3     2 5    2 5    0      0      5      2 5    5  17 5  ADDITIONAL PREVIOUS VISIT INFORMATION  VISITDATE   PRIMARY RX               DOSE      CrCl  10/2/2017   WARFARIN SOD             5mg  9/29/2017   WARFARIN SOD             5mg  9/26/2017   WARFARIN SOD             5mg  9/22/2017   WARFARIN SOD             5mg  OTHER CURRENT MEDICATIONS:  WARFARIN SOD  PROGRESS NOTES:   PATIENT INSTRUCTIONS: 10/2/17, missy pt, cont 2 5 mg daily, next pt/ inr due 1  week, 10/9   will fax to [facility]  [facility]in  TEST LOCATION: [facility] , ,   INBOUND LAB DATA:  Lab       Lab Value Col Date                 Rpt Date                 Lab  Reference Range  Electronically signed by: Sharif Sam on 10/2/2017 4:16 PM EDT     * XR CHEST PA & LATERAL [id number] 11:45AM Genesis Salguero Order Number: [id number]     Test Name Result Flag Reference   XR CHEST PA & LATERAL (Report)     CHEST - DUAL ENERGY     INDICATION: Status post open heart surgery with cardiac arrhythmia  COMPARISON: 9/8/2017  VIEWS: PA (including soft tissue/bone algorithms) and lateral projections     IMAGES: 4     FINDINGS: There are median sternotomy wires indicating prior cardiac surgery  Heart shadow is enlarged but unchanged from prior exam      Improved aeration with with small residual pleural effusions noted and mild bibasilar atelectasis  No pneumothorax at either apex  Visualized osseous structures appear within normal limits for the patient's age  IMPRESSION:       1  Improved aeration status post CABG with small pleural effusions and bibasilar atelectasis  2  Stable cardiomegaly         Workstation performed: [id number]     Signed by:   Lesa Ambrosio MD   10/3/17     (1) PT WITH INR [id number] 01:46PM Susan Porter     Test Name Result Flag Reference   INR 2 50  2 000 - 3 000   REPORT NAME: Progress Notes Report  VISIT DATE: 9/29/2017  VISIT TIME: 1:46 PM EDT  PATIENT NAME: Milton Aguillon   MEDICAL RECORD NUMBER: [id number]  YOB: 1945  AGE: 71  REFERRING PHYSICIAN: Ramos Taylor DO  SUPERVISING CLINICIAN: Ramos Taylor DO  HEALTH CARE PROVIDER: [facility]  PATIENT HOME ADDRESS: [address] [place], [place] [address] [place], [address] [place] [address]  PATIENT HOME PHONE: [phone]  SOCIAL SECURITY NUMBER:   DIAGNOSIS 1: Unspecified atrial fibrillation / I48 91  DIAGNOSIS 2:   INR RANGE: 2 - 3  INR GOAL: 2  5  TREATMENT START DATE:   TREATMENT END DATE:   NEXT VISIT: 10/2/2017  [facility]  VISIT RESULTS  ENCOUNTER NUMBER:   TEST LOCATION: [facility]  TEST TYPE: Outside Lab (Venipuncture)  VISIT TYPE: Phone Consult  CURRENT INR: 2 5 PROTIME: 26 1  SPECIMEN COL AND RPT DATE: 9/29/2017 1:46  PM EDT  VITAL SIGNS  PULSE:  BP: / WEIGHT:  HEIGHT:  TEMP:   CURRENT ANTICOAGULANT DOSING SCHEDULE  DOSE SIZE: 5mg    ANTICOAGULANT TYPE: WARFARIN SOD  DOSING REGIMEN  Sun       Mon Tues Wed Thurs Fri       Sat  Total/Wk  2 5       2 5       0         0         0         2 5       2 5       10  PATIENT MEDICATION INSTRUCTION: Yes  PATIENT NUTRITIONAL COUNSELING: No  PATIENT BRUISING INSTRUCTION: No  LAST EDUCATION DATE:   PREVIOUS VISIT INFORMATION  VISITDATE  INRGoal INR   Sun    Mon    Tues   Wed    Thurs  Fri    Sat  Total/wk  9/29/2017   2 5     2 5   2 5    2 5    0      0      0      2 5    2 5  10  9/26/2017   2 5     3 1   0      0      HOLD   2 5    2 5    0      0  5  9/22/2017   2 5     3     2 5    2 5    0      0      5      2 5    5  17 5  9/18/2017   2 5     4     5      5      HOLD   2 5    0      0      0  12 5  ADDITIONAL PREVIOUS VISIT INFORMATION  VISITDATE   PRIMARY RX               DOSE      CrCl  9/29/2017   WARFARIN SOD             5mg  9/26/2017   WARFARIN SOD             5mg  9/22/2017   WARFARIN SOD             5mg  9/18/2017   WARFARIN SOD             5mg  OTHER CURRENT MEDICATIONS:  WARFARIN SOD  PROGRESS NOTES:   PATIENT INSTRUCTIONS: 9/29/17,tc pt, cont 2 5 mg warfarin daily  next pt/  inr due mon, 10/2   will fax to [facility]  TEST LOCATION: [facility] , ,   INBOUND LAB DATA:  Lab       Lab Value Col Date                 Rpt Date                 Lab  Reference Range  Electronically signed by:  Monica Sam on 9/29/2017 1:46 PM EDT   PT 26 10     REPORT NAME: Progress Notes Report  VISIT DATE: 9/29/2017  VISIT TIME: 1:46 PM EDT  PATIENT NAME: Juana Boogie   MEDICAL RECORD NUMBER: [id number]  YOB: 1945  AGE: 71  REFERRING PHYSICIAN: Jackelyn Call DO  SUPERVISING CLINICIAN: Jackelyn Call DO  HEALTH CARE PROVIDER: Marcelino ARIAS [facility]  PATIENT HOME ADDRESS: [address] [place] [address], [address] [place] [address]  PATIENT HOME PHONE: [phone]  SOCIAL SECURITY NUMBER:   DIAGNOSIS 1: Unspecified atrial fibrillation / I48 91  DIAGNOSIS 2:   INR RANGE: 2 - 3  INR GOAL: 2 5  TREATMENT START DATE:   TREATMENT END DATE:   NEXT VISIT: 10/2/2017  [facility]  VISIT RESULTS  ENCOUNTER NUMBER:   [facility]  TEST TYPE: Outside Lab (Venipuncture)  VISIT TYPE: Phone Consult  CURRENT INR: 2 5 PROTIME: 26 1  SPECIMEN COL AND RPT DATE: 9/29/2017 1:46  PM EDT  VITAL SIGNS  PULSE:  BP: / WEIGHT:  HEIGHT:  TEMP:   CURRENT ANTICOAGULANT DOSING SCHEDULE  DOSE SIZE: 5mg    ANTICOAGULANT TYPE: WARFARIN SOD  DOSING REGIMEN  Sun       Mon       Tues      Wed       Thurs     Fri       Sat  Total/Wk  2 5       2 5       0         0         0         2 5       2 5       10  PATIENT MEDICATION INSTRUCTION: Yes  PATIENT NUTRITIONAL COUNSELING: No  PATIENT BRUISING INSTRUCTION: No  LAST EDUCATION DATE:   PREVIOUS VISIT INFORMATION  VISITDATE  INRGoal INR   Sun    Mon    Tues   Wed    Thurs  Fri    Sat  Total/wk  9/29/2017   2 5     2 5   2 5    2 5    0      0      0      2 5    2 5  10  9/26/2017   2 5     3 1   0      0      HOLD   2 5    2 5    0      0  5  9/22/2017   2 5     3     2 5    2 5    0      0      5      2 5    5  17 5  9/18/2017   2 5     4     5      5      HOLD   2 5    0      0      0  12 5  ADDITIONAL PREVIOUS VISIT INFORMATION  VISITDATE   PRIMARY RX               DOSE      CrCl  9/29/2017   WARFARIN SOD             5mg  9/26/2017   WARFARIN SOD             5mg  9/22/2017   WARFARIN SOD             5mg  9/18/2017   WARFARIN SOD             5mg  OTHER CURRENT MEDICATIONS:  WARFARIN SOD  PROGRESS NOTES: PATIENT INSTRUCTIONS: 9/29/17,tc pt, cont 2 5 mg warfarin daily  next pt/  inr due mon, 10/2   will fax to [facility] home care  [facility]  TEST LOCATION: [facility] ,   INBOUND LAB DATA:  Lab       Lab Value Col Date                 Rpt Date                 Lab  Reference Range  Electronically signed by: Cheryl Sam on 9/29/2017 1:46 PM EDT       Assessment    1  Benign essential hypertension (401 1) (I10)   2  Microalbuminuria (791 0) (R80 9)    Plan  Microalbuminuria    · (1) BASIC METABOLIC PROFILE; Status:Active; Requested for:01Oct2018; Perform:[facility]; RDC:[id number];QWEAUQZ; For:Microalbuminuria; Ordered By:Taqueria Soto;   · (1) MICROALBUMIN CREATININE RATIO, RANDOM URINE; Status:Active; Requested  for:01Oct2018; Perform:[facility]; DTU:[id number];XGFGDDO; For:Microalbuminuria; Ordered By:Taqueria Soto;    Your creatinine is normal and this is the measure of your kidney funcyion  Protein in urine still very low so excellent  Colntinue sugar control, BP and Lisinopril and cholesterol treatment  Discussion/Summary   the patient is here for yearly follow-up to monitor kidney function  He was initially referred for proteinuria and given that he has diabetic we have been monitoring this  He is doing very well and he did undergo successful repair of his thoracic aorta  He is recovering from that  Latest creatinine is normal at 0 7 and protein estimation is still below 300 mg so he has microalbuminuria  There has been no progression or worsening of his kidney issues so discontinue with glycemic control, blood pressure control, lipid control and lisinopril to help reduce proteinuria and will continue to monitor yearly  I asked him to call me if there is any problems or he needs anything between visits  Future Appointments    Signatures   Electronically signed by :  ALPHONSE Tolliver ; Oct 12 2017 10:28AM EST                       (Author)

## 2018-01-16 NOTE — MISCELLANEOUS
History of Present Illness  Cardiac Surgery Phone Follow-up:    This phone call was in regards to post-operative care  Procedure: Replacement of ascending aorta  09/06/2017   Hospital Discharge Date: 09/14/2017  Surgeon: Soni Tracy MD    Office Visit Pending: Yes   10/13/2017  The patient has an appointment with their PCP on: 09/21/2017  The patient has an appointment with their cardiologist on: Pending appointment  Date of Call: 09/18/2017  Symptom review with the patient is as follows: no shortness of breath, no chest pain, no leg swelling, pain is controlled, incision stable, no bowel problems, stable appetite, no lightheaded/dizziness, blood pressure stable, heart rate stable, sleep stable, activity: Tolerating well, recent 11 lbs weight loss, patient's reported pre-op weight was 234 lbs, patient's reported weight at discharge was 240 lbs, patient's reported current weight is 229 lbs and medication review  Comments: Patient states he has been unable to administer insulin because he was not given instructions  Patient states he is seeing his PCP on 09/21/17 that will be giving him instructions  No other complaints at this time  Active Problems    1  Allergic rhinitis (477 9) (J30 9)   2  Aortic aneurysm and dissection (441 00) (I71 00)   3  Arthritis (V13 4)   4  Bell's palsy (351 0) (G51 0)   5  Benign essential hypertension (401 1) (I10)   6  Diabetic peripheral neuropathy (250 60,357 2) (E11 42)   7  Dizziness (780 4) (R42)   8  Dizziness and giddiness (780 4) (R42)   9  Dry Skin (782 9)   10  Edema (782 3) (R60 9)   11  Elevated prostate specific antigen (PSA) (790 93) (R97 20)   12  Glaucoma screening (V80 1) (Z13 5)   13  Head trauma (959 01) (S09 90XA)   14  Hearing Loss Bilaterally   15  Hyperlipidemia (272 4) (E78 5)   16  Hypertension, essential (401 9) (I10)   17  Hypokalemia (276 8) (E87 6)   18   Intracerebral hemorrhage (431) (I61 9)   19  Joint pain, knee (719 46) (M25 569)   20  Microalbuminuria (791 0) (R80 9)   21  Need for influenza vaccination (V04 81) (Z23)   22  Obesity (278 00) (E66 9)   23  Prostate cancer (185) (C61)   24  Stroke syndrome (434 91) (I63 9)   25  Thyroid nodule (241 0) (E04 1)   26  Tinnitus, unspecified laterality (388 30) (H93 19)   27  Type 2 diabetes mellitus with hyperglycemia (250 00) (E11 65)   28  Urinary Stream Starts And Stops (788 61)    Past Medical History    1  History of Arthritis (716 90) (M19 90)   2  History of Benign essential hypertension (401 1) (I10)   3  History of Cervical strain (847 0) (S16 1XXA)   4  History of Currently Wearing Eyeglasses   5  History of Diabetes Mellitus (250 00)   6  History of Hand pain (729 5) (M79 643)   7  History of sinusitis (V12 69) (Z87 09)   8  History of Need for influenza vaccination (V04 81) (Z23)   9  History of Pulmonary nodule seen on imaging study (793 11) (R91 1)   10  History of Screening for depression (V79 0) (Z13 89)   11  History of Screening for genitourinary condition (V81 6) (Z13 89)   12  History of Special screening for other neurological conditions (V80 09) (Z13 89)   13  History of Stroke (434 91) (I63 9)    Surgical History    1  Denied: History of Recent Surgery    Family History  Problems    1  Family history of Currently Wearing Eyeglasses   2  Family history of Denial Of Any Significant Medical History   3  Family history of Family Health Status 2  Children Living   4  Family history of Family Health Status Of Father -    11  Family history of Family Health Status Of Mother -    10  Family history of Maternal Grandfather Is    7  Family history of Maternal Grandmother Is    8  Family history of Paternal Grandfather Is    5  Family history of Paternal Grandmother Is    8  Family history of Stroke Complications :  Mother    Social History    · Being A Social Drinker   · Daily caffeine consumption, 1 serving a day   · Denied: History of Drug Use   · Education History   · Marital History -    · Never A Smoker   · Occupation: Retired   · Person living alone (V60 3) (Z60 2)   · Physical Activity Tolerance   · Denied: History of Sexually Active   · Denied: History of Sexually Active High-risk    Current Meds    1  Atenolol 100 MG Oral Tablet; Take 1 tablet daily; Therapy: 27HHJ5756 to (Last Rx:12Jun2016)  Requested for: 12Jun2016 Ordered   2  Dyrenium 50 MG Oral Capsule; TAKE 1 CAPSULE Every morning; Therapy: 10HXF0192 to (Last Rx:12Aug2017)  Requested for: 12Aug2017 Ordered   3  Valsartan-Hydrochlorothiazide 160-25 MG Oral Tablet; take one tablet by mouth one time   daily; Therapy: 47MCG0438 to (Last Rx:14Aug2017)  Requested for: 14Aug2017 Ordered    4  Glimepiride 4 MG Oral Tablet; Take 1 tablet daily; Therapy: 87NCB8724 to (Last Rx:07Jun2017)  Requested for: 07Jun2017 Ordered   5  MetFORMIN HCl - 1000 MG Oral Tablet; take 1 tablet twice a day; Therapy: 57ASW5838 to (Last Rx:16Jan2017)  Requested for: 98CRZ5219 Ordered    6   [facility] Fish Oil CAPS; TAKE 1 CAPSULE Daily Recorded    Signatures   Electronically signed by : Erika Najjar, CRNP; Sep 19 2017  9:13AM EST                       (Author)

## 2018-01-16 NOTE — PROGRESS NOTES
REPORT NAME: Progress Notes Report  VISIT DATE: 10/31/2017  VISIT TIME: 4:31 PM EDT  PATIENT NAME: Erica Rizo   MEDICAL RECORD NUMBER: [id number]  YOB: 1945  AGE: 71  REFERRING PHYSICIAN: Carina Damon DO  SUPERVISING CLINICIAN: [id number] CARE PROVIDER: Cheryl Vasques  PATIENT HOME ADDRESS: [address] [place] [address], [address] [place] [address]  PATIENT HOME PHONE: [phone]  SOCIAL SECURITY NUMBER:   DIAGNOSIS 1: Unspecified atrial fibrillation / I48 91  DIAGNOSIS 2:   INR RANGE: 2 - 3  INR GOAL: 2 5  TREATMENT START DATE:   TREATMENT END DATE:   NEXT VISIT: 11/7/2017  [facility]  VISIT RESULTS  ENCOUNTER NUMBER:   TEST LOCATION: [facility]  TEST TYPE: Outside Lab (Venipuncture)  VISIT TYPE: Phone Consult  CURRENT INR: 1 6 PROTIME: 18 3  SPECIMEN COL AND RPT DATE: 10/31/2017 4:31  PM EDT  VITAL SIGNS  PULSE:  BP: / WEIGHT:  HEIGHT:  TEMP:   CURRENT ANTICOAGULANT DOSING SCHEDULE  DOSE SIZE: 5mg    ANTICOAGULANT TYPE: WARFARIN SOD  DOSING REGIMEN  Sun       Mon Tues Wed Thurs Fri       Sat  Total/Wk  5         5         2 5       5         5         2 5       5         30  PATIENT MEDICATION INSTRUCTION: Yes  PATIENT NUTRITIONAL COUNSELING: No  PATIENT BRUISING INSTRUCTION: No  LAST EDUCATION DATE:   PREVIOUS VISIT INFORMATION  VISITDATE  INRGoal INR   Sun    Mon    Tues   Wed    Thurs  Fri    Sat  Total/wk  10/31/2017  2 5     1 6   5      5      2 5    5      5      2 5    5  30  10/23/2017  2 5     3 2   5      0      2 5    5      5      2 5    5  25  10/17/2017  2 5     2 3   5      0      5      5      5      5      5  30  10/9/2017   2 5     1 4   2 5    2 5    2 5    2 5    2 5    2 5    2 5  17 5  ADDITIONAL PREVIOUS VISIT INFORMATION  VISITDATE   PRIMARY RX               DOSE      CrCl  10/31/2017  WARFARIN SOD             5mg  10/23/2017  WARFARIN SOD             5mg  10/17/2017  WARFARIN SOD             5mg  10/9/2017   WARFARIN SOD 5mg  OTHER CURRENT MEDICATIONS:  WARFARIN SOD  PROGRESS NOTES:   PATIENT INSTRUCTIONS: 10/31/17, tc from pt, states he did not take 5 mg on  th/fri last week,he took 0 mg  took 2 5 on mon, in place of 5 mg   he was  not clear on instructions  reviewed prior dosing , will cont 2 5 mg t/f, 5 mg others, inr in one week,  11/7  called and lm am for [facility] regarding inr draw 11/7   lorin   ---- Additional Instructions entered on 11/22/2017 4:12 PM EST by Janna Meigs Perinotti :  11/22/17, per RP, pt seen at ov today, warfarin discontinued  lorin  TEST LOCATION: [facility] , ,   INBOUND LAB DATA:  Lab       Lab Value Col Date                 Rpt Date                 Lab  Reference Range  Electronically signed by:  Janna Meigs Perinotti on 11/22/2017 4:12 PM EST

## 2018-01-16 NOTE — RESULT NOTES
Message  Our records indicate that you are overdue for a colonoscopy  Please contact your gastroenterologist to schedule an appointment with their office  If you do not have a gastroenterologist, please contact our office and we will recommend a physician to you  We can be reached at [phone]   Thank you      Signatures   Electronically signed by : Eddi Taylor, ; Jul 7 2017 11:08AM EST                       (Author)

## 2018-01-18 NOTE — MISCELLANEOUS
Message  Return to work or school:        Our record indicates that you are overdue for a colonoscopy  Please contact your gastroenterologist to schedule for this appointment  If you have any questions please contact our office at [phone]  Thank you     Kathy Casas Do/am       Signatures   Electronically signed by : Richie Sharp, ; Oct 19 2016 12:04PM EST                       (Author)

## 2018-01-22 VITALS
HEART RATE: 64 BPM | DIASTOLIC BLOOD PRESSURE: 90 MMHG | SYSTOLIC BLOOD PRESSURE: 162 MMHG | HEIGHT: 66 IN | RESPIRATION RATE: 14 BRPM | OXYGEN SATURATION: 96 % | BODY MASS INDEX: 36.8 KG/M2 | TEMPERATURE: 98.4 F | WEIGHT: 229 LBS

## 2018-01-22 VITALS — DIASTOLIC BLOOD PRESSURE: 90 MMHG | SYSTOLIC BLOOD PRESSURE: 176 MMHG

## 2018-01-22 VITALS
HEIGHT: 66 IN | HEART RATE: 60 BPM | SYSTOLIC BLOOD PRESSURE: 160 MMHG | BODY MASS INDEX: 35.52 KG/M2 | DIASTOLIC BLOOD PRESSURE: 78 MMHG | WEIGHT: 221 LBS

## 2018-01-22 VITALS
SYSTOLIC BLOOD PRESSURE: 138 MMHG | WEIGHT: 232 LBS | BODY MASS INDEX: 37.28 KG/M2 | HEIGHT: 66 IN | DIASTOLIC BLOOD PRESSURE: 88 MMHG

## 2018-01-23 VITALS
DIASTOLIC BLOOD PRESSURE: 60 MMHG | SYSTOLIC BLOOD PRESSURE: 128 MMHG | HEIGHT: 66 IN | BODY MASS INDEX: 36.5 KG/M2 | HEART RATE: 64 BPM | RESPIRATION RATE: 16 BRPM | WEIGHT: 227.13 LBS

## 2018-01-23 NOTE — MISCELLANEOUS
Assessment   1  Type 2 diabetes mellitus with hyperglycemia (250 00) (E11 65)1   2  Benign essential hypertension (401 1) (I10)1   3  Aortic aneurysm and dissection (441 00) (I71 00)1   4  Obesity (278 00) (E66 9)1      1 Amended By: Rowan Martin; Sep 21 2017 12:01 PM EST    Discussion/Summary  Discussion Summary:   1,2  Here 2  1,2  for 2  1,2  RAMOS  2  1,2  Overall 2  1,2  stable 2  1,2  and 2  1,2  taking 2  1,2  all 2  1,2  meds 2  1,2  as 2  1,2  directed  2  1,2      1  1,2  Pt  2  1,2  had 2  1,2  an 2  1,2  acute 2  1,2  Type A aortic dissection, ascending aortic aneurysm  S/P Replacement of ascending aorta with aggressive hemiarch reconstruction with a 26Dacron graft  1  1,2  He 2  1,2  has 2  1,2  a 2  1,2  hx 2  1,2  of 2  1,2  Prostate CA, NIDDM, HTN, obesity, arthritis, HL, hypokalemia, ICH (in 2014 with no residual symptoms), thyroid nodule, BPH, [place] Palsy (right facial weakness), microalbuminuria (follows with [facility])  1  1,2    1  1,2    1  1,2  Jakob Levine  was provided contact information and scheduled a follow up appointment with ALPHONSE Guadarrama, 1  1,2  their cardiologist within 2-3 weeks  1  1,2  He 2  1,2  has 2  1,2  yet 2  1,2  to 2  1,2  f-up 2  1,2  with 2  1,2  his 2  1,2  endocrinologist 2  1,2    2  1,2  Josh for his DM now 2  on 1  1,2  insulin  2  1,2  His 2  1,2  meds 2  1,2  were 2  1,2  reviewed 2  1,2  today 2  1,2  and 2  1,2  answered 2  1,2  all 2  1,2  questions 2  1,2  about 2  1,2  his 2  1,2  new 2  1,2  meds  2  1,2      The patient was discharged on ongoing diuretic therapy with Torsemide 20 mg, PO Daily and Potassium Chloride 20 mEq, PO Daily  They were advised to continue these medications for 14 1  1,2  days as directed on discharge instructions, 2  unless otherwise directed  Narcotic pain medication was prescribed in the form of Percocet  1  1,2  He 2  1,2  uses 2  1,2  this 2  1,2  as 2  1,2  needed   2  1,2      The patient was prescribed injectable insulin for management of their pre-existing diabetes  Call Dr Gerald Murrell office to make an appointment to be seen in 1  1,2  1-2 2  weeks  Take insulin glargine (Lantus) 28 Units at bedtime each night, and take insulin lispro (Humalog) 8 Units three times a day before meals delivered via injectable pen system, unless otherwise directed  Keep a log of your blood sugars and call the office in 1 week to report glucose   Measure your blood sugar levels 3-4 times a day  Call your endocrinology office with any questions or concerns about your insulin  Patient has own glucose monitor at home   Insulin teaching was completed in the hospital and the patient was able to demonstrate competency with the pen needle system and felt comfortable doing so  Marcela Cowden 1  1,2  Jr was 2  1,2  discharged on anticoagulation therapy for treatment of PAF  As they are new to Coumadin, arrangements have been made for Dr CAYETANO HOLLAND AT [facility] office to monitor INR levels and provide dosing instructions  Their office 1  1,2  will 2  1,2  be 2  1,2  following 2  1,2  daily INR's and correlating Coumadin doses 1  1,2  as 2  1,2  directed  2  1,2    1  1,2  The patient 1  1,2  was 2  1,2  provided a prescription for PT/INR to be drawn every Monday and 1  1,2  Thursday in the hospital, 2  with results to Dr CAYETANO HOLLAND [facility] office  They have been prescribed Coumadin, 5 mg tabs, with 60 tablets 1  1,2  dispensed  Finally, they have been advised to take 5 mg daily, unless otherwise directed  The patient was informed that following their postoperative surgical evaluation, they 1  1,2  were 2  1,2  to 1  go to 2  outpatient cardiac rehabilitation  They were counseled that this program is run by specialists who will help them safely strengthen their heart and prevent more heart disease  Cardiac rehabilitation will include exercise, relaxation, stress management, and heart-healthy nutrition   Caregivers will also check to make sure their medication regimen is working  1  1,2  Pt  2  1,2  is 1  1,2  to f-up in our office as directed for routine medical f-up in 2 month and to call 2  if 1  1,2  any 2  1,2  problems  2  1,2    Counseling Documentation With Imm: The  patient2  was counseled regarding2   Medication SE Review and Pt Understands Tx: Possible side effects of new medications were reviewed with the patient/guardian today2  The treatment plan was reviewed with the patient/guardian  The patient/guardian understands and agrees with the treatment plan2        1 Amended By: Eulalia Coppola; Sep 21 2017 11:21 AM EST   2 Amended By: Eulalia Coppola; Sep 21 2017 12:38 PM EST    Chief Complaint  Chief Complaint Free Text Note Form: RAMOS, having problems with insulin - took his blood sugars this morning fasting and it was 1  1,2  243  2        1 Amended By: Mei Stanford; Sep 21 2017 10:29 AM EST   2 Amended By: Eulalia Coppola; Sep 21 2017 11:50 AM EST    History of Present Illness  TCM Communication [facility]: He was hospitalized at [facility]  The date of admission: 09/06/2017, date of discharge: 09/14/2017  Diagnosis: Aortic dissection  He was discharged to home  Medications reviewed and updated today  He scheduled a follow up appointment  Follow-up appointments with other specialists: Cardiologist  soreness from surgery and joint pain from being in bed Counseling was provided to the patient  Communication performed and completed by JACKIE   HPI: Here for RAMOS from hospital 1 week ago for aortic dissection and repair  Hx of DM and HTn and obesity  he is doing well today  No sob or ha  He has no lower extremity edema  he has many questions about his insulin used and why his BS are elevated after starting them  He is taking all his other meds as directed from discharge and is suppose to follow his blood sugars as directed and log them and f-up with Dr Tea Whyte in 1-2 weeks  He has yet to make this appt   He brought in all his meds to office today and for me to review them and how to use them  He is unsure if he is taking his insulin correctly as his blood sugars have been stable and now they are in the 240's recently after coming home  He is urinating and having BM's and is taking pain meds as directed s/p surgery for his chest dicomfort from the surgical site  Has some anxiousness today re his blood sugars and his recent surgery  He has no fever or chills and denies any urinary complaints  He has many bruises on his arms from IV's and venipunctures while in hospital 1        1 Amended By: Anitha Blanco; Sep 21 2017 11:50 AM EST    Review of Systems  Complete-Male:   Constitutional:1  No fever or chills, feels well, no tiredness, no recent weight gain or weight loss1   Eyes:1  No complaints of eye pain, no red eyes, no discharge from eyes, no itchy eyes1   ENT:1  no complaints of earache, no hearing loss, no nosebleeds, no nasal discharge, no sore throat, no hoarseness1   Cardiovascular: 1  as noted in HPI1   Respiratory:1  No complaints of shortness of breath, no wheezing, no cough, no SOB on exertion, no orthopnea or PND1   Gastrointestinal:1  No complaints of abdominal pain, no constipation, no nausea or vomiting, no diarrhea or bloody stools1  and as noted in HPI1   Genitourinary:1  No complaints of dysuria, no incontinence, no hesitancy, no nocturia, no genital lesion, no testicular pain1  and as noted in HPI1   Musculoskeletal:1  as noted in [address]   Integumentary:1  as noted in [address]   Neurological:1  No compliants of headache, no confusion, no convulsions, no numbness or tingling, no dizziness or fainting, no limb weakness, no difficulty walking1   Psychiatric:1  Is not suicidal, no sleep disturbances, no anxiety or depression, no change in personality, no emotional problems1  and as noted in HPI1      Endocrine:1  No complaints of proptosis, no hot flashes, no muscle weakness, no erectile dysfunction, no deepening of the voice, no feelings of weakness1   Hematologic/Lymphatic:1  No complaints of swollen glands, no swollen glands in the neck, does not bleed easily, no easy bruising1         1 Amended By: Nik Browning; Sep 21 2017 11:57 AM EST    Active Problems   1  Allergic rhinitis (477 9) (J30 9)  2  Aortic aneurysm and dissection (441 00) (I71 00)  3  Arthritis (V13 4)  4  Bell's palsy (351 0) (G51 0)  5  Benign essential hypertension (401 1) (I10)  6  Diabetic peripheral neuropathy (250 60,357 2) (E11 42)  7  Dizziness (780 4) (R42)  8  Dizziness and giddiness (780 4) (R42)  9  Dry Skin (782 9)  10  Edema (782 3) (R60 9)  11  Elevated prostate specific antigen (PSA) (790 93) (R97 20)  12  Glaucoma screening (V80 1) (Z13 5)  13  Head trauma (959 01) (S09 90XA)  14  Hearing Loss Bilaterally  15  Hyperlipidemia (272 4) (E78 5)  16  Hypertension, essential (401 9) (I10)  17  Hypokalemia (276 8) (E87 6)  18  Intracerebral hemorrhage (431) (I61 9)  19  Joint pain, knee (719 46) (M25 569)  20  Microalbuminuria (791 0) (R80 9)  21  Need for influenza vaccination (V04 81) (Z23)  22  Obesity (278 00) (E66 9)  23  Prostate cancer (185) (C61)  24  Stroke syndrome (434 91) (I63 9)  25  Thyroid nodule (241 0) (E04 1)  26  Tinnitus, unspecified laterality (388 30) (H93 19)  27  Type 2 diabetes mellitus with hyperglycemia (250 00) (E11 65)  28  Urinary Stream Starts And Stops (788 61)    Past Medical History   1  History of Arthritis (716 90) (M19 90)  2  History of Benign essential hypertension (401 1) (I10)  3  History of Cervical strain (847 0) (S16 1XXA)  4  History of Currently Wearing Eyeglasses  5  History of Diabetes Mellitus (250 00)  6  History of Hand pain (729 5) (M79 643)  7  History of sinusitis (V12 69) (Z87 09)  8  History of Need for influenza vaccination (V04 81) (Z23)  9  History of Pulmonary nodule seen on imaging study (793 11) (R91 1)  10  History of Screening for depression (V79 0) (Z13 89)  11   History of Screening for genitourinary condition (V81 6) (Z13 89)  12  History of Special screening for other neurological conditions (V80 09) (Z13 89)  13  History of Stroke (434 91) (I63 9)    Surgical History   1  Denied: History of Recent Surgery    Family History  Mother   1  Family history of Stroke Complications  Family History   2  Family history of Currently Wearing Eyeglasses  3  Family history of Denial Of Any Significant Medical History  4  Family history of Family Health Status 2  Children Living  5  Family history of Family Health Status Of Father -   10  Family history of Family Health Status Of Mother -   9  Family history of Maternal Grandfather Is   6  Family history of Maternal Grandmother Is   5  Family history of Paternal Grandfather Is   8  Family history of Paternal Grandmother Is     Social History    · Being A Social Drinker   · Daily caffeine consumption, 1 serving a day   · Denied: History of Drug Use   · Education History   · Marital History -    · Never A Smoker   · Occupation: Retired   · Person living alone (V60 3) (Z60 2)   · Physical Activity Tolerance   · Denied: History of Sexually Active   · Denied: History of Sexually Active High-risk    Current Meds  1  Atenolol 100 MG Oral Tablet; Take 1 tablet daily; Therapy: 36WLY1913 to (Last Rx:2016)  Requested for: 2016 Ordered  2  [facility] Fish Oil CAPS; TAKE 1 CAPSULE Daily Recorded  3  Dyrenium 50 MG Oral Capsule; TAKE 1 CAPSULE Every morning; Therapy: 19VFJ7282 to (Last Rx:65Ezj3516)  Requested for: 19Jgq7082 Ordered  4  Glimepiride 4 MG Oral Tablet; Take 1 tablet daily; Therapy: 18OKL1929 to (Last Rx:2017)  Requested for: 2017 Ordered  5  MetFORMIN HCl - 1000 MG Oral Tablet; take 1 tablet twice a day; Therapy: 89QNX2524 to (Last Rx:2017)  Requested for: 66AMW8516 Ordered  6   Valsartan-Hydrochlorothiazide 160-25 MG Oral Tablet; take one tablet by mouth one time   daily; Therapy: 84DPG5174 to (Last Rx:19Mgd7734)  Requested for: 46Hjb0841 Ordered    Allergies   1  No Known Drug Allergies   2  No Known Environmental Allergies  3  No Known Food Allergies    Vitals  Signs   Recorded: 54KRU2755 51:24MR    Systolic: 623 1    Diastolic: 88 1    Height: 5 ft 5 5 in 1    Weight: 232 lb  1    BMI Calculated: 38 02 1    BSA Calculated: 2 12 1      1 Amended By: Mei Stanford; Sep 21 2017 10:34 AM EST    Physical Exam    Constitutional   General appearance: Abnormal  1  Obesity1   Eyes1    Conjunctiva and lids: No swelling, erythema, or discharge  1    Pupils and irises: Equal, round and reactive to light  1    Ears, Nose, Mouth, and Throat1    External inspection of ears and nose: Normal 1    Otoscopic examination: Tympanic membrance translucent with normal light reflex  Canals patent without erythema  1    Nasal mucosa, septum, and turbinates: Normal without edema or erythema  1    Oropharynx: Normal with no erythema, edema, exudate or lesions  1    Pulmonary1    Respiratory effort: No increased work of breathing or signs of respiratory distress  1    Auscultation of lungs: Clear to auscultation, equal breath sounds bilaterally, no wheezes, no rales, no rhonci  1    Cardiovascular1    Palpation of heart: Normal PMI, no thrills  1    Auscultation of heart: Normal rate and rhythm, normal S1 and S2, without murmurs  1    Examination of extremities for edema and/or varicosities: Normal 1    Carotid pulses: Normal 1    Abdomen1    Abdomen: Non-tender, no masses  1    Liver and spleen: No hepatomegaly or splenomegaly  1    Lymphatic1    Palpation of lymph nodes in neck: No lymphadenopathy  1    Musculoskeletal1    Gait and station: Normal 1    Digits and nails: Normal without clubbing or cyanosis  1    Inspection/palpation of joints, bones, and muscles: Normal 1    Skin   Skin and subcutaneous tissue: Abnormal  1  Bruises on forearms b/l and also abdomen   His chest suture site is intact and free of any bleeding or redness1   Neurologic1    Cranial nerves: Cranial nerves 2-12 intact  1    Reflexes: 2+ and symmetric  1    Sensation: No sensory loss  1    Psychiatric1    Orientation to person, place and time: Normal 1    Mood and affect: Normal 1          1 Amended By: Anitha Blanco; Sep 21 2017 11:59 AM EST    Future Appointments    Date/Time Provider Specialty Site   10/12/2017 10:00 AM ALPHONSE Byrd  [facility]   09/21/2017 10:30 AM Anitha Blanco, [address] [place] [address]   10/13/2017 11:00 AM ALPHONSE Shah   Cardiac Surgery [facility]     Signatures   Electronically signed by : Justin Villafuerte DO; Sep 15 2017 12:58PM EST                       (Author)    Electronically signed by : Justin Villafuerte DO; Sep 21 2017 12:39PM EST                       (Author)

## 2018-02-21 ENCOUNTER — OFFICE VISIT (OUTPATIENT)
Dept: FAMILY MEDICINE CLINIC | Facility: CLINIC | Age: 73
End: 2018-02-21
Payer: COMMERCIAL

## 2018-02-21 VITALS
DIASTOLIC BLOOD PRESSURE: 62 MMHG | SYSTOLIC BLOOD PRESSURE: 124 MMHG | WEIGHT: 224.2 LBS | BODY MASS INDEX: 36.03 KG/M2 | HEIGHT: 66 IN

## 2018-02-21 DIAGNOSIS — E66.09 OBESITY DUE TO EXCESS CALORIES, UNSPECIFIED CLASSIFICATION, UNSPECIFIED WHETHER SERIOUS COMORBIDITY PRESENT: ICD-10-CM

## 2018-02-21 DIAGNOSIS — E11.9 TYPE 2 DIABETES MELLITUS WITHOUT COMPLICATION, UNSPECIFIED LONG TERM INSULIN USE STATUS: Primary | ICD-10-CM

## 2018-02-21 DIAGNOSIS — R60.9 EDEMA, UNSPECIFIED TYPE: ICD-10-CM

## 2018-02-21 DIAGNOSIS — I10 ESSENTIAL HYPERTENSION: ICD-10-CM

## 2018-02-21 PROCEDURE — 99214 OFFICE O/P EST MOD 30 MIN: CPT | Performed by: FAMILY MEDICINE

## 2018-02-21 PROCEDURE — 3725F SCREEN DEPRESSION PERFORMED: CPT | Performed by: FAMILY MEDICINE

## 2018-02-21 RX ORDER — LISINOPRIL AND HYDROCHLOROTHIAZIDE 20; 12.5 MG/1; MG/1
2 TABLET ORAL DAILY
COMMUNITY
Start: 2017-10-19 | End: 2018-03-08 | Stop reason: SDUPTHER

## 2018-02-21 RX ORDER — HYDROCODONE BITARTRATE AND ACETAMINOPHEN 5; 325 MG/1; MG/1
TABLET ORAL
COMMUNITY
Start: 2017-09-27 | End: 2018-04-20

## 2018-02-21 RX ORDER — AMLODIPINE BESYLATE 10 MG/1
1 TABLET ORAL 2 TIMES DAILY
COMMUNITY
Start: 2017-10-31 | End: 2018-09-14 | Stop reason: SDUPTHER

## 2018-02-21 NOTE — PATIENT INSTRUCTIONS
HTN stable, DM stable with blood sugars daily below 130 fasting in am, rec  Labs in 3 months with office visit  F-up with Cardiology as directed for CAD and hx of aortic dissection and repair  Elevate legs as directed for edema in lower legs  Lose weight

## 2018-02-21 NOTE — PROGRESS NOTES
Assessment/Plan:  Chief Complaint   Patient presents with    Follow-up    Diabetes     needs foot exam    Edema     lower legs     There are no Patient Instructions on file for this visit  No problem-specific Assessment & Plan notes found for this encounter  There are no diagnoses linked to this encounter  Subjective:      Patient ID: Amisha Leo  is a 71 y o  male  Hx of aortic dissection and repair  Hx of DM and HTN  and obesity  He is doing well today  No sob or ha  He has some lower extremity edema  He is taking all his other meds as directed from last   discharge from hospital  and is suppose to follow his blood sugars as directed and log them and f-up with Dr Elizabeth Rhodes as directed  His blood sugar this am was 96 and very rarely its greater than 130 and is usually below this daily  The following portions of the patient's history were reviewed and updated as appropriate: allergies, current medications, past family history, past medical history, past social history, past surgical history and problem list     Review of Systems   Constitutional: Negative  HENT: Negative  Eyes: Negative  Respiratory: Negative  Cardiovascular: Negative  Gastrointestinal: Negative  Endocrine: Negative  Genitourinary: Negative  Musculoskeletal: Negative  Skin: Negative  Allergic/Immunologic: Negative  Neurological: Negative  Hematological: Negative  Psychiatric/Behavioral: Negative  Objective:      /62 (BP Location: Left arm, Patient Position: Sitting, Cuff Size: Standard)   Ht 5' 6" (1 676 m)   Wt 102 kg (224 lb 3 2 oz)   BMI 36 19 kg/m²          Physical Exam   Constitutional: He is oriented to person, place, and time  He appears well-developed and well-nourished  obesity   HENT:   Head: Normocephalic and atraumatic     Right Ear: External ear normal    Left Ear: External ear normal    Nose: Nose normal    Mouth/Throat: Oropharynx is clear and moist    Eyes: Conjunctivae and EOM are normal  Pupils are equal, round, and reactive to light  Neck: Normal range of motion  Neck supple  Cardiovascular: Normal rate, regular rhythm, normal heart sounds and intact distal pulses  Pulmonary/Chest: Effort normal and breath sounds normal    Musculoskeletal: Normal range of motion  Neurological: He is alert and oriented to person, place, and time  He has normal reflexes  Skin: Skin is warm and dry  Scar anterior chest, edema b/l lower extremities   Psychiatric: He has a normal mood and affect   His behavior is normal

## 2018-03-05 DIAGNOSIS — E11.9 TYPE 2 DIABETES MELLITUS WITHOUT COMPLICATION, UNSPECIFIED LONG TERM INSULIN USE STATUS: Primary | ICD-10-CM

## 2018-03-05 RX ORDER — PEN NEEDLE, DIABETIC 31 GX5/16"
NEEDLE, DISPOSABLE MISCELLANEOUS
Qty: 100 EACH | Refills: 3 | Status: SHIPPED | OUTPATIENT
Start: 2018-03-05 | End: 2018-05-12 | Stop reason: SDUPTHER

## 2018-03-08 DIAGNOSIS — I10 HTN (HYPERTENSION), BENIGN: Primary | ICD-10-CM

## 2018-03-09 RX ORDER — LISINOPRIL AND HYDROCHLOROTHIAZIDE 20; 12.5 MG/1; MG/1
TABLET ORAL
Qty: 60 TABLET | Refills: 1 | Status: SHIPPED | OUTPATIENT
Start: 2018-03-09 | End: 2018-05-02 | Stop reason: SDUPTHER

## 2018-03-12 ENCOUNTER — OFFICE VISIT (OUTPATIENT)
Dept: SLEEP CENTER | Facility: CLINIC | Age: 73
End: 2018-03-12
Payer: COMMERCIAL

## 2018-03-12 VITALS
HEIGHT: 66 IN | BODY MASS INDEX: 36.29 KG/M2 | HEART RATE: 66 BPM | SYSTOLIC BLOOD PRESSURE: 140 MMHG | DIASTOLIC BLOOD PRESSURE: 60 MMHG | WEIGHT: 225.8 LBS

## 2018-03-12 DIAGNOSIS — G47.33 OSA (OBSTRUCTIVE SLEEP APNEA): Primary | ICD-10-CM

## 2018-03-12 DIAGNOSIS — E66.09 CLASS 2 OBESITY DUE TO EXCESS CALORIES WITHOUT SERIOUS COMORBIDITY WITH BODY MASS INDEX (BMI) OF 37.0 TO 37.9 IN ADULT: ICD-10-CM

## 2018-03-12 PROBLEM — E66.812 CLASS 2 OBESITY DUE TO EXCESS CALORIES WITHOUT SERIOUS COMORBIDITY WITH BODY MASS INDEX (BMI) OF 37.0 TO 37.9 IN ADULT: Status: ACTIVE | Noted: 2018-03-12

## 2018-03-12 PROCEDURE — 99214 OFFICE O/P EST MOD 30 MIN: CPT | Performed by: PSYCHIATRY & NEUROLOGY

## 2018-03-12 NOTE — PROGRESS NOTES
Review of Systems      Genitourinary frequent urination at night   Cardiology none   Gastrointestinal none   Neurology muscle weakness   Constitutional none   Integumentary none   Psychiatry anxiety   Musculoskeletal joint pain, muscle tenderness/aching and back pain   Pulmonary none   ENT nasal or sinus congestion and post nasal drip   Endocrine none   Hematological none

## 2018-03-12 NOTE — PROGRESS NOTES
Progress Note - [address] [facility],[address]  71 y o  male   :1945, MRN: [id number]  3/12/2018          Follow Up Evaluation / Problem:     Obstructive Sleep Apnea  Obesity    HPI: Julia Rey  is a 71y o  year old male  He has a history of previously diagnosed structure sleep apnea  He has been using nasal CPAP since his diagnosis in 2017  Review of Systems      Genitourinary frequent urination at night   Cardiology none   Gastrointestinal none   Neurology muscle weakness   Constitutional none   Integumentary none   Psychiatry anxiety   Musculoskeletal joint pain, muscle tenderness/aching and back pain   Pulmonary none   ENT nasal or sinus congestion and post nasal drip   Endocrine none   Hematological none         Current Outpatient Prescriptions:     amLODIPine (NORVASC) 10 mg tablet, Take 1 tablet by mouth 2 (two) times a day, Disp: , Rfl:     aspirin 81 mg chewable tablet, Chew 1 tablet daily, Disp: 30 tablet, Rfl: 0    insulin glargine (LANTUS) 100 units/mL subcutaneous injection, Inject 28 Units under the skin daily at bedtime Dx: Diabetes E11 9, dispense pens, Disp: 10 mL, Rfl: 0    insulin lispro (HumaLOG) 100 units/mL injection, Inject 8 Units under the skin 3 (three) times a day before meals Dx: Diabetes E11 9  Dispense pens, Disp: 8 mL, Rfl: 0    Insulin Pen Needle (B-D UF III MINI PEN NEEDLES) 31G X 5 MM MISC, by Does not apply route, Disp: , Rfl:     Insulin Pen Needle 32G X 4 MM MISC, Diagnosis: Diabetes E11 9  Inject Lantus each evening, and inject Humolog 3 x daily with meals, as directed, Disp: 100 each, Rfl: 0    lisinopril-hydrochlorothiazide (PRINZIDE,ZESTORETIC) 20-12 5 MG per tablet, TAKE 2 TABLETS DAILY  , Disp: 60 tablet, Rfl: 1    metoprolol tartrate (LOPRESSOR) 100 mg tablet, Take 1 tablet by mouth every 12 (twelve) hours, Disp: 60 tablet, Rfl: 0    acetaminophen (TYLENOL) 325 mg tablet, Take 2 tablets by mouth every 6 (six) hours as needed for mild pain, Disp: 30 tablet, Rfl: 0    amiodarone 200 mg tablet, Take 200 mg TID x 7 days, then 200 mg BID x 7 days, then 200 mg daily x 7 days then stop, Disp: 42 tablet, Rfl: 0    atorvastatin (LIPITOR) 80 mg tablet, Take 1 tablet by mouth daily with dinner, Disp: 30 tablet, Rfl: 0    B-D ULTRAFINE III SHORT PEN 31G X 8 MM MISC, USE AS DIRECTED , Disp: 100 each, Rfl: 3    docusate sodium (COLACE) 100 mg capsule, Take 1 capsule by mouth 2 (two) times a day, Disp: 30 capsule, Rfl: 0    glucose 4-6 GM-MG, Chew 2 tablets as needed for low blood sugar (blood sugar below 70) for up to 30 days, Disp: 30 tablet, Rfl: 0    HYDROcodone-acetaminophen (NORCO) 5-325 mg per tablet, Take by mouth, Disp: , Rfl:     omeprazole (PriLOSEC OTC) 20 MG tablet, Take 1 tablet by mouth daily for 30 days, Disp: 30 tablet, Rfl: 0    oxyCODONE-acetaminophen (PERCOCET) 5-325 mg per tablet, Take 1 - 2 tablets PO every 6 hours as needed for Pain For ongoing therapy, Disp: 60 tablet, Rfl: 0    polyethylene glycol (GLYCOLAX) powder, Take 17 g by mouth daily as needed (constipation  stop with loose stools) for up to 30 days, Disp: 510 g, Rfl: 0    potassium chloride (K-DUR,KLOR-CON) 20 mEq tablet, Take 1 tablet by mouth daily for 14 days, Disp: 14 tablet, Rfl: 0    torsemide (DEMADEX) 20 mg tablet, Take 1 tablet by mouth daily for 14 days, Disp: 14 tablet, Rfl: 0    warfarin (COUMADIN) 5 mg tablet, Take 1 tablet by mouth daily Unless otherwise instructed, Disp: 60 tablet, Rfl: 2    [place] Sleepiness Scale  Sitting and reading: Slight chance of dozing  Watching TV: Moderate chance of dozing  Sitting, inactive in a public place (e g  a theatre or a meeting):  Would never doze  As a passenger in a car for an hour without a break: Would never doze  Lying down to rest in the afternoon when circumstances permit: Would never doze  Sitting and talking to someone: Would never doze  Sitting quietly after a lunch without alcohol: Would never doze  In a car, while stopped for a few minutes in traffic: Would never doze  Total score: 3              Vitals:    03/12/18 1200   BP: 140/60   Pulse: 66   Weight: 102 kg (225 lb 12 8 oz)   Height: 5' 5 5" (1 664 m)       Body mass index is 37 kg/m²  Neck Circumference:  45 cm    EPWORTH SLEEPINESS SCORE  Total score: 3      Past History Since Last Sleep Center Visit:     He has been using nasal CPAP but finds the device to be somewhat comfortable  He complains of dryness in his mouth and throat  He also finds that cleaning his equipment is somewhat cumbersome  His friends have told him that a did DC PAP machine is worse than the toilet and could cause series infection  I guaranteed him that if he cleans his equipment as instructed he should not have any significant difficulties in this regard  He says he has been following instructions that he has received with his equipment  This point time he has had no evidence of infection or other problems  There may be a problem with his interface, however, he has not receive new equipment since his original fitting  He should be due for new equipment within the next several weeks  The following portions of the patient's history were reviewed and updated as appropriate: allergies, current medications, past family history, past medical history, past social history, past surgical history and problem list     OBJECTIVE    PAP Pressure: Nasal AutoPAP using a lower limit of 5 cm and an upper limit of 20 cm of water pressure  DME Provider:  [facility]    Physical Exam:     General Appearance:   Alert, cooperative, no distress, appears stated age, obese     Head:   Normocephalic, without obvious abnormality, atraumatic     Eyes:   PERRL, conjunctiva/corneas clear, EOM's intact          Nose:  Nares normal, septum midline, no drainage or sinus tenderness           Throat:  Lips, teeth and gums normal; tongue normal size and  shape and midline in position; mucosa redundant bilaterally, uvula long and thick, tonsils not visible, Mallampati class 4       Neck:  Supple, symmetrical, trachea midline, no adenopathy; Thyroid: No enlargement, tenderness or nodules; no carotid bruit or JVD     Lungs:      Clear to auscultation bilaterally, respirations unlabored     Heart:   Regular rate and rhythm, S1 and S2 normal, no murmur, rub or gallop       Extremities:  Extremities normal, atraumatic, no cyanosis or edema     Pulses:  2+ and symmetric all extremities     Skin:  Skin color, texture, turgor normal, no rashes or lesions     Lymph nodes:      Cervical and supraclavicular normal     Neurologic:  CNII-XII intact  Normal strength, sensation throughout       ASSESSMENT / PLAN    1  KARIN (obstructive sleep apnea)  Cpap DME   2  Class 2 obesity due to excess calories without serious comorbidity with body mass index (BMI) of 37 0 to 37 9 in adult             Counseling / Coordination of Care  Total clinic time spent today 25 minutes  Greater than 50% of total time was spent with the patient and / or family counseling and / or coordination of care  A description of the counseling / coordination of care:     Impressions, Diagnostic results, Prognosis, Instructions for management, Risks and benefits of treatment, Patient and family education, Risk factor reductions and Importance of compliance with treatment    The following instructions have been given to the patient today:    Patient Instructions   1  Continue nasal AutoPAP using a lower limit of 5 cm and an upper limit of 20 cm of water  2   Continue to maintain his equipment as per his instructions  3  Supply a prescription for new CPAP supplies which should last for the next 12 months  4    Attempt weight loss as possible using a combination of diet and exercise        DO Camilla Powers 15

## 2018-03-12 NOTE — PATIENT INSTRUCTIONS
1   Continue nasal AutoPAP using a lower limit of 5 cm and an upper limit of 20 cm of water  2   Continue to maintain his equipment as per his instructions  3  Supply a prescription for new CPAP supplies which should last for the next 12 months  4    Attempt weight loss as possible using a combination of diet and exercise

## 2018-03-30 ENCOUNTER — APPOINTMENT (OUTPATIENT)
Dept: LAB | Facility: CLINIC | Age: 73
End: 2018-03-30
Payer: COMMERCIAL

## 2018-03-30 ENCOUNTER — TRANSCRIBE ORDERS (OUTPATIENT)
Dept: LAB | Facility: CLINIC | Age: 73
End: 2018-03-30

## 2018-03-30 DIAGNOSIS — I71.00 MEDIONECROSIS OF AORTA (HCC): Primary | ICD-10-CM

## 2018-03-30 DIAGNOSIS — I71.00 MEDIONECROSIS OF AORTA (HCC): ICD-10-CM

## 2018-03-30 LAB
ANION GAP SERPL CALCULATED.3IONS-SCNC: 8 MMOL/L (ref 4–13)
BUN SERPL-MCNC: 12 MG/DL (ref 5–25)
CALCIUM SERPL-MCNC: 9.3 MG/DL (ref 8.3–10.1)
CHLORIDE SERPL-SCNC: 103 MMOL/L (ref 100–108)
CO2 SERPL-SCNC: 29 MMOL/L (ref 21–32)
CREAT SERPL-MCNC: 0.71 MG/DL (ref 0.6–1.3)
GFR SERPL CREATININE-BSD FRML MDRD: 109 ML/MIN/1.73SQ M
GLUCOSE SERPL-MCNC: 112 MG/DL (ref 65–140)
POTASSIUM SERPL-SCNC: 3.5 MMOL/L (ref 3.5–5.3)
SODIUM SERPL-SCNC: 140 MMOL/L (ref 136–145)

## 2018-03-30 PROCEDURE — 80048 BASIC METABOLIC PNL TOTAL CA: CPT

## 2018-03-30 PROCEDURE — 36415 COLL VENOUS BLD VENIPUNCTURE: CPT

## 2018-04-10 ENCOUNTER — HOSPITAL ENCOUNTER (OUTPATIENT)
Dept: CT IMAGING | Facility: HOSPITAL | Age: 73
Discharge: HOME | End: 2018-04-10
Attending: THORACIC SURGERY (CARDIOTHORACIC VASCULAR SURGERY)
Payer: COMMERCIAL

## 2018-04-10 DIAGNOSIS — I71.00 MEDIONECROSIS OF AORTA (HCC): ICD-10-CM

## 2018-04-10 PROCEDURE — 71275 CT ANGIOGRAPHY CHEST: CPT

## 2018-04-10 PROCEDURE — 74174 CTA ABD&PLVS W/CONTRAST: CPT

## 2018-04-10 RX ADMIN — IOHEXOL 100 ML: 350 INJECTION, SOLUTION INTRAVENOUS at 10:05

## 2018-04-20 ENCOUNTER — OFFICE VISIT (OUTPATIENT)
Dept: CARDIAC SURGERY | Facility: CLINIC | Age: 73
End: 2018-04-20
Payer: COMMERCIAL

## 2018-04-20 ENCOUNTER — TRANSCRIBE ORDERS (OUTPATIENT)
Dept: ADMINISTRATIVE | Facility: HOSPITAL | Age: 73
End: 2018-04-20

## 2018-04-20 VITALS
RESPIRATION RATE: 14 BRPM | OXYGEN SATURATION: 98 % | HEART RATE: 60 BPM | BODY MASS INDEX: 37.12 KG/M2 | SYSTOLIC BLOOD PRESSURE: 132 MMHG | TEMPERATURE: 97.9 F | DIASTOLIC BLOOD PRESSURE: 78 MMHG | WEIGHT: 231 LBS | HEIGHT: 66 IN

## 2018-04-20 DIAGNOSIS — Z86.79 S/P ASCENDING AORTIC ANEURYSM REPAIR: ICD-10-CM

## 2018-04-20 DIAGNOSIS — I71.2 AORTIC ARCH ANEURYSM (HCC): Primary | ICD-10-CM

## 2018-04-20 DIAGNOSIS — Z98.890 S/P ASCENDING AORTIC ANEURYSM REPAIR: ICD-10-CM

## 2018-04-20 DIAGNOSIS — I71.01 ACUTE THORACIC AORTIC DISSECTION (HCC): ICD-10-CM

## 2018-04-20 PROBLEM — I71.22 AORTIC ARCH ANEURYSM: Status: ACTIVE | Noted: 2018-04-20

## 2018-04-20 PROCEDURE — 99215 OFFICE O/P EST HI 40 MIN: CPT | Performed by: NURSE PRACTITIONER

## 2018-04-20 NOTE — PROGRESS NOTES
Aortic Clinic  Michael Fuller 71 y o  male MRN: [id number]      Reason for Consult / Principal Problem: Follow up after emergent repair of acuteType A ([place] I)    History of Present Illness: Michael Fuller  is a 71y o  year old male who presents today for ongoing surveillance after emergent surgical repair of an acute Type A aortic dissection in September 2017  He returns to aortic clinic today for a c month follow up with CTA chest/abd/pelvis  He states he has been doing well since his last visit in our office which was his post op visit  He denies changes in his health status  He has been diligent in monitoring his BP and blood glucose and keeping a record  His BP is consistently running 120's-130's/60-70  His Blood sugar remain under good control on insulin  His Coumadin (post op PAF) was discontinued and he states his PCP discontinued his Atorvastatin  He experiences mid-back discomfort, mosttl at night, when he is trying to sleep on his side  He does not have this pain with exertion  He denies chest pain, other than residual surgical incision soreness, and denies SOB, presyncope, syncope, limb numbness/tingling/pain or weakness  Past Medical History:  Past Medical History:   Diagnosis Date    Arthritis     Diabetes [facility]     Hypertension     Prostate cancer ([facility] [facility]     Stroke (cerebrum) ([facility] )          Past Surgical History:   Past Surgical History:   Procedure Laterality Date    [place] ASCEND AORTA GRAFT W ROOT REPLACMENT  VALVE CONDUIT/CORON RECONSTRUCT N/A 9/6/2017    Procedure: ASCENDING AORTIC REPAIR WITH A 26MM  GELWEAVE STRAIGHT GRAFT AND HEMIARCH WITH 8MM SIDE ARM BRANCH;  Surgeon: Sanjay Bills MD;  Location: [facility];  Service: Cardiac Surgery    [place] EXPLOR POSTOP BLEED,INFEC,CLOT-CHST N/A 9/6/2017    Procedure: RE-EXPLORATION MEDIASTERNAL CAVITY FOR POST-OP BLEED (BRING BACK);   Surgeon: Sanjay Bills MD;  Location: [facility];  Service: Cardiac Surgery         Family History:  Family History   Problem Relation Age of Onset    Stroke Mother      complications         Social History:    History   Alcohol Use    Yes     Comment: social use as per [employer]     History   Drug Use No     History   Smoking Status    Never Smoker   Smokeless Tobacco    Never Used         Home Medications:   Prior to Admission medications    Medication Sig Start Date End Date Taking? Authorizing Provider   acetaminophen (TYLENOL) 325 mg tablet Take 2 tablets by mouth every 6 (six) hours as needed for mild pain 9/14/17   Sanjay Bills MD   amiodarone 200 mg tablet Take 200 mg TID x 7 days, then 200 mg BID x 7 days, then 200 mg daily x 7 days then stop 9/14/17   Sanjay Bills MD   amLODIPine (NORVASC) 10 mg tablet Take 1 tablet by mouth 2 (two) times a day 10/31/17   Historical Provider, MD   aspirin 81 mg chewable tablet Chew 1 tablet daily 9/14/17   Sanjay Bills MD   atorvastatin (LIPITOR) 80 mg tablet Take 1 tablet by mouth daily with dinner 9/14/17   MD KYLEIGH TeranD ULTRAFINE III SHORT PEN 31G X 8 MM MISC USE AS DIRECTED  3/5/18   Yogesh Thomas DO   docusate sodium (COLACE) 100 mg capsule Take 1 capsule by mouth 2 (two) times a day 9/14/17   Sanjay Bills MD   glucose 4-6 GM-MG Chew 2 tablets as needed for low blood sugar (blood sugar below 70) for up to 30 days 9/14/17 10/14/17  Sanjay Bills MD   HYDROcodone-acetaminophen [facility]) 5-325 mg per tablet Take by mouth 9/27/17   Historical Provider, MD   insulin glargine (LANTUS) 100 units/mL subcutaneous injection Inject 28 Units under the skin daily at bedtime Dx: Diabetes E11 9, dispense pens 9/14/17   Sanjay Bills MD   insulin lispro (HumaLOG) 100 units/mL injection Inject 8 Units under the skin 3 (three) times a day before meals Dx: Diabetes E11 9   Dispense pens 9/14/17   Sanjay Bills MD   Insulin Pen Needle (VEENA-D UF III MINI PEN NEEDLES) 31G X 5 MM MISC by Does not apply route 10/19/17   Historical Provider, MD   Insulin Pen Needle 32G X 4 MM MISC Diagnosis: Diabetes E11 9  Inject Lantus each evening, and inject Humolog 3 x daily with meals, as directed 9/14/17   Odette Parikh MD   lisinopril-hydrochlorothiazide (PRINZIDE,ZESTORETIC) 20-12 5 MG per tablet TAKE 2 TABLETS DAILY  3/9/18   Lei Grove DO   metoprolol tartrate (LOPRESSOR) 100 mg tablet Take 1 tablet by mouth every 12 (twelve) hours 9/14/17   Odette Parikh MD   omeprazole (PriLOSEC OTC) 20 MG tablet Take 1 tablet by mouth daily for 30 days 9/14/17 10/14/17  Odette Parikh MD   oxyCODONE-acetaminophen (PERCOCET) 5-325 mg per tablet Take 1 - 2 tablets PO every 6 hours as needed for Pain  For ongoing therapy 9/14/17   Odette Parikh MD   polyethylene glycol (GLYCOLAX) powder Take 17 g by mouth daily as needed (constipation   stop with loose stools) for up to 30 days 9/14/17 10/14/17  Odette Parikh MD   potassium chloride (K-DUR,KLOR-CON) 20 mEq tablet Take 1 tablet by mouth daily for 14 days 9/14/17 9/28/17  Odette Parikh MD   torsemide [facility]) 20 mg tablet Take 1 tablet by mouth daily for 14 days 9/14/17 9/28/17  Odette Parikh MD   warfarin (COUMADIN) 5 mg tablet Take 1 tablet by mouth daily Unless otherwise instructed 9/14/17   Odette Parikh MD       Allergies:  No Known Allergies    Review of Systems:   Review of Systems - History obtained from chart review and the patient  General ROS: negative for - chills, fatigue or fever  Psychological ROS: negative  Hematological and Lymphatic ROS: negative for - bleeding problems, blood clots or bruising  Respiratory ROS: no cough, shortness of breath, or wheezing  Cardiovascular ROS: no chest pain or dyspnea on exertion  Gastrointestinal ROS: no abdominal pain, change in bowel habits, or black or bloody stools  Musculoskeletal ROS: positive for - pain in back - middle lumbar region  Neurological ROS: no TIA or stroke symptoms    Vital Signs:   Vitals:    04/20/18 0900 04/20/18 0927   BP: 130/72 132/78   BP Location: Left arm Right arm Cuff Size: Adult    Pulse: 60    Resp: 14    Temp: 97 9 °F (36 6 °C)    TempSrc: Oral    SpO2: 98%    Weight: 105 kg (231 lb)    Height: 5' 5 5" (1 664 m)        Physical Exam:  General: well developed, no acute distress  HEENT/NECK:  PERRLA  No jugular venous distention  Cardiac:Regular rate and rhythm, No murmurs, rubs or gallops  Carotid arteries: 2+ pulses, no bruits  Pulmonary:  Breath sounds clear bilaterally  Abdomen:  Non-tender, Non-distended  Positive bowel sounds  Upper extremities: 2+ radial pulses  Lower extremities: Extremities warm/dry  PT/DP pulses 1+ bilaterally  2+ edema B/L  Neuro: Alert and oriented X 3  Sensation is grossly intact  No focal deficits  Skin: Warm/Dry, without rashes or lesions  Lab Results:     Lab Results   Component Value Date    HGBA1C 7 1 (H) 09/06/2017     Lab Results   Component Value Date    CKTOTAL 72 01/15/2014    TROPONINI 0 03 09/06/2017       Imaging Studies:     CT Chest:   IMPRESSION:     1  Stable and satisfactory appearance of ascending aortic repair  2  Stable extent of residual dissection however distal arch aneurysm has increased in the interim to 53 mm compared to 42 mm in October 2017   3   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  As described previously, multiple arterial feeders are likely and there are 3 aneurysms all of which remain stable in size  The largest aneurysm measures 27 mm  Echocardiogram: 11/24/2017  SUMMARY     PROCEDURE INFORMATION:  This was a technically difficult study  Echocardiographic views were limited due to poor acoustic window availability and decreased penetration      LEFT VENTRICLE:  Systolic function was normal  Ejection fraction was estimated to be 60 %  There were no regional wall motion abnormalities      VENTRICULAR SEPTUM:  There was paradoxical motion   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      RIGHT ATRIUM:  Poorly visualized, but appears grossly mildly dilated      PERICARDIUM:  A small pericardial effusion was identified posterior to the heart        I have personally reviewed pertinent reports  Assessment:  Patient Active Problem List    Diagnosis Date Noted    KARIN (obstructive sleep apnea) 03/12/2018    Class 2 obesity due to excess calories without serious comorbidity with body mass index (BMI) of 37 0 to 37 9 in adult 03/12/2018    Anticoagulated on Coumadin 09/12/2017    Atrial fibrillation [facility] [address] [facility] 09/11/2017    [facility] 09[facility]06/2017    Acute blood loss anemia 09/06/2017    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/2017    Hypokalemia 09/06/2017    Prostate cancer ([facility] [address] )     Hypertension     Diabetes [facility]      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  Plan:    CT imaging performed prior to this visit demonstrates stable appearance of ascending aortic repair and stable residual dissection however distal arch aneurysm has increased to 53 mm as compared to 42 mm in October 2017  Also noted is stable appearance of mediastinal AVM's (these were present pre op and noted intra op)  These findings were confirmed and shared with the patient today  Since he has had a significant increase in size of his distal arch aneurysm in just 6 months, we will refer him to Dr Angela Molina, Vascular Surgery, for consultation with plans for carotid/subclavian bypass followed by TEVAR repair  Razasudha Mclaughlinna  was comfortable with our recommendations, and his questions were answered to his satisfaction  Thank you for allowing us to participate in the care of this patient  Aortic Aneurysm Instructions were provided to the patient as follows:    1  No lifting more than 50 pounds  Regular aerobic exercise permitted and recommended  2  Maintain a controlled blood pressure with a goal of less than 140/80  3  Follow up in Aortic Clinic as recommended with radiology follow up as instructed  4   Report to the ER or call 911 immediately with the following signs / symptoms: sudden onset of back pain, chest pain or shortness of breath  5  Tobacco cessation discussed      SIGNATURE: MAUREEN Post  DATE: April 20, 2018  TIME: 9:52 AM

## 2018-04-20 NOTE — LETTER
April 20, 2018     [place] Hart, [address] [facility]  [address]  [facility]    Patient: Praveena Koenig  YOB: 1945   Date of Visit: 4/20/2018       Dear Dr Brittany Youssef: Thank you for referring Gerri Maldonado to me for evaluation  Below are my notes for this consultation  If you have questions, please do not hesitate to call me  I look forward to following your patient along with you  Sincerely,        Aury Benavides MD        CC: Grace Ganser, MD Geannie Bailey, CRNP  4/20/2018 10:13 AM  Attested  Aortic Clinic  Praveena Koenig  71 y o  male MRN: [id number]      Reason for Consult / Principal Problem: Follow up after emergent repair of acuteType A (Debakey I)    History of Present Illness: Praveena Koenig  is a 71y o  year old male who presents today for ongoing surveillance after emergent surgical repair of an acute Type A aortic dissection in September 2017  He returns to aortic clinic today for a c month follow up with CTA chest/abd/pelvis  He states he has been doing well since his last visit in our office which was his post op visit  He denies changes in his health status  He has been diligent in monitoring his BP and blood glucose and keeping a record  His BP is consistently running 120's-130's/60-70  His Blood sugar remain under good control on insulin  His Coumadin (post op PAF) was discontinued and he states his PCP discontinued his Atorvastatin  He experiences mid-back discomfort, mosttl at night, when he is trying to sleep on his side  He does not have this pain with exertion  He denies chest pain, other than residual surgical incision soreness, and denies SOB, presyncope, syncope, limb numbness/tingling/pain or weakness         Past Medical History:  Past Medical History:   Diagnosis Date    Arthritis     Diabetes ([facility] [address] )     Hypertension     Prostate cancer ([facility] [address] )     Stroke (cerebrum) ([facility] [address] )          Past Surgical History:   Past Surgical History: Procedure Laterality Date    VA ASCEND AORTA GRAFT W ROOT REPLACMENT  VALVE CONDUIT/CORON RECONSTRUCT N/A 9/6/2017    Procedure: ASCENDING AORTIC REPAIR WITH A 26MM  GELWEAVE STRAIGHT GRAFT AND HEMIARCH WITH 8MM SIDE ARM BRANCH;  Surgeon: Rockwell Hammans, MD;  Location: [facility];  Service: Cardiac Surgery    VA EXPLOR POSTOP BLEED,INFEC,CLOT-CHST N/A 9/6/2017    Procedure: RE-EXPLORATION MEDIASTERNAL CAVITY FOR POST-OP BLEED (BRING BACK); Surgeon: Rockwell Hammans, MD;  Location: [facility];  Service: Cardiac Surgery         Family History:  Family History   Problem Relation Age of Onset    Stroke Mother      complications         Social History:    History   Alcohol Use    Yes     Comment: social use as per [employer]     History   Drug Use No     History   Smoking Status    Never Smoker   Smokeless Tobacco    Never Used         Home Medications:   Prior to Admission medications    Medication Sig Start Date End Date Taking?  Authorizing Provider   acetaminophen (TYLENOL) 325 mg tablet Take 2 tablets by mouth every 6 (six) hours as needed for mild pain 9/14/17   Rockwell Hammans, MD   amiodarone 200 mg tablet Take 200 mg TID x 7 days, then 200 mg BID x 7 days, then 200 mg daily x 7 days then stop 9/14/17   Rockwell Hammans, MD   amLODIPine (NORVASC) 10 mg tablet Take 1 tablet by mouth 2 (two) times a day 10/31/17   Historical Provider, MD   aspirin 81 mg chewable tablet Chew 1 tablet daily 9/14/17   Rockwell Hammans, MD   atorvastatin (LIPITOR) 80 mg tablet Take 1 tablet by mouth daily with dinner 9/14/17   Rockwell Hammans, MD   B-D ULTRAFINE III SHORT PEN 31G X 8 MM MISC USE AS DIRECTED  3/5/18   Genevive Rising, DO   docusate sodium (COLACE) 100 mg capsule Take 1 capsule by mouth 2 (two) times a day 9/14/17   Rockwell Hammans, MD   glucose 4-6 GM-MG Chew 2 tablets as needed for low blood sugar (blood sugar below 70) for up to 30 days 9/14/17 10/14/17  Rockwell Hammans, MD   HYDROcodone-acetaminophen (NORCO) 5-325 mg per tablet Take by mouth 9/27/17   Historical Provider, MD   insulin glargine (LANTUS) 100 units/mL subcutaneous injection Inject 28 Units under the skin daily at bedtime Dx: Diabetes E11 9, dispense pens 9/14/17   Patricia Montero MD   insulin lispro (HumaLOG) 100 units/mL injection Inject 8 Units under the skin 3 (three) times a day before meals Dx: Diabetes E11 9  Dispense pens 9/14/17   Patricia Montero MD   Insulin Pen Needle (B-D UF III MINI PEN NEEDLES) 31G X 5 MM MISC by Does not apply route 10/19/17   Historical Provider, MD   Insulin Pen Needle 32G X 4 MM MISC Diagnosis: Diabetes E11 9  Inject Lantus each evening, and inject Humolog 3 x daily with meals, as directed 9/14/17   Patricia Montero MD   lisinopril-hydrochlorothiazide (PRINZIDE,ZESTORETIC) 20-12 5 MG per tablet TAKE 2 TABLETS DAILY  3/9/18   Lei Grove DO   metoprolol tartrate (LOPRESSOR) 100 mg tablet Take 1 tablet by mouth every 12 (twelve) hours 9/14/17   Patricia Montero MD   omeprazole (PriLOSEC OTC) 20 MG tablet Take 1 tablet by mouth daily for 30 days 9/14/17 10/14/17  Patricia Montero MD   oxyCODONE-acetaminophen (PERCOCET) 5-325 mg per tablet Take 1 - 2 tablets PO every 6 hours as needed for Pain  For ongoing therapy 9/14/17   Patricia Montero MD   polyethylene glycol (GLYCOLAX) powder Take 17 g by mouth daily as needed (constipation   stop with loose stools) for up to 30 days 9/14/17 10/14/17  Patricia Montero MD   potassium chloride (K-DUR,KLOR-CON) 20 mEq tablet Take 1 tablet by mouth daily for 14 days 9/14/17 9/28/17  Patricia Montero MD   torsemide [facility]) 20 mg tablet Take 1 tablet by mouth daily for 14 days 9/14/17 9/28/17  Patricia Montero MD   warfarin (COUMADIN) 5 mg tablet Take 1 tablet by mouth daily Unless otherwise instructed 9/14/17   Patricia Montero MD       Allergies:  No Known Allergies    Review of Systems:   Review of Systems - History obtained from chart review and the patient  General ROS: negative for - chills, fatigue or fever  Psychological ROS: negative  Hematological and Lymphatic ROS: negative for - bleeding problems, blood clots or bruising  Respiratory ROS: no cough, shortness of breath, or wheezing  Cardiovascular ROS: no chest pain or dyspnea on exertion  Gastrointestinal ROS: no abdominal pain, change in bowel habits, or black or bloody stools  Musculoskeletal ROS: positive for - pain in back - middle lumbar region  Neurological ROS: no TIA or stroke symptoms    Vital Signs:   Vitals:    04/20/18 0900 04/20/18 0927   BP: 130/72 132/78   BP Location: Left arm Right arm   Cuff Size: Adult    Pulse: 60    Resp: 14    Temp: 97 9 °F (36 6 °C)    TempSrc: Oral    SpO2: 98%    Weight: 105 kg (231 lb)    Height: 5' 5 5" (1 664 m)        Physical Exam:  General: well developed, no acute distress  HEENT/NECK:  PERRLA  No jugular venous distention  Cardiac:Regular rate and rhythm, No murmurs, rubs or gallops  Carotid arteries: 2+ pulses, no bruits  Pulmonary:  Breath sounds clear bilaterally  Abdomen:  Non-tender, Non-distended  Positive bowel sounds  Upper extremities: 2+ radial pulses  Lower extremities: Extremities warm/dry  PT/DP pulses 1+ bilaterally  2+ edema B/L  Neuro: Alert and oriented X 3  Sensation is grossly intact  No focal deficits  Skin: Warm/Dry, without rashes or lesions  Lab Results:     Lab Results   Component Value Date    HGBA1C 7 1 (H) 09/06/2017     Lab Results   Component Value Date    CKTOTAL 72 01/15/2014    TROPONINI 0 03 09/06/2017       Imaging Studies:     CT Chest:   IMPRESSION:     1  Stable and satisfactory appearance of ascending aortic repair  2  Stable extent of residual dissection however distal arch aneurysm has increased in the interim to 53 mm compared to 42 mm in October 2017   3   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  As described previously, multiple arterial feeders are likely and there are 3 aneurysms all of which remain stable in size    The largest aneurysm measures 27 mm  Echocardiogram: 11/24/2017  SUMMARY     PROCEDURE INFORMATION:  This was a technically difficult study  Echocardiographic views were limited due to poor acoustic window availability and decreased penetration      LEFT VENTRICLE:  Systolic function was normal  Ejection fraction was estimated to be 60 %  There were no regional wall motion abnormalities      VENTRICULAR SEPTUM:  There was paradoxical motion  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      RIGHT ATRIUM:  Poorly visualized, but appears grossly mildly dilated      PERICARDIUM:  A small pericardial effusion was identified posterior to the heart        I have personally reviewed pertinent reports  Assessment:  Patient Active Problem List    Diagnosis Date Noted    KARIN (obstructive sleep apnea) 03/12/2018    Class 2 obesity due to excess calories without serious comorbidity with body mass index (BMI) of 37 0 to 37 9 in adult 03/12/2018    Anticoagulated on Coumadin 09/12/2017    Atrial fibrillation ([facility] [address] ) 09/11/2017    Acute thoracic aortic dissection ([facility] [address] ) 09/06/2017    Acute blood loss anemia 09/06/2017    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/2017    Hypokalemia 09/06/2017    Prostate cancer ([facility] [address] )     Hypertension     Diabetes (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  Plan:    CT imaging performed prior to this visit demonstrates stable appearance of ascending aortic repair and stable residual dissection however distal arch aneurysm has increased to 53 mm as compared to 42 mm in October 2017  Also noted is stable appearance of mediastinal AVM's (these were present pre op and noted intra op)  These findings were confirmed and shared with the patient today      Since he has had a significant increase in size of his distal arch aneurysm in just 6 months, we will refer him to Dr Florencia Shabazz, Vascular Surgery, for consultation with plans for carotid/subclavian bypass followed by TEVAR repair  Danay Napoles  was comfortable with our recommendations, and his questions were answered to his satisfaction  Thank you for allowing us to participate in the care of this patient  Aortic Aneurysm Instructions were provided to the patient as follows:    1  No lifting more than 50 pounds  Regular aerobic exercise permitted and recommended  2  Maintain a controlled blood pressure with a goal of less than 140/80  3  Follow up in Aortic Clinic as recommended with radiology follow up as instructed  4  Report to the ER or call 911 immediately with the following signs / symptoms: sudden onset of back pain, chest pain or shortness of breath  5  Tobacco cessation discussed  SIGNATURE: MAUREEN Cole  DATE: April 20, 2018  TIME: 9:52 AM  Attestation signed by Ynes Izaguirre MD at 4/20/2018 10:24 AM:  Pt seen and examined with MAUREEN  I agree with the above assessment and plan  Meño Diaz is doing well and has completely recovered 7 months s/p emergent Type A (DeBakey I) aortic dissection  I reviewed his CTA and unfortunately he has had significant growth of the proximal-most portion of his descending aorta  I reviewed the films with Dr Kayla Alvarez and we feel that early TEVAR is the best option to prevent late death from progressive dilation and rupture  This would require left carotid-subclavian bypass/transposition followed by TEVAR covering the left subclavian artery  I've estimated the likelihood of paraplegia at 5%  I had a lengthy discussion with the patient detailing the above and he is agreeable to moving forward  We've made arrangements for him to see Dr Kayla Alvarez in the near future to schedule carotid-subclavian bypass  After that is completed, I'll see him in the office to schedule TEVAR      Amanda Ramsey MD  DATE: April 20, 2018  TIME: 10:18 AM

## 2018-05-02 DIAGNOSIS — I10 HTN (HYPERTENSION), BENIGN: ICD-10-CM

## 2018-05-04 DIAGNOSIS — I10 HTN (HYPERTENSION), BENIGN: Primary | ICD-10-CM

## 2018-05-04 RX ORDER — LISINOPRIL AND HYDROCHLOROTHIAZIDE 20; 12.5 MG/1; MG/1
TABLET ORAL
Qty: 60 TABLET | Refills: 1 | Status: SHIPPED | OUTPATIENT
Start: 2018-05-04 | End: 2018-06-30 | Stop reason: SDUPTHER

## 2018-05-04 RX ORDER — METOPROLOL TARTRATE 100 MG/1
TABLET ORAL
Qty: 60 TABLET | Refills: 5 | Status: SHIPPED | OUTPATIENT
Start: 2018-05-04 | End: 2018-10-30 | Stop reason: SDUPTHER

## 2018-05-12 DIAGNOSIS — E11.9 TYPE 2 DIABETES MELLITUS WITHOUT COMPLICATION (HCC): ICD-10-CM

## 2018-05-13 RX ORDER — PEN NEEDLE, DIABETIC 31 GX5/16"
NEEDLE, DISPOSABLE MISCELLANEOUS
Qty: 100 EACH | Refills: 3 | Status: SHIPPED | OUTPATIENT
Start: 2018-05-13 | End: 2022-03-25 | Stop reason: SDUPTHER

## 2018-05-15 ENCOUNTER — OFFICE VISIT (OUTPATIENT)
Dept: VASCULAR SURGERY | Facility: CLINIC | Age: 73
End: 2018-05-15
Payer: COMMERCIAL

## 2018-05-15 VITALS
WEIGHT: 227 LBS | DIASTOLIC BLOOD PRESSURE: 80 MMHG | SYSTOLIC BLOOD PRESSURE: 130 MMHG | BODY MASS INDEX: 37.82 KG/M2 | TEMPERATURE: 97.6 F | HEART RATE: 78 BPM | RESPIRATION RATE: 16 BRPM | HEIGHT: 65 IN

## 2018-05-15 DIAGNOSIS — Z86.79 S/P ASCENDING AORTIC ANEURYSM REPAIR: ICD-10-CM

## 2018-05-15 DIAGNOSIS — I71.2 AORTIC ARCH ANEURYSM (HCC): ICD-10-CM

## 2018-05-15 DIAGNOSIS — I71.01 ACUTE THORACIC AORTIC DISSECTION (HCC): ICD-10-CM

## 2018-05-15 DIAGNOSIS — Z98.890 S/P ASCENDING AORTIC ANEURYSM REPAIR: ICD-10-CM

## 2018-05-15 DIAGNOSIS — I71.01 CHRONIC THORACIC AORTIC DISSECTION (HCC): ICD-10-CM

## 2018-05-15 PROBLEM — I71.019 CHRONIC THORACIC AORTIC DISSECTION: Chronic | Status: ACTIVE | Noted: 2017-09-06

## 2018-05-15 PROCEDURE — 99205 OFFICE O/P NEW HI 60 MIN: CPT | Performed by: SURGERY

## 2018-05-15 NOTE — LETTER
May 15, 2018     Taisha Medina, [address] [facility] [address]  [address] [id number]    Patient: Alayna Madden  YOB: 1945   Date of Visit: 5/15/2018       Dear Dr Vi Jeff: Thank you for referring Rosa Isela Duong to me for evaluation  Below are the relevant portions of my assessment and plan of care  Chronic thoracic aortic dissection (HCC)  Thoracic aortic dissection with aneurysmal degeneration of the distal aortic arch and proximal descending thoracic aorta  We discussed the findings on CT angiogram along with the indications for treatment in the associated treatment options with their risks and benefits  We specifically discussed the 2 stage approach with carotid subclavian bypass followed by thoracic stent graft repair  We did discuss the risks of stroke and paralysis  He is concerned about these risks and wants to think about it further  In the interval we will ask that he undergo cardiac evaluation and clearance  Following this he will return to the office for further treatment recommendations  If you have questions, please do not hesitate to call me  I look forward to following Eunice Weiner along with you           Sincerely,        Markos Daniel MD        CC: DO Ethan Eckert MD Inga Foley, DO

## 2018-05-15 NOTE — PATIENT INSTRUCTIONS
Chronic thoracic aortic dissection (HCC)  Thoracic aortic dissection with aneurysmal degeneration of the distal aortic arch and proximal descending thoracic aorta  We discussed the findings on CT angiogram along with the indications for treatment in the associated treatment options with their risks and benefits  We specifically discussed the 2 stage approach with carotid subclavian bypass followed by thoracic stent graft repair  We did discuss the risks of stroke and paralysis  He is concerned about these risks and wants to think about it further  In the interval we will ask that he undergo cardiac evaluation and clearance  Following this he will return to the office for further treatment recommendations

## 2018-05-15 NOTE — ASSESSMENT & PLAN NOTE
Thoracic aortic dissection with aneurysmal degeneration of the distal aortic arch and proximal descending thoracic aorta  We discussed the findings on CT angiogram along with the indications for treatment in the associated treatment options with their risks and benefits  We specifically discussed the 2 stage approach with carotid subclavian bypass followed by thoracic stent graft repair  We did discuss the risks of stroke and paralysis  He is concerned about these risks and wants to think about it further  In the interval we will ask that he undergo cardiac evaluation and clearance  Following this he will return to the office for further treatment recommendations

## 2018-05-15 NOTE — PROGRESS NOTES
Assessment/Plan:    Chronic thoracic aortic dissection (HCC)  Thoracic aortic dissection with aneurysmal degeneration of the distal aortic arch and proximal descending thoracic aorta  We discussed the findings on CT angiogram along with the indications for treatment in the associated treatment options with their risks and benefits  We specifically discussed the 2 stage approach with carotid subclavian bypass followed by thoracic stent graft repair  We did discuss the risks of stroke and paralysis  He is concerned about these risks and wants to think about it further  In the interval we will ask that he undergo cardiac evaluation and clearance  Following this he will return to the office for further treatment recommendations  Diagnoses and all orders for this visit:    Aortic arch aneurysm [facility])  -     Ambulatory referral to Vascular Surgery  -     Ambulatory referral to Cardiology; Future    Acute thoracic aortic dissection [facility])  -     Ambulatory referral to Vascular Surgery  -     Ambulatory referral to Cardiology; Future    S/P ascending aortic aneurysm repair  -     Ambulatory referral to Vascular Surgery  -     Ambulatory referral to Cardiology; Future    Chronic thoracic aortic dissection (HCC)          Subjective:      Patient ID: Kylie Martinez  is a 71 y o  male  71year-old 6-7 months status post repair of acute ascending aortic dissection  During follow-up with CT surgery he has subsequently been found to have degeneration of the proximal descending thoracic aorta with aneurysmal formation  His most recent CT scan shows the aorta to be 5 3-5 6 cm in maximum diameter just beyond the distal aortic arch  He states he is generally doing well and recovering from his ascending arch repair  He notes no specific limitations in his activities  He denies shortness of breath or chest pain  He does have some mid to lower back pain which is been present for awhile    He denies claudication symptoms but does complain of some lower extremity swelling for which he is being treated with diuretic therapy  On review of his CT angiogram the aorta is dilated to approximately 5 6 cm in the proximal segment of the descending thoracic aorta  The dissection appears to originate just beyond the left subclavian artery  The aorta around the subclavian artery and distal to the left common carotid artery appears to be relatively healthy for placement of a thoracic stent graft  OF NOTE THERE IS A VERY HIGH BIFURCATION OF THE COMMON FEMORAL ARTERY AT THE LEVEL OF THE UPPER FEMORAL HEAD BILATERALLY  THIS WILL HAVE TO BE TAKEN INTO CONSIDERATION AT THE TIME OF ARTERIAL CANNULATION FOR TEVAR  FORTUNATELY EVEN THE SUPERFICIAL FEMORAL ARTERY IS LARGE ENOUGH TO EXCEPT A STENT GRAFT  The following portions of the patient's history were reviewed and updated as appropriate: allergies, current medications, past family history, past medical history, past social history, past surgical history and problem list     Review of Systems   Constitutional: Positive for activity change  HENT: Positive for postnasal drip, rhinorrhea and tinnitus  Eyes: Positive for discharge  Respiratory: Positive for apnea and cough  Cardiovascular: Positive for leg swelling  Gastrointestinal: Negative  Endocrine: Negative  Genitourinary: Positive for urgency  Musculoskeletal: Positive for arthralgias, back pain and neck pain  Skin: Negative  Allergic/Immunologic: Positive for environmental allergies  Neurological: Positive for weakness  Hematological: Negative  Psychiatric/Behavioral: Negative  Objective:      /80 (BP Location: Left arm, Patient Position: Sitting, Cuff Size: Standard)   Pulse 78   Temp 97 6 °F (36 4 °C)   Resp 16   Ht 5' 5" (1 651 m)   Wt 103 kg (227 lb)   BMI 37 77 kg/m²          Physical Exam   Constitutional: He is oriented to person, place, and time   He appears well-developed and well-nourished  HENT:   Head: Normocephalic and atraumatic  Eyes: Conjunctivae and EOM are normal    Neck: Normal range of motion  Neck supple  Cardiovascular: Normal rate, regular rhythm, S1 normal, S2 normal and normal heart sounds  No murmur heard  Pulses:       Carotid pulses are 2+ on the right side, and 2+ on the left side  Radial pulses are 2+ on the right side, and 2+ on the left side  Femoral pulses are 2+ on the right side, and 2+ on the left side  Popliteal pulses are 3+ on the right side, and 3+ on the left side  Dorsalis pedis pulses are 2+ on the right side, and 2+ on the left side  Posterior tibial pulses are 2+ on the right side, and 2+ on the left side  Pulmonary/Chest: Effort normal and breath sounds normal    Abdominal: Soft  Normal aorta  There is no tenderness  No hernia  Obese   Musculoskeletal: Normal range of motion  He exhibits edema  He exhibits no tenderness or deformity  Neurological: He is alert and oriented to person, place, and time  No cranial nerve deficit  Skin: Skin is warm, dry and intact  Psychiatric: He has a normal mood and affect

## 2018-05-16 LAB
LEFT EYE DIABETIC RETINOPATHY: NORMAL
RIGHT EYE DIABETIC RETINOPATHY: NORMAL
SEVERITY (EYE EXAM): NORMAL

## 2018-05-23 ENCOUNTER — APPOINTMENT (OUTPATIENT)
Dept: LAB | Facility: CLINIC | Age: 73
End: 2018-05-23
Payer: COMMERCIAL

## 2018-05-23 ENCOUNTER — TRANSCRIBE ORDERS (OUTPATIENT)
Dept: LAB | Facility: CLINIC | Age: 73
End: 2018-05-23

## 2018-05-23 ENCOUNTER — OFFICE VISIT (OUTPATIENT)
Dept: FAMILY MEDICINE CLINIC | Facility: CLINIC | Age: 73
End: 2018-05-23
Payer: COMMERCIAL

## 2018-05-23 VITALS
BODY MASS INDEX: 37.69 KG/M2 | WEIGHT: 226.2 LBS | HEIGHT: 65 IN | DIASTOLIC BLOOD PRESSURE: 84 MMHG | SYSTOLIC BLOOD PRESSURE: 132 MMHG

## 2018-05-23 DIAGNOSIS — E66.9 OBESITY, UNSPECIFIED CLASSIFICATION, UNSPECIFIED OBESITY TYPE, UNSPECIFIED WHETHER SERIOUS COMORBIDITY PRESENT: ICD-10-CM

## 2018-05-23 DIAGNOSIS — Z12.11 SCREENING FOR COLON CANCER: ICD-10-CM

## 2018-05-23 DIAGNOSIS — E11.8 UNCONTROLLED TYPE 2 DIABETES MELLITUS WITH COMPLICATION, UNSPECIFIED WHETHER LONG TERM INSULIN USE: Primary | ICD-10-CM

## 2018-05-23 DIAGNOSIS — I10 ESSENTIAL HYPERTENSION, MALIGNANT: ICD-10-CM

## 2018-05-23 DIAGNOSIS — E11.9 TYPE 2 DIABETES MELLITUS WITHOUT COMPLICATION, UNSPECIFIED WHETHER LONG TERM INSULIN USE (HCC): Chronic | ICD-10-CM

## 2018-05-23 DIAGNOSIS — E78.5 HYPERLIPIDEMIA, UNSPECIFIED HYPERLIPIDEMIA TYPE: ICD-10-CM

## 2018-05-23 DIAGNOSIS — E11.65 UNCONTROLLED TYPE 2 DIABETES MELLITUS WITH COMPLICATION, UNSPECIFIED WHETHER LONG TERM INSULIN USE: Primary | ICD-10-CM

## 2018-05-23 DIAGNOSIS — I10 ESSENTIAL HYPERTENSION: Primary | Chronic | ICD-10-CM

## 2018-05-23 DIAGNOSIS — Z98.890 PERSONAL HISTORY OF SURGERY TO HEART AND GREAT VESSELS, PRESENTING HAZARDS TO HEALTH: ICD-10-CM

## 2018-05-23 DIAGNOSIS — E11.8 UNCONTROLLED TYPE 2 DIABETES MELLITUS WITH COMPLICATION, UNSPECIFIED WHETHER LONG TERM INSULIN USE: ICD-10-CM

## 2018-05-23 DIAGNOSIS — I48.0 PAROXYSMAL ATRIAL FIBRILLATION (HCC): ICD-10-CM

## 2018-05-23 DIAGNOSIS — I71.00 MEDIONECROSIS OF AORTA (HCC): ICD-10-CM

## 2018-05-23 DIAGNOSIS — E11.65 UNCONTROLLED TYPE 2 DIABETES MELLITUS WITH COMPLICATION, UNSPECIFIED WHETHER LONG TERM INSULIN USE: ICD-10-CM

## 2018-05-23 DIAGNOSIS — E66.09 CLASS 2 OBESITY DUE TO EXCESS CALORIES WITHOUT SERIOUS COMORBIDITY WITH BODY MASS INDEX (BMI) OF 37.0 TO 37.9 IN ADULT: ICD-10-CM

## 2018-05-23 DIAGNOSIS — I71.01 CHRONIC THORACIC AORTIC DISSECTION (HCC): Chronic | ICD-10-CM

## 2018-05-23 DIAGNOSIS — Z00.00 HEALTH CARE MAINTENANCE: ICD-10-CM

## 2018-05-23 LAB
ALBUMIN SERPL BCP-MCNC: 3.6 G/DL (ref 3.5–5)
ALP SERPL-CCNC: 107 U/L (ref 46–116)
ALT SERPL W P-5'-P-CCNC: 28 U/L (ref 12–78)
ANION GAP SERPL CALCULATED.3IONS-SCNC: 3 MMOL/L (ref 4–13)
AST SERPL W P-5'-P-CCNC: 23 U/L (ref 5–45)
BILIRUB SERPL-MCNC: 0.56 MG/DL (ref 0.2–1)
BUN SERPL-MCNC: 14 MG/DL (ref 5–25)
CALCIUM SERPL-MCNC: 9.8 MG/DL (ref 8.3–10.1)
CHLORIDE SERPL-SCNC: 103 MMOL/L (ref 100–108)
CO2 SERPL-SCNC: 34 MMOL/L (ref 21–32)
CREAT SERPL-MCNC: 0.76 MG/DL (ref 0.6–1.3)
CREAT UR-MCNC: 105 MG/DL
EST. AVERAGE GLUCOSE BLD GHB EST-MCNC: 157 MG/DL
GFR SERPL CREATININE-BSD FRML MDRD: 106 ML/MIN/1.73SQ M
GLUCOSE P FAST SERPL-MCNC: 84 MG/DL (ref 65–99)
HBA1C MFR BLD: 7.1 % (ref 4.2–6.3)
MICROALBUMIN UR-MCNC: 117 MG/L (ref 0–20)
MICROALBUMIN/CREAT 24H UR: 111 MG/G CREATININE (ref 0–30)
POTASSIUM SERPL-SCNC: 3.7 MMOL/L (ref 3.5–5.3)
PROT SERPL-MCNC: 8.7 G/DL (ref 6.4–8.2)
SODIUM SERPL-SCNC: 140 MMOL/L (ref 136–145)

## 2018-05-23 PROCEDURE — 36415 COLL VENOUS BLD VENIPUNCTURE: CPT | Performed by: FAMILY MEDICINE

## 2018-05-23 PROCEDURE — G0439 PPPS, SUBSEQ VISIT: HCPCS | Performed by: FAMILY MEDICINE

## 2018-05-23 PROCEDURE — 82043 UR ALBUMIN QUANTITATIVE: CPT

## 2018-05-23 PROCEDURE — 99214 OFFICE O/P EST MOD 30 MIN: CPT | Performed by: FAMILY MEDICINE

## 2018-05-23 PROCEDURE — 83036 HEMOGLOBIN GLYCOSYLATED A1C: CPT | Performed by: FAMILY MEDICINE

## 2018-05-23 PROCEDURE — 82570 ASSAY OF URINE CREATININE: CPT

## 2018-05-23 PROCEDURE — 3060F POS MICROALBUMINURIA REV: CPT | Performed by: FAMILY MEDICINE

## 2018-05-23 PROCEDURE — 80053 COMPREHEN METABOLIC PANEL: CPT

## 2018-05-23 NOTE — PATIENT INSTRUCTIONS
Get labs tim directed for DM and also rec  f-up with cardiology as directed  He will be seeing Cardiothoracic surgery as directed for hx of thoracic aortic aneurysm  He will take all meds as directed and f-up as directed in 3 months with labs  Await CMP, hga1c, and microalbumin ordered for this lab visit and recheck labs in 4 months for f-up  Discussed DM and labs and will call if any problems  Medicare wellness done today  Medicare wellness done today  Get advanced directives done

## 2018-05-23 NOTE — PROGRESS NOTES
Assessment/Plan:  Chief Complaint   Patient presents with   Lezlie Rubinstein Wellness Visit     Patient Instructions   Get labs tim directed for DM and also rec  f-up with cardiology as directed  He will be seeing Cardiothoracic surgery as directed for hx of thoracic aortic aneurysm  He will take all meds as directed and f-up as directed in 3 months with labs  Await CMP, hga1c, and microalbumin ordered for this lab visit and recheck labs in 4 months for f-up  Discussed DM and labs and will call if any problems  Medicare wellness done today  Medicare wellness done today  Get advanced directives done  No problem-specific Assessment & Plan notes found for this encounter  Diagnoses and all orders for this visit:    Essential hypertension    Class 2 obesity due to excess calories without serious comorbidity with body mass index (BMI) of 37 0 to 37 9 in adult    Chronic thoracic aortic dissection (HCC)    Type 2 diabetes mellitus without complication, unspecified whether long term insulin use (HCC)          Subjective:      Patient ID: Tomasa Delgado  is a 71 y o  male  Here for recheck and has seen specialist for :    Aortic arch aneurysm [facility])  -     Ambulatory referral to Vascular Surgery  -     Ambulatory referral to Cardiology; Future     Acute thoracic aortic dissection [facility])  -     Ambulatory referral to Vascular Surgery  -     Ambulatory referral to Cardiology; Future     S/P ascending aortic aneurysm repair  -     Ambulatory referral to Vascular Surgery  -     Ambulatory referral to Cardiology; Future     Chronic thoracic aortic dissection [facility])    He will be seeing cardiology also  Pt  Did not get labs for today and will get them soon  He does have some concern about possible complications with surgery           The following portions of the patient's history were reviewed and updated as appropriate: allergies, current medications, past family history, past medical history, past social history, past surgical history and problem list     Review of Systems   Constitutional: Negative  HENT: Negative  Eyes: Negative  Respiratory: Negative  Cardiovascular: Negative  Gastrointestinal: Negative  Endocrine: Negative  Genitourinary: Negative  Musculoskeletal: Negative  Skin: Negative  Allergic/Immunologic: Negative  Neurological: Negative  Hematological: Negative  Psychiatric/Behavioral: Negative  Objective:      /84   Ht 5' 5" (1 651 m)   Wt 103 kg (226 lb 3 2 oz)   BMI 37 64 kg/m²          Physical Exam   Constitutional: He is oriented to person, place, and time  He appears well-developed and well-nourished  HENT:   Head: Normocephalic and atraumatic  Right Ear: External ear normal    Left Ear: External ear normal    Nose: Nose normal    Mouth/Throat: Oropharynx is clear and moist    Eyes: Conjunctivae and EOM are normal  Pupils are equal, round, and reactive to light  Neck: Normal range of motion  Neck supple  Cardiovascular: Normal rate, regular rhythm, normal heart sounds and intact distal pulses  Pulses are no weak pulses  Pulses:       Dorsalis pedis pulses are 2+ on the right side, and 2+ on the left side  Posterior tibial pulses are 2+ on the right side, and 2+ on the left side  Pulmonary/Chest: Effort normal and breath sounds normal    Abdominal: Soft  Bowel sounds are normal    Musculoskeletal: Normal range of motion  Feet:   Right Foot:   Skin Integrity: Negative for ulcer, skin breakdown, erythema, warmth, callus or dry skin  Left Foot:   Skin Integrity: Negative for ulcer, skin breakdown, erythema, warmth, callus or dry skin  Neurological: He is alert and oriented to person, place, and time  He has normal reflexes  Skin: Skin is warm and dry  Psychiatric: He has a normal mood and affect  His behavior is normal          HPI:  Kylie Martinez  is a 71 y o  male here for his Subsequent Wellness Visit      Patient Active Problem List   Diagnosis    [facility] )    Hypertension    Diabetes ([place] [address] )    Chronic thoracic aortic dissection (HCC)    Acute blood loss anemia    S/P ascending aortic aneurysm repair    Hypokalemia    Atrial fibrillation (HCC)    Anticoagulated on Coumadin    KARIN (obstructive sleep apnea)    Class 2 obesity due to excess calories without serious comorbidity with body mass index (BMI) of 37 0 to 37 9 in adult    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HCC)    Hyperlipidemia     Past Medical History:   Diagnosis Date    Arthritis     Diabetes ([place] [address] )     Hypertension     Prostate cancer ([place] [address] )     Stroke (cerebrum) ([place] [address] )      Past Surgical History:   Procedure Laterality Date    [place] ASCEND AORTA GRAFT W ROOT REPLACMENT  VALVE CONDUIT/CORON RECONSTRUCT N/A 9/6/2017    Procedure: ASCENDING AORTIC REPAIR WITH A 26MM  GELWEAVE STRAIGHT GRAFT AND HEMIARCH WITH 8MM SIDE ARM BRANCH;  Surgeon: Diomedes Caputo MD;  [facility];  Service: Cardiac Surgery    [place] EXPLOR POSTOP BLEED,INFEC,CLOT-CHST N/A 9/6/2017    Procedure: RE-EXPLORATION MEDIASTERNAL CAVITY FOR POST-OP BLEED (BRING BACK);   Surgeon: Diomedes Caputo MD;  [facility];  Service: Cardiac Surgery     Family History   Problem Relation Age of Onset    Stroke Mother      complications     History   Smoking Status    Never Smoker   Smokeless Tobacco    Never Used     History   Alcohol Use    Yes     Comment: social use as per All[employer]      History   Drug Use No     /84   Ht 5' 5" (1 651 m)   Wt 103 kg (226 lb 3 2 oz)   BMI 37 64 kg/m²       Current Outpatient Prescriptions   Medication Sig Dispense Refill    amLODIPine (NORVASC) 10 mg tablet Take 1 tablet by mouth 2 (two) times a day      aspirin 81 mg chewable tablet Chew 1 tablet daily 30 tablet 0    B-D ULTRAFINE III SHORT PEN 31G X 8 MM MISC USE AS DIRECTED  100 each 3    insulin glargine (LANTUS) 100 units/mL subcutaneous injection Inject 28 Units under the skin daily at bedtime Dx: Diabetes E11 9, dispense pens 10 mL 0    insulin lispro (HumaLOG) 100 units/mL injection Inject 8 Units under the skin 3 (three) times a day before meals Dx: Diabetes E11 9  Dispense pens 8 mL 0    lisinopril-hydrochlorothiazide (PRINZIDE,ZESTORETIC) 20-12 5 MG per tablet TAKE 2 TABLETS BY MOUTH EVERY DAY 60 tablet 1    metoprolol tartrate (LOPRESSOR) 100 mg tablet TAKE 1 TABLET TWICE A DAY 60 tablet 5     No current facility-administered medications for this visit        No Known Allergies  Immunization History   Administered Date(s) Administered    Influenza 12/19/2006    Influenza Split High Dose Preservative Free IM 09/20/2012, 09/23/2013, 09/26/2014, 10/14/2015, 10/26/2016, 11/21/2017       Patient Care Team:  Yuval Willams DO as PCP - General  DO Curtis Underwood MD Bella Charters, MD Willma Rosales, MD Shirleyann Bares, MD Tia Pitman, DO (Cardiology)      Medicare Screening Tests and Risk Assessments:  AWV Clinical     ISAR:   Previous hospitalizations?:  No       Once in a Lifetime Medicare Screening:       Medicare Screening Tests and Risk Assessment:   AAA Risk Assessment    Osteoporosis Risk Assessment    HIV Risk Assessment        Drug and Alcohol Use:   Tobacco use    Cigarettes:  never smoker    Smokeless:  never used smokeless tobacco    Tobacco use duration    Tobacco Cessation Readiness    Alcohol use    Alcohol use:  occasional use    Alcohol Treatment Readiness   Illicit Drug Use    Drug use:  never    Drug type:  no sedative use       Diet & Exercise:   Diet   What is your diet?:  Regular   How many servings a day of the following:   Fruits and Vegetables:  1-2 Meat:  1-2   Whole Grains:  1 Simple Carbs:  2   Dairy:  1 Soda:  0   Coffee:  1 Tea:  0   Exercise    Do you currently exercise?:  currently not exercising       Cognitive Impairment Screening:   Cognitive Impairment Screening        Functional Ability/Level of Safety:   Hearing    Hearing difficulties:  Yes Bilateral:  slightly decreased   Hearing aid:  No    Hearing Impairment Assessment    Hearing status:  No impairment   Do your family members ever complain that you turn on the radio or T V  too loudly?:  No Do you find that other people have to repeat themselves when talking to you?:  No   Do you have difficulty hearing while talking on the phone?:  No Has anyone ever told you that you are speaking too loudly when talking with them?:  No   Do you have trouble hearing the doorbell or phone ringing?:  No Do you have difficulty hearing such that you feel frustrated talking to people?:  No   Do you feel sad because you cannot hear well?:  No Do you feel inconvienced due to your hearing problem?:  No   Do you think you would be a happier person if you could hear better?:  No Would you be willing to go for a hearing aid fitting if suggested?:  No   Current Activities    Help needed with the folllowing:    Using the phone:  No Transportation:  No   Shopping:  No Preparing Meals:  No   Doing Housework:  No Doing Laundry:  No   Managing Medications:  No Managing Money:  No   ADL    Fall Risk   Have you fallen in the last 12 months?:  Yes Are you unsteady on your feet?:  No   How many times?:  1 Are you taking any medications that may cause fatigue or dizziness?:  No   Do you have any chronic conditions that may contribute to a fall?:  Diabetes, Arthritis Do you rush to the bathroom potentially risking a fall?:  No   Injury History   Polypharmacy:  No Antidepressant Use:  No   Sedative Use:  No Antihypertensive Use:  No   Previous Fall:  Yes Alcohol Use:  No   Deconditioning:  No Visual Impairment:  No   Cogitive Impairment:  No Mmobility Impairment:  No   Postural Hypotension:  No Urinary Incontinence:  No       Home Safety:   Are there hazards in your environment?:  No    Do you have problems or concerns getting in/out of a bed, chair, tub, or toilet?:  No   Do you feel unsteady when walking?:  No Is your activity limited by pain?:  Yes   Do you have handrails and grab-bars in the home?:  Yes Are emergency numbers kept by the phone and regularly updated?:  Yes   Are you and/or family members aware of the dangers of smoking in bed?:  Yes Are firearms stored securely?:  Yes   Do you have working smoke alarms and fire extinguisher?:  Yes    Have you left the stove on unsupervised?:  No    Home Safety Risk Factors   Unfamilar with surroundings:  No Uneven floors:  No   Stairs or handrail saftey risk:  No Loose rugs:  No   Household clutter:  No Poor household lighting:  No   No grab bars in bathroom:  Yes Further evaluation needed:  No       Advanced Directives:   Advanced Directives    Living Will:  No Durable POA for healthcare:  No   Advanced directive:  No    Patient's End of Life Decisions        Urinary Incontinence:   Do you have urinary incontinence?:  No Do you have incomplete emptying?:  No   Do you urinate frequently?:  No Do you have urinary urgency?:  No   Do you have urinary hesitancy?:  No Do you have dysuria (painful and/or difficult urination)?:  No   Do you have nocturia (waking up to urinate)?:  No Do you strain when urinating (have to push to urinate)?:  No   Do you have a weak stream when urinating?:  No Do you have intermittent streaming when urinating?:  No   Do you dribble urine after finishing?:  No        Glaucoma:            Provider Screening    No data filed        No exam data present  Reviewed Updated [facility] Prior Wellness Visits:   Last Medicare wellness visit information was reviewed, patient interviewed , no change since last AWVyes  Last Medicare wellness visit information was reviewed, patient interviewed and updates made to the record today yes    Assessment and Plan:  1  Essential hypertension     2  Class 2 obesity due to excess calories without serious comorbidity with body mass index (BMI) of 37 0 to 37 9 in adult     3  Chronic thoracic aortic dissection (HCC)     4   Type 2 diabetes mellitus without complication, unspecified whether long term insulin use [facility])         Health Maintenance Due   Topic Date Due    Hepatitis C Screening  1945    COLONOSCOPY  1945    DTaP,Tdap,and Td Vaccines (1 - Tdap) 06/19/1967    PNEUMOCOCCAL POLYSACCHARIDE VACCINE AGE 65 AND OVER  06/19/2011    GLAUCOMA SCREENING 67+ YR  06/19/2013    Diabetic Foot Exam  04/20/2017    OPHTHALMOLOGY EXAM  03/28/2018    HEMOGLOBIN A1C  05/21/2018     Patient's shoes and socks removed  Right Foot/Ankle   Right Foot Inspection  Skin Exam: skin normal and skin intact no dry skin, no warmth, no callus, no erythema, no maceration, no abnormal color, no pre-ulcer, no ulcer and no callus                          Toe Exam: ROM and strength within normal limits  Sensory   Vibration: intact  Proprioception: intact   Monofilament testing: intact  Vascular  Capillary refills: < 3 seconds  The right DP pulse is 2+  The right PT pulse is 2+  Left Foot/Ankle  Left Foot Inspection  Skin Exam: skin normal and skin intactno dry skin, no warmth, no erythema, no maceration, normal color, no pre-ulcer, no ulcer and no callus                         Toe Exam: ROM and strength within normal limits                   Sensory   Vibration: intact  Proprioception: intact  Monofilament: intact  Vascular  Capillary refills: < 3 seconds  The left DP pulse is 2+  The left PT pulse is 2+  Assign Risk Category:  No deformity present; No loss of protective sensation;  No weak pulses       Risk: 0

## 2018-06-11 ENCOUNTER — OFFICE VISIT (OUTPATIENT)
Dept: CARDIOLOGY CLINIC | Facility: CLINIC | Age: 73
End: 2018-06-11
Payer: COMMERCIAL

## 2018-06-11 VITALS
SYSTOLIC BLOOD PRESSURE: 118 MMHG | HEART RATE: 54 BPM | DIASTOLIC BLOOD PRESSURE: 72 MMHG | BODY MASS INDEX: 38.35 KG/M2 | WEIGHT: 230.2 LBS | HEIGHT: 65 IN

## 2018-06-11 DIAGNOSIS — E78.5 DYSLIPIDEMIA: Primary | ICD-10-CM

## 2018-06-11 DIAGNOSIS — I71.2 AORTIC ARCH ANEURYSM (HCC): ICD-10-CM

## 2018-06-11 DIAGNOSIS — Z86.79 S/P ASCENDING AORTIC ANEURYSM REPAIR: ICD-10-CM

## 2018-06-11 DIAGNOSIS — I71.01 ACUTE THORACIC AORTIC DISSECTION (HCC): ICD-10-CM

## 2018-06-11 DIAGNOSIS — Z98.890 S/P ASCENDING AORTIC ANEURYSM REPAIR: ICD-10-CM

## 2018-06-11 PROCEDURE — 99214 OFFICE O/P EST MOD 30 MIN: CPT | Performed by: INTERNAL MEDICINE

## 2018-06-11 PROCEDURE — 93000 ELECTROCARDIOGRAM COMPLETE: CPT | Performed by: INTERNAL MEDICINE

## 2018-06-11 RX ORDER — ATORVASTATIN CALCIUM 80 MG/1
80 TABLET, FILM COATED ORAL DAILY
Qty: 90 TABLET | Refills: 5 | Status: SHIPPED | OUTPATIENT
Start: 2018-06-11 | End: 2019-08-24 | Stop reason: SDUPTHER

## 2018-06-11 NOTE — PROGRESS NOTES
Cardiology Follow Up        Ken Peoples       1945      [id number]      Discussion/Summary:    1  History of type a aortic dissection, status post ascending aortic replacement/celia arch reconstruction  2  Distal aortic arch/proximal descending aortic aneurysm, 5 3 cm  3  Benign essential hypertension  4  Dyslipidemia  5  Postoperative atrial fibrillation 09/2017 without recurrence  6  Obesity    · Patient unsure if he would like to undergo surgery, significantly concerned about potential complications including stroke and paraplegia  I have tried to answer some of his questions to the best of my ability  · Will restart atorvastatin 80 mg daily for cardiovascular risk reduction  Heart rate and blood pressure very well controlled including home blood pressure readings which have been reviewed by me  Continue metoprolol tartrate 100 mg daily, lisinopril/hydrochlorothiazide 20/12 5 mg daily, aspirin 81 mg daily, amlodipine 10 mg daily  · As he may undergo high-risk vascular surgery in the near future and in light of his coronary risk factors will schedule ischemic evaluation for further risk stratification, as he has never had an ischemic evaluation in the past   · Follow-up in 1 month to discuss results of stress testing  ===================================================  Addendum:  Reviewed nuclear stress test:  Evidence of prior basal inferolateral infarct w/ some level of deloris-infarct ischemia  Pt w/ no CP or SHERIDAN  He is able to walk atleast 2 city blocks and climb up 2 flight of stairs without symptoms  Would suspect he is at intermediate-high risk for vascular surgery, but no contraindications from cardiac standpoint to proceed at this time  For now, would advocate aggressive medical management for probable underlying CAD    Would not proceed w/ cath/PCI in light of upcoming surgery for distal aortic arch/proximal descending thoracic aortic aneurysm  Pt agreeable to hold off on coronary angiography at this time and pursuing aggressive medical tx  Will f/u in August   He states he is still not sure about undergoing surgery  Interval History: This is a 60-year-old male, with a significant history of basal ganglia hemorrhage in 2014 secondary to uncontrolled hypertension, who presented to [address] [place] [address] on 9/05/2017 with chest pain and accelerated hypertension  Troponin level was negative on admission and he was admitted to the floor with chest pain  In light of continued symptoms and labile hypertension, he was sent for a stat CT of his chest/abdomen, revealing a type a dissection  He was transferred to [address] [place] [address], and echocardiogram revealed a moderate to large pericardial effusion with signs of tamponade  On 09/06/2017 he underwent emergency surgery with replacement of the ascending aorta with celia arch reconstruction with a 26 millimeter Dacron graft  Postoperatively, he had mediastinal hemorrhage and went back to the operating room on 09/06/2017 with evacuation and correction of his hemorrhage  On postoperative day 1 , 09/07/2017 he had rapid atrial fibrillation and was initiated on amiodarone, and Thereafter on warfarin for anticoagulation and stroke prevention  He underwent a sleep study in early November 2017 and has been diagnosed with severe sleep apnea syndrome  He underwent a Holter monitor in early December 2017, which did not reveal any evidence of atrial fibrillation  He has been off anticoagulation secondary to his history of intracranial hemorrhage  Prior CT chest 04/10/2018 revealed significant increase in distal arch aortic aneurysm, now at 5 3 cm, compared to 4 2 cm 10/2017  There was 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    He has been recommended to undergo left carotid to subclavian bypass/transposition followed by TEVAR covering the L SC artery by Dr Michelle Taylor and Doug uGtiérrez presents today for follow-up and preoperative cardiac clearance  From a symptomatic standpoint feels well without exertional chest pain, shortness of breath, lower extremity edema, dizziness, palpitations  He has never had an ischemic evaluation in the past   Risk factors include hypertension, diabetes, peripheral arterial disease, dyslipidemia  At some point he states his atorvastatin was discontinued which I cannot find documentation of  Vitals:  Vitals:    06/11/18 1336   BP: 118/72   BP Location: Right arm   Patient Position: Sitting   Cuff Size: Large   Pulse: (!) 54   Weight: 104 kg (230 lb 3 2 oz)   Height: 5' 5" (1 651 m)         Past Medical History:   Diagnosis Date    Arthritis     Diabetes [facility] )     Hypertension     Prostate cancer ([facility] )     Stroke (cerebrum) ([place] [address] )      Social History     Social History    Marital status:      Spouse name: N/A    Number of children: N/A    Years of education: N/A     Occupational History          retired     Social History Main Topics    Smoking status: Never Smoker    Smokeless tobacco: Never Used    Alcohol use Yes      Comment: social use as per [employer]    Drug use: No    Sexual activity: No      Comment: denied hx of sexually active high risk     Other Topics Concern    Not on file     Social History Narrative    1 serving caffeine/day    Less than high school diploma    Person living alone    Participate in moderate activities both inside and outside of the home      Family History   Problem Relation Age of Onset    Stroke Mother      complications     Past Surgical History:   Procedure Laterality Date    DE ASCEND AORTA GRAFT W ROOT REPLACMENT  VALVE CONDUIT/CORON RECONSTRUCT N/A 9/6/2017    Procedure: ASCENDING AORTIC REPAIR WITH A 26MM  GELWEAVE STRAIGHT GRAFT AND HEMIARCH WITH 8MM SIDE ARM BRANCH;  Surgeon: Akbar Juarez MD;  [facility];  Service: Cardiac Surgery    DE EXPLOR POSTOP BLEED,INFEC,CLOT-CHST N/A 9/6/2017    Procedure: RE-EXPLORATION MEDIASTERNAL CAVITY FOR POST-OP BLEED (BRING BACK); Surgeon: Patricia Montero MD;  [facility];  Service: Cardiac Surgery       Current Outpatient Prescriptions:     amLODIPine (NORVASC) 10 mg tablet, Take 1 tablet by mouth 2 (two) times a day, Disp: , Rfl:     aspirin 81 mg chewable tablet, Chew 1 tablet daily, Disp: 30 tablet, Rfl: 0    B-D ULTRAFINE III SHORT PEN 31G X 8 MM MISC, USE AS DIRECTED , Disp: 100 each, Rfl: 3    insulin glargine (LANTUS) 100 units/mL subcutaneous injection, Inject 28 Units under the skin daily at bedtime Dx: Diabetes E11 9, dispense pens, Disp: 10 mL, Rfl: 0    insulin lispro (HumaLOG) 100 units/mL injection, Inject 8 Units under the skin 3 (three) times a day before meals Dx: Diabetes E11 9  Dispense pens, Disp: 8 mL, Rfl: 0    lisinopril-hydrochlorothiazide (PRINZIDE,ZESTORETIC) 20-12 5 MG per tablet, TAKE 2 TABLETS BY MOUTH EVERY DAY, Disp: 60 tablet, Rfl: 1    metoprolol tartrate (LOPRESSOR) 100 mg tablet, TAKE 1 TABLET TWICE A DAY, Disp: 60 tablet, Rfl: 5    atorvastatin (LIPITOR) 80 mg tablet, Take 1 tablet (80 mg total) by mouth daily, Disp: 90 tablet, Rfl: 5        Review of Systems:  Review of Systems   Respiratory: Negative  Cardiovascular: Negative  All other systems reviewed and are negative  Physical Exam:  Physical Exam   Constitutional: He is oriented to person, place, and time  He appears well-developed and well-nourished  No distress  HENT:   Head: Normocephalic and atraumatic  Eyes: EOM are normal  Pupils are equal, round, and reactive to light  Right eye exhibits no discharge  No scleral icterus  Neck: Normal range of motion  Neck supple  No thyromegaly present  Cardiovascular: Normal rate, regular rhythm and normal heart sounds  Exam reveals no gallop and no friction rub  No murmur heard    Pulmonary/Chest: Effort normal and breath sounds normal    Abdominal: He exhibits no distension  There is no tenderness  There is no rebound and no guarding  Musculoskeletal: Normal range of motion  He exhibits no edema  Neurological: He is alert and oriented to person, place, and time  Coordination normal    Skin: Skin is warm and dry  No rash noted  He is not diaphoretic  No erythema  No pallor  Psychiatric: He has a normal mood and affect   His behavior is normal  Judgment and thought content normal

## 2018-06-20 ENCOUNTER — TELEPHONE (OUTPATIENT)
Dept: VASCULAR SURGERY | Facility: CLINIC | Age: 73
End: 2018-06-20

## 2018-06-20 NOTE — TELEPHONE ENCOUNTER
Pt will need carotid subclavian bypass followed by thoracic stent graft repair  Pt had appt with Dr Malik Donald 6/11  He is scheduled for stress test 6/26  Bertram Lawson faxed the clearance form to Dr Sepideh ugarte

## 2018-06-26 ENCOUNTER — HOSPITAL ENCOUNTER (OUTPATIENT)
Dept: NUCLEAR MEDICINE | Facility: HOSPITAL | Age: 73
Discharge: HOME | End: 2018-06-26
Attending: INTERNAL MEDICINE
Payer: COMMERCIAL

## 2018-06-26 ENCOUNTER — HOSPITAL ENCOUNTER (OUTPATIENT)
Dept: NON INVASIVE DIAGNOSTICS | Facility: HOSPITAL | Age: 73
Discharge: HOME | End: 2018-06-26
Attending: INTERNAL MEDICINE
Payer: COMMERCIAL

## 2018-06-26 DIAGNOSIS — Z98.890 S/P ASCENDING AORTIC ANEURYSM REPAIR: ICD-10-CM

## 2018-06-26 DIAGNOSIS — I71.2 AORTIC ARCH ANEURYSM (HCC): ICD-10-CM

## 2018-06-26 DIAGNOSIS — Z86.79 S/P ASCENDING AORTIC ANEURYSM REPAIR: ICD-10-CM

## 2018-06-26 PROCEDURE — 78452 HT MUSCLE IMAGE SPECT MULT: CPT

## 2018-06-26 PROCEDURE — A9502 TC99M TETROFOSMIN: HCPCS

## 2018-06-26 PROCEDURE — 93017 CV STRESS TEST TRACING ONLY: CPT

## 2018-06-26 RX ADMIN — REGADENOSON 0.4 MG: 0.08 INJECTION, SOLUTION INTRAVENOUS at 09:12

## 2018-06-27 LAB
ARRHY DURING EX: NORMAL
CHEST PAIN STATEMENT: NORMAL
MAX DIASTOLIC BP: 74 MMHG
MAX HEART RATE: 84 BPM
MAX PREDICTED HEART RATE: 148 BPM
MAX. SYSTOLIC BP: 126 MMHG
PROTOCOL NAME: NORMAL
REASON FOR TERMINATION: NORMAL
TARGET HR FORMULA: NORMAL
TIME IN EXERCISE PHASE: NORMAL

## 2018-06-30 DIAGNOSIS — I10 HTN (HYPERTENSION), BENIGN: ICD-10-CM

## 2018-07-02 ENCOUNTER — OFFICE VISIT (OUTPATIENT)
Dept: GASTROENTEROLOGY | Facility: MEDICAL CENTER | Age: 73
End: 2018-07-02
Payer: COMMERCIAL

## 2018-07-02 VITALS
TEMPERATURE: 97.5 F | WEIGHT: 227 LBS | DIASTOLIC BLOOD PRESSURE: 70 MMHG | HEIGHT: 65 IN | BODY MASS INDEX: 37.82 KG/M2 | SYSTOLIC BLOOD PRESSURE: 122 MMHG | HEART RATE: 64 BPM

## 2018-07-02 DIAGNOSIS — Z12.11 SCREENING FOR COLON CANCER: Primary | ICD-10-CM

## 2018-07-02 DIAGNOSIS — D12.4 ADENOMATOUS POLYP OF DESCENDING COLON: ICD-10-CM

## 2018-07-02 DIAGNOSIS — D62 ACUTE BLOOD LOSS ANEMIA: ICD-10-CM

## 2018-07-02 PROBLEM — Z79.01 ANTICOAGULATED ON COUMADIN: Status: RESOLVED | Noted: 2017-09-12 | Resolved: 2018-07-02

## 2018-07-02 PROCEDURE — 99203 OFFICE O/P NEW LOW 30 MIN: CPT | Performed by: INTERNAL MEDICINE

## 2018-07-02 RX ORDER — LISINOPRIL AND HYDROCHLOROTHIAZIDE 20; 12.5 MG/1; MG/1
TABLET ORAL
Qty: 60 TABLET | Refills: 1 | Status: ON HOLD | OUTPATIENT
Start: 2018-07-02 | End: 2018-08-14

## 2018-07-02 NOTE — PROGRESS NOTES
[facility] - Outpatient Consultation  Columba Aguilera  68 y o  male MRN: [id number]  Encounter: [id number]          ASSESSMENT AND PLAN:      1  Screening for colon cancer  2  Anemia - likely due to surgery   3  History of colonic polyps   - Mr Aaron Infante states that he does not have any family members or friends at this time that can drive him home from the procedure  He is going to talk to his daughter and find a time that works for both of them  He will call to schedule the colonoscopy    -discuss patient should likely get EGD and colonoscopy due to history of anemia  Currently he is off anticoagulation  Getting a ride home has a common major issue and he will schedule when he has this  He is also currently planning on getting aortic aneurysm repair I would prefer to get that before EGD and colonoscopy   ______________________________________________________________________    HPI:      Columba Aguilera  is a 68 y o  male who presents for colonoscopy evaluation  He had a colonoscopy 5-6 years ago, he had polyps at that time  He denies GI symptoms  He denies family history of colon cancer  He was on Coumadin for atrial fibrillation and aortic aneurysm, but he is no longer taking it  He was found to have blood loss anemia in  2017  His hemoglobin was 9 4 but increased to 11 4  Last CBC as in September of 2017  REVIEW OF SYSTEMS:    CONSTITUTIONAL: Denies any fever, chills, rigors, and weight loss  HEENT: No earache or tinnitus  Denies hearing loss or visual disturbances  CARDIOVASCULAR: No chest pain or palpitations  RESPIRATORY: Denies any cough, hemoptysis, shortness of breath or dyspnea on exertion  GASTROINTESTINAL: As noted in the History of Present Illness  GENITOURINARY: No problems with urination  Denies any hematuria or dysuria  NEUROLOGIC: No dizziness or vertigo, denies headaches  MUSCULOSKELETAL: Reports back pain   SKIN: Denies skin rashes or itching  ENDOCRINE: Denies excessive thirst  Denies intolerance to heat or cold  PSYCHOSOCIAL: Denies depression or anxiety  Denies any recent memory loss  Historical Information   Past Medical History:   Diagnosis Date    Arthritis     [facility]     Hypertension     Pro[facility] cancer [facility]     [facility]      Past Surgical History:   Procedure Laterality Date    NH ASCEND AORTA GRAFT W ROOT REPLACMENT  VALVE CONDUIT/CORON RECONSTRUCT N/A 9/6/2017    Procedure: ASCENDING AORTIC REPAIR WITH A 26MM  GELWEAVE STRAIGHT GRAFT AND HEMIARCH WITH 8MM SIDE ARM BRANCH;  Surgeon: Jose Euceda MD;  [facility];  Service: Cardiac Surgery    NH EXPLOR POSTOP BLEED,INFEC,CLOT-CHST N/A 9/6/2017    Procedure: RE-EXPLORATION MEDIASTERNAL CAVITY FOR POST-OP BLEED (BRING BACK); Surgeon: Jose Euceda MD;  [facility];  Service: Cardiac Surgery     Social History   History   Alcohol Use    Yes     Comment: social use as per [employer]     History   Drug Use No     History   Smoking Status    Never Smoker   Smokeless Tobacco    Never Used     Family History   Problem Relation Age of Onset    Stroke Mother         complications       Meds/Allergies       Current Outpatient Prescriptions:     amLODIPine (NORVASC) 10 mg tablet    aspirin 81 mg chewable tablet    atorvastatin (LIPITOR) 80 mg tablet    B-D ULTRAFINE III SHORT PEN 31G X 8 MM MISC    insulin glargine (LANTUS) 100 units/mL subcutaneous injection    insulin lispro (HumaLOG) 100 units/mL injection    lisinopril-hydrochlorothiazide (PRINZIDE,ZESTORETIC) 20-12 5 MG per tablet    metoprolol tartrate (LOPRESSOR) 100 mg tablet    No Known Allergies        Objective     Blood pressure 122/70, pulse 64, temperature 97 5 °F (36 4 °C), temperature source Tympanic, height 5' 5" (1 651 m), weight 103 kg (227 lb)  Body mass index is 37 77 kg/m²          PHYSICAL EXAM:      General Appearance:   Alert, cooperative, no distress   HEENT:   Normocephalic, atraumatic, anicteric      Neck:  Supple, symmetrical, trachea midline   Lungs:   Clear to auscultation bilaterally; no rales, rhonchi or wheezing; respirations unlabored    Heart[de-identified]   Regular rate and rhythm; no murmur, rub, or gallop  Abdomen:   Soft, non-tender, non-distended; normal bowel sounds; no masses, no organomegaly    Genitalia:   Deferred    Rectal:   Deferred    Extremities:  No cyanosis, clubbing or edema    Pulses:  2+ and symmetric    Skin:  No jaundice, rashes, or lesions    Lymph nodes:  No palpable cervical lymphadenopathy        Lab Results:   No visits with results within 1 Day(s) from this visit  Latest known visit with results is:   Hospital Outpatient Visit on 06/26/2018   Component Date Value    Protocol Name 06/26/2018 RICH WALK     Time In Exercise Phase 06/26/2018 00:03:00     MAX  SYSTOLIC BP 06/42/2201 965     Max Diastolic Bp 02/58/4200 74     Max Heart Rate 06/26/2018 84     Max Predicted Heart Rate 06/26/2018 148     Reason for Termination 06/26/2018 Test Complete     Test Indication 06/26/2018                      Value:Ischemia Evaluation  Pre-Op Evaluation      Target Hr Formular 06/26/2018 (220 - Age)*85%     Arrhy During Ex 06/26/2018 atrial premature beats     Chest Pain Statement 06/26/2018 none          Attestation:   By signing my name below, Cortney Medina, attest that this documentation has been prepared under the direction and in the presence of Brooklyn Simmons MD  Electronically Signed: Rich Sanchez  07/02/2018  Tabitha Deleon, personally performed the services described in this documentation  All medical record entries made by the ceceibmike were at my direction and in my presence  I have reviewed the chart and discharge instructions and agree    that the record reflects my personal performance and is accurate and complete  Brooklyn Simmons MD  07/02/2018

## 2018-07-02 NOTE — LETTER
July 2, 2018     Moris Riveramason, [address]     Patient: Kiah Romeo  YOB: 1945   Date of Visit: 7/2/2018       Dear Dr Syeda Villegas: Thank you for referring Lina Martines to me for evaluation  Below are my notes for this consultation  If you have questions, please do not hesitate to call me  I look forward to following your patient along with you  Sincerely,        Stiven Garay MD        CC: No Recipients  Stiven Garay MD  7/2/2018  3:09 PM  Sign at close encounter  [facility] - Outpatient Consultation  Kiah Romeo  68 y o  male MRN: [id number]  Encounter: [id number]          ASSESSMENT AND PLAN:      1  Screening for colon cancer  2  Anemia - likely due to surgery   3  History of colonic polyps   - Mr Carlos Azul states that he does not have any family members or friends at this time that can drive him home from the procedure  He is going to talk to his daughter and find a time that works for both of them  He will call to schedule the colonoscopy    -discuss patient should likely get EGD and colonoscopy due to history of anemia  Currently he is off anticoagulation  Getting a ride home has a common major issue and he will schedule when he has this  He is also currently planning on getting aortic aneurysm repair I would prefer to get that before EGD and colonoscopy   ______________________________________________________________________    HPI:      Kiah Romeo  is a 68 y o  male who presents for colonoscopy evaluation  He had a colonoscopy 5-6 years ago, he had polyps at that time  He denies GI symptoms  He denies family history of colon cancer  He was on Coumadin for atrial fibrillation and aortic aneurysm, but he is no longer taking it  He was found to have blood loss anemia in  2017  His hemoglobin was 9 4 but increased to 11 4  Last CBC as in September of 2017         REVIEW OF SYSTEMS:    CONSTITUTIONAL: Denies any fever, chills, rigors, and weight loss  HEENT: No earache or tinnitus  Denies hearing loss or visual disturbances  CARDIOVASCULAR: No chest pain or palpitations  RESPIRATORY: Denies any cough, hemoptysis, shortness of breath or dyspnea on exertion  GASTROINTESTINAL: As noted in the History of Present Illness  GENITOURINARY: No problems with urination  Denies any hematuria or dysuria  NEUROLOGIC: No dizziness or vertigo, denies headaches  MUSCULOSKELETAL: Reports back pain   SKIN: Denies skin rashes or itching  ENDOCRINE: Denies excessive thirst  Denies intolerance to heat or cold  PSYCHOSOCIAL: Denies depression or anxiety  Denies any recent memory loss  Historical Information   Past Medical History:   Diagnosis Date    Arthritis     [facility]     Hypertension     Prostate cancer [facility]     [facility] ([facility] )      Past Surgical History:   Procedure Laterality Date    RI ASCEND AORTA GRAFT W ROOT REPLACMENT  VALVE CONDUIT/CORON RECONSTRUCT N/A 9/6/2017    Procedure: ASCENDING AORTIC REPAIR WITH A 26MM  GELWEAVE STRAIGHT GRAFT AND HEMIARCH WITH 8MM SIDE ARM BRANCH;  Surgeon: Akbar Juarez MD;  [facility];  Service: Cardiac Surgery    RI EXPLOR POSTOP BLEED,INFEC,CLOT-CHST N/A 9/6/2017    Procedure: RE-EXPLORATION MEDIASTERNAL CAVITY FOR POST-OP BLEED (BRING BACK);   Surgeon: Akbar Juarez MD;  [facility];  Service: Cardiac Surgery     Social History   History   Alcohol Use    Yes     Comment: social use as per [employer]     History   Drug Use No     History   Smoking Status    Never Smoker   Smokeless Tobacco    Never Used     Family History   Problem Relation Age of Onset    Stroke Mother         complications       Meds/Allergies       Current Outpatient Prescriptions:     amLODIPine (NORVASC) 10 mg tablet    aspirin 81 mg chewable tablet    atorvastatin (LIPITOR) 80 mg tablet    B-D ULTRAFINE III SHORT PEN 31G X 8 MM MISC    insulin glargine (LANTUS) 100 units/mL subcutaneous injection    insulin lispro (HumaLOG) 100 units/mL injection    lisinopril-hydrochlorothiazide (PRINZIDE,ZESTORETIC) 20-12 5 MG per tablet    metoprolol tartrate (LOPRESSOR) 100 mg tablet    No Known Allergies        Objective     Blood pressure 122/70, pulse 64, temperature 97 5 °F (36 4 °C), temperature source Tympanic, height 5' 5" (1 651 m), weight 103 kg (227 lb)  Body mass index is 37 77 kg/m²  PHYSICAL EXAM:      General Appearance:   Alert, cooperative, no distress   HEENT:   Normocephalic, atraumatic, anicteric      Neck:  Supple, symmetrical, trachea midline   Lungs:   Clear to auscultation bilaterally; no rales, rhonchi or wheezing; respirations unlabored    Heart[de-identified]   Regular rate and rhythm; no murmur, rub, or gallop  Abdomen:   Soft, non-tender, non-distended; normal bowel sounds; no masses, no organomegaly    Genitalia:   Deferred    Rectal:   Deferred    Extremities:  No cyanosis, clubbing or edema    Pulses:  2+ and symmetric    Skin:  No jaundice, rashes, or lesions    Lymph nodes:  No palpable cervical lymphadenopathy        Lab Results:   No visits with results within 1 Day(s) from this visit  Latest known visit with results is:   Hospital Outpatient Visit on 06/26/2018   Component Date Value    Protocol Name 06/26/2018 RICH WALK     Time In Exercise Phase 06/26/2018 00:03:00     MAX   SYSTOLIC BP 00/47/4159 896     Max Diastolic Bp 17/16/8731 74     Max Heart Rate 06/26/2018 84     Max Predicted Heart Rate 06/26/2018 148     Reason for Termination 06/26/2018 Test Complete     Test Indication 06/26/2018                      Value:Ischemia Evaluation  Pre-Op Evaluation      Target Hr Formular 06/26/2018 (220 - Age)*85%     Arrhy During Ex 06/26/2018 atrial premature beats     Chest Pain Statement 06/26/2018 none          Attestation:   By signing my name below, I, Bang Yost, attest that this documentation has been prepared under the direction and in the presence of Paras Wills MD  Electronically Signed: Rich Michel  07/02/2018  Urbano Godfrey, personally performed the services described in this documentation  All medical record entries made by the scribe were at my direction and in my presence  I have reviewed the chart and discharge instructions and agree    that the record reflects my personal performance and is accurate and complete  Paras Wills MD  07/02/2018

## 2018-07-05 ENCOUNTER — OFFICE VISIT (OUTPATIENT)
Dept: VASCULAR SURGERY | Facility: CLINIC | Age: 73
End: 2018-07-05
Payer: COMMERCIAL

## 2018-07-05 VITALS — TEMPERATURE: 97.7 F | DIASTOLIC BLOOD PRESSURE: 74 MMHG | SYSTOLIC BLOOD PRESSURE: 142 MMHG | HEART RATE: 76 BPM

## 2018-07-05 DIAGNOSIS — I71.01 CHRONIC THORACIC AORTIC DISSECTION (HCC): Primary | Chronic | ICD-10-CM

## 2018-07-05 PROCEDURE — 99215 OFFICE O/P EST HI 40 MIN: CPT | Performed by: SURGERY

## 2018-07-05 NOTE — ASSESSMENT & PLAN NOTE
Aortic dissection with previous arch repair  Now with degeneration of thoracic aorta with recommendation from CT surgery for repair  We again discussed the 2 stage approach with de branching of the left subclavian artery followed by stent graft placement  We discussed at length the associated risks and benefits  Patient appears to understand and would like to proceed but asked to speak with Dr Eyad Tate prior to stage I of this procedure

## 2018-07-05 NOTE — LETTER
July 5, 2018     Charles Plant, [address]  [facility]    Patient: Alicia Omalley  YOB: 1945   Date of Visit: 7/5/2018       Dear Dr Hannah Walters: Thank you for referring Sofi Alejandro to me for evaluation  Below are the relevant portions of my assessment and plan of care  Chronic thoracic aortic dissection (HCC)  Aortic dissection with previous arch repair  Now with degeneration of thoracic aorta with recommendation from CT surgery for repair  We again discussed the 2 stage approach with de branching of the left subclavian artery followed by stent graft placement  We discussed at length the associated risks and benefits  Patient appears to understand and would like to proceed but asked to speak with Dr Odilia Cortez prior to stage I of this procedure  If you have questions, please do not hesitate to call me  I look forward to following Monste Webb along with you           Sincerely,        Javier Vences MD        CC: Beba Chen MD

## 2018-07-05 NOTE — PROGRESS NOTES
Assessment/Plan:    Chronic thoracic aortic dissection (HCC)  Aortic dissection with previous arch repair  Now with degeneration of thoracic aorta with recommendation from CT surgery for repair  We again discussed the 2 stage approach with de branching of the left subclavian artery followed by stent graft placement  We discussed at length the associated risks and benefits  Patient appears to understand and would like to proceed but asked to speak with Dr Ulysses Muniz prior to stage I of this procedure  Diagnoses and all orders for this visit:    Chronic thoracic aortic dissection ([address] )  -     Ambulatory referral to Cardiovascular Surgery; Future          Subjective:      Patient ID: Kanwal Wheeler  is a 68 y o  male  Patient presents for a pre-op visit  He had cardiac clearance  Patient denies any stomach pain  He has chronic back pain  He denies leg pain but has swelling bilat  Patient is dizzy upon bending  He denies any TIA or CVA like symptoms  He denies tobacco use  81-year-old with history of aortic arch dissection treated with arch repair  On subsequent follow-up he has had degeneration of his thoracic aorta with aneurysmal dilatation  He has been recommended for repair by CT surgery  He has recently undergone cardiac evaluation and clearance  He now presents to review options in preparation for this staged procedure to include initial intervention with left subclavian to carotid bypass with embolization of the proximal subclavian artery  Of note he has had some concerns about the risks of this procedure and thus a prolonged discussion is pursued in this regard  On evaluation today he has no complaints  He denies chest pain or shortness of breath  He remains active with no major limitations  CT scan 06/26/2018 is reviewed with evaluation of dissection  Of note upon de branching of the subclavian artery there will be a 2-2 5 cm neck approximately 32 mm in diameter      I have spent 45 minutes with Family today in which greater than 50% of this time was spent in counseling/coordination of care regarding Risks and benefits of tx options  The following portions of the patient's history were reviewed and updated as appropriate: allergies, current medications, past family history, past medical history, past social history, past surgical history and problem list     Review of Systems   Constitutional: Positive for unexpected weight change (gained 3 lbs)  HENT: Positive for postnasal drip and rhinorrhea  Eyes: Positive for discharge  Respiratory: Positive for apnea  Cardiovascular: Positive for leg swelling  Gastrointestinal: Negative  Endocrine: Negative  Genitourinary: Negative  Musculoskeletal: Positive for back pain, joint swelling and neck pain  Skin: Negative  Allergic/Immunologic: Negative  Neurological: Positive for dizziness  Hematological: Negative  Psychiatric/Behavioral: Positive for agitation  Objective:      /74 (BP Location: Right arm, Patient Position: Sitting, Cuff Size: Adult)   Pulse 76   Temp 97 7 °F (36 5 °C) (Tympanic)          Physical Exam   Constitutional: He is oriented to person, place, and time  He appears well-developed and well-nourished  HENT:   Head: Normocephalic and atraumatic  Eyes: Pupils are equal, round, and reactive to light  Neck: Normal range of motion  Neck supple  Normal carotid pulses and no JVD present  Carotid bruit is present  Cardiovascular: Normal rate and regular rhythm  Murmur heard  Systolic murmur is present   Pulses:       Carotid pulses are 2+ on the right side, and 2+ on the left side  Radial pulses are 2+ on the right side, and 2+ on the left side  Musculoskeletal: Normal range of motion  Neurological: He is alert and oriented to person, place, and time  Psychiatric: He has a normal mood and affect         Operative Scheduling Information:    Hospital[facility]  [facility]    Physician:  Montrell Victor and interventional radiologist    Surgery:  Left common carotid to subclavian artery bypass with embolization of proximal subclavian artery    Urgency:  Standard    Case Length:  Normal    Post-op Bed:  Stepdown    OR Table:  Discovery    Equipment Needs:  None    Medication Instructions:  Aspirin:   Continue (do not hold)    Hydration:  No

## 2018-07-05 NOTE — H&P
Assessment/Plan:    Chronic thoracic aortic dissection (HCC)  Aortic dissection with previous arch repair  Now with degeneration of thoracic aorta with recommendation from CT surgery for repair  We again discussed the 2 stage approach with de branching of the left subclavian artery followed by stent graft placement  We discussed at length the associated risks and benefits  Patient appears to understand and would like to proceed but asked to speak with Dr Silvana Lucas prior to stage I of this procedure  Diagnoses and all orders for this visit:    Chronic thoracic aortic dissection ([address] )  -     Ambulatory referral to Cardiovascular Surgery; Future          Subjective:      Patient ID: Vipin Ledesma  is a 68 y o  male  Patient presents for a pre-op visit  He had cardiac clearance  Patient denies any stomach pain  He has chronic back pain  He denies leg pain but has swelling bilat  Patient is dizzy upon bending  He denies any TIA or CVA like symptoms  He denies tobacco use  78-year-old with history of aortic arch dissection treated with arch repair  On subsequent follow-up he has had degeneration of his thoracic aorta with aneurysmal dilatation  He has been recommended for repair by CT surgery  He has recently undergone cardiac evaluation and clearance  He now presents to review options in preparation for this staged procedure to include initial intervention with left subclavian to carotid bypass with embolization of the proximal subclavian artery  Of note he has had some concerns about the risks of this procedure and thus a prolonged discussion is pursued in this regard  On evaluation today he has no complaints  He denies chest pain or shortness of breath  He remains active with no major limitations  CT scan 06/26/2018 is reviewed with evaluation of dissection  Of note upon de branching of the subclavian artery there will be a 2-2 5 cm neck approximately 32 mm in diameter          The following portions of the patient's history were reviewed and updated as appropriate: allergies, current medications, past family history, past medical history, past social history, past surgical history and problem list     Review of Systems   Constitutional: Positive for unexpected weight change (gained 3 lbs)  HENT: Positive for postnasal drip and rhinorrhea  Eyes: Positive for discharge  Respiratory: Positive for apnea  Cardiovascular: Positive for leg swelling  Gastrointestinal: Negative  Endocrine: Negative  Genitourinary: Negative  Musculoskeletal: Positive for back pain, joint swelling and neck pain  Skin: Negative  Allergic/Immunologic: Negative  Neurological: Positive for dizziness  Hematological: Negative  Psychiatric/Behavioral: Positive for agitation  Objective:      /74 (BP Location: Right arm, Patient Position: Sitting, Cuff Size: Adult)   Pulse 76   Temp 97 7 °F (36 5 °C) (Tympanic)          Physical Exam   Constitutional: He is oriented to person, place, and time  He appears well-developed and well-nourished  HENT:   Head: Normocephalic and atraumatic  Eyes: Pupils are equal, round, and reactive to light  Neck: Normal range of motion  Neck supple  Normal carotid pulses and no JVD present  Carotid bruit is present  Cardiovascular: Normal rate and regular rhythm  Murmur heard  Systolic murmur is present   Pulses:       Carotid pulses are 2+ on the right side, and 2+ on the left side  Radial pulses are 2+ on the right side, and 2+ on the left side  Musculoskeletal: Normal range of motion  Neurological: He is alert and oriented to person, place, and time  Psychiatric: He has a normal mood and affect         Operative Scheduling Information:    [facility]:  [facility]    Physician:  Bessy Ruvalcaba and interventional radiologist    Surgery:  Left common carotid to subclavian artery bypass with embolization of proximal subclavian artery    Urgency:  Standard    Case Length:  Normal    Post-op Bed:  Stepdown    OR Table:  Discovery    Equipment Needs:  None    Medication Instructions:  Aspirin:   Continue (do not hold)    Hydration:  No

## 2018-07-05 NOTE — PATIENT INSTRUCTIONS
Chronic thoracic aortic dissection (HCC)  Aortic dissection with previous arch repair  Now with degeneration of thoracic aorta with recommendation from CT surgery for repair  We again discussed the 2 stage approach with de branching of the left subclavian artery followed by stent graft placement  We discussed at length the associated risks and benefits  Patient appears to understand and would like to proceed but asked to speak with Dr Maxx Bernard prior to stage I of this procedure

## 2018-07-13 ENCOUNTER — TELEPHONE (OUTPATIENT)
Dept: VASCULAR SURGERY | Facility: CLINIC | Age: 73
End: 2018-07-13

## 2018-07-13 ENCOUNTER — PREP FOR PROCEDURE (OUTPATIENT)
Dept: VASCULAR SURGERY | Facility: CLINIC | Age: 73
End: 2018-07-13

## 2018-07-13 ENCOUNTER — OFFICE VISIT (OUTPATIENT)
Dept: CARDIAC SURGERY | Facility: CLINIC | Age: 73
End: 2018-07-13

## 2018-07-13 VITALS
BODY MASS INDEX: 38.15 KG/M2 | RESPIRATION RATE: 14 BRPM | HEART RATE: 56 BPM | TEMPERATURE: 97.9 F | DIASTOLIC BLOOD PRESSURE: 70 MMHG | OXYGEN SATURATION: 97 % | WEIGHT: 229 LBS | HEIGHT: 65 IN | SYSTOLIC BLOOD PRESSURE: 128 MMHG

## 2018-07-13 DIAGNOSIS — I71.2 THORACIC AORTIC ANEURYSM WITHOUT RUPTURE (HCC): Primary | ICD-10-CM

## 2018-07-13 DIAGNOSIS — I71.01 CHRONIC THORACIC AORTIC DISSECTION (HCC): Chronic | ICD-10-CM

## 2018-07-13 PROCEDURE — 99024 POSTOP FOLLOW-UP VISIT: CPT | Performed by: PHYSICIAN ASSISTANT

## 2018-07-13 NOTE — PROGRESS NOTES
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 in September 2017    History of Present Illness: Praveena Koenig  is a 68y o  year old male who presents to the office today to discuss his risks and benefits of treatment for his descending aortic aneurysm  He is scheduled to undergo subclavian to carotid bypass, and ultimately TEVAR  He has been seen by Dr Pretty Mendez with Vascular surgery and has obtained cardiac clearance  He has recovered well from his initial emergent aortic arch repair s/p Type A aortic dissection  He denies angina/dyspnea/sternal instability  He does admit to intermittent mid back pain that comes and goes with no inciting event or attempt to relieve pain  He also admits to dizziness with bending over or standing up, but denies ever losing consciousness  He has daily edema in his upper and lower extremities, right greater than left  He denies TIA or CVA symptoms, abdominal pain, N/V, change in bowel/urinary habits, claudication, numbness/tingling in extremities  Past Medical History:  Past Medical History:   Diagnosis Date    Arthritis     Diabetes [facility]     Hypertension     Prostate cancer ([facility]     Stroke (cerebrum) ([facility] )          Past Surgical History:   Past Surgical History:   Procedure Laterality Date    [place] ASCEND AORTA GRAFT W ROOT REPLACMENT  VALVE CONDUIT/CORON RECONSTRUCT N/A 9/6/2017    Procedure: ASCENDING AORTIC REPAIR WITH A 26MM  GELWEAVE STRAIGHT GRAFT AND HEMIARCH WITH 8MM SIDE ARM BRANCH;  Surgeon: Aury Benavides MD;  [facility];  Service: Cardiac Surgery    [place] EXPLOR POSTOP BLEED,INFEC,CLOT-CHST N/A 9/6/2017    Procedure: RE-EXPLORATION MEDIASTERNAL CAVITY FOR POST-OP BLEED (BRING BACK);   Surgeon: Aury Benavides MD;  [facility];  Service: Cardiac Surgery         Family History:  Family History   Problem Relation Age of Onset    Stroke Mother         complications         Social History:    History   Alcohol Use    Yes     Comment: social use as per [employer]     History   Drug Use No     History   Smoking Status    Never Smoker   Smokeless Tobacco    Never Used       Home Medications:   Prior to Admission medications    Medication Sig Start Date End Date Taking? Authorizing Provider   amLODIPine (NORVASC) 10 mg tablet Take 1 tablet by mouth 2 (two) times a day 10/31/17  Yes Historical Provider, MD   aspirin 81 mg chewable tablet Chew 1 tablet daily 9/14/17  Yes LV Kelley MD   atorvastatin (LIPITOR) 80 mg tablet Take 1 tablet (80 mg total) by mouth daily 6/11/18  Yes Lei Grove DO   insulin glargine (LANTUS) 100 units/mL subcutaneous injection Inject 28 Units under the skin daily at bedtime Dx: Diabetes E11 9, dispense pens 9/14/17  Yes Corbin Liu MD   insulin lispro (HumaLOG) 100 units/mL injection Inject 8 Units under the skin 3 (three) times a day before meals Dx: Diabetes E11 9   Dispense pens 9/14/17  Yes LV Kelley MD   lisinopril-hydrochlorothiazide (PRINZIDE,ZESTORETIC) 20-12 5 MG per tablet TAKE 2 TABLETS BY MOUTH EVERY DAY 7/2/18  Yes Lei Grove DO   metoprolol tartrate (LOPRESSOR) 100 mg tablet TAKE 1 TABLET TWICE A DAY 5/4/18  Yes Lei Grove DO   B-D ULTRAFINE III SHORT PEN 31G X 8 MM MISC USE AS DIRECTED  5/13/18   Anabel Way DO       Allergies:  No Known Allergies    Review of Systems:  Review of Systems - General ROS: positive for  - fatigue  negative for - chills or fever  Psychological ROS: negative for - disorientation or hallucinations  Hematological and Lymphatic ROS: negative for - bleeding problems, bruising or jaundice  Endocrine ROS: negative  Respiratory ROS: no cough, shortness of breath, or wheezing  Cardiovascular ROS: positive for - edema   negative for - chest pain, dyspnea on exertion or loss of consciousness  Gastrointestinal ROS: no abdominal pain, change in bowel habits, or black or bloody stools  Musculoskeletal ROS: positive for - muscle pain and pain in mid back  negative for - gait disturbance or muscular weakness  Neurological ROS: no TIA or stroke symptoms    Vital Signs:     Vitals:    07/13/18 1000 07/13/18 1042   BP: 130/72 128/70   BP Location: Left arm Right arm   Cuff Size: Adult    Pulse: 56    Resp: 14    Temp: 97 9 °F (36 6 °C)    TempSrc: Oral    SpO2: 97%    Weight: 104 kg (229 lb)    Height: 5' 5" (1 651 m)        Physical Exam:    General:  AAOx3, NAD  HEENT/NECK:  PERRLA  No jugular venous distention  Cardiac:Regular rate and rhythm, systolic ejection Murmur  Carotid arteries: brisk upstroke, no bruits appreciated  Pulmonary:  Breath sounds clear bilaterally  Abdomen:  Non-tender, Non-distended  Positive bowel sounds  Upper extremities: 2+ radial pulses; brisk capillary refill  Lower extremities: Extremities warm/dry  PT/DP pulses 2+ bilaterally  1+ edema B/L, right greater than left  Neuro: Alert and oriented X 3  Sensation is grossly intact  No focal deficits  Skin: Warm/Dry, without rashes or lesions  Lab Results:                 Lab Results   Component Value Date    HGBA1C 7 1 (H) 05/23/2018     Lab Results   Component Value Date    CKTOTAL 72 01/15/2014    TROPONINI 0 03 09/06/2017       Imaging Studies:     NM myocardial perfusion spect:  STRESS SUMMARY: Duration of pharmacologic stress was 3 min  Maximal heart rate during stress was 84 bpm  The rate-pressure product for the peak heart rate and blood pressure was 12052  There was no chest pain during stress  The stress test  was terminated due to protocol completion  Pre oxygen saturation: 96 %  Peak oxygen saturation: 98 %  The stress ECG was negative for ischemia and normal  There were no stress arrhythmias or conduction abnormalities        CT Scan:   1    Stable and satisfactory appearance of ascending aortic repair  2  Stable extent of residual dissection however distal arch aneurysm has increased in the interim to 53 mm compared to 42 mm in October 2017   3   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  As described previously, multiple arterial feeders are likely and there are 3 aneurysms all of which remain stable in size  The largest aneurysm measures 27 mm  I have personally reviewed pertinent films in PACS    Assessment:  Patient Active Problem List    Diagnosis Date Noted    Screening for colon cancer 07/02/2018    Adenomatous polyp of descending colon 07/02/2018    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[facility] ) 04/20/2018    KARIN (obstructive sleep apnea) 03/12/2018    Class 2 obesity due to excess calories without serious comorbidity with body mass index (BMI) of 37 0 to 37 9 in adult 03/12/2018    Atrial fibrillation ([facility] ) 09/11/2017    Chronic thoracic aortic dissection ([facility] ) 09/06/2017    Acute blood loss anemia 09/06/2017    S/P ascending aortic aneurysm repair 09/06/2017    Hypokalemia 09/06/2017    Prostate cancer ([facility] )     Hypertension     Diabetes ([facility] )     Hyperlipidemia 06/07/2012     Descending aortic aneurysm s/p arch repair; Ongoing 2 stage approach with debranching of left subclavian artery follow by stent graft placement    Plan:  Patient has recently seen Dr Thierry Moore with Vascular surgery to discuss risks and benefits of operation  Risks and benefits of operation were also discussed in detail today with the Dr Luzmaria Ramos  We have reviewed results all preoperative radiographic,  laboratory and vascular studies  They understand and wish to proceed with stage 1 of surgical intervention which includes debranching of the left subclavian artery with bypass to the carotid artery and embolization of the proximal subclavian artery  Ultimately, stage 2 involves TEVAR at a later date      Deidra Resendez  was comfortable with our recommendations, and their questions were answered to their satisfaction  Thank you for allowing us to participate in the care of this patient       SIGNATURE: Mark Coats PA-C  DATE: July 13, 2018  TIME: 10:43 AM

## 2018-07-13 NOTE — TELEPHONE ENCOUNTER
Called patient to schedule Left common carotid to subclavian artery bypass with embolization of proximal subclavian artery at \[facility]\"" with Dr Florencia Shabazz  Offered patient 7/23/18 however his daughter will not be available  He is ok with date 8/13/18  We went over the surgery and showering instructions and he was advised not to hold his aspirin  He was advised to go for PAT's first week of August  I have forwarded a copy of the surgery scheduling sheet to precert dept   I have emailed IR dept for IR assist the last hour of the case per Dr Florencia Shabazz request

## 2018-07-13 NOTE — LETTER
July 13, 2018     Yvonne Sargent MD  [address] [address]    Patient: Kiah Romeo  YOB: 1945   Date of Visit: 7/13/2018       Dear Dr Cari Baptiste:    Thank you for referring Lina Martines to me for evaluation  Below are my notes for this consultation  If you have questions, please do not hesitate to call me  I look forward to following your patient along with you  Sincerely,        Melvi Barnhart MD        CC: DO Liya Jeanjohnathan Guadelpe Agios Therapon, [employer]  7/13/2018 11:22 AM  Attested  Follow up preop visit - Cardiothoracic Surgery   Kiah Romeo  68 y o  male MRN: [id number]    Physician Requesting Consult: Dr Cari Baptiste  Reason for Consult / Principal Problem: 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 in September 2017    History of Present Illness: Kiah Romeo  is a 68y o  year old male who presents to the office today to discuss his risks and benefits of treatment for his descending aortic aneurysm  He is scheduled to undergo subclavian to carotid bypass, and ultimately TEVAR  He has been seen by Dr Cari Baptiste with Vascular surgery and has obtained cardiac clearance  He has recovered well from his initial emergent aortic arch repair s/p Type A aortic dissection  He denies angina/dyspnea/sternal instability  He does admit to intermittent mid back pain that comes and goes with no inciting event or attempt to relieve pain  He also admits to dizziness with bending over or standing up, but denies ever losing consciousness  He has daily edema in his upper and lower extremities, right greater than left  He denies TIA or CVA symptoms, abdominal pain, N/V, change in bowel/urinary habits, claudication, numbness/tingling in extremities      Past Medical History:  Past Medical History:   Diagnosis Date    Arthritis     [facility]     Hypertension     Prostate cancer ([facility]     [facility]brum[facility])          Past Surgical History:   Past Surgical History:   Procedure Laterality Date    [place] ASCEND AORTA GRAFT W ROOT REPLACMENT  VALVE CONDUIT/CORON RECONSTRUCT N/A 9/6/2017    Procedure: ASCENDING AORTIC REPAIR WITH A 26MM  GELWEAVE STRAIGHT GRAFT AND HEMIARCH WITH 8MM SIDE ARM BRANCH;  Surgeon: Odette Parikh MD;  [facility];  Service: Cardiac Surgery    [place] EXPLOR POSTOP BLEED,INFEC,CLOT-CHST N/A 9/6/2017    Procedure: RE-EXPLORATION MEDIASTERNAL CAVITY FOR POST-OP BLEED (BRING BACK); Surgeon: Odette Parikh MD;  [facility];  Service: Cardiac Surgery         Family History:  Family History   Problem Relation Age of Onset    Stroke Mother         complications         Social History:    History   Alcohol Use    Yes     Comment: social use as per [employer]     History   Drug Use No     History   Smoking Status    Never Smoker   Smokeless Tobacco    Never Used       Home Medications:   Prior to Admission medications    Medication Sig Start Date End Date Taking? Authorizing Provider   amLODIPine (NORVASC) 10 mg tablet Take 1 tablet by mouth 2 (two) times a day 10/31/17  Yes Historical Provider, MD   aspirin 81 mg chewable tablet Chew 1 tablet daily 9/14/17  Yes LV Smith MD   atorvastatin (LIPITOR) 80 mg tablet Take 1 tablet (80 mg total) by mouth daily 6/11/18  Yes Lei Grove DO   insulin glargine (LANTUS) 100 units/mL subcutaneous injection Inject 28 Units under the skin daily at bedtime Dx: Diabetes E11 9, dispense pens 9/14/17  Yes Luz Smith MD   insulin lispro (HumaLOG) 100 units/mL injection Inject 8 Units under the skin 3 (three) times a day before meals Dx: Diabetes E11 9   Dispense pens 9/14/17  Yes Luz Smith MD   lisinopril-hydrochlorothiazide (PRINZIDE,ZESTORETIC) 20-12 5 MG per tablet TAKE 2 TABLETS BY MOUTH EVERY DAY 7/2/18  Yes Lei Grove DO   metoprolol tartrate (LOPRESSOR) 100 mg tablet TAKE 1 TABLET TWICE A DAY 5/4/18  Yes Lei Grove DO   B-D ULTRAFINE III SHORT PEN 31G X 8 MM MISC USE AS DIRECTED  5/13/18   Charles Plant, DO       Allergies:  No Known Allergies    Review of Systems:  Review of Systems - General ROS: positive for  - fatigue  negative for - chills or fever  Psychological ROS: negative for - disorientation or hallucinations  Hematological and Lymphatic ROS: negative for - bleeding problems, bruising or jaundice  Endocrine ROS: negative  Respiratory ROS: no cough, shortness of breath, or wheezing  Cardiovascular ROS: positive for - edema   negative for - chest pain, dyspnea on exertion or loss of consciousness  Gastrointestinal ROS: no abdominal pain, change in bowel habits, or black or bloody stools  Musculoskeletal ROS: positive for - muscle pain and pain in mid back  negative for - gait disturbance or muscular weakness  Neurological ROS: no TIA or stroke symptoms    Vital Signs:     Vitals:    07/13/18 1000 07/13/18 1042   BP: 130/72 128/70   BP Location: Left arm Right arm   Cuff Size: Adult    Pulse: 56    Resp: 14    Temp: 97 9 °F (36 6 °C)    TempSrc: Oral    SpO2: 97%    Weight: 104 kg (229 lb)    Height: 5' 5" (1 651 m)        Physical Exam:    General:  AAOx3, NAD  HEENT/NECK:  PERRLA  No jugular venous distention  Cardiac:Regular rate and rhythm, systolic ejection Murmur  Carotid arteries: brisk upstroke, no bruits appreciated  Pulmonary:  Breath sounds clear bilaterally  Abdomen:  Non-tender, Non-distended  Positive bowel sounds  Upper extremities: 2+ radial pulses; brisk capillary refill  Lower extremities: Extremities warm/dry  PT/DP pulses 2+ bilaterally  1+ edema B/L, right greater than left  Neuro: Alert and oriented X 3  Sensation is grossly intact  No focal deficits  Skin: Warm/Dry, without rashes or lesions      Lab Results:                 Lab Results   Component Value Date    HGBA1C 7 1 (H) 05/23/2018     Lab Results   Component Value Date    CKTOTAL 72 01/15/2014    TROPONINI 0 03 09/06/2017       Imaging Studies: NM myocardial perfusion spect:  STRESS SUMMARY: Duration of pharmacologic stress was 3 min  Maximal heart rate during stress was 84 bpm  The rate-pressure product for the peak heart rate and blood pressure was 48960  There was no chest pain during stress  The stress test  was terminated due to protocol completion  Pre oxygen saturation: 96 %  Peak oxygen saturation: 98 %  The stress ECG was negative for ischemia and normal  There were no stress arrhythmias or conduction abnormalities        CT Scan:   1  Stable and satisfactory appearance of ascending aortic repair  2  Stable extent of residual dissection however distal arch aneurysm has increased in the interim to 53 mm compared to 42 mm in October 2017   3   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  As described previously, multiple arterial feeders are likely and there are 3 aneurysms all of which remain stable in size  The largest aneurysm measures 27 mm  I have personally reviewed pertinent films in PACS    Assessment:  Patient Active Problem List    Diagnosis Date Noted    Screening for colon cancer 07/02/2018    Adenomatous polyp of descending colon 07/02/2018    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/20/2018    KARIN (obstructive sleep apnea) 03/12/2018    Class 2 obesity due to excess calories without serious comorbidity with body mass index (BMI) of 37 0 to 37 9 in adult 03/12/2018    Atrial fibrillation ([facility] ) 09/11/2017    Chronic thoracic aortic dissection ([facility] ) 09/06/2017    Acute blood loss anemia 09/06/2017    S/P ascending aortic aneurysm repair 09/06/2017    Hypokalemia 09/06/2017    Prostate cancer ([facility] )     Hypertension     Diabetes ([facility] )     Hyperlipidemia 06/07/2012     Descending aortic aneurysm s/p arch repair; Ongoing 2 stage approach with debranching of left subclavian artery follow by stent graft placement    Plan:  Patient has recently seen Dr Cash Short with Vascular surgery to discuss risks and benefits of operation  Risks and benefits of operation were also discussed in detail today with the Dr Bronwyn Medina  We have reviewed results all preoperative radiographic,  laboratory and vascular studies  They understand and wish to proceed with stage 1 of surgical intervention which includes debranching of the left subclavian artery with bypass to the carotid artery and embolization of the proximal subclavian artery  Ultimately, stage 2 involves TEVAR at a later date  Sylvie Parks  was comfortable with our recommendations, and their questions were answered to their satisfaction  Thank you for allowing us to participate in the care of this patient  SIGNATURE: Soy Lockhart PA-C  DATE: July 13, 2018  TIME: 10:43 AM  Attestation signed by Harry Uriarte MD at 7/13/2018 12:03 PM:  Pt seen and examined with PA  I agree with the above assessment and plan  Derek Dalal is well known to me from prior Type A aortic dissection repair  He is scheduled for left carotid subclavian bypass with Dr Vanessa Severin and will then undergo TEVAR  He just wanted to discuss paraplegia risk with me prior to proceeding  I had a lengthy conversation with Derek Mulugeta and I detailed that paraplegia risk is really quite low but that I would never be able to make it zero  He also knows that there is risk of continued aneurysmal dilation of his descending aorta and subsequent complication/rupture/mortality risk due to that  He understands that I am only recommending TEVAR to prevent those disastrous complications  He is comfortable proceeding  He'll undergo carotid subclavian bypass as planned and when he's recovered, we'll arrange for TEVAR with Dr Vanessa Severin and myself      Jg Benavides MD  DATE: July 13, 2018  TIME: 11:58 AM

## 2018-07-15 DIAGNOSIS — E11.9 TYPE 2 DIABETES MELLITUS WITHOUT COMPLICATION (HCC): ICD-10-CM

## 2018-07-15 RX ORDER — PEN NEEDLE, DIABETIC 31 GX5/16"
NEEDLE, DISPOSABLE MISCELLANEOUS
Qty: 100 EACH | Refills: 3 | Status: ON HOLD | OUTPATIENT
Start: 2018-07-15 | End: 2018-09-19

## 2018-07-25 ENCOUNTER — ANESTHESIA EVENT (INPATIENT)
Dept: PERIOP | Facility: HOSPITAL | Age: 73
DRG: 253 | End: 2018-07-25
Payer: COMMERCIAL

## 2018-07-25 ENCOUNTER — APPOINTMENT (OUTPATIENT)
Dept: LAB | Facility: HOSPITAL | Age: 73
End: 2018-07-25
Payer: COMMERCIAL

## 2018-07-25 DIAGNOSIS — E11.9 TYPE 2 DIABETES MELLITUS WITHOUT COMPLICATION (HCC): ICD-10-CM

## 2018-07-25 DIAGNOSIS — I10 ESSENTIAL HYPERTENSION: ICD-10-CM

## 2018-07-25 DIAGNOSIS — I71.01 CHRONIC THORACIC AORTIC DISSECTION (HCC): Chronic | ICD-10-CM

## 2018-07-25 LAB
ABO GROUP BLD: NORMAL
ALBUMIN SERPL BCP-MCNC: 3.9 G/DL (ref 3.5–5)
ALP SERPL-CCNC: 129 U/L (ref 46–116)
ALT SERPL W P-5'-P-CCNC: 36 U/L (ref 12–78)
ANION GAP SERPL CALCULATED.3IONS-SCNC: 8 MMOL/L (ref 4–13)
AST SERPL W P-5'-P-CCNC: 28 U/L (ref 5–45)
BILIRUB SERPL-MCNC: 0.72 MG/DL (ref 0.2–1)
BLD GP AB SCN SERPL QL: NEGATIVE
BUN SERPL-MCNC: 15 MG/DL (ref 5–25)
CALCIUM SERPL-MCNC: 9.6 MG/DL (ref 8.3–10.1)
CHLORIDE SERPL-SCNC: 100 MMOL/L (ref 100–108)
CO2 SERPL-SCNC: 31 MMOL/L (ref 21–32)
CREAT SERPL-MCNC: 0.92 MG/DL (ref 0.6–1.3)
ERYTHROCYTE [DISTWIDTH] IN BLOOD BY AUTOMATED COUNT: 14.6 % (ref 11.6–15.1)
EST. AVERAGE GLUCOSE BLD GHB EST-MCNC: 148 MG/DL
GFR SERPL CREATININE-BSD FRML MDRD: 96 ML/MIN/1.73SQ M
GLUCOSE SERPL-MCNC: 160 MG/DL (ref 65–140)
HBA1C MFR BLD: 6.8 % (ref 4.2–6.3)
HCT VFR BLD AUTO: 44.5 % (ref 36.5–49.3)
HGB BLD-MCNC: 14.3 G/DL (ref 12–17)
INR PPP: 1.03 (ref 0.86–1.17)
MCH RBC QN AUTO: 27.9 PG (ref 26.8–34.3)
MCHC RBC AUTO-ENTMCNC: 32.1 G/DL (ref 31.4–37.4)
MCV RBC AUTO: 87 FL (ref 82–98)
PLATELET # BLD AUTO: 237 THOUSANDS/UL (ref 149–390)
PMV BLD AUTO: 9.9 FL (ref 8.9–12.7)
POTASSIUM SERPL-SCNC: 3.3 MMOL/L (ref 3.5–5.3)
PROT SERPL-MCNC: 8.9 G/DL (ref 6.4–8.2)
PROTHROMBIN TIME: 13.6 SECONDS (ref 11.8–14.2)
RBC # BLD AUTO: 5.13 MILLION/UL (ref 3.88–5.62)
RH BLD: POSITIVE
SODIUM SERPL-SCNC: 139 MMOL/L (ref 136–145)
SPECIMEN EXPIRATION DATE: NORMAL
WBC # BLD AUTO: 8.36 THOUSAND/UL (ref 4.31–10.16)

## 2018-07-25 PROCEDURE — 85027 COMPLETE CBC AUTOMATED: CPT

## 2018-07-25 PROCEDURE — 36415 COLL VENOUS BLD VENIPUNCTURE: CPT

## 2018-07-25 PROCEDURE — 86901 BLOOD TYPING SEROLOGIC RH(D): CPT

## 2018-07-25 PROCEDURE — 86850 RBC ANTIBODY SCREEN: CPT

## 2018-07-25 PROCEDURE — 83036 HEMOGLOBIN GLYCOSYLATED A1C: CPT

## 2018-07-25 PROCEDURE — 80053 COMPREHEN METABOLIC PANEL: CPT

## 2018-07-25 PROCEDURE — 85610 PROTHROMBIN TIME: CPT

## 2018-07-25 PROCEDURE — 86900 BLOOD TYPING SEROLOGIC ABO: CPT

## 2018-08-01 ENCOUNTER — OFFICE VISIT (OUTPATIENT)
Dept: CARDIOLOGY CLINIC | Facility: CLINIC | Age: 73
End: 2018-08-01
Payer: COMMERCIAL

## 2018-08-01 VITALS
HEIGHT: 65 IN | SYSTOLIC BLOOD PRESSURE: 142 MMHG | HEART RATE: 60 BPM | DIASTOLIC BLOOD PRESSURE: 70 MMHG | WEIGHT: 231.2 LBS | BODY MASS INDEX: 38.52 KG/M2 | RESPIRATION RATE: 16 BRPM

## 2018-08-01 DIAGNOSIS — I25.10 CORONARY ARTERY DISEASE INVOLVING NATIVE CORONARY ARTERY OF NATIVE HEART WITHOUT ANGINA PECTORIS: Primary | ICD-10-CM

## 2018-08-01 DIAGNOSIS — E78.5 DYSLIPIDEMIA: ICD-10-CM

## 2018-08-01 DIAGNOSIS — I10 BENIGN ESSENTIAL HTN: ICD-10-CM

## 2018-08-01 PROCEDURE — 99214 OFFICE O/P EST MOD 30 MIN: CPT | Performed by: INTERNAL MEDICINE

## 2018-08-01 NOTE — PROGRESS NOTES
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  3  Benign essential hypertension  4  Dyslipidemia  5  Postoperative atrial fibrillation 09/2017 without recurrence  6  Obesity    · Evidence of prior basal inferolateral infarct w/ some level of deloris-infarct ischemia  Pt w/ no CP or SHERIDAN  On today's visit, he states he is able to walk atleast 3-4 city blocks and climb up 2 flight of stairs without symptoms  Would suspect he is at intermediate-high risk for vascular surgery, but no contraindications from cardiac standpoint to proceed at this time  For now, would advocate aggressive medical management for probable underlying CAD  Pt agreeable to hold off on coronary angiography at this time and pursuing aggressive medical tx for probable underlying CAD  Continue high dose statin, beta blocker, ASA, ACEi  F/U in 4 months  Interval History: This is a 81-year-old male, with a significant history of basal ganglia hemorrhage in 2014 secondary to uncontrolled hypertension, who presented to [address] [place] [address] on 9/05/2017 with chest pain and accelerated hypertension  Troponin level was negative on admission and he was admitted to the floor with chest pain  In light of continued symptoms and labile hypertension, he was sent for a stat CT of his chest/abdomen, revealing a type a dissection  He was transferred to [address] [place] [address], and echocardiogram revealed a moderate to large pericardial effusion with signs of tamponade  On 09/06/2017 he underwent emergency surgery with replacement of the ascending aorta with celia arch reconstruction with a 26 millimeter Dacron graft    Postoperatively, he had mediastinal hemorrhage and went back to the operating room on 09/06/2017 with evacuation and correction of his hemorrhage  On postoperative day 1 , 09/07/2017 he had rapid atrial fibrillation and was initiated on amiodarone, and Thereafter on warfarin for anticoagulation and stroke prevention  He underwent a sleep study in early November 2017 and has been diagnosed with severe sleep apnea syndrome  He underwent a Holter monitor in early December 2017, which did not reveal any evidence of atrial fibrillation  He has been off anticoagulation secondary to his history of intracranial hemorrhage  Prior CT chest 04/10/2018 revealed significant increase in distal arch aortic aneurysm, now at 5 3 cm, compared to 4 2 cm 10/2017  There was 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  He has been recommended to undergo left carotid to subclavian bypass/transposition followed by TEVAR covering the L SC artery by Dr Marilee Puckett and Bessy Allison Pain presents today for follow-up and preoperative cardiac clearance  From a symptomatic standpoint feels well without exertional chest pain, shortness of breath, lower extremity edema, dizziness, palpitations  He climbs a flight of steps in his home without symptoms and states he can walk atleast 3-4 city blocks without symptoms  Nuclear stress test 06/26/2018 revealed evidence of prior basal inferolateral infarction with mild deloris-infarct ischemia with no evidence of perfusion defects another myocardial wall segments  Ejection fraction was 51%  Prior echocardiogram 11/2017 revealed LVEF 60%             Vitals:  Vitals:    08/01/18 1550   BP: 142/70   Pulse: 60   Resp: 16   Weight: 105 kg (231 lb 3 2 oz)   Height: 5' 5" (1 651 m)         Past Medical History:   Diagnosis Date    Arthritis     CPAP (continuous positive airway pressure) dependence     Diabetes ([facility] )     Hyperlipidemia     Hypertension     Prostate cancer ([facility] )     prostate    Sleep apnea     Stroke (cerebrum[facility])     Wears glasses      Social History     Social History    Marital status:      Spouse name: N/A    Number of children: N/A    Years of education: N/A     Occupational History          retired     Social History Main Topics    Smoking status: Never Smoker    Smokeless tobacco: Never Used    Alcohol use Yes      Comment: social beer or two daily    Drug use: No    Sexual activity: No      Comment: denied hx of sexually active high risk     Other Topics Concern    Not on file     Social History Narrative    1 serving caffeine/day    Less than high school diploma    Person living alone    Participate in moderate activities both inside and outside of the home      Family History   Problem Relation Age of Onset    Stroke Mother         complications    Hypertension Mother     Arthritis Mother     No Known Problems Father      Past Surgical History:   Procedure Laterality Date    CARDIAC SURGERY      CIRCUMCISION      COLONOSCOPY      CT ASCEND AORTA GRAFT W ROOT REPLACMENT  VALVE CONDUIT/CORON RECONSTRUCT N/A 9/6/2017    Procedure: ASCENDING AORTIC REPAIR WITH A 26MM  GELWEAVE STRAIGHT GRAFT AND HEMIARCH WITH 8MM SIDE ARM BRANCH;  Surgeon: Kem Clinton MD;  [facility];  Service: Cardiac Surgery    CT EXPLOR POSTOP BLEED,INFEC,CLOT-CHST N/A 9/6/2017    Procedure: RE-EXPLORATION MEDIASTERNAL CAVITY FOR POST-OP BLEED (BRING BACK);   Surgeon: Kem Clinton MD;  [facility];  Service: Cardiac Surgery       Current Outpatient Prescriptions:     amLODIPine (NORVASC) 10 mg tablet, Take 1 tablet by mouth 2 (two) times a day, Disp: , Rfl:     aspirin 81 mg chewable tablet, Chew 1 tablet daily, Disp: 30 tablet, Rfl: 0    atorvastatin (LIPITOR) 80 mg tablet, Take 1 tablet (80 mg total) by mouth daily, Disp: 90 tablet, Rfl: 5    B-D ULTRAFINE III SHORT PEN 31G X 8 MM MISC, USE AS DIRECTED , Disp: 100 each, Rfl: 3    B-D ULTRAFINE III SHORT PEN 31G X 8 MM MISC, USE AS DIRECTED , Disp: 100 each, Rfl: 3    insulin glargine (LANTUS) 100 units/mL subcutaneous injection, Inject 28 Units under the skin daily at bedtime Dx: Diabetes E11 9, dispense pens, Disp: 10 mL, Rfl: 0    insulin lispro (HumaLOG) 100 units/mL injection, Inject 8 Units under the skin 3 (three) times a day before meals Dx: Diabetes E11 9  Dispense pens, Disp: 8 mL, Rfl: 0    lisinopril-hydrochlorothiazide (PRINZIDE,ZESTORETIC) 20-12 5 MG per tablet, TAKE 2 TABLETS BY MOUTH EVERY DAY, Disp: 60 tablet, Rfl: 1    metoprolol tartrate (LOPRESSOR) 100 mg tablet, TAKE 1 TABLET TWICE A DAY, Disp: 60 tablet, Rfl: 5        Review of Systems:  Review of Systems   Respiratory: Negative  Cardiovascular: Negative  All other systems reviewed and are negative  Physical Exam:  Physical Exam   Constitutional: He is oriented to person, place, and time  He appears well-developed and well-nourished  No distress  HENT:   Head: Normocephalic and atraumatic  Eyes: EOM are normal  Pupils are equal, round, and reactive to light  Right eye exhibits no discharge  No scleral icterus  Neck: Normal range of motion  Neck supple  No thyromegaly present  Cardiovascular: Normal rate, regular rhythm and normal heart sounds  Exam reveals no gallop and no friction rub  No murmur heard  Pulmonary/Chest: Effort normal and breath sounds normal    Abdominal: He exhibits no distension  There is no tenderness  There is no rebound and no guarding  Musculoskeletal: Normal range of motion  He exhibits no edema  Neurological: He is alert and oriented to person, place, and time  Coordination normal    Skin: Skin is warm and dry  No rash noted  He is not diaphoretic  No erythema  No pallor  Psychiatric: He has a normal mood and affect   His behavior is normal  Judgment and thought content normal

## 2018-08-13 ENCOUNTER — HOSPITAL ENCOUNTER (INPATIENT)
Facility: HOSPITAL | Age: 73
LOS: 1 days | Discharge: HOME | DRG: 253 | End: 2018-08-14
Attending: SURGERY | Admitting: SURGERY
Payer: COMMERCIAL

## 2018-08-13 ENCOUNTER — APPOINTMENT (OUTPATIENT)
Dept: RADIOLOGY | Facility: HOSPITAL | Age: 73
DRG: 253 | End: 2018-08-13
Attending: SURGERY
Payer: COMMERCIAL

## 2018-08-13 ENCOUNTER — ANESTHESIA (INPATIENT)
Dept: PERIOP | Facility: HOSPITAL | Age: 73
DRG: 253 | End: 2018-08-13
Payer: COMMERCIAL

## 2018-08-13 DIAGNOSIS — I10 HTN (HYPERTENSION), BENIGN: ICD-10-CM

## 2018-08-13 DIAGNOSIS — I71.01 CHRONIC THORACIC AORTIC DISSECTION (HCC): ICD-10-CM

## 2018-08-13 DIAGNOSIS — E11.9 TYPE 2 DIABETES MELLITUS WITHOUT COMPLICATION, UNSPECIFIED WHETHER LONG TERM INSULIN USE (HCC): Primary | Chronic | ICD-10-CM

## 2018-08-13 DIAGNOSIS — Z95.828 S/P VASCULAR BYPASS: ICD-10-CM

## 2018-08-13 LAB
ABO GROUP BLD: NORMAL
ANION GAP SERPL CALCULATED.3IONS-SCNC: 5 MMOL/L (ref 4–13)
BASE EXCESS BLDA CALC-SCNC: -4 MMOL/L (ref -2–3)
BASE EXCESS BLDA CALC-SCNC: 4 MMOL/L (ref -2–3)
BASOPHILS # BLD AUTO: 0.03 THOUSANDS/ΜL (ref 0–0.1)
BASOPHILS NFR BLD AUTO: 0 % (ref 0–1)
BLD GP AB SCN SERPL QL: NEGATIVE
BUN SERPL-MCNC: 10 MG/DL (ref 5–25)
CA-I BLD-SCNC: 0.93 MMOL/L (ref 1.12–1.32)
CA-I BLD-SCNC: 1.13 MMOL/L (ref 1.12–1.32)
CALCIUM SERPL-MCNC: 8.1 MG/DL (ref 8.3–10.1)
CHLORIDE SERPL-SCNC: 107 MMOL/L (ref 100–108)
CO2 SERPL-SCNC: 28 MMOL/L (ref 21–32)
CREAT SERPL-MCNC: 0.74 MG/DL (ref 0.6–1.3)
EOSINOPHIL # BLD AUTO: 0.16 THOUSAND/ΜL (ref 0–0.61)
EOSINOPHIL NFR BLD AUTO: 2 % (ref 0–6)
ERYTHROCYTE [DISTWIDTH] IN BLOOD BY AUTOMATED COUNT: 14.5 % (ref 11.6–15.1)
GFR SERPL CREATININE-BSD FRML MDRD: 107 ML/MIN/1.73SQ M
GLUCOSE SERPL-MCNC: 118 MG/DL (ref 65–140)
GLUCOSE SERPL-MCNC: 123 MG/DL (ref 65–140)
GLUCOSE SERPL-MCNC: 144 MG/DL (ref 65–140)
GLUCOSE SERPL-MCNC: 147 MG/DL (ref 65–140)
GLUCOSE SERPL-MCNC: 153 MG/DL (ref 65–140)
GLUCOSE SERPL-MCNC: 191 MG/DL (ref 65–140)
HCO3 BLDA-SCNC: 20 MMOL/L (ref 22–28)
HCO3 BLDA-SCNC: 28.1 MMOL/L (ref 22–28)
HCT VFR BLD AUTO: 36.5 % (ref 36.5–49.3)
HCT VFR BLD CALC: 25 % (ref 36.5–49.3)
HCT VFR BLD CALC: 34 % (ref 36.5–49.3)
HGB BLD-MCNC: 12.4 G/DL (ref 12–17)
HGB BLDA-MCNC: 11.6 G/DL (ref 12–17)
HGB BLDA-MCNC: 8.5 G/DL (ref 12–17)
IMM GRANULOCYTES # BLD AUTO: 0.02 THOUSAND/UL (ref 0–0.2)
IMM GRANULOCYTES NFR BLD AUTO: 0 % (ref 0–2)
KCT BLD-ACNC: 183 SEC (ref 89–137)
KCT BLD-ACNC: 189 SEC (ref 89–137)
LACTATE SERPL-SCNC: 1 MMOL/L (ref 0.5–2)
LYMPHOCYTES # BLD AUTO: 1.13 THOUSANDS/ΜL (ref 0.6–4.47)
LYMPHOCYTES NFR BLD AUTO: 15 % (ref 14–44)
MAGNESIUM SERPL-MCNC: 2 MG/DL (ref 1.6–2.6)
MCH RBC QN AUTO: 29.1 PG (ref 26.8–34.3)
MCHC RBC AUTO-ENTMCNC: 34 G/DL (ref 31.4–37.4)
MCV RBC AUTO: 86 FL (ref 82–98)
MONOCYTES # BLD AUTO: 0.2 THOUSAND/ΜL (ref 0.17–1.22)
MONOCYTES NFR BLD AUTO: 3 % (ref 4–12)
NEUTROPHILS # BLD AUTO: 6.24 THOUSANDS/ΜL (ref 1.85–7.62)
NEUTS SEG NFR BLD AUTO: 80 % (ref 43–75)
NRBC BLD AUTO-RTO: 0 /100 WBCS
PCO2 BLD: 21 MMOL/L (ref 21–32)
PCO2 BLD: 29 MMOL/L (ref 21–32)
PCO2 BLD: 29.4 MM HG (ref 36–44)
PCO2 BLD: 38.9 MM HG (ref 36–44)
PH BLD: 7.44 [PH] (ref 7.35–7.45)
PH BLD: 7.47 [PH] (ref 7.35–7.45)
PLATELET # BLD AUTO: 220 THOUSANDS/UL (ref 149–390)
PMV BLD AUTO: 9.9 FL (ref 8.9–12.7)
PO2 BLD: 144 MM HG (ref 75–129)
PO2 BLD: 155 MM HG (ref 75–129)
POTASSIUM BLD-SCNC: 2.5 MMOL/L (ref 3.5–5.3)
POTASSIUM BLD-SCNC: 3.2 MMOL/L (ref 3.5–5.3)
POTASSIUM SERPL-SCNC: 3.3 MMOL/L (ref 3.5–5.3)
RBC # BLD AUTO: 4.26 MILLION/UL (ref 3.88–5.62)
RH BLD: POSITIVE
SAO2 % BLD FROM PO2: 99 % (ref 95–98)
SAO2 % BLD FROM PO2: 99 % (ref 95–98)
SODIUM BLD-SCNC: 142 MMOL/L (ref 136–145)
SODIUM BLD-SCNC: 147 MMOL/L (ref 136–145)
SODIUM SERPL-SCNC: 140 MMOL/L (ref 136–145)
SPECIMEN EXPIRATION DATE: NORMAL
SPECIMEN SOURCE: ABNORMAL
WBC # BLD AUTO: 7.78 THOUSAND/UL (ref 4.31–10.16)

## 2018-08-13 PROCEDURE — 85347 COAGULATION TIME ACTIVATED: CPT

## 2018-08-13 PROCEDURE — 80048 BASIC METABOLIC PNL TOTAL CA: CPT | Performed by: SURGERY

## 2018-08-13 PROCEDURE — 03V43CZ RESTRICTION OF LEFT SUBCLAVIAN ARTERY WITH EXTRALUMINAL DEVICE, PERCUTANEOUS APPROACH: ICD-10-PCS | Performed by: RADIOLOGY

## 2018-08-13 PROCEDURE — C1769 GUIDE WIRE: HCPCS | Performed by: SURGERY

## 2018-08-13 PROCEDURE — C1768 GRAFT, VASCULAR: HCPCS | Performed by: SURGERY

## 2018-08-13 PROCEDURE — 82330 ASSAY OF CALCIUM: CPT

## 2018-08-13 PROCEDURE — 85025 COMPLETE CBC W/AUTO DIFF WBC: CPT | Performed by: SURGERY

## 2018-08-13 PROCEDURE — 82803 BLOOD GASES ANY COMBINATION: CPT

## 2018-08-13 PROCEDURE — 84132 ASSAY OF SERUM POTASSIUM: CPT

## 2018-08-13 PROCEDURE — 37242 VASC EMBOLIZE/OCCLUDE ARTERY: CPT | Performed by: RADIOLOGY

## 2018-08-13 PROCEDURE — 76937 US GUIDE VASCULAR ACCESS: CPT

## 2018-08-13 PROCEDURE — 99232 SBSQ HOSP IP/OBS MODERATE 35: CPT | Performed by: PHYSICIAN ASSISTANT

## 2018-08-13 PROCEDURE — 84295 ASSAY OF SERUM SODIUM: CPT

## 2018-08-13 PROCEDURE — 83735 ASSAY OF MAGNESIUM: CPT | Performed by: SURGERY

## 2018-08-13 PROCEDURE — 82948 REAGENT STRIP/BLOOD GLUCOSE: CPT

## 2018-08-13 PROCEDURE — 82947 ASSAY GLUCOSE BLOOD QUANT: CPT

## 2018-08-13 PROCEDURE — C1894 INTRO/SHEATH, NON-LASER: HCPCS | Performed by: SURGERY

## 2018-08-13 PROCEDURE — 86901 BLOOD TYPING SEROLOGIC RH(D): CPT | Performed by: SURGERY

## 2018-08-13 PROCEDURE — 86850 RBC ANTIBODY SCREEN: CPT | Performed by: SURGERY

## 2018-08-13 PROCEDURE — 85014 HEMATOCRIT: CPT

## 2018-08-13 PROCEDURE — 94760 N-INVAS EAR/PLS OXIMETRY 1: CPT

## 2018-08-13 PROCEDURE — 35606 BPG CAROTID-SUBCLAVIAN: CPT | Performed by: SURGERY

## 2018-08-13 PROCEDURE — 031J0JK BYPASS LEFT COMMON CAROTID ARTERY TO LEFT EXTRACRANIAL ARTERY WITH SYNTHETIC SUBSTITUTE, OPEN APPROACH: ICD-10-PCS | Performed by: SURGERY

## 2018-08-13 PROCEDURE — 86900 BLOOD TYPING SEROLOGIC ABO: CPT | Performed by: SURGERY

## 2018-08-13 PROCEDURE — B312YZZ FLUOROSCOPY OF LEFT SUBCLAVIAN ARTERY USING OTHER CONTRAST: ICD-10-PCS | Performed by: RADIOLOGY

## 2018-08-13 PROCEDURE — 83605 ASSAY OF LACTIC ACID: CPT | Performed by: SURGERY

## 2018-08-13 DEVICE — IMPLANTABLE DEVICE: Type: IMPLANTABLE DEVICE | Site: CAROTID | Status: FUNCTIONAL

## 2018-08-13 RX ORDER — HEPARIN SODIUM 5000 [USP'U]/ML
5000 INJECTION, SOLUTION INTRAVENOUS; SUBCUTANEOUS EVERY 8 HOURS SCHEDULED
Status: DISCONTINUED | OUTPATIENT
Start: 2018-08-13 | End: 2018-08-14 | Stop reason: HOSPADM

## 2018-08-13 RX ORDER — INSULIN GLARGINE 100 [IU]/ML
28 INJECTION, SOLUTION SUBCUTANEOUS
Status: DISCONTINUED | OUTPATIENT
Start: 2018-08-13 | End: 2018-08-14 | Stop reason: HOSPADM

## 2018-08-13 RX ORDER — ONDANSETRON 2 MG/ML
4 INJECTION INTRAMUSCULAR; INTRAVENOUS ONCE AS NEEDED
Status: DISCONTINUED | OUTPATIENT
Start: 2018-08-13 | End: 2018-08-13 | Stop reason: HOSPADM

## 2018-08-13 RX ORDER — BUPIVACAINE HYDROCHLORIDE AND EPINEPHRINE 5; 5 MG/ML; UG/ML
INJECTION, SOLUTION EPIDURAL; INTRACAUDAL; PERINEURAL AS NEEDED
Status: DISCONTINUED | OUTPATIENT
Start: 2018-08-13 | End: 2018-08-13 | Stop reason: HOSPADM

## 2018-08-13 RX ORDER — OXYCODONE HYDROCHLORIDE AND ACETAMINOPHEN 5; 325 MG/1; MG/1
1 TABLET ORAL EVERY 4 HOURS PRN
Status: DISCONTINUED | OUTPATIENT
Start: 2018-08-13 | End: 2018-08-14 | Stop reason: HOSPADM

## 2018-08-13 RX ORDER — SODIUM CHLORIDE 9 MG/ML
50 INJECTION, SOLUTION INTRAVENOUS CONTINUOUS
Status: DISCONTINUED | OUTPATIENT
Start: 2018-08-13 | End: 2018-08-14

## 2018-08-13 RX ORDER — FENTANYL CITRATE 50 UG/ML
INJECTION, SOLUTION INTRAMUSCULAR; INTRAVENOUS AS NEEDED
Status: DISCONTINUED | OUTPATIENT
Start: 2018-08-13 | End: 2018-08-13 | Stop reason: SURG

## 2018-08-13 RX ORDER — POTASSIUM CHLORIDE 14.9 MG/ML
INJECTION INTRAVENOUS CONTINUOUS PRN
Status: DISCONTINUED | OUTPATIENT
Start: 2018-08-13 | End: 2018-08-13 | Stop reason: SURG

## 2018-08-13 RX ORDER — ROCURONIUM BROMIDE 10 MG/ML
INJECTION, SOLUTION INTRAVENOUS AS NEEDED
Status: DISCONTINUED | OUTPATIENT
Start: 2018-08-13 | End: 2018-08-13 | Stop reason: SURG

## 2018-08-13 RX ORDER — PROPOFOL 10 MG/ML
INJECTION, EMULSION INTRAVENOUS AS NEEDED
Status: DISCONTINUED | OUTPATIENT
Start: 2018-08-13 | End: 2018-08-13 | Stop reason: SURG

## 2018-08-13 RX ORDER — ONDANSETRON 2 MG/ML
INJECTION INTRAMUSCULAR; INTRAVENOUS AS NEEDED
Status: DISCONTINUED | OUTPATIENT
Start: 2018-08-13 | End: 2018-08-13 | Stop reason: SURG

## 2018-08-13 RX ORDER — PROPOFOL 10 MG/ML
INJECTION, EMULSION INTRAVENOUS CONTINUOUS PRN
Status: DISCONTINUED | OUTPATIENT
Start: 2018-08-13 | End: 2018-08-13 | Stop reason: SURG

## 2018-08-13 RX ORDER — METOPROLOL TARTRATE 50 MG/1
100 TABLET, FILM COATED ORAL EVERY 12 HOURS SCHEDULED
Status: DISCONTINUED | OUTPATIENT
Start: 2018-08-13 | End: 2018-08-14 | Stop reason: HOSPADM

## 2018-08-13 RX ORDER — AMLODIPINE BESYLATE 10 MG/1
10 TABLET ORAL 2 TIMES DAILY
Status: DISCONTINUED | OUTPATIENT
Start: 2018-08-14 | End: 2018-08-14 | Stop reason: HOSPADM

## 2018-08-13 RX ORDER — CHLORHEXIDINE GLUCONATE 0.12 MG/ML
15 RINSE ORAL EVERY 12 HOURS SCHEDULED
Status: DISCONTINUED | OUTPATIENT
Start: 2018-08-13 | End: 2018-08-13

## 2018-08-13 RX ORDER — ATORVASTATIN CALCIUM 80 MG/1
80 TABLET, FILM COATED ORAL DAILY
Status: DISCONTINUED | OUTPATIENT
Start: 2018-08-14 | End: 2018-08-14 | Stop reason: HOSPADM

## 2018-08-13 RX ORDER — LIDOCAINE HYDROCHLORIDE 10 MG/ML
INJECTION, SOLUTION INFILTRATION; PERINEURAL AS NEEDED
Status: DISCONTINUED | OUTPATIENT
Start: 2018-08-13 | End: 2018-08-13 | Stop reason: HOSPADM

## 2018-08-13 RX ORDER — PROPOFOL 10 MG/ML
INJECTION, EMULSION INTRAVENOUS AS NEEDED
Status: DISCONTINUED | OUTPATIENT
Start: 2018-08-13 | End: 2018-08-13

## 2018-08-13 RX ORDER — HEPARIN SODIUM 1000 [USP'U]/ML
INJECTION, SOLUTION INTRAVENOUS; SUBCUTANEOUS AS NEEDED
Status: DISCONTINUED | OUTPATIENT
Start: 2018-08-13 | End: 2018-08-13 | Stop reason: SURG

## 2018-08-13 RX ORDER — CHLORHEXIDINE GLUCONATE 0.12 MG/ML
15 RINSE ORAL ONCE
Status: COMPLETED | OUTPATIENT
Start: 2018-08-13 | End: 2018-08-13

## 2018-08-13 RX ORDER — MIDAZOLAM HYDROCHLORIDE 1 MG/ML
INJECTION INTRAMUSCULAR; INTRAVENOUS AS NEEDED
Status: DISCONTINUED | OUTPATIENT
Start: 2018-08-13 | End: 2018-08-13 | Stop reason: SURG

## 2018-08-13 RX ORDER — ASPIRIN 81 MG/1
81 TABLET, CHEWABLE ORAL DAILY
Status: DISCONTINUED | OUTPATIENT
Start: 2018-08-13 | End: 2018-08-14 | Stop reason: HOSPADM

## 2018-08-13 RX ORDER — ONDANSETRON 2 MG/ML
4 INJECTION INTRAMUSCULAR; INTRAVENOUS EVERY 4 HOURS PRN
Status: DISCONTINUED | OUTPATIENT
Start: 2018-08-13 | End: 2018-08-14 | Stop reason: HOSPADM

## 2018-08-13 RX ORDER — LIDOCAINE HYDROCHLORIDE 10 MG/ML
INJECTION, SOLUTION INFILTRATION; PERINEURAL AS NEEDED
Status: DISCONTINUED | OUTPATIENT
Start: 2018-08-13 | End: 2018-08-13 | Stop reason: SURG

## 2018-08-13 RX ORDER — EPHEDRINE SULFATE 50 MG/ML
INJECTION, SOLUTION INTRAVENOUS AS NEEDED
Status: DISCONTINUED | OUTPATIENT
Start: 2018-08-13 | End: 2018-08-13 | Stop reason: SURG

## 2018-08-13 RX ORDER — FENTANYL CITRATE/PF 50 MCG/ML
25 SYRINGE (ML) INJECTION
Status: COMPLETED | OUTPATIENT
Start: 2018-08-13 | End: 2018-08-13

## 2018-08-13 RX ORDER — OXYCODONE HYDROCHLORIDE AND ACETAMINOPHEN 5; 325 MG/1; MG/1
2 TABLET ORAL EVERY 4 HOURS PRN
Status: DISCONTINUED | OUTPATIENT
Start: 2018-08-13 | End: 2018-08-14 | Stop reason: HOSPADM

## 2018-08-13 RX ORDER — SODIUM CHLORIDE, SODIUM LACTATE, POTASSIUM CHLORIDE, CALCIUM CHLORIDE 600; 310; 30; 20 MG/100ML; MG/100ML; MG/100ML; MG/100ML
125 INJECTION, SOLUTION INTRAVENOUS CONTINUOUS
Status: DISCONTINUED | OUTPATIENT
Start: 2018-08-13 | End: 2018-08-14

## 2018-08-13 RX ORDER — SODIUM CHLORIDE 9 MG/ML
INJECTION, SOLUTION INTRAVENOUS CONTINUOUS PRN
Status: DISCONTINUED | OUTPATIENT
Start: 2018-08-13 | End: 2018-08-13 | Stop reason: SURG

## 2018-08-13 RX ORDER — ACETAMINOPHEN 325 MG/1
650 TABLET ORAL EVERY 6 HOURS PRN
Status: DISCONTINUED | OUTPATIENT
Start: 2018-08-13 | End: 2018-08-14 | Stop reason: HOSPADM

## 2018-08-13 RX ADMIN — ROCURONIUM BROMIDE 20 MG: 10 INJECTION INTRAVENOUS at 10:31

## 2018-08-13 RX ADMIN — FENTANYL CITRATE 25 MCG: 50 INJECTION, SOLUTION INTRAMUSCULAR; INTRAVENOUS at 14:50

## 2018-08-13 RX ADMIN — Medication 0.2 MCG/KG/MIN: at 08:06

## 2018-08-13 RX ADMIN — PROPOFOL 100 MCG/KG/MIN: 10 INJECTION, EMULSION INTRAVENOUS at 08:04

## 2018-08-13 RX ADMIN — PROPOFOL 200 MG: 10 INJECTION, EMULSION INTRAVENOUS at 07:57

## 2018-08-13 RX ADMIN — SODIUM CHLORIDE: 0.9 INJECTION, SOLUTION INTRAVENOUS at 08:08

## 2018-08-13 RX ADMIN — SODIUM CHLORIDE 50 ML/HR: 0.9 INJECTION, SOLUTION INTRAVENOUS at 15:51

## 2018-08-13 RX ADMIN — Medication 0.5 MCG/KG/HR: at 08:06

## 2018-08-13 RX ADMIN — INSULIN GLARGINE 28 UNITS: 100 INJECTION, SOLUTION SUBCUTANEOUS at 21:09

## 2018-08-13 RX ADMIN — ROCURONIUM BROMIDE 10 MG: 10 INJECTION INTRAVENOUS at 10:25

## 2018-08-13 RX ADMIN — SODIUM CHLORIDE, SODIUM LACTATE, POTASSIUM CHLORIDE, AND CALCIUM CHLORIDE: .6; .31; .03; .02 INJECTION, SOLUTION INTRAVENOUS at 07:41

## 2018-08-13 RX ADMIN — LIDOCAINE HYDROCHLORIDE 50 MG: 10 INJECTION, SOLUTION INFILTRATION; PERINEURAL at 07:57

## 2018-08-13 RX ADMIN — ROCURONIUM BROMIDE 50 MG: 10 INJECTION INTRAVENOUS at 07:57

## 2018-08-13 RX ADMIN — FENTANYL CITRATE 25 MCG: 50 INJECTION, SOLUTION INTRAMUSCULAR; INTRAVENOUS at 15:00

## 2018-08-13 RX ADMIN — CEFAZOLIN SODIUM 2000 MG: 2 SOLUTION INTRAVENOUS at 07:59

## 2018-08-13 RX ADMIN — INSULIN LISPRO 1 UNITS: 100 INJECTION, SOLUTION INTRAVENOUS; SUBCUTANEOUS at 21:10

## 2018-08-13 RX ADMIN — MIDAZOLAM 2 MG: 1 INJECTION INTRAMUSCULAR; INTRAVENOUS at 07:46

## 2018-08-13 RX ADMIN — ONDANSETRON 4 MG: 2 INJECTION INTRAMUSCULAR; INTRAVENOUS at 11:04

## 2018-08-13 RX ADMIN — HEPARIN SODIUM 2000 UNITS: 1000 INJECTION INTRAVENOUS; SUBCUTANEOUS at 10:07

## 2018-08-13 RX ADMIN — CHLORHEXIDINE GLUCONATE 15 ML: 1.2 RINSE ORAL at 06:02

## 2018-08-13 RX ADMIN — EPHEDRINE SULFATE 5 MG: 50 INJECTION, SOLUTION INTRAMUSCULAR; INTRAVENOUS; SUBCUTANEOUS at 08:05

## 2018-08-13 RX ADMIN — HEPARIN SODIUM 8000 UNITS: 1000 INJECTION INTRAVENOUS; SUBCUTANEOUS at 09:03

## 2018-08-13 RX ADMIN — SUGAMMADEX 419 MG: 100 INJECTION, SOLUTION INTRAVENOUS at 11:10

## 2018-08-13 RX ADMIN — ROCURONIUM BROMIDE 10 MG: 10 INJECTION INTRAVENOUS at 09:01

## 2018-08-13 RX ADMIN — SODIUM CHLORIDE, SODIUM LACTATE, POTASSIUM CHLORIDE, AND CALCIUM CHLORIDE: .6; .31; .03; .02 INJECTION, SOLUTION INTRAVENOUS at 11:00

## 2018-08-13 RX ADMIN — POTASSIUM CHLORIDE: 200 INJECTION, SOLUTION INTRAVENOUS at 09:39

## 2018-08-13 RX ADMIN — Medication 20 MEQ: at 15:01

## 2018-08-13 RX ADMIN — ROCURONIUM BROMIDE 10 MG: 10 INJECTION INTRAVENOUS at 09:33

## 2018-08-13 RX ADMIN — Medication 20 MEQ: at 13:00

## 2018-08-13 RX ADMIN — POTASSIUM CHLORIDE: 200 INJECTION, SOLUTION INTRAVENOUS at 08:39

## 2018-08-13 RX ADMIN — FENTANYL CITRATE 50 MCG: 50 INJECTION, SOLUTION INTRAMUSCULAR; INTRAVENOUS at 07:57

## 2018-08-13 RX ADMIN — METOPROLOL TARTRATE 100 MG: 50 TABLET ORAL at 21:09

## 2018-08-13 RX ADMIN — FENTANYL CITRATE 25 MCG: 50 INJECTION, SOLUTION INTRAMUSCULAR; INTRAVENOUS at 13:20

## 2018-08-13 RX ADMIN — FENTANYL CITRATE 25 MCG: 50 INJECTION, SOLUTION INTRAMUSCULAR; INTRAVENOUS at 12:00

## 2018-08-13 RX ADMIN — HYDROMORPHONE HYDROCHLORIDE 0.4 MG: 1 INJECTION, SOLUTION INTRAMUSCULAR; INTRAVENOUS; SUBCUTANEOUS at 15:54

## 2018-08-13 RX ADMIN — HEPARIN SODIUM 5000 UNITS: 5000 INJECTION, SOLUTION INTRAVENOUS; SUBCUTANEOUS at 21:10

## 2018-08-13 NOTE — BRIEF OP NOTE (RAD/CATH)
Subclavian artery embolization Procedure Note    PATIENT NAME: Alayna Madden  : 1945  MRN: [id number]     Pre-op Diagnosis:   1  Type 2 diabetes mellitus without complication, unspecified whether long term insulin use ([facility] )    2  Chronic thoracic aortic dissection (HCC)      Post-op Diagnosis:   1  Type 2 diabetes mellitus without complication, unspecified whether long term insulin use ([facility] )    2  Chronic thoracic aortic dissection [facility])        Surgeon:   Lori Sams MD  Assistants:     No qualified resident was available, Resident is only observing    Estimated Blood Loss: Minimal  Findings: Embolization of left subclavian artery, proximal to the vertebral artery, with a 16 mm Amplatz plug      Specimens: none    Complications:  none    Anesthesia: General    Lori Sams MD     Date: 2018  Time: 11:29 AM

## 2018-08-13 NOTE — ANESTHESIA PROCEDURE NOTES
Arterial Line Insertion  Date/Time: 8/13/2018 7:40 AM  Performed by: Suraj Brooks  Authorized by: Christine Courser   Consent: Verbal consent obtained  Written consent obtained  Risks and benefits: risks, benefits and alternatives were discussed  Consent given by: patient  Patient understanding: patient states understanding of the procedure being performed  Patient consent: the patient's understanding of the procedure matches consent given  Procedure consent: procedure consent matches procedure scheduled  Relevant documents: relevant documents present and verified  Test results: test results available and properly labeled  Site marked: the operative site was not marked  Required items: required blood products, implants, devices, and special equipment available  Patient identity confirmed: verbally with patient, arm band and provided demographic data  Time out: Immediately prior to procedure a "time out" was called to verify the correct patient, procedure, equipment, support staff and site/side marked as required  Preparation: Patient was prepped and draped in the usual sterile fashion    Indications: hemodynamic monitoring  Orientation:  Right  Location: radial artery  Anesthesia: local infiltration  Local Anesthetic: lidocaine 1% without epinephrine    Sedation:  Patient sedated: no    Procedure Details:  Ajit's test normal: yes  Needle gauge: 20  Number of attempts: 2    Post-procedure:  Post-procedure: dressing applied  Waveform: good waveform and waveform confirmed  Post-procedure CNS: normal  Patient tolerance: Patient tolerated the procedure well with no immediate complications

## 2018-08-13 NOTE — ANESTHESIA PREPROCEDURE EVALUATION
Review of Systems/Medical History  Patient summary reviewed  Chart reviewed      Cardiovascular  EKG reviewed, Hyperlipidemia, Hypertension controlled,    Pulmonary  Sleep apnea CPAP,        GI/Hepatic            Endo/Other  Diabetes well controlled type 1 Insulin,      GYN       Hematology  Anemia ,     Musculoskeletal    Arthritis     Neurology    CVA ,    Psychology           Physical Exam    Airway    Mallampati score: II  TM Distance: >3 FB  Neck ROM: full     Dental   No notable dental hx     Cardiovascular  Rhythm: regular, Rate: normal, Cardiovascular exam normal    Pulmonary  Pulmonary exam normal Breath sounds clear to auscultation, Decreased breath sounds,     Other Findings        Anesthesia Plan  ASA Score- 4     Anesthesia Type- general with ASA Monitors  Additional Monitors: arterial line  Airway Plan: ETT  Plan Factors-    Induction- intravenous  Postoperative Plan- Plan for postoperative opioid use  Informed Consent- Anesthetic plan and risks discussed with patient  I personally reviewed this patient with the CRNA  Discussed and agreed on the Anesthesia Plan with the CRNA  Georgette Ormond

## 2018-08-13 NOTE — H&P (VIEW-ONLY)
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  3  Benign essential hypertension  4  Dyslipidemia  5  Postoperative atrial fibrillation 09/2017 without recurrence  6  Obesity    · Evidence of prior basal inferolateral infarct w/ some level of deloris-infarct ischemia  Pt w/ no CP or SHERIDAN  On today's visit, he states he is able to walk atleast 3-4 city blocks and climb up 2 flight of stairs without symptoms  Would suspect he is at intermediate-high risk for vascular surgery, but no contraindications from cardiac standpoint to proceed at this time  For now, would advocate aggressive medical management for probable underlying CAD  Pt agreeable to hold off on coronary angiography at this time and pursuing aggressive medical tx for probable underlying CAD  Continue high dose statin, beta blocker, ASA, ACEi  F/U in 4 months  Interval History: This is a 71-year-old male, with a significant history of basal ganglia hemorrhage in 2014 secondary to uncontrolled hypertension, who presented to [address] [place] [address] on 9/05/2017 with chest pain and accelerated hypertension  Troponin level was negative on admission and he was admitted to the floor with chest pain  In light of continued symptoms and labile hypertension, he was sent for a stat CT of his chest/abdomen, revealing a type a dissection  He was transferred to [facility], and echocardiogram revealed a moderate to large pericardial effusion with signs of tamponade  On 09/06/2017 he underwent emergency surgery with replacement of the ascending aorta with celia arch reconstruction with a 26 millimeter Dacron graft    Postoperatively, he had mediastinal hemorrhage and went back to the operating room on 09/06/2017 with evacuation and correction of his hemorrhage  On postoperative day 1 , 09/07/2017 he had rapid atrial fibrillation and was initiated on amiodarone, and Thereafter on warfarin for anticoagulation and stroke prevention  He underwent a sleep study in early November 2017 and has been diagnosed with severe sleep apnea syndrome  He underwent a Holter monitor in early December 2017, which did not reveal any evidence of atrial fibrillation  He has been off anticoagulation secondary to his history of intracranial hemorrhage  Prior CT chest 04/10/2018 revealed significant increase in distal arch aortic aneurysm, now at 5 3 cm, compared to 4 2 cm 10/2017  There was 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  He has been recommended to undergo left carotid to subclavian bypass/transposition followed by TEVAR covering the L SC artery by Dr Delmy Bianchi and Cash Holliday Flash presents today for follow-up and preoperative cardiac clearance  From a symptomatic standpoint feels well without exertional chest pain, shortness of breath, lower extremity edema, dizziness, palpitations  He climbs a flight of steps in his home without symptoms and states he can walk atleast 3-4 city blocks without symptoms  Nuclear stress test 06/26/2018 revealed evidence of prior basal inferolateral infarction with mild deloris-infarct ischemia with no evidence of perfusion defects another myocardial wall segments  Ejection fraction was 51%  Prior echocardiogram 11/2017 revealed LVEF 60%             Vitals:  Vitals:    08/01/18 1550   BP: 142/70   Pulse: 60   Resp: 16   Weight: 105 kg (231 lb 3 2 oz)   Height: 5' 5" (1 651 m)         Past Medical History:   Diagnosis Date    Arthritis     CPAP (continuous positive airway pressure) dependence     Diabetes [facility]     Hyperlipidemia     Hypertension     Prostate cancer ([facility] )     prostate    Sleep apnea     [facility]um[facility])     Wears glasses      Social History     Social History    Marital status:      Spouse name: N/A    Number of children: N/A    Years of education: N/A     Occupational History          retired     Social History Main Topics    Smoking status: Never Smoker    Smokeless tobacco: Never Used    Alcohol use Yes      Comment: social beer or two daily    Drug use: No    Sexual activity: No      Comment: denied hx of sexually active high risk     Other Topics Concern    Not on file     Social History Narrative    1 serving caffeine/day    Less than high school diploma    Person living alone    Participate in moderate activities both inside and outside of the home      Family History   Problem Relation Age of Onset    Stroke Mother         complications    Hypertension Mother     Arthritis Mother     No Known Problems Father      Past Surgical History:   Procedure Laterality Date    CARDIAC SURGERY      CIRCUMCISION      COLONOSCOPY      SC ASCEND AORTA GRAFT W ROOT REPLACMENT  VALVE CONDUIT/CORON RECONSTRUCT N/A 9/6/2017    Procedure: ASCENDING AORTIC REPAIR WITH A 26MM  GELWEAVE STRAIGHT GRAFT AND HEMIARCH WITH 8MM SIDE ARM BRANCH;  Surgeon: Kwaku Heart MD;  [facility];  Service: Cardiac Surgery    SC EXPLOR POSTOP BLEED,INFEC,CLOT-CHST N/A 9/6/2017    Procedure: RE-EXPLORATION MEDIASTERNAL CAVITY FOR POST-OP BLEED (BRING BACK);   Surgeon: Kwaku Heart MD;  [facility];  Service: Cardiac Surgery       Current Outpatient Prescriptions:     amLODIPine (NORVASC) 10 mg tablet, Take 1 tablet by mouth 2 (two) times a day, Disp: , Rfl:     aspirin 81 mg chewable tablet, Chew 1 tablet daily, Disp: 30 tablet, Rfl: 0    atorvastatin (LIPITOR) 80 mg tablet, Take 1 tablet (80 mg total) by mouth daily, Disp: 90 tablet, Rfl: 5    B-D ULTRAFINE III SHORT PEN 31G X 8 MM MISC, USE AS DIRECTED , Disp: 100 each, Rfl: 3    B-D ULTRAFINE III SHORT PEN 31G X 8 MM MISC, USE AS DIRECTED , Disp: 100 each, Rfl: 3    insulin glargine (LANTUS) 100 units/mL subcutaneous injection, Inject 28 Units under the skin daily at bedtime Dx: Diabetes E11 9, dispense pens, Disp: 10 mL, Rfl: 0    insulin lispro (HumaLOG) 100 units/mL injection, Inject 8 Units under the skin 3 (three) times a day before meals Dx: Diabetes E11 9  Dispense pens, Disp: 8 mL, Rfl: 0    lisinopril-hydrochlorothiazide (PRINZIDE,ZESTORETIC) 20-12 5 MG per tablet, TAKE 2 TABLETS BY MOUTH EVERY DAY, Disp: 60 tablet, Rfl: 1    metoprolol tartrate (LOPRESSOR) 100 mg tablet, TAKE 1 TABLET TWICE A DAY, Disp: 60 tablet, Rfl: 5        Review of Systems:  Review of Systems   Respiratory: Negative  Cardiovascular: Negative  All other systems reviewed and are negative  Physical Exam:  Physical Exam   Constitutional: He is oriented to person, place, and time  He appears well-developed and well-nourished  No distress  HENT:   Head: Normocephalic and atraumatic  Eyes: EOM are normal  Pupils are equal, round, and reactive to light  Right eye exhibits no discharge  No scleral icterus  Neck: Normal range of motion  Neck supple  No thyromegaly present  Cardiovascular: Normal rate, regular rhythm and normal heart sounds  Exam reveals no gallop and no friction rub  No murmur heard  Pulmonary/Chest: Effort normal and breath sounds normal    Abdominal: He exhibits no distension  There is no tenderness  There is no rebound and no guarding  Musculoskeletal: Normal range of motion  He exhibits no edema  Neurological: He is alert and oriented to person, place, and time  Coordination normal    Skin: Skin is warm and dry  No rash noted  He is not diaphoretic  No erythema  No pallor  Psychiatric: He has a normal mood and affect   His behavior is normal  Judgment and thought content normal

## 2018-08-13 NOTE — ANESTHESIA POSTPROCEDURE EVALUATION
Post-Op Assessment Note      CV Status:  Stable    Mental Status:  Awake    Hydration Status:  Stable    PONV Controlled:  None    Airway Patency:  Patent    Post Op Vitals Reviewed: Yes          Staff: Anesthesiologist, CRNA, other anesthesia staff           BP      Temp      Pulse     Resp      SpO2

## 2018-08-13 NOTE — ASSESSMENT & PLAN NOTE
· Management per primary team  · Successful bypass of left common carotid to subclavian artery with 8 mm ringed Marcellus-Behzad graft by Dr Darien Doe, Embolization of proximal subclavian artery performed by IR today 8/13

## 2018-08-13 NOTE — INTERVAL H&P NOTE
H&P reviewed  After examining the patient I find no changes in the patients condition since the H&P had been written  Impression:  Aortic dissection pending reconstruction by CT surgery  First stage of this reconstruction will require de branching of the aortic arch  Plan on proceeding with left common carotid to subclavian artery bypass with embolization of proximal subclavian artery

## 2018-08-13 NOTE — ANESTHESIA POSTPROCEDURE EVALUATION
Post-Op Assessment Note      CV Status:  Stable    Mental Status:  Awake    Hydration Status:  Stable    PONV Controlled:  None    Airway Patency:  Patent  Airway: intubated    Post Op Vitals Reviewed: Yes          Staff: Anesthesiologist           BP      Temp      Pulse     Resp      SpO2

## 2018-08-13 NOTE — OP NOTE
OPERATIVE REPORT  PATIENT NAME: Alexandrea Joaquin  :  1945  MRN: [id number]  Pt Location: [facility] HYBRID OR ROOM 02    SURGERY DATE: 2018    Surgeon(s) and Role:     * Abraham Franks MD - Primary     * Carlie Saucedo - Assisting     * Roberto Carlos Nixon MD - Assisting    Chronic thoracic aortic dissection ([address] ) [I71 01]    Post-Op Diagnosis Codes:     * Chronic thoracic aortic dissection ([address] ) [I71 01]      Procedure(s):  BYPASS CAROTID SUBCLAVIAN WITH EMBOLIZATION OF PROXIMAL SUBCLAVIAN ARTERY  Specimen(s):  * No specimens in log *    Estimated Blood Loss:   Minimal    Drains:  Urethral Catheter Latex 16 Fr  (Active)   Site Assessment Clean;Skin intact 2018 11:36 AM   Collection Container Standard drainage bag 2018 11:36 AM   Securement Method Securing device (Describe) 2018 11:36 AM   Number of days: 0       Anesthesia Type:   General    Operative Indications:  Chronic thoracic aortic dissection ([address] ) []  81-year-old with history of type a aortic dissection now with expansion of the distal aortic arch and thoracic aorta requiring repair  First step of this repair is de branching of the left subclavian artery  Operative Findings:  Successful bypass of the left common carotid to subclavian artery with 8 mm ringed [place]-Behzad graft  Embolization of the proximal subclavian artery performed by Interventional Radiology  At the conclusion of the procedure there was a palpable radial pulse  The patient was awake, following commands and moving all 4 extremities  Complications:   None    Procedure and Technique:    The patient was brought to the operating room and placed on the operating table in the supine position  The patient was identified by verbal confirmation and armband identification  A radial A-line was placed in general anesthesia was administered  The head was extended and rotated to the contralateral side   The left neck and arm were prepped and draped in usual sterile fashion with chlorhexidine  An [place] drape was placed  A transverse incision was made just over the clavicle from the lateral aspect of the sternal head of theleft sternocleidomastoid muscle approximately 6 cm laterally  The platysma was divided  Dissection was then carried out inferiorly just above the clavicle to mobilize the fat pad  Multiple veins and lymphatics were either clipped or ligated with silk ties  This fat pad was retracted cephalad  The medial aspect of this dissection was carried out on the lateral wall of the internal jugular vein  Once the fat pad was fully mobilized and retracted cephalad the anterior scalene muscle was visualized  The phrenic nerve was also visualized and preserved throughout the dissection  The anterior scalene was dissected free circumferentially and transected inferiorly  This exposed the anterior superior aspect of the subclavian artery which was then dissected free over a 3 cm segment  Any branches were then dissected free and encircled with vessel loops  Through this same incision the jugular vein was retracted anteriorly  The common carotid artery was then visualized  The vagus nerve was also visualized and preserved throughout the dissection  The common carotid artery was freed over a 3 cm segment and encircled with vessel loops  IV heparin was administered  ACT levels were obtained and further heparin was administered to maintain a therapeutic level  The subclavian artery was then occluded with a vascular clamp proximally and vessel loops distally  All branches were occluded with vessel loops  An arteriotomy was created on the anterior superior aspect of the subclavian artery with an 11 blade and a 4 mm punch  A 8 mm ringed [place]-Behzad graft was then spatulated at the appropriate length and anastomosed with a running 6 0 [place]-Behzad suture  Once this was completed all vessels were back bled and flushed appropriately    Flow was restored once a vascular clamp was placed on the graft  No bleeding points were encountered  The common carotid artery was then occluded proximally and distally with vascular clamps  An arteriotomy was created on the lateral aspect of the artery with an 11 blade and a 4 mm punch  The [place]-Behzad graft was then spatulated at the appropriate length after the appropriate rings were removed  An anastomosis was then created with a 6 0 [place]-Behzad suture in the standard fashion  Once the anastomosis was nearly completed all vessels were back bled and flushed appropriately  The anastomosis was then completed and flow was restored 1st through the subclavian bypass then the common carotid artery distally  No bleeding points were encountered  A small amount of Surgicel was placed around each anastomosis and the wound was packed  At this point the embolization procedure was performed by the interventional radiology team   The brachial artery was punctured under ultrasound guidance and an appropriate sheath was placed  Imaging was then performed to identify the origin of the subclavian artery and the vertebral artery takeoff  A 16 Amplatzer plug was then positioned appropriately and deployed  Completion imaging was performed which showed appropriate positioning of the plug and patency of the bypass graft  At this point all guidewires and catheters withdrawn and manual compression was held for hemostasis of the brachial artery puncture  The neck incision was then irrigated with antibiotic laden solution  A Monocryl suture was used to secure the fat pad inferiorly to cover the graft  The platysma was then closed with a running 3 0 Monocryl suture  Local anesthetic was injected in the deloris-incisional tissue and the skin was closed with a running 4 Monocryl subcuticular skin closure  A Histacryl dressing was placed  The patient awoke moving all 4 extremities and following commands  The radial pulse was palpable       I was present for the entire procedure    Patient Disposition:  PACU       SIGNATURE: Gabbie Rousseau MD  DATE: August 13, 2018  TIME: 12:56 PM

## 2018-08-13 NOTE — ASSESSMENT & PLAN NOTE
Lab Results   Component Value Date    HGBA1C 6 8 (H) 07/25/2018       Recent Labs      08/13/18   0606  08/13/18   1139   POCGLU  123  147*       Blood Sugar Average: Last 72 hrs:  (P) 135     · Continue home regimen of Lantus 28 units qhs, Humalog 8 units tid with meals  · SSI, accu checks

## 2018-08-14 ENCOUNTER — TRANSITIONAL CARE MANAGEMENT (OUTPATIENT)
Dept: FAMILY MEDICINE CLINIC | Facility: CLINIC | Age: 73
End: 2018-08-14

## 2018-08-14 VITALS
WEIGHT: 232.9 LBS | HEART RATE: 63 BPM | OXYGEN SATURATION: 94 % | BODY MASS INDEX: 38.8 KG/M2 | RESPIRATION RATE: 16 BRPM | DIASTOLIC BLOOD PRESSURE: 60 MMHG | SYSTOLIC BLOOD PRESSURE: 130 MMHG | TEMPERATURE: 98.2 F | HEIGHT: 65 IN

## 2018-08-14 PROBLEM — Z95.828 S/P VASCULAR BYPASS: Status: ACTIVE | Noted: 2018-08-14

## 2018-08-14 LAB
ANION GAP SERPL CALCULATED.3IONS-SCNC: 7 MMOL/L (ref 4–13)
BASE EXCESS BLDA CALC-SCNC: 10 MMOL/L (ref -2–3)
BASE EXCESS BLDA CALC-SCNC: 9 MMOL/L (ref -2–3)
BASOPHILS # BLD AUTO: 0.01 THOUSANDS/ΜL (ref 0–0.1)
BASOPHILS NFR BLD AUTO: 0 % (ref 0–1)
BUN SERPL-MCNC: 11 MG/DL (ref 5–25)
CA-I BLD-SCNC: 1.14 MMOL/L (ref 1.12–1.32)
CA-I BLD-SCNC: 1.14 MMOL/L (ref 1.12–1.32)
CALCIUM SERPL-MCNC: 8.7 MG/DL (ref 8.3–10.1)
CHLORIDE SERPL-SCNC: 106 MMOL/L (ref 100–108)
CO2 SERPL-SCNC: 27 MMOL/L (ref 21–32)
CREAT SERPL-MCNC: 0.64 MG/DL (ref 0.6–1.3)
EOSINOPHIL # BLD AUTO: 0 THOUSAND/ΜL (ref 0–0.61)
EOSINOPHIL NFR BLD AUTO: 0 % (ref 0–6)
ERYTHROCYTE [DISTWIDTH] IN BLOOD BY AUTOMATED COUNT: 14.1 % (ref 11.6–15.1)
GFR SERPL CREATININE-BSD FRML MDRD: 113 ML/MIN/1.73SQ M
GLUCOSE SERPL-MCNC: 144 MG/DL (ref 65–140)
GLUCOSE SERPL-MCNC: 150 MG/DL (ref 65–140)
GLUCOSE SERPL-MCNC: 150 MG/DL (ref 65–140)
GLUCOSE SERPL-MCNC: 163 MG/DL (ref 65–140)
HCO3 BLDA-SCNC: 33 MMOL/L (ref 24–30)
HCO3 BLDA-SCNC: 34.6 MMOL/L (ref 24–30)
HCT VFR BLD AUTO: 34.2 % (ref 36.5–49.3)
HCT VFR BLD CALC: 36 % (ref 36.5–49.3)
HCT VFR BLD CALC: 37 % (ref 36.5–49.3)
HGB BLD-MCNC: 11.3 G/DL (ref 12–17)
HGB BLDA-MCNC: 12.2 G/DL (ref 12–17)
HGB BLDA-MCNC: 12.6 G/DL (ref 12–17)
IMM GRANULOCYTES # BLD AUTO: 0.1 THOUSAND/UL (ref 0–0.2)
IMM GRANULOCYTES NFR BLD AUTO: 1 % (ref 0–2)
KCT BLD-ACNC: 260 SEC (ref 89–137)
LYMPHOCYTES # BLD AUTO: 0.75 THOUSANDS/ΜL (ref 0.6–4.47)
LYMPHOCYTES NFR BLD AUTO: 6 % (ref 14–44)
MAGNESIUM SERPL-MCNC: 1.8 MG/DL (ref 1.6–2.6)
MCH RBC QN AUTO: 28.3 PG (ref 26.8–34.3)
MCHC RBC AUTO-ENTMCNC: 33 G/DL (ref 31.4–37.4)
MCV RBC AUTO: 86 FL (ref 82–98)
MONOCYTES # BLD AUTO: 0.39 THOUSAND/ΜL (ref 0.17–1.22)
MONOCYTES NFR BLD AUTO: 3 % (ref 4–12)
NEUTROPHILS # BLD AUTO: 12.02 THOUSANDS/ΜL (ref 1.85–7.62)
NEUTS SEG NFR BLD AUTO: 90 % (ref 43–75)
NRBC BLD AUTO-RTO: 0 /100 WBCS
PCO2 BLD: 34 MMOL/L (ref 21–32)
PCO2 BLD: 36 MMOL/L (ref 21–32)
PCO2 BLD: 43.8 MM HG (ref 42–50)
PCO2 BLD: 46.6 MM HG (ref 42–50)
PH BLD: 7.48 [PH] (ref 7.3–7.4)
PH BLD: 7.49 [PH] (ref 7.3–7.4)
PLATELET # BLD AUTO: 196 THOUSANDS/UL (ref 149–390)
PMV BLD AUTO: 10.1 FL (ref 8.9–12.7)
PO2 BLD: 387 MM HG (ref 35–45)
PO2 BLD: >400 MM HG (ref 35–45)
POTASSIUM BLD-SCNC: 2.8 MMOL/L (ref 3.5–5.3)
POTASSIUM BLD-SCNC: 2.9 MMOL/L (ref 3.5–5.3)
POTASSIUM SERPL-SCNC: 3.6 MMOL/L (ref 3.5–5.3)
RBC # BLD AUTO: 3.99 MILLION/UL (ref 3.88–5.62)
SAO2 % BLD FROM PO2: 100 % (ref 95–98)
SODIUM BLD-SCNC: 142 MMOL/L (ref 136–145)
SODIUM BLD-SCNC: 142 MMOL/L (ref 136–145)
SODIUM SERPL-SCNC: 140 MMOL/L (ref 136–145)
SPECIMEN SOURCE: ABNORMAL
WBC # BLD AUTO: 13.27 THOUSAND/UL (ref 4.31–10.16)

## 2018-08-14 PROCEDURE — 99024 POSTOP FOLLOW-UP VISIT: CPT | Performed by: PHYSICIAN ASSISTANT

## 2018-08-14 PROCEDURE — 80048 BASIC METABOLIC PNL TOTAL CA: CPT | Performed by: SURGERY

## 2018-08-14 PROCEDURE — 85025 COMPLETE CBC W/AUTO DIFF WBC: CPT | Performed by: SURGERY

## 2018-08-14 PROCEDURE — 82948 REAGENT STRIP/BLOOD GLUCOSE: CPT

## 2018-08-14 PROCEDURE — 83735 ASSAY OF MAGNESIUM: CPT | Performed by: SURGERY

## 2018-08-14 PROCEDURE — 97163 PT EVAL HIGH COMPLEX 45 MIN: CPT

## 2018-08-14 PROCEDURE — G8988 SELF CARE GOAL STATUS: HCPCS

## 2018-08-14 PROCEDURE — 97165 OT EVAL LOW COMPLEX 30 MIN: CPT

## 2018-08-14 PROCEDURE — G8987 SELF CARE CURRENT STATUS: HCPCS

## 2018-08-14 PROCEDURE — G8979 MOBILITY GOAL STATUS: HCPCS

## 2018-08-14 PROCEDURE — G8989 SELF CARE D/C STATUS: HCPCS

## 2018-08-14 PROCEDURE — 99232 SBSQ HOSP IP/OBS MODERATE 35: CPT | Performed by: INTERNAL MEDICINE

## 2018-08-14 PROCEDURE — G8978 MOBILITY CURRENT STATUS: HCPCS

## 2018-08-14 RX ORDER — ACETAMINOPHEN 325 MG/1
650 TABLET ORAL EVERY 6 HOURS PRN
COMMUNITY
Start: 2018-08-14

## 2018-08-14 RX ORDER — LISINOPRIL AND HYDROCHLOROTHIAZIDE 20; 12.5 MG/1; MG/1
2 TABLET ORAL DAILY
COMMUNITY
Start: 2018-08-15 | End: 2018-09-07 | Stop reason: SDUPTHER

## 2018-08-14 RX ADMIN — ASPIRIN 81 MG 81 MG: 81 TABLET ORAL at 08:14

## 2018-08-14 RX ADMIN — METOPROLOL TARTRATE 100 MG: 50 TABLET ORAL at 08:14

## 2018-08-14 RX ADMIN — INSULIN LISPRO 1 UNITS: 100 INJECTION, SOLUTION INTRAVENOUS; SUBCUTANEOUS at 11:32

## 2018-08-14 RX ADMIN — ATORVASTATIN CALCIUM 80 MG: 80 TABLET, FILM COATED ORAL at 08:14

## 2018-08-14 RX ADMIN — HEPARIN SODIUM 5000 UNITS: 5000 INJECTION, SOLUTION INTRAVENOUS; SUBCUTANEOUS at 05:07

## 2018-08-14 RX ADMIN — INSULIN LISPRO 8 UNITS: 100 INJECTION, SOLUTION INTRAVENOUS; SUBCUTANEOUS at 11:33

## 2018-08-14 RX ADMIN — AMLODIPINE BESYLATE 10 MG: 10 TABLET ORAL at 08:14

## 2018-08-14 NOTE — CONSULTS
Consult- Deidra Resendez  1945, 68 y o  male MRN: [id number]    Unit/Bed#: [facility] 507-01 Encounter: [id number]    Primary Care Provider: Oddis Simmonds, DO   Date and time admitted to hospital: 8/13/2018  5:20 AM      Inpatient consult to Internal Medicine  Consult performed by: Lars Sotelo ordered by: Ester Del Valle          Hypokalemia   Assessment & Plan    · K 3 3 - repleted   · Check BMP in AM        Diabetes mellitus [facility])   Assessment & Plan    Lab Results   Component Value Date    HGBA1C 6 8 (H) 07/25/2018       Recent Labs      08/13/18   0606  08/13/18   1139   POCGLU  123  147*       Blood Sugar Average: Last 72 hrs:  (P) 135     · Continue home regimen of Lantus 28 units qhs, Humalog 8 units tid with meals  · SSI, accu checks         Hypertension   Assessment & Plan    · Continue amlodipine, metoprolol  · Monitor        * Chronic thoracic aortic dissection (HCC)   Assessment & Plan    · Management per primary team  · Successful bypass of left common carotid to subclavian artery with 8 mm ringed [place]-Behzad graft by Dr Neal Cantrell, Embolization of proximal subclavian artery performed by IR today 8/13            VTE Prophylaxis: Heparin  / sequential compression device     Recommendations for Discharge:  · Continue home insulin regimen and adjust as appropriate - f/u with PCP upon discharge for further mangement    Counseling / Coordination of Care Time: 45 minutes  Greater than 50% of total time spent on patient counseling and coordination of care  Collaboration of Care: Were Recommendations Directly Discussed with Primary Treatment Team? - No     History of Present Illness:    Deidra Resendez  is a 68 y o  male who is originally admitted to the vascular service due to aortic dissection pending reconstruction CT surgery  We are consulted for management of DM  Patient reports his home insulin dose is Lantus 28 units qhs and Humalog 8 units tid with meals, and SSI    He follows with his PCP outpatient  Patient also reports to history of HTN, HLD on ASA, statin, amlodipine, metoprolol  Patient has no complaints presently, aside from some pain at the incision site  Reports to edema b/l LE  Denies SOB, CP, N/V, abdominal pain, fevers, sweats, chills  Review of Systems:    Review of Systems   Constitutional: Negative for chills, fatigue and fever  HENT: Negative  Eyes: Negative  Respiratory: Negative for cough and shortness of breath  Cardiovascular: Positive for chest pain (pain around incision site) and leg swelling  Negative for palpitations  Gastrointestinal: Negative for abdominal pain, constipation, nausea and vomiting  Genitourinary: Negative  Musculoskeletal: Negative  Skin: Negative  Neurological: Negative  Hematological: Negative  Psychiatric/Behavioral: Negative  Past Medical and Surgical History:     Past Medical History:   Diagnosis Date    Arthritis     CPAP (continuous positive airway pressure) dependence     Diabetes [facility] )     Hyperlipidemia     Hypertension     Prostate cancer ([facility] )     prostate    Sleep apnea     Stroke (cerebrum) ([facility] )     Wears glasses        Past Surgical History:   Procedure Laterality Date    CARDIAC SURGERY      CIRCUMCISION      COLONOSCOPY      IR CEREBRAL ANGIOGRAPHY / INTERVENTION  8/13/2018    OR ASCEND AORTA GRAFT W ROOT REPLACMENT  VALVE CONDUIT/CORON RECONSTRUCT N/A 9/6/2017    Procedure: ASCENDING AORTIC REPAIR WITH A 26MM  GELWEAVE STRAIGHT GRAFT AND HEMIARCH WITH 8MM SIDE ARM BRANCH;  Surgeon: Ynes Izaguirre MD;  Location: [facility];  Service: Cardiac Surgery    OR EXPLOR POSTOP BLEED,INFEC,CLOT-CHST N/A 9/6/2017    Procedure: RE-EXPLORATION MEDIASTERNAL CAVITY FOR POST-OP BLEED (BRING BACK);   Surgeon: Ynes Izaguirre MD;  Location: [facility];  Service: Cardiac Surgery       Meds/Allergies:    all medications and allergies reviewed    Allergies: No Known Allergies    Social History:     Marital Status:     Substance Use History:   History   Alcohol Use    Yes     Comment: social beer or two daily     History   Smoking Status    Never Smoker   Smokeless Tobacco    Never Used     History   Drug Use No       Family History:    non-contributory    Physical Exam:     Vitals:   Blood Pressure: 152/88 (08/13/18 1839)  Pulse: (!) 109 (08/13/18 1839)  Temperature: 98 °F (36 7 °C) (08/13/18 1839)  Temp Source: Oral (08/13/18 1839)  Respirations: 18 (08/13/18 1839)  Height: 5' 5" (165 1 cm) (08/13/18 6029)  Weight - Scale: 105 kg (231 lb) (08/13/18 0605)  SpO2: 99 % (08/13/18 1839)    Physical Exam   Constitutional: He is oriented to person, place, and time  He appears well-developed and well-nourished  No distress  HENT:   Head: Normocephalic and atraumatic  Mouth/Throat: Oropharynx is clear and moist    Eyes: EOM are normal  Pupils are equal, round, and reactive to light  Neck: Normal range of motion  Neck supple  No thyromegaly present  Cardiovascular: Normal rate, regular rhythm and normal heart sounds  No murmur heard  Pulmonary/Chest: Effort normal and breath sounds normal  No respiratory distress  He has no wheezes  He has no rales  He exhibits tenderness (incision site)  Incision clean, intact   Abdominal: Soft  Bowel sounds are normal  He exhibits no distension  There is no tenderness  Musculoskeletal: Normal range of motion  He exhibits edema (mild B/L LE)  He exhibits no tenderness  Neurological: He is alert and oriented to person, place, and time  No cranial nerve deficit  Skin: Skin is warm and dry  No rash noted  He is not diaphoretic  No erythema  Psychiatric: He has a normal mood and affect  His behavior is normal  Judgment and thought content normal    Vitals reviewed  Additional Data:     Lab Results: I have personally reviewed pertinent reports          Results from last 7 days  Lab Units 08/13/18  1142   WBC Thousand/uL 7 78   HEMOGLOBIN g/dL 12 4   HEMATOCRIT % 36 5   PLATELETS Thousands/uL 220   NEUTROS PCT % 80*   LYMPHS PCT % 15   MONOS PCT % 3*   EOS PCT % 2       Results from last 7 days  Lab Units 08/13/18  1142   SODIUM mmol/L 140   POTASSIUM mmol/L 3 3*   CHLORIDE mmol/L 107   CO2 mmol/L 28   BUN mg/dL 10   CREATININE mg/dL 0 74   CALCIUM mg/dL 8 1*   GLUCOSE RANDOM mg/dL 144*             Lab Results   Component Value Date/Time    HGBA1C 6 8 (H) 07/25/2018 01:35 PM    HGBA1C 7 1 (H) 05/23/2018 02:27 PM    HGBA1C 7 1 (H) 09/06/2017 06:33 AM    HGBA1C 7 4 (H) 10/07/2015 09:34 AM    HGBA1C 7 4 (H) 07/07/2015 09:23 AM    HGBA1C 6 9 (H) 09/17/2014 08:13 AM       Results from last 7 days  Lab Units 08/13/18  1139 08/13/18  0606   POC GLUCOSE mg/dl 147* 123       Imaging: I have personally reviewed pertinent reports  IR cerebral angiography / intervention   Final Result by Glo Shea MD (08/13 2073)   Impression:       Successful embolization of the left subclavian artery, proximal to the left vertebral artery  Workstation performed: [id number]             EKG, Pathology, and Other Studies Reviewed on Admission:   · EKG:     ** Please Note: This note has been constructed using a voice recognition system   **

## 2018-08-14 NOTE — PLAN OF CARE
Problem: PHYSICAL THERAPY ADULT  Goal: Performs mobility at highest level of function for planned discharge setting  See evaluation for individualized goals  Treatment/Interventions: Functional transfer training, LE strengthening/ROM, Patient/family training, Equipment eval/education, Bed mobility, Gait training, Spoke to case management, Spoke to nursing  Equipment Recommended:  (none)       See flowsheet documentation for full assessment, interventions and recommendations  Prognosis: Good  Problem List: Decreased endurance, Impaired balance, Decreased mobility  Assessment: Pt is 68 y o  male seen for PT evaluation s/p admit to [facility] on 8/13/2018 w/ Chronic thoracic aortic dissection ([address] )  Pt underwent surgery for carotid subclavian bypass with embolization of subclavian artery and aortic arch reconstruction  PT consulted to assess pt's functional mobility and d/c needs  Order placed for PT eval and tx, w/ up and OOB as tolerated order  Comorbidities affecting pt's physical performance at time of assessment include: hx of prostate CA, anemia, surg for repair of aortic aneurism, a-fib, KARIN, obesity    PTA, pt was independent w/ all functional mobility w/ no need for an assistive device for ambulation , ambulates community distances and elevations, has 3 NADIA, lives alone in 2 level home and retired  Personal factors affecting pt at time of IE include: lives in 2 story house, stairs to enter home, limited home support and is functioning at a slightly below baseline level of mobility a this time, however this may in part be due to the numerous lines pt is currently attached to   [facility] Please find objective findings from PT assessment regarding body systems outlined above with impairments and limitations including impaired balance, decreased endurance, gait deviations and decreased functional mobility tolerance   The following objective measures performed on IE also reveal limitations: Barthel Index: 70/100  Pt's clinical presentation is currently unstable/unpredictable seen in pt's presentation of pt needing telemetry, pt with a line, pt lives alone and has steps entering his home and 1 flight to his bedroom, pt with below baseline level of mobility, pt with some abnormal lab values    Pt to benefit from continued PT tx to address deficits as defined above and maximize level of functional independent mobility and consistency  From PT/mobility standpoint, recommendation at time of d/c would home with family support  ( pt stated his family members can stay at his home overnight for a few days as needed  )  pending progress in order to facilitate return to PLOF  Recommendation: Home with family support     PT - OK to Discharge: Yes    See flowsheet documentation for full assessment

## 2018-08-14 NOTE — PROGRESS NOTES
Post op check - Vascular Surgery   Eduardo Hernandez  68 y o  male MRN: [id number]  Unit/Bed#: [facility] 507-01 Encounter: [id number]    Assessment:  68 y o  male s/p carotid-subclavian bypass w/ embolization of proximal subclavian artery  type A aortic dissection, s/p ascending aortic replacement/celia arch reconstruction  Patient doing well post-operatively  Plan:  1  Diet -- Cardiac diet  2  Pain control  3  Incentive spirometry  4  OOB, ambulate    Subjective/Objective     Subjective: Patient states his pain is well controlled  Denies weakness/numbness in the LUE  Tolerating regular diet  Denies n/v d      Objective:     Vitals: Blood pressure 132/67, pulse 63, temperature 98 4 °F (36 9 °C), resp  rate 18, height 5' 5" (1 651 m), weight 105 kg (231 lb), SpO2 99 %  ,Body mass index is 38 44 kg/m²  I/O       08/12 0701 - 08/13 0700 08/13 0701 - 08/14 0700    P  O   0    I V  (mL/kg)  3027 5 (28 8)    IV Piggyback  200    Total Intake(mL/kg)  3227 5 (30 7)    Urine (mL/kg/hr)  1560 (0 6)    Total Output   1560    Net   +1667 5                Physical Exam:  GEN: NAD  HEENT: MMM, L neck surgical incision  No erythema, discharge or hematoma  CV: RRR,  Pulses: b/l 2+ radial pulses  Lung: normal effort  Ab: Soft, NT/ND  Extrem: No CCE  Neuro:  A+Ox3, motor and sensation grossly intact      Lab, Imaging and other studies:   CBC with diff:   Lab Results   Component Value Date    WBC 7 78 08/13/2018    HGB 12 4 08/13/2018    HCT 36 5 08/13/2018    MCV 86 08/13/2018     08/13/2018    MCH 29 1 08/13/2018    MCHC 34 0 08/13/2018    RDW 14 5 08/13/2018    MPV 9 9 08/13/2018    NRBC 0 08/13/2018   , BMP/CMP:   Lab Results   Component Value Date     08/13/2018    K 3 3 (L) 08/13/2018     08/13/2018    CO2 28 08/13/2018    ANIONGAP 5 08/13/2018    BUN 10 08/13/2018    CREATININE 0 74 08/13/2018    GLUCOSE 144 (H) 08/13/2018    GLUCOSE 153 (H) 08/13/2018    CALCIUM 8 1 (L) 08/13/2018    EGFR 107 08/13/2018   , Coags: No results found for: PT, PTT, INR  VTE Pharmacologic Prophylaxis: Heparin  VTE Mechanical Prophylaxis: sequential compression device

## 2018-08-14 NOTE — PROGRESS NOTES
[facility] Internal Medicine Progress Note  Patient: Raza King  68 y o  male   MRN: [id number]  PCP: Garima Mcmillan DO  [facility] Encounter: [id number]  Date Of Visit: 18    Assessment:    Principal Problem:    Chronic thoracic aortic dissection ([facility] )  Active Problems:    Hypertension    Diabetes mellitus ([facility] )    Hypokalemia      Plan:    1  Diabetes mellitus type 2, with A1c 6 8, acceptable blood sugar continue Lantus and Humalog TID, continue insulin sliding scale, change diet to diabetic diet  2  Hypertension, acceptable blood pressure, continue Norvasc and Lopressor  3  KARIN  4  Chronic thoracic aortic dissection status post surgical repair on , primary is following,  on aspirin, statin and beta-blocker       VTE Pharmacologic Prophylaxis:   Pharmacologic: Heparin  Mechanical VTE Prophylaxis in Place: Yes    Patient Centered Rounds: I have performed bedside rounds with nursing staff today  Discussions with Specialists or Other Care Team Provider:     Education and Discussions with Family / Patient:  Patient and his family at bedside    Time Spent for Care: 30 minutes  More than 50% of total time spent on counseling and coordination of care as described above  Current Length of Stay: 1 day(s)    Current Patient Status: Inpatient       Code Status: Level 1 - Full Code      Subjective:     Patient seen and examined  Comfortable sitting in chair  Tolerating oral diet  No chest pain or shortness of breath    Objective:     Vitals:   Temp (24hrs), Av 9 °F (36 6 °C), Min:97 °F (36 1 °C), Max:98 4 °F (36 9 °C)    HR:  [] 63  Resp:  [13-18] 16  BP: (130-165)/(60-88) 130/60  SpO2:  [92 %-99 %] 94 %  Body mass index is 38 76 kg/m²  Input and Output Summary (last 24 hours):        Intake/Output Summary (Last 24 hours) at 18 1525  Last data filed at 18 1514   Gross per 24 hour   Intake           1147 5 ml   Output             2335 ml   Net          -1187 5 ml       Physical Exam:     Physical Exam  Patient is awake alert in no acute distress  Lung clear to auscultation bilateral  Heart positive M3-Y3 positive systolic murmur  Abdomen soft nontender positive bowel sounds  Bilateral lower extremity mild edema  Additional Data:     Labs:      Results from last 7 days  Lab Units 08/14/18  0504   WBC Thousand/uL 13 27*   HEMOGLOBIN g/dL 11 3*   HEMATOCRIT % 34 2*   PLATELETS Thousands/uL 196   NEUTROS PCT % 90*   LYMPHS PCT % 6*   MONOS PCT % 3*   EOS PCT % 0       Results from last 7 days  Lab Units 08/14/18  0504   SODIUM mmol/L 140   POTASSIUM mmol/L 3 6   CHLORIDE mmol/L 106   CO2 mmol/L 27   BUN mg/dL 11   CREATININE mg/dL 0 64   CALCIUM mg/dL 8 7   GLUCOSE RANDOM mg/dL 163*           * I Have Reviewed All Lab Data Listed Above  * Additional Pertinent Lab Tests Reviewed: [facility] Admission Reviewed    Imaging:    Imaging Reports Reviewed Today Include:   Imaging Personally Reviewed by Myself Includes:      Recent Cultures (last 7 days):           Last 24 Hours Medication List:     Current Facility-Administered Medications:  acetaminophen 650 mg Oral Q6H PRN Eleonora Key   amLODIPine 10 mg Oral BID Eleonora Key   aspirin 81 mg Oral Daily Eleonora Key   atorvastatin 80 mg Oral Daily Eleonora Key   heparin (porcine) 5,000 Units Subcutaneous Q8H [facility] Eleonora Key   insulin glargine 28 Units Subcutaneous HS Eleonora Key   insulin lispro 1-5 Units Subcutaneous HS Eleonora Key   insulin lispro 1-6 Units Subcutaneous TID AC Eleonora Key   insulin lispro 8 Units Subcutaneous TID AC Eleonora Key   metoprolol tartrate 100 mg Oral Q12H [facility] Eleonora Key   ondansetron 4 mg Intravenous Q4H PRN Eleonora Key   oxyCODONE-acetaminophen 1 tablet Oral Q4H PRN Eleonora Key   oxyCODONE-acetaminophen 2 tablet Oral Q4H PRN Nery Norton        Today, Patient Was Seen By: Mercedes Wright DO    ** Please Note: This note has been constructed using a voice recognition system   **

## 2018-08-14 NOTE — DISCHARGE INSTRUCTIONS
DISCHARGE INSTRUCTIONS                       CAROTID ENDARTERECTOMY  Following discharge from the hospital, you may have some questions about your operation, your activities or your general condition  These instructions may answer some of your questions and help you adjust during the first few weeks following your operation  ACTIVITY: Resume your normal activities and exercise schedule as tolerated  If you were driving prior to surgery, you may resume driving one week following discharge from the hospital  You may ride in a car upon discharge  DIET: Resume your normal diet  Try to eat low fat and low cholesterol foods  RECURRENT SYMPTOMS: If you develop any new numbness, weakness, blindness or difficulty with speech after discharge call 911 or go to an emergency department immediately  INCISION: Your surgeon may have chosen to use a type of adhesive glue to close your incision  The glue is used to cover the incision, assist in closure, and prevent contamination  This adhesive will darken and peel away on its own within one to two weeks  You may shower the day after your surgery, but do not scrub the incision  If you do not have this adhesive glue, you may include the operated area in a shower on the third day following surgery  It is normal to have swelling or discoloration around the incision  If increasing redness or pain develops, call our office immediately  Numbness in the region of the incision may occur following the surgery  This normally resolves in six to twelve months  FOLLOW UP STUDIES: Doppler ultrasound studies are very important to your post-operative care  Your surgeon will arrange them at your first postoperative visit  The first study is due 3 months after surgery      Appt nancy/ MAUREEN Gale: 8/23/18 at [place] [address], [place], [address] [facility]    [phone] Audra Castillos [address] [facility] [phone]  [address] [place] [address] [place] [address]  [facility] 99, [facility], [address]  [address], [place], [address]  611 [facility], [address] [facility] [address]    Oleg Lola 62, 1st  Floor, Xochitl Latif 34  [facility] 19, [address] [facility], [address], [facility], [facility] 97   [address] [facility], [address]14 [facility], [address]  [facility], [address] [facility], [facility], Zachary Brock 6

## 2018-08-14 NOTE — SOCIAL WORK
Cm met with pt to discuss DCP and cm role  Pt lives alone in [address] [place] [address] with 3ste   Prior to admission pt used a rollator with ambulation and was independent with ADLS  Pt has prior hx of Revolutionary but would rather not have VNA at d/c  Pt's preference for pharmacy is [facility] [address] in [facility]  Pt denies any hx of drug/ETOH or inpt psych stays  Patient/caregiver received discharge checklist  Content reviewed  Patient/caregiver encouraged to participate in discharge plan of care prior to discharge home  Cm reviewed d/c planning process including the following: identifying help at home, patient preference for d/c planning needs, Discharge Lounge, [facility] Meds to Bed program, availability of treatment team to discuss questions or concerns patient and/or family may have regarding understanding medications and recognizing signs and symptoms once discharged  Cm also encouraged patient to follow up with all recommended appointments after discharge  Patient advised of importance for patient and family to participate in managing patients medical well being

## 2018-08-14 NOTE — PHYSICAL THERAPY NOTE
Physical Therapy Initial Evaluation   Estefany Mejia, PT   08/14/18 1017   Note Type   Note type Eval only   Pain Assessment   Pain Assessment 0-10   Pain Score 1   Pain Type Acute pain   Pain Location Neck   Pain Orientation Left   Hospital Pain Intervention(s) Repositioned; Ambulation/increased activity; Emotional support   Response to Interventions tolerated  Home Living   Type of [address] Multi-level;Stairs to enter with rails;Bed/bath upstairs  (full flight up to bed/bath  3 NADIA into home  )   Bathroom Shower/Tub Tub/shower unit   Bathroom Toilet Standard   [address] [facility]  ([employer] not in use PTA  )   Additional Comments was independent with driving  Prior Function   Level of [place] Independent with ADLs and functional mobility   Lives With Alone  (however family can stay in pt's home for several days   )   Receives Help From Family   ADL Assistance Independent   IADLs Independent   Falls in the last 6 months 0   Vocational Retired   Restrictions/Precautions   [place] [place] Bearing Precautions Per Order No   Braces or Orthoses Other (Comment)  (none)   Other Precautions Multiple lines;Telemetry   General   Additional Pertinent History dx: chronic thoracic aortic dissection, 8/13/18 carotid subclavian bypass surgery  PMH: prostate CA, DM, anemia, rapair of aortic aneurism, a-fib, KARIN, obesity, colong polyp  Family/Caregiver Present No   Cognition   Overall Cognitive Status WFL   Arousal/Participation Alert   Orientation Level Oriented X4   Memory Within functional limits   Following Commands Follows all commands and directions without difficulty   Comments pleasant, cooperative, motivated to participate  RLE Assessment   RLE Assessment WFL   LLE Assessment   LLE Assessment WFL   Coordination   Movements are Fluid and Coordinated 1   Bed Mobility   Supine to Sit 5  Supervision   Additional items Assist x 1; Increased time required;Verbal cues  (Many lines to navigate around  )   Sit to Supine 5  Supervision   Additional items Assist x 1; Increased time required;Verbal cues   Transfers   Sit to Stand 5  Supervision   Additional items Assist x 1; Increased time required;Verbal cues   Stand to Sit 5  Supervision   Additional items Assist x 1; Increased time required;Verbal cues   Ambulation/Elevation   Gait pattern Excessively slow; Short stride   Gait Assistance 5  Supervision   Additional items Assist x 1;Verbal cues   Assistive Device None   Distance 150'  (limited by numerous lines  )   Stair Management Assistance Not tested  (Too many lines  )   Balance   Static Sitting Good   Dynamic Sitting Fair +   Static Standing Fair   Dynamic Standing Fair   Ambulatory Fair   Endurance Deficit   Endurance Deficit Yes   Endurance Deficit Description slight fatigue  Activity Tolerance   Activity Tolerance Patient limited by fatigue   Medical Staff Made Aware RN consented to allow pt to participate in PT eval      Nurse Made Aware yes   Assessment   Prognosis Good   Problem List Decreased endurance; Impaired balance;Decreased mobility   Assessment Pt is 68 y o  male seen for PT evaluation s/p admit to [facility] on 8/13/2018 w/ Chronic thoracic aortic dissection ([address] )  Pt underwent surgery for carotid subclavian bypass with embolization of subclavian artery and aortic arch reconstruction  PT consulted to assess pt's functional mobility and d/c needs  Order placed for PT eval and tx, w/ up and OOB as tolerated order  Comorbidities affecting pt's physical performance at time of assessment include: hx of prostate CA, anemia, surg for repair of aortic aneurism, a-fib, KARIN, obesity    PTA, pt was independent w/ all functional mobility w/ no need for an assistive device for ambulation , ambulates community distances and elevations, has 3 NADIA, lives alone in 2 level home and retired   Personal factors affecting pt at time of IE include: lives in 2 story house, stairs to enter home, limited home support and is functioning at a slightly below baseline level of mobility a this time, however this may in part be due to the numerous lines pt is currently attached to   Patricia Anton Please find objective findings from PT assessment regarding body systems outlined above with impairments and limitations including impaired balance, decreased endurance, gait deviations and decreased functional mobility tolerance  The following objective measures performed on IE also reveal limitations: Barthel Index: 70/100  Pt's clinical presentation is currently unstable/unpredictable seen in pt's presentation of pt needing telemetry, pt with a line, pt lives alone and has steps entering his home and 1 flight to his bedroom, pt with below baseline level of mobility, pt with some abnormal lab values    Pt to benefit from continued PT tx to address deficits as defined above and maximize level of functional independent mobility and consistency  From PT/mobility standpoint, recommendation at time of d/c would home with family support  ( pt stated his family members can stay at his home overnight for a few days as needed  )  pending progress in order to facilitate return to PLOF  Goals   Patient Goals To go home   STG Expiration Date 08/19/18   Short Term Goal #1 Pt is independent with completion of bed mobility activities and transfers without the need for an assistive device  Pt ambulates 400-500' independently without a device with good balance  Pt ambulates up and down 1 flight of steps independently with good balance and safety  Treatment Day 0   Plan   Treatment/Interventions Functional transfer training;LE strengthening/ROM; Patient/family training;Equipment eval/education; Bed mobility;Gait training;Spoke to case management;Spoke to nursing   PT Frequency Other (Comment)  (3-5x/wk)   Recommendation   Recommendation Home with family support   Equipment Recommended (none)   PT - OK to Discharge Yes Additional Comments yes pending successful ambulation on 1 flight steps      Barthel Index   Feeding 10   Bathing 5   Grooming Score 5   Dressing Score 5   Bladder Score 10   Bowels Score 10   Toilet Use Score 5   Transfers (Bed/Chair) Score 10   Mobility (Level Surface) Score 10   Stairs Score 0   Barthel Index Score 70

## 2018-08-14 NOTE — PLAN OF CARE
Problem: OCCUPATIONAL THERAPY ADULT  Goal: Performs self-care activities at highest level of function for planned discharge setting  See evaluation for individualized goals  See flowsheet documentation for full assessment, interventions and recommendations  Limitation: Decreased ADL status  Prognosis: Good  Assessment: Pt is a 68 y o  male who was [facility] on 8/13/18 for sx with chronic thoracic aortic dissection  Pt presents s/p Bypass carotid subclavian with embolization of proximal subclavian artery  Pt's dx include: hypokalemia, DM, HTN, Chronic thoracic aortic dissection  Pt PMH includes: CPAP dependence, arthritis, HLD, HTN, prostate cancer, hx of basal ganglia hemorrhage in 2014, sleep apnea,  Hx of cardiac sx  At baseline pt was completing all areas of occupation independently, drove, and required no use of DME PTA  Pt lives with alone in 2 story home, bed/bath on 2nd floor  Pt has supportive family who lives locally and is able to assist PRN upon discharge  Pt currently presents with telemetry, fall risk, decreased ADL status  Currently pt completes functional mobility/transfers with SBA and ADLs within room with SBA  Anticipate pt will continue to progress with continued participation in ADLs, maintaining safety, managing pain, and as he medically progresses  From OT standpoint, pt will be safe to D/C to previous environment with with Home OT/ Family assist as needed  No immediate inpatient OT needs  D/C OT at this time        OT Discharge Recommendation: Home OT  OT - OK to Discharge: Yes    Marion Lauren MS , OTR/L

## 2018-08-14 NOTE — DISCHARGE SUMMARY
Discharge Summary   Deepali Child  68 y o  male MRN: [id number]  Unit/Bed#: [facility] 685-36 Encounter: [id number]    Admission Date: 8/13/2018     Discharge Date:08/14/18    Attending:Girma Ramey MD     Consultants: [facility]    Admitting Diagnosis: Chronic thoracic aortic dissection ([facility] ) [I71 01]    Principle/ Secondary Diagnosis:  · Chronic thoracic aortic dissection with chronic aneurysmal, dilated degeneration with plan for repair  · Postoperative acute blood loss anemia secondary to procedural and dilutional loss  · DM 2  · Leukocytosis-likely reactive secondary to surgery    Past Medical History:   Diagnosis Date    Arthritis     CPAP (continuous positive airway pressure) dependence     Diabetes ([facility] )     Hyperlipidemia     Hypertension     [facility] )     prostate    Sleep apnea     Stroke (cerebrum) ([facility] )     Wears glasses      Past Surgical History:   Procedure Laterality Date    CARDIAC SURGERY      CIRCUMCISION      COLONOSCOPY      IR CEREBRAL ANGIOGRAPHY / INTERVENTION  8/13/2018    OR ASCEND AORTA GRAFT W ROOT REPLACMENT  VALVE CONDUIT/CORON RECONSTRUCT N/A 9/6/2017    Procedure: ASCENDING AORTIC REPAIR WITH A 26MM  GELWEAVE STRAIGHT GRAFT AND HEMIARCH WITH 8MM SIDE ARM BRANCH;  Surgeon: Jose Euceda MD;  [facility]: [facility];  Service: Cardiac Surgery    OR EXPLOR POSTOP BLEED,INFEC,CLOT-CHST N/A 9/6/2017    Procedure: RE-EXPLORATION MEDIASTERNAL CAVITY FOR POST-OP BLEED (BRING BACK);   Surgeon: Jose Euceda MD;  [facility];  Service: Cardiac Surgery    OR VEIN BYPASS GRAFT,CAROTID-BRACHIAL Left 8/13/2018    Procedure: BYPASS CAROTID SUBCLAVIAN WITH EMBOLIZATION OF PROXIMAL SUBCLAVIAN ARTERY  ;  Surgeon: Chaparro Grady MD;  Location: [facility];  Service: Vascular       Procedures Performed:   · 08/13/2018 left CCA-SCL bypass with embolization of proximal SCL artery (left brachial access)- [place]    Laboratory data at discharge:     Results from last 7 days  Lab Units 08/14/18  0504 08/13/18  1142 08/13/18  1049   WBC Thousand/uL 13 27* 7 78  --    HEMOGLOBIN g/dL 11 3* 12 4  --    I STAT HEMOGLOBIN g/dl  --   --  11 6*   HEMATOCRIT % 34 2* 36 5  --    PLATELETS Thousands/uL 196 220  --        Results from last 7 days  Lab Units 08/14/18  0504 08/13/18  1142   SODIUM mmol/L 140 140   POTASSIUM mmol/L 3 6 3 3*   CHLORIDE mmol/L 106 107   CO2 mmol/L 27 28   BUN mg/dL 11 10   CREATININE mg/dL 0 64 0 74   GLUCOSE RANDOM mg/dL 163* 144*   CALCIUM mg/dL 8 7 8 1*               Discharge instructions/Information to patient and family:   See after visit summary for information provided to patient and family  Discharge Medications:  See after visit summary for reconciled discharge medications provided to patient and family  · Lisinopril/ hydrochlorothiazide 20/12 5 mg-2 tablets p o  Daily --resumed 08/15/2018 (48h postop)  · Prn pain control-- Tylenol 650mg q 6h prn    Hospital Course:   Guru Wilcox is a 67 yo male with history of type a aortic dissection status post ascending aortic replacement/celia arch reconstruction  He also has history of chronic thoracic aortic dissection with chronic aneurysmal,  dilated degeneration with plan for TEVAR by CT surgery  He was worked up in the outpatient setting for planned 2 stage approach with de branching of the left subclavian artery followed by stent graft placement   On 08/13/2018, the patient was electively admitted to [facility] in [address] at which time he was taken to the operating room and underwent left CCA-SCL bypass with embolization of the proximal SCL artery via a left brachial approach by Dr Daily Anderson, he was maintained in the PACU then transferred to medical-surgical/telemetry/step-down floor where he continued to convalesce for the remainder of his hospitalization  By POD #1, he remained neurovascularly intact  His vital signs were stable  He was tolerating a diet    His pain was controlled without use of Tylenol nor narcotic  He was able to void post Wilson catheter removal   He was ambulatory  He was seen and evaluated by both Physical therapy and Occupational therapy and deemed appropriate for home  The patient was discharged in the care of his family on 08/14/2018  Hospital course was complicated by the following:  · Postoperative acute blood loss anemia-secondary to procedural and dilutional loss  · Leukocytosis-likely reactive from surgical procedure    Of note, the patient carries a diagnosis of diabetes mellitus type 2 on insulin therapy  Consultation was placed to our [address] [place] [address] Internal Medicine colleagues for assistance in management of his diabetes and blood sugar control  No changes were made to his medical regimen  Prior to discharge, the patient was given instructions for outpatient care and follow-up  The patient has been instructed to call w/ any questions, changes, or concerns for the medical condition  For further details of the hospitalization, please refer to the medical record  Condition at Discharge: stable     Provisions for Follow-Up Care:  See after visit summary for information related to follow-up care and any pertinent home health orders  Disposition: Home    Planned Readmission: Yes -this admission was stage 2 approach with de branching of the left subclavian artery with plan for future TEVAR of which timing is TBD  Discharge Statement   I spent 30 minutes discharging the patient  This time was spent on the day of discharge  I had direct contact with the patient on the day of discharge  Additional documentation is required if more than 30 minutes were spent on discharge       Elsy Aggarwal PA-C  8/14/2018

## 2018-08-14 NOTE — MEDICAL STUDENT
MEDICAL STUDENT  Inpatient Progress Note for TRAINING ONLY  Not Part of Legal Medical Record       Progress Note - Mariam Grant  68 y o  male MRN: [id number]    Unit/Bed#: [facility] 507-01 Encounter: [id number]      Assessment:  Mariam Grant  Is a 68 y o  Male s/p carotid-subclavian bypass with embolization of proximal subclavian artery, type A thoracic aortic dissection, s/p ascending aortic replacement/celia arch reconstruction  Doing well post-operatively  Plan:  1  Chronic thoracic aortic dissection  - s/p carotid-subclavian bypass with embolization of proximal subclavian artery, ascending aortic replacement/celia arch reconstruction on 08/13/18  - managed by vascular surgery   - stable  - continue OOB, PT, incentive spirometry     2  Diabetes Mellitus   - HgbA1C: 6 8 (07/25/18)  - Stable  - continue with home regimen- Lantus 28 units at night, Humalog 8 units TID with meals- as his blood glucose has been well controlled since admission   - continue with cardiac diet and monitor glucose with accu checks  - consider to change diet to diabetic diet        3  Benign essential HTN   - stable  - continue amlodipine 10mg BID, metoprolol 100mg Q12hr  - continue to monitor     4  KARIN   - has been using his home CPAP during night  - stable    Subjective: The patient is doing well  No chest pain or shortness of breath except for some discomfort when he bends over  Denies any abdominal pain  Tolerating diet well  Objective:     Vitals: Blood pressure 130/60, pulse 63, temperature 98 2 °F (36 8 °C), temperature source Oral, resp  rate 16, height 5' 5" (1 651 m), weight 106 kg (232 lb 14 4 oz), SpO2 94 %  ,Body mass index is 38 76 kg/m²        Intake/Output Summary (Last 24 hours) at 08/14/18 1518  Last data filed at 08/14/18 1514   Gross per 24 hour   Intake           1147 5 ml   Output             2335 ml   Net          -1187 5 ml       Physical Exam: /60 (BP Location: Right arm)   Pulse 63   Temp 98 2 °F (36 8 °C) (Oral)   Resp 16   Ht 5' 5" (1 651 m)   Wt 106 kg (232 lb 14 4 oz)   SpO2 94%   BMI 38 76 kg/m²   General appearance: alert and oriented, in no acute distress and cooperative, sitting on a chair comfortably   Head: Normocephalic, without obvious abnormality, atraumatic  Lungs: clear to auscultation bilaterally and no wheezes, crackles heard  Heart: regular rate and rhythm and systolic murmur +9/+1 best heard on R  2nd intercostal space   Abdomen: soft, non-tender; bowel sounds normal; no masses,  no organomegaly  Extremities: +1 edema on LE      Results from last 7 days  Lab Units 08/14/18  0504 08/13/18  1142 08/13/18  1049   SODIUM mmol/L 140 140  --    POTASSIUM mmol/L 3 6 3 3*  --    CHLORIDE mmol/L 106 107  --    CO2 mmol/L 27 28  --    BUN mg/dL 11 10  --    CREATININE mg/dL 0 64 0 74  --    CALCIUM mg/dL 8 7 8 1*  --    GLUCOSE RANDOM mg/dL 163* 144*  --    GLUCOSE, ISTAT mg/dl  --   --  153*   WBC Thousand/uL 13 27* 7 78  --    HEMOGLOBIN g/dL 11 3* 12 4  --    I STAT HEMOGLOBIN g/dl  --   --  11 6*   HEMATOCRIT % 34 2* 36 5  --    PLATELETS Thousands/uL 196 220  --        Ir Cerebral Angiography / Intervention    Result Date: 8/13/2018  Impression: Impression: Successful embolization of the left subclavian artery, proximal to the left vertebral artery  Workstation performed: [id number]     Invasive Devices     Peripheral Intravenous Line            Peripheral IV 08/13/18 Left Arm 1 day    Peripheral IV 08/13/18 Left Hand 1 day                Lab, Imaging and other studies: I have personally reviewed pertinent reports      VTE Pharmacologic Prophylaxis: Heparin  VTE Mechanical Prophylaxis: sequential compression device

## 2018-08-14 NOTE — PROGRESS NOTES
Progress Note - Vascular Surgery   Aliza Hinkle  68 y o  male MRN: [id number]  Unit/Bed#: [facility] 042-61 Encounter: [id number]    Assessment:  68 y o  male s/p (8/13) carotid-subclavian bypass w/ embolization of proximal subclavian artery  type A aortic dissection, s/p ascending aortic replacement/celia arch reconstruction  Patient doing well post-operatively  Plan:  Diet -- Cardiac diet  f/u SLIM consult recs for glucose mgmt  F/u AM labs -- reassess home lisinopril/HCTZ  D/c fry  Pain control  Incentive spirometry  OOB, ambulate    Subjective/Objective     Subjective: No acute events overnight  Patient states his pain is well controlled  Denies weakness/numbness in the LUE  Tolerating regular diet  Denies n/v/d      Objective:     Vitals: Blood pressure 132/67, pulse 63, temperature 98 4 °F (36 9 °C), resp  rate 18, height 5' 5" (1 651 m), weight 105 kg (231 lb), SpO2 99 %  ,Body mass index is 38 44 kg/m²  I/O       08/12 0701 - 08/13 0700 08/13 0701 - 08/14 0700    P  O   0    I V  (mL/kg)  3027 5 (28 8)    IV Piggyback  200    Total Intake(mL/kg)  3227 5 (30 7)    Urine (mL/kg/hr)  1585 (0 6)    Total Output   1585    Net   +1642 5              Physical Exam:  GEN: NAD  HEENT: MMM, L neck surgical incision  No erythema, discharge or hematoma  CV: RRR,  Pulses: b/l 2+ radial and DPs  Lung: normal effort  Ab: Soft, NT/ND  Extrem: No CCE  Neuro:  A+Ox3, motor and sensation grossly intact      Lab, Imaging and other studies:   CBC with diff:   Lab Results   Component Value Date    WBC 7 78 08/13/2018    HGB 12 4 08/13/2018    HCT 36 5 08/13/2018    MCV 86 08/13/2018     08/13/2018    MCH 29 1 08/13/2018    MCHC 34 0 08/13/2018    RDW 14 5 08/13/2018    MPV 9 9 08/13/2018    NRBC 0 08/13/2018   , BMP/CMP:   Lab Results   Component Value Date     08/13/2018    K 3 3 (L) 08/13/2018     08/13/2018    CO2 28 08/13/2018    ANIONGAP 5 08/13/2018    BUN 10 08/13/2018    CREATININE 0 74 08/13/2018 GLUCOSE 144 (H) 08/13/2018    GLUCOSE 153 (H) 08/13/2018    CALCIUM 8 1 (L) 08/13/2018    EGFR 107 08/13/2018   , Coags: No results found for: PT, PTT, INR  VTE Pharmacologic Prophylaxis: Heparin  VTE Mechanical Prophylaxis: sequential compression device

## 2018-08-14 NOTE — RESTORATIVE TECHNICIAN NOTE
Restorative Specialist Mobility Note       Pt ambulating halls as a self w/ no assistance 2-3x's daily  Will continue to follow up as needed  RN peter Montenegro Restorative Technician BS

## 2018-08-14 NOTE — CASE MANAGEMENT
Thank you,  Maira Aqq  [facility] Utilization Review Department  Phone: [phone]; Fax [phone]  ATTENTION: The Network Utilization Review Department is now centralized for our 9 Facilities  Make a note that we have a new phone and fax numbers for our Department  Please call with any questions or concerns to [phone] and carefully follow the prompts so that you are directed to the right person  All voicemails are confidential  Fax any determinations, approvals, denials, and requests for initial or continue stay review clinical to [phone]  Due to HIGH CALL volume, it would be easier if you could please send faxed requests to expedite your requests and in part, help us provide discharge notifications faster  Initial Clinical Review  SCHEDULED INPATIENT PROCEDURE 18 / IP [id number]   *PER PEC NOTE "Per Mikel Arredondo at [facility] (ref # Giovanna F @408pm 18)  no prior auth required"       Age/Sex: 68 y o  male    OPERATIVE REPORT  PATIENT NAME: Mariam Grant  :  1945  MRN: [id number]  Pt Location: [facility]BR[facility] ROOM 02     SURGERY DATE: 2018     Chronic thoracic aortic dissection ([address] ) [I71 01]     Post-Op Diagnosis Codes:     * Chronic thoracic aortic dissection (HCC) [I71 01]        Procedure(s):  BYPASS CAROTID SUBCLAVIAN WITH EMBOLIZATION OF PROXIMAL SUBCLAVIAN ARTERY         Anesthesia Type: General     Operative Indications:  Chronic thoracic aortic dissection ([address] ) []  45-year-old with history of type a aortic dissection now with expansion of the distal aortic arch and thoracic aorta requiring repair  First step of this repair is de branching of the left subclavian artery      Operative Findings:  Successful bypass of the left common carotid to subclavian artery with 8 mm ringed Rhodell-Behzad graft  Embolization of the proximal subclavian artery performed by Interventional Radiology  At the conclusion of the procedure there was a palpable radial pulse  The patient was awake, following commands and moving all 4 extremities      Complications: None           Admission Orders: Date/Time/Statement:    8/13/18 AT  8768     Orders Placed This Encounter   Procedures    Inpatient Admission     Standing Status:   Standing     Number of Occurrences:   1     Order Specific Question:   Admitting Physician     Answer:   Nell Record     Order Specific Question:   Level of Care     Answer:   Level 1 Stepdown [13]     Order Specific Question:   Estimated length of stay     Answer:   Inpatient Only Surgery       Vital Signs: /60 (BP Location: Right arm)   Pulse 63   Temp 98 2 °F (36 8 °C) (Oral)   Resp 16   Ht 5' 5" (1 651 m)   Wt 106 kg (232 lb 14 4 oz)   SpO2 94%   BMI 38 76 kg/m²       08/13 0701 08/14 0700 08/14 0701 08/14 1155  Most Recent    Temperature (°F) 9798 4 98 2  98 2 (36 8)    Pulse 52109 63  63    Respirations 1320 16  16    Blood Pressure 111/69165/80 130/60  130/60    Arterial Line /62158/68        SpO2 (%) 92100 94  94          Diet:        Diet Orders            Start     Ordered    08/13/18 1200  Diet Cardiac; Cardiac Step 1  Diet effective now     Question Answer Comment   Diet Type Cardiac    Cardiac Cardiac Step 1    RD to adjust diet per protocol? Yes        08/13/18 1159          Continuous IV Infusions:   IVF sodium chloride 0 9 % infusion  at 50 cc/hr         Mobility:   Up + OOB as Tolerated    DVT Prophylaxis:   ASA;  Heparin SQ; SCD    Scheduled Meds:  Current Facility-Administered Medications:  acetaminophen 650 mg Oral Q6H PRN Eleonora Key   amLODIPine 10 mg Oral BID Eleonora Key   aspirin 81 mg Oral Daily Eleonora Key   atorvastatin 80 mg Oral Daily Eleonora Key   heparin (porcine) 5,000 Units Subcutaneous Q8H [facility] Eleonora Key   insulin glargine 28 Units Subcutaneous HS Eleonora Key   insulin lispro 1-5 Units Subcutaneous HS Eleonora Key   insulin lispro 1-6 Units Subcutaneous TID AC Eleonora Key   insulin lispro 8 Units Subcutaneous TID AC Eleonora Key   metoprolol tartrate 100 mg Oral Q12H [address] Eleonora Key   ondansetron 4 mg Intravenous Q4H PRN Eleonora Key   oxyCODONE-acetaminophen 1 tablet Oral Q4H PRN Eleonora Key   oxyCODONE-acetaminophen 2 tablet Oral Q4H PRN Molly Zachery       PRN Meds:    acetaminophen    ondansetron    oxyCODONE-acetaminophen         Vascular Surgery  Progress Notes  POD # 1 Date of Service: 8/14/2018  4:56 AM        Assessment:  68 y  o  male s/p (8/13) carotid-subclavian bypass w/ embolization of proximal subclavian artery  type A aortic dissection, s/p ascending aortic replacement/celia arch reconstruction  Patient doing well post-operatively      Plan:  Diet -- Cardiac diet  f/u SLIM consult recs for glucose mgmt  F/u AM labs -- reassess home lisinopril/HCTZ  D/c fry  Pain control  Incentive spirometry  OOB, ambulate      Subjective/Objective   Subjective: No acute events overnight  Patient states his pain is well controlled  Denies weakness/numbness in the LUE  Tolerating regular diet  Denies n/v/d        Objective:    Vitals: Blood pressure 132/67, pulse 63, temperature 98 4 °F (36 9 °C), resp  rate 18, height 5' 5" (1 651 m), weight 105 kg (231 lb), SpO2 99 %  ,Body mass index is 38 44 kg/m²            I/O        08/12 0701 - 08/13 0700 08/13 0701 - 08/14 0700     P  O    0     I V  (mL/kg)   3027 5 (28 8)     IV Piggyback   200     Total Intake(mL/kg)   3227 5 (30 7)     Urine (mL/kg/hr)   1585 (0 6)     Total Output   1585     Net   +1642  5                    Physical Exam:  GEN: NAD  HEENT: MMM, L neck surgical incision  No erythema, discharge or hematoma  CV: RRR,  Pulses: b/l 2+ radial and DPs  Lung: normal effort  Ab: Soft, NT/ND  Extrem: No CCE  Neuro:  A+Ox3, motor and sensation grossly intact        Lab, Imaging and other studies:   CBC with diff:         Lab Results   Component Value Date     WBC 7 78 08/13/2018     HGB 12 4 08/13/2018     HCT 36 5 08/13/2018     MCV 86 08/13/2018      08/13/2018     MCH 29 1 08/13/2018     MCHC 34 0 08/13/2018     RDW 14 5 08/13/2018     MPV 9 9 08/13/2018     NRBC 0 08/13/2018     BMP/CMP:         Lab Results   Component Value Date      08/13/2018     K 3 3 (L) 08/13/2018      08/13/2018     CO2 28 08/13/2018     ANIONGAP 5 08/13/2018     BUN 10 08/13/2018     CREATININE 0 74 08/13/2018     GLUCOSE 144 (H) 08/13/2018     GLUCOSE 153 (H) 08/13/2018     CALCIUM 8 1 (L) 08/13/2018     EGFR 107 08/13/2018     VTE Pharmacologic Prophylaxis: Heparin  VTE Mechanical Prophylaxis: sequential compression device

## 2018-08-14 NOTE — PLAN OF CARE
CARDIOVASCULAR - ADULT     Maintains optimal cardiac output and hemodynamic stability Progressing     Absence of cardiac dysrhythmias or at baseline rhythm Progressing        DISCHARGE PLANNING     Discharge to home or other facility with appropriate resources Progressing        DISCHARGE PLANNING - CARE MANAGEMENT     Discharge to post-acute care or home with appropriate resources Progressing        INFECTION - ADULT     Absence or prevention of progression during hospitalization Progressing     Absence of fever/infection during neutropenic period Progressing        Knowledge Deficit     Patient/family/caregiver demonstrates understanding of disease process, treatment plan, medications, and discharge instructions Progressing        PAIN - ADULT     Verbalizes/displays adequate comfort level or baseline comfort level Progressing        Potential for Falls     Patient will remain free of falls Progressing        Prexisting or High Potential for Compromised Skin Integrity     Skin integrity is maintained or improved Progressing        RESPIRATORY - ADULT     Achieves optimal ventilation and oxygenation Progressing        SAFETY ADULT     Maintain or return to baseline ADL function Progressing     Maintain or return mobility status to optimal level Progressing

## 2018-08-14 NOTE — OCCUPATIONAL THERAPY NOTE
[address] Evaluation     Patient Name: Tomasa Delgado  Today's Date: 8/14/2018  Problem List  Patient Active Problem List   Diagnosis    [facility]    Hypertension    Diabetes mellitus ([facility] )    Chronic thoracic aortic dissection (HCC)    Acute blood loss anemia    S/P ascending aortic aneurysm repair    Hypokalemia    Atrial fibrillation (HCC)    KARIN (obstructive sleep apnea)    Class 2 obesity due to excess calories without serious comorbidity with body mass index (BMI) of 37 0 to 37 9 in adult    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HCC)    Hyperlipidemia    Screening for colon cancer    Adenomatous polyp of descending colon    S/P vascular bypass     Past Medical History  Past Medical History:   Diagnosis Date    Arthritis     CPAP (continuous positive airway pressure) dependence     Diabetes [facility] )     Hyperlipidemia     Hypertension     Prostate cancer ([facility] )     prostate    Sleep apnea     Stroke (cerebrum) ([facility] )     Wears glasses      Past Surgical History  Past Surgical History:   Procedure Laterality Date    CARDIAC SURGERY      CIRCUMCISION      COLONOSCOPY      IR CEREBRAL ANGIOGRAPHY / INTERVENTION  8/13/2018    MA ASCEND AORTA GRAFT W ROOT REPLACMENT  VALVE CONDUIT/CORON RECONSTRUCT N/A 9/6/2017    Procedure: ASCENDING AORTIC REPAIR WITH A 26MM  GELWEAVE STRAIGHT GRAFT AND HEMIARCH WITH 8MM SIDE ARM BRANCH;  Surgeon: Ameya Vasquez MD;  Location: [facility];  Service: Cardiac Surgery    MA EXPLOR POSTOP BLEED,INFEC,CLOT-CHST N/A 9/6/2017    Procedure: RE-EXPLORATION MEDIASTERNAL CAVITY FOR POST-OP BLEED (BRING BACK);   Surgeon: Ameya Vasquez MD;  Location: [facility];  Service: Cardiac Surgery    MA VEIN BYPASS GRAFT,CAROTID-BRACHIAL Left 8/13/2018    Procedure: BYPASS CAROTID SUBCLAVIAN WITH EMBOLIZATION OF PROXIMAL SUBCLAVIAN ARTERY  ;  Surgeon: Jie Ly MD;  Location: [facility];  Service: Vascular         08/14/18 1508   Restrictions/Precautions   Weight Bearing Precautions Per Order No   Other Precautions Telemetry;Pain   Pain Assessment   Pain Assessment No/denies pain   Home Living   Type of Home Apartment   Home Layout Multi-level;Stairs to enter with rails;Bed/bath upstairs   Bathroom Shower/Tub Tub/shower unit   Bathroom Toilet Standard   Bathroom Accessibility Accessible   Home Equipment Walker   Additional Comments Pt lives in multi story home with 3 NADIA, bed/bath on 2nd floor  Prior Function   Level of [place] Independent with ADLs and functional mobility   Lives With Alone   Receives Help From Family   ADL Assistance Independent   IADLs Independent   Falls in the last 6 months 0   Vocational Retired   Comments Pt was independent in all areas of occupations PTA  Pt lives alone, family lives close and is able to stay over if needed  Lifestyle   Autonomy At baseline pt was independent in all areas of occupations    Reciprocal Relationships Pt lives alone, has supportive family that lives locally    Service to Others Pt is retired    Intrinsic Gratification Pt enjoys watching TV    Psychosocial   Psychosocial (WDL) WDL   Subjective   Subjective "Do you know what therapist means?"   ADL   Where Assessed Chair   Eating Assistance 6  Modified independent   Grooming Assistance 6  Modified Independent   UB Bathing Assistance 6  Modified Independent   LB Bathing Assistance 5  Supervision/Setup   UB Dressing Assistance 6  Modified independent   [address] 5  Supervision/Setup   LB Dressing Deficit Increased time to complete   Toileting Assistance  6  Modified independent   Functional Assistance 5  Supervision/Setup   Bed Mobility   Supine to Sit Unable to assess   Additional Comments Pt in chair upon arrival and returned to chair at end of eval    Transfers   Sit to Stand 5  Supervision   Additional items Verbal cues; Increased time required   Stand to Sit 5  Supervision   Additional items Increased time required;Verbal cues;Armrests   Toilet transfer 5  Supervision   Additional items Assist x 1; Increased time required;Standard toilet  (Grab bar )   Additional Comments Pt requires SBA for functional transfers for safety and verbal cues for safe hand placements    Functional Mobility   Functional Mobility 5  Supervision   Additional Comments Pt requires SBA short in room distances    Balance   Static Sitting Good   Dynamic Sitting Fair +   Static Standing Fair   Dynamic Standing [address]   Activity Tolerance   Activity Tolerance Patient tolerated treatment well   Medical Staff Made Aware Pt appropriate to see per RN    Nurse Made Aware Yes, Tricia Patches Assessment   RUE Assessment WFL   LUE Assessment   LUE Assessment WFL   Hand Function   Gross Motor Coordination Functional   Fine Motor Coordination Functional   Vision-Basic Assessment   Current Vision Wears glasses all the time   Vision - Complex Assessment   Acuity Able to read employee name badge without difficulty   Cognition   Overall Cognitive Status WFL   Arousal/Participation Responsive; Cooperative; Alert   Attention Attends with cues to redirect   Orientation Level Oriented X4   Memory Within functional limits   Following Commands Follows all commands and directions without difficulty   Comments Pt pleasant, Ox4 and able to converse beyond his basic needs  Assessment   Limitation Decreased ADL status   Prognosis Good   Assessment Pt is a 68 y o  male who was admitted to [facility] on 8/13/18 for sx with chronic thoracic aortic dissection  Pt presents s/p Bypass carotid subclavian with embolization of proximal subclavian artery  Pt's dx include: hypokalemia, DM, HTN, Chronic thoracic aortic dissection  Pt PMH includes: CPAP dependence, arthritis, HLD, HTN, prostate cancer, hx of basal ganglia hemorrhage in 2014, sleep apnea,  Hx of cardiac sx  At baseline pt was completing all areas of occupation independently, drove, and required no use of DME PTA    Pt lives with alone in 2 story home, bed/bath on 2nd floor  Pt has supportive family who lives locally and is able to assist PRN upon discharge  Pt currently presents with telemetry, fall risk, decreased ADL status  Currently pt completes functional mobility/transfers with SBA and ADLs within room with SBA  Anticipate pt will continue to progress with continued participation in ADLs, maintaining safety, managing pain, and as he medically progresses  From OT standpoint, pt will be safe to D/C to previous environment with with Home OT/ Family assist as needed  No immediate inpatient OT needs  D/C OT at this time      Goals   Patient Goals To go home    Recommendation   OT Discharge Recommendation Home OT   OT - OK to Discharge Yes   Barthel Index   Feeding 10   Bathing 5   Grooming Score 5   Dressing Score 5   Bladder Score 10   Bowels Score 10   Toilet Use Score 5   Transfers (Bed/Chair) Score 10   Mobility (Level Surface) Score 10   Stairs Score 0   Barthel Index Score 70   Modified [place] Scale   Modified [place] Scale 3     Samuel Martínez MS , OTR/L

## 2018-08-16 ENCOUNTER — TELEPHONE (OUTPATIENT)
Dept: VASCULAR SURGERY | Facility: CLINIC | Age: 73
End: 2018-08-16

## 2018-08-16 NOTE — TELEPHONE ENCOUNTER
Procedure:BYPASS CAROTID SUBCLAVIAN WITH EMBOLIZATION OF PROXIMAL SUBCLAVIAN ARTERY   (Left Neck)    Date of Procedure: 8/13/18                               Surgeon: Kedar Riley MD    Discharge Date:8/14/18      Change in vision?: no    Change in Speech?:no    Weakness?:no    Pain controlled? :yes    Hoarseness?:no    Trouble Swallowing?: no    Incision concerns?:no    ASA or Plavix?: ASA 81mg    Bleeding?: no      NEXT OFFICE VISIT SCHEDULED: 8/23/18      Any further questions/concerns? Pt would like apt location changed, he does not go to 8[address] e office, transportation is an issue  transf to [facility] to try to r/s

## 2018-08-16 NOTE — TELEPHONE ENCOUNTER
Pt states his d/c info said he should be taking prinzide zestoretic and [facility] and was told [facility] not [facility] him, it is listed on med list  Confirmed w/ Felicitas Topete PA-C that pt had been on lisinopril prior to admission, he should have this at home and was told to resume it - s/w pt and explained this - he confirmed he does have lisinopril and has been taking daily as directed  He thought this was a different med

## 2018-08-23 ENCOUNTER — OFFICE VISIT (OUTPATIENT)
Dept: FAMILY MEDICINE CLINIC | Facility: CLINIC | Age: 73
End: 2018-08-23
Payer: COMMERCIAL

## 2018-08-23 VITALS
SYSTOLIC BLOOD PRESSURE: 120 MMHG | HEIGHT: 65 IN | DIASTOLIC BLOOD PRESSURE: 70 MMHG | WEIGHT: 219.8 LBS | BODY MASS INDEX: 36.62 KG/M2

## 2018-08-23 DIAGNOSIS — I10 ESSENTIAL HYPERTENSION: Primary | Chronic | ICD-10-CM

## 2018-08-23 DIAGNOSIS — E11.9 TYPE 2 DIABETES MELLITUS WITHOUT COMPLICATION, UNSPECIFIED WHETHER LONG TERM INSULIN USE (HCC): ICD-10-CM

## 2018-08-23 DIAGNOSIS — E78.5 HYPERLIPIDEMIA, UNSPECIFIED HYPERLIPIDEMIA TYPE: ICD-10-CM

## 2018-08-23 DIAGNOSIS — IMO0001 TRANSITION OF CARE PERFORMED WITH SHARING OF CLINICAL SUMMARY: ICD-10-CM

## 2018-08-23 DIAGNOSIS — I71.01 CHRONIC THORACIC AORTIC DISSECTION (HCC): Chronic | ICD-10-CM

## 2018-08-23 PROCEDURE — 99495 TRANSJ CARE MGMT MOD F2F 14D: CPT | Performed by: FAMILY MEDICINE

## 2018-08-23 PROCEDURE — 1111F DSCHRG MED/CURRENT MED MERGE: CPT | Performed by: FAMILY MEDICINE

## 2018-08-23 NOTE — PROGRESS NOTES
Assessment/Plan:  Chief Complaint   Patient presents with    Transition of Care Management     pt was seen at \[facility]"" on 08/13/2018; D/C 08/14/2018 regarding Chronic thoracic aortic dissection; pt states he is feeling "good"     Patient Instructions   Here for TCM and doing well, continue all meds as directed  BP stable today and monitor HGA1C as directed for Diabetes  Take med for HTN and hyperlipidemia  F-up with specialists as directed  Take all meds as directed  He feels well today  DM stable, take insulin as directed  No problem-specific Assessment & Plan notes found for this encounter  Diagnoses and all orders for this visit:    Essential hypertension  -     Comprehensive metabolic panel; Future    Transition of care performed with sharing of clinical summary    Chronic thoracic aortic dissection (HCC)    Type 2 diabetes mellitus without complication, unspecified whether long term insulin use ([facility] )  -     Comprehensive metabolic panel; Future  -     Hemoglobin A1C    Hyperlipidemia, unspecified hyperlipidemia type  -     Comprehensive metabolic panel; Future          Subjective:      Patient ID: Steve Casillas  is a 68 y o  male  Transition of Care Management (pt was seen at \[facility]\"" on 08/13/2018; D/C 08/14/2018 regarding Chronic thoracic aortic dissection; pt states he is feeling "good")  No cp or sob, or ha, takes all meds as directed  Medical records reviewed from last hospitalization :    Steve Casillas is a 67 yo male with history of type a aortic dissection status post ascending aortic replacement/celia arch reconstruction  He also has history of chronic thoracic aortic dissection with chronic aneurysmal,  dilated degeneration with plan for TEVAR by CT surgery   He was worked up in the outpatient setting for planned 2 stage approach with de branching of the left subclavian artery followed by stent graft placement       On 08/13/2018, the patient was electively admitted to [facility] in [employer] at which time he was taken to the operating room and underwent left CCA-SCL bypass with embolization of the proximal SCL artery via a left brachial approach by Dr Vesna Harvey     Postprocedure, he was maintained in the PACU then transferred to medical-surgical/telemetry/step-down floor where he continued to convalesce for the remainder of his hospitalization  By POD #1, he remained neurovascularly intact  His vital signs were stable  He was tolerating a diet  His pain was controlled without use of Tylenol nor narcotic  He was able to void post Wilson catheter removal   He was ambulatory  He was seen and evaluated by both Physical therapy and Occupational therapy and deemed appropriate for home  The patient was discharged in the care of his family on 08/14/2018  The following portions of the patient's history were reviewed and updated as appropriate: allergies, current medications, past family history, past medical history, past social history, past surgical history and problem list     Review of Systems   Constitutional: Negative  HENT: Negative  Eyes: Negative  Respiratory: Negative  Cardiovascular: Negative  Gastrointestinal: Negative  Endocrine: Negative  Genitourinary: Negative  Musculoskeletal: Negative  Skin: Negative  Allergic/Immunologic: Negative  Neurological: Negative  Hematological: Negative  Psychiatric/Behavioral: Negative  Objective:      /70   Ht 5' 5" (1 651 m)   Wt 99 7 kg (219 lb 12 8 oz)   BMI 36 58 kg/m²          Physical Exam   Constitutional: He is oriented to person, place, and time  He appears well-developed and well-nourished  HENT:   Head: Normocephalic and atraumatic  Right Ear: External ear normal    Left Ear: External ear normal    Nose: Nose normal    Mouth/Throat: Oropharynx is clear and moist    Eyes: Conjunctivae and EOM are normal  Pupils are equal, round, and reactive to light  Neck: Normal range of motion  Neck supple  Cardiovascular: Normal rate, regular rhythm, normal heart sounds and intact distal pulses  Pulmonary/Chest: Effort normal and breath sounds normal    Musculoskeletal: Normal range of motion  Neurological: He is alert and oriented to person, place, and time  He has normal reflexes  Skin: Skin is warm and dry  Psychiatric: He has a normal mood and affect   His behavior is normal

## 2018-08-23 NOTE — PATIENT INSTRUCTIONS
Here for TCM for chronic thoracic aortic dissection and doing well, continue all meds as directed  BP stable today and monitor HGA1C as directed for Diabetes  Take med for HTN and hyperlipidemia  F-up with specialists as directed  Take all meds as directed  He feels well today  DM stable, take insulin as directed

## 2018-08-23 NOTE — PROGRESS NOTES
Hospital care reviewed:  Records reviewed  Patient was hopsitalized at:  [facility]  Date of admission:  8/13/18  Date of discharge:  8/14/18  Diagnosis:  l71 01 Chronic thoracic aortic  Disposition:  Home  Current symptoms:  None  Scheduled for follow up?:  Yes  Patients specialists:  Cardiologist  I have advised the patient to call PCP with any new or worsening symptoms (please type in name along with any credentials):  Radha Verde  Living Arrangements:  Alone  Counseling:  Patient

## 2018-08-27 DIAGNOSIS — I10 HTN (HYPERTENSION), BENIGN: ICD-10-CM

## 2018-08-27 DIAGNOSIS — E11.9 TYPE 2 DIABETES MELLITUS WITHOUT COMPLICATION, UNSPECIFIED WHETHER LONG TERM INSULIN USE (HCC): Primary | ICD-10-CM

## 2018-08-27 RX ORDER — LISINOPRIL AND HYDROCHLOROTHIAZIDE 20; 12.5 MG/1; MG/1
TABLET ORAL
Qty: 60 TABLET | Refills: 1 | Status: ON HOLD | OUTPATIENT
Start: 2018-08-27 | End: 2018-09-21

## 2018-08-27 RX ORDER — INSULIN GLARGINE 100 [IU]/ML
INJECTION, SOLUTION SUBCUTANEOUS
Qty: 1 PEN | Refills: 5 | Status: SHIPPED | OUTPATIENT
Start: 2018-08-27 | End: 2018-09-07 | Stop reason: SDUPTHER

## 2018-08-29 ENCOUNTER — OFFICE VISIT (OUTPATIENT)
Dept: VASCULAR SURGERY | Facility: CLINIC | Age: 73
End: 2018-08-29

## 2018-08-29 VITALS
DIASTOLIC BLOOD PRESSURE: 88 MMHG | TEMPERATURE: 98.4 F | BODY MASS INDEX: 36.65 KG/M2 | HEIGHT: 65 IN | WEIGHT: 220 LBS | SYSTOLIC BLOOD PRESSURE: 144 MMHG

## 2018-08-29 DIAGNOSIS — Z98.890 POSTOPERATIVE STATE: ICD-10-CM

## 2018-08-29 DIAGNOSIS — I71.01 CHRONIC THORACIC AORTIC DISSECTION (HCC): Primary | Chronic | ICD-10-CM

## 2018-08-29 PROCEDURE — 99024 POSTOP FOLLOW-UP VISIT: CPT | Performed by: SURGERY

## 2018-08-29 NOTE — PROGRESS NOTES
Chronic thoracic aortic dissection [facility])  70-year-old gentleman with a history of type A aortic dissection, s/p ascending aortic repair with hemiarch reconstruction by Dr Odilia Cortez 9/6/2017 now with degeneration w/chronic thoracic aortic dissection and aneurysmal dilatation of proximal descending thoracic aorta requiring repair by cardiac surgery  Staged procedure with debranching followed by TEVAR recommended  Patient s/p L CCA-subclavian bypass w/[place]-Behzad graft and embolization of proximal L subclavian artery by Dr Vicki Martínez 8/13/2018  -doing well, incision healing well  -continue aspirin and statin therapy  -+ palpable left radial and ulnar pulses  -follow-up with Dr Odilia Cortez to discuss 2nd stage of procedure (TEVAR) as previously planned  -TEVAR to be scheduled by cardiac surgery  *Of note, very high bifurcation of CFA at level of femoral head bilaterally noted on CTA*  -d/w Dr Justa Ramirez        Assessment/Plan   Diagnoses and all orders for this visit:    Chronic thoracic aortic dissection ([address] )    Postoperative state        No chief complaint on file  Subjective   Patient ID: Alicia Omalley  is a 68 y o  male  Chief complaint: Left common carotid to subclavian artery bypass with embolization of proximal subclavian artery SLB TCO 8/13/18  Pt is on asa     70-year-old gentleman with a history of HTN, HLD, type 2 DM, prostate CA, type A aortic dissection, s/p ascending aortic repair with hemiarch reconstruction by Dr Odilia Cortez 9/6/2017 now with degeneration w/chronic thoracic aortic dissection and aneurysmal dilatation of proximal descending thoracic aorta requiring repair  Patient referred to vascular surgery for staged debranching prior to TEVAR  Patient is now s/p L CCA-subclavian bypass w/[place]-Behzad graft and embolization of proximal L subclavian artery by Dr Vicki Martínez 8/13/2018  The patient's postoperative course was unremarkable and he was discharged home on 8/14/2018    The patient returns to the office today for postoperative follow-up  Patient denies fever, shaking chills, sweats, aphasia, facial droop, amaurosis fugax, dysarthria, lateralizing extremity weakness or left upper extremity weakness, paresthesias, numbness, cool sensation, color change or tissue loss  The patient's left inferior neck incision is healed well  No evidence of erythema, induration, hematoma, hemorrhage or drainage  The following portions of the patient's history were reviewed and updated as appropriate: allergies, current medications, past family history, past medical history, past social history, past surgical history and problem list     Review of Systems    Patient Active Problem List   Diagnosis    Prostate cancer ([facility] )    Hypertension    Diabetes mellitus ([facility] )    Chronic thoracic aortic dissection ([facility] )    Acute blood loss anemia    S/P ascending aortic aneurysm repair    Hypokalemia    Atrial fibrillation ([facility] )    KARIN (obstructive sleep apnea)    Class 2 obesity due to excess calories without serious comorbidity with body mass index (BMI) of 37 0 to 37 9 in adult    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HCC)    Hyperlipidemia    Screening for colon cancer    Adenomatous polyp of descending colon    S/P vascular bypass       Past Surgical History:   Procedure Laterality Date    CARDIAC SURGERY      CIRCUMCISION      COLONOSCOPY      IR CEREBRAL ANGIOGRAPHY / INTERVENTION  8/13/2018    [place] ASCEND AORTA GRAFT W ROOT REPLACMENT  VALVE CONDUIT/CORON RECONSTRUCT N/A 9/6/2017    Procedure: ASCENDING AORTIC REPAIR WITH A 26MM  GELWEAVE STRAIGHT GRAFT AND HEMIARCH WITH 8MM SIDE ARM BRANCH;  Surgeon: Jose Euceda MD;  [facility];  Service: Cardiac Surgery    [place] EXPLOR POSTOP BLEED,INFEC,CLOT-CHST N/A 9/6/2017    Procedure: RE-EXPLORATION MEDIASTERNAL CAVITY FOR POST-OP BLEED (BRING BACK);   Surgeon: Jose Euceda MD;  [facility];  Service: Cardiac Surgery    [place] VEIN BYPASS GRAFT,CAROTID-BRACHIAL Left 8/13/2018    Procedure: BYPASS CAROTID SUBCLAVIAN WITH EMBOLIZATION OF PROXIMAL SUBCLAVIAN ARTERY  ;  Surgeon: Yanick Rosales MD;  [facility];  Service: Vascular       Family History   Problem Relation Age of Onset    Stroke Mother         complications    Hypertension Mother    Bow Arthritis Mother     No Known Problems Father        Social History     Social History    Marital status:       Spouse name: N/A    Number of children: N/A    Years of education: N/A     Occupational History          retired     Social History Main Topics    Smoking status: Never Smoker    Smokeless tobacco: Never Used    Alcohol use Yes      Comment: social beer or two daily    Drug use: No    Sexual activity: No      Comment: denied hx of sexually active high risk     Other Topics Concern    Not on file     Social History Narrative    1 serving caffeine/day    Less than high school diploma    Person living alone    Participate in moderate activities both inside and outside of the home       No Known Allergies      Current Outpatient Prescriptions:     acetaminophen (TYLENOL) 325 mg tablet, Take 2 tablets (650 mg total) by mouth every 6 (six) hours as needed for mild pain, Disp: , Rfl:     amLODIPine (NORVASC) 10 mg tablet, Take 1 tablet by mouth 2 (two) times a day, Disp: , Rfl:     aspirin 81 mg chewable tablet, Chew 1 tablet daily, Disp: 30 tablet, Rfl: 0    atorvastatin (LIPITOR) 80 mg tablet, Take 1 tablet (80 mg total) by mouth daily, Disp: 90 tablet, Rfl: 5    B-D ULTRAFINE III SHORT PEN 31G X 8 MM MISC, USE AS DIRECTED , Disp: 100 each, Rfl: 3    B-D ULTRAFINE III SHORT PEN 31G X 8 MM MISC, USE AS DIRECTED , Disp: 100 each, Rfl: 3    insulin glargine (LANTUS) 100 units/mL subcutaneous injection, Inject 28 Units under the skin daily at bedtime Dx: Diabetes E11 9, dispense pens, Disp: 10 mL, Rfl: 0    insulin lispro (HumaLOG) 100 units/mL injection, Inject 8 Units under the skin 3 (three) times a day before meals Dx: Diabetes E11 9  Dispense pens, Disp: 8 mL, Rfl: 0    LANTUS SOLOSTAR 100 units/mL injection pen, INJECT 28 UNIT BEDTIME, Disp: 1 pen, Rfl: 5    lisinopril-hydrochlorothiazide (PRINZIDE,ZESTORETIC) 20-12 5 MG per tablet, Take 2 tablets by mouth daily, Disp: , Rfl:     lisinopril-hydrochlorothiazide (PRINZIDE,ZESTORETIC) 20-12 5 MG per tablet, TAKE 2 TABLETS BY MOUTH EVERY DAY, Disp: 60 tablet, Rfl: 1    metoprolol tartrate (LOPRESSOR) 100 mg tablet, TAKE 1 TABLET TWICE A DAY, Disp: 60 tablet, Rfl: 5    Objective     Physical Exam:    General appearance: alert and oriented, in no acute distress  Neck: no adenopathy, no carotid bruit, no JVD, supple, symmetrical, trachea midline, thyroid not enlarged, symmetric, no tenderness/mass/nodules and Left inferior neck/supraclavicular incision healing well without erythema, induration, drainage, hematoma, ecchymosis or hemorrhage  Lungs: clear to auscultation bilaterally  Chest wall: no tenderness, Well-healed midline sternotomy scar noted  Sternum stable  Heart: regular rate and rhythm, S1, S2 normal, no murmur, click, rub or gallop  Abdomen: soft, non-tender; bowel sounds normal; no masses,  no organomegaly and Nondistended  No abdominal bruits  Extremities: extremities normal, warm and well-perfused; no cyanosis, clubbing, or edema and Left upper extremity warm, pink and well perfused  No tissue loss  No cyanosis or pallor   , biceps and triceps strength 5/5  Left upper extremity motor and sensory intact   2+ L radial and ulnar pulses    Pulse exam:  Radial: Right: 2+ Left[de-identified] 2+  Ulnar: Right: 2+ Left[de-identified] 2+  Femoral: Right: 2+ Left: 2+

## 2018-08-29 NOTE — LETTER
August 29, 2018     Sundar King [place]  [facility]  [address] [address]    Patient: Deepali Child  YOB: 1945   Date of Visit: 8/29/2018       Dear Dr Osmel Robertson:    I had the pleasure seeing your patient, Janeth Ortiz, in postoperative follow-up related to recent L CCA-subclavian bypass and embolization of proximal subclavian artery by Dr Henry Ramey 8/13/18  Below are my notes for this consultation  If you have questions, please do not hesitate to call me  I look forward to following your patient along with you  Sincerely,        Esau Rice, DO        CC: No Recipients  Luz Marina Novak PA-C  8/29/2018  2:30 PM  Sign at close encounter  Chronic thoracic aortic dissection [facility])  71-year-old gentleman with a history of type A aortic dissection, s/p ascending aortic repair with hemiarch reconstruction by Dr Osmel Robertson 9/6/2017 now with degeneration w/chronic thoracic aortic dissection and aneurysmal dilatation of proximal descending thoracic aorta requiring repair by cardiac surgery  Staged procedure with debranching followed by TEVAR recommended  Patient s/p L CCA-subclavian bypass w/[place]-Behzad graft and embolization of proximal L subclavian artery by Dr Henry Ramey 8/13/2018  -doing well, incision healing well  -continue aspirin and statin therapy  -+ palpable left radial and ulnar pulses  -follow-up with Dr Osmel Robertson to discuss 2nd stage of procedure (TEVAR) as previously planned  -TEVAR to be scheduled by cardiac surgery  *Of note, very high bifurcation of CFA at level of femoral head bilaterally noted on CTA*  -d/w Dr Ed Laurent        Assessment/Plan   Diagnoses and all orders for this visit:    Chronic thoracic aortic dissection ([address] )    Postoperative state        No chief complaint on file  Subjective   Patient ID: Deepali Child  is a 68 y o  male    Chief complaint: Left common carotid to subclavian artery bypass with embolization of proximal subclavian artery SLB TCO 8/13/18  Pt is on asa     21-year-old gentleman with a history of HTN, HLD, type 2 DM, prostate CA, type A aortic dissection, s/p ascending aortic repair with hemiarch reconstruction by Dr Lon Marquez 9/6/2017 now with degeneration w/chronic thoracic aortic dissection and aneurysmal dilatation of proximal descending thoracic aorta requiring repair  Patient referred to vascular surgery for staged debranching prior to TEVAR  Patient is now s/p L CCA-subclavian bypass w/[place]-Behzad graft and embolization of proximal L subclavian artery by Dr Monica Myles 8/13/2018  The patient's postoperative course was unremarkable and he was discharged home on 8/14/2018  The patient returns to the office today for postoperative follow-up  Patient denies fever, shaking chills, sweats, aphasia, facial droop, amaurosis fugax, dysarthria, lateralizing extremity weakness or left upper extremity weakness, paresthesias, numbness, cool sensation, color change or tissue loss  The patient's left inferior neck incision is healed well  No evidence of erythema, induration, hematoma, hemorrhage or drainage          The following portions of the patient's history were reviewed and updated as appropriate: allergies, current medications, past family history, past medical history, past social history, past surgical history and problem list     Review of Systems    Patient Active Problem List   Diagnosis    Prostate cancer ([address] )    Hypertension    Diabetes mellitus ([address] )    Chronic thoracic aortic dissection ([address] )    Acute blood loss anemia    S/P ascending aortic aneurysm repair    Hypokalemia    Atrial fibrillation ([address] )    KARIN (obstructive sleep apnea)    Class 2 obesity due to excess calories without serious comorbidity with body mass index (BMI) of 37 0 to 37 9 in adult    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HCC)    Hyperlipidemia    Screening for colon cancer    Adenomatous polyp of descending colon    S/P vascular bypass       Past Surgical History:   Procedure Laterality Date    CARDIAC SURGERY      CIRCUMCISION      COLONOSCOPY      IR CEREBRAL ANGIOGRAPHY / INTERVENTION  8/13/2018    NM ASCEND AORTA GRAFT W ROOT REPLACMENT  VALVE CONDUIT/CORON RECONSTRUCT N/A 9/6/2017    Procedure: ASCENDING AORTIC REPAIR WITH A 26MM  GELWEAVE STRAIGHT GRAFT AND HEMIARCH WITH 8MM SIDE ARM BRANCH;  Surgeon: Alonzo Arredondo MD;  [facility];  Service: Cardiac Surgery    NM EXPLOR POSTOP BLEED,INFEC,CLOT-CHST N/A 9/6/2017    Procedure: RE-EXPLORATION MEDIASTERNAL CAVITY FOR POST-OP BLEED (BRING BACK); Surgeon: Alonzo Arredondo MD;  [facility];  Service: Cardiac Surgery    NM VEIN BYPASS GRAFT,CAROTID-BRACHIAL Left 8/13/2018    Procedure: BYPASS CAROTID SUBCLAVIAN WITH EMBOLIZATION OF PROXIMAL SUBCLAVIAN ARTERY  ;  Surgeon: Abraham Franks MD;  [facility];  Service: Vascular       Family History   Problem Relation Age of Onset    Stroke Mother         complications    Hypertension Mother    Ezekiel Rodas Arthritis Mother     No Known Problems Father        Social History     Social History    Marital status:       Spouse name: N/A    Number of children: N/A    Years of education: N/A     Occupational History          retired     Social History Main Topics    Smoking status: Never Smoker    Smokeless tobacco: Never Used    Alcohol use Yes      Comment: social beer or two daily    Drug use: No    Sexual activity: No      Comment: denied hx of sexually active high risk     Other Topics Concern    Not on file     Social History Narrative    1 serving caffeine/day    Less than high school diploma    Person living alone    Participate in moderate activities both inside and outside of the home       No Known Allergies      Current Outpatient Prescriptions:     acetaminophen (TYLENOL) 325 mg tablet, Take 2 tablets (650 mg total) by mouth every 6 (six) hours as needed for mild pain, Disp: , Rfl:     amLODIPine (NORVASC) 10 mg tablet, Take 1 tablet by mouth 2 (two) times a day, Disp: , Rfl:     aspirin 81 mg chewable tablet, Chew 1 tablet daily, Disp: 30 tablet, Rfl: 0    atorvastatin (LIPITOR) 80 mg tablet, Take 1 tablet (80 mg total) by mouth daily, Disp: 90 tablet, Rfl: 5    B-D ULTRAFINE III SHORT PEN 31G X 8 MM MISC, USE AS DIRECTED , Disp: 100 each, Rfl: 3    B-D ULTRAFINE III SHORT PEN 31G X 8 MM MISC, USE AS DIRECTED , Disp: 100 each, Rfl: 3    insulin glargine (LANTUS) 100 units/mL subcutaneous injection, Inject 28 Units under the skin daily at bedtime Dx: Diabetes E11 9, dispense pens, Disp: 10 mL, Rfl: 0    insulin lispro (HumaLOG) 100 units/mL injection, Inject 8 Units under the skin 3 (three) times a day before meals Dx: Diabetes E11 9  Dispense pens, Disp: 8 mL, Rfl: 0    LANTUS SOLOSTAR 100 units/mL injection pen, INJECT 28 UNIT BEDTIME, Disp: 1 pen, Rfl: 5    lisinopril-hydrochlorothiazide (PRINZIDE,ZESTORETIC) 20-12 5 MG per tablet, Take 2 tablets by mouth daily, Disp: , Rfl:     lisinopril-hydrochlorothiazide (PRINZIDE,ZESTORETIC) 20-12 5 MG per tablet, TAKE 2 TABLETS BY MOUTH EVERY DAY, Disp: 60 tablet, Rfl: 1    metoprolol tartrate (LOPRESSOR) 100 mg tablet, TAKE 1 TABLET TWICE A DAY, Disp: 60 tablet, Rfl: 5    Objective     Physical Exam:    General appearance: alert and oriented, in no acute distress  Neck: no adenopathy, no carotid bruit, no JVD, supple, symmetrical, trachea midline, thyroid not enlarged, symmetric, no tenderness/mass/nodules and Left inferior neck/supraclavicular incision healing well without erythema, induration, drainage, hematoma, ecchymosis or hemorrhage  Lungs: clear to auscultation bilaterally  Chest wall: no tenderness, Well-healed midline sternotomy scar noted  Sternum stable  Heart: regular rate and rhythm, S1, S2 normal, no murmur, click, rub or gallop  Abdomen: soft, non-tender; bowel sounds normal; no masses,  no organomegaly and Nondistended    No abdominal bruits  Extremities: extremities normal, warm and well-perfused; no cyanosis, clubbing, or edema and Left upper extremity warm, pink and well perfused  No tissue loss  No cyanosis or pallor   , biceps and triceps strength 5/5  Left upper extremity motor and sensory intact   2+ L radial and ulnar pulses    Pulse exam:  Radial: Right: 2+ Left[de-identified] 2+  Ulnar: Right: 2+ Left[de-identified] 2+  Femoral: Right: 2+ Left: 2+

## 2018-08-29 NOTE — ASSESSMENT & PLAN NOTE
64-year-old gentleman with a history of type A aortic dissection, s/p ascending aortic repair with hemiarch reconstruction by Dr Inna Pérez 9/6/2017 now with degeneration w/chronic thoracic aortic dissection and aneurysmal dilatation of proximal descending thoracic aorta requiring repair by cardiac surgery  Staged procedure with debranching followed by TEVAR recommended  Patient s/p L CCA-subclavian bypass w/[place]-Behzad graft and embolization of proximal L subclavian artery by Dr Montrell Victor 8/13/2018  -doing well, incision healing well  -continue aspirin and statin therapy  -+ palpable left radial and ulnar pulses  -follow-up with Dr Inna Pérez to discuss 2nd stage of procedure (TEVAR) as previously planned  -TEVAR to be scheduled by cardiac surgery   *Of note, very high bifurcation of CFA at level of femoral head bilaterally noted on CTA*  -d/w Dr Alicia Melo

## 2018-08-29 NOTE — LETTER
August 29, 2018     Genevive Rising, [address]    Patient: Ken Pepoles  YOB: 1945   Date of Visit: 8/29/2018       Dear Dr Alise Diamond: I had the pleasure seeing your patient, Aron Frias , in follow-up today in the office  Below are my notes for this consultation  If you have questions, please do not hesitate to call me  I look forward to following your patient along with you  Sincerely,        Esau Rice, DO        CC: No Recipients  Pj Valencia PA-C  8/29/2018  2:30 PM  Sign at close encounter  Chronic thoracic aortic dissection [facility])  52-year-old gentleman with a history of type A aortic dissection, s/p ascending aortic repair with hemiarch reconstruction by Dr Messi Rey 9/6/2017 now with degeneration w/chronic thoracic aortic dissection and aneurysmal dilatation of proximal descending thoracic aorta requiring repair by cardiac surgery  Staged procedure with debranching followed by TEVAR recommended  Patient s/p L CCA-subclavian bypass w/[place]-Behzad graft and embolization of proximal L subclavian artery by Dr Razia Peterson 8/13/2018  -doing well, incision healing well  -continue aspirin and statin therapy  -+ palpable left radial and ulnar pulses  -follow-up with Dr Messi Rey to discuss 2nd stage of procedure (TEVAR) as previously planned  -TEVAR to be scheduled by cardiac surgery  *Of note, very high bifurcation of CFA at level of femoral head bilaterally noted on CTA*  -d/w Dr Leroy Jeffery        Assessment/Plan   Diagnoses and all orders for this visit:    Chronic thoracic aortic dissection ([address] )    Postoperative state        No chief complaint on file  Subjective   Patient ID: Ken Peoples  is a 68 y o  male  Chief complaint: Left common carotid to subclavian artery bypass with embolization of proximal subclavian artery SLB TCO 8/13/18   Pt is on asa     52-year-old gentleman with a history of HTN, HLD, type 2 DM, prostate CA, type A aortic dissection, s/p ascending aortic repair with hemiarch reconstruction by Dr Delmy Bianchi 9/6/2017 now with degeneration w/chronic thoracic aortic dissection and aneurysmal dilatation of proximal descending thoracic aorta requiring repair  Patient referred to vascular surgery for staged debranching prior to TEVAR  Patient is now s/p L CCA-subclavian bypass w/[place]-Behzad graft and embolization of proximal L subclavian artery by Dr Cash Short 8/13/2018  The patient's postoperative course was unremarkable and he was discharged home on 8/14/2018  The patient returns to the office today for postoperative follow-up  Patient denies fever, shaking chills, sweats, aphasia, facial droop, amaurosis fugax, dysarthria, lateralizing extremity weakness or left upper extremity weakness, paresthesias, numbness, cool sensation, color change or tissue loss  The patient's left inferior neck incision is healed well  No evidence of erythema, induration, hematoma, hemorrhage or drainage          The following portions of the patient's history were reviewed and updated as appropriate: allergies, current medications, past family history, past medical history, past social history, past surgical history and problem list     Review of Systems    Patient Active Problem List   Diagnosis    Prostate cancer [facility] )    Hypertension    Diabetes mellitus ([facility] )    Chronic thoracic aortic dissection ([address] )    Acute blood loss anemia    S/P ascending aortic aneurysm repair    Hypokalemia    Atrial fibrillation ([address] )    KARIN (obstructive sleep apnea)    Class 2 obesity due to excess calories without serious comorbidity with body mass index (BMI) of 37 0 to 37 9 in adult    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HCC)    Hyperlipidemia    Screening for colon cancer    Adenomatous polyp of descending colon    S/P vascular bypass       Past Surgical History:   Procedure Laterality Date    CARDIAC SURGERY      CIRCUMCISION      COLONOSCOPY      IR CEREBRAL ANGIOGRAPHY / INTERVENTION  8/13/2018    AL ASCEND AORTA GRAFT W ROOT REPLACMENT  VALVE CONDUIT/CORON RECONSTRUCT N/A 9/6/2017    Procedure: ASCENDING AORTIC REPAIR WITH A 26MM  GELWEAVE STRAIGHT GRAFT AND HEMIARCH WITH 8MM SIDE ARM BRANCH;  Surgeon: Patricia Montero MD;  [facility];  Service: Cardiac Surgery    AL EXPLOR POSTOP BLEED,INFEC,CLOT-CHST N/A 9/6/2017    Procedure: RE-EXPLORATION MEDIASTERNAL CAVITY FOR POST-OP BLEED (BRING BACK); Surgeon: Patricia Montero MD;  [facility];  Service: Cardiac Surgery    AL VEIN BYPASS GRAFT,CAROTID-BRACHIAL Left 8/13/2018    Procedure: BYPASS CAROTID SUBCLAVIAN WITH EMBOLIZATION OF PROXIMAL SUBCLAVIAN ARTERY  ;  Surgeon: Mitchell Sotomayor MD;  [facility];  Service: Vascular       Family History   Problem Relation Age of Onset    Stroke Mother         complications    Hypertension Mother    [place] Arthritis Mother     No Known Problems Father        Social History     Social History    Marital status:       Spouse name: N/A    Number of children: N/A    Years of education: N/A     Occupational History          retired     Social History Main Topics    Smoking status: Never Smoker    Smokeless tobacco: Never Used    Alcohol use Yes      Comment: social beer or two daily    Drug use: No    Sexual activity: No      Comment: denied hx of sexually active high risk     Other Topics Concern    Not on file     Social History Narrative    1 serving caffeine/day    Less than high school diploma    Person living alone    Participate in moderate activities both inside and outside of the home       No Known Allergies      Current Outpatient Prescriptions:     acetaminophen (TYLENOL) 325 mg tablet, Take 2 tablets (650 mg total) by mouth every 6 (six) hours as needed for mild pain, Disp: , Rfl:     amLODIPine (NORVASC) 10 mg tablet, Take 1 tablet by mouth 2 (two) times a day, Disp: , Rfl:     aspirin 81 mg chewable tablet, Chew 1 tablet daily, Disp: 30 tablet, Rfl: 0    atorvastatin (LIPITOR) 80 mg tablet, Take 1 tablet (80 mg total) by mouth daily, Disp: 90 tablet, Rfl: 5    B-D ULTRAFINE III SHORT PEN 31G X 8 MM MISC, USE AS DIRECTED , Disp: 100 each, Rfl: 3    B-D ULTRAFINE III SHORT PEN 31G X 8 MM MISC, USE AS DIRECTED , Disp: 100 each, Rfl: 3    insulin glargine (LANTUS) 100 units/mL subcutaneous injection, Inject 28 Units under the skin daily at bedtime Dx: Diabetes E11 9, dispense pens, Disp: 10 mL, Rfl: 0    insulin lispro (HumaLOG) 100 units/mL injection, Inject 8 Units under the skin 3 (three) times a day before meals Dx: Diabetes E11 9  Dispense pens, Disp: 8 mL, Rfl: 0    LANTUS SOLOSTAR 100 units/mL injection pen, INJECT 28 UNIT BEDTIME, Disp: 1 pen, Rfl: 5    lisinopril-hydrochlorothiazide (PRINZIDE,ZESTORETIC) 20-12 5 MG per tablet, Take 2 tablets by mouth daily, Disp: , Rfl:     lisinopril-hydrochlorothiazide (PRINZIDE,ZESTORETIC) 20-12 5 MG per tablet, TAKE 2 TABLETS BY MOUTH EVERY DAY, Disp: 60 tablet, Rfl: 1    metoprolol tartrate (LOPRESSOR) 100 mg tablet, TAKE 1 TABLET TWICE A DAY, Disp: 60 tablet, Rfl: 5    Objective     Physical Exam:    General appearance: alert and oriented, in no acute distress  Neck: no adenopathy, no carotid bruit, no JVD, supple, symmetrical, trachea midline, thyroid not enlarged, symmetric, no tenderness/mass/nodules and Left inferior neck/supraclavicular incision healing well without erythema, induration, drainage, hematoma, ecchymosis or hemorrhage  Lungs: clear to auscultation bilaterally  Chest wall: no tenderness, Well-healed midline sternotomy scar noted  Sternum stable  Heart: regular rate and rhythm, S1, S2 normal, no murmur, click, rub or gallop  Abdomen: soft, non-tender; bowel sounds normal; no masses,  no organomegaly and Nondistended    No abdominal bruits  Extremities: extremities normal, warm and well-perfused; no cyanosis, clubbing, or edema and Left upper extremity warm, pink and well perfused  No tissue loss  No cyanosis or pallor   , biceps and triceps strength 5/5  Left upper extremity motor and sensory intact   2+ L radial and ulnar pulses    Pulse exam:  Radial: Right: 2+ Left[de-identified] 2+  Ulnar: Right: 2+ Left[de-identified] 2+  Femoral: Right: 2+ Left: 2+

## 2018-08-29 NOTE — PATIENT INSTRUCTIONS
-we are referring you back to see year cardiac surgeon, Dr Finn Hernandez, to discuss scheduling of thoracic endograft  -continue to clean your left neck incision with soap and water daily  No soaking or submerging the incision x 2 weeks  No lotions, ointments or creams to incision x 2 weeks

## 2018-09-04 ENCOUNTER — PREP FOR PROCEDURE (OUTPATIENT)
Dept: VASCULAR SURGERY | Facility: CLINIC | Age: 73
End: 2018-09-04

## 2018-09-04 DIAGNOSIS — I71.01 CHRONIC DISSECTION OF THORACIC AORTA (HCC): Primary | ICD-10-CM

## 2018-09-04 PROBLEM — I71.019 CHRONIC DISSECTION OF THORACIC AORTA: Status: ACTIVE | Noted: 2018-09-04

## 2018-09-05 ENCOUNTER — TRANSCRIBE ORDERS (OUTPATIENT)
Dept: ADMINISTRATIVE | Facility: HOSPITAL | Age: 73
End: 2018-09-05

## 2018-09-05 DIAGNOSIS — I71.01 DISSECTION OF AORTA, THORACIC (HCC): Primary | ICD-10-CM

## 2018-09-05 NOTE — PRE-PROCEDURE INSTRUCTIONS
Pre-Surgery Instructions:   Medication Instructions    acetaminophen (TYLENOL) 325 mg tablet Patient was instructed by Physician and understands   amLODIPine (NORVASC) 10 mg tablet Patient was instructed by Physician and understands   aspirin 81 mg chewable tablet Patient was instructed by Physician and understands   atorvastatin (LIPITOR) 80 mg tablet Patient was instructed by Physician and understands   insulin lispro (HumaLOG) 100 units/mL injection Patient was instructed by Physician and understands   LANTUS SOLOSTAR 100 units/mL injection pen Patient was instructed by Physician and understands   lisinopril-hydrochlorothiazide (PRINZIDE,ZESTORETIC) 20-12 5 MG per tablet Patient was instructed by Physician and understands   lisinopril-hydrochlorothiazide (PRINZIDE,ZESTORETIC) 20-12 5 MG per tablet Patient was instructed by Physician and understands   metoprolol tartrate (LOPRESSOR) 100 mg tablet Patient was instructed by Physician and understands  Pre op instructions reviewed at this time he states he had recent surgery in August and is familiar with routine  Will follow instructions that Dr Garrett Rausch gives  Bathing and hospital instructions reviewed

## 2018-09-07 ENCOUNTER — OFFICE VISIT (OUTPATIENT)
Dept: CARDIAC SURGERY | Facility: CLINIC | Age: 73
End: 2018-09-07
Payer: COMMERCIAL

## 2018-09-07 VITALS
DIASTOLIC BLOOD PRESSURE: 62 MMHG | HEART RATE: 54 BPM | OXYGEN SATURATION: 97 % | BODY MASS INDEX: 37.32 KG/M2 | RESPIRATION RATE: 14 BRPM | TEMPERATURE: 98.4 F | WEIGHT: 224 LBS | SYSTOLIC BLOOD PRESSURE: 112 MMHG | HEIGHT: 65 IN

## 2018-09-07 DIAGNOSIS — Z98.890 S/P ASCENDING AORTIC ANEURYSM REPAIR: ICD-10-CM

## 2018-09-07 DIAGNOSIS — I71.9 DESCENDING AORTIC ANEURYSM (HCC): Primary | ICD-10-CM

## 2018-09-07 DIAGNOSIS — Z86.79 S/P ASCENDING AORTIC ANEURYSM REPAIR: ICD-10-CM

## 2018-09-07 DIAGNOSIS — I71.01 CHRONIC DISSECTION OF THORACIC AORTA (HCC): ICD-10-CM

## 2018-09-07 PROCEDURE — 99215 OFFICE O/P EST HI 40 MIN: CPT | Performed by: PHYSICIAN ASSISTANT

## 2018-09-07 RX ORDER — CHLORHEXIDINE GLUCONATE 0.12 MG/ML
15 RINSE ORAL ONCE
Status: CANCELLED | OUTPATIENT
Start: 2018-09-07 | End: 2018-09-07

## 2018-09-07 NOTE — H&P
Consultation - Cardiothoracic Surgery   Alba Carrion  68 y o  male MRN: [id number]    Reason for Consult / Principal Problem: 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    History of Present Illness: Alba Carrion  is a 68y o  year old male known to our service for undergoing emergent surgical repair of an acute TYpe A aortic dissection in September 2017  He was seen in the office in 4/2018 for follow up with a CTA chest/abdomen/pelvis  AT that time he had growth of the proximal most portion of his descending aorta  He was recomemended to have early TEVAR to prevent progressive dilation  He has subsequently had a left common carotid to subclavian artery bypass with embolization of proximal subclavian artery on 8/13/2018  He returns to the office today for scheduling TEVAR  The patient states he is ultimately doing well  He has no specific complaints on examination  He is alone on consultation today but states he has strong family support and his daughter lives next door to him  He tolerates his ADLs independently  He monitors his blood pressure and understands his goal   He is a insulin dependent diabetic and checks his blood glucose routinely, last noted value of 120  Past Medical History:  Past Medical History:   Diagnosis Date    Arthritis     CPAP (continuous positive airway pressure) dependence     Diabetes ([address] )     Hyperlipidemia     Hypertension     Prostate cancer ([address] )     prostate    Sleep apnea     Stroke (cerebrum) ([address] )     Wears glasses        Past Surgical History:   Past Surgical History:   Procedure Laterality Date    CARDIAC SURGERY      CIRCUMCISION      COLONOSCOPY      IR CEREBRAL ANGIOGRAPHY / INTERVENTION  8/13/2018    NC ASCEND AORTA GRAFT W ROOT REPLACMENT  VALVE CONDUIT/CORON RECONSTRUCT N/A 9/6/2017    Procedure: ASCENDING AORTIC REPAIR WITH A 26MM  GELWEAVE STRAIGHT GRAFT AND HEMIARCH WITH 8MM SIDE ARM BRANCH;  Surgeon: Les Barrow MD; [facility];  Service: Cardiac Surgery    MO EXPLOR POSTOP BLEED,INFEC,CLOT-CHST N/A 9/6/2017    Procedure: RE-EXPLORATION MEDIASTERNAL CAVITY FOR POST-OP BLEED (BRING BACK); Surgeon: Selwyn Mendoza MD;  [facility];  Service: Cardiac Surgery    MO VEIN BYPASS GRAFT,CAROTID-BRACHIAL Left 8/13/2018    Procedure: BYPASS CAROTID SUBCLAVIAN WITH EMBOLIZATION OF PROXIMAL SUBCLAVIAN ARTERY  ;  Surgeon: Young Kate MD;  [facility];  Service: Vascular     Family History:  Family History   Problem Relation Age of Onset    Stroke Mother         complications    Hypertension Mother    [facility] Arthritis Mother     No Known Problems Father      Social History:    History   Alcohol Use    Yes     Comment: social beer or two daily     History   Drug Use No     History   Smoking Status    Never Smoker   Smokeless Tobacco    Never Used       Home Medications:   Prior to Admission medications    Medication Sig Start Date End Date Taking? Authorizing Provider   acetaminophen (TYLENOL) 325 mg tablet Take 2 tablets (650 mg total) by mouth every 6 (six) hours as needed for mild pain 8/14/18  Yes Esme Jacobo PA-C   amLODIPine (NORVASC) 10 mg tablet Take 1 tablet by mouth 2 (two) times a day 10/31/17  Yes Historical Provider, MD   aspirin 81 mg chewable tablet Chew 1 tablet daily 9/14/17  Yes LV Bustos MD   atorvastatin (LIPITOR) 80 mg tablet Take 1 tablet (80 mg total) by mouth daily 6/11/18  Yes Lei Grove DO   insulin glargine (LANTUS) 100 units/mL subcutaneous injection Inject 28 Units under the skin daily at bedtime Dx: Diabetes E11 9, dispense pens 9/14/17  Yes Keshawn Bustos MD   insulin lispro (HumaLOG) 100 units/mL injection Inject 8 Units under the skin 3 (three) times a day before meals Dx: Diabetes E11 9   Dispense pens 9/14/17  Yes Keshawn Bustos MD   lisinopril-hydrochlorothiazide (PRINZIDE,ZESTORETIC) 20-12 5 MG per tablet TAKE 2 TABLETS BY MOUTH EVERY DAY 8/27/18  Yes Rujul Grove, DO   metoprolol tartrate (LOPRESSOR) 100 mg tablet TAKE 1 TABLET TWICE A DAY 5/4/18  Yes Rujul Grove, DO   B-D ULTRAFINE III SHORT PEN 31G X 8 MM MISC USE AS DIRECTED  5/13/18   Everette Block, DO   B-D ULTRAFINE III SHORT PEN 31G X 8 MM MISC USE AS DIRECTED  7/15/18   Everette Matias, DO   LANTUS SOLOSTAR 100 units/mL injection pen INJECT 28 UNIT BEDTIME  Patient not taking: Reported on 9/7/2018 8/27/18 9/7/18  Everette Matias, DO   lisinopril-hydrochlorothiazide (PRINZIDE,ZESTORETIC) 20-12 5 MG per tablet Take 2 tablets by mouth daily 8/15/18 9/7/18  Katheryn Mittal PA-C       Allergies:  No Known Allergies    Review of Systems:  Review of Systems   Constitutional: Negative  HENT: Negative  Eyes: Negative  Respiratory: Negative  Cardiovascular: Negative  Gastrointestinal: Negative  Endocrine: Negative  Genitourinary: Negative  Musculoskeletal: Negative  Skin: Negative  Allergic/Immunologic: Negative  Neurological: Negative  Hematological: Negative  Psychiatric/Behavioral: Negative  Vital Signs:     Vitals:    09/07/18 0800 09/07/18 0835   BP: 112/66 112/62   BP Location: Left arm Right arm   Cuff Size: Adult    Pulse: (!) 54    Resp: 14    Temp: 98 4 °F (36 9 °C)    TempSrc: Oral    SpO2: 97%    Weight: 102 kg (224 lb)    Height: 5' 5" (1 651 m)        Physical Exam:  Physical Exam   Constitutional: He is oriented to person, place, and time  He appears well-developed and well-nourished  HENT:   Head: Normocephalic and atraumatic  Right Ear: External ear normal    Left Ear: External ear normal    Nose: Nose normal    Mouth/Throat: Oropharynx is clear and moist  No oropharyngeal exudate  Eyes: Conjunctivae and EOM are normal  Pupils are equal, round, and reactive to light  Right eye exhibits no discharge  Left eye exhibits no discharge  No scleral icterus  Neck: Normal range of motion  No JVD present   No tracheal deviation present  Cardiovascular: Normal rate, regular rhythm, normal heart sounds and intact distal pulses  Exam reveals no friction rub  No murmur heard  Pulmonary/Chest: Effort normal and breath sounds normal  No stridor  No respiratory distress  He has no wheezes  He has no rales  Abdominal: Soft  Bowel sounds are normal  He exhibits no distension  There is no tenderness  There is no rebound and no guarding  Musculoskeletal: Normal range of motion  He exhibits no edema, tenderness or deformity  Neurological: He is alert and oriented to person, place, and time  He displays normal reflexes  No cranial nerve deficit  He exhibits normal muscle tone  Coordination normal    Skin: Skin is warm and dry  No rash noted  He is not diaphoretic  No erythema  No pallor  Psychiatric: He has a normal mood and affect  His behavior is normal  Judgment and thought content normal        Lab Results:               Lab Results   Component Value Date    HGBA1C 6 8 (H) 07/25/2018     Lab Results   Component Value Date    CKTOTAL 72 01/15/2014    TROPONINI 0 03 09/06/2017       Imaging Studies:   CTA c/a/p  FINDINGS:     VASCULAR STRUCTURES:  The ascending aorta has been repaired  There is minimal residual perigraft fluid  Great vessels are widely patent  Residual dissection begins in the mid arch  The distal arch is aneurysmal measuring 53 mm in diameter best   measured on sagittal and coronal reconstructions  Previous measurement was 42 mm  The remainder of the descending thoracic aorta is normal in caliber  The extent of the dissection is unchanged, extending to just above the aortic bifurcation  The   visceral vessels remain patent  There is dissection flap extending into the celiac artery similar to previous though the artery remains patent including splenic and common hepatic arteries  SMA arises from the true lumen  Iliac vessels are diffusely   tortuous but no significant occlusive disease is present  There are high common femoral bifurcations bilaterally      There are very prominent serpiginous arteries in the anterior mediastinum with 3 aneurysms measuring 14 mm, 15 mm, and 27 x 24 mm one of which is partially rim calcified indicative of chronicity  The overall appearance is suggestive of a vascular   malformation and the appearance and size of the aneurysms are unchanged from the prior study  Venous drainage is not identified  There is arterial supply seen likely directly from the aorta arising inferiorly from the distal arch and possibly from the   left internal mammary artery and left coronary      OTHER FINDINGS:      CHEST:      HEART:  Normal cardiac size  No pericardial effusion      LUNGS:  Unremarkable      PLEURA: No pleural effusion      MEDIASTINUM AND MARII:  No mass or significant lymphadenopathy      CHEST WALL AND LOWER NECK:  Stable 19 mm nodule in the right lobe of the thyroid  Incidental discovery of one or more thyroid nodule(s) measuring more than 1 5 cm and without suspicious features is noted in this patient who is above 28years old;   according to guidelines published in the February 2015 white paper on incidental thyroid nodules in the Journal of the [employer] of Radiology Warner Guerrero), further characterization with thyroid ultrasound may be considered  However, as the majority of   incidental thyroid nodules are benign and because small incidental thyroid malignancies typically demonstrate indolent behavior, consideration of the patient's life expectancy and possible comorbidities should be taken into account prior to a decision to   pursue further imaging  Additionally, the nodule has been stable for at least 4 years      ABDOMEN     LIVER/BILIARY TREE:  Unremarkable      GALLBLADDER:  No calcified gallstones  No pericholecystic inflammatory change      SPLEEN:  Unremarkable    Normal size      PANCREAS:  Unremarkable      ADRENAL GLANDS:  Unremarkable      KIDNEYS/URETERS:  No solid renal mass  No hydronephrosis  No urinary tract calculi      PELVIS     REPRODUCTIVE ORGANS:  Unremarkable for patient's age      URINARY BLADDER:  Unremarkable      ADDITIONAL ABDOMINAL AND PELVIC STRUCTURES:      STOMACH AND BOWEL:  Unremarkable      ABDOMINOPELVIC CAVITY:  No pathologically enlarged mesenteric or retroperitoneal lymph nodes  No ascites or free intraperitoneal air      ABDOMINAL WALL/INGUINAL REGIONS:  Unremarkable      OSSEOUS STRUCTURES:  No acute fracture or destructive osseous lesion      IMPRESSION:     1  Stable and satisfactory appearance of ascending aortic repair  2  Stable extent of residual dissection however distal arch aneurysm has increased in the interim to 53 mm compared to 42 mm in October 2017   3   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  As described previously, multiple arterial feeders are likely and there are 3 aneurysms all of which remain stable in size  The largest aneurysm measures 27 mm  *above films reviewed    Assessment:  Patient Active Problem List    Diagnosis Date Noted    Chronic dissection of thoracic aorta ([facility] ) 09/04/2018    S/P vascular bypass 08/14/2018    Screening for colon cancer 07/02/2018    Adenomatous polyp of descending colon 07/02/2018    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[facility] ) 04/20/2018    KARIN (obstructive sleep apnea) 03/12/2018    Class 2 obesity due to excess calories without serious comorbidity with body mass index (BMI) of 37 0 to 37 9 in adult 03/12/2018    Atrial fibrillation ([address] ) 09/11/2017    Chronic thoracic aortic dissection ([facility] ) 09/06/2017    Acute blood loss anemia 09/06/2017    S/P ascending aortic aneurysm repair 09/06/2017    Hypokalemia 09/06/2017    Prostate cancer ([address] )     Hypertension     Diabetes mellitus ([address] )     Hyperlipidemia 06/07/2012     Plan:  Risks and benefits of TEVAR were discussed in detail today with the patient    They understand and wish to proceed with further workup and ultimately surgical intervention  We have ordered routine preoperative laboratory and vascular studies  Pending the results of these tests, they will be scheduled for surgery 9/19/2018  with ALPHONSE Suarez  was comfortable with our recommendations, and their questions were answered to their satisfaction  Thank you for allowing us to participate in the care of this patient       SIGNATURE: Shane Horne  DATE: September 7, 2018  TIME: 8:58 AM

## 2018-09-07 NOTE — PROGRESS NOTES
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    History of Present Illness: Columba Aguilera  is a 68y o  year old male known to our service for undergoing emergent surgical repair of an acute TYpe A aortic dissection in September 2017  He was seen in the office in 4/2018 for follow up with a CTA chest/abdomen/pelvis  AT that time he had growth of the proximal most portion of his descending aorta  He was recomemended to have early TEVAR to prevent progressive dilation  He has subsequently had a left common carotid to subclavian artery bypass with embolization of proximal subclavian artery on 8/13/2018  He returns to the office today for scheduling TEVAR  The patient states he is ultimately doing well  He has no specific complaints on examination  He is alone on consultation today but states he has strong family support and his daughter lives next door to him  He tolerates his ADLs independently  He monitors his blood pressure and understands his goal   He is a insulin dependent diabetic and checks his blood glucose routinely, last noted value of 120  Past Medical History:  Past Medical History:   Diagnosis Date    Arthritis     CPAP (continuous positive airway pressure) dependence     Diabetes ([address]     Hyperlipidemia     Hypertension     Prostate cancer ([address] )     prostate    Sleep apnea     Stroke (cerebrum) ([address] )     Wears glasses        Past Surgical History:   Past Surgical History:   Procedure Laterality Date    CARDIAC SURGERY      CIRCUMCISION      COLONOSCOPY      IR CEREBRAL ANGIOGRAPHY / INTERVENTION  8/13/2018    OR ASCEND AORTA GRAFT W ROOT REPLACMENT  VALVE CONDUIT/CORON RECONSTRUCT N/A 9/6/2017    Procedure: ASCENDING AORTIC REPAIR WITH A 26MM  GELWEAVE STRAIGHT GRAFT AND HEMIARCH WITH 8MM SIDE ARM BRANCH;  Surgeon: Odette Parikh MD; Location: [facility];  Service: Cardiac Surgery    ID EXPLOR POSTOP BLEED,INFEC,CLOT-CHST N/A 9/6/2017    Procedure: RE-EXPLORATION MEDIASTERNAL CAVITY FOR POST-OP BLEED (BRING BACK); Surgeon: Kwaku Heart MD;  Location: [facility];  Service: Cardiac Surgery    ID VEIN BYPASS GRAFT,CAROTID-BRACHIAL Left 8/13/2018    Procedure: BYPASS CAROTID SUBCLAVIAN WITH EMBOLIZATION OF PROXIMAL SUBCLAVIAN ARTERY  ;  Surgeon: Robin Lopez MD;  Location: [facility];  Service: Vascular     Family History:  Family History   Problem Relation Age of Onset    Stroke Mother         complications    Hypertension Mother    [facility] Arthritis Mother     No Known Problems Father      Social History:    History   Alcohol Use    Yes     Comment: social beer or two daily     History   Drug Use No     History   Smoking Status    Never Smoker   Smokeless Tobacco    Never Used       Home Medications:   Prior to Admission medications    Medication Sig Start Date End Date Taking? Authorizing Provider   acetaminophen (TYLENOL) 325 mg tablet Take 2 tablets (650 mg total) by mouth every 6 (six) hours as needed for mild pain 8/14/18  Yes Raul De La Garza PA-C   amLODIPine (NORVASC) 10 mg tablet Take 1 tablet by mouth 2 (two) times a day 10/31/17  Yes Historical Provider, MD   aspirin 81 mg chewable tablet Chew 1 tablet daily 9/14/17  Yes LV Tariq MD   atorvastatin (LIPITOR) 80 mg tablet Take 1 tablet (80 mg total) by mouth daily 6/11/18  Yes Lei Grove DO   insulin glargine (LANTUS) 100 units/mL subcutaneous injection Inject 28 Units under the skin daily at bedtime Dx: Diabetes E11 9, dispense pens 9/14/17  Yes Marcello Tariq MD   insulin lispro (HumaLOG) 100 units/mL injection Inject 8 Units under the skin 3 (three) times a day before meals Dx: Diabetes E11 9   Dispense pens 9/14/17  Yes Marcello Tariq MD   lisinopril-hydrochlorothiazide (PRINZIDE,ZESTORETIC) 20-12 5 MG per tablet TAKE 2 TABLETS BY MOUTH EVERY DAY 8/27/18  Yes Rujul Grove, DO   metoprolol tartrate (LOPRESSOR) 100 mg tablet TAKE 1 TABLET TWICE A DAY 5/4/18  Yes Rujul Grove, DO   B-D ULTRAFINE III SHORT PEN 31G X 8 MM MISC USE AS DIRECTED  5/13/18   Florence Miracle, DO   B-D ULTRAFINE III SHORT PEN 31G X 8 MM MISC USE AS DIRECTED  7/15/18   Florence Miracle, DO   LANTUS SOLOSTAR 100 units/mL injection pen INJECT 28 UNIT BEDTIME  Patient not taking: Reported on 9/7/2018 8/27/18 9/7/18  Florence Miracle, DO   lisinopril-hydrochlorothiazide (PRINZIDE,ZESTORETIC) 20-12 5 MG per tablet Take 2 tablets by mouth daily 8/15/18 9/7/18  Charisma Obrien PA-C       Allergies:  No Known Allergies    Review of Systems:  Review of Systems   Constitutional: Negative  HENT: Negative  Eyes: Negative  Respiratory: Negative  Cardiovascular: Negative  Gastrointestinal: Negative  Endocrine: Negative  Genitourinary: Negative  Musculoskeletal: Negative  Skin: Negative  Allergic/Immunologic: Negative  Neurological: Negative  Hematological: Negative  Psychiatric/Behavioral: Negative  Vital Signs:     Vitals:    09/07/18 0800 09/07/18 0835   BP: 112/66 112/62   BP Location: Left arm Right arm   Cuff Size: Adult    Pulse: (!) 54    Resp: 14    Temp: 98 4 °F (36 9 °C)    TempSrc: Oral    SpO2: 97%    Weight: 102 kg (224 lb)    Height: 5' 5" (1 651 m)        Physical Exam:  Physical Exam   Constitutional: He is oriented to person, place, and time  He appears well-developed and well-nourished  HENT:   Head: Normocephalic and atraumatic  Right Ear: External ear normal    Left Ear: External ear normal    Nose: Nose normal    Mouth/Throat: Oropharynx is clear and moist  No oropharyngeal exudate  Eyes: Conjunctivae and EOM are normal  Pupils are equal, round, and reactive to light  Right eye exhibits no discharge  Left eye exhibits no discharge  No scleral icterus  Neck: Normal range of motion  No JVD present   No tracheal deviation present  Cardiovascular: Normal rate, regular rhythm, normal heart sounds and intact distal pulses  Exam reveals no friction rub  No murmur heard  Pulmonary/Chest: Effort normal and breath sounds normal  No stridor  No respiratory distress  He has no wheezes  He has no rales  Abdominal: Soft  Bowel sounds are normal  He exhibits no distension  There is no tenderness  There is no rebound and no guarding  Musculoskeletal: Normal range of motion  He exhibits no edema, tenderness or deformity  Neurological: He is alert and oriented to person, place, and time  He displays normal reflexes  No cranial nerve deficit  He exhibits normal muscle tone  Coordination normal    Skin: Skin is warm and dry  No rash noted  He is not diaphoretic  No erythema  No pallor  Psychiatric: He has a normal mood and affect  His behavior is normal  Judgment and thought content normal        Lab Results:               Lab Results   Component Value Date    HGBA1C 6 8 (H) 07/25/2018     Lab Results   Component Value Date    CKTOTAL 72 01/15/2014    TROPONINI 0 03 09/06/2017       Imaging Studies:   CTA c/a/p  FINDINGS:     VASCULAR STRUCTURES:  The ascending aorta has been repaired  There is minimal residual perigraft fluid  Great vessels are widely patent  Residual dissection begins in the mid arch  The distal arch is aneurysmal measuring 53 mm in diameter best   measured on sagittal and coronal reconstructions  Previous measurement was 42 mm  The remainder of the descending thoracic aorta is normal in caliber  The extent of the dissection is unchanged, extending to just above the aortic bifurcation  The   visceral vessels remain patent  There is dissection flap extending into the celiac artery similar to previous though the artery remains patent including splenic and common hepatic arteries  SMA arises from the true lumen  Iliac vessels are diffusely   tortuous but no significant occlusive disease is present  There are high common femoral bifurcations bilaterally      There are very prominent serpiginous arteries in the anterior mediastinum with 3 aneurysms measuring 14 mm, 15 mm, and 27 x 24 mm one of which is partially rim calcified indicative of chronicity  The overall appearance is suggestive of a vascular   malformation and the appearance and size of the aneurysms are unchanged from the prior study  Venous drainage is not identified  There is arterial supply seen likely directly from the aorta arising inferiorly from the distal arch and possibly from the   left internal mammary artery and left coronary      OTHER FINDINGS:      CHEST:      HEART:  Normal cardiac size  No pericardial effusion      LUNGS:  Unremarkable      PLEURA: No pleural effusion      MEDIASTINUM AND MARII:  No mass or significant lymphadenopathy      CHEST WALL AND LOWER NECK:  Stable 19 mm nodule in the right lobe of the thyroid  Incidental discovery of one or more thyroid nodule(s) measuring more than 1 5 cm and without suspicious features is noted in this patient who is above 28years old;   according to guidelines published in the February 2015 white paper on incidental thyroid nodules in the Journal of the [employer] of [employer] Abhijit Ham), further characterization with thyroid ultrasound may be considered  However, as the majority of   incidental thyroid nodules are benign and because small incidental thyroid malignancies typically demonstrate indolent behavior, consideration of the patient's life expectancy and possible comorbidities should be taken into account prior to a decision to   pursue further imaging  Additionally, the nodule has been stable for at least 4 years      ABDOMEN     LIVER/BILIARY TREE:  Unremarkable      GALLBLADDER:  No calcified gallstones  No pericholecystic inflammatory change      SPLEEN:  Unremarkable    Normal size      PANCREAS:  Unremarkable      ADRENAL GLANDS:  Unremarkable      KIDNEYS/URETERS:  No solid renal mass  No hydronephrosis  No urinary tract calculi      PELVIS     REPRODUCTIVE ORGANS:  Unremarkable for patient's age      URINARY BLADDER:  Unremarkable      ADDITIONAL ABDOMINAL AND PELVIC STRUCTURES:      STOMACH AND BOWEL:  Unremarkable      ABDOMINOPELVIC CAVITY:  No pathologically enlarged mesenteric or retroperitoneal lymph nodes  No ascites or free intraperitoneal air      ABDOMINAL WALL/INGUINAL REGIONS:  Unremarkable      OSSEOUS STRUCTURES:  No acute fracture or destructive osseous lesion      IMPRESSION:     1  Stable and satisfactory appearance of ascending aortic repair  2  Stable extent of residual dissection however distal arch aneurysm has increased in the interim to 53 mm compared to 42 mm in October 2017   3   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  As described previously, multiple arterial feeders are likely and there are 3 aneurysms all of which remain stable in size  The largest aneurysm measures 27 mm  *above films reviewed    Assessment:  Patient Active Problem List    Diagnosis Date Noted    Chronic dissection of [facility] aorta [facility] [address] ) 09/04/2018    S/P vascular bypass 08/14/2018    Screening for colon cancer 07/02/2018    Adenomatous polyp of descending colon 07/02/2018    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[facility] [address] ) 04/20/2018    KARIN (obstructive sleep apnea) 03/12/2018    Class 2 obesity due to excess calories without serious comorbidity with body mass index (BMI) of 37 0 to 37 9 in adult 03/12/2018    Atrial fibrillation ([address] ) 09/11/2017    Chronic thoracic aortic dissection ([facility] [address] ) 09/06/2017    Acute blood loss anemia 09/06/2017    S/P ascending aortic aneurysm repair 09/06/2017    Hypokalemia 09/06/2017    Prostate cancer ([address] )     Hypertension     Diabetes mellitus ([address] )     Hyperlipidemia 06/07/2012     Plan:  Risks and benefits of TEVAR were discussed in detail today with the patient    They understand and wish to proceed with further workup and ultimately surgical intervention  We have ordered routine preoperative laboratory and vascular studies  Pending the results of these tests, they will be scheduled for surgery 9/19/2018  with ALPHONSE Trevizo  was comfortable with our recommendations, and their questions were answered to their satisfaction  Thank you for allowing us to participate in the care of this patient       SIGNATURE: Shane Lee  DATE: September 7, 2018  TIME: 8:58 AM

## 2018-09-07 NOTE — LETTER
September 7, 2018     Lizabeth Morales MD  [address] [address]    Patient: Maycol Sweeney  YOB: 1945   Date of Visit: 9/7/2018       Dear Dr Yamil Juan:    Thank you for referring Claudean Deutscher to me for evaluation  Below are my notes for this consultation  If you have questions, please do not hesitate to call me  I look forward to following your patient along with you  Sincerely,        Paulino Prieto MD        CC: DO Dominique Carlisle [place], [place]  9/7/2018 10:13 AM  Attested  Consultation - Cardiothoracic Surgery   Maycol Sweeney  68 y o  male MRN: [id number]    Reason for Consult / Principal Problem: 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    History of Present Illness: Maycol Sweeney  is a 68y o  year old male known to our service for undergoing emergent surgical repair of an acute TYpe A aortic dissection in September 2017  He was seen in the office in 4/2018 for follow up with a CTA chest/abdomen/pelvis  AT that time he had growth of the proximal most portion of his descending aorta  He was recomemended to have early TEVAR to prevent progressive dilation  He has subsequently had a left common carotid to subclavian artery bypass with embolization of proximal subclavian artery on 8/13/2018  He returns to the office today for scheduling TEVAR  The patient states he is ultimately doing well  He has no specific complaints on examination  He is alone on consultation today but states he has strong family support and his daughter lives next door to him  He tolerates his ADLs independently  He monitors his blood pressure and understands his goal   He is a insulin dependent diabetic and checks his blood glucose routinely, last noted value of 120       Past Medical History:  Past Medical History:   Diagnosis Date    Arthritis     CPAP (continuous positive airway pressure) dependence     Diabetes ([facility] [address] )     Hyperlipidemia     Hypertension     Prostate cancer [facility] [address] [facility]     prostate    Sleep apnea     Stroke (cerebrum) ([facility] [address] )     Wears glasses        Past Surgical History:   Past Surgical History:   Procedure Laterality Date    CARDIAC SURGERY      CIRCUMCISION      COLONOSCOPY      IR CEREBRAL ANGIOGRAPHY / INTERVENTION  8/13/2018    MN ASCEND AORTA GRAFT W ROOT REPLACMENT  VALVE CONDUIT/CORON RECONSTRUCT N/A 9/6/2017    Procedure: ASCENDING AORTIC REPAIR WITH A 26MM  GELWEAVE STRAIGHT GRAFT AND HEMIARCH WITH 8MM SIDE ARM BRANCH;  Surgeon: Patricia Montero MD;  [facility];  Service: Cardiac Surgery    MN EXPLOR POSTOP BLEED,INFEC,CLOT-CHST N/A 9/6/2017    Procedure: RE-EXPLORATION MEDIASTERNAL CAVITY FOR POST-OP BLEED (BRING BACK); Surgeon: Patricia Montero MD;  [facility];  Service: Cardiac Surgery    MN VEIN BYPASS GRAFT,CAROTID-BRACHIAL Left 8/13/2018    Procedure: BYPASS CAROTID SUBCLAVIAN WITH EMBOLIZATION OF PROXIMAL SUBCLAVIAN ARTERY  ;  Surgeon: Mitchell Sotomayor MD;  Location: [facility];  Service: Vascular     Family History:  Family History   Problem Relation Age of Onset    Stroke Mother         complications    Hypertension Mother    [de-identified] Arthritis Mother     No Known Problems Father      Social History:    History   Alcohol Use    Yes     Comment: social beer or two daily     History   Drug Use No     History   Smoking Status    Never Smoker   Smokeless Tobacco    Never Used       Home Medications:   Prior to Admission medications    Medication Sig Start Date End Date Taking?  Authorizing Provider   acetaminophen (TYLENOL) 325 mg tablet Take 2 tablets (650 mg total) by mouth every 6 (six) hours as needed for mild pain 8/14/18  Yes Koko Murry PA-C   amLODIPine (NORVASC) 10 mg tablet Take 1 tablet by mouth 2 (two) times a day 10/31/17  Yes Historical Provider, MD   aspirin 81 mg chewable tablet Chew 1 tablet daily 9/14/17  Yes Migdalia Cabrera MD   atorvastatin (LIPITOR) 80 mg tablet Take 1 tablet (80 mg total) by mouth daily 6/11/18  Yes Lei Grove DO   insulin glargine (LANTUS) 100 units/mL subcutaneous injection Inject 28 Units under the skin daily at bedtime Dx: Diabetes E11 9, dispense pens 9/14/17  Yes Daniel Ko MD   insulin lispro (HumaLOG) 100 units/mL injection Inject 8 Units under the skin 3 (three) times a day before meals Dx: Diabetes E11 9  Dispense pens 9/14/17  Yes LV Ko MD   lisinopril-hydrochlorothiazide (PRINZIDE,ZESTORETIC) 20-12 5 MG per tablet TAKE 2 TABLETS BY MOUTH EVERY DAY 8/27/18  Yes Lei Grove DO   metoprolol tartrate (LOPRESSOR) 100 mg tablet TAKE 1 TABLET TWICE A DAY 5/4/18  Yes Lei Grove DO   B-D ULTRAFINE III SHORT PEN 31G X 8 MM MISC USE AS DIRECTED  5/13/18   Jamaica Hart DO   B-D ULTRAFINE III SHORT PEN 31G X 8 MM MISC USE AS DIRECTED  7/15/18   Jamaica Hart DO   LANTUS SOLOSTAR 100 units/mL injection pen INJECT 28 UNIT BEDTIME  Patient not taking: Reported on 9/7/2018 8/27/18 9/7/18  Jamaica Hart DO   lisinopril-hydrochlorothiazide (PRINZIDE,ZESTORETIC) 20-12 5 MG per tablet Take 2 tablets by mouth daily 8/15/18 9/7/18  Rajendra Benedict PA-C       Allergies:  No Known Allergies    Review of Systems:  Review of Systems   Constitutional: Negative  HENT: Negative  Eyes: Negative  Respiratory: Negative  Cardiovascular: Negative  Gastrointestinal: Negative  Endocrine: Negative  Genitourinary: Negative  Musculoskeletal: Negative  Skin: Negative  Allergic/Immunologic: Negative  Neurological: Negative  Hematological: Negative  Psychiatric/Behavioral: Negative          Vital Signs:     Vitals:    09/07/18 0800 09/07/18 0835   BP: 112/66 112/62   BP Location: Left arm Right arm   Cuff Size: Adult    Pulse: (!) 54    Resp: 14    Temp: 98 4 °F (36 9 °C)    TempSrc: Oral    SpO2: 97%    Weight: 102 kg (224 lb)    Height: 5' 5" (1 651 m)        Physical Exam:  Physical Exam   Constitutional: He is oriented to person, place, and time  He appears well-developed and well-nourished  HENT:   Head: Normocephalic and atraumatic  Right Ear: External ear normal    Left Ear: External ear normal    Nose: Nose normal    Mouth/Throat: Oropharynx is clear and moist  No oropharyngeal exudate  Eyes: Conjunctivae and EOM are normal  Pupils are equal, round, and reactive to light  Right eye exhibits no discharge  Left eye exhibits no discharge  No scleral icterus  Neck: Normal range of motion  No JVD present  No tracheal deviation present  Cardiovascular: Normal rate, regular rhythm, normal heart sounds and intact distal pulses  Exam reveals no friction rub  No murmur heard  Pulmonary/Chest: Effort normal and breath sounds normal  No stridor  No respiratory distress  He has no wheezes  He has no rales  Abdominal: Soft  Bowel sounds are normal  He exhibits no distension  There is no tenderness  There is no rebound and no guarding  Musculoskeletal: Normal range of motion  He exhibits no edema, tenderness or deformity  Neurological: He is alert and oriented to person, place, and time  He displays normal reflexes  No cranial nerve deficit  He exhibits normal muscle tone  Coordination normal    Skin: Skin is warm and dry  No rash noted  He is not diaphoretic  No erythema  No pallor  Psychiatric: He has a normal mood and affect  His behavior is normal  Judgment and thought content normal        Lab Results:               Lab Results   Component Value Date    HGBA1C 6 8 (H) 07/25/2018     Lab Results   Component Value Date    CKTOTAL 72 01/15/2014    TROPONINI 0 03 09/06/2017       Imaging Studies:   CTA c/a/p  FINDINGS:     VASCULAR STRUCTURES:  The ascending aorta has been repaired  There is minimal residual perigraft fluid  Great vessels are widely patent  Residual dissection begins in the mid arch    The distal arch is aneurysmal measuring 53 mm in diameter best   measured on sagittal and coronal reconstructions  Previous measurement was 42 mm  The remainder of the descending thoracic aorta is normal in caliber  The extent of the dissection is unchanged, extending to just above the aortic bifurcation  The   visceral vessels remain patent  There is dissection flap extending into the celiac artery similar to previous though the artery remains patent including splenic and common hepatic arteries  SMA arises from the true lumen  Iliac vessels are diffusely   tortuous but no significant occlusive disease is present  There are high common femoral bifurcations bilaterally      There are very prominent serpiginous arteries in the anterior mediastinum with 3 aneurysms measuring 14 mm, 15 mm, and 27 x 24 mm one of which is partially rim calcified indicative of chronicity  The overall appearance is suggestive of a vascular   malformation and the appearance and size of the aneurysms are unchanged from the prior study  Venous drainage is not identified  There is arterial supply seen likely directly from the aorta arising inferiorly from the distal arch and possibly from the   left internal mammary artery and left coronary      OTHER FINDINGS:      CHEST:      HEART:  Normal cardiac size  No pericardial effusion      LUNGS:  Unremarkable      PLEURA: No pleural effusion      MEDIASTINUM AND MARII:  No mass or significant lymphadenopathy      CHEST WALL AND LOWER NECK:  Stable 19 mm nodule in the right lobe of the thyroid  Incidental discovery of one or more thyroid nodule(s) measuring more than 1 5 cm and without suspicious features is noted in this patient who is above 28years old;   according to guidelines published in the February 2015 white paper on incidental thyroid nodules in the Journal of the [employer] [facility]), further characterization with thyroid ultrasound may be considered    However, as the majority of   incidental thyroid nodules are benign and because small incidental thyroid malignancies typically demonstrate indolent behavior, consideration of the patient's life expectancy and possible comorbidities should be taken into account prior to a decision to   pursue further imaging  Additionally, the nodule has been stable for at least 4 years      ABDOMEN     LIVER/BILIARY TREE:  Unremarkable      GALLBLADDER:  No calcified gallstones  No pericholecystic inflammatory change      SPLEEN:  Unremarkable  Normal size      PANCREAS:  Unremarkable      ADRENAL GLANDS:  Unremarkable      KIDNEYS/URETERS:  No solid renal mass  No hydronephrosis  No urinary tract calculi      PELVIS     REPRODUCTIVE ORGANS:  Unremarkable for patient's age      URINARY BLADDER:  Unremarkable      ADDITIONAL ABDOMINAL AND PELVIC STRUCTURES:      STOMACH AND BOWEL:  Unremarkable      ABDOMINOPELVIC CAVITY:  No pathologically enlarged mesenteric or retroperitoneal lymph nodes  No ascites or free intraperitoneal air      ABDOMINAL WALL/INGUINAL REGIONS:  Unremarkable      OSSEOUS STRUCTURES:  No acute fracture or destructive osseous lesion      IMPRESSION:     1  Stable and satisfactory appearance of ascending aortic repair  2  Stable extent of residual dissection however distal arch aneurysm has increased in the interim to 53 mm compared to 42 mm in October 2017   3   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  As described previously, multiple arterial feeders are likely and there are 3 aneurysms all of which remain stable in size    The largest aneurysm measures 27 mm  *above films reviewed    Assessment:  Patient Active Problem List    Diagnosis Date Noted    Chronic dissection of thoracic aorta ([address] ) 09/04/2018    S/P vascular bypass 08/14/2018    Screening for colon cancer 07/02/2018    Adenomatous polyp of descending colon 07/02/2018    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[address] ) 04/20/2018    KARIN (obstructive sleep apnea) 03/12/2018    Class 2 obesity due to excess calories without serious comorbidity with body mass index (BMI) of 37 0 to 37 9 in adult 03/12/2018    Atrial fibrillation ([facility] ) 09/11/2017    Chronic thoracic aortic dissection ([facility] ) 09/06/2017    Acute blood loss anemia 09/06/2017    S/P ascending aortic aneurysm repair 09/06/2017    Hypokalemia 09/06/2017    Prostate cancer ([facility] )     Hypertension     Diabetes mellitus ([address] [address] )     Hyperlipidemia 06/07/2012     Plan:  Risks and benefits of TEVAR were discussed in detail today with the patient  They understand and wish to proceed with further workup and ultimately surgical intervention  We have ordered routine preoperative laboratory and vascular studies  Pending the results of these tests, they will be scheduled for surgery 9/19/2018  with ALPHONSE Dyer  was comfortable with our recommendations, and their questions were answered to their satisfaction  Thank you for allowing us to participate in the care of this patient  SIGNATURE: Brannon [place], [place]  DATE: September 7, 2018  TIME: 8:58 AM  Attestation signed by Afsaneh Warner MD at 9/7/2018 10:51 AM:  Pt seen and examined with PA  I agree with the above assessment and plan  Eunice Weiner returns to schedule TEVAR having completed left carotid-subclavian bypass with embolization of proximal left subclavian artery  This procedure was performed by Dr Nolberto Dupree  He is now ready to proceed with TEVAR for his chronic descending thoracic aortic dissection with aneurysmal degeneration  He is well aware of the risks but is now ready to proceed  I answered all of his remaining questions and informed consent was obtained  TEVAR is scheduled for 9/19/18      John Paul Bruno MD  DATE: September 7, 2018  TIME: 10:49 AM

## 2018-09-11 ENCOUNTER — HOSPITAL ENCOUNTER (OUTPATIENT)
Dept: NON INVASIVE DIAGNOSTICS | Facility: HOSPITAL | Age: 73
Discharge: HOME | End: 2018-09-11
Payer: COMMERCIAL

## 2018-09-11 ENCOUNTER — TRANSCRIBE ORDERS (OUTPATIENT)
Dept: ADMINISTRATIVE | Facility: HOSPITAL | Age: 73
End: 2018-09-11

## 2018-09-11 ENCOUNTER — HOSPITAL ENCOUNTER (OUTPATIENT)
Dept: RADIOLOGY | Facility: HOSPITAL | Age: 73
Discharge: HOME | End: 2018-09-11
Payer: COMMERCIAL

## 2018-09-11 ENCOUNTER — LAB REQUISITION (OUTPATIENT)
Dept: LAB | Facility: HOSPITAL | Age: 73
End: 2018-09-11
Payer: COMMERCIAL

## 2018-09-11 ENCOUNTER — APPOINTMENT (OUTPATIENT)
Dept: LAB | Facility: HOSPITAL | Age: 73
End: 2018-09-11
Payer: COMMERCIAL

## 2018-09-11 DIAGNOSIS — I71.2 THORACIC AORTIC ANEURYSM WITHOUT RUPTURE (HCC): ICD-10-CM

## 2018-09-11 DIAGNOSIS — I71.9 DESCENDING AORTIC ANEURYSM (HCC): ICD-10-CM

## 2018-09-11 DIAGNOSIS — I71.01 DISSECTION OF THORACIC AORTA (HCC): ICD-10-CM

## 2018-09-11 DIAGNOSIS — I71.9 AORTIC ANEURYSM WITHOUT RUPTURE (HCC): ICD-10-CM

## 2018-09-11 DIAGNOSIS — I71.01 DISSECTION OF AORTA, THORACIC (HCC): ICD-10-CM

## 2018-09-11 DIAGNOSIS — I71.01 DISSECTION OF AORTA, THORACIC (HCC): Primary | ICD-10-CM

## 2018-09-11 LAB
ABO GROUP BLD: NORMAL
ALBUMIN SERPL BCP-MCNC: 3.6 G/DL (ref 3.5–5)
ALP SERPL-CCNC: 125 U/L (ref 46–116)
ALT SERPL W P-5'-P-CCNC: 53 U/L (ref 12–78)
ANION GAP SERPL CALCULATED.3IONS-SCNC: 7 MMOL/L (ref 4–13)
APTT PPP: 31 SECONDS (ref 24–36)
AST SERPL W P-5'-P-CCNC: 38 U/L (ref 5–45)
BASOPHILS # BLD AUTO: 0.03 THOUSANDS/ΜL (ref 0–0.1)
BASOPHILS NFR BLD AUTO: 0 % (ref 0–1)
BILIRUB SERPL-MCNC: 0.48 MG/DL (ref 0.2–1)
BILIRUB UR QL STRIP: NEGATIVE
BLD GP AB SCN SERPL QL: NEGATIVE
BUN SERPL-MCNC: 15 MG/DL (ref 5–25)
CALCIUM SERPL-MCNC: 9.6 MG/DL (ref 8.3–10.1)
CHLORIDE SERPL-SCNC: 102 MMOL/L (ref 100–108)
CHOLEST SERPL-MCNC: 110 MG/DL (ref 50–200)
CLARITY UR: CLEAR
CO2 SERPL-SCNC: 34 MMOL/L (ref 21–32)
COLOR UR: YELLOW
CREAT SERPL-MCNC: 0.87 MG/DL (ref 0.6–1.3)
EOSINOPHIL # BLD AUTO: 0.28 THOUSAND/ΜL (ref 0–0.61)
EOSINOPHIL NFR BLD AUTO: 4 % (ref 0–6)
ERYTHROCYTE [DISTWIDTH] IN BLOOD BY AUTOMATED COUNT: 14.3 % (ref 11.6–15.1)
EST. AVERAGE GLUCOSE BLD GHB EST-MCNC: 151 MG/DL
GFR SERPL CREATININE-BSD FRML MDRD: 100 ML/MIN/1.73SQ M
GLUCOSE P FAST SERPL-MCNC: 95 MG/DL (ref 65–99)
GLUCOSE UR STRIP-MCNC: NEGATIVE MG/DL
HBA1C MFR BLD: 6.9 % (ref 4.2–6.3)
HCT VFR BLD AUTO: 42.3 % (ref 36.5–49.3)
HDLC SERPL-MCNC: 46 MG/DL (ref 40–60)
HGB BLD-MCNC: 13.9 G/DL (ref 12–17)
HGB UR QL STRIP.AUTO: NEGATIVE
IMM GRANULOCYTES # BLD AUTO: 0.02 THOUSAND/UL (ref 0–0.2)
IMM GRANULOCYTES NFR BLD AUTO: 0 % (ref 0–2)
INR PPP: 1.03 (ref 0.86–1.17)
KETONES UR STRIP-MCNC: NEGATIVE MG/DL
LDLC SERPL CALC-MCNC: 49 MG/DL (ref 0–100)
LEUKOCYTE ESTERASE UR QL STRIP: NEGATIVE
LYMPHOCYTES # BLD AUTO: 1.49 THOUSANDS/ΜL (ref 0.6–4.47)
LYMPHOCYTES NFR BLD AUTO: 20 % (ref 14–44)
MCH RBC QN AUTO: 28.8 PG (ref 26.8–34.3)
MCHC RBC AUTO-ENTMCNC: 32.9 G/DL (ref 31.4–37.4)
MCV RBC AUTO: 88 FL (ref 82–98)
MONOCYTES # BLD AUTO: 0.59 THOUSAND/ΜL (ref 0.17–1.22)
MONOCYTES NFR BLD AUTO: 8 % (ref 4–12)
NEUTROPHILS # BLD AUTO: 5.09 THOUSANDS/ΜL (ref 1.85–7.62)
NEUTS SEG NFR BLD AUTO: 68 % (ref 43–75)
NITRITE UR QL STRIP: NEGATIVE
NONHDLC SERPL-MCNC: 64 MG/DL
NRBC BLD AUTO-RTO: 0 /100 WBCS
PH UR STRIP.AUTO: 6.5 [PH] (ref 4.5–8)
PLATELET # BLD AUTO: 237 THOUSANDS/UL (ref 149–390)
PMV BLD AUTO: 9.5 FL (ref 8.9–12.7)
POTASSIUM SERPL-SCNC: 3.4 MMOL/L (ref 3.5–5.3)
PROT SERPL-MCNC: 8.3 G/DL (ref 6.4–8.2)
PROT UR STRIP-MCNC: NEGATIVE MG/DL
PROTHROMBIN TIME: 13.6 SECONDS (ref 11.8–14.2)
RBC # BLD AUTO: 4.82 MILLION/UL (ref 3.88–5.62)
RH BLD: POSITIVE
SODIUM SERPL-SCNC: 143 MMOL/L (ref 136–145)
SP GR UR STRIP.AUTO: 1.01 (ref 1–1.03)
SPECIMEN EXPIRATION DATE: NORMAL
TRIGL SERPL-MCNC: 76 MG/DL
UROBILINOGEN UR QL STRIP.AUTO: 0.2 E.U./DL
WBC # BLD AUTO: 7.5 THOUSAND/UL (ref 4.31–10.16)

## 2018-09-11 PROCEDURE — 81003 URINALYSIS AUTO W/O SCOPE: CPT | Performed by: PHYSICIAN ASSISTANT

## 2018-09-11 PROCEDURE — 83036 HEMOGLOBIN GLYCOSYLATED A1C: CPT | Performed by: FAMILY MEDICINE

## 2018-09-11 PROCEDURE — 85025 COMPLETE CBC W/AUTO DIFF WBC: CPT

## 2018-09-11 PROCEDURE — 87081 CULTURE SCREEN ONLY: CPT

## 2018-09-11 PROCEDURE — 36415 COLL VENOUS BLD VENIPUNCTURE: CPT | Performed by: FAMILY MEDICINE

## 2018-09-11 PROCEDURE — 86850 RBC ANTIBODY SCREEN: CPT | Performed by: PHYSICIAN ASSISTANT

## 2018-09-11 PROCEDURE — 71046 X-RAY EXAM CHEST 2 VIEWS: CPT

## 2018-09-11 PROCEDURE — 86901 BLOOD TYPING SEROLOGIC RH(D): CPT | Performed by: PHYSICIAN ASSISTANT

## 2018-09-11 PROCEDURE — 85730 THROMBOPLASTIN TIME PARTIAL: CPT

## 2018-09-11 PROCEDURE — 93005 ELECTROCARDIOGRAM TRACING: CPT

## 2018-09-11 PROCEDURE — 80053 COMPREHEN METABOLIC PANEL: CPT

## 2018-09-11 PROCEDURE — 80061 LIPID PANEL: CPT

## 2018-09-11 PROCEDURE — 86900 BLOOD TYPING SEROLOGIC ABO: CPT | Performed by: PHYSICIAN ASSISTANT

## 2018-09-11 PROCEDURE — 85610 PROTHROMBIN TIME: CPT

## 2018-09-12 LAB — MRSA NOSE QL CULT: NORMAL

## 2018-09-13 LAB
ATRIAL RATE: 49 BPM
P AXIS: 9 DEGREES
PR INTERVAL: 176 MS
QRS AXIS: -20 DEGREES
QRSD INTERVAL: 96 MS
QT INTERVAL: 460 MS
QTC INTERVAL: 415 MS
T WAVE AXIS: -6 DEGREES
VENTRICULAR RATE: 49 BPM

## 2018-09-13 PROCEDURE — 93010 ELECTROCARDIOGRAM REPORT: CPT | Performed by: INTERNAL MEDICINE

## 2018-09-14 DIAGNOSIS — I10 HTN (HYPERTENSION), BENIGN: Primary | ICD-10-CM

## 2018-09-14 RX ORDER — AMLODIPINE BESYLATE 10 MG/1
TABLET ORAL
Qty: 60 TABLET | Refills: 6 | Status: SHIPPED | OUTPATIENT
Start: 2018-09-14 | End: 2019-02-01 | Stop reason: SDUPTHER

## 2018-09-19 ENCOUNTER — APPOINTMENT (INPATIENT)
Dept: RADIOLOGY | Facility: HOSPITAL | Age: 73
DRG: 221 | End: 2018-09-19
Payer: COMMERCIAL

## 2018-09-19 ENCOUNTER — HOSPITAL ENCOUNTER (INPATIENT)
Facility: HOSPITAL | Age: 73
LOS: 2 days | Discharge: HOME HEALTH | DRG: 221 | End: 2018-09-21
Attending: THORACIC SURGERY (CARDIOTHORACIC VASCULAR SURGERY) | Admitting: THORACIC SURGERY (CARDIOTHORACIC VASCULAR SURGERY)
Payer: COMMERCIAL

## 2018-09-19 ENCOUNTER — ANESTHESIA EVENT (INPATIENT)
Dept: PERIOP | Facility: HOSPITAL | Age: 73
DRG: 221 | End: 2018-09-19
Payer: COMMERCIAL

## 2018-09-19 ENCOUNTER — ANESTHESIA (INPATIENT)
Dept: PERIOP | Facility: HOSPITAL | Age: 73
DRG: 221 | End: 2018-09-19
Payer: COMMERCIAL

## 2018-09-19 ENCOUNTER — APPOINTMENT (OUTPATIENT)
Dept: RADIOLOGY | Facility: HOSPITAL | Age: 73
DRG: 221 | End: 2018-09-19
Attending: SURGERY
Payer: COMMERCIAL

## 2018-09-19 DIAGNOSIS — I10 HTN (HYPERTENSION), BENIGN: ICD-10-CM

## 2018-09-19 DIAGNOSIS — I71.01 DISSECTION OF AORTA, THORACIC (HCC): ICD-10-CM

## 2018-09-19 DIAGNOSIS — I71.01 CHRONIC DISSECTION OF THORACIC AORTA (HCC): ICD-10-CM

## 2018-09-19 DIAGNOSIS — Z86.79 H/O REPAIR OF DISSECTING ANEURYSM OF DESCENDING THORACIC AORTA: Primary | Chronic | ICD-10-CM

## 2018-09-19 DIAGNOSIS — Z98.890 H/O REPAIR OF DISSECTING ANEURYSM OF DESCENDING THORACIC AORTA: Primary | Chronic | ICD-10-CM

## 2018-09-19 PROBLEM — I71.9 DESCENDING AORTIC ANEURYSM (HCC): Chronic | Status: ACTIVE | Noted: 2018-09-07

## 2018-09-19 PROBLEM — Z12.11 SCREENING FOR COLON CANCER: Status: RESOLVED | Noted: 2018-07-02 | Resolved: 2018-09-19

## 2018-09-19 PROBLEM — G47.33 OSA (OBSTRUCTIVE SLEEP APNEA): Chronic | Status: ACTIVE | Noted: 2018-03-12

## 2018-09-19 PROBLEM — Z95.828 S/P VASCULAR BYPASS: Chronic | Status: ACTIVE | Noted: 2018-08-14

## 2018-09-19 PROBLEM — I48.91 ATRIAL FIBRILLATION (HCC): Chronic | Status: ACTIVE | Noted: 2017-09-11

## 2018-09-19 LAB
ANION GAP SERPL CALCULATED.3IONS-SCNC: 7 MMOL/L (ref 4–13)
ATRIAL RATE: 60 BPM
ATRIAL RATE: 71 BPM
BUN SERPL-MCNC: 10 MG/DL (ref 5–25)
CALCIUM SERPL-MCNC: 8.5 MG/DL (ref 8.3–10.1)
CHLORIDE SERPL-SCNC: 107 MMOL/L (ref 100–108)
CO2 SERPL-SCNC: 27 MMOL/L (ref 21–32)
CREAT SERPL-MCNC: 0.74 MG/DL (ref 0.6–1.3)
GFR SERPL CREATININE-BSD FRML MDRD: 107 ML/MIN/1.73SQ M
GLUCOSE SERPL-MCNC: 123 MG/DL (ref 65–140)
GLUCOSE SERPL-MCNC: 140 MG/DL (ref 65–140)
GLUCOSE SERPL-MCNC: 146 MG/DL (ref 65–140)
GLUCOSE SERPL-MCNC: 147 MG/DL (ref 65–140)
GLUCOSE SERPL-MCNC: 149 MG/DL (ref 65–140)
GLUCOSE SERPL-MCNC: 154 MG/DL (ref 65–140)
HCT VFR BLD AUTO: 36.5 % (ref 36.5–49.3)
HGB BLD-MCNC: 12.1 G/DL (ref 12–17)
KCT BLD-ACNC: 115 SEC (ref 89–137)
KCT BLD-ACNC: 230 SEC (ref 89–137)
KCT BLD-ACNC: 248 SEC (ref 89–137)
P AXIS: 39 DEGREES
P AXIS: 51 DEGREES
POTASSIUM SERPL-SCNC: 3.2 MMOL/L (ref 3.5–5.3)
PR INTERVAL: 179 MS
PR INTERVAL: 196 MS
QRS AXIS: -6 DEGREES
QRS AXIS: 33 DEGREES
QRSD INTERVAL: 100 MS
QRSD INTERVAL: 96 MS
QT INTERVAL: 467 MS
QT INTERVAL: 475 MS
QTC INTERVAL: 475 MS
QTC INTERVAL: 508 MS
SODIUM SERPL-SCNC: 141 MMOL/L (ref 136–145)
SPECIMEN SOURCE: ABNORMAL
SPECIMEN SOURCE: ABNORMAL
SPECIMEN SOURCE: NORMAL
T WAVE AXIS: 4 DEGREES
T WAVE AXIS: 52 DEGREES
VENTRICULAR RATE: 60 BPM
VENTRICULAR RATE: 71 BPM

## 2018-09-19 PROCEDURE — C1760 CLOSURE DEV, VASC: HCPCS | Performed by: THORACIC SURGERY (CARDIOTHORACIC VASCULAR SURGERY)

## 2018-09-19 PROCEDURE — 76937 US GUIDE VASCULAR ACCESS: CPT

## 2018-09-19 PROCEDURE — C1894 INTRO/SHEATH, NON-LASER: HCPCS | Performed by: THORACIC SURGERY (CARDIOTHORACIC VASCULAR SURGERY)

## 2018-09-19 PROCEDURE — 36200 PLACE CATHETER IN AORTA: CPT | Performed by: SURGERY

## 2018-09-19 PROCEDURE — 86923 COMPATIBILITY TEST ELECTRIC: CPT

## 2018-09-19 PROCEDURE — 82948 REAGENT STRIP/BLOOD GLUCOSE: CPT

## 2018-09-19 PROCEDURE — C1769 GUIDE WIRE: HCPCS | Performed by: THORACIC SURGERY (CARDIOTHORACIC VASCULAR SURGERY)

## 2018-09-19 PROCEDURE — 71045 X-RAY EXAM CHEST 1 VIEW: CPT

## 2018-09-19 PROCEDURE — 93010 ELECTROCARDIOGRAM REPORT: CPT | Performed by: INTERNAL MEDICINE

## 2018-09-19 PROCEDURE — 33880 EVASC RPR TA NDGFT COV LSA: CPT | Performed by: SURGERY

## 2018-09-19 PROCEDURE — 33880 EVASC RPR TA NDGFT COV LSA: CPT | Performed by: RADIOLOGY

## 2018-09-19 PROCEDURE — C1874 STENT, COATED/COV W/DEL SYS: HCPCS | Performed by: THORACIC SURGERY (CARDIOTHORACIC VASCULAR SURGERY)

## 2018-09-19 PROCEDURE — 02HV33Z INSERTION OF INFUSION DEVICE INTO SUPERIOR VENA CAVA, PERCUTANEOUS APPROACH: ICD-10-PCS | Performed by: THORACIC SURGERY (CARDIOTHORACIC VASCULAR SURGERY)

## 2018-09-19 PROCEDURE — 99222 1ST HOSP IP/OBS MODERATE 55: CPT | Performed by: ANESTHESIOLOGY

## 2018-09-19 PROCEDURE — 93005 ELECTROCARDIOGRAM TRACING: CPT

## 2018-09-19 PROCEDURE — 5A1221Z PERFORMANCE OF CARDIAC OUTPUT, CONTINUOUS: ICD-10-PCS | Performed by: THORACIC SURGERY (CARDIOTHORACIC VASCULAR SURGERY)

## 2018-09-19 PROCEDURE — 80048 BASIC METABOLIC PNL TOTAL CA: CPT | Performed by: PHYSICIAN ASSISTANT

## 2018-09-19 PROCEDURE — 33880 EVASC RPR TA NDGFT COV LSA: CPT | Performed by: THORACIC SURGERY (CARDIOTHORACIC VASCULAR SURGERY)

## 2018-09-19 PROCEDURE — 85347 COAGULATION TIME ACTIVATED: CPT

## 2018-09-19 PROCEDURE — 02VW3DZ RESTRICTION OF THORACIC AORTA, DESCENDING WITH INTRALUMINAL DEVICE, PERCUTANEOUS APPROACH: ICD-10-PCS | Performed by: THORACIC SURGERY (CARDIOTHORACIC VASCULAR SURGERY)

## 2018-09-19 PROCEDURE — 76936 ECHO GUIDE FOR ARTERY REPAIR: CPT | Performed by: RADIOLOGY

## 2018-09-19 PROCEDURE — 85014 HEMATOCRIT: CPT | Performed by: PHYSICIAN ASSISTANT

## 2018-09-19 PROCEDURE — 85018 HEMOGLOBIN: CPT | Performed by: PHYSICIAN ASSISTANT

## 2018-09-19 DEVICE — IMPLANTABLE DEVICE: Type: IMPLANTABLE DEVICE | Site: THORACIC | Status: FUNCTIONAL

## 2018-09-19 DEVICE — PERCLOSE PROGLIDE™ SUTURE-MEDIATED CLOSURE SYSTEM
Type: IMPLANTABLE DEVICE | Site: ARTERIAL | Status: FUNCTIONAL
Brand: PERCLOSE PROGLIDE™

## 2018-09-19 RX ORDER — METOPROLOL TARTRATE 50 MG/1
100 TABLET, FILM COATED ORAL 2 TIMES DAILY
Status: DISCONTINUED | OUTPATIENT
Start: 2018-09-20 | End: 2018-09-21 | Stop reason: HOSPADM

## 2018-09-19 RX ORDER — HEPARIN SODIUM 1000 [USP'U]/ML
INJECTION, SOLUTION INTRAVENOUS; SUBCUTANEOUS AS NEEDED
Status: DISCONTINUED | OUTPATIENT
Start: 2018-09-19 | End: 2018-09-19 | Stop reason: SURG

## 2018-09-19 RX ORDER — ATORVASTATIN CALCIUM 80 MG/1
80 TABLET, FILM COATED ORAL
Status: DISCONTINUED | OUTPATIENT
Start: 2018-09-19 | End: 2018-09-21 | Stop reason: HOSPADM

## 2018-09-19 RX ORDER — CHLORHEXIDINE GLUCONATE 0.12 MG/ML
15 RINSE ORAL EVERY 12 HOURS SCHEDULED
Status: DISCONTINUED | OUTPATIENT
Start: 2018-09-19 | End: 2018-09-20

## 2018-09-19 RX ORDER — CHLORHEXIDINE GLUCONATE 0.12 MG/ML
15 RINSE ORAL ONCE
Status: COMPLETED | OUTPATIENT
Start: 2018-09-19 | End: 2018-09-19

## 2018-09-19 RX ORDER — FENTANYL CITRATE 50 UG/ML
50 INJECTION, SOLUTION INTRAMUSCULAR; INTRAVENOUS ONCE
Status: DISCONTINUED | OUTPATIENT
Start: 2018-09-19 | End: 2018-09-19

## 2018-09-19 RX ORDER — ONDANSETRON 2 MG/ML
INJECTION INTRAMUSCULAR; INTRAVENOUS AS NEEDED
Status: DISCONTINUED | OUTPATIENT
Start: 2018-09-19 | End: 2018-09-19 | Stop reason: SURG

## 2018-09-19 RX ORDER — FONDAPARINUX SODIUM 2.5 MG/.5ML
2.5 INJECTION SUBCUTANEOUS EVERY 24 HOURS
Status: DISCONTINUED | OUTPATIENT
Start: 2018-09-19 | End: 2018-09-19

## 2018-09-19 RX ORDER — FONDAPARINUX SODIUM 2.5 MG/.5ML
2.5 INJECTION SUBCUTANEOUS EVERY 24 HOURS
Status: DISCONTINUED | OUTPATIENT
Start: 2018-09-20 | End: 2018-09-21 | Stop reason: HOSPADM

## 2018-09-19 RX ORDER — FENTANYL CITRATE/PF 50 MCG/ML
25 SYRINGE (ML) INJECTION
Status: DISCONTINUED | OUTPATIENT
Start: 2018-09-19 | End: 2018-09-19 | Stop reason: HOSPADM

## 2018-09-19 RX ORDER — PROPOFOL 10 MG/ML
5-50 INJECTION, EMULSION INTRAVENOUS
Status: DISCONTINUED | OUTPATIENT
Start: 2018-09-19 | End: 2018-09-19

## 2018-09-19 RX ORDER — LISINOPRIL 20 MG/1
20 TABLET ORAL DAILY
Status: DISCONTINUED | OUTPATIENT
Start: 2018-09-20 | End: 2018-09-21 | Stop reason: HOSPADM

## 2018-09-19 RX ORDER — FENTANYL CITRATE 50 UG/ML
50 INJECTION, SOLUTION INTRAMUSCULAR; INTRAVENOUS
Status: DISCONTINUED | OUTPATIENT
Start: 2018-09-19 | End: 2018-09-19

## 2018-09-19 RX ORDER — BISACODYL 10 MG
10 SUPPOSITORY, RECTAL RECTAL DAILY PRN
Status: DISCONTINUED | OUTPATIENT
Start: 2018-09-19 | End: 2018-09-21 | Stop reason: HOSPADM

## 2018-09-19 RX ORDER — SODIUM CHLORIDE, SODIUM LACTATE, POTASSIUM CHLORIDE, CALCIUM CHLORIDE 600; 310; 30; 20 MG/100ML; MG/100ML; MG/100ML; MG/100ML
125 INJECTION, SOLUTION INTRAVENOUS CONTINUOUS
Status: DISCONTINUED | OUTPATIENT
Start: 2018-09-19 | End: 2018-09-20

## 2018-09-19 RX ORDER — AMLODIPINE BESYLATE 10 MG/1
10 TABLET ORAL 2 TIMES DAILY
Status: DISCONTINUED | OUTPATIENT
Start: 2018-09-20 | End: 2018-09-21 | Stop reason: HOSPADM

## 2018-09-19 RX ORDER — SODIUM CHLORIDE 450 MG/100ML
20 INJECTION, SOLUTION INTRAVENOUS CONTINUOUS
Status: DISCONTINUED | OUTPATIENT
Start: 2018-09-19 | End: 2018-09-20

## 2018-09-19 RX ORDER — ONDANSETRON 2 MG/ML
4 INJECTION INTRAMUSCULAR; INTRAVENOUS ONCE AS NEEDED
Status: DISCONTINUED | OUTPATIENT
Start: 2018-09-19 | End: 2018-09-19 | Stop reason: HOSPADM

## 2018-09-19 RX ORDER — POTASSIUM CHLORIDE 29.8 MG/ML
40 INJECTION INTRAVENOUS ONCE
Status: COMPLETED | OUTPATIENT
Start: 2018-09-19 | End: 2018-09-19

## 2018-09-19 RX ORDER — PROTAMINE SULFATE 10 MG/ML
INJECTION, SOLUTION INTRAVENOUS AS NEEDED
Status: DISCONTINUED | OUTPATIENT
Start: 2018-09-19 | End: 2018-09-19 | Stop reason: SURG

## 2018-09-19 RX ORDER — ASPIRIN 81 MG/1
81 TABLET, CHEWABLE ORAL DAILY
Status: DISCONTINUED | OUTPATIENT
Start: 2018-09-20 | End: 2018-09-21 | Stop reason: HOSPADM

## 2018-09-19 RX ORDER — ONDANSETRON 2 MG/ML
4 INJECTION INTRAMUSCULAR; INTRAVENOUS EVERY 6 HOURS PRN
Status: DISCONTINUED | OUTPATIENT
Start: 2018-09-19 | End: 2018-09-21 | Stop reason: HOSPADM

## 2018-09-19 RX ORDER — HYDROCHLOROTHIAZIDE 12.5 MG/1
12.5 TABLET ORAL DAILY
Status: DISCONTINUED | OUTPATIENT
Start: 2018-09-20 | End: 2018-09-21 | Stop reason: HOSPADM

## 2018-09-19 RX ORDER — FENTANYL CITRATE 50 UG/ML
INJECTION, SOLUTION INTRAMUSCULAR; INTRAVENOUS AS NEEDED
Status: DISCONTINUED | OUTPATIENT
Start: 2018-09-19 | End: 2018-09-19 | Stop reason: SURG

## 2018-09-19 RX ORDER — POLYETHYLENE GLYCOL 3350 17 G/17G
17 POWDER, FOR SOLUTION ORAL DAILY
Status: DISCONTINUED | OUTPATIENT
Start: 2018-09-19 | End: 2018-09-21 | Stop reason: HOSPADM

## 2018-09-19 RX ORDER — SODIUM CHLORIDE 9 MG/ML
INJECTION, SOLUTION INTRAVENOUS CONTINUOUS PRN
Status: DISCONTINUED | OUTPATIENT
Start: 2018-09-19 | End: 2018-09-19

## 2018-09-19 RX ORDER — EPHEDRINE SULFATE 50 MG/ML
INJECTION, SOLUTION INTRAVENOUS AS NEEDED
Status: DISCONTINUED | OUTPATIENT
Start: 2018-09-19 | End: 2018-09-19 | Stop reason: SURG

## 2018-09-19 RX ORDER — ACETAMINOPHEN 325 MG/1
650 TABLET ORAL EVERY 4 HOURS PRN
Status: DISCONTINUED | OUTPATIENT
Start: 2018-09-19 | End: 2018-09-21 | Stop reason: HOSPADM

## 2018-09-19 RX ORDER — INSULIN GLARGINE 100 [IU]/ML
28 INJECTION, SOLUTION SUBCUTANEOUS
Status: DISCONTINUED | OUTPATIENT
Start: 2018-09-19 | End: 2018-09-21 | Stop reason: HOSPADM

## 2018-09-19 RX ORDER — PROPOFOL 10 MG/ML
INJECTION, EMULSION INTRAVENOUS AS NEEDED
Status: DISCONTINUED | OUTPATIENT
Start: 2018-09-19 | End: 2018-09-19 | Stop reason: SURG

## 2018-09-19 RX ORDER — ROCURONIUM BROMIDE 10 MG/ML
INJECTION, SOLUTION INTRAVENOUS AS NEEDED
Status: DISCONTINUED | OUTPATIENT
Start: 2018-09-19 | End: 2018-09-19 | Stop reason: SURG

## 2018-09-19 RX ORDER — GLYCOPYRROLATE 0.2 MG/ML
INJECTION INTRAMUSCULAR; INTRAVENOUS AS NEEDED
Status: DISCONTINUED | OUTPATIENT
Start: 2018-09-19 | End: 2018-09-19 | Stop reason: SURG

## 2018-09-19 RX ADMIN — ATORVASTATIN CALCIUM 80 MG: 80 TABLET, FILM COATED ORAL at 17:12

## 2018-09-19 RX ADMIN — POTASSIUM CHLORIDE 40 MEQ: 400 INJECTION, SOLUTION INTRAVENOUS at 14:51

## 2018-09-19 RX ADMIN — NEOSTIGMINE METHYLSULFATE 3 MG: 1 INJECTION, SOLUTION INTRAMUSCULAR; INTRAVENOUS; SUBCUTANEOUS at 12:38

## 2018-09-19 RX ADMIN — DEXAMETHASONE SODIUM PHOSPHATE 4 MG: 10 INJECTION INTRAMUSCULAR; INTRAVENOUS at 11:10

## 2018-09-19 RX ADMIN — INSULIN GLARGINE 28 UNITS: 100 INJECTION, SOLUTION SUBCUTANEOUS at 22:18

## 2018-09-19 RX ADMIN — FENTANYL CITRATE 50 MCG: 50 INJECTION, SOLUTION INTRAMUSCULAR; INTRAVENOUS at 12:58

## 2018-09-19 RX ADMIN — HEPARIN SODIUM 10000 UNITS: 1000 INJECTION INTRAVENOUS; SUBCUTANEOUS at 11:41

## 2018-09-19 RX ADMIN — CHLORHEXIDINE GLUCONATE 15 ML: 1.2 RINSE ORAL at 20:07

## 2018-09-19 RX ADMIN — PROTAMINE SULFATE 50 MG: 10 INJECTION, SOLUTION INTRAVENOUS at 12:38

## 2018-09-19 RX ADMIN — PROPOFOL 200 MG: 10 INJECTION, EMULSION INTRAVENOUS at 10:50

## 2018-09-19 RX ADMIN — HEPARIN SODIUM 2000 UNITS: 1000 INJECTION INTRAVENOUS; SUBCUTANEOUS at 11:52

## 2018-09-19 RX ADMIN — EPHEDRINE SULFATE 5 MG: 50 INJECTION, SOLUTION INTRAMUSCULAR; INTRAVENOUS; SUBCUTANEOUS at 11:45

## 2018-09-19 RX ADMIN — SODIUM CHLORIDE 20 ML/HR: 0.45 INJECTION, SOLUTION INTRAVENOUS at 14:00

## 2018-09-19 RX ADMIN — SODIUM CHLORIDE 3 MG/HR: 0.9 INJECTION, SOLUTION INTRAVENOUS at 22:18

## 2018-09-19 RX ADMIN — FENTANYL CITRATE 25 MCG: 50 INJECTION, SOLUTION INTRAMUSCULAR; INTRAVENOUS at 13:08

## 2018-09-19 RX ADMIN — CEFAZOLIN SODIUM 2000 MG: 2 SOLUTION INTRAVENOUS at 11:00

## 2018-09-19 RX ADMIN — SODIUM CHLORIDE 2 MG/HR: 0.9 INJECTION, SOLUTION INTRAVENOUS at 14:50

## 2018-09-19 RX ADMIN — LIDOCAINE HYDROCHLORIDE 50 MG: 20 INJECTION, SOLUTION INTRAVENOUS at 10:50

## 2018-09-19 RX ADMIN — SODIUM CHLORIDE, SODIUM LACTATE, POTASSIUM CHLORIDE, AND CALCIUM CHLORIDE: .6; .31; .03; .02 INJECTION, SOLUTION INTRAVENOUS at 10:10

## 2018-09-19 RX ADMIN — FENTANYL CITRATE 100 MCG: 50 INJECTION, SOLUTION INTRAMUSCULAR; INTRAVENOUS at 10:50

## 2018-09-19 RX ADMIN — GLYCOPYRROLATE 0.4 MG: 0.2 INJECTION, SOLUTION INTRAMUSCULAR; INTRAVENOUS at 12:38

## 2018-09-19 RX ADMIN — CEFAZOLIN SODIUM 2000 MG: 2 SOLUTION INTRAVENOUS at 18:04

## 2018-09-19 RX ADMIN — ROCURONIUM BROMIDE 50 MG: 10 INJECTION INTRAVENOUS at 10:50

## 2018-09-19 RX ADMIN — ONDANSETRON 4 MG: 2 INJECTION INTRAMUSCULAR; INTRAVENOUS at 12:38

## 2018-09-19 RX ADMIN — MUPIROCIN 1 APPLICATION: 20 OINTMENT TOPICAL at 09:43

## 2018-09-19 RX ADMIN — CHLORHEXIDINE GLUCONATE 15 ML: 1.2 RINSE ORAL at 09:45

## 2018-09-19 RX ADMIN — MUPIROCIN 1 APPLICATION: 20 OINTMENT TOPICAL at 20:07

## 2018-09-19 RX ADMIN — SODIUM CHLORIDE: 9 INJECTION, SOLUTION INTRAVENOUS at 11:00

## 2018-09-19 RX ADMIN — EPHEDRINE SULFATE 10 MG: 50 INJECTION, SOLUTION INTRAMUSCULAR; INTRAVENOUS; SUBCUTANEOUS at 11:22

## 2018-09-19 RX ADMIN — EPHEDRINE SULFATE 10 MG: 50 INJECTION, SOLUTION INTRAMUSCULAR; INTRAVENOUS; SUBCUTANEOUS at 11:26

## 2018-09-19 RX ADMIN — FENTANYL CITRATE 25 MCG: 50 INJECTION, SOLUTION INTRAMUSCULAR; INTRAVENOUS at 13:13

## 2018-09-19 NOTE — CASE MANAGEMENT
Thank you,  [address]  Utilization Review Department  Phone: [phone]; Fax [phone]  ATTENTION: Please call with any questions or concerns to [phone]  and carefully follow the prompts so that you are directed to the right person  Send all requests for admission clinical reviews, approved or denied determinations and any other requests to fax [phone]  All voicemails are confidential      ==============================================================    Initial Clinical Review    Age/Sex: 68 y o  male    Surgery Date: 09/19/2018    Procedure: Thoracic endovascular aneurysm repair with a 37/37 x 20 cm GORE c-TAG endovascular graft and 37/37 x 15 cm GORE c-TAG graft  REPAIR ANEURSYM THORACIC ENDOVASCUALR DESCENDING AORTIC ANEURYSM (TEVAR)  LEFT BRACHIAL ARTERY CANNULATION WITH ARCH AORTOGRAM  ULTRASOUND-GUIDED LEFT BRACHIAL ARTERY PUNCTURE  ULTRASOUND-GUIDED RIGHT COMMON FEMORAL ARTERY PUNCTURE    Anesthesia: General endotracheal anesthesia, Dr Izabela Redmond     Admission Orders: Date/Time/Statement: 9/19/18 @ 99 [id number]  Orders Placed This Encounter   Procedures    Inpatient Admission     Standing Status:   Standing     Number of Occurrences:   1     Order Specific Question:   Admitting Physician     Answer: Bhanu Pantoja [[id number]]     Order Specific Question:   Level of Care     Answer:   Critical Care [15]     Order Specific Question:   Estimated length of stay     Answer:   More than 2 Midnights     Order Specific Question:   Certification     Answer:   I certify that inpatient services are medically necessary for this patient for a duration of greater than two midnights  See H&P and MD Progress Notes for additional information about the patient's course of treatment         Vital Signs: /52   Pulse 56   Temp 97 7 °F (36 5 °C) (Oral)   Resp 15   Ht 5' 7" (1 702 m)   Wt 101 kg (222 lb)   SpO2 96%   BMI 34 77 kg/m²     Diet:        Diet Orders Start     Ordered    09/20/18 0000  Diet Clear Liquid  Diet effective 0500     Question Answer Comment   Diet Type Clear Liquid    RD to adjust diet per protocol? No        09/19/18 1325    09/19/18 1326  Diet NPO; Sips with meds  Diet effective now     Question Answer Comment   Diet Type NPO    NPO Except: Sips with meds    RD to adjust diet per protocol?  No        09/19/18 1325      A line right radial / Triple CVC line     Mobility: Up with assistance / Up as tolerated / POD#1 OOB to chair and for all meals    DVT Prophylaxis: Yaya SCDs    Pain Control:   Pain Medications             aspirin 81 mg chewable tablet Chew 1 tablet daily    acetaminophen (TYLENOL) 325 mg tablet Take 2 tablets (650 mg total) by mouth every 6 (six) hours as needed for mild pain        Scheduled Meds:  Current Facility-Administered Medications:  acetaminophen 650 mg Rectal Q4H PRN    acetaminophen 650 mg Oral Q4H PRN    [START ON 9/20/2018] amLODIPine 10 mg Oral BID    [START ON 9/20/2018] aspirin 81 mg Oral Daily    atorvastatin 80 mg Oral Daily With Dinner    bisacodyl 10 mg Rectal Daily PRN    cefazolin 2,000 mg Intravenous Q8H    chlorhexidine 15 mL Swish & Spit Q12H [facility]    fondaparinux 2 5 mg Subcutaneous Q24H    [START ON 9/20/2018] hydrochlorothiazide 12 5 mg Oral Daily    insulin glargine 28 Units Subcutaneous HS    insulin lispro 1-5 Units Subcutaneous TID AC    insulin lispro 1-5 Units Subcutaneous HS    insulin lispro 8 Units Subcutaneous TID AC    lactated ringers 125 mL/hr Intravenous Continuous Last Rate: Stopped (09/19/18 6899)   [START ON 9/20/2018] lisinopril 20 mg Oral Daily    [START ON 9/20/2018] metoprolol tartrate 100 mg Oral BID    mupirocin 1 application Nasal C62Z [facility]    niCARdipine 1-15 mg/hr Intravenous Titrated Last Rate: 3 mg/hr (09/19/18 1600)   ondansetron 4 mg Intravenous Q6H PRN    polyethylene glycol 17 g Oral Daily    potassium chloride 40 mEq Intravenous Once Last Rate: 40 mEq (09/19/18 1451)   sodium chloride 20 mL/hr Intravenous Continuous Last Rate: 20 mL/hr (09/19/18 1600)     Continuous Infusions:  lactated ringers 125 mL/hr Last Rate: Stopped (09/19/18 6299)   niCARdipine 1-15 mg/hr Last Rate: 3 mg/hr (09/19/18 1600)   sodium chloride 20 mL/hr Last Rate: 20 mL/hr (09/19/18 1600)

## 2018-09-19 NOTE — OP NOTE
OPERATIVE REPORT  PATIENT NAME: Jasmyne Verdin  :  1945  MRN: [id number]  Pt Location: [facility] OR ROOM 02    SURGERY DATE: 2018    SURGEON: Brooklynn Madrigal MD     ASSISTANT: None    VASCULAR SURGEON: Dr Shannan Fitzgerald     INTERVENTIONAL RADIOLOGIST: Dr Gauthier See DIAGNOSIS 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, s/p Type A dissection repair; s/p left carotid-subclavian bypass;     POSTOPERATIVE DIAGNOSIS 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, s/p Type A dissection repair; s/p left carotid-subclavian bypass;      NYHA CLASS: 2    CCS CLASS: 2    PROCEDURE Thoracic endovascular aneurysm repair with a 37/37 x 20 cm GORE c-TAG endovascular graft and 37/37 x 15 cm GORE c-TAG graft  ANESTHESIA: General endotracheal anesthesia, Dr Herberth Aguero 50 mL  NOTE: The complex nature of this procedure required the participation of a cardiac surgeon for expertise with thoracic aortic disease and a vascular surgeon and interventional radiologist for expertise with endovascular intervention, as well as radiographic interpretation  OPERATIVE TECHNIQUE: The patient was taken to the operating room and placed supine on the operating table  Following the satisfactory induction of general anesthesia and placement of monitoring lines, the patient was prepped and draped in the usual sterile fashion  A time-out procedure was performed       The left brachial artery was punctured under ultrasound guidance with a micropuncture kit and exchanged for a Bentson wire   A 4 Cameroonian sheath was placed in the left brachial artery and a pigtail catheter was advanced through the carotid-subclavian bypass into the carotid artery, and subsequently into the ascending aorta under radiographic guidance      The right common femoral artery was punctured under ultrasound guidance with a micropuncture kit and exchanged for a Bentson wire   Two Perclose sutures were placed in standard fashion  An 8 Bulgarian sheath was placed in the right common femoral artery and a pigtail catheter was placed over the Bentson wire  Aortography was performed to confirm the position of the catheter in the true lumen  The pigtail catheter was advanced into the ascending aorta  A super stiff Lunderquist wire was advanced through the pigtail catheter and positioned in the ascending aorta  Aortography was performed in an [place]an projection identifying the site of the left common carotid takeoff and the previously embolized left subclavian artery  The patient was systemically heparinized  The right common femoral artery sheath was exchanged under fluoroscopic guidance for the 24 FR delivery sheath, which was placed through the right common femoral artery and positioned in the abdominal aorta  A 37/37 mm diameter x 20 cm length GORE c-TAG graft was selected and prepared, and advanced through the 24 FR sheath into the appropriate position using fluoroscopy  When appropriate positioning was confirmed, the endograft was deployed in standard fashion  Fluoroscopy was used to identify the distal landing zone at approximately the level of the T10 vertebra  A 37/37 mm diameter x 15 cm length GORE c-TAG graft was selected and prepared, and advanced through the 24 FR sheath into the appropriate position using fluoroscopy  When appropriate positioning was confirmed, the endograft was deployed in standard fashion  Aortography was again performed  and confirmed good proximal and distal landing zone seals  There was no evidence of type III endoleak  There was late, partial filling of the false lumen due to a distal fenestration in the abdomen, however there was no passage of contrast to the proximal thoracic false lumen  The left common carotid artery was obviously patent, as were all of the visceral vessels   The right common femoral artery sheath was then removed while the wire was maintained and previously placed Perclose devices were secured in the right common femoral artery  Protamine was then administered to reverse heparinization  Direct pressure was held on the right common femoral artery  Hemostasis was confirmed  The pigtail catheter was removed from the left brachial sheath over a wire  The left brachial sheath was removed and direct pressure was held  Hemostasis was confirmed  Distal pulses were evaluated and found to be intact  The patient tolerated the procedure well and was transferred in stable condition  The patient had normal sensorimotor function of the lower extremities upon completion of the procedure        Vikas Jeffery MD  DATE: September 19, 2018  TIME: 1:16 PM

## 2018-09-19 NOTE — ANESTHESIA PROCEDURE NOTES
Central Line Insertion  Performed by: Vaughn Bah  Authorized by: Shelly Sher   Date/Time: 9/19/2018 11:01 AM  Catheter Type:  triple lumen  Consent: Verbal consent obtained  Written consent obtained  Risks and benefits: risks, benefits and alternatives were discussed  Consent given by: patient  Patient understanding: patient states understanding of the procedure being performed  Patient consent: the patient's understanding of the procedure matches consent given  Procedure consent: procedure consent matches procedure scheduled  Relevant documents: relevant documents present and verified  Test results: test results available and properly labeled  Required items: required blood products, implants, devices, and special equipment available  Patient identity confirmed: verbally with patient and arm band  Indications: vascular access  Location details: right internal jugular  Catheter size: 9 5 Fr  Patient position: Trendelenburg  Anesthesia: see MAR for details (GA)  Assessment: blood return through all ports and free fluid flow  Skin prep agent dried: skin prep agent completely dried prior to procedure  Sterile barriers: all five maximum sterile barriers used - cap, mask, sterile gown, sterile gloves, and large sterile sheet  Hand hygiene: hand hygiene performed prior to central venous catheter insertion  Ultrasound guidance: yes  sterile gel and probe cover used in ultrasound-guided central venous catheter insertionReason All Sterile Barriers Not Used:   All elements of maximal sterile barrier technique not followed for medical reasons  Pre-procedure: landmarks identified  Vessel of Catheter Tip End: SVC  Number of attempts: 1  Successful placement: yes  Post-procedure: chlorhexidine patch applied and dressing applied  Patient tolerance: Patient tolerated the procedure well with no immediate complications

## 2018-09-19 NOTE — PROGRESS NOTES
PGY1 Post-Op Check Note    S:  Patient is resting comfortably  Patient is awake and alert with no complaints of discomfort at this time  He denies any fevers chills nausea or vomiting, shortness of breath or chest pain  He has not yet passing flatus  Patient denies any numbness tingling, decrease in sensation in any extremities  O:   Vitals:    09/19/18 1900   BP:    Pulse: 58   Resp: 15   Temp:    SpO2: 94%       I/O last 3 completed shifts: In: 1331 5 [I V :1181 5; IV Piggyback:150]  Out: 570 [Urine:545; Blood:25]  No intake/output data recorded      PE:  NAD  Norm resp effort  RRR, systolic murmur auscultated, patient with history of aortic valve repair  Abd soft, nontender nondistended  Right groin access site clean dry and intact with histocryl    Pulses:  Palpable DP bilaterally, palpable fem was bilaterally    Lab Results   Component Value Date    WBC 7 50 09/11/2018    HGB 12 1 09/19/2018    HCT 36 5 09/19/2018    MCV 88 09/11/2018     09/11/2018     Lab Results   Component Value Date    GLUCOSE 153 (H) 08/13/2018    CALCIUM 8 5 09/19/2018     09/19/2018    K 3 2 (L) 09/19/2018    CO2 27 09/19/2018     09/19/2018    BUN 10 09/19/2018    CREATININE 0 74 09/19/2018         A/P: 68 y o  male w/ chronic dissection of thoracic aorta s/p thoracic endovascular descending aortic aneurysm repair via left brachial artery cannulation with arch aortogram, and right common femoral artery puncture  - patient can start clear liquids on 09/20/2018 in the morning  - continue Cardene drip for blood pressure control, goal of 604 or less systolic blood pressure  - serial neurovascular checks  - PT/OT  - SQH/SCDs

## 2018-09-19 NOTE — ANESTHESIA POSTPROCEDURE EVALUATION
Post-Op Assessment Note      CV Status:  Stable    Mental Status:  Alert and awake    Hydration Status:  Euvolemic    PONV Controlled:  Controlled    Airway Patency:  Patent    Post Op Vitals Reviewed: Yes          Staff: CRNA           BP   142/59   Temp   98 1 F   Pulse 60   Resp   14   SpO2   97%

## 2018-09-19 NOTE — OP NOTE
OPERATIVE REPORT  PATIENT NAME: Tara Cruz  :  1945  MRN: [id number]  Pt Location: [facility]    SURGERY DATE: 2018    Surgeon(s) and Role:     * Cristina Wilkes MD - Primary     * Opal Olvera DO - Assisting     * Madeleine Healy MD - CoSurgeon    Chronic dissection of thoracic aorta ([address] ) [I71 01]    Post-Op Diagnosis Codes:     * Chronic dissection of thoracic aorta ([address] ) [I71 01]      Procedure(s):  REPAIR ANEURSYM THORACIC ENDOVASCUALR DESCENDING AORTIC ANEURYSM (TEVAR)  LEFT BRACHIAL ARTERY CANNULATION WITH ARCH AORTOGRAM  ULTRASOUND-GUIDED LEFT BRACHIAL ARTERY PUNCTURE  ULTRASOUND-GUIDED RIGHT COMMON FEMORAL ARTERY PUNCTURE    Specimen(s):  * No specimens in log *    Estimated Blood Loss:   25 mL    Drains:  Urethral Catheter Latex 16 Fr  (Active)   Site Assessment Clean;Skin intact 2018 11:09 AM   Collection Container Standard drainage bag 2018 11:09 AM   Securement Method Leg strap 2018 11:09 AM   Number of days: 0       Anesthesia Type:   General    Operative Indications:  Chronic dissection of thoracic aorta (HCC) [I71 01] with aneurysmal degeneration    Operative Findings:  Successful placement of thoracic stent graft with the proximal landing zone at the distal aspect of the common carotid artery and distal aspect of the graft at approximately the T10 level below previously identified fenestration  At the conclusion of the procedure the common carotid artery on the left remained widely patent with filling of the carotid to subclavian artery bypass  The graft was widely patent with outflow via the true lumen filling the mesenteric and right renal arteries with retrograde filling of the false lumen then filling the left renal artery  The patient had palpable radial and pedal pulses at the conclusion the procedure and was moving both extremities      Complications:   None    Procedure and Technique:    A cardiothoracic surgeon, vascular surgeon and interventional radiologist were present throughout this procedures due to their unique skill sets and the necessity to simultaneously manipulate multiple endovascular devices  General anesthesia was administered  Radial A-line was placed  Bilateral upper legs and the left arm were prepped and draped in normal sterile fashion with chlorhexidine prep  The femoral head was imaged with fluoroscopy and marked bilaterally  Transverse incisions were made overlying the right common femoral artery  Ultrasound was used to identify the common femoral artery and micropuncture kit was utilized to cannulate the right common femoral artery under direct ultrasound guidance  The tract was dilated and pre closure was performed with 2 ProGlide closure systems in each common femoral artery  An 8 Guyanese sheath was placed  The left brachial artery was imaged via fluoroscopy  Direct ultrasound guidance was then used to cannulate the brachial artery with a micropuncture kit  Of note the brachial artery was punctured at a point in which it was fully compressible with no evidence of atherosclerotic disease  A 4 Guyanese sheath was placed  A flush catheter was then advanced into the ascending aorta for direct imaging of the aortic arch  IV heparin was administered  ACT levels were obtained  Further heparin boluses were administered to maintain a therapeutic level  A vertebral catheter and Bentson wire was then advanced into the ascending aorta via the right femoral sheath  This was exchanged for a Lunderquist wire which was positioned just above the aortic valve  A 24 Guyanese dry seal sheath was placed via the right common femoral puncture  A 37 X 200 MM Hammond Tag device was then advanced to the aortic arch  Images were then obtained to pritesh the distal aspect of the left common carotid artery  The stent graft was then positioned appropriately and a repeat image was obtained    Once appropriate positioning was obtained the graft was deployed and the delivery system removed  A completion image was obtained  This showed wide patency of the common carotid artery with filling of the common carotid to left subclavian artery bypass  The distal aspect of the graft was then imaged in an appropriate obliquity to visualize the celiac artery  A distal landing point at approximately the T10 interspace was identified below the previously noted fenestration and a 37 x 150 mm Elizabeth Tag device was positioned appropriately with a 6-7 mm proximal overlap and deployed  Completion imaging showed rapid flow through the graft with no evidence of endoleak  There is then rapid filling of the celiac, superior mesenteric and right renal arteries via the true lumen with retrograde flow in the false lumen filling the left main renal artery  All guidewires and catheters were withdrawn from the right common femoral access site and the pre closure system was used to close the common femoral artery puncture site  Manual compression was held for hemostasis  The wound was then closed with Histacryl dressings  The catheter and sheath were then withdrawn from the brachial artery puncture site and manual compression was held for hemostasis  Once hemostasis was obtained a Histacryl dressing was placed  The patient was then woken from anesthesia and transferred to the recovery room  I was present for the entire procedure    Patient Disposition:  PACU      Vascular Quality Initiative - TEVAR Dissection    Genetic History: none     Indication: Aneurysm    Dissection Type: >= 30 Days    Primary Tear Zone: 2  Zone of max diameter = 4  Internal iliac patency = patent bilaterally      Proximal Zone of Disease: 2    Distal Zone of Disease: 9    Maximum Aortic Diameter: 55mm    Urgency: Elective    Presentation: Asymptomatic    Leg Motor Function: Normal      SIGNATURE: Ravinder Mckoy MD  DATE: September 19, 2018  TIME: 1:29 PM

## 2018-09-19 NOTE — ANESTHESIA PREPROCEDURE EVALUATION
Review of Systems/Medical History  Patient summary reviewed  Chart reviewed      Cardiovascular  Hypertension controlled,   Comment: S/P type a dissection 2017,  Pulmonary  Sleep apnea CPAP,        GI/Hepatic            Endo/Other  Diabetes Insulin,      GYN       Hematology  Anemia anemia of chronic disease,     Musculoskeletal    Arthritis     Neurology    CVA , no residual symptoms,    Psychology           Physical Exam    Airway    Mallampati score: II  TM Distance: >3 FB  Neck ROM: full     Dental       Cardiovascular      Pulmonary      Other Findings        Anesthesia Plan  ASA Score- 4     Anesthesia Type- general with ASA Monitors  Additional Monitors: central venous line and arterial line  Airway Plan:         Plan Factors-    Induction- intravenous  Postoperative Plan-   Planned trial extubation    Informed Consent- Anesthetic plan and risks discussed with patient  I personally reviewed this patient with the CRNA  Discussed and agreed on the Anesthesia Plan with the CRNA  Ramos Soliman

## 2018-09-19 NOTE — INTERVAL H&P NOTE
Pt seen/plan reviewed  No changes noted since history and physical were performed  Plan for TEVAR  Preoperative Beta Blocker: indicated      Deonte Angel MD  09/19/18  10:15 AM

## 2018-09-19 NOTE — H&P (VIEW-ONLY)
Consultation - Cardiothoracic Surgery   Tara Cruz  68 y o  male MRN: [id number]    Reason for Consult / Principal Problem: 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    History of Present Illness: Tara Cruz  is a 68y o  year old male known to our service for undergoing emergent surgical repair of an acute TYpe A aortic dissection in September 2017  He was seen in the office in 4/2018 for follow up with a CTA chest/abdomen/pelvis  AT that time he had growth of the proximal most portion of his descending aorta  He was recomemended to have early TEVAR to prevent progressive dilation  He has subsequently had a left common carotid to subclavian artery bypass with embolization of proximal subclavian artery on 8/13/2018  He returns to the office today for scheduling TEVAR  The patient states he is ultimately doing well  He has no specific complaints on examination  He is alone on consultation today but states he has strong family support and his daughter lives next door to him  He tolerates his ADLs independently  He monitors his blood pressure and understands his goal   He is a insulin dependent diabetic and checks his blood glucose routinely, last noted value of 120  Past Medical History:  Past Medical History:   Diagnosis Date    Arthritis     CPAP (continuous positive airway pressure) dependence     Diabetes ([address] )     Hyperlipidemia     Hypertension     Prostate cancer ([address] )     prostate    Sleep apnea     Stroke (cerebrum) ([address] )     Wears glasses        Past Surgical History:   Past Surgical History:   Procedure Laterality Date    CARDIAC SURGERY      CIRCUMCISION      COLONOSCOPY      IR CEREBRAL ANGIOGRAPHY / INTERVENTION  8/13/2018    MN ASCEND AORTA GRAFT W ROOT REPLACMENT  VALVE CONDUIT/CORON RECONSTRUCT N/A 9/6/2017    Procedure: ASCENDING AORTIC REPAIR WITH A 26MM  GELWEAVE STRAIGHT GRAFT AND HEMIARCH WITH 8MM SIDE ARM BRANCH;  Surgeon: Sandra Nguyen MD; Location: [facility];  Service: Cardiac Surgery    MD EXPLOR POSTOP BLEED,INFEC,CLOT-CHST N/A 9/6/2017    Procedure: RE-EXPLORATION MEDIASTERNAL CAVITY FOR POST-OP BLEED (BRING BACK); Surgeon: Naya Clayton MD;  Location: [facility];  Service: Cardiac Surgery    MD VEIN BYPASS GRAFT,CAROTID-BRACHIAL Left 8/13/2018    Procedure: BYPASS CAROTID SUBCLAVIAN WITH EMBOLIZATION OF PROXIMAL SUBCLAVIAN ARTERY  ;  Surgeon: Opal Melo MD;  Location: [facility];  Service: Vascular     Family History:  Family History   Problem Relation Age of Onset    Stroke Mother         complications    Hypertension Mother    Stephy Pall Arthritis Mother     No Known Problems Father      Social History:    History   Alcohol Use    Yes     Comment: social beer or two daily     History   Drug Use No     History   Smoking Status    Never Smoker   Smokeless Tobacco    Never Used       Home Medications:   Prior to Admission medications    Medication Sig Start Date End Date Taking? Authorizing Provider   acetaminophen (TYLENOL) 325 mg tablet Take 2 tablets (650 mg total) by mouth every 6 (six) hours as needed for mild pain 8/14/18  Yes Manuela Hernandez PA-C   amLODIPine (NORVASC) 10 mg tablet Take 1 tablet by mouth 2 (two) times a day 10/31/17  Yes Historical Provider, MD   aspirin 81 mg chewable tablet Chew 1 tablet daily 9/14/17  Yes LV Mayer MD   atorvastatin (LIPITOR) 80 mg tablet Take 1 tablet (80 mg total) by mouth daily 6/11/18  Yes Lei Grove DO   insulin glargine (LANTUS) 100 units/mL subcutaneous injection Inject 28 Units under the skin daily at bedtime Dx: Diabetes E11 9, dispense pens 9/14/17  Yes Thony Mayer MD   insulin lispro (HumaLOG) 100 units/mL injection Inject 8 Units under the skin 3 (three) times a day before meals Dx: Diabetes E11 9   Dispense pens 9/14/17  Yes Thony Mayer MD   lisinopril-hydrochlorothiazide (PRINZIDE,ZESTORETIC) 20-12 5 MG per tablet TAKE 2 TABLETS BY MOUTH EVERY DAY 8/27/18  Yes Rujul Grove, DO   metoprolol tartrate (LOPRESSOR) 100 mg tablet TAKE 1 TABLET TWICE A DAY 5/4/18  Yes Rujul Grove, DO   B-D ULTRAFINE III SHORT PEN 31G X 8 MM MISC USE AS DIRECTED  5/13/18   Jennifer Sunitha, DO   B-D ULTRAFINE III SHORT PEN 31G X 8 MM MISC USE AS DIRECTED  7/15/18   Jennifer Helton DO   LANTUS SOLOSTAR 100 units/mL injection pen INJECT 28 UNIT BEDTIME  Patient not taking: Reported on 9/7/2018 8/27/18 9/7/18  Jennifer Helton DO   lisinopril-hydrochlorothiazide (PRINZIDE,ZESTORETIC) 20-12 5 MG per tablet Take 2 tablets by mouth daily 8/15/18 9/7/18  Renie Najjar, PA-C       Allergies:  No Known Allergies    Review of Systems:  Review of Systems   Constitutional: Negative  HENT: Negative  Eyes: Negative  Respiratory: Negative  Cardiovascular: Negative  Gastrointestinal: Negative  Endocrine: Negative  Genitourinary: Negative  Musculoskeletal: Negative  Skin: Negative  Allergic/Immunologic: Negative  Neurological: Negative  Hematological: Negative  Psychiatric/Behavioral: Negative  Vital Signs:     Vitals:    09/07/18 0800 09/07/18 0835   BP: 112/66 112/62   BP Location: Left arm Right arm   Cuff Size: Adult    Pulse: (!) 54    Resp: 14    Temp: 98 4 °F (36 9 °C)    TempSrc: Oral    SpO2: 97%    Weight: 102 kg (224 lb)    Height: 5' 5" (1 651 m)        Physical Exam:  Physical Exam   Constitutional: He is oriented to person, place, and time  He appears well-developed and well-nourished  HENT:   Head: Normocephalic and atraumatic  Right Ear: External ear normal    Left Ear: External ear normal    Nose: Nose normal    Mouth/Throat: Oropharynx is clear and moist  No oropharyngeal exudate  Eyes: Conjunctivae and EOM are normal  Pupils are equal, round, and reactive to light  Right eye exhibits no discharge  Left eye exhibits no discharge  No scleral icterus  Neck: Normal range of motion  No JVD present   No tracheal deviation present  Cardiovascular: Normal rate, regular rhythm, normal heart sounds and intact distal pulses  Exam reveals no friction rub  No murmur heard  Pulmonary/Chest: Effort normal and breath sounds normal  No stridor  No respiratory distress  He has no wheezes  He has no rales  Abdominal: Soft  Bowel sounds are normal  He exhibits no distension  There is no tenderness  There is no rebound and no guarding  Musculoskeletal: Normal range of motion  He exhibits no edema, tenderness or deformity  Neurological: He is alert and oriented to person, place, and time  He displays normal reflexes  No cranial nerve deficit  He exhibits normal muscle tone  Coordination normal    Skin: Skin is warm and dry  No rash noted  He is not diaphoretic  No erythema  No pallor  Psychiatric: He has a normal mood and affect  His behavior is normal  Judgment and thought content normal        Lab Results:               Lab Results   Component Value Date    HGBA1C 6 8 (H) 07/25/2018     Lab Results   Component Value Date    CKTOTAL 72 01/15/2014    TROPONINI 0 03 09/06/2017       Imaging Studies:   CTA c/a/p  FINDINGS:     VASCULAR STRUCTURES:  The ascending aorta has been repaired  There is minimal residual perigraft fluid  Great vessels are widely patent  Residual dissection begins in the mid arch  The distal arch is aneurysmal measuring 53 mm in diameter best   measured on sagittal and coronal reconstructions  Previous measurement was 42 mm  The remainder of the descending thoracic aorta is normal in caliber  The extent of the dissection is unchanged, extending to just above the aortic bifurcation  The   visceral vessels remain patent  There is dissection flap extending into the celiac artery similar to previous though the artery remains patent including splenic and common hepatic arteries  SMA arises from the true lumen  Iliac vessels are diffusely   tortuous but no significant occlusive disease is present  There are high common femoral bifurcations bilaterally      There are very prominent serpiginous arteries in the anterior mediastinum with 3 aneurysms measuring 14 mm, 15 mm, and 27 x 24 mm one of which is partially rim calcified indicative of chronicity  The overall appearance is suggestive of a vascular   malformation and the appearance and size of the aneurysms are unchanged from the prior study  Venous drainage is not identified  There is arterial supply seen likely directly from the aorta arising inferiorly from the distal arch and possibly from the   left internal mammary artery and left coronary      OTHER FINDINGS:      CHEST:      HEART:  Normal cardiac size  No pericardial effusion      LUNGS:  Unremarkable      PLEURA: No pleural effusion      MEDIASTINUM AND MARII:  No mass or significant lymphadenopathy      CHEST WALL AND LOWER NECK:  Stable 19 mm nodule in the right lobe of the thyroid  Incidental discovery of one or more thyroid nodule(s) measuring more than 1 5 cm and without suspicious features is noted in this patient who is above 28years old;   according to guidelines published in the February 2015 white paper on incidental thyroid nodules in the Journal of the [employer] of Radiology [place]), further characterization with thyroid ultrasound may be considered  However, as the majority of   incidental thyroid nodules are benign and because small incidental thyroid malignancies typically demonstrate indolent behavior, consideration of the patient's life expectancy and possible comorbidities should be taken into account prior to a decision to   pursue further imaging  Additionally, the nodule has been stable for at least 4 years      ABDOMEN     LIVER/BILIARY TREE:  Unremarkable      GALLBLADDER:  No calcified gallstones  No pericholecystic inflammatory change      SPLEEN:  Unremarkable    Normal size      PANCREAS:  Unremarkable      ADRENAL GLANDS:  Unremarkable      KIDNEYS/URETERS:  No solid renal mass  No hydronephrosis  No urinary tract calculi      PELVIS     REPRODUCTIVE ORGANS:  Unremarkable for patient's age      URINARY BLADDER:  Unremarkable      ADDITIONAL ABDOMINAL AND PELVIC STRUCTURES:      STOMACH AND BOWEL:  Unremarkable      ABDOMINOPELVIC CAVITY:  No pathologically enlarged mesenteric or retroperitoneal lymph nodes  No ascites or free intraperitoneal air      ABDOMINAL WALL/INGUINAL REGIONS:  Unremarkable      OSSEOUS STRUCTURES:  No acute fracture or destructive osseous lesion      IMPRESSION:     1  Stable and satisfactory appearance of ascending aortic repair  2  Stable extent of residual dissection however distal arch aneurysm has increased in the interim to 53 mm compared to 42 mm in October 2017   3   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  As described previously, multiple arterial feeders are likely and there are 3 aneurysms all of which remain stable in size  The largest aneurysm measures 27 mm  *above films reviewed    Assessment:  Patient Active Problem List    Diagnosis Date Noted    Chronic dissection of thoracic aorta ([facility] ) 09/04/2018    S/P vascular bypass 08/14/2018    Screening for colon cancer 07/02/2018    Adenomatous polyp of descending colon 07/02/2018    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[facility] [address] ) 04/20/2018    KARIN (obstructive sleep apnea) 03/12/2018    Class 2 obesity due to excess calories without serious comorbidity with body mass index (BMI) of 37 0 to 37 9 in adult 03/12/2018    Atrial fibrillation ([address] ) 09/11/2017    Chronic thoracic aortic dissection ([facility] [address] ) 09/06/2017    Acute blood loss anemia 09/06/2017    S/P ascending aortic aneurysm repair 09/06/2017    Hypokalemia 09/06/2017    Prostate cancer ([address] )     Hypertension     Diabetes mellitus ([address] )     Hyperlipidemia 06/07/2012     Plan:  Risks and benefits of TEVAR were discussed in detail today with the patient    They understand and wish to proceed with further workup and ultimately surgical intervention  We have ordered routine preoperative laboratory and vascular studies  Pending the results of these tests, they will be scheduled for surgery 9/19/2018  with ALPHONSE Andujar  was comfortable with our recommendations, and their questions were answered to their satisfaction  Thank you for allowing us to participate in the care of this patient       SIGNATURE: Shane Topete  DATE: September 7, 2018  TIME: 8:58 AM

## 2018-09-19 NOTE — ANESTHESIA PROCEDURE NOTES
Arterial Line Insertion  Date/Time: 9/19/2018 10:52 AM  Performed by: James Arambula  Authorized by: Jase Gomez   Consent: Verbal consent obtained  Written consent obtained  Risks and benefits: risks, benefits and alternatives were discussed  Consent given by: patient  Patient understanding: patient states understanding of the procedure being performed  Patient consent: the patient's understanding of the procedure matches consent given  Procedure consent: procedure consent matches procedure scheduled  Patient identity confirmed: hospital-assigned identification number and arm band  Time out: Immediately prior to procedure a "time out" was called to verify the correct patient, procedure, equipment, support staff and site/side marked as required  Preparation: Patient was prepped and draped in the usual sterile fashion    Indications: hemodynamic monitoring  Orientation:  Right  Location: radial artery  Sedation:  Patient sedated: yes (GETA)    Procedure Details:  Needle gauge: 20  Seldinger technique: Seldinger technique used  Number of attempts: 1    Post-procedure:  Post-procedure: dressing applied  Waveform: good waveform and waveform confirmed  Patient tolerance: Patient tolerated the procedure well with no immediate complications

## 2018-09-19 NOTE — CONSULTS
Consult - [facility]  68 y o  male MRN: [id number]  Unit/Bed#: [facility] 414-01 Encounter: [id number]      Physician Requesting Consult: Ryan Mccormick MD    Reason for Consult / Principal Problem: Chronic thoracic aortic dissection [facility])    HPI: Marylen Bergamo  is a 69 yo M who underwent emergent type A aortic dissection repair Septemeber 2017  He has been followed by cardiac surgery and a serial CTA revealed growth of his proximal portion of the descending aorta  It was recommended that he proceed with repair of his descending aorta  He did undergo left common carotid to subclavian artery bypass with embolization of the proximal subclavian artery August 13th in preparation  The patient presents to the ICU postoperatively today after TEVAR  History obtained from chart review due to patient being intubated and sedated  PMH:   Past Medical History:   Diagnosis Date    Arthritis     CPAP (continuous positive airway pressure) dependence     [facility] )     Hyperlipidemia     Hypertension     Prostate cancer ([facility] )     prostate    Sleep apnea     [facility]     Wears glasses        PSH:   Past Surgical History:   Procedure Laterality Date    CARDIAC SURGERY      CIRCUMCISION      COLONOSCOPY      IR CEREBRAL ANGIOGRAPHY / INTERVENTION  8/13/2018    IR TEVAR  9/19/2018    TX ASCEND AORTA GRAFT W ROOT REPLACMENT  VALVE CONDUIT/CORON RECONSTRUCT N/A 9/6/2017    Procedure: ASCENDING AORTIC REPAIR WITH A 26MM  GELWEAVE STRAIGHT GRAFT AND HEMIARCH WITH 8MM SIDE ARM BRANCH;  Surgeon: Naya Clayton MD;  Location: [facility] MA[facility];  Service: Cardiac Surgery    TX EXPLOR POSTOP BLEED,INFEC,CLOT-CHST N/A 9/6/2017    Procedure: RE-EXPLORATION MEDIASTERNAL CAVITY FOR POST-OP BLEED (BRING BACK);   Surgeon: Naya Clayton MD;  Location: [facility];  Service: Cardiac Surgery    TX VEIN BYPASS GRAFT,CAROTID-BRACHIAL Left 8/13/2018    Procedure: BYPASS CAROTID SUBCLAVIAN WITH EMBOLIZATION OF PROXIMAL SUBCLAVIAN ARTERY  ;  Surgeon: Kg Parnell MD;  Location: [facility];  Service: Vascular       Family History:   Family History   Problem Relation Age of Onset    Stroke Mother         complications    Hypertension Mother    Bijan Deleon Arthritis Mother     No Known Problems Father        Social History:   History   Smoking Status    Never Smoker   Smokeless Tobacco    Never Used    History   Alcohol Use    Yes     Comment: social beer or two daily      Marital Status:     ROS: ROS unable to be obtained secondary to intubation and sedation  Allergies: No Known Allergies    Home Medications:   Prior to Admission medications    Medication Sig Start Date End Date Taking? Authorizing Provider   amLODIPine (NORVASC) 10 mg tablet TAKE 1 TABLET TWICE DAILY 9/14/18  Yes Lei Souza DO   aspirin 81 mg chewable tablet Chew 1 tablet daily 9/14/17  Yes LV Hollis MD   atorvastatin (LIPITOR) 80 mg tablet Take 1 tablet (80 mg total) by mouth daily 6/11/18  Yes Lei Grove DO   insulin glargine (LANTUS) 100 units/mL subcutaneous injection Inject 28 Units under the skin daily at bedtime Dx: Diabetes E11 9, dispense pens 9/14/17  Yes Kristan Hollis MD   insulin lispro (HumaLOG) 100 units/mL injection Inject 8 Units under the skin 3 (three) times a day before meals Dx: Diabetes E11 9  Dispense pens  Patient taking differently: Inject 8 Units under the skin 2 (two) times a day before meals Dx: Diabetes E11 9   Dispense pens  Before breakfast and dinner  9/14/17  Yes LV Hollis MD   lisinopril-hydrochlorothiazide (PRINZIDE,ZESTORETIC) 20-12 5 MG per tablet TAKE 2 TABLETS BY MOUTH EVERY DAY 8/27/18  Yes Lei Grove DO   metoprolol tartrate (LOPRESSOR) 100 mg tablet TAKE 1 TABLET TWICE A DAY 5/4/18  Yes Lei Grove DO   mupirocin (BACTROBAN) 2 % nasal ointment into each nostril 2 (two) times a day 9/7/18  Yes Micaela Jarrett PA-C   acetaminophen (TYLENOL) 325 mg tablet Take 2 tablets (650 mg total) by mouth every 6 (six) hours as needed for mild pain 8/14/18   JEREMIAS Barraza ULTRAFINE III SHORT PEN 31G X 8 MM MISC USE AS DIRECTED  5/13/18   DO SHIRA Lopez ULTRAFINE III SHORT PEN 31G X 8 MM MISC USE AS DIRECTED  7/15/18 9/19/18  Italo Espino DO       Inpatient Medications:  Scheduled Meds:  Current Facility-Administered Medications:  acetaminophen 650 mg Rectal Q4H PRN Caro Adamson PA-C    acetaminophen 650 mg Oral Q4H PRN Caro Adamson PA-C    [START ON 9/20/2018] amLODIPine 10 mg Oral BID Caro Adamson PA-C    [START ON 9/20/2018] aspirin 81 mg Oral Daily Caro Adamson PA-C    atorvastatin 80 mg Oral Daily With Dinner Caro Adamson PA-C    bisacodyl 10 mg Rectal Daily PRN Caro Adamson PA-C    cefazolin 2,000 mg Intravenous Q8H Caro Adamson PA-C    chlorhexidine 15 mL Swish & Spit Q12H 120 [address]    [START ON 9/20/2018] hydrochlorothiazide 12 5 mg Oral Daily Caro Adamson PA-C    insulin glargine 28 Units Subcutaneous HS Caro Adamson PA-C    insulin lispro 1-5 Units Subcutaneous TID AC Eleonora Smith PA-C    insulin lispro 1-5 Units Subcutaneous HS Caro Adamson PA-C    insulin lispro 8 Units Subcutaneous TID AC Eleonora Cruz PA-C    lactated ringers 125 mL/hr Intravenous Continuous Joy Bertrand MD Last Rate: Stopped (09/19/18 1451)   [START ON 9/20/2018] lisinopril 20 mg Oral Daily Caro Adamson PA-C    [START ON 9/20/2018] metoprolol tartrate 100 mg Oral BID Caro Adamson PA-C    mupirocin 1 application Nasal [address] Caro Adamson PA-C    niCARdipine 1-15 mg/hr Intravenous Titrated Caro Adamson PA-C    ondansetron 4 mg Intravenous Q6H PRN Caro Adamson PA-C    polyethylene glycol 17 g Oral Daily Caro Adamson PA-C    potassium chloride 40 mEq Intravenous Once Jeremiah Lama PA-C    sodium chloride 20 mL/hr Intravenous Continuous Caro Adamson PA-C Last Rate: 20 mL/hr (18 1400)     Continuous Infusions:  lactated ringers 125 mL/hr Last Rate: Stopped (18 1359)   niCARdipine 1-15 mg/hr    sodium chloride 20 mL/hr Last Rate: 20 mL/hr (18 1400)     PRN Meds:    acetaminophen 650 mg Q4H PRN   acetaminophen 650 mg Q4H PRN   bisacodyl 10 mg Daily PRN   ondansetron 4 mg Q6H PRN       VTE Pharmacologic Prophylaxis: Sequential compression device (Venodyne)   VTE Mechanical Prophylaxis: sequential compression device    Invasive lines and devices: Invasive Devices     Central Venous Catheter Line            CVC Central Lines 18 Triple less than 1 day          Peripheral Intravenous Line            Peripheral IV 18 Left Hand less than 1 day    Peripheral IV 18 Right Arm less than 1 day          Arterial Line            Arterial Line 18 Right Radial less than 1 day          Drain            Urethral Catheter Latex 16 Fr  less than 1 day                Vitals:   Vitals:    18 1345 18 1400 18 1425 18 1430   BP: (!) 104/49 102/52     Pulse: (!) 52 (!) 52 (!) 54 (!) 52   Resp: 12 15 21 15   Temp:  97 8 °F (36 6 °C)     TempSrc:       SpO2: 97% 96% 97% 96%   Weight:       Height:         Arterial Line BP: 132/52  Arterial Line MAP (mmHg): 78 mmHg    Temperature: Temp (24hrs), Av 3 °F (36 8 °C), Min:97 8 °F (36 6 °C), Max:98 9 °F (37 2 °C)  Current: Temperature: 97 8 °F (36 6 °C)    Weights: IBW: 66 1 kg  Body mass index is 34 77 kg/m²  Respiratory:  SpO2: SpO2: 98 %, SpO2 Activity:  , SpO2 Device: O2 Device: Nasal cannula  O2 Flow Rate (L/min): 2 L/min    Physical Exam:    Constitutional: Well developed male in NAD  HENT: Atraumatic  Neck: Supple  +CVC  Cardiovascular: Regular rate and rhythm  No murmur heard  Exam reveals no rub  Pulmonary/Chest: Breath sounds clear bilaterally  Abdominal: Soft  No distension  Musculoskeletal: No edema     Neurological:   Grossly nonfocal   Equal strength in all 4 extremities  Alert and oriented x3  Skin: Skin is warm  Psychiatric:   Mood and affect appear appropriate    Labs/Imaging:     Results from last 7 days  Lab Units 09/19/18  1331   HEMOGLOBIN g/dL 12 1   HEMATOCRIT % 36 5       Results from last 7 days  Lab Units 09/19/18  1331   SODIUM mmol/L 141   POTASSIUM mmol/L 3 2*   CHLORIDE mmol/L 107   CO2 mmol/L 27   BUN mg/dL 10   CREATININE mg/dL 0 74   CALCIUM mg/dL 8 5     Post-op CXR:   Triple-lumen catheter in appropriate positioning  Lungs clear  TEVAR graft in place  I have personally reviewed pertinent films in PACS    Impression:  Principal Problem:    Chronic thoracic aortic dissection (HCC)  Active Problems:    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 (TEVAR)    S/P ascending aortic aneurysm repair    S/P vascular bypass    Descending aortic aneurysm (HCC)    Diabetes mellitus (HCC)    Atrial fibrillation (HCC)    Hypertension    Hyperlipidemia    KARIN (obstructive sleep apnea)      Plan:    Neuro: On no continuous sedation  PRN dilaudid and percocet for pain  Trend neuro exam Q 1 hour  Regulate sleep/wake cycle  Delirium precautions  CV: MAP goal >65  SBP goal <130  Post-op medications: Cardene, 2 mg/hour  Wean to off as able  Volume resuscitation as needed  Monitor rhythm on telemetry  Lung: Check STAT post-op CXR  Wean nasal cannula oxygen to room air as able with SpO2 goal greater than 92%  GI: GI prophylaxis with PPI  Bowel regimen  Zofran PRN for nausea  FEN: NPO  Can advance diet  Replete K >4 0, Mag >2 0 and calcium >7 0  : Check STAT post-op BMP  Wilson in place  Monitor UOP with goal >0 5cc/kg/hour  Lasix prn  Volume resuscitate as needed  ID: Prophylactic post-op abx  Maintain normothermia  Tylenol PRN for fevers  Heme: Check STAT post-op H/H and platelets  Monitor incision site and invasive line for bleeding  Send coag panel if needed  Endo: Insulin gtt for blood sugar <180      Disposition: ICU Care    Counseling / Coordination of Care  Total Critical Care time spent 0 minutes excluding procedures, teaching and family updates        SIGNATURE: Carol Ramirez PA-C  DATE: September 19, 2018  TIME: 2:39 PM

## 2018-09-19 NOTE — RESPIRATORY THERAPY NOTE
RT Protocol Note  Main Brito  68 y o  male MRN: [id number]  Unit/Bed#: [facility] 414-01 Encounter: [id number]    Assessment    Principal Problem:    Chronic [facility] dissection ([facility] )  Active Problems:    Hypertension    Diabetes mellitus ([facility] )    S/P ascending aortic aneurysm repair    Atrial fibrillation (HCC)    KARIN (obstructive sleep apnea)    Hyperlipidemia    S/P vascular bypass    Descending aortic aneurysm ([facility] )    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 (TEVAR)      Home Pulmonary Medications:  noen  Home Devices/Therapy: (P) BiPAP/CPAP    Past Medical History:   Diagnosis Date    Arthritis     CPAP (continuous positive airway pressure) dependence     Diabetes ([place] [address] )     Hyperlipidemia     Hypertension     Prostate cancer ([place] [address] )     prostate    Sleep apnea     Stroke (cerebrum) ([place] [address] )     Wears glasses      Social History     Social History    Marital status:      Spouse name: N/A    Number of children: N/A    Years of education: N/A     Occupational History          retired     Social History Main Topics    Smoking status: Never Smoker    Smokeless tobacco: Never Used    Alcohol use Yes      Comment: social beer or two daily    Drug use: No    Sexual activity: No      Comment: denied hx of sexually active high risk     Other Topics Concern    None     Social History Narrative    1 serving caffeine/day    Less than high school diploma    Person living alone    Participate in moderate activities both inside and outside of the home       Subjective         Objective    Physical Exam:   Assessment Type: (P) Assess only  General Appearance: (P) Alert, Awake  Respiratory Pattern: (P) Normal  Chest Assessment: (P) Chest expansion symmetrical  Bilateral Breath Sounds: (P) Clear  R Breath Sounds: (P) Clear  L Breath Sounds: (P) Clear    Vitals:  Blood pressure 102/52, pulse (!) 52, temperature 97 7 °F (36 5 °C), temperature source Oral, resp   rate 13, height 5' 7" (1 702 m), weight 101 kg (222 lb), SpO2 98 %  Imaging and other studies: I have personally reviewed pertinent reports  Plan    Respiratory Plan: (P) No distress/Pulmonary history, Discontinue Protocol  Airway Clearance Plan: (P) Incentive Spirometer     Resp Comments: (P) Pt evaluated per resp protocol and airway clearance protocol  Pt appears comfortable on a NC after having a TEVAR procedure  Pt was extubated in the OR  He denies sob/distress and denies pulmonary hx  Will DC pt from resp protocol and continue with physician ordered IS

## 2018-09-20 ENCOUNTER — APPOINTMENT (INPATIENT)
Dept: RADIOLOGY | Facility: HOSPITAL | Age: 73
DRG: 221 | End: 2018-09-20
Payer: COMMERCIAL

## 2018-09-20 DIAGNOSIS — I71.9 DESCENDING AORTIC ANEURYSM (HCC): Primary | ICD-10-CM

## 2018-09-20 PROBLEM — D62 ACUTE BLOOD LOSS ANEMIA: Status: RESOLVED | Noted: 2017-09-06 | Resolved: 2018-09-20

## 2018-09-20 PROBLEM — I48.0 PAROXYSMAL ATRIAL FIBRILLATION (HCC): Chronic | Status: ACTIVE | Noted: 2017-09-11

## 2018-09-20 PROBLEM — I48.0 PAROXYSMAL ATRIAL FIBRILLATION (HCC): Status: ACTIVE | Noted: 2017-09-11

## 2018-09-20 LAB
ABO GROUP BLD BPU: NORMAL
ANION GAP SERPL CALCULATED.3IONS-SCNC: 6 MMOL/L (ref 4–13)
ARTERIAL PATENCY WRIST A: NO
ATRIAL RATE: 62 BPM
BASE EXCESS BLDA CALC-SCNC: 0.4 MMOL/L
BODY TEMPERATURE: 97.8 DEGREES FEHRENHEIT
BPU ID: NORMAL
BUN SERPL-MCNC: 13 MG/DL (ref 5–25)
CALCIUM SERPL-MCNC: 8.1 MG/DL (ref 8.3–10.1)
CHLORIDE SERPL-SCNC: 106 MMOL/L (ref 100–108)
CO2 SERPL-SCNC: 27 MMOL/L (ref 21–32)
CREAT SERPL-MCNC: 0.69 MG/DL (ref 0.6–1.3)
ERYTHROCYTE [DISTWIDTH] IN BLOOD BY AUTOMATED COUNT: 14.4 % (ref 11.6–15.1)
GFR SERPL CREATININE-BSD FRML MDRD: 110 ML/MIN/1.73SQ M
GLUCOSE SERPL-MCNC: 108 MG/DL (ref 65–140)
GLUCOSE SERPL-MCNC: 143 MG/DL (ref 65–140)
GLUCOSE SERPL-MCNC: 153 MG/DL (ref 65–140)
GLUCOSE SERPL-MCNC: 159 MG/DL (ref 65–140)
GLUCOSE SERPL-MCNC: 164 MG/DL (ref 65–140)
HCO3 BLDA-SCNC: 24.8 MMOL/L (ref 22–28)
HCT VFR BLD AUTO: 35.6 % (ref 36.5–49.3)
HGB BLD-MCNC: 11.8 G/DL (ref 12–17)
MAGNESIUM SERPL-MCNC: 1.8 MG/DL (ref 1.6–2.6)
MCH RBC QN AUTO: 28.7 PG (ref 26.8–34.3)
MCHC RBC AUTO-ENTMCNC: 33.1 G/DL (ref 31.4–37.4)
MCV RBC AUTO: 87 FL (ref 82–98)
O2 CT BLDA-SCNC: 17 ML/DL (ref 16–23)
OXYHGB MFR BLDA: 95.8 % (ref 94–97)
P AXIS: 36 DEGREES
PCO2 BLDA: 39.3 MM HG (ref 36–44)
PH BLDA: 7.42 [PH] (ref 7.35–7.45)
PLATELET # BLD AUTO: 186 THOUSANDS/UL (ref 149–390)
PMV BLD AUTO: 9.2 FL (ref 8.9–12.7)
PO2 BLDA: 84.9 MM HG (ref 75–129)
POTASSIUM SERPL-SCNC: 3.6 MMOL/L (ref 3.5–5.3)
PR INTERVAL: 175 MS
QRS AXIS: 28 DEGREES
QRSD INTERVAL: 96 MS
QT INTERVAL: 421 MS
QTC INTERVAL: 428 MS
RBC # BLD AUTO: 4.11 MILLION/UL (ref 3.88–5.62)
SODIUM SERPL-SCNC: 139 MMOL/L (ref 136–145)
SPECIMEN SOURCE: NORMAL
T WAVE AXIS: 46 DEGREES
UNIT DISPENSE STATUS: NORMAL
UNIT PRODUCT CODE: NORMAL
UNIT RH: NORMAL
VENTRICULAR RATE: 62 BPM
WBC # BLD AUTO: 13.14 THOUSAND/UL (ref 4.31–10.16)

## 2018-09-20 PROCEDURE — 85027 COMPLETE CBC AUTOMATED: CPT | Performed by: THORACIC SURGERY (CARDIOTHORACIC VASCULAR SURGERY)

## 2018-09-20 PROCEDURE — 93010 ELECTROCARDIOGRAM REPORT: CPT | Performed by: INTERNAL MEDICINE

## 2018-09-20 PROCEDURE — 80048 BASIC METABOLIC PNL TOTAL CA: CPT | Performed by: THORACIC SURGERY (CARDIOTHORACIC VASCULAR SURGERY)

## 2018-09-20 PROCEDURE — 82948 REAGENT STRIP/BLOOD GLUCOSE: CPT

## 2018-09-20 PROCEDURE — 99232 SBSQ HOSP IP/OBS MODERATE 35: CPT | Performed by: ANESTHESIOLOGY

## 2018-09-20 PROCEDURE — 82805 BLOOD GASES W/O2 SATURATION: CPT | Performed by: PHYSICIAN ASSISTANT

## 2018-09-20 PROCEDURE — 83735 ASSAY OF MAGNESIUM: CPT | Performed by: PHYSICIAN ASSISTANT

## 2018-09-20 PROCEDURE — 99024 POSTOP FOLLOW-UP VISIT: CPT | Performed by: THORACIC SURGERY (CARDIOTHORACIC VASCULAR SURGERY)

## 2018-09-20 PROCEDURE — 71045 X-RAY EXAM CHEST 1 VIEW: CPT

## 2018-09-20 RX ORDER — DOCUSATE SODIUM 100 MG/1
100 CAPSULE, LIQUID FILLED ORAL 2 TIMES DAILY
Status: DISCONTINUED | OUTPATIENT
Start: 2018-09-20 | End: 2018-09-21 | Stop reason: HOSPADM

## 2018-09-20 RX ORDER — MAGNESIUM SULFATE HEPTAHYDRATE 40 MG/ML
2 INJECTION, SOLUTION INTRAVENOUS ONCE
Status: COMPLETED | OUTPATIENT
Start: 2018-09-20 | End: 2018-09-20

## 2018-09-20 RX ORDER — POTASSIUM CHLORIDE 29.8 MG/ML
40 INJECTION INTRAVENOUS ONCE
Status: COMPLETED | OUTPATIENT
Start: 2018-09-20 | End: 2018-09-20

## 2018-09-20 RX ADMIN — MUPIROCIN 1 APPLICATION: 20 OINTMENT TOPICAL at 08:45

## 2018-09-20 RX ADMIN — INSULIN LISPRO 1 UNITS: 100 INJECTION, SOLUTION INTRAVENOUS; SUBCUTANEOUS at 06:15

## 2018-09-20 RX ADMIN — INSULIN LISPRO 8 UNITS: 100 INJECTION, SOLUTION INTRAVENOUS; SUBCUTANEOUS at 06:15

## 2018-09-20 RX ADMIN — MAGNESIUM SULFATE HEPTAHYDRATE 2 G: 40 INJECTION, SOLUTION INTRAVENOUS at 06:20

## 2018-09-20 RX ADMIN — INSULIN LISPRO 1 UNITS: 100 INJECTION, SOLUTION INTRAVENOUS; SUBCUTANEOUS at 16:59

## 2018-09-20 RX ADMIN — FONDAPARINUX SODIUM 2.5 MG: 2.5 INJECTION SUBCUTANEOUS at 06:15

## 2018-09-20 RX ADMIN — POLYETHYLENE GLYCOL 3350 17 G: 17 POWDER, FOR SOLUTION ORAL at 08:44

## 2018-09-20 RX ADMIN — INSULIN LISPRO 8 UNITS: 100 INJECTION, SOLUTION INTRAVENOUS; SUBCUTANEOUS at 17:39

## 2018-09-20 RX ADMIN — DOCUSATE SODIUM 100 MG: 100 CAPSULE, LIQUID FILLED ORAL at 08:44

## 2018-09-20 RX ADMIN — CEFAZOLIN SODIUM 2000 MG: 2 SOLUTION INTRAVENOUS at 02:10

## 2018-09-20 RX ADMIN — MUPIROCIN 1 APPLICATION: 20 OINTMENT TOPICAL at 20:55

## 2018-09-20 RX ADMIN — HYDROCHLOROTHIAZIDE 12.5 MG: 12.5 TABLET ORAL at 08:48

## 2018-09-20 RX ADMIN — ATORVASTATIN CALCIUM 80 MG: 80 TABLET, FILM COATED ORAL at 16:58

## 2018-09-20 RX ADMIN — DOCUSATE SODIUM 100 MG: 100 CAPSULE, LIQUID FILLED ORAL at 17:39

## 2018-09-20 RX ADMIN — CEFAZOLIN SODIUM 2000 MG: 2 SOLUTION INTRAVENOUS at 11:46

## 2018-09-20 RX ADMIN — ASPIRIN 81 MG 81 MG: 81 TABLET ORAL at 08:45

## 2018-09-20 RX ADMIN — POTASSIUM CHLORIDE 40 MEQ: 400 INJECTION, SOLUTION INTRAVENOUS at 06:20

## 2018-09-20 RX ADMIN — INSULIN GLARGINE 28 UNITS: 100 INJECTION, SOLUTION SUBCUTANEOUS at 22:22

## 2018-09-20 RX ADMIN — AMLODIPINE BESYLATE 10 MG: 10 TABLET ORAL at 08:45

## 2018-09-20 RX ADMIN — LISINOPRIL 20 MG: 20 TABLET ORAL at 08:44

## 2018-09-20 RX ADMIN — METOPROLOL TARTRATE 100 MG: 50 TABLET ORAL at 17:38

## 2018-09-20 RX ADMIN — METOPROLOL TARTRATE 100 MG: 50 TABLET ORAL at 08:44

## 2018-09-20 RX ADMIN — AMLODIPINE BESYLATE 10 MG: 10 TABLET ORAL at 17:39

## 2018-09-20 NOTE — PROGRESS NOTES
Progress Note - Cardiothoracic Surgery   Inell Clause  68 y o  male MRN: [id number]  Unit/Bed#: [facility] 414-01 Encounter: [id number]      POD # 1 s/p TEVAR    Pt seen/examined  Interval history and data reviewed with critical care team   Pt doing well  No specific complaints  Neurovascularly intact  No vasoactive gtts        Medications:   Scheduled Meds:  Current Facility-Administered Medications:  acetaminophen 650 mg Oral Q4H PRN Huseyin Barakat PA-C    amLODIPine 10 mg Oral BID Huseyin Barakat PA-C    aspirin 81 mg Oral Daily Huseyin Barakat PA-C    atorvastatin 80 mg Oral Daily With Dinner Hsueyin Barakat PA-C    bisacodyl 10 mg Rectal Daily PRN Huseyin Barakat PA-C    cefazolin 2,000 mg Intravenous Q8H Huseyin Barakat PA-C Last Rate: 2,000 mg (09/20/18 0210)   chlorhexidine 15 mL Swish & Spit Q12H [address] Calleen [place] Yaya Walker PA-C    fondaparinux 2 5 mg Subcutaneous Q24H Corie Bruno PA-C    hydrochlorothiazide 12 5 mg Oral Daily Huseyin Barakat PA-C    insulin glargine 28 Units Subcutaneous HS Huseyin Barakat PA-C    insulin lispro 1-5 Units Subcutaneous TID AC Eleonora Smith PA-C    insulin lispro 1-5 Units Subcutaneous HS Huseyin Barakat PA-C    insulin lispro 8 Units Subcutaneous TID AC Eleonora Walker PA-C    lisinopril 20 mg Oral Daily Huseyin Barakat PA-C    magnesium sulfate 2 g Intravenous Once Francoise Zeng PA-C    metoprolol tartrate 100 mg Oral BID Huseyin Barakat PA-C    mupirocin 1 application Nasal [address] Huseyin Barakat PA-C    niCARdipine 1-15 mg/hr Intravenous Titrated Huseyin Barakat PA-C Last Rate: Stopped (09/20/18 0600)   ondansetron 4 mg Intravenous Q6H PRN Huseyin Barakat PA-C    polyethylene glycol 17 g Oral Daily Huseyin Barakat PA-C    potassium chloride 40 mEq Intravenous Once Gokul Reddy PA-C Last Rate: 40 mEq (09/20/18 0620)     Continuous Infusions:  niCARdipine 1-15 mg/hr Last Rate: Stopped (09/20/18 0600)     PRN Meds:   acetaminophen    bisacodyl   ondansetron    Vitals: Blood pressure 127/55, pulse 60, temperature 97 9 °F (36 6 °C), temperature source Axillary, resp  rate 17, height 5' 7" (1 702 m), weight 100 kg (220 lb 10 9 oz), SpO2 97 %  ,Body mass index is 34 56 kg/m²  I/O last 24 hours: In: 2029 5 [I V :1679 5; IV Piggyback:350]  Out: 1551 [OKBKT:5027; Blood:25]  Invasive Devices     Central Venous Catheter Line            CVC Central Lines 09/19/18 Triple less than 1 day          Peripheral Intravenous Line            Peripheral IV 09/19/18 Left Hand less than 1 day    Peripheral IV 09/19/18 Right Arm less than 1 day          Arterial Line            Arterial Line 09/19/18 Right Radial less than 1 day                  Lab, Imaging and other studies:     Results from last 7 days  Lab Units 09/20/18  0431 09/19/18  1331   WBC Thousand/uL 13 14*  --    HEMOGLOBIN g/dL 11 8* 12 1   HEMATOCRIT % 35 6* 36 5   PLATELETS Thousands/uL 186  --        Results from last 7 days  Lab Units 09/20/18  0431 09/19/18  1331   SODIUM mmol/L 139 141   POTASSIUM mmol/L 3 6 3 2*   CHLORIDE mmol/L 106 107   CO2 mmol/L 27 27   BUN mg/dL 13 10   CREATININE mg/dL 0 69 0 74   CALCIUM mg/dL 8 1* 8 5         Recent Labs      09/20/18   0435   PHART  7 418   [id number]  24 8   PO2ART  84 9   [id number]  39 3   BEART  0 4           Plan:    DC TLC/Emily/Wilson  Transfer to floor  Ambulate  Incentive spirometry  PRN Diuresis  PO ASA/Statin/B blocker  Anticipate DC home tomorrow if he continues to do well        Rose Raphael MD  DATE: September 20, 2018  TIME: 7:41 AM

## 2018-09-20 NOTE — RESTORATIVE TECHNICIAN NOTE
Restorative Specialist Mobility Note       Activity: Ambulate in preciado, Chair     Assistive Device: None

## 2018-09-20 NOTE — PLAN OF CARE
Problem: DISCHARGE PLANNING - CARE MANAGEMENT  Goal: Discharge to post-acute care or home with appropriate resources  INTERVENTIONS:  - Conduct assessment to determine patient/family and health care team treatment goals, and need for post-acute services based on payer coverage, community resources, and patient preferences, and barriers to discharge  - Address psychosocial, clinical, and financial barriers to discharge as identified in assessment in conjunction with the patient/family and health care team  - Arrange appropriate level of post-acute services according to patient's   needs and preference and payer coverage in collaboration with the physician and health care team  - Communicate with and update the patient/family, physician, and health care team regarding progress on the discharge plan  - Arrange appropriate transportation to post-acute venues  - Anticipate home w/[facility]   Outcome: Progressing

## 2018-09-20 NOTE — DISCHARGE INSTRUCTIONS
DISCHARGE INSTRUCTIONS  ENDOVASCULAR ANEURYSM REPAIR    Following discharge from the hospital, you may have some questions about your operation, your activities or your general condition  These instructions may answer some of your questions and help you adjust during the first few weeks following your operation  ACTIVITY:  Limit your physical activity to slow walking  Avoid climbing or heavy lifting for the first two weeks after surgery  Walking up steps and normal activities may be resumed, as you feel ready  You should not drive a car for three to four days following discharge from the hospital   You may ride in a car upon discharge  DIET:  Resume your normal diet  Try to eat low fat and low cholesterol foods  INCISION:  You may include the operated area in a shower on the second day following surgery  Sitting in a tub is not recommended for the first week following surgery or if you have any open wounds  Your physician may have chosen to use a type of adhesive glue, to close your incision  There are stitches present under the skin which will absorb on their own  The glue is used to cover the incision, assist in closure, and prevent contamination  This adhesive will darken and peel away on its own within one to two weeks  If one is present, you may remove the dressing, band-aid or steri-strips over your wound after two days  Numbness in the region of the incision may occur following the surgery  This normally resolves in six to twelve months  It is normal to have some bruising, swelling or mild discoloration around the incision  If increasing redness or pain develops, call our office immediately  If you notice any active bleeding, apply pressure to the site and call 911 or go to the nearest Emergency Department  Appt nancy/ Dr Zarco : Oct 2, 2018 at 8:30am, [facility] office  [address] [place] [address], [facility],  [address]    [phone]  [phone] TOLL FREE [phone]  [address] [facility] , [address], [facility], [address]  [address], [place], [address]  [address], [place], [address]  [address] [facility] [address] [facility], [facility], [address]  [address], [address], [address]  [address], [facility], [address] [facility] [address]   [address] [facility], [address] [facility], [facility], [address] [facility]  [address] [facility], [address] [facility], Zachary Brock 6    FOLLOW UP STUDIES:  Doppler ultrasound studies, CT scans and abdominal x-rays are very important to your post-operative care  Your surgeon will arrange for them at your first postoperative visit

## 2018-09-20 NOTE — PROGRESS NOTES
Progress Note - Vascular Surgery   Josiah Sue  68 y o  male MRN: [id number]  Unit/Bed#: [facility] 414-01 Encounter: [id number]    Assessment/Plan:  68 y o  male with descending thoracic aortic dissection w/ aneurysm s/p TEVAR 9/29/2018    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 (TEVAR)   Assessment & Plan    NPO until bowel function  OOB, ambulate  Continue arixtra ppx  Care per cardiac surgery            Subjective/Objective     Subjective: Patient feels well  No complaints  No gas or BM but feels "rumblings "      Objective:     Vitals: Blood pressure 127/55, pulse 60, temperature 97 9 °F (36 6 °C), temperature source Axillary, resp  rate 17, height 5' 7" (1 702 m), weight 100 kg (220 lb 10 9 oz), SpO2 97 %  ,Body mass index is 34 56 kg/m²  I/O       09/18 0701 - 09/19 0700 09/19 0701 - 09/20 0700 09/20 0701 - 09/21 0700    I V  (mL/kg)  1679 5 (16 8)     IV Piggyback  350     Total Intake(mL/kg)  2029 5 (20 3)     Urine (mL/kg/hr)  1145     Blood  25     Total Output   1170      Net   +859 5                   Physical Exam:  GEN: NAD  HEENT: MMM  CV: RRR  Lung: Normal effort  Ab: Soft, NT/ND  Extrem: No CCE, palp DP B/L  Neuro:  A+Ox3    Lab, Imaging and other studies:   CBC with diff:   Lab Results   Component Value Date    WBC 13 14 (H) 09/20/2018    HGB 11 8 (L) 09/20/2018    HCT 35 6 (L) 09/20/2018    MCV 87 09/20/2018     09/20/2018    MCH 28 7 09/20/2018    MCHC 33 1 09/20/2018    RDW 14 4 09/20/2018    MPV 9 2 09/20/2018   , BMP/CMP:   Lab Results   Component Value Date     09/20/2018    K 3 6 09/20/2018     09/20/2018    CO2 27 09/20/2018    BUN 13 09/20/2018    CREATININE 0 69 09/20/2018    CALCIUM 8 1 (L) 09/20/2018    EGFR 110 09/20/2018   , Magnesium: No results found for: MAG  VTE Pharmacologic Prophylaxis: Heparin  VTE Mechanical Prophylaxis: sequential compression device

## 2018-09-20 NOTE — PROGRESS NOTES
Progress Note - Critical Care   Litzy Vargas  68 y o  male MRN: [id number]  Unit/Bed#: [facility] 125-75 Encounter: [id number]      Attending Physician: Mikie Young MD    24 Hour Events: POD # 1 s/p TEVAR  Maintained on Cardene overnight but now off this AM  Oral HTN medications reordered       Allergies: No Known Allergies    Medications:   Scheduled Meds:  Current Facility-Administered Medications:  acetaminophen 650 mg Oral Q4H PRN Bhavin Diaz PA-C    amLODIPine 10 mg Oral BID Bhavin Diaz PA-C    aspirin 81 mg Oral Daily Bhavin Diaz PA-C    atorvastatin 80 mg Oral Daily With Dinner Bhavin Diaz PA-C    bisacodyl 10 mg Rectal Daily PRN Bhavin Diaz PA-C    cefazolin 2,000 mg Intravenous Q8H Bhavin Diaz PA-C Last Rate: 2,000 mg (09/20/18 0210)   chlorhexidine 15 mL Swish & Spit Q12H [address] Sepideh Duong PA-C    fondaparinux 2 5 mg Subcutaneous Q24H Eric Jerez PA-C    hydrochlorothiazide 12 5 mg Oral Daily Bhavin Diaz PA-C    insulin glargine 28 Units Subcutaneous HS Bhavin Diaz PA-C    insulin lispro 1-5 Units Subcutaneous TID AC Eleonora Smith PA-C    insulin lispro 1-5 Units Subcutaneous HS Bhavin Diaz PA-C    insulin lispro 8 Units Subcutaneous TID AC Eleonora Duong PA-C    lisinopril 20 mg Oral Daily Bhavin Diaz PA-C    magnesium sulfate 2 g Intravenous Once Francoise Zeng PA-C    metoprolol tartrate 100 mg Oral BID Bhavin Diaz PA-C    mupirocin 1 application Nasal B27V [address] Bhavin Diaz PA-C    niCARdipine 1-15 mg/hr Intravenous Titrated Bhavin Diaz PA-C Last Rate: 2 mg/hr (09/20/18 0506)   ondansetron 4 mg Intravenous Q6H PRN Bhavin Diaz PA-C    polyethylene glycol 17 g Oral Daily Bhavin Diaz PA-C    potassium chloride 40 mEq Intravenous Once Tez Puga PA-C        VTE Pharmacologic Prophylaxis: Fondaparinux (Arixtra)  VTE Mechanical Prophylaxis: sequential compression device    Continuous Infusions:  niCARdipine 1-15 mg/hr Last Rate: 2 mg/hr (09/20/18 0506)     PRN Meds:    acetaminophen 650 mg Q4H PRN   bisacodyl 10 mg Daily PRN   ondansetron 4 mg Q6H PRN       Home Medications:   Prior to Admission medications    Medication Sig Start Date End Date Taking? Authorizing Provider   amLODIPine (NORVASC) 10 mg tablet TAKE 1 TABLET TWICE DAILY 9/14/18  Yes Lei Thomas DO   aspirin 81 mg chewable tablet Chew 1 tablet daily 9/14/17  Yes LV Mckeon MD   atorvastatin (LIPITOR) 80 mg tablet Take 1 tablet (80 mg total) by mouth daily 6/11/18  Yes Lei Grove DO   insulin glargine (LANTUS) 100 units/mL subcutaneous injection Inject 28 Units under the skin daily at bedtime Dx: Diabetes E11 9, dispense pens 9/14/17  Yes Rosalva Mckeon MD   insulin lispro (HumaLOG) 100 units/mL injection Inject 8 Units under the skin 3 (three) times a day before meals Dx: Diabetes E11 9  Dispense pens  Patient taking differently: Inject 8 Units under the skin 2 (two) times a day before meals Dx: Diabetes E11 9   Dispense pens  Before breakfast and dinner  9/14/17  Yes LV Mckeon MD   lisinopril-hydrochlorothiazide (PRINZIDE,ZESTORETIC) 20-12 5 MG per tablet TAKE 2 TABLETS BY MOUTH EVERY DAY 8/27/18  Yes Lei Grove DO   metoprolol tartrate (LOPRESSOR) 100 mg tablet TAKE 1 TABLET TWICE A DAY 5/4/18  Yes Lei Grove DO   mupirocin (BACTROBAN) 2 % nasal ointment into each nostril 2 (two) times a day 9/7/18  Yes Leslie Cao PA-C   acetaminophen (TYLENOL) 325 mg tablet Take 2 tablets (650 mg total) by mouth every 6 (six) hours as needed for mild pain 8/14/18   JEREMIAS Grant-D ULTRAFINE III SHORT PEN 31G X 8 MM MISC USE AS DIRECTED  5/13/18   Jesse Greer DO       Vitals:   Vitals:    09/20/18 0100 09/20/18 0200 09/20/18 0300 09/20/18 0400   BP:       Pulse: 60 56 56 56   Resp: 14 13 14 16   Temp:       TempSrc:       SpO2: 98% 97% 98% 97%   Weight:       Height:         Arterial Line BP: 118/46  Arterial Line MAP (mmHg): 68 mmHg    Tele Rhythm: NSR This was personally reviewed by myself  Respiratory:  SpO2: SpO2: 97 %, SpO2 Activity: SpO2 Activity: At Rest, SpO2 Device: O2 Device: Room Air    Temperature: Temp (24hrs), Av °F (36 7 °C), Min:97 6 °F (36 4 °C), Max:98 9 °F (37 2 °C)  Current: Temperature: 97 9 °F (36 6 °C)    Weights:   Weight (last 2 days)     Date/Time   Weight    18 0901  101 (222)            IBW: 66 1 kg  Body mass index is 34 77 kg/m²  Intake and Outputs:  Intake/Output Summary (Last 24 hours) at 18 0614  Last data filed at 18 0400   Gross per 24 hour   Intake           1801 5 ml   Output             1020 ml   Net            781 5 ml     I/O last 24 hours: In: 1801 5 [I V :1601 5; IV Piggyback:200]  Out: 1020 [Urine:995; Blood:25]    UOP: 30-50/hour     Labs:    Results from last 7 days  Lab Units 18  0431 18  1331   WBC Thousand/uL 13 14*  --    HEMOGLOBIN g/dL 11 8* 12 1   HEMATOCRIT % 35 6* 36 5   PLATELETS Thousands/uL 186  --        Results from last 7 days  Lab Units 18  0431 18  1331   SODIUM mmol/L 139 141   POTASSIUM mmol/L 3 6 3 2*   CHLORIDE mmol/L 106 107   CO2 mmol/L 27 27   BUN mg/dL 13 10   CREATININE mg/dL 0 69 0 74   CALCIUM mg/dL 8 1* 8 5       Results from last 7 days  Lab Units 18  0427   MAGNESIUM mg/dL 1 8       Imaging:  AM CXR: TLC in good position  No pTX  Lungs clear  This was personally reviewed by myself in PACS  AM EKG: NSR  RVCD  This was personally reviewed by myself  Physical Exam:    General Appearance:  Well developed male in bedside chair  HENT: Atraumtic  Neck: Supple  +CVC in place  Eyes: No icterus  Cardiac: Regular rate and rhythm, +2 sytolic murmur, no rub  Pulmonary: Clear to auscultation bilaterally  Gastrointestinal: Soft, no distention  : Wilson present: yes   Musculoskeletal: No edema bilaterally  Neuro:  Screening neurologic exam reveals no deficits  Follows commands  Psych: Mood and affect seem appropriate  Skin: Warm  Incisions: Inguinal site is clean and dry  Invasive lines and devices: Invasive Devices     Central Venous Catheter Line            CVC Central Lines 09/19/18 Triple less than 1 day          Peripheral Intravenous Line            Peripheral IV 09/19/18 Left Hand less than 1 day    Peripheral IV 09/19/18 Right Arm less than 1 day          Arterial Line            Arterial Line 09/19/18 Right Radial less than 1 day          Drain            Urethral Catheter Latex 16 Fr  less than 1 day                Assessment:  Principal Problem:    Chronic thoracic aortic dissection (HCC)  Active Problems:    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 (TEVAR)    S/P ascending aortic aneurysm repair    S/P vascular bypass    Descending aortic aneurysm (HCC)    Diabetes mellitus (HCC)    Paroxysmal atrial fibrillation (HCC)    Hypertension    Hyperlipidemia    KARIN (obstructive sleep apnea)    Prostate cancer (HCC)    Hypokalemia      Plan:    Neuro:   · Pain controlled with: percocet and tylenol PRN  · Regulate sleep/wake cycle  · Restoril QHS PRN  · Trend neuro exam  CV:   · Cardiac infusions: Cardene off  · MAP goal > 65   · D/C Arterial line  · Rhythm: NSR  · Follow rhythm on telemetry  · Restart lopressor 100 mg PO BID  · Lisinopril, norvasc and HCTZ restarted  · Continue statin and ASA therapy  · Continue DVT prophylaxis  Lung:   · Good Room air oxygen saturation; Continue incentive spirometry/Coughing/Deep breathing exercises  · SpO2 goal >92%  · Continue IS, deep breathing and coughing exercises  GI:   · Continue PPI for stress ulcer prophylaxis  · Continue bowel regimen  · Zofran PRN for nausea  FEN:   · Diuretic plan: None  Takes none at home     · Goal 24 hour fluid balance: Even  · Nutrition/diet plan: Initiate TLC 2 3 gm sodium diet with 1800 mL fluid restriction   · Replete electrolytes with goals: K >4 0, Mag >2 0, and Phos >3 0  :   · Indwelling Wilson present: yes   · D/C Wilson  · Trend UOP and BUN/creat  · Strict I and O  ID:   · Trend temps and WBC count  · Maintain normothermia  · Tylenol PRN for fevers  Heme:   · Trend hgb and plts  · Transfuse as needed for goal hgb >7 0  Endo:   · Glycemic control plan: History of diabetes: SQ insulin  MSK/Skin:  · Mobility goal: OOB and ambulating as able  · PT consult: not applicable  · OT consult: not applicable  · Frequent turning and pressure off-loading  · Local wound care as needed    Disposition:  Transfer from ICU to telemetry today  Possible D/C home  Collaborative bedside rounds performed with cardiac surgery attending and bedside RN      SIGNATURE: Julieth Singh PA-C  DATE: 2018  TIME: 6:14 AM

## 2018-09-20 NOTE — SOCIAL WORK
CM met w/pt @ bedside to discuss CM role and possible d/c needs  Pt does not have POA  Emergency contact is pt's daughter Nataliia Alvarez [phone]  Pt stated he lives alone in [facility]; [facility]  Pt uses rollator and was independent in ADLs PTA  Pt has hx [facility]  Pt confirmed he is agreeable to referral for [facility] @ discharge  [facility] referral sent  No hx inpatient rehab  Pt uses [facility] [address] in [facility]  Pt denies hx inpatient MH/D&A treatment  CM reviewed d/c checklist with patient with copy left at bedside  Pt encouraged to participate in discharge planning prior to discharge  CM reviewed d/c planning process including the following: identifying help at home, patient preference for d/c planning needs, Discharge Lounge, [facility] Meds to Bed program, availability of treatment team to discuss questions or concerns patient and/or family may have regarding understanding medications and recognizing signs and symptoms once discharged  CM also encouraged patient to follow up with all recommended appointments after discharge  Patient advised of importance for patient and family to participate in managing patients medical well being

## 2018-09-21 ENCOUNTER — TRANSITIONAL CARE MANAGEMENT (OUTPATIENT)
Dept: FAMILY MEDICINE CLINIC | Facility: CLINIC | Age: 73
End: 2018-09-21

## 2018-09-21 VITALS
HEIGHT: 67 IN | DIASTOLIC BLOOD PRESSURE: 52 MMHG | WEIGHT: 222.44 LBS | RESPIRATION RATE: 18 BRPM | HEART RATE: 65 BPM | BODY MASS INDEX: 34.91 KG/M2 | SYSTOLIC BLOOD PRESSURE: 128 MMHG | TEMPERATURE: 98 F | OXYGEN SATURATION: 97 %

## 2018-09-21 LAB
ANION GAP SERPL CALCULATED.3IONS-SCNC: 6 MMOL/L (ref 4–13)
BUN SERPL-MCNC: 12 MG/DL (ref 5–25)
CALCIUM SERPL-MCNC: 8.5 MG/DL (ref 8.3–10.1)
CHLORIDE SERPL-SCNC: 103 MMOL/L (ref 100–108)
CO2 SERPL-SCNC: 29 MMOL/L (ref 21–32)
CREAT SERPL-MCNC: 0.69 MG/DL (ref 0.6–1.3)
ERYTHROCYTE [DISTWIDTH] IN BLOOD BY AUTOMATED COUNT: 14.6 % (ref 11.6–15.1)
GFR SERPL CREATININE-BSD FRML MDRD: 110 ML/MIN/1.73SQ M
GLUCOSE SERPL-MCNC: 103 MG/DL (ref 65–140)
GLUCOSE SERPL-MCNC: 116 MG/DL (ref 65–140)
GLUCOSE SERPL-MCNC: 116 MG/DL (ref 65–140)
HCT VFR BLD AUTO: 37.1 % (ref 36.5–49.3)
HGB BLD-MCNC: 12.3 G/DL (ref 12–17)
MCH RBC QN AUTO: 29.3 PG (ref 26.8–34.3)
MCHC RBC AUTO-ENTMCNC: 33.2 G/DL (ref 31.4–37.4)
MCV RBC AUTO: 88 FL (ref 82–98)
PLATELET # BLD AUTO: 184 THOUSANDS/UL (ref 149–390)
PMV BLD AUTO: 10 FL (ref 8.9–12.7)
POTASSIUM SERPL-SCNC: 3.7 MMOL/L (ref 3.5–5.3)
RBC # BLD AUTO: 4.2 MILLION/UL (ref 3.88–5.62)
SODIUM SERPL-SCNC: 138 MMOL/L (ref 136–145)
WBC # BLD AUTO: 13.46 THOUSAND/UL (ref 4.31–10.16)

## 2018-09-21 PROCEDURE — 82948 REAGENT STRIP/BLOOD GLUCOSE: CPT

## 2018-09-21 PROCEDURE — 80048 BASIC METABOLIC PNL TOTAL CA: CPT | Performed by: PHYSICIAN ASSISTANT

## 2018-09-21 PROCEDURE — 99024 POSTOP FOLLOW-UP VISIT: CPT | Performed by: THORACIC SURGERY (CARDIOTHORACIC VASCULAR SURGERY)

## 2018-09-21 PROCEDURE — 85027 COMPLETE CBC AUTOMATED: CPT | Performed by: PHYSICIAN ASSISTANT

## 2018-09-21 RX ORDER — DOCUSATE SODIUM 100 MG/1
100 CAPSULE, LIQUID FILLED ORAL 2 TIMES DAILY PRN
Qty: 10 CAPSULE | Refills: 0 | COMMUNITY
Start: 2018-09-21 | End: 2019-12-02 | Stop reason: ALTCHOICE

## 2018-09-21 RX ORDER — LISINOPRIL AND HYDROCHLOROTHIAZIDE 20; 12.5 MG/1; MG/1
1 TABLET ORAL DAILY
Qty: 60 TABLET | Refills: 0 | Status: SHIPPED | OUTPATIENT
Start: 2018-09-21 | End: 2018-11-20 | Stop reason: SDUPTHER

## 2018-09-21 RX ADMIN — INSULIN LISPRO 8 UNITS: 100 INJECTION, SOLUTION INTRAVENOUS; SUBCUTANEOUS at 12:02

## 2018-09-21 RX ADMIN — METOPROLOL TARTRATE 100 MG: 50 TABLET ORAL at 09:13

## 2018-09-21 RX ADMIN — LISINOPRIL 20 MG: 20 TABLET ORAL at 09:13

## 2018-09-21 RX ADMIN — HYDROCHLOROTHIAZIDE 12.5 MG: 12.5 TABLET ORAL at 09:13

## 2018-09-21 RX ADMIN — AMLODIPINE BESYLATE 10 MG: 10 TABLET ORAL at 09:14

## 2018-09-21 RX ADMIN — POLYETHYLENE GLYCOL 3350 17 G: 17 POWDER, FOR SOLUTION ORAL at 09:14

## 2018-09-21 RX ADMIN — POTASSIUM CHLORIDE 30 MEQ: 1500 TABLET, EXTENDED RELEASE ORAL at 12:01

## 2018-09-21 RX ADMIN — DOCUSATE SODIUM 100 MG: 100 CAPSULE, LIQUID FILLED ORAL at 09:13

## 2018-09-21 RX ADMIN — INSULIN LISPRO 8 UNITS: 100 INJECTION, SOLUTION INTRAVENOUS; SUBCUTANEOUS at 06:48

## 2018-09-21 RX ADMIN — MUPIROCIN 1 APPLICATION: 20 OINTMENT TOPICAL at 09:11

## 2018-09-21 RX ADMIN — ASPIRIN 81 MG 81 MG: 81 TABLET ORAL at 09:13

## 2018-09-21 RX ADMIN — FONDAPARINUX SODIUM 2.5 MG: 2.5 INJECTION SUBCUTANEOUS at 06:47

## 2018-09-21 NOTE — PROGRESS NOTES
Progress Note - Cardiothoracic Surgery   Darrell Godfrey  68 y o  male MRN: [id number]  Unit/Bed#: [facility] 430-01 Encounter: [id number]  POD 2 s/p TEVAR    24 Hour Events: No events overnight  No complaints  Tolerating diet  Ambulating well  Medications:   Scheduled Meds:  Current Facility-Administered Medications:  acetaminophen 650 mg Oral Q4H PRN Earl Lawrence PA-C   amLODIPine 10 mg Oral BID Earl Lawrence PA-C   aspirin 81 mg Oral Daily Earl Lawrence PA-C   atorvastatin 80 mg Oral Daily With Dinner Earl Lawrence PA-C   bisacodyl 10 mg Rectal Daily PRN Earl Lawrence PA-C   docusate sodium 100 mg Oral BID Candis Monteiro PA-C   fondaparinux 2 5 mg Subcutaneous Q24H Clyde Nair PA-C   hydrochlorothiazide 12 5 mg Oral Daily Earl Lawrence PA-C   insulin glargine 28 Units Subcutaneous HS Earl Lawrence PA-C   insulin lispro 1-5 Units Subcutaneous TID AC Eleonora Smith PA-C   insulin lispro 1-5 Units Subcutaneous HS Earl Lawrence PA-C   insulin lispro 8 Units Subcutaneous TID AC Eleonora Smith PA-C   lisinopril 20 mg Oral Daily Earl Lawrence PA-C   metoprolol tartrate 100 mg Oral BID Earl Lawrence PA-C   mupirocin 1 application Nasal L61Y [facility] Shasta Schmidt PA-C   ondansetron 4 mg Intravenous Q6H PRN Earl Lawrence PA-C   polyethylene glycol 17 g Oral Daily Earl Lawrence PA-C     Continuous Infusions:   PRN Meds:   acetaminophen    bisacodyl    ondansetron    Vitals:   Vitals:    09/20/18 2344 09/21/18 0329 09/21/18 0600 09/21/18 0700   BP: 118/65 119/57  144/75   BP Location: Right arm Right arm  Right arm   Pulse: 55 61  60   Resp: 20 18 18   Temp: 98 1 °F (36 7 °C) 98 2 °F (36 8 °C)  98 3 °F (36 8 °C)   TempSrc: Oral Oral  Oral   SpO2: 93% 95%  96%   Weight:   101 kg (222 lb 7 1 oz)    Height:           Telemetry: NSR; Heart Rate: 61    Respiratory:   SpO2: SpO2: 96 %;  Room Air    Intake/Output:   I/O       09/19 0701 - 09/20 0700 09/20 0701 - 09/21 0700 09/21 0701 - 09/22 0700    I V  (mL/kg) 1679 5 (16 8) 60 (0 6)     IV Piggyback 350 50     Total Intake(mL/kg) 2029 5 (20 3) 110 (1 1)     Urine (mL/kg/hr) 1145 1275 (0 5)     Blood 25      Total Output 1170 1275      Net +859 5 -1165                 Weights:   Weight (last 2 days)     Date/Time   Weight    09/21/18 0600  101 (222 44)    09/20/18 0600  100 (220 68)    09/19/18 0901  101 (222)            Admit weight: 101    Results:     Results from last 7 days  Lab Units 09/21/18  0507 09/20/18  0431 09/19/18  1331   WBC Thousand/uL 13 46* 13 14*  --    HEMOGLOBIN g/dL 12 3 11 8* 12 1   HEMATOCRIT % 37 1 35 6* 36 5   PLATELETS Thousands/uL 184 186  --        Results from last 7 days  Lab Units 09/21/18  0507 09/20/18  0431 09/19/18  1331   SODIUM mmol/L 138 139 141   POTASSIUM mmol/L 3 7 3 6 3 2*   CHLORIDE mmol/L 103 106 107   CO2 mmol/L 29 27 27   BUN mg/dL 12 13 10   CREATININE mg/dL 0 69 0 69 0 74   CALCIUM mg/dL 8 5 8 1* 8 5         Point of care glucose: 108-164    Studies:  No new    Invasive Lines/Tubes:  Invasive Devices     Peripheral Intravenous Line            Peripheral IV 09/19/18 Left Hand 1 day    Peripheral IV 09/19/18 Right Arm 1 day                Physical Exam:    HEENT/NECK:  PERRLA  No jugular venous distention  Cardiac: Regular rate and rhythm  No rubs/murmurs/gallops  Pulmonary:  Breath sounds slightly diminished at the bases bilaterally  Abdomen:  Non-tender, Non-distended  Positive bowel sounds  Incisions:   Groin c/d/i  Lower extremities: Extremities warm/dry  No edema B/L  Neuro: Alert and oriented X 3  Sensation is grossly intact  No focal deficits  Skin: Warm/Dry, without rashes or lesions      Assessment:  Patient Active Problem List   Diagnosis    Pro[facility] cancer ([facility] [address] )    Hypertension    Diabetes mellitus ([facility] [facility] )    Chronic thoracic aortic dissection ([facility] [address] [facility] )    S/P ascending aortic aneurysm repair    Hypokalemia    Paroxysmal atrial fibrillation (HCC)    KARIN (obstructive sleep apnea)    Class 2 obesity due to excess calories without serious comorbidity with body mass index (BMI) of 37 0 to 37 9 in adult    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HCC)    Hyperlipidemia    Adenomatous polyp of descending colon    S/P vascular bypass    Descending aortic aneurysm (HCC)    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 (TEVAR)       POD 2 s/p TEVAR    Plan:    1  Cardiac:   NSR; HR/BP well-controlled  Lopressor 100 mg PO BID  Continue ASA and Statin therapy  Central IV access no longer required; Remove central venous catheter today  Continue DVT prophylaxis    2  Pulmonary:   Good Room air oxygen saturation; Continue incentive spirometry/Coughing/Deep breathing exercises    3  Renal:   Intake/Output net: --1165 mL/24 hours  Post op Creatinine stable; Follow up labs prn    4  Neuro:  Neurologically intact; No active issues  Incisional pain well-controlled; Continue prn Percocet    5  GI:  Tolerating TLC 2 3 gm sodium diet  Maintain 1800 mL daily fluid restriction   Continue stool softeners and prn suppository  Continue GI prophylaxis    6  Endo: on home diabetic insulin regimen    7  Hematology: no issues     8   Disposition:  Anticipate discharge to home today     VTE Pharmacologic Prophylaxis: Fondaparinux (Arixtra)  VTE Mechanical Prophylaxis: sequential compression device    Collaborative rounds completed with ALPHONSE Smith , and Yogesh Estrella RN    SIGNATURE: Shane Zarco  DATE: September 21, 2018  TIME: 8:17 AM

## 2018-09-21 NOTE — PROGRESS NOTES
Progress Note - Vascular Surgery   Main Brito  68 y o  male MRN: [id number]  Unit/Bed#: [facility] 430-01 Encounter: [id number]    Assessment:  71-year-old male with chronic dissection of thoracic aorta status post thoracic endovascular descending aortic aneurysm repair via left brachial artery cannulation with arch aortogram and right common femoral artery puncture    Plan:  Continue regular diet  P r n  Diuresis  Ambulate floor  Patient is stable for discharge    Subjective/Objective   Chief Complaint:     Subjective: No acute events  Patient resting with CPAP on overnight  Denies f/c/n/v/sob/cp  Objective:     Blood pressure 119/57, pulse 61, temperature 98 2 °F (36 8 °C), temperature source Oral, resp  rate 18, height 5' 7" (1 702 m), weight 100 kg (220 lb 10 9 oz), SpO2 95 %  ,Body mass index is 34 56 kg/m²  Intake/Output Summary (Last 24 hours) at 09/21/18 0538  Last data filed at 09/21/18 0331   Gross per 24 hour   Intake              338 ml   Output             1425 ml   Net            -1087 ml       Invasive Devices     Peripheral Intravenous Line            Peripheral IV 09/19/18 Left Hand 1 day    Peripheral IV 09/19/18 Right Arm 1 day                Physical Exam: General: AAOx3  Respiratory: BS b/l  Abdomen: Soft, NT, no rebound/guarding  Heart: RRR, S1s2  Ext: Warm no cyanosis   Pulses Palpable DP b/l  Right groin access c/d/i, no ecchymosis/hematoma    Lab, Imaging and other studies:  I have personally reviewed pertinent lab results    , CBC:   Lab Results   Component Value Date    WBC 13 46 (H) 09/21/2018    HGB 12 3 09/21/2018    HCT 37 1 09/21/2018    MCV 88 09/21/2018     09/21/2018    MCH 29 3 09/21/2018    MCHC 33 2 09/21/2018    RDW 14 6 09/21/2018    MPV 10 0 09/21/2018   , CMP:   No results found for: NA, K, CL, CO2, ANIONGAP, BUN, CREATININE, GLUCOSE, CALCIUM, AST, ALT, ALKPHOS, PROT, ALBUMIN, BILITOT, EGFR  VTE Pharmacologic Prophylaxis: Fondaparinux (Arixtra)  VTE Mechanical Prophylaxis: sequential compression device

## 2018-09-21 NOTE — DISCHARGE SUMMARY
Discharge Summary - Cardiothoracic Surgery   Neymar Chavez  68 y o  male MRN: [id number]  Unit/Bed#: [facility] 430-01 Encounter: [id number]    Admission Date: 9/19/2018     Discharge Date: 09/21/18    Admitting Diagnosis: Chronic dissection of thoracic aorta ([address] ) [I71 01]    Primary Discharge Diagnosis:   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, s/p Type A dissection repair; s/p left carotid-subclavian bypass;  S/P Thoracic endovascular aneurysm repair with a 37/37 x 20 cm GORE c-TAG endovascular graft and 37/37 x 15 cm GORE c-TAG graft  Secondary Discharge Diagnosis:    allergic rhinitis, arthritis, Bells palsy, HTN, IDDM2 w/ neuropathy, glaucoma, b/l hearing loss, HL, h/o ICH, obesity, prostate ca, h/o CVA, thyroid nodule, pulm nodule    Attending: ALPHONSE Kiser  Consulting Physician(s):   Medical/Critical Care    Procedures Performed:   9/19/2018: Thoracic endovascular aneurysm repair with a 37/37 x 20 cm GORE c-TAG endovascular graft and 37/37 x 15 cm GORE c-TAG graft  Hospital Course:   9/19: TEVAR  Patient tolerated procedure well & was transferred post-operatively to the ICU hemodynamically stable on no gtts  No spinal drain placed  Wean to extubate  9/20: Extubated overnight, currently on RA  Cardene weaned to off  Started on home medications  NV intact  [facility] transfer to [facility]  Anticipated d/c 24h   9/21: Stable for discharge to home  NVI  HR/BP good on preoperative medications  Condition at Discharge:   good     Discharge Physical Exam:  HEENT/NECK:  PERRLA  No jugular venous distention  Cardiac: Regular rate and rhythm  No rubs/murmurs/gallops  Pulmonary:  Breath sounds slightly diminished at the bases bilaterally  Abdomen:  Non-tender, Non-distended  Positive bowel sounds  Incisions: femoral site c/d/i  Lower extremities: Extremities warm/dry  Radial/PT/DP pulses 2+ bilaterally  No edema B/L  Neuro: Alert and oriented X 3    Sensation is grossly intact  No focal deficits  Skin: Warm/Dry, without rashes or lesions  Discharge Data:    Results from last 7 days  Lab Units 09/21/18  0507 09/20/18  0431 09/19/18  1331   WBC Thousand/uL 13 46* 13 14*  --    HEMOGLOBIN g/dL 12 3 11 8* 12 1   HEMATOCRIT % 37 1 35 6* 36 5   PLATELETS Thousands/uL 184 186  --        Results from last 7 days  Lab Units 09/21/18  0507 09/20/18  0431 09/19/18  1331   SODIUM mmol/L 138 139 141   POTASSIUM mmol/L 3 7 3 6 3 2*   CHLORIDE mmol/L 103 106 107   CO2 mmol/L 29 27 27   BUN mg/dL 12 13 10   CREATININE mg/dL 0 69 0 69 0 74   CALCIUM mg/dL 8 5 8 1* 8 5           Discharge instructions/Information to patient and family:   See after visit summary for information provided to patient and family  Irvin Ellis  was educated on restrictions regarding driving and lifting, and techniques of proper incisional care  They were specifically counselled on signs and symptoms of a sternal wound infection, and advised to contact our service immediately should they develop fevers, sweats, chill, redness or drainage at the site of any incisions  Provisions for Follow-Up Care:  See after visit summary for information related to follow-up care and any pertinent home health orders  Disposition:  Home    Planned Readmission:   No    Discharge Medications:  See after visit summary for reconciled discharge medications provided to patient and family  Irvin Ellis  was provided contact information and scheduled a follow up appointment with ALPHONSE Terrazas  Additionally, they were advised to schedule follow up appointments to be seen by their primary care physician within 7-10 days  Contact information was provided  Vascular surgery scheduled follow up outpatient appointment  No pain medication or diuretic given at discharge, aside from his home HCTZ will resume  I spent 30 minutes discharging the patient  This time was spent on the day of discharge   I had direct contact with the patient on the day of discharge  Additional documentation is required if more than 30 minutes were spent on discharge       SIGNATURE: Shane Garcia  DATE: September 21, 2018  TIME: 10:22 AM

## 2018-09-24 ENCOUNTER — TRANSITIONAL CARE MANAGEMENT (OUTPATIENT)
Dept: FAMILY MEDICINE CLINIC | Facility: CLINIC | Age: 73
End: 2018-09-24

## 2018-09-25 ENCOUNTER — TELEPHONE (OUTPATIENT)
Dept: VASCULAR SURGERY | Facility: CLINIC | Age: 73
End: 2018-09-25

## 2018-09-25 NOTE — TELEPHONE ENCOUNTER
Procedure: REPAIR ANEURSYM THORACIC ENDOVASCUALR DESCENDING AORTIC ANEURYSM (TEVAR)  LEFT BRACHIAL ARTERY CANNULATION WITH ARCH AORTOGRAM  ULTRASOUND-GUIDED LEFT BRACHIAL ARTERY PUNCTURE  ULTRASOUND-GUIDED RIGHT COMMON FEMORAL ARTERY PUNCTURE    Date of Procedure: 9/19/2018    Surgeon: Bob Dixon MD - Primary     * Baron Nadeem DO - Assisting     * Jose Mariee MD - Assisting    Discharge Date: 9/21/2018      Leg weakness?: No    Leg swelling?: No    Leg numbness?: No    Chest pain?: No    Shortness of breath?: No    Orthopnea?: No    Bowel/Bladder function stable?: Yes    ASA or Plavix?: ASA 81 mg QD    Bleeding?: No    Pain Controlled?: Yes    Incision stable?: Yes    Fever/chills?: No      NEXT OFFICE VISIT SCHEDULED: 10/2/2018 0830 Dr Mekhi Gray      Any further questions/concerns? Patient reports slightly elevated blood sugars in the 190's even though he reports adherence to his insulin regimen  Instructed to keep log of sugars and times taken and bring to his 9/27/2018 appointment with his PCP  Patient also reports uncertainty regarding his ability to make his follow up appointments as he is dependent upon his daughter for transportation  Requested that he give call the office to reschedule if he knows he cannot make his scheduled appointment  Diet, fluid restriction, and signs of infection reinforced with patient

## 2018-09-27 ENCOUNTER — OFFICE VISIT (OUTPATIENT)
Dept: FAMILY MEDICINE CLINIC | Facility: CLINIC | Age: 73
End: 2018-09-27
Payer: COMMERCIAL

## 2018-09-27 VITALS
SYSTOLIC BLOOD PRESSURE: 120 MMHG | HEIGHT: 67 IN | DIASTOLIC BLOOD PRESSURE: 70 MMHG | BODY MASS INDEX: 33.74 KG/M2 | WEIGHT: 215 LBS

## 2018-09-27 DIAGNOSIS — E11.9 TYPE 2 DIABETES MELLITUS WITHOUT COMPLICATION, UNSPECIFIED WHETHER LONG TERM INSULIN USE (HCC): Chronic | ICD-10-CM

## 2018-09-27 DIAGNOSIS — Z86.79 H/O REPAIR OF DISSECTING ANEURYSM OF DESCENDING THORACIC AORTA: Chronic | ICD-10-CM

## 2018-09-27 DIAGNOSIS — I10 ESSENTIAL HYPERTENSION: Chronic | ICD-10-CM

## 2018-09-27 DIAGNOSIS — IMO0001 TRANSITION OF CARE PERFORMED WITH SHARING OF CLINICAL SUMMARY: Primary | ICD-10-CM

## 2018-09-27 DIAGNOSIS — Z98.890 H/O REPAIR OF DISSECTING ANEURYSM OF DESCENDING THORACIC AORTA: Chronic | ICD-10-CM

## 2018-09-27 PROCEDURE — 99496 TRANSJ CARE MGMT HIGH F2F 7D: CPT | Performed by: FAMILY MEDICINE

## 2018-09-27 PROCEDURE — 1160F RVW MEDS BY RX/DR IN RCRD: CPT | Performed by: FAMILY MEDICINE

## 2018-09-27 PROCEDURE — 1111F DSCHRG MED/CURRENT MED MERGE: CPT | Performed by: FAMILY MEDICINE

## 2018-09-27 NOTE — PATIENT INSTRUCTIONS
Here for RAMOS and doing well, he has appt  To see Vascular surgery next week s/p  Chronic dissection of thoracic aorta  He is to call if any problems and take all meds as directed  F-up with Dr Cash Short and Dr Delmy Bianchi as directed

## 2018-09-27 NOTE — PROGRESS NOTES
Assessment/Plan:  Chief Complaint   Patient presents with    Transition of Care Management     Patient Instructions   Here for JANNETH MERCADO and doing well, he has appt  To see Vascular surgery next week s/p  Chronic dissection of thoracic aorta  He is to call if any problems and take all meds as directed  F-up with Dr Rodríguez Mace and Dr Daniel Akers as directed  No problem-specific Assessment & Plan notes found for this encounter  Diagnoses and all orders for this visit:    Transition of care performed with sharing of clinical summary    Essential hypertension    Type 2 diabetes mellitus without complication, unspecified whether long term insulin use ([facility] [address] )    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 (TEVAR)          Subjective:      Patient ID: Leonela Huynh  is a 68 y o  male  Here for LAGUNAS JESS discharged 6 days ago and doing well  No cp or sob, or ha  From discharge summary:     Admission Date: 9/19/2018      Discharge Date: 09/21/18     Admitting Diagnosis: Chronic dissection of thoracic aorta (HCC) (I71 01)     Primary Discharge Diagnosis:   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, s/p Type A dissection repair; s/p left carotid-subclavian bypass;  S/P Thoracic endovascular aneurysm repair with a 37/37 x 20 cm GORE c-TAG endovascular graft and 37/37 x 15 cm GORE c-TAG graft       Secondary Discharge Diagnosis:    allergic rhinitis, arthritis, Bell's palsy, HTN, IDDM2 w/ neuropathy, glaucoma, b/l hearing loss, HL, h/o ICH, obesity, prostate ca, h/o CVA, thyroid nodule, pulm nodule    He is having normal BM's and urinating without problems  He is taking all meds as directed for HTN and diabetes           The following portions of the patient's history were reviewed and updated as appropriate: allergies, current medications, past family history, past medical history, past social history, past surgical history and problem list     Review of Systems Constitutional: Negative  HENT: Negative  Eyes: Negative  Respiratory: Negative  Cardiovascular: Negative  Gastrointestinal: Negative  Endocrine: Negative  Genitourinary: Negative  Musculoskeletal: Negative  Skin: Negative  Allergic/Immunologic: Negative  Neurological: Negative  Psychiatric/Behavioral: Negative  Objective:      /70   Ht 5' 7" (1 702 m)   Wt 97 5 kg (215 lb)   BMI 33 67 kg/m²          Physical Exam   Constitutional: He is oriented to person, place, and time  He appears well-developed and well-nourished  HENT:   Head: Normocephalic and atraumatic  Right Ear: External ear normal    Left Ear: External ear normal    Nose: Nose normal    Mouth/Throat: Oropharynx is clear and moist    Eyes: Pupils are equal, round, and reactive to light  Conjunctivae and EOM are normal    Neck: Normal range of motion  Neck supple  Cardiovascular: Normal rate, regular rhythm, normal heart sounds and intact distal pulses  Pulmonary/Chest: Effort normal and breath sounds normal    Abdominal: Soft  Bowel sounds are normal    Musculoskeletal: Normal range of motion  Neurological: He is alert and oriented to person, place, and time  He has normal reflexes  Skin: Skin is warm and dry  Bruise left hand and right hand and right groin, all stable  Psychiatric: He has a normal mood and affect   His behavior is normal

## 2018-09-28 ENCOUNTER — TELEPHONE (OUTPATIENT)
Dept: CARDIAC SURGERY | Facility: CLINIC | Age: 73
End: 2018-09-28

## 2018-09-28 NOTE — TELEPHONE ENCOUNTER
Call placed to patient to follow-up after discharge from hospital, post-op  Spoke to: Patient  Procedure & Date: TEVAR 9/19/18  Surgeon: Marion Thurston patient today for update since discharged from the hospital after TEVAR procedure  Patient was very agitated on the phone, stated he was tired of receiving so many phone calls from us  He was able to provide some information: No swelling, incisions stable, he also said VNA's were going to see him at home and they told him everything was "fine"  Advised patient to contact our office if he has any questions/issues in the future and that otherwise we would see him at his post-op appointment on 10/19/18  Education:  1  Daily AM weight, call for weight gain of 2 lbs or more in 24 hours  2  Take frequent short walks, increase activity as tolerated  3  Maintain sternal precautions: No strenuous activity; no lifting more than 10 lbs;  no driving  4  Continue to use Incentive Spirometer frequently throughout the day  5  Shower daily; Cleanse incisions with antibacterial soap and water  6  No ointments, powder or lotions on surgical incisions  7   Call [facility] office with questions or concerns

## 2018-10-02 ENCOUNTER — OFFICE VISIT (OUTPATIENT)
Dept: VASCULAR SURGERY | Facility: CLINIC | Age: 73
End: 2018-10-02

## 2018-10-02 VITALS
WEIGHT: 211 LBS | BODY MASS INDEX: 33.12 KG/M2 | TEMPERATURE: 97.8 F | DIASTOLIC BLOOD PRESSURE: 78 MMHG | SYSTOLIC BLOOD PRESSURE: 136 MMHG | HEIGHT: 67 IN

## 2018-10-02 DIAGNOSIS — I71.01 CHRONIC THORACIC AORTIC DISSECTION (HCC): Primary | ICD-10-CM

## 2018-10-02 PROCEDURE — 99024 POSTOP FOLLOW-UP VISIT: CPT | Performed by: SURGERY

## 2018-10-02 NOTE — LETTER
October 2, 2018     Cory Noss, [address]    Patient: Marylen Bergamo  YOB: 1945   Date of Visit: 10/2/2018       Dear Dr Polly Correa: Thank you for referring Raj Aguilera to me for evaluation  Below are the relevant portions of my assessment and plan of care  Chronic thoracic aortic dissection (HCC)  Chronic thoracic aortic dissection approximately 2 weeks status post TEVAR  Patient doing well  There are no restrictions at this point  Will plan standard follow-up with CT angiogram pending within the next 2 weeks  Will also follow left carotid to subclavian artery bypass with carotid duplex in approximately 2 months  If you have questions, please do not hesitate to call me  I look forward to following Ramiro Garcia along with you           Sincerely,        Opal Melo MD        CC: Thony Mayer MD

## 2018-10-02 NOTE — PATIENT INSTRUCTIONS
Chronic thoracic aortic dissection (HCC)  Chronic thoracic aortic dissection approximately 2 weeks status post TEVAR  Patient doing well  There are no restrictions at this point  Will plan standard follow-up with CT angiogram pending within the next 2 weeks  Will also follow left carotid to subclavian artery bypass with carotid duplex in approximately 2 months

## 2018-10-02 NOTE — PROGRESS NOTES
Assessment/Plan:    Chronic thoracic aortic dissection (HCC)  Chronic thoracic aortic dissection approximately 2 weeks status post TEVAR  Patient doing well  There are no restrictions at this point  Will plan standard follow-up with CT angiogram pending within the next 2 weeks  Will also follow left carotid to subclavian artery bypass with carotid duplex in approximately 2 months  Diagnoses and all orders for this visit:    Chronic thoracic aortic dissection (HCC)  -     VAS carotid complete study; Future               Patient ID: Chuy Blank  is a 68 y o  male  Chief complaint: Pt is post op TEVAR done 9/19/18 by Dr Yulia Patrick  Pt denies pain, drainage,redness or swelling to incision site Pt does c/o pain going from right neck down to mid back Pt offers no other complaints  Pt is on ASA and statin  67-year-old with chronic thoracic aortic dissection approximately 2 weeks status post repair with thoracic stent graft with history of left carotid to subclavian artery bypass  He states he is doing well with no major limitations  He has returned to a relatively normal level of activity  He denies any discomfort at either the right femoral puncture site or left brachial puncture site  On examination the left brachial puncture site is well healed with no evidence of hematoma or bruising  He has easily palpable brachial and radial pulse  Motor and sensory functions intact  The right femoral puncture site is also well healed with mild bruising and small hematoma  Easily palpable femoral and distal pulses  Motor and sensory function is intact  The following portions of the patient's history were reviewed and updated as appropriate: allergies, current medications, past family history, past medical history, past social history, past surgical history and problem list     The ROS as bellow was reviewed and changes made as indictated  Review of Systems   Constitutional: Negative      HENT: Positive for postnasal drip, rhinorrhea and tinnitus  Eyes: Negative  Respiratory: Positive for apnea and cough  Cardiovascular: Positive for leg swelling  Gastrointestinal: Negative  Endocrine: Negative  Genitourinary: Negative  Musculoskeletal: Positive for arthralgias and neck pain  Skin: Negative  Allergic/Immunologic: Positive for environmental allergies  Neurological: Positive for dizziness  Hematological: Negative  Psychiatric/Behavioral: Negative            Objective:      /78 (BP Location: Right arm, Patient Position: Sitting, Cuff Size: Adult)   Temp 97 8 °F (36 6 °C) (Tympanic)   Ht 5' 7" (1 702 m)   Wt 95 7 kg (211 lb)   BMI 33 05 kg/m²          Physical Exam

## 2018-10-02 NOTE — ASSESSMENT & PLAN NOTE
Chronic thoracic aortic dissection approximately 2 weeks status post TEVAR  Patient doing well  There are no restrictions at this point  Will plan standard follow-up with CT angiogram pending within the next 2 weeks  Will also follow left carotid to subclavian artery bypass with carotid duplex in approximately 2 months

## 2018-10-08 ENCOUNTER — HOSPITAL ENCOUNTER (OUTPATIENT)
Dept: CT IMAGING | Facility: HOSPITAL | Age: 73
Discharge: HOME | End: 2018-10-08
Attending: THORACIC SURGERY (CARDIOTHORACIC VASCULAR SURGERY)
Payer: COMMERCIAL

## 2018-10-08 DIAGNOSIS — I71.9 DESCENDING AORTIC ANEURYSM (HCC): ICD-10-CM

## 2018-10-08 PROCEDURE — 74174 CTA ABD&PLVS W/CONTRAST: CPT

## 2018-10-08 PROCEDURE — 71275 CT ANGIOGRAPHY CHEST: CPT

## 2018-10-08 RX ADMIN — IOHEXOL 100 ML: 350 INJECTION, SOLUTION INTRAVENOUS at 08:44

## 2018-10-18 PROBLEM — Z48.89 ENCOUNTER FOR POSTOPERATIVE CARE: Status: ACTIVE | Noted: 2018-10-18

## 2018-10-19 ENCOUNTER — OFFICE VISIT (OUTPATIENT)
Dept: CARDIAC SURGERY | Facility: CLINIC | Age: 73
End: 2018-10-19

## 2018-10-19 VITALS
TEMPERATURE: 97.7 F | HEART RATE: 56 BPM | DIASTOLIC BLOOD PRESSURE: 62 MMHG | OXYGEN SATURATION: 98 % | SYSTOLIC BLOOD PRESSURE: 116 MMHG | RESPIRATION RATE: 14 BRPM | WEIGHT: 211 LBS | BODY MASS INDEX: 33.12 KG/M2 | HEIGHT: 67 IN

## 2018-10-19 DIAGNOSIS — Z95.828 S/P VASCULAR BYPASS: Chronic | ICD-10-CM

## 2018-10-19 DIAGNOSIS — I71.2 AORTIC ARCH ANEURYSM (HCC): ICD-10-CM

## 2018-10-19 DIAGNOSIS — Z48.89 ENCOUNTER FOR POSTOPERATIVE CARE: ICD-10-CM

## 2018-10-19 DIAGNOSIS — Z98.890 H/O REPAIR OF DISSECTING ANEURYSM OF DESCENDING THORACIC AORTA: Primary | Chronic | ICD-10-CM

## 2018-10-19 DIAGNOSIS — Z86.79 S/P ASCENDING AORTIC ANEURYSM REPAIR: Chronic | ICD-10-CM

## 2018-10-19 DIAGNOSIS — Z98.890 S/P ASCENDING AORTIC ANEURYSM REPAIR: Chronic | ICD-10-CM

## 2018-10-19 DIAGNOSIS — Z86.79 H/O REPAIR OF DISSECTING ANEURYSM OF DESCENDING THORACIC AORTA: Primary | Chronic | ICD-10-CM

## 2018-10-19 DIAGNOSIS — I71.9 DESCENDING AORTIC ANEURYSM (HCC): Chronic | ICD-10-CM

## 2018-10-19 DIAGNOSIS — I71.01 CHRONIC THORACIC AORTIC DISSECTION (HCC): ICD-10-CM

## 2018-10-19 PROCEDURE — 99024 POSTOP FOLLOW-UP VISIT: CPT | Performed by: PHYSICIAN ASSISTANT

## 2018-10-19 NOTE — LETTER
October 19, 2018     [address] [id number]    Patient: Augustin Higgins  YOB: 1945   Date of Visit: 10/19/2018       Dear Dr Dayne Andres:    Thank you for referring Kenneth Avery to me for evaluation  Below are my notes for this consultation  If you have questions, please do not hesitate to call me  I look forward to following your patient along with you  Sincerely,        Kaci Santoyo MD        CC: DO Wayne De La Torre [place], [place]  10/19/2018  3:18 PM  Attested  Procedure: S/P thoracic endovascular aortic repair, performed on 9/19/18    History: Augustin Higgins  is a 68y o  year old male who presents to our office today for routine follow up care from thoracic endovascular aortic repair on 9/19/18 due to a chronic thoracic aortic dissection  His past history is significant for undergoing an emergent surgical repair of an acute Type A aortic dissection in September 2017 and a left carotid to subclavian artery bypass by Dr Dayne Andres on 8/13/18  He returns today with a CTA chest/abd/pelvis  Since he has been home, Mr Walt Mendieta reports he is doing very well  He denies CP, abdominal pain, back pain, SOB, palpitations, syncope, LE edema, LE weakness or problems with his groin access sites  He has been ambulating well and getting around well  Vital Signs:   Vitals:    10/19/18 1400 10/19/18 1438   BP: 124/64 116/62   BP Location: Left arm Right arm   Cuff Size: Adult    Pulse: 56    Resp: 14    Temp: 97 7 °F (36 5 °C)    TempSrc: Oral    SpO2: 98%    Weight: 95 7 kg (211 lb)    Height: 5' 7" (1 702 m)        Home Medications:   Prior to Admission medications    Medication Sig Start Date End Date Taking?  Authorizing Provider   acetaminophen (TYLENOL) 325 mg tablet Take 2 tablets (650 mg total) by mouth every 6 (six) hours as needed for mild pain 8/14/18  Yes Nara Hardin PA-C   amLODIPine (NORVASC) 10 mg tablet TAKE 1 TABLET TWICE DAILY 9/14/18  Yes Lei Blanton DO   aspirin 81 mg chewable tablet Chew 1 tablet daily 9/14/17  Yes LV Mcmahan MD   atorvastatin (LIPITOR) 80 mg tablet Take 1 tablet (80 mg total) by mouth daily 6/11/18  Yes Lei Blanton DO   docusate sodium (COLACE) 100 mg capsule Take 1 capsule (100 mg total) by mouth 2 (two) times a day as needed for constipation 9/21/18  Yes Johanne Frias PA-C   insulin glargine (LANTUS) 100 units/mL subcutaneous injection Inject 28 Units under the skin daily at bedtime Dx: Diabetes E11 9, dispense pens 9/14/17  Yes Samantha Mcmahan MD   insulin lispro (HumaLOG) 100 units/mL injection Inject 8 Units under the skin 3 (three) times a day before meals Dx: Diabetes E11 9  Dispense pens  Patient taking differently: Inject 8 Units under the skin 2 (two) times a day before meals Dx: Diabetes E11 9  Dispense pens  Before breakfast and dinner  9/14/17  Yes Samantha Mcmahan MD   lisinopril-hydrochlorothiazide [facility]) 20-12 5 MG per tablet Take 1 tablet by mouth daily 9/21/18  Yes Johanne Frias PA-C   metoprolol tartrate (LOPRESSOR) 100 mg tablet TAKE 1 TABLET TWICE A DAY 5/4/18  Yes Lei Grove DO   B-D ULTRAFINE III SHORT PEN 31G X 8 MM MISC USE AS DIRECTED  5/13/18   Raffaele Prim, DO       Physical Exam:    HEENT/NECK:  PERRLA  No jugular venous distention  Cardiac:Regular rate and rhythm  Pulmonary:Breath sounds clear bilaterally  Abdomen:  Non-tender, Non-distended  Positive bowel sounds  Lower extremities: Extremities warm/dry  Radial/PT/DP pules 2+ bilaterally  Trace edema B/L  Incisions: Bilateral groins well healed, without hematoma  Neuro: Alert and oriented X 3  Sensation is grossly intact  No focal deficits  Skin: Warm/Dry, without rashes or lesions      Lab Results:                 Lab Results   Component Value Date    HGBA1C 6 9 (H) 09/11/2018     Lab Results   Component Value Date    CKTOTAL 72 01/15/2014    TROPONINI 0 03 09/06/2017       Imaging Studies:     CTA C/A/P 9/20/18: VASCULAR STRUCTURES:  The patient is status post Amilcar type A aortic dissection with surgical repair of the ascending aorta  The patient had subsequent carotid subclavian bypass on the left with embolization of the origin the left subclavian artery   and placement of a thoracic endograft to treat the dissection due to expansion of the false lumen  Currently, the carotid subclavian bypass is patent, and the graft is in good position  There is persistent retrograde flow into the false lumen, however  The false lumen and overall aortic size are stable since the preendograft CT from 4/10/2018  Distal to the endograft, the dissection flap extends into the celiac axis, but the appearance remains stable  The SMA and right renal artery arise from the true lumen, and the left renal artery arises from the false lumen  The inferior mesenteric artery arises from the true lumen  The dissection terminates in the distal abdominal aorta  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      HEART:  Coronary artery calcifications are noted with a stable 26 mm aneurysm versus AVM involving a branch of the left anterior descending artery  This has been present on multiple previous scans and remains stable  Normal cardiac size  No pericardial effusion    Stable heterogeneous right thyroid lobe mass  I have personally reviewed pertinent films in PACS    Assessment: Chronic Thoracic Aortic dissection  S/P TEVAR;    Plan:     Tara Cruz  continues to recover well following thoracic endovascular aortic repair  To date they have made progressive improvements physical rehabilitation  Tara Cruz  has also been cleared to resume driving at this point  I asked that them do so in progressive increments  I did remind them of their ongoing lifting restrictions of 25 pounds for an additional 2 months    I would like to have a repeat CTA chest/abdomen/pelvis in 6 months  I will schedule an appointment for him to be seen at that time, as well  Caffie Leaks  has already been evaluated by their primary care physician cardiologist for ongoing medical care  I have advised them to call with any new concerns that may arise  Caffie Leaks  was comfortable with our recommendations and their questions were answered to their satisfaction  Hermelinda Ivan  10/19/18  Attestation signed by Sean Guadarrama MD at 10/19/2018  3:33 PM:  Pt seen and examined with PA  I agree with the above assessment and plan  He is recovering nicely from recent TEVAR  CTA demonstrates some residual flow in the false lumen which is originating from the distal false lumen  I suspect the proximal false lumen, which is the area we were treating due to aneurysmal dilatation, should thrombose and regress in size over time  I'll see him again in 6 months with repeat CTA      Tori Tapia MD  DATE: October 19, 2018  TIME: 3:31 PM

## 2018-10-19 NOTE — PROGRESS NOTES
Procedure: S/P thoracic endovascular aortic repair, performed on 9/19/18    History: Ann Luna  is a 68y o  year old male who presents to our office today for routine follow up care from thoracic endovascular aortic repair on 9/19/18 due to a chronic thoracic aortic dissection  His past history is significant for undergoing an emergent surgical repair of an acute Type A aortic dissection in September 2017 and a left carotid to subclavian artery bypass by Dr Lewis Myrick on 8/13/18  He returns today with a CTA chest/abd/pelvis  Since he has been home, Mr Griselda Esteban reports he is doing very well  He denies CP, abdominal pain, back pain, SOB, palpitations, syncope, LE edema, LE weakness or problems with his groin access sites  He has been ambulating well and getting around well  Vital Signs:   Vitals:    10/19/18 1400 10/19/18 1438   BP: 124/64 116/62   BP Location: Left arm Right arm   Cuff Size: Adult    Pulse: 56    Resp: 14    Temp: 97 7 °F (36 5 °C)    TempSrc: Oral    SpO2: 98%    Weight: 95 7 kg (211 lb)    Height: 5' 7" (1 702 m)        Home Medications:   Prior to Admission medications    Medication Sig Start Date End Date Taking?  Authorizing Provider   acetaminophen (TYLENOL) 325 mg tablet Take 2 tablets (650 mg total) by mouth every 6 (six) hours as needed for mild pain 8/14/18  Yes Lg Monday JEREMIAS Hardin   amLODIPine (NORVASC) 10 mg tablet TAKE 1 TABLET TWICE DAILY 9/14/18  Yes Lei Amado DO   aspirin 81 mg chewable tablet Chew 1 tablet daily 9/14/17  Yes LV Richey MD   atorvastatin (LIPITOR) 80 mg tablet Take 1 tablet (80 mg total) by mouth daily 6/11/18  Yes Lei Grove DO   docusate sodium (COLACE) 100 mg capsule Take 1 capsule (100 mg total) by mouth 2 (two) times a day as needed for constipation 9/21/18  Yes Shant Sanders PA-C   insulin glargine (LANTUS) 100 units/mL subcutaneous injection Inject 28 Units under the skin daily at bedtime Dx: Diabetes E11 9, dispense pens 9/14/17  Yes Fawn Courtney MD   insulin lispro (HumaLOG) 100 units/mL injection Inject 8 Units under the skin 3 (three) times a day before meals Dx: Diabetes E11 9  Dispense pens  Patient taking differently: Inject 8 Units under the skin 2 (two) times a day before meals Dx: Diabetes E11 9  Dispense pens  Before breakfast and dinner  9/14/17  Yes Fawn Courtney MD   lisinopril-hydrochlorothiazide [facility]) 20-12 5 MG per tablet Take 1 tablet by mouth daily 9/21/18  Yes Alice Rousseau PA-C   metoprolol tartrate (LOPRESSOR) 100 mg tablet TAKE 1 TABLET TWICE A DAY 5/4/18  Yes DO VEENA Murillo-D ULTRAFINE III SHORT PEN 31G X 8 MM MISC USE AS DIRECTED  5/13/18   [facility],        Physical Exam:    HEENT/NECK:  PERRLA  No jugular venous distention  Cardiac:Regular rate and rhythm  Pulmonary:Breath sounds clear bilaterally  Abdomen:  Non-tender, Non-distended  Positive bowel sounds  Lower extremities: Extremities warm/dry  Radial/PT/DP pules 2+ bilaterally  Trace edema B/L  Incisions: Bilateral groins well healed, without hematoma  Neuro: Alert and oriented X 3  Sensation is grossly intact  No focal deficits  Skin: Warm/Dry, without rashes or lesions  Lab Results:                 Lab Results   Component Value Date    HGBA1C 6 9 (H) 09/11/2018     Lab Results   Component Value Date    CKTOTAL 72 01/15/2014    TROPONINI 0 03 09/06/2017       Imaging Studies:     CTA C/A/P 9/20/18: VASCULAR STRUCTURES:  The patient is status post [place] type A aortic dissection with surgical repair of the ascending aorta  The patient had subsequent carotid subclavian bypass on the left with embolization of the origin the left subclavian artery   and placement of a thoracic endograft to treat the dissection due to expansion of the false lumen  Currently, the carotid subclavian bypass is patent, and the graft is in good position    There is persistent retrograde flow into the false lumen, however  The false lumen and overall aortic size are stable since the preendograft CT from 4/10/2018  Distal to the endograft, the dissection flap extends into the celiac axis, but the appearance remains stable  The SMA and right renal artery arise from the true lumen, and the left renal artery arises from the false lumen  The inferior mesenteric artery arises from the true lumen  The dissection terminates in the distal abdominal aorta  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      HEART:  Coronary artery calcifications are noted with a stable 26 mm aneurysm versus AVM involving a branch of the left anterior descending artery  This has been present on multiple previous scans and remains stable  Normal cardiac size  No pericardial effusion    Stable heterogeneous right thyroid lobe mass  I have personally reviewed pertinent films in PACS    Assessment: Chronic Thoracic Aortic dissection  S/P TEVAR;    Plan:     Ann Luna  continues to recover well following thoracic endovascular aortic repair  To date they have made progressive improvements physical rehabilitation  Ann Luna  has also been cleared to resume driving at this point  I asked that them do so in progressive increments  I did remind them of their ongoing lifting restrictions of 25 pounds for an additional 2 months  I would like to have a repeat CTA chest/abdomen/pelvis in 6 months  I will schedule an appointment for him to be seen at that time, as well  Ann Luna  has already been evaluated by their primary care physician cardiologist for ongoing medical care  I have advised them to call with any new concerns that may arise  Ann Luna  was comfortable with our recommendations and their questions were answered to their satisfaction      [place], [place]  10/19/18

## 2018-10-30 DIAGNOSIS — I10 HTN (HYPERTENSION), BENIGN: ICD-10-CM

## 2018-10-30 RX ORDER — METOPROLOL TARTRATE 100 MG/1
TABLET ORAL
Qty: 60 TABLET | Refills: 5 | Status: SHIPPED | OUTPATIENT
Start: 2018-10-30 | End: 2019-03-24 | Stop reason: SDUPTHER

## 2018-11-20 ENCOUNTER — APPOINTMENT (OUTPATIENT)
Dept: LAB | Facility: CLINIC | Age: 73
End: 2018-11-20
Payer: COMMERCIAL

## 2018-11-20 DIAGNOSIS — R39.12 WEAK URINARY STREAM: ICD-10-CM

## 2018-11-20 DIAGNOSIS — R35.1 NOCTURIA: ICD-10-CM

## 2018-11-20 DIAGNOSIS — E78.5 HYPERLIPIDEMIA, UNSPECIFIED HYPERLIPIDEMIA TYPE: ICD-10-CM

## 2018-11-20 DIAGNOSIS — I10 ESSENTIAL HYPERTENSION: Chronic | ICD-10-CM

## 2018-11-20 DIAGNOSIS — I10 HTN (HYPERTENSION), BENIGN: ICD-10-CM

## 2018-11-20 DIAGNOSIS — E11.9 TYPE 2 DIABETES MELLITUS WITHOUT COMPLICATION, UNSPECIFIED WHETHER LONG TERM INSULIN USE (HCC): ICD-10-CM

## 2018-11-20 DIAGNOSIS — Z85.46 PERSONAL HISTORY OF MALIGNANT NEOPLASM OF PROSTATE: Primary | ICD-10-CM

## 2018-11-20 LAB
ALBUMIN SERPL BCP-MCNC: 3.2 G/DL (ref 3.5–5)
ALP SERPL-CCNC: 125 U/L (ref 46–116)
ALT SERPL W P-5'-P-CCNC: 29 U/L (ref 12–78)
ANION GAP SERPL CALCULATED.3IONS-SCNC: 4 MMOL/L (ref 4–13)
AST SERPL W P-5'-P-CCNC: 28 U/L (ref 5–45)
BILIRUB SERPL-MCNC: 0.42 MG/DL (ref 0.2–1)
BUN SERPL-MCNC: 13 MG/DL (ref 5–25)
CALCIUM SERPL-MCNC: 9.7 MG/DL (ref 8.3–10.1)
CHLORIDE SERPL-SCNC: 101 MMOL/L (ref 100–108)
CO2 SERPL-SCNC: 28 MMOL/L (ref 21–32)
CREAT SERPL-MCNC: 0.81 MG/DL (ref 0.6–1.3)
GFR SERPL CREATININE-BSD FRML MDRD: 103 ML/MIN/1.73SQ M
GLUCOSE P FAST SERPL-MCNC: 113 MG/DL (ref 65–99)
POTASSIUM SERPL-SCNC: 3.5 MMOL/L (ref 3.5–5.3)
PROT SERPL-MCNC: 7.8 G/DL (ref 6.4–8.2)
SODIUM SERPL-SCNC: 133 MMOL/L (ref 136–145)

## 2018-11-20 PROCEDURE — 36415 COLL VENOUS BLD VENIPUNCTURE: CPT

## 2018-11-20 PROCEDURE — 80053 COMPREHEN METABOLIC PANEL: CPT

## 2018-11-20 PROCEDURE — 83036 HEMOGLOBIN GLYCOSYLATED A1C: CPT

## 2018-11-20 PROCEDURE — G0103 PSA SCREENING: HCPCS

## 2018-11-21 LAB
EST. AVERAGE GLUCOSE BLD GHB EST-MCNC: 146 MG/DL
HBA1C MFR BLD: 6.7 % (ref 4.2–6.3)
PSA SERPL-MCNC: 0.4 NG/ML (ref 0–4)

## 2018-11-21 RX ORDER — LISINOPRIL AND HYDROCHLOROTHIAZIDE 20; 12.5 MG/1; MG/1
1 TABLET ORAL DAILY
Qty: 60 TABLET | Refills: 3 | Status: SHIPPED | OUTPATIENT
Start: 2018-11-21 | End: 2018-12-05 | Stop reason: SDUPTHER

## 2018-11-29 ENCOUNTER — OFFICE VISIT (OUTPATIENT)
Dept: FAMILY MEDICINE CLINIC | Facility: CLINIC | Age: 73
End: 2018-11-29
Payer: COMMERCIAL

## 2018-11-29 VITALS
BODY MASS INDEX: 36.32 KG/M2 | SYSTOLIC BLOOD PRESSURE: 138 MMHG | DIASTOLIC BLOOD PRESSURE: 86 MMHG | WEIGHT: 218 LBS | HEIGHT: 65 IN

## 2018-11-29 DIAGNOSIS — Z11.59 ENCOUNTER FOR HEPATITIS C SCREENING TEST FOR LOW RISK PATIENT: Primary | ICD-10-CM

## 2018-11-29 DIAGNOSIS — Z98.890 H/O REPAIR OF DISSECTING ANEURYSM OF DESCENDING THORACIC AORTA: Chronic | ICD-10-CM

## 2018-11-29 DIAGNOSIS — I48.0 PAROXYSMAL ATRIAL FIBRILLATION (HCC): Chronic | ICD-10-CM

## 2018-11-29 DIAGNOSIS — E78.5 HYPERLIPIDEMIA, UNSPECIFIED HYPERLIPIDEMIA TYPE: Chronic | ICD-10-CM

## 2018-11-29 DIAGNOSIS — Z23 NEED FOR INFLUENZA VACCINATION: ICD-10-CM

## 2018-11-29 DIAGNOSIS — E11.9 TYPE 2 DIABETES MELLITUS WITHOUT COMPLICATION, UNSPECIFIED WHETHER LONG TERM INSULIN USE (HCC): Chronic | ICD-10-CM

## 2018-11-29 DIAGNOSIS — I10 ESSENTIAL HYPERTENSION: Chronic | ICD-10-CM

## 2018-11-29 DIAGNOSIS — Z86.79 H/O REPAIR OF DISSECTING ANEURYSM OF DESCENDING THORACIC AORTA: Chronic | ICD-10-CM

## 2018-11-29 PROCEDURE — 99214 OFFICE O/P EST MOD 30 MIN: CPT | Performed by: FAMILY MEDICINE

## 2018-11-29 PROCEDURE — 3079F DIAST BP 80-89 MM HG: CPT | Performed by: FAMILY MEDICINE

## 2018-11-29 PROCEDURE — 3075F SYST BP GE 130 - 139MM HG: CPT | Performed by: FAMILY MEDICINE

## 2018-11-29 PROCEDURE — 1036F TOBACCO NON-USER: CPT | Performed by: FAMILY MEDICINE

## 2018-11-29 PROCEDURE — 90662 IIV NO PRSV INCREASED AG IM: CPT | Performed by: FAMILY MEDICINE

## 2018-11-29 PROCEDURE — G0008 ADMIN INFLUENZA VIRUS VAC: HCPCS | Performed by: FAMILY MEDICINE

## 2018-11-29 NOTE — PROGRESS NOTES
Assessment/Plan:  Chief Complaint   Patient presents with    Follow-up     regarding HTN; review labs    Edema     B/L ankles    Immunizations     flu     Patient Instructions   Doing well and BP stable today, he is s/p aortic aneurysm repair and doing well and b/l lower extremity edema is improved after surgery  Diabetes is well controlled with HGA1C less than 7  Low sugar and low cholesterol diet encouraged  His PSA is wnl  His lipid panel is wnl on medication  He is to f-up with Dr Susanne Becker and Dr Katalina Dickinson as directed  Flu shot today  No problem-specific Assessment & Plan notes found for this encounter  Diagnoses and all orders for this visit:    Encounter for hepatitis C screening test for low risk patient  -     Hepatitis panel, acute; Future    Paroxysmal atrial fibrillation (HCC)    Essential hypertension    Hyperlipidemia, unspecified hyperlipidemia type    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 (TEVAR)    Type 2 diabetes mellitus without complication, unspecified whether long term insulin use ([facility] )  -     Comprehensive metabolic panel; Future  -     Hemoglobin A1C          Subjective:      Patient ID: Chago Bell  is a 68 y o  male  Here for f-up and doing well, edema is better  No cp or sob, or ha  The following portions of the patient's history were reviewed and updated as appropriate: allergies, current medications, past family history, past medical history, past social history, past surgical history and problem list     Review of Systems   Constitutional: Negative  HENT: Negative  Eyes: Negative  Respiratory: Negative  Cardiovascular: Negative  Gastrointestinal: Negative  Endocrine: Negative  Genitourinary: Negative  Musculoskeletal: Negative  Skin: Negative  Allergic/Immunologic: Negative  Neurological: Negative  Hematological: Negative  Psychiatric/Behavioral: Negative            Objective:      /86 (BP Location: Left arm, Patient Position: Sitting, Cuff Size: Standard)   Ht 5' 5" (1 651 m)   Wt 98 9 kg (218 lb)   BMI 36 28 kg/m²          Physical Exam   Constitutional: He is oriented to person, place, and time  He appears well-developed and well-nourished  HENT:   Head: Normocephalic and atraumatic  Right Ear: External ear normal    Left Ear: External ear normal    Nose: Nose normal    Mouth/Throat: Oropharynx is clear and moist    Eyes: Pupils are equal, round, and reactive to light  Conjunctivae and EOM are normal    Neck: Normal range of motion  Neck supple  Cardiovascular: Normal rate, regular rhythm, normal heart sounds and intact distal pulses  Pulmonary/Chest: Effort normal and breath sounds normal    Musculoskeletal: Normal range of motion  Neurological: He is alert and oriented to person, place, and time  He has normal reflexes  Skin: Skin is warm and dry  Psychiatric: He has a normal mood and affect   His behavior is normal

## 2018-11-29 NOTE — PATIENT INSTRUCTIONS
Doing well and BP stable today, he is s/p aortic aneurysm repair and doing well and b/l lower extremity edema is improved after surgery  Diabetes is well controlled with HGA1C less than 7  Low sugar and low cholesterol diet encouraged  His PSA is wnl  His lipid panel is wnl on medication  He is to f-up with Dr Neal Wilkins and Dr Yolande Barcenas as directed  Flu shot today

## 2018-12-05 ENCOUNTER — OFFICE VISIT (OUTPATIENT)
Dept: CARDIOLOGY CLINIC | Facility: CLINIC | Age: 73
End: 2018-12-05
Payer: COMMERCIAL

## 2018-12-05 VITALS
BODY MASS INDEX: 36.32 KG/M2 | HEIGHT: 65 IN | HEART RATE: 78 BPM | WEIGHT: 218 LBS | DIASTOLIC BLOOD PRESSURE: 66 MMHG | SYSTOLIC BLOOD PRESSURE: 92 MMHG

## 2018-12-05 DIAGNOSIS — I10 HTN (HYPERTENSION), BENIGN: Primary | ICD-10-CM

## 2018-12-05 DIAGNOSIS — I25.10 CORONARY ARTERY DISEASE INVOLVING NATIVE CORONARY ARTERY OF NATIVE HEART WITHOUT ANGINA PECTORIS: ICD-10-CM

## 2018-12-05 PROCEDURE — 99214 OFFICE O/P EST MOD 30 MIN: CPT | Performed by: INTERNAL MEDICINE

## 2018-12-05 RX ORDER — LISINOPRIL AND HYDROCHLOROTHIAZIDE 20; 12.5 MG/1; MG/1
1 TABLET ORAL DAILY
Qty: 90 TABLET | Refills: 4 | Status: SHIPPED | OUTPATIENT
Start: 2018-12-05 | End: 2020-07-14

## 2018-12-05 NOTE — PROGRESS NOTES
Cardiology Follow Up        Finn Mckeon       1945      [id number]      Discussion/Summary:    1  History of type a aortic dissection, status post ascending aortic replacement/celia arch reconstruction 9/2017, and subsequent TEVAR of descending thoracic aorta 9/19/2018  2  PAD, s/p L common carotid to left subclavian bypass 08/14/2018  3  Benign essential hypertension  4  Dyslipidemia  5  Postoperative atrial fibrillation 09/2017 without recurrence  6  Obesity  7  Probable CAD, basal inferior infarct with mild deloris-infarct ischemia on prior nuclear stress test 06/2018    · Doing well postoperatively after TEVAR and L CCA-SCA bypass  · Blood pressure marginally low, will decrease lisinopril/hydrochlorothiazide to once daily  · Usual blood work as ordered by PCP  · Had discussion with patient regarding potential further workup of basilar inferior infarct and mild deloris-infarct ischemia on nuclear stress test 06/2018  At this time he has no symptoms of angina, and did very well with his high risk surgeries and August and September 2018  Therefore, we have collectively decided to continue aggressive medical management only at this time  I requested remote to contact me right away if he develops any symptoms of angina in which case coronary angiography will be considered  Will continue aspirin, high-dose statin, and pursue adequate blood pressure control with his current regimen    Follow-up in 3 months with echocardiogram      Interval History: This is a 78-year-old male, with a significant history of basal ganglia hemorrhage in 2014 secondary to uncontrolled hypertension, who presented to [address] [place] [address] on 9/05/2017 with chest pain and accelerated hypertension  Troponin level was negative on admission and he was admitted to the floor with chest pain    In light of continued symptoms and labile hypertension, he was sent for a stat CT of his chest/abdomen, revealing a type a dissection  He was transferred to [address] [address], and echocardiogram revealed a moderate to large pericardial effusion with signs of tamponade  On 09/06/2017 he underwent emergency surgery with replacement of the ascending aorta with celia arch reconstruction with a 26 millimeter Dacron graft  Postoperatively, he had mediastinal hemorrhage and went back to the operating room on 09/06/2017 with evacuation and correction of his hemorrhage  On postoperative day 1 , 09/07/2017 he had rapid atrial fibrillation and was initiated on amiodarone, and Thereafter on warfarin for anticoagulation and stroke prevention  He underwent a sleep study in early November 2017 and has been diagnosed with severe sleep apnea syndrome  He underwent a Holter monitor in early December 2017, which did not reveal any evidence of atrial fibrillation  He has been off anticoagulation secondary to his history of intracranial hemorrhage  Prior CT chest 04/10/2018 revealed significant increase in distal arch aortic aneurysm, now at 5 3 cm, compared to 4 2 cm 10/2017  There was 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  He was recommended to undergo left carotid to subclavian bypass/transposition followed by TEVAR covering the L SC artery by Dr Sallie Chacon and Brandyn Solorio  Preoperative nuclear stress test 06/26/2018 revealed evidence of prior basal inferolateral infarction with mild deloris-infarct ischemia  With collectively decided to continue medical management  Mary Matt underwent left common carotid artery to subclavian artery bypass with embolization of the proximal subclavian artery via left brachial approach on 08/14/2018  He did well, and subsequently underwent thoracic endovascular aneurysm repair (TEVAR) of this descending thoracic artery aneurysmal degeration 09/19/2018, and did well  He presents today for follow-up    He denies much in the way of lightheadedness or dizziness, has some incisional chest pain which is chronic without exertional chest pain, shortness of breath, presyncope or syncope, palpitations, lower extremity edema  Vitals:  Vitals:    12/05/18 1141   BP: 92/66   Pulse: 78   Weight: 98 9 kg (218 lb)   Height: 5' 5" (1 651 m)         Past Medical History:   Diagnosis Date    Arthritis     CPAP (continuous positive airway pressure) dependence     Diabetes [facility] )     Hyperlipidemia     Hypertension     [facility]     prostate    Sleep apnea     Stroke [facility]cerebrum[facility] ([facility]     Wears glasses      Social History     Social History    Marital status:      Spouse name: N/A    Number of children: N/A    Years of education: N/A     Occupational History          retired     Social History Main Topics    Smoking status: Never Smoker    Smokeless tobacco: Never Used    Alcohol use Yes      Comment: social beer or two daily    Drug use: No    Sexual activity: No      Comment: denied hx of sexually active high risk     Other Topics Concern    Not on file     Social History Narrative    1 serving caffeine/day    Less than high school diploma    Person living alone    Participate in moderate activities both inside and outside of the home      Family History   Problem Relation Age of Onset    Stroke Mother         complications    Hypertension Mother     Arthritis Mother     No Known Problems Father      Past Surgical History:   Procedure Laterality Date    CARDIAC SURGERY      CIRCUMCISION      COLONOSCOPY      IR CEREBRAL ANGIOGRAPHY / INTERVENTION  8/13/2018    IR TEVAR  9/19/2018    VA ASCEND AORTA GRAFT W ROOT REPLACMENT  VALVE CONDUIT/CORON RECONSTRUCT N/A 9/6/2017    Procedure: ASCENDING AORTIC REPAIR WITH A 26MM  GELWEAVE STRAIGHT GRAFT AND HEMIARCH WITH 8MM SIDE ARM BRANCH;  Surgeon: Eduardo Esparza MD;  [facility];  Service: Cardiac Surgery    VA ENDOVASC TAA REINCL SUBCL N/A 9/19/2018    Procedure: REPAIR ANEURSYM THORACIC ENDOVASCUALR DESCENDING AORTIC ANEURYSM (TEVAR); Surgeon: Derek Arroyo MD;  [facility];  Service: Cardiac Surgery    LA EXPLOR POSTOP BLEED,INFEC,CLOT-CHST N/A 9/6/2017    Procedure: RE-EXPLORATION MEDIASTERNAL CAVITY FOR POST-OP BLEED (BRING BACK); Surgeon: Rosemary Zurita MD;  [facility];  Service: Cardiac Surgery    LA VEIN BYPASS GRAFT,CAROTID-BRACHIAL Left 8/13/2018    Procedure: BYPASS CAROTID SUBCLAVIAN WITH EMBOLIZATION OF PROXIMAL SUBCLAVIAN ARTERY  ;  Surgeon: Tete Luciano MD;  [facility];  Service: Vascular       Current Outpatient Prescriptions:     acetaminophen (TYLENOL) 325 mg tablet, Take 2 tablets (650 mg total) by mouth every 6 (six) hours as needed for mild pain, Disp: , Rfl:     amLODIPine (NORVASC) 10 mg tablet, TAKE 1 TABLET TWICE DAILY, Disp: 60 tablet, Rfl: 6    aspirin 81 mg chewable tablet, Chew 1 tablet daily, Disp: 30 tablet, Rfl: 0    atorvastatin (LIPITOR) 80 mg tablet, Take 1 tablet (80 mg total) by mouth daily, Disp: 90 tablet, Rfl: 5    insulin glargine (LANTUS) 100 units/mL subcutaneous injection, Inject 28 Units under the skin daily at bedtime Dx: Diabetes E11 9, dispense pens, Disp: 10 mL, Rfl: 0    insulin lispro (HumaLOG) 100 units/mL injection, Inject 8 Units under the skin 3 (three) times a day before meals Dx: Diabetes E11 9  Dispense pens (Patient taking differently: Inject 8 Units under the skin 2 (two) times a day before meals Dx: Diabetes E11 9   Dispense pens Before breakfast and dinner ), Disp: 8 mL, Rfl: 0    lisinopril-hydrochlorothiazide (PRINZIDE,ZESTORETIC) 20-12 5 MG per tablet, Take 1 tablet by mouth daily, Disp: 90 tablet, Rfl: 4    metoprolol tartrate (LOPRESSOR) 100 mg tablet, TAKE 1 TABLET BY MOUTH TWICE A DAY, Disp: 60 tablet, Rfl: 5    mupirocin (BACTROBAN) 2 % ointment, APPLY TO BOTH NOSTRILS AS DIRECTED 2 TIMES DAILY, Disp: , Rfl: 0    B-D ULTRAFINE III SHORT PEN 31G X 8 MM MISC, USE AS DIRECTED , Disp: 100 each, Rfl: 3    docusate sodium (COLACE) 100 mg capsule, Take 1 capsule (100 mg total) by mouth 2 (two) times a day as needed for constipation (Patient not taking: Reported on 12/5/2018 ), Disp: 10 capsule, Rfl: 0        Review of Systems:  Review of Systems   Respiratory: Negative  Cardiovascular: Negative  All other systems reviewed and are negative  Physical Exam:  Physical Exam   Constitutional: He is oriented to person, place, and time  He appears well-developed and well-nourished  No distress  HENT:   Head: Normocephalic and atraumatic  Eyes: Pupils are equal, round, and reactive to light  EOM are normal  Right eye exhibits no discharge  No scleral icterus  Neck: Normal range of motion  Neck supple  No thyromegaly present  Cardiovascular: Normal rate, regular rhythm and normal heart sounds  Exam reveals no gallop and no friction rub  No murmur heard  Pulmonary/Chest: Effort normal and breath sounds normal    Abdominal: He exhibits no distension  There is no tenderness  There is no rebound and no guarding  Musculoskeletal: Normal range of motion  He exhibits no edema  Neurological: He is alert and oriented to person, place, and time  Coordination normal    Skin: Skin is warm and dry  No rash noted  He is not diaphoretic  No erythema  No pallor  Psychiatric: He has a normal mood and affect   His behavior is normal  Judgment and thought content normal

## 2018-12-15 DIAGNOSIS — E11.9 TYPE 2 DIABETES MELLITUS WITHOUT COMPLICATION, UNSPECIFIED WHETHER LONG TERM INSULIN USE (HCC): Primary | ICD-10-CM

## 2018-12-16 RX ORDER — INSULIN LISPRO 100 [IU]/ML
INJECTION, SOLUTION INTRAVENOUS; SUBCUTANEOUS
Qty: 15 PEN | Refills: 5 | Status: SHIPPED | OUTPATIENT
Start: 2018-12-16 | End: 2020-01-09

## 2018-12-22 DIAGNOSIS — I10 HTN (HYPERTENSION), BENIGN: ICD-10-CM

## 2018-12-26 RX ORDER — LISINOPRIL AND HYDROCHLOROTHIAZIDE 20; 12.5 MG/1; MG/1
TABLET ORAL
Qty: 60 TABLET | Refills: 1 | Status: SHIPPED | OUTPATIENT
Start: 2018-12-26 | End: 2019-02-26 | Stop reason: SDUPTHER

## 2019-02-01 DIAGNOSIS — I10 HTN (HYPERTENSION), BENIGN: ICD-10-CM

## 2019-02-01 RX ORDER — AMLODIPINE BESYLATE 10 MG/1
TABLET ORAL
Qty: 60 TABLET | Refills: 6 | Status: SHIPPED | OUTPATIENT
Start: 2019-02-01 | End: 2019-11-13 | Stop reason: SDUPTHER

## 2019-02-13 ENCOUNTER — TELEPHONE (OUTPATIENT)
Dept: FAMILY MEDICINE CLINIC | Facility: CLINIC | Age: 74
End: 2019-02-13

## 2019-02-26 DIAGNOSIS — I10 HTN (HYPERTENSION), BENIGN: ICD-10-CM

## 2019-02-26 RX ORDER — LISINOPRIL AND HYDROCHLOROTHIAZIDE 20; 12.5 MG/1; MG/1
TABLET ORAL
Qty: 60 TABLET | Refills: 1 | Status: SHIPPED | OUTPATIENT
Start: 2019-02-26 | End: 2019-03-06 | Stop reason: ALTCHOICE

## 2019-03-01 ENCOUNTER — APPOINTMENT (OUTPATIENT)
Dept: LAB | Facility: CLINIC | Age: 74
End: 2019-03-01
Payer: COMMERCIAL

## 2019-03-01 DIAGNOSIS — E11.9 TYPE 2 DIABETES MELLITUS WITHOUT COMPLICATION, UNSPECIFIED WHETHER LONG TERM INSULIN USE (HCC): Chronic | ICD-10-CM

## 2019-03-01 LAB
ALBUMIN SERPL BCP-MCNC: 3.6 G/DL (ref 3.5–5)
ALP SERPL-CCNC: 119 U/L (ref 46–116)
ALT SERPL W P-5'-P-CCNC: 28 U/L (ref 12–78)
ANION GAP SERPL CALCULATED.3IONS-SCNC: 5 MMOL/L (ref 4–13)
AST SERPL W P-5'-P-CCNC: 26 U/L (ref 5–45)
BILIRUB SERPL-MCNC: 0.56 MG/DL (ref 0.2–1)
BUN SERPL-MCNC: 13 MG/DL (ref 5–25)
CALCIUM SERPL-MCNC: 8.9 MG/DL (ref 8.3–10.1)
CHLORIDE SERPL-SCNC: 106 MMOL/L (ref 100–108)
CO2 SERPL-SCNC: 27 MMOL/L (ref 21–32)
CREAT SERPL-MCNC: 0.78 MG/DL (ref 0.6–1.3)
EST. AVERAGE GLUCOSE BLD GHB EST-MCNC: 143 MG/DL
GFR SERPL CREATININE-BSD FRML MDRD: 104 ML/MIN/1.73SQ M
GLUCOSE P FAST SERPL-MCNC: 60 MG/DL (ref 65–99)
HBA1C MFR BLD: 6.6 % (ref 4.2–6.3)
POTASSIUM SERPL-SCNC: 3.6 MMOL/L (ref 3.5–5.3)
PROT SERPL-MCNC: 7.9 G/DL (ref 6.4–8.2)
SODIUM SERPL-SCNC: 138 MMOL/L (ref 136–145)

## 2019-03-01 PROCEDURE — 80053 COMPREHEN METABOLIC PANEL: CPT

## 2019-03-01 PROCEDURE — 83036 HEMOGLOBIN GLYCOSYLATED A1C: CPT | Performed by: FAMILY MEDICINE

## 2019-03-01 PROCEDURE — 36415 COLL VENOUS BLD VENIPUNCTURE: CPT

## 2019-03-05 ENCOUNTER — TELEPHONE (OUTPATIENT)
Dept: FAMILY MEDICINE CLINIC | Facility: CLINIC | Age: 74
End: 2019-03-05

## 2019-03-06 ENCOUNTER — OFFICE VISIT (OUTPATIENT)
Dept: FAMILY MEDICINE CLINIC | Facility: CLINIC | Age: 74
End: 2019-03-06
Payer: COMMERCIAL

## 2019-03-06 VITALS
BODY MASS INDEX: 36.65 KG/M2 | WEIGHT: 220 LBS | HEIGHT: 65 IN | DIASTOLIC BLOOD PRESSURE: 68 MMHG | SYSTOLIC BLOOD PRESSURE: 132 MMHG

## 2019-03-06 DIAGNOSIS — I10 ESSENTIAL HYPERTENSION: Primary | Chronic | ICD-10-CM

## 2019-03-06 DIAGNOSIS — Z98.890 H/O REPAIR OF DISSECTING ANEURYSM OF DESCENDING THORACIC AORTA: Chronic | ICD-10-CM

## 2019-03-06 DIAGNOSIS — E78.5 HYPERLIPIDEMIA, UNSPECIFIED HYPERLIPIDEMIA TYPE: Chronic | ICD-10-CM

## 2019-03-06 DIAGNOSIS — G47.33 OSA (OBSTRUCTIVE SLEEP APNEA): Chronic | ICD-10-CM

## 2019-03-06 DIAGNOSIS — I48.0 PAROXYSMAL ATRIAL FIBRILLATION (HCC): Chronic | ICD-10-CM

## 2019-03-06 DIAGNOSIS — I71.01 CHRONIC THORACIC AORTIC DISSECTION (HCC): ICD-10-CM

## 2019-03-06 DIAGNOSIS — Z86.79 H/O REPAIR OF DISSECTING ANEURYSM OF DESCENDING THORACIC AORTA: Chronic | ICD-10-CM

## 2019-03-06 DIAGNOSIS — E11.9 TYPE 2 DIABETES MELLITUS WITHOUT COMPLICATION, UNSPECIFIED WHETHER LONG TERM INSULIN USE (HCC): Chronic | ICD-10-CM

## 2019-03-06 DIAGNOSIS — I71.9 DESCENDING AORTIC ANEURYSM (HCC): Chronic | ICD-10-CM

## 2019-03-06 DIAGNOSIS — I71.2 AORTIC ARCH ANEURYSM (HCC): ICD-10-CM

## 2019-03-06 DIAGNOSIS — E66.09 CLASS 2 OBESITY DUE TO EXCESS CALORIES WITHOUT SERIOUS COMORBIDITY WITH BODY MASS INDEX (BMI) OF 37.0 TO 37.9 IN ADULT: ICD-10-CM

## 2019-03-06 PROCEDURE — 3078F DIAST BP <80 MM HG: CPT | Performed by: FAMILY MEDICINE

## 2019-03-06 PROCEDURE — 3008F BODY MASS INDEX DOCD: CPT | Performed by: FAMILY MEDICINE

## 2019-03-06 PROCEDURE — 1170F FXNL STATUS ASSESSED: CPT | Performed by: FAMILY MEDICINE

## 2019-03-06 PROCEDURE — G0439 PPPS, SUBSEQ VISIT: HCPCS | Performed by: FAMILY MEDICINE

## 2019-03-06 PROCEDURE — 99214 OFFICE O/P EST MOD 30 MIN: CPT | Performed by: FAMILY MEDICINE

## 2019-03-06 PROCEDURE — 1125F AMNT PAIN NOTED PAIN PRSNT: CPT | Performed by: FAMILY MEDICINE

## 2019-03-06 PROCEDURE — 3075F SYST BP GE 130 - 139MM HG: CPT | Performed by: FAMILY MEDICINE

## 2019-03-06 PROCEDURE — 1160F RVW MEDS BY RX/DR IN RCRD: CPT | Performed by: FAMILY MEDICINE

## 2019-03-06 NOTE — PATIENT INSTRUCTIONS
Here for medicare wellness and recheck of labs for hx of DM and HTN and hyperlipidemia and hx of thoracic aortic aneurysm  Medicare wellness done today and needs advanced directives done

## 2019-03-06 NOTE — PROGRESS NOTES
Assessment and Plan:    Problem List Items Addressed This Visit        Endocrine    Diabetes mellitus ([address] ) (Chronic)    Relevant Orders    Comprehensive metabolic panel    Hemoglobin A1C    Microalbumin / creatinine urine ratio       Respiratory    KARIN (obstructive sleep apnea) (Chronic)       Cardiovascular and Mediastinum    Hypertension - Primary (Chronic)    Paroxysmal atrial fibrillation (HCC) (Chronic)    Descending aortic aneurysm (HCC) (Chronic)    Chronic thoracic aortic dissection (HCC)    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HCC)       Other    Hyperlipidemia (Chronic)    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 (TEVAR) (Chronic)    Class 2 obesity due to excess calories without serious comorbidity with body mass index (BMI) of 37 0 to 37 9 in adult        Health Maintenance Due   Topic Date Due    Hepatitis C Screening  1945    CRC Screening: Colonoscopy  1945    BMI: Followup Plan  06/19/1964    HEPATITIS B VACCINES (1 of 3 - Risk 3-dose series) 06/19/1965    DTaP,Tdap,and Td Vaccines (1 - Tdap) 06/19/1967    Pneumococcal PPSV23/PCV13 65+ Years / High and Highest Risk (1 of 2 - PCV13) 06/19/2011         HPI:  Tara Cruz  is a 68 y o  male here for his Subsequent Wellness Visit      Patient Active Problem List   Diagnosis    [facility] [address] [facility]    Hypertension    Diabetes mellitus ([address] )    Chronic thoracic aortic dissection ([address] )    S/P ascending aortic aneurysm repair    Hypokalemia    Paroxysmal atrial fibrillation (HCC)    KARIN (obstructive sleep apnea)    Class 2 obesity due to excess calories without serious comorbidity with body mass index (BMI) of 37 0 to 37 9 in adult    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HCC)    Hyperlipidemia    Adenomatous polyp of descending colon    S/P vascular bypass    Descending aortic aneurysm (HCC)    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 (TEVAR)    Encounter for postoperative care     Past Medical History: Diagnosis Date    Arthritis     CPAP (continuous positive airway pressure) dependence     Diabetes ([facility] [address] )     Hyperlipidemia     Hypertension     Prostate cancer ([facility] )     prostate    Sleep apnea     Stroke (cerebrum) ([facility] [address] )     Wears glasses      Past Surgical History:   Procedure Laterality Date    CARDIAC SURGERY      CIRCUMCISION      COLONOSCOPY      IR CEREBRAL ANGIOGRAPHY / INTERVENTION  8/13/2018    IR TEVAR  9/19/2018    NE ASCEND AORTA GRAFT W ROOT REPLACMENT  VALVE CONDUIT/CORON RECONSTRUCT N/A 9/6/2017    Procedure: ASCENDING AORTIC REPAIR WITH A 26MM  GELWEAVE STRAIGHT GRAFT AND HEMIARCH WITH 8MM SIDE ARM BRANCH;  Surgeon: Naya Clayton MD;  [facility];  Service: Cardiac Surgery    NE ENDOVASC TAA REINCL SUBCL N/A 9/19/2018    Procedure: REPAIR ANEURSYM THORACIC ENDOVASCUALR DESCENDING AORTIC ANEURYSM (TEVAR); Surgeon: Ryan Mccormick MD;  [facility];  Service: Cardiac Surgery    NE EXPLOR POSTOP BLEED,INFEC,CLOT-CHST N/A 9/6/2017    Procedure: RE-EXPLORATION MEDIASTERNAL CAVITY FOR POST-OP BLEED (BRING BACK);   Surgeon: Naya Clayton MD;  [facility];  Service: Cardiac Surgery    NE VEIN BYPASS GRAFT,CAROTID-BRACHIAL Left 8/13/2018    Procedure: BYPASS CAROTID SUBCLAVIAN WITH EMBOLIZATION OF PROXIMAL SUBCLAVIAN ARTERY  ;  Surgeon: Opal Melo MD;  [facility];  Service: Vascular     Family History   Problem Relation Age of Onset    Stroke Mother         complications    Hypertension Mother    Stephy Pall Arthritis Mother     No Known Problems Father      Social History     Tobacco Use   Smoking Status Never Smoker   Smokeless Tobacco Never Used     Social History     Substance and Sexual Activity   Alcohol Use Yes    Comment: social beer or two daily      Social History     Substance and Sexual Activity   Drug Use No       Current Outpatient Medications   Medication Sig Dispense Refill    amLODIPine (NORVASC) 10 mg tablet TAKE 1 TABLET BY MOUTH TWICE A DAY 60 tablet 6    aspirin 81 mg chewable tablet Chew 1 tablet daily 30 tablet 0    atorvastatin (LIPITOR) 80 mg tablet Take 1 tablet (80 mg total) by mouth daily 90 tablet 5    B-D ULTRAFINE III SHORT PEN 31G X 8 MM MISC USE AS DIRECTED  100 each 3    HUMALOG KWIKPEN 100 units/mL injection pen INJECT 8 UNIT 3 TIMES DAILY BEFORE MEALS 15 pen 5    insulin glargine (LANTUS) 100 units/mL subcutaneous injection Inject 28 Units under the skin daily at bedtime Dx: Diabetes E11 9, dispense pens 10 mL 0    insulin lispro (HumaLOG) 100 units/mL injection Inject 8 Units under the skin 3 (three) times a day before meals Dx: Diabetes E11 9  Dispense pens (Patient taking differently: Inject 8 Units under the skin 2 (two) times a day before meals Dx: Diabetes E11 9  Dispense pens  Before breakfast and dinner ) 8 mL 0    lisinopril-hydrochlorothiazide (PRINZIDE,ZESTORETIC) 20-12 5 MG per tablet Take 1 tablet by mouth daily 90 tablet 4    metoprolol tartrate (LOPRESSOR) 100 mg tablet TAKE 1 TABLET BY MOUTH TWICE A DAY 60 tablet 5    acetaminophen (TYLENOL) 325 mg tablet Take 2 tablets (650 mg total) by mouth every 6 (six) hours as needed for mild pain (Patient not taking: Reported on 3/6/2019)      docusate sodium (COLACE) 100 mg capsule Take 1 capsule (100 mg total) by mouth 2 (two) times a day as needed for constipation (Patient not taking: Reported on 12/5/2018 ) 10 capsule 0    mupirocin (BACTROBAN) 2 % ointment APPLY TO BOTH NOSTRILS AS DIRECTED 2 TIMES DAILY  0     No current facility-administered medications for this visit        No Known Allergies  Immunization History   Administered Date(s) Administered    INFLUENZA 12/19/2006, 11/29/2018    Influenza Split High Dose Preservative Free IM 09/20/2012, 09/23/2013, 09/26/2014, 10/14/2015, 10/26/2016, 11/21/2017    Influenza, high dose seasonal 0 5 mL 11/29/2018       Patient Care Team:  Neha Dee DO as PCP - Kendrick [address], MD Scott Sanchez MD Thomasine Billings, MD Jeanenne Levan, MD Massie Dacosta, DO (Cardiology)    Medicare Screening Tests and Risk Assessments:  Mai Ventura is here for his Subsequent Wellness visit  Last Medicare Wellness visit information reviewed, patient interviewed and updates made to the record today  Health Risk Assessment:  Patient rates overall health as fair  Patient feels that their physical health rating is Same  Eyesight was rated as Slightly worse  Hearing was rated as Same  Patient feels that their emotional and mental health rating is Same  Pain experienced by patient in the last 7 days has been None  Patient states that he has experienced no weight loss or gain in last 6 months  Emotional/Mental Health:  Patient has been feeling nervous/anxious  PHQ-9 Depression Screening:    Frequency of the following problems over the past two weeks:      1  Little interest or pleasure in doing things: 1 - several days      2  Feeling down, depressed, or hopeless: 1 - several days      3  Trouble falling or staying asleep, or sleeping too much: 0 - not at all      4  Feeling tired or having little energy: 1 - several days      5  Poor appetite or overeatin - not at all      6  Feeling bad about yourself - or that you are a failure or have let yourself or your family down: 1 - several days      7  Trouble concentrating on things, such as reading the newspaper or watching television: 0 - not at all      8  Moving or speaking so slowly that other people could have noticed  Or the opposite - being so fidgety or restless that you have been moving around a lot more than usual: 0 - not at all      9  Thoughts that you would be better off dead, or of hurting yourself in some way: 0 - not at all  PHQ-2 Score: 2  PHQ-9 Score: 4    Broken Bones/Falls:     Fall Risk Assessment:    In the past year, patient has experienced: History of falling in past year     Number of falls: 1  Patient feels unsteady standing  Patient is not taking medication that can cause feelings of lightheadedness or tiredness  Patient often has no need to rush to the toilet  Chronic conditions that may contribute to falls diabetes and arthritis  Bladder/Bowel:  Patient has not leaked urine accidently in the last six months  Patient reports no loss of bowel control  Immunizations:  Patient has had a flu vaccination within the last year  Patient has received a pneumonia shot  Patient has not received a shingles shot  Patient has not received tetanus/diphtheria shot  Home Safety:  Patient does not have trouble with stairs inside or outside of their home  Patient currently reports that there are no safety hazards present in home, working smoke alarms, working carbon monoxide detectors  Preventative Screenings:   prostate cancer screen performed, no colon cancer screen completed, cholesterol screen completed, glaucoma eye exam completed,     Nutrition:  Current diet: Diabetic and Regular with servings of the following:    Medications:  Patient is currently taking over-the-counter supplements  Patient is able to manage medications  Lifestyle Choices:  Patient reports no tobacco use  Patient has not smoked or used tobacco in the past   Patient reports alcohol use  Alcohol use per week: 3 week   Patient drives a vehicle  Patient wears seat belt  Current level of exercise of physical activity described by patient as: walking   Activities of Daily Living:  Can get out of bed by his or her self, able to dress self, able to make own meals, able to do own shopping, able to bathe self, can do own laundry/housekeeping, can manage own money, pay bills and track expenses    Previous Hospitalizations:  No hospitalization or ED visit in past 12 months        Advanced Directives:  Patient has decided on a power of   Patient has spoken to designated power of     Patient has not completed advanced directive  Preventative Screening/Counseling:      Cardiovascular:      General: Risks and Benefits Discussed          Diabetes:      General: Risks and Benefits Discussed          Colorectal Cancer:      General: Risks and Benefits Discussed          Prostate Cancer:      General: Risks and Benefits Discussed          Osteoporosis:      General: Risks and Benefits Discussed          AAA:      General: Risks and Benefits Discussed          Glaucoma:      General: Risks and Benefits Discussed          Advanced Directives:   Patient has no living will for healthcare, patient does not have an advanced directive  Information on ACP and/or AD provided  End of life assessment reviewed with patient  BMI Counseling: Body mass index is 36 61 kg/m²  Discussed the patient's BMI with him  The BMI is above average  BMI counseling and education was provided to the patient  Nutrition recommendations include reducing portion sizes, decreasing overall calorie intake and 3-5 servings of fruits/vegetables daily  Exercise recommendations include exercising 3-5 times per week  BMI Counseling: Body mass index is 36 61 kg/m²  Discussed the patient's BMI with him  The BMI is above average  BMI counseling and education was provided to the patient  Nutrition recommendations include reducing portion sizes and reducing fast food intake  Exercise recommendations include exercising 3-5 times per week

## 2019-03-06 NOTE — PROGRESS NOTES
Assessment/Plan:  Chief Complaint   Patient presents with    Medicare Wellness Visit    Immunizations     pnuemo, tdap     Patient Instructions   Here for medicare wellness and recheck of labs for hx of DM and HTN and hyperlipidemia and hx of thoracic aortic aneurysm  Medicare wellness done today and needs advanced directives done  No problem-specific Assessment & Plan notes found for this encounter  Diagnoses and all orders for this visit:    Essential hypertension    Paroxysmal atrial fibrillation (HCC)    KARIN (obstructive sleep apnea)    Hyperlipidemia, unspecified hyperlipidemia type    Type 2 diabetes mellitus without complication, unspecified whether long term insulin use (HCC)  -     Comprehensive metabolic panel; Future  -     Hemoglobin A1C  -     Microalbumin / creatinine urine ratio    Descending aortic aneurysm (HCC)    Class 2 obesity due to excess calories without serious comorbidity with body mass index (BMI) of 37 0 to 37 9 in adult    Chronic thoracic aortic dissection (HCC)    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HCC)    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 (TEVAR)          Subjective:      Patient ID: Marylen Bergamo  is a 68 y o  male  Here for routine medical visit and also is doing well and no cp or sob, or ha  Here for medicare wellness  Advanced directives UTD  The following portions of the patient's history were reviewed and updated as appropriate: allergies, current medications, past family history, past medical history, past social history, past surgical history and problem list     Review of Systems   Constitutional:        Obesity     HENT: Negative  Eyes: Negative  Respiratory: Negative  Cardiovascular: Negative  Gastrointestinal: Negative  Endocrine: Negative  Genitourinary: Negative  Musculoskeletal: Negative  Skin: Negative  Allergic/Immunologic: Negative  Neurological: Negative  Hematological: Negative  Psychiatric/Behavioral: Negative  Objective:      /68   Ht 5' 5" (1 651 m)   Wt 99 8 kg (220 lb)   BMI 36 61 kg/m²          Physical Exam   Constitutional: He is oriented to person, place, and time  He appears well-developed and well-nourished  obesity   HENT:   Head: Normocephalic and atraumatic  Right Ear: External ear normal    Left Ear: External ear normal    Nose: Nose normal    Mouth/Throat: Oropharynx is clear and moist    Eyes: Pupils are equal, round, and reactive to light  Conjunctivae and EOM are normal    Neck: Normal range of motion  Neck supple  Cardiovascular: Normal rate, regular rhythm, normal heart sounds and intact distal pulses  Pulses are no weak pulses  Pulses:       Dorsalis pedis pulses are 2+ on the right side, and 2+ on the left side  Posterior tibial pulses are 2+ on the right side, and 2+ on the left side  Pulmonary/Chest: Effort normal and breath sounds normal    Musculoskeletal: Normal range of motion  Feet:   Right Foot:   Skin Integrity: Negative for ulcer, skin breakdown, erythema, warmth, callus or dry skin  Left Foot:   Skin Integrity: Negative for ulcer, skin breakdown, erythema, warmth, callus or dry skin  Neurological: He is alert and oriented to person, place, and time  He has normal reflexes  Skin: Skin is warm and dry  Psychiatric: He has a normal mood and affect  His behavior is normal      Patient's shoes and socks removed  Right Foot/Ankle   Right Foot Inspection  Skin Exam: skin normal and skin intact no dry skin, no warmth, no callus, no erythema, no maceration, no abnormal color, no pre-ulcer, no ulcer and no callus                          Toe Exam: ROM and strength within normal limits  Sensory   Vibration: intact  Proprioception: intact   Monofilament testing: intact  Vascular  Capillary refills: < 3 seconds  The right DP pulse is 2+  The right PT pulse is 2+       Left Foot/Ankle  Left Foot Inspection  Skin Exam: skin normal and skin intactno dry skin, no warmth, no erythema, no maceration, normal color, no pre-ulcer, no ulcer and no callus                                         Sensory   Vibration: intact  Proprioception: intact  Monofilament: intact  Vascular  Capillary refills: < 3 seconds  The left DP pulse is 2+  The left PT pulse is 2+  Assign Risk Category:  No deformity present; No loss of protective sensation;  No weak pulses       Risk: 0

## 2019-03-14 ENCOUNTER — OFFICE VISIT (OUTPATIENT)
Dept: SLEEP CENTER | Facility: CLINIC | Age: 74
End: 2019-03-14
Payer: COMMERCIAL

## 2019-03-14 VITALS
WEIGHT: 223.6 LBS | HEART RATE: 68 BPM | SYSTOLIC BLOOD PRESSURE: 120 MMHG | HEIGHT: 65 IN | DIASTOLIC BLOOD PRESSURE: 60 MMHG | BODY MASS INDEX: 37.25 KG/M2

## 2019-03-14 DIAGNOSIS — I10 ESSENTIAL HYPERTENSION: Chronic | ICD-10-CM

## 2019-03-14 DIAGNOSIS — Z98.890 H/O REPAIR OF DISSECTING ANEURYSM OF DESCENDING THORACIC AORTA: Chronic | ICD-10-CM

## 2019-03-14 DIAGNOSIS — Z86.79 H/O REPAIR OF DISSECTING ANEURYSM OF DESCENDING THORACIC AORTA: Chronic | ICD-10-CM

## 2019-03-14 DIAGNOSIS — G47.33 OSA (OBSTRUCTIVE SLEEP APNEA): Primary | Chronic | ICD-10-CM

## 2019-03-14 PROCEDURE — 99214 OFFICE O/P EST MOD 30 MIN: CPT | Performed by: PSYCHIATRY & NEUROLOGY

## 2019-03-14 NOTE — PATIENT INSTRUCTIONS
1  Continue AutoPAP at current settings  2  Provide a prescription for new supplies to last over the next 12 months  3  Return in 1 year for routine re-evaluation  4   [facility] if any problems arise prior to that time

## 2019-03-24 DIAGNOSIS — I10 HTN (HYPERTENSION), BENIGN: ICD-10-CM

## 2019-03-24 DIAGNOSIS — E11.9 TYPE 2 DIABETES MELLITUS WITHOUT COMPLICATION (HCC): ICD-10-CM

## 2019-03-25 RX ORDER — PEN NEEDLE, DIABETIC 31 GX5/16"
NEEDLE, DISPOSABLE MISCELLANEOUS
Qty: 100 EACH | Refills: 5 | Status: SHIPPED | OUTPATIENT
Start: 2019-03-25 | End: 2019-10-11 | Stop reason: SDUPTHER

## 2019-03-25 RX ORDER — METOPROLOL TARTRATE 100 MG/1
TABLET ORAL
Qty: 60 TABLET | Refills: 4 | Status: SHIPPED | OUTPATIENT
Start: 2019-03-25 | End: 2020-03-13 | Stop reason: SDUPTHER

## 2019-04-09 ENCOUNTER — HOSPITAL ENCOUNTER (OUTPATIENT)
Dept: NON INVASIVE DIAGNOSTICS | Facility: HOSPITAL | Age: 74
Discharge: HOME | End: 2019-04-09
Attending: INTERNAL MEDICINE
Payer: COMMERCIAL

## 2019-04-09 ENCOUNTER — HOSPITAL ENCOUNTER (OUTPATIENT)
Dept: CT IMAGING | Facility: HOSPITAL | Age: 74
Discharge: HOME | End: 2019-04-09
Payer: COMMERCIAL

## 2019-04-09 DIAGNOSIS — Z98.890 S/P ASCENDING AORTIC ANEURYSM REPAIR: Chronic | ICD-10-CM

## 2019-04-09 DIAGNOSIS — Z86.79 H/O REPAIR OF DISSECTING ANEURYSM OF DESCENDING THORACIC AORTA: Chronic | ICD-10-CM

## 2019-04-09 DIAGNOSIS — I71.01 CHRONIC THORACIC AORTIC DISSECTION (HCC): ICD-10-CM

## 2019-04-09 DIAGNOSIS — Z86.79 S/P ASCENDING AORTIC ANEURYSM REPAIR: Chronic | ICD-10-CM

## 2019-04-09 DIAGNOSIS — I25.10 CORONARY ARTERY DISEASE INVOLVING NATIVE CORONARY ARTERY OF NATIVE HEART WITHOUT ANGINA PECTORIS: ICD-10-CM

## 2019-04-09 DIAGNOSIS — Z95.828 S/P VASCULAR BYPASS: Chronic | ICD-10-CM

## 2019-04-09 DIAGNOSIS — Z98.890 H/O REPAIR OF DISSECTING ANEURYSM OF DESCENDING THORACIC AORTA: Chronic | ICD-10-CM

## 2019-04-09 PROCEDURE — 74174 CTA ABD&PLVS W/CONTRAST: CPT

## 2019-04-09 PROCEDURE — 71275 CT ANGIOGRAPHY CHEST: CPT

## 2019-04-09 PROCEDURE — 93306 TTE W/DOPPLER COMPLETE: CPT

## 2019-04-09 RX ADMIN — IOHEXOL 100 ML: 350 INJECTION, SOLUTION INTRAVENOUS at 09:00

## 2019-04-10 PROCEDURE — 93306 TTE W/DOPPLER COMPLETE: CPT | Performed by: INTERNAL MEDICINE

## 2019-04-19 ENCOUNTER — OFFICE VISIT (OUTPATIENT)
Dept: CARDIAC SURGERY | Facility: CLINIC | Age: 74
End: 2019-04-19
Payer: COMMERCIAL

## 2019-04-19 ENCOUNTER — HOSPITAL ENCOUNTER (OUTPATIENT)
Dept: NON INVASIVE DIAGNOSTICS | Facility: CLINIC | Age: 74
Discharge: HOME | End: 2019-04-19
Payer: COMMERCIAL

## 2019-04-19 VITALS
BODY MASS INDEX: 36.99 KG/M2 | OXYGEN SATURATION: 97 % | HEIGHT: 65 IN | TEMPERATURE: 97.9 F | SYSTOLIC BLOOD PRESSURE: 112 MMHG | RESPIRATION RATE: 14 BRPM | WEIGHT: 222 LBS | HEART RATE: 82 BPM | DIASTOLIC BLOOD PRESSURE: 62 MMHG

## 2019-04-19 DIAGNOSIS — Z86.79 H/O REPAIR OF DISSECTING ANEURYSM OF DESCENDING THORACIC AORTA: Chronic | ICD-10-CM

## 2019-04-19 DIAGNOSIS — I71.01 CHRONIC THORACIC AORTIC DISSECTION (HCC): ICD-10-CM

## 2019-04-19 DIAGNOSIS — Z98.890 H/O REPAIR OF DISSECTING ANEURYSM OF DESCENDING THORACIC AORTA: Chronic | ICD-10-CM

## 2019-04-19 DIAGNOSIS — Z86.79 S/P ASCENDING AORTIC ANEURYSM REPAIR: Primary | Chronic | ICD-10-CM

## 2019-04-19 DIAGNOSIS — Z98.890 S/P ASCENDING AORTIC ANEURYSM REPAIR: Primary | Chronic | ICD-10-CM

## 2019-04-19 DIAGNOSIS — I71.9 DESCENDING AORTIC ANEURYSM (HCC): Chronic | ICD-10-CM

## 2019-04-19 PROCEDURE — 99213 OFFICE O/P EST LOW 20 MIN: CPT | Performed by: NURSE PRACTITIONER

## 2019-04-19 PROCEDURE — 93880 EXTRACRANIAL BILAT STUDY: CPT

## 2019-04-22 PROCEDURE — 93880 EXTRACRANIAL BILAT STUDY: CPT | Performed by: SURGERY

## 2019-04-26 DIAGNOSIS — I10 HTN (HYPERTENSION), BENIGN: ICD-10-CM

## 2019-04-29 RX ORDER — LISINOPRIL AND HYDROCHLOROTHIAZIDE 20; 12.5 MG/1; MG/1
TABLET ORAL
Qty: 60 TABLET | Refills: 1 | Status: SHIPPED | OUTPATIENT
Start: 2019-04-29 | End: 2019-05-22 | Stop reason: SDUPTHER

## 2019-05-21 PROBLEM — E66.01 CLASS 2 SEVERE OBESITY DUE TO EXCESS CALORIES WITH SERIOUS COMORBIDITY AND BODY MASS INDEX (BMI) OF 37.0 TO 37.9 IN ADULT (HCC): Status: ACTIVE | Noted: 2018-03-12

## 2019-05-22 DIAGNOSIS — I10 HTN (HYPERTENSION), BENIGN: ICD-10-CM

## 2019-05-22 RX ORDER — LISINOPRIL AND HYDROCHLOROTHIAZIDE 20; 12.5 MG/1; MG/1
TABLET ORAL
Qty: 60 TABLET | Refills: 0 | Status: SHIPPED | OUTPATIENT
Start: 2019-05-22 | End: 2019-06-15 | Stop reason: SDUPTHER

## 2019-05-30 ENCOUNTER — APPOINTMENT (OUTPATIENT)
Dept: LAB | Facility: CLINIC | Age: 74
End: 2019-05-30
Payer: COMMERCIAL

## 2019-05-30 ENCOUNTER — TRANSCRIBE ORDERS (OUTPATIENT)
Dept: LAB | Facility: CLINIC | Age: 74
End: 2019-05-30

## 2019-05-30 DIAGNOSIS — E11.9 TYPE 2 DIABETES MELLITUS WITHOUT COMPLICATION, UNSPECIFIED WHETHER LONG TERM INSULIN USE (HCC): Chronic | ICD-10-CM

## 2019-05-30 LAB
ALBUMIN SERPL BCP-MCNC: 3.6 G/DL (ref 3.5–5)
ALP SERPL-CCNC: 101 U/L (ref 46–116)
ALT SERPL W P-5'-P-CCNC: 37 U/L (ref 12–78)
ANION GAP SERPL CALCULATED.3IONS-SCNC: 7 MMOL/L (ref 4–13)
AST SERPL W P-5'-P-CCNC: 31 U/L (ref 5–45)
BILIRUB SERPL-MCNC: 0.52 MG/DL (ref 0.2–1)
BUN SERPL-MCNC: 13 MG/DL (ref 5–25)
CALCIUM SERPL-MCNC: 8.3 MG/DL (ref 8.3–10.1)
CHLORIDE SERPL-SCNC: 105 MMOL/L (ref 100–108)
CO2 SERPL-SCNC: 27 MMOL/L (ref 21–32)
CREAT SERPL-MCNC: 0.72 MG/DL (ref 0.6–1.3)
CREAT UR-MCNC: 167 MG/DL
EST. AVERAGE GLUCOSE BLD GHB EST-MCNC: 137 MG/DL
GFR SERPL CREATININE-BSD FRML MDRD: 93 ML/MIN/1.73SQ M
GLUCOSE P FAST SERPL-MCNC: 116 MG/DL (ref 65–99)
HBA1C MFR BLD: 6.4 % (ref 4.2–6.3)
MICROALBUMIN UR-MCNC: 84.8 MG/L (ref 0–20)
MICROALBUMIN/CREAT 24H UR: 51 MG/G CREATININE (ref 0–30)
POTASSIUM SERPL-SCNC: 3.2 MMOL/L (ref 3.5–5.3)
PROT SERPL-MCNC: 7.7 G/DL (ref 6.4–8.2)
SODIUM SERPL-SCNC: 139 MMOL/L (ref 136–145)

## 2019-05-30 PROCEDURE — 82570 ASSAY OF URINE CREATININE: CPT | Performed by: FAMILY MEDICINE

## 2019-05-30 PROCEDURE — 83036 HEMOGLOBIN GLYCOSYLATED A1C: CPT | Performed by: FAMILY MEDICINE

## 2019-05-30 PROCEDURE — 82043 UR ALBUMIN QUANTITATIVE: CPT | Performed by: FAMILY MEDICINE

## 2019-05-30 PROCEDURE — 36415 COLL VENOUS BLD VENIPUNCTURE: CPT | Performed by: FAMILY MEDICINE

## 2019-05-30 PROCEDURE — 3060F POS MICROALBUMINURIA REV: CPT | Performed by: FAMILY MEDICINE

## 2019-05-30 PROCEDURE — 80053 COMPREHEN METABOLIC PANEL: CPT

## 2019-06-05 ENCOUNTER — OFFICE VISIT (OUTPATIENT)
Dept: FAMILY MEDICINE CLINIC | Facility: CLINIC | Age: 74
End: 2019-06-05
Payer: COMMERCIAL

## 2019-06-05 VITALS
HEIGHT: 65 IN | DIASTOLIC BLOOD PRESSURE: 80 MMHG | TEMPERATURE: 98.4 F | OXYGEN SATURATION: 97 % | BODY MASS INDEX: 37.29 KG/M2 | WEIGHT: 223.8 LBS | HEART RATE: 56 BPM | SYSTOLIC BLOOD PRESSURE: 120 MMHG

## 2019-06-05 DIAGNOSIS — I10 ESSENTIAL HYPERTENSION: Primary | Chronic | ICD-10-CM

## 2019-06-05 DIAGNOSIS — E11.9 TYPE 2 DIABETES MELLITUS WITHOUT COMPLICATION, UNSPECIFIED WHETHER LONG TERM INSULIN USE (HCC): Chronic | ICD-10-CM

## 2019-06-05 DIAGNOSIS — E66.9 OBESITY (BMI 30-39.9): ICD-10-CM

## 2019-06-05 DIAGNOSIS — E78.5 HYPERLIPIDEMIA, UNSPECIFIED HYPERLIPIDEMIA TYPE: Chronic | ICD-10-CM

## 2019-06-05 LAB
LEFT EYE DIABETIC RETINOPATHY: NORMAL
RIGHT EYE DIABETIC RETINOPATHY: NORMAL

## 2019-06-05 PROCEDURE — 3079F DIAST BP 80-89 MM HG: CPT | Performed by: FAMILY MEDICINE

## 2019-06-05 PROCEDURE — 1036F TOBACCO NON-USER: CPT | Performed by: FAMILY MEDICINE

## 2019-06-05 PROCEDURE — 3008F BODY MASS INDEX DOCD: CPT | Performed by: FAMILY MEDICINE

## 2019-06-05 PROCEDURE — 3074F SYST BP LT 130 MM HG: CPT | Performed by: FAMILY MEDICINE

## 2019-06-05 PROCEDURE — 3072F LOW RISK FOR RETINOPATHY: CPT | Performed by: FAMILY MEDICINE

## 2019-06-05 PROCEDURE — 99214 OFFICE O/P EST MOD 30 MIN: CPT | Performed by: FAMILY MEDICINE

## 2019-06-13 DIAGNOSIS — I10 HTN (HYPERTENSION), BENIGN: ICD-10-CM

## 2019-06-15 RX ORDER — LISINOPRIL AND HYDROCHLOROTHIAZIDE 20; 12.5 MG/1; MG/1
TABLET ORAL
Qty: 60 TABLET | Refills: 0 | Status: SHIPPED | OUTPATIENT
Start: 2019-06-15 | End: 2019-09-05

## 2019-07-04 DIAGNOSIS — E11.9 TYPE 2 DIABETES MELLITUS WITHOUT COMPLICATION, UNSPECIFIED WHETHER LONG TERM INSULIN USE (HCC): ICD-10-CM

## 2019-07-05 RX ORDER — INSULIN GLARGINE 100 [IU]/ML
INJECTION, SOLUTION SUBCUTANEOUS
Qty: 15 PEN | Refills: 5 | Status: SHIPPED | OUTPATIENT
Start: 2019-07-05 | End: 2020-08-10

## 2019-07-12 DIAGNOSIS — I10 HTN (HYPERTENSION), BENIGN: ICD-10-CM

## 2019-07-12 RX ORDER — LISINOPRIL AND HYDROCHLOROTHIAZIDE 20; 12.5 MG/1; MG/1
TABLET ORAL
Qty: 180 TABLET | Refills: 3 | Status: SHIPPED | OUTPATIENT
Start: 2019-07-12 | End: 2019-09-05

## 2019-08-24 DIAGNOSIS — E78.5 DYSLIPIDEMIA: ICD-10-CM

## 2019-08-28 RX ORDER — ATORVASTATIN CALCIUM 80 MG/1
TABLET, FILM COATED ORAL
Qty: 90 TABLET | Refills: 5 | Status: SHIPPED | OUTPATIENT
Start: 2019-08-28 | End: 2020-11-05

## 2019-09-04 ENCOUNTER — APPOINTMENT (OUTPATIENT)
Dept: LAB | Facility: CLINIC | Age: 74
End: 2019-09-04
Payer: COMMERCIAL

## 2019-09-04 DIAGNOSIS — E78.5 HYPERLIPIDEMIA, UNSPECIFIED HYPERLIPIDEMIA TYPE: Chronic | ICD-10-CM

## 2019-09-04 DIAGNOSIS — E11.9 TYPE 2 DIABETES MELLITUS WITHOUT COMPLICATION, UNSPECIFIED WHETHER LONG TERM INSULIN USE (HCC): Chronic | ICD-10-CM

## 2019-09-04 DIAGNOSIS — Z86.79 H/O REPAIR OF DISSECTING ANEURYSM OF DESCENDING THORACIC AORTA: Chronic | ICD-10-CM

## 2019-09-04 DIAGNOSIS — I10 ESSENTIAL HYPERTENSION: Chronic | ICD-10-CM

## 2019-09-04 DIAGNOSIS — Z86.79 S/P ASCENDING AORTIC ANEURYSM REPAIR: Chronic | ICD-10-CM

## 2019-09-04 DIAGNOSIS — I71.01 CHRONIC THORACIC AORTIC DISSECTION (HCC): ICD-10-CM

## 2019-09-04 DIAGNOSIS — I71.9 DESCENDING AORTIC ANEURYSM (HCC): ICD-10-CM

## 2019-09-04 DIAGNOSIS — Z95.828 S/P VASCULAR BYPASS: Chronic | ICD-10-CM

## 2019-09-04 DIAGNOSIS — Z98.890 H/O REPAIR OF DISSECTING ANEURYSM OF DESCENDING THORACIC AORTA: Chronic | ICD-10-CM

## 2019-09-04 DIAGNOSIS — Z11.59 ENCOUNTER FOR HEPATITIS C SCREENING TEST FOR LOW RISK PATIENT: ICD-10-CM

## 2019-09-04 DIAGNOSIS — Z98.890 S/P ASCENDING AORTIC ANEURYSM REPAIR: Chronic | ICD-10-CM

## 2019-09-04 LAB
ALBUMIN SERPL BCP-MCNC: 4.1 G/DL (ref 3.5–5)
ALP SERPL-CCNC: 108 U/L (ref 46–116)
ALT SERPL W P-5'-P-CCNC: 44 U/L (ref 12–78)
ANION GAP SERPL CALCULATED.3IONS-SCNC: 8 MMOL/L (ref 4–13)
AST SERPL W P-5'-P-CCNC: 38 U/L (ref 5–45)
BILIRUB SERPL-MCNC: 0.56 MG/DL (ref 0.2–1)
BUN SERPL-MCNC: 14 MG/DL (ref 5–25)
CALCIUM SERPL-MCNC: 9.4 MG/DL (ref 8.3–10.1)
CHLORIDE SERPL-SCNC: 107 MMOL/L (ref 100–108)
CO2 SERPL-SCNC: 29 MMOL/L (ref 21–32)
CREAT SERPL-MCNC: 0.79 MG/DL (ref 0.6–1.3)
EST. AVERAGE GLUCOSE BLD GHB EST-MCNC: 128 MG/DL
GFR SERPL CREATININE-BSD FRML MDRD: 89 ML/MIN/1.73SQ M
GLUCOSE P FAST SERPL-MCNC: 97 MG/DL (ref 65–99)
HAV IGM SER QL: NORMAL
HBA1C MFR BLD: 6.1 % (ref 4.2–6.3)
HBV CORE IGM SER QL: NORMAL
HBV SURFACE AG SER QL: NORMAL
HCV AB SER QL: NORMAL
POTASSIUM SERPL-SCNC: 3.4 MMOL/L (ref 3.5–5.3)
PROT SERPL-MCNC: 7.8 G/DL (ref 6.4–8.2)
SODIUM SERPL-SCNC: 144 MMOL/L (ref 136–145)

## 2019-09-04 PROCEDURE — 80074 ACUTE HEPATITIS PANEL: CPT

## 2019-09-04 PROCEDURE — 83036 HEMOGLOBIN GLYCOSYLATED A1C: CPT | Performed by: FAMILY MEDICINE

## 2019-09-04 PROCEDURE — 3044F HG A1C LEVEL LT 7.0%: CPT | Performed by: FAMILY MEDICINE

## 2019-09-04 PROCEDURE — 80053 COMPREHEN METABOLIC PANEL: CPT

## 2019-09-04 PROCEDURE — 36415 COLL VENOUS BLD VENIPUNCTURE: CPT | Performed by: FAMILY MEDICINE

## 2019-09-05 ENCOUNTER — OFFICE VISIT (OUTPATIENT)
Dept: FAMILY MEDICINE CLINIC | Facility: CLINIC | Age: 74
End: 2019-09-05
Payer: COMMERCIAL

## 2019-09-05 VITALS
BODY MASS INDEX: 38.69 KG/M2 | TEMPERATURE: 97.7 F | DIASTOLIC BLOOD PRESSURE: 70 MMHG | SYSTOLIC BLOOD PRESSURE: 126 MMHG | HEIGHT: 65 IN | OXYGEN SATURATION: 97 % | WEIGHT: 232.2 LBS | HEART RATE: 71 BPM

## 2019-09-05 DIAGNOSIS — I10 ESSENTIAL HYPERTENSION: Chronic | ICD-10-CM

## 2019-09-05 DIAGNOSIS — E66.9 OBESITY (BMI 30-39.9): ICD-10-CM

## 2019-09-05 DIAGNOSIS — E87.6 LOW SERUM POTASSIUM: ICD-10-CM

## 2019-09-05 DIAGNOSIS — E78.5 HYPERLIPIDEMIA, UNSPECIFIED HYPERLIPIDEMIA TYPE: Chronic | ICD-10-CM

## 2019-09-05 DIAGNOSIS — E11.9 TYPE 2 DIABETES MELLITUS WITHOUT COMPLICATION, UNSPECIFIED WHETHER LONG TERM INSULIN USE (HCC): Primary | Chronic | ICD-10-CM

## 2019-09-05 PROCEDURE — 3008F BODY MASS INDEX DOCD: CPT | Performed by: FAMILY MEDICINE

## 2019-09-05 PROCEDURE — 99214 OFFICE O/P EST MOD 30 MIN: CPT | Performed by: FAMILY MEDICINE

## 2019-09-05 PROCEDURE — 3078F DIAST BP <80 MM HG: CPT | Performed by: FAMILY MEDICINE

## 2019-09-05 PROCEDURE — 3074F SYST BP LT 130 MM HG: CPT | Performed by: FAMILY MEDICINE

## 2019-09-05 NOTE — PATIENT INSTRUCTIONS
Here for recheck and doing well with HTN and diabetes and needs to add potassium in diet for low potassium  Low sugar diet and low cholesterol diet as directed  Recheck in 3 months with labs  F-up with urology as directed for prostate exams

## 2019-09-05 NOTE — PROGRESS NOTES
Assessment/Plan:  Chief Complaint   Patient presents with    Follow-up     Follow up of chronic medical problems, has been having a rough time with cancer in family members  Patient has no energy and it is taking a toll on him  Patient Instructions   Here for recheck and doing well with HTN and diabetes and needs to add potassium in diet for low potassium  Low sugar diet and low cholesterol diet as directed  Recheck in 3 months with labs  F-up with urology as directed for prostate exams  No problem-specific Assessment & Plan notes found for this encounter  Diagnoses and all orders for this visit:    Type 2 diabetes mellitus without complication, unspecified whether long term insulin use (HCC)  -     Comprehensive metabolic panel; Future  -     Hemoglobin A1C    Hyperlipidemia, unspecified hyperlipidemia type  -     Comprehensive metabolic panel; Future  -     Lipid Panel with Direct LDL reflex; Future    Essential hypertension    Low serum potassium    Obesity (BMI 30-39  9)          Subjective:      Patient ID: Ava Romeo  is a 69 y o  male  Follow-up (Follow up of chronic medical problems, has been having a rough time with cancer in family members  Patient has no energy and it is taking a toll on him  )      The following portions of the patient's history were reviewed and updated as appropriate: allergies, current medications, past family history, past medical history, past social history, past surgical history and problem list     Review of Systems   Constitutional:        Obesity   HENT: Negative  Eyes: Negative  Respiratory: Negative  Cardiovascular: Negative  Gastrointestinal: Negative  Endocrine: Negative  Genitourinary: Negative  Musculoskeletal: Negative  Skin: Negative  Allergic/Immunologic: Negative  Neurological: Negative  Hematological: Negative  Psychiatric/Behavioral: Negative            Objective:      /70   Pulse 71   Temp 97 7 °F (36 5 °C) (Temporal)   Ht 5' 5" (1 651 m)   Wt 105 kg (232 lb 3 2 oz)   SpO2 97%   BMI 38 64 kg/m²          Physical Exam   Constitutional: He is oriented to person, place, and time  He appears well-developed and well-nourished  Obesity     HENT:   Head: Normocephalic and atraumatic  Right Ear: External ear normal    Left Ear: External ear normal    Nose: Nose normal    Mouth/Throat: Oropharynx is clear and moist    Eyes: Pupils are equal, round, and reactive to light  Conjunctivae and EOM are normal    Neck: Normal range of motion  Neck supple  Cardiovascular: Normal rate, regular rhythm, normal heart sounds and intact distal pulses  Pulmonary/Chest: Effort normal and breath sounds normal    Abdominal: Soft  Bowel sounds are normal    Musculoskeletal: Normal range of motion  Neurological: He is alert and oriented to person, place, and time  He has normal reflexes  Skin: Skin is warm and dry  Psychiatric: He has a normal mood and affect   His behavior is normal

## 2019-10-11 DIAGNOSIS — E11.9 TYPE 2 DIABETES MELLITUS WITHOUT COMPLICATION (HCC): ICD-10-CM

## 2019-10-11 RX ORDER — PEN NEEDLE, DIABETIC 31 GX5/16"
NEEDLE, DISPOSABLE MISCELLANEOUS
Qty: 100 EACH | Refills: 5 | Status: SHIPPED | OUTPATIENT
Start: 2019-10-11 | End: 2020-06-01 | Stop reason: SDUPTHER

## 2019-10-25 ENCOUNTER — DOCUMENTATION (OUTPATIENT)
Dept: RADIATION ONCOLOGY | Facility: HOSPITAL | Age: 74
End: 2019-10-25

## 2019-10-25 NOTE — PROGRESS NOTES
Called the pt to remind him that Dr Donnell Byrne will be the speaker at the November 5 meeting of the Prostate Cancer SG meeting  Provided support to the pt re the recent loss of his brother

## 2019-11-05 DIAGNOSIS — I65.23 CAROTID STENOSIS, BILATERAL: Primary | ICD-10-CM

## 2019-11-13 DIAGNOSIS — I10 HTN (HYPERTENSION), BENIGN: ICD-10-CM

## 2019-11-13 RX ORDER — AMLODIPINE BESYLATE 10 MG/1
TABLET ORAL
Qty: 180 TABLET | Refills: 2 | Status: SHIPPED | OUTPATIENT
Start: 2019-11-13 | End: 2020-08-17 | Stop reason: SDUPTHER

## 2019-11-18 ENCOUNTER — HOSPITAL ENCOUNTER (OUTPATIENT)
Dept: NON INVASIVE DIAGNOSTICS | Facility: CLINIC | Age: 74
Discharge: HOME | End: 2019-11-18
Payer: COMMERCIAL

## 2019-11-18 DIAGNOSIS — I65.23 CAROTID STENOSIS, BILATERAL: ICD-10-CM

## 2019-11-18 PROCEDURE — 93880 EXTRACRANIAL BILAT STUDY: CPT

## 2019-11-18 PROCEDURE — 93880 EXTRACRANIAL BILAT STUDY: CPT | Performed by: SURGERY

## 2019-12-03 ENCOUNTER — APPOINTMENT (OUTPATIENT)
Dept: LAB | Facility: CLINIC | Age: 74
End: 2019-12-03
Payer: COMMERCIAL

## 2019-12-03 ENCOUNTER — OFFICE VISIT (OUTPATIENT)
Dept: VASCULAR SURGERY | Facility: CLINIC | Age: 74
End: 2019-12-03
Payer: COMMERCIAL

## 2019-12-03 VITALS
WEIGHT: 231 LBS | SYSTOLIC BLOOD PRESSURE: 122 MMHG | HEART RATE: 70 BPM | DIASTOLIC BLOOD PRESSURE: 68 MMHG | HEIGHT: 65 IN | BODY MASS INDEX: 38.49 KG/M2

## 2019-12-03 DIAGNOSIS — Z98.890 H/O REPAIR OF DISSECTING ANEURYSM OF DESCENDING THORACIC AORTA: Chronic | ICD-10-CM

## 2019-12-03 DIAGNOSIS — Z95.828 S/P VASCULAR BYPASS: Chronic | ICD-10-CM

## 2019-12-03 DIAGNOSIS — I71.2 AORTIC ARCH ANEURYSM (HCC): ICD-10-CM

## 2019-12-03 DIAGNOSIS — I10 ESSENTIAL HYPERTENSION: Chronic | ICD-10-CM

## 2019-12-03 DIAGNOSIS — E78.5 HYPERLIPIDEMIA, UNSPECIFIED HYPERLIPIDEMIA TYPE: Chronic | ICD-10-CM

## 2019-12-03 DIAGNOSIS — Z86.79 H/O REPAIR OF DISSECTING ANEURYSM OF DESCENDING THORACIC AORTA: Chronic | ICD-10-CM

## 2019-12-03 DIAGNOSIS — I71.01 CHRONIC THORACIC AORTIC DISSECTION (HCC): Primary | ICD-10-CM

## 2019-12-03 DIAGNOSIS — E11.9 TYPE 2 DIABETES MELLITUS WITHOUT COMPLICATION, UNSPECIFIED WHETHER LONG TERM INSULIN USE (HCC): Chronic | ICD-10-CM

## 2019-12-03 LAB
ALBUMIN SERPL BCP-MCNC: 3.3 G/DL (ref 3.5–5)
ALP SERPL-CCNC: 120 U/L (ref 46–116)
ALT SERPL W P-5'-P-CCNC: 38 U/L (ref 12–78)
ANION GAP SERPL CALCULATED.3IONS-SCNC: 4 MMOL/L (ref 4–13)
AST SERPL W P-5'-P-CCNC: 30 U/L (ref 5–45)
BILIRUB SERPL-MCNC: 0.57 MG/DL (ref 0.2–1)
BUN SERPL-MCNC: 15 MG/DL (ref 5–25)
CALCIUM SERPL-MCNC: 9 MG/DL (ref 8.3–10.1)
CHLORIDE SERPL-SCNC: 107 MMOL/L (ref 100–108)
CHOLEST SERPL-MCNC: 96 MG/DL (ref 50–200)
CO2 SERPL-SCNC: 30 MMOL/L (ref 21–32)
CREAT SERPL-MCNC: 0.88 MG/DL (ref 0.6–1.3)
EST. AVERAGE GLUCOSE BLD GHB EST-MCNC: 148 MG/DL
GFR SERPL CREATININE-BSD FRML MDRD: 85 ML/MIN/1.73SQ M
GLUCOSE P FAST SERPL-MCNC: 83 MG/DL (ref 65–99)
HBA1C MFR BLD: 6.8 % (ref 4.2–6.3)
HDLC SERPL-MCNC: 46 MG/DL
LDLC SERPL CALC-MCNC: 41 MG/DL (ref 0–100)
POTASSIUM SERPL-SCNC: 3.3 MMOL/L (ref 3.5–5.3)
PROT SERPL-MCNC: 7.5 G/DL (ref 6.4–8.2)
SODIUM SERPL-SCNC: 141 MMOL/L (ref 136–145)
TRIGL SERPL-MCNC: 44 MG/DL

## 2019-12-03 PROCEDURE — 99214 OFFICE O/P EST MOD 30 MIN: CPT | Performed by: PHYSICIAN ASSISTANT

## 2019-12-03 PROCEDURE — 3078F DIAST BP <80 MM HG: CPT | Performed by: PHYSICIAN ASSISTANT

## 2019-12-03 PROCEDURE — 83036 HEMOGLOBIN GLYCOSYLATED A1C: CPT | Performed by: FAMILY MEDICINE

## 2019-12-03 PROCEDURE — 80053 COMPREHEN METABOLIC PANEL: CPT

## 2019-12-03 PROCEDURE — 3044F HG A1C LEVEL LT 7.0%: CPT | Performed by: FAMILY MEDICINE

## 2019-12-03 PROCEDURE — 3074F SYST BP LT 130 MM HG: CPT | Performed by: PHYSICIAN ASSISTANT

## 2019-12-03 PROCEDURE — 80061 LIPID PANEL: CPT

## 2019-12-03 PROCEDURE — 36415 COLL VENOUS BLD VENIPUNCTURE: CPT | Performed by: FAMILY MEDICINE

## 2019-12-03 NOTE — PROGRESS NOTES
Assessment/Plan:    Chronic thoracic aortic dissection [facility])  71-year-old gentleman with a history of HTN, HLD, PAF on no anticoagulation, type 2 DM, prostate cancer, type A aortic dissection, s/p ascending aortic repair with hemiarch reconstruction by Dr Raymundo Apodaca 9/6/2017 and subsequent chronic thoracic aortic dissection w/aneurysmal dilatation of arch/proximal descending thoracic aorta, s/p L CCA-subclavian bypass w/Wilmot-Behzad and embolization of proximal L subclavian artery by Dr Duglas Rawls 8/13/2018 followed by TEVAR 9/19/2018 by Chirag Camacho/Cassidy  Recent carotid duplex demonstrates widely patent L CCA-subclavian bypass with no significant carotid stenosis  CTA in April demonstrates filling of the false lumen in distal aortic arch but stable distal aortic arch aneurysm size and stability of chronic dissection extending to the distal abdominal aorta at the celiac artery  Celiac, SMA, CONNOR right renal artery originate off the true lumen with left renal artery originating off the false lumen  Patient remains asymptomatic  +palpable L radial, ulnar and bilateral distal pulses  -continue surveillance L CCA-subclavian bypass with carotid duplex  Will obtain carotid duplex in 6 months then yearly thereafter  -continue follow-up with cardiac surgery for postoperative surveillance of TEVAR and aortic arch aneurysm and chronic dissection    Repeat CTA scheduled in April 2020  -return to office in 6 months after carotid duplex for routine follow-up  -continue aspirin and statin therapy  -instructed to contact the office with new concerns or symptoms      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HCC)  -stable 4 2 cm distal aortic arch aneurysm  -results of CTA as noted  -continue follow-up with cardiac surgery  -repeat imaging scheduled for April 2020    Hyperlipidemia  -stable  -continue statin therapy  -management per PCP    Hypertension  -BP well controlled  -continue current medical regimen  -management per PCP       Diagnoses and all orders for this visit:    Chronic thoracic aortic dissection (HCC)  -     VAS carotid complete study; Future    Aortic arch aneurysm [facility])    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 (TEVAR)  -     VAS carotid complete study; Future    S/P vascular bypass  -     VAS carotid complete study; Future    Hyperlipidemia, unspecified hyperlipidemia type    Essential hypertension          Subjective:      Patient ID: Elio Mckeon  is a 69 y o  male  Pt is here [facility] CV 11/18  Pt has HX of TEVAR done in September 2018  Pt occassionally gets dizzy spells and BL LE EDEMA  Pt currently takes ASA 81mg, Atorvastatin  56-year-old gentleman with a history of HTN, HLD, PAF on no anticoagulation, type 2 DM, prostate cancer, type A aortic dissection, s/p ascending aortic repair with hemiarch reconstruction by Dr Fredo Del Real 9/6/2017 and subsequent chronic thoracic aortic dissection w/aneurysmal dilatation of arch/proximal descending thoracic aorta, s/p L CCA-subclavian bypass w/Weiner-Behzad and embolization of proximal L subclavian artery by Dr Markel Glover 8/13/2018 followed by TEVAR 9/19/2018 by Chirag Camacho/Cassidy who returns the office for review of recent surveillance noninvasive imaging  Patient has been doing well and denies left upper extremity claudication, rest pain, tissue loss, skin color/temperature changes, paresthesias, weakness or numbness  Recent carotid duplex on 11/18/2019 has been reviewed and demonstrates widely patent L CCA-subclavian bypass with no significant carotid stenosis  L ICA velocity 42/11  Vertebrals antegrade  Patient continues to be followed by cardiac surgery  CTA chest, abdomen, pelvis in April demonstrates filling of the false lumen in distal aortic arch but stable distal aortic arch aneurysm size and stability of chronic dissection extending to the distal abdominal aorta at the celiac artery    Celiac, SMA, CONNOR right renal artery originate off the true lumen with left renal artery originating off the false lumen  Repeat imaging scheduled for April 2020  Patient remains asymptomatic  +palpable L radial, ulnar and bilateral distal pulses  The following portions of the patient's history were reviewed and updated as appropriate: allergies, current medications, past family history, past medical history, past social history, past surgical history and problem list     Review of Systems   Constitutional: Negative  HENT: Negative  Eyes: Negative  Respiratory: Negative  Cardiovascular: Positive for leg swelling  Gastrointestinal: Negative  Endocrine: Negative  Genitourinary: Negative  Musculoskeletal: Negative  Skin: Negative  Allergic/Immunologic: Negative  Neurological: Positive for dizziness  Hematological: Negative  Psychiatric/Behavioral: Negative  I have reviewed and made appropriate changes to the review of systems input by the medical assistant      Vitals:    12/03/19 1250   BP: 122/68   BP Location: Right arm   Patient Position: Sitting   Pulse: 70   Weight: 105 kg (231 lb)   Height: 5' 5" (1 651 m)       Patient Active Problem List   Diagnosis    Prostate cancer ([facility] )    Hypertension    Diabetes mellitus ([facility] )    Chronic thoracic aortic dissection (HCC)    S/P ascending aortic aneurysm repair    Hypokalemia    Paroxysmal atrial fibrillation (HCC)    KARIN (obstructive sleep apnea)    Class 2 severe obesity due to excess calories with serious comorbidity and body mass index (BMI) of 37 0 to 37 9 in adult [facility])    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HCC)    Hyperlipidemia    Adenomatous polyp of descending colon    S/P vascular bypass    Descending aortic aneurysm (HCC)    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 (TEVAR)    Encounter for postoperative care       Past Surgical History:   Procedure Laterality Date    CARDIAC SURGERY      CIRCUMCISION      COLONOSCOPY      IR CEREBRAL ANGIOGRAPHY / INTERVENTION  8/13/2018    IR TEVAR  9/19/2018    AK ASCEND AORTA GRAFT W ROOT REPLACMENT  VALVE CONDUIT/CORON RECONSTRUCT N/A 9/6/2017    Procedure: ASCENDING AORTIC REPAIR WITH A 26MM  GELWEAVE STRAIGHT GRAFT AND HEMIARCH WITH 8MM SIDE ARM BRANCH;  Surgeon: Akanksha Banks MD;  [facility];  Service: Cardiac Surgery    AK ENDOVASC TAA REINCL SUBCL N/A 9/19/2018    Procedure: REPAIR ANEURSYM THORACIC ENDOVASCUALR DESCENDING AORTIC ANEURYSM (TEVAR); Surgeon: Miladys Saunders MD;  [facility];  Service: Cardiac Surgery    AK EXPLOR POSTOP BLEED,INFEC,CLOT-CHST N/A 9/6/2017    Procedure: RE-EXPLORATION MEDIASTERNAL CAVITY FOR POST-OP BLEED (BRING BACK); Surgeon: Akanksha Banks MD;  [facility];  Service: Cardiac Surgery    AK VEIN BYPASS GRAFT,CAROTID-BRACHIAL Left 8/13/2018    Procedure: BYPASS CAROTID SUBCLAVIAN WITH EMBOLIZATION OF PROXIMAL SUBCLAVIAN ARTERY  ;  Surgeon: Shay Lyons MD;  [facility];  Service: Vascular       Family History   Problem Relation Age of Onset    Stroke Mother         complications    Hypertension Mother    Osker Ok Arthritis Mother     No Known Problems Father        Social History     Socioeconomic History    Marital status:       Spouse name: Not on file    Number of children: Not on file    Years of education: Not on file    Highest education level: Not on file   Occupational History     Comment: retired   Social Needs    Financial resource strain: Not on file    Food insecurity:     Worry: Not on file     Inability: Not on file   [employer] needs:     Medical: Not on file     Non-medical: Not on file   Tobacco Use    Smoking status: Never Smoker    Smokeless tobacco: Never Used   Substance and Sexual Activity    Alcohol use: Yes     Comment: social beer or two daily    Drug use: No    Sexual activity: Never     Comment: denied hx of sexually active high risk   Lifestyle    Physical activity:     Days per week: Not on file     Minutes per session: Not on file    Stress: Not on file   Relationships    Social connections:     Talks on phone: Not on file     Gets together: Not on file     Attends Anabaptism service: Not on file     Active member of club or organization: Not on file     Attends meetings of clubs or organizations: Not on file     Relationship status: Not on file    Intimate partner violence:     Fear of current or ex partner: Not on file     Emotionally abused: Not on file     Physically abused: Not on file     Forced sexual activity: Not on file   Other Topics Concern    Not on file   Social History Narrative            1 serving caffeine/day    Less than high school diploma    Person living alone    Participate in moderate activities both inside and outside of the home       No Known Allergies      Current Outpatient Medications:     acetaminophen (TYLENOL) 325 mg tablet, Take 2 tablets (650 mg total) by mouth every 6 (six) hours as needed for mild pain, Disp: , Rfl:     amLODIPine (NORVASC) 10 mg tablet, TAKE 1 TABLET BY MOUTH TWICE A DAY, Disp: 180 tablet, Rfl: 2    aspirin 81 mg chewable tablet, Chew 1 tablet daily, Disp: 30 tablet, Rfl: 0    atorvastatin (LIPITOR) 80 mg tablet, TAKE 1 TABLET BY MOUTH EVERY DAY, Disp: 90 tablet, Rfl: 5    B-D ULTRAFINE III SHORT PEN 31G X 8 MM MISC, USE AS DIRECTED , Disp: 100 each, Rfl: 3    B-D ULTRAFINE III SHORT PEN 31G X 8 MM MISC, USE AS DIRECTED , Disp: 100 each, Rfl: 5    HUMALOG KWIKPEN 100 units/mL injection pen, INJECT 8 UNIT 3 TIMES DAILY BEFORE MEALS, Disp: 15 pen, Rfl: 5    insulin glargine (LANTUS) 100 units/mL subcutaneous injection, Inject 28 Units under the skin daily at bedtime Dx: Diabetes E11 9, dispense pens, Disp: 10 mL, Rfl: 0    insulin lispro (HumaLOG) 100 units/mL injection, Inject 8 Units under the skin 3 (three) times a day before meals Dx: Diabetes E11 9   Dispense pens (Patient taking differently: Inject 8 Units under the skin 2 (two) times a day before meals Dx: Diabetes E11 9  Dispense pens Before breakfast and dinner ), Disp: 8 mL, Rfl: 0    LANTUS SOLOSTAR 100 units/mL injection pen, INJECT 28 UNIT BEDTIME (Patient not taking: Reported on 9/5/2019), Disp: 15 pen, Rfl: 5    lisinopril-hydrochlorothiazide (PRINZIDE,ZESTORETIC) 20-12 5 MG per tablet, Take 1 tablet by mouth daily, Disp: 90 tablet, Rfl: 4    metoprolol tartrate (LOPRESSOR) 100 mg tablet, TAKE 1 TABLET BY MOUTH TWICE A DAY, Disp: 60 tablet, Rfl: 4    Objective:  Imaging study:  Carotid duplex 11/18/2019:  Imaging study reviewed and as described above  Patent left CCA-subclavian artery bypass with no significant carotid disease  See full report below:  Right                      Impression  PSV  EDV (cm/s)  Ratio    Dist  ICA                               89          19   1 46    Mid  ICA                                61          11   1 00    Prox  ICA                  1 - 49%      66          13   1 09    Dist CCA                                57          14           Mid CCA                                 61           7   0 97    Prox CCA                                63          10           Ext Carotid                             95           4   1 56    Prox Vert                               64          10           Subclavian                              96           3              Left                       Impression  PSV  EDV (cm/s)  Ratio    Dist  ICA                               46          12   0 57    Inflow Anastomosis                     246                       Mid  ICA                                63          15   0 79    Prox  ICA                  Normal       42          11   0 53    Dist CCA                                58           0           Graft Mid   3rd                         257                       Mid CCA                                 80           9   0 51    Prox CCA                               156           0           Graft Outflow Anastomosis              164                       Ext Carotid                             71           0   0 89    Prox Vert                               26           7           Subclavian                             135           6                    CONCLUSION:     Impression  RIGHT:  There is <50% stenosis noted in the internal carotid artery  Plaque is  heterogenous and smooth  Vertebral artery flow is antegrade  There is no significant subclavian artery disease  LEFT:  Patent common carotid to subclavian artery bypass graft  There is no evidence of arterial disease in the internal carotid artery and  external carotid artery     Vertebral artery flow is antegrade  In comparison to the study of 4/19/2019, there is no significant change in the  disease process  CTA chest, abdomen pelvis 4/9/2019:  Imaging study reviewed  4 2 cm distal aortic arch aneurysm unchanged  Filling noted of the false lumen at the distal arch  Dissection remains unchanged extending to the distal abdominal aorta into the celiac artery  The celiac and left renal artery are coming off the false lumen and celiac, SMA, CONNOR right renal artery originating off the true lumen  2 6 cm LAD aneurysm    /68 (BP Location: Right arm, Patient Position: Sitting)   Pulse 70   Ht 5' 5" (1 651 m)   Wt 105 kg (231 lb)   BMI 38 44 kg/m²          Physical Exam   Constitutional: He is oriented to person, place, and time  He appears well-developed and well-nourished  No distress  HENT:   Head: Normocephalic and atraumatic  Eyes: Pupils are equal, round, and reactive to light  Conjunctivae and EOM are normal  No scleral icterus  Neck: Normal range of motion  Neck supple  No JVD present  Carotid bruit is not present  No tracheal deviation present  No thyromegaly present  Well-healed left infraclavicular surgical scar  +soft bruit over Left infraclavicular space     Cardiovascular: Normal rate, regular rhythm, S1 normal and S2 normal  Exam reveals no gallop, no S3 and no friction rub  Murmur (soft II/VI systolic murmur auscultated at right sternal border and radiating to bilateral carotids) heard  Pulmonary/Chest: Breath sounds normal  No stridor  No respiratory distress  He has no wheezes  He has no rhonchi  He has no rales  Well-healed midline sternotomy scar  Sternum stable   Abdominal: Soft  Bowel sounds are normal  He exhibits no distension, no abdominal bruit, no pulsatile midline mass and no mass  There is no hepatosplenomegaly  There is no tenderness  There is no rebound  Musculoskeletal: Normal range of motion  He exhibits edema (1+ edema bilateral lower extremities  No left upper extremity edema  )  He exhibits no deformity  Left upper extremity warm, pink, well perfused and motor and sensory intact  Muscle strength 5/5  Brisk capillary refill  2+ radial pulses bilaterally  No cyanosis, pallor tissue loss left hand  Neurological: He is alert and oriented to person, place, and time  He has normal strength  Skin: Skin is warm, dry and intact  Capillary refill takes less than 2 seconds  No lesion noted  No cyanosis or erythema  No pallor  Psychiatric: He has a normal mood and affect

## 2019-12-03 NOTE — ASSESSMENT & PLAN NOTE
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, s/p L CCA-subclavian bypass w/Barnard-Behzad and embolization of proximal L subclavian artery by Dr Anmol Young 8/13/2018 followed by TEVAR 9/19/2018 by Chirag Camacho/Cassidy  Recent carotid duplex demonstrates widely patent L CCA-subclavian bypass with no significant carotid stenosis  CTA in April demonstrates filling of the false lumen in distal aortic arch but stable distal aortic arch aneurysm size and stability of chronic dissection extending to the distal abdominal aorta at the celiac artery  Celiac, SMA, CONNOR right renal artery originate off the true lumen with left renal artery originating off the false lumen  Patient remains asymptomatic  +palpable L radial, ulnar and bilateral distal pulses  -continue surveillance L CCA-subclavian bypass with carotid duplex  Will obtain carotid duplex in 6 months then yearly thereafter  -continue follow-up with cardiac surgery for postoperative surveillance of TEVAR and aortic arch aneurysm and chronic dissection    Repeat CTA scheduled in April 2020  -return to office in 6 months after carotid duplex for routine follow-up  -continue aspirin and statin therapy  -instructed to contact the office with new concerns or symptoms

## 2019-12-03 NOTE — PATIENT INSTRUCTIONS
-your recent carotid ultrasound demonstrates that your carotid to subclavian bypass is open without narrowing  No significant carotid stenosis  -continue postoperative surveillance with repeat carotid ultrasound in 6 months then yearly thereafter if unchanged  -continue follow-up with cardiac surgery with repeat CT scan in April 2 continue to follow you're aortic arch aneurysm  -continue aspirin and atorvastatin  -return to office in 6 months with carotid duplex for routine follow-up  -please contact the office with new symptoms or concerns

## 2019-12-03 NOTE — ASSESSMENT & PLAN NOTE
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

## 2019-12-05 ENCOUNTER — OFFICE VISIT (OUTPATIENT)
Dept: FAMILY MEDICINE CLINIC | Facility: CLINIC | Age: 74
End: 2019-12-05
Payer: COMMERCIAL

## 2019-12-05 VITALS
WEIGHT: 228 LBS | HEART RATE: 78 BPM | OXYGEN SATURATION: 98 % | SYSTOLIC BLOOD PRESSURE: 142 MMHG | DIASTOLIC BLOOD PRESSURE: 70 MMHG | TEMPERATURE: 97.6 F | BODY MASS INDEX: 37.99 KG/M2 | HEIGHT: 65 IN | RESPIRATION RATE: 16 BRPM

## 2019-12-05 DIAGNOSIS — E87.6 HYPOKALEMIA: ICD-10-CM

## 2019-12-05 DIAGNOSIS — I10 ESSENTIAL HYPERTENSION: Primary | Chronic | ICD-10-CM

## 2019-12-05 DIAGNOSIS — E11.9 TYPE 2 DIABETES MELLITUS WITHOUT COMPLICATION, UNSPECIFIED WHETHER LONG TERM INSULIN USE (HCC): Chronic | ICD-10-CM

## 2019-12-05 DIAGNOSIS — E66.01 CLASS 2 SEVERE OBESITY DUE TO EXCESS CALORIES WITH SERIOUS COMORBIDITY AND BODY MASS INDEX (BMI) OF 37.0 TO 37.9 IN ADULT (HCC): ICD-10-CM

## 2019-12-05 DIAGNOSIS — E78.5 HYPERLIPIDEMIA, UNSPECIFIED HYPERLIPIDEMIA TYPE: Chronic | ICD-10-CM

## 2019-12-05 DIAGNOSIS — Z23 NEED FOR IMMUNIZATION AGAINST INFLUENZA: ICD-10-CM

## 2019-12-05 PROCEDURE — 1036F TOBACCO NON-USER: CPT | Performed by: FAMILY MEDICINE

## 2019-12-05 PROCEDURE — 99214 OFFICE O/P EST MOD 30 MIN: CPT | Performed by: FAMILY MEDICINE

## 2019-12-05 PROCEDURE — 90662 IIV NO PRSV INCREASED AG IM: CPT | Performed by: FAMILY MEDICINE

## 2019-12-05 PROCEDURE — G0008 ADMIN INFLUENZA VIRUS VAC: HCPCS | Performed by: FAMILY MEDICINE

## 2019-12-05 NOTE — PATIENT INSTRUCTIONS
Here for recheck and diabetes and has HTN and also lipids are stable  Lose weight as directed via diet and exercise  Recheck in 3 months with labs  Flu shot today  Potassium rich foods to help increase potassium with spinach and OJ or banana

## 2019-12-05 NOTE — PROGRESS NOTES
Assessment/Plan:  Chief Complaint   Patient presents with    Follow-up     3 month follow up      Patient Instructions   Here for recheck and diabetes and has HTN and also lipids are stable  Lose weight as directed via diet and exercise  Recheck in 3 months with labs  Flu shot today  Potassium rich foods to help increase potassium with spinach and OJ or banana  No problem-specific Assessment & Plan notes found for this encounter  Diagnoses and all orders for this visit:    Essential hypertension  -     Comprehensive metabolic panel; Future    Type 2 diabetes mellitus without complication, unspecified whether long term insulin use (HCC)  -     Comprehensive metabolic panel; Future  -     Hemoglobin A1C    Hyperlipidemia, unspecified hyperlipidemia type  -     Comprehensive metabolic panel; Future    Hypokalemia  -     Comprehensive metabolic panel; Future    Class 2 severe obesity due to excess calories with serious comorbidity and body mass index (BMI) of 37 0 to 37 9 in adult [facility])          Subjective:      Patient ID: Zion Bernstein  is a 69 y o  male  Follow-up (3 month follow up ) Here for flu shot, review labs, takes all meds as directed  Labs stable except low potassium  No cp or sob, or ha  The following portions of the patient's history were reviewed and updated as appropriate: allergies, current medications, past family history, past medical history, past social history, past surgical history and problem list     Review of Systems   Constitutional:        Obesity   HENT: Negative  Eyes: Negative  Respiratory: Negative  Cardiovascular: Negative  Gastrointestinal: Negative  Endocrine: Negative  Genitourinary: Negative  Musculoskeletal: Negative  Skin: Negative  Allergic/Immunologic: Negative  Neurological: Negative  Hematological: Negative  Psychiatric/Behavioral: Negative            Objective:      /70   Pulse 78   Temp 97 6 °F (36 4 °C) (Temporal)   Resp 16   Ht 5' 5" (1 651 m)   Wt 103 kg (228 lb)   SpO2 98%   BMI 37 94 kg/m²          Physical Exam   Constitutional: He is oriented to person, place, and time  He appears well-developed and well-nourished  obesity   HENT:   Head: Normocephalic and atraumatic  Right Ear: External ear normal    Left Ear: External ear normal    Nose: Nose normal    Mouth/Throat: Oropharynx is clear and moist    Eyes: Pupils are equal, round, and reactive to light  Conjunctivae and EOM are normal    Neck: Normal range of motion  Neck supple  Cardiovascular: Normal rate, regular rhythm, normal heart sounds and intact distal pulses  Pulses are no weak pulses  Pulses:       Dorsalis pedis pulses are 2+ on the right side, and 2+ on the left side  Posterior tibial pulses are 2+ on the right side, and 2+ on the left side  Pulmonary/Chest: Effort normal and breath sounds normal    Musculoskeletal: Normal range of motion  Feet:   Right Foot:   Skin Integrity: Negative for ulcer, skin breakdown, erythema, warmth, callus or dry skin  Left Foot:   Skin Integrity: Negative for ulcer, skin breakdown, erythema, warmth, callus or dry skin  Neurological: He is alert and oriented to person, place, and time  He has normal reflexes  Skin: Skin is warm and dry  Psychiatric: He has a normal mood and affect  His behavior is normal      Patient's shoes and socks removed  Right Foot/Ankle   Right Foot Inspection  Skin Exam: skin normal and skin intact no dry skin, no warmth, no callus, no erythema, no maceration, no abnormal color, no pre-ulcer, no ulcer and no callus                          Toe Exam: ROM and strength within normal limits  Sensory   Vibration: intact  Proprioception: intact   Monofilament testing: intact  Vascular  Capillary refills: < 3 seconds  The right DP pulse is 2+  The right PT pulse is 2+       Left Foot/Ankle  Left Foot Inspection  Skin Exam: skin normal and skin intactno dry skin, no warmth, no erythema, no maceration, normal color, no pre-ulcer, no ulcer and no callus                         Toe Exam: ROM and strength within normal limits                   Sensory   Vibration: intact  Proprioception: intact  Monofilament: intact  Vascular  Capillary refills: < 3 seconds  The left DP pulse is 2+  The left PT pulse is 2+  Assign Risk Category:  No deformity present; No loss of protective sensation;  No weak pulses       Risk: 0

## 2020-01-09 DIAGNOSIS — E11.9 TYPE 2 DIABETES MELLITUS WITHOUT COMPLICATION, UNSPECIFIED WHETHER LONG TERM INSULIN USE (HCC): ICD-10-CM

## 2020-01-09 RX ORDER — INSULIN LISPRO 100 [IU]/ML
INJECTION, SOLUTION INTRAVENOUS; SUBCUTANEOUS
Qty: 15 PEN | Refills: 5 | Status: SHIPPED | OUTPATIENT
Start: 2020-01-09

## 2020-01-27 ENCOUNTER — TRANSCRIBE ORDERS (OUTPATIENT)
Dept: ADMINISTRATIVE | Facility: HOSPITAL | Age: 75
End: 2020-01-27

## 2020-01-27 DIAGNOSIS — R31.0 GROSS HEMATURIA: Primary | ICD-10-CM

## 2020-01-29 ENCOUNTER — HOSPITAL ENCOUNTER (OUTPATIENT)
Dept: ULTRASOUND IMAGING | Facility: HOSPITAL | Age: 75
Discharge: HOME | End: 2020-01-29
Payer: COMMERCIAL

## 2020-01-29 DIAGNOSIS — R31.0 GROSS HEMATURIA: ICD-10-CM

## 2020-01-29 PROCEDURE — 76770 US EXAM ABDO BACK WALL COMP: CPT

## 2020-03-04 ENCOUNTER — OFFICE VISIT (OUTPATIENT)
Dept: FAMILY MEDICINE CLINIC | Facility: CLINIC | Age: 75
End: 2020-03-04
Payer: COMMERCIAL

## 2020-03-04 VITALS
HEART RATE: 77 BPM | DIASTOLIC BLOOD PRESSURE: 66 MMHG | SYSTOLIC BLOOD PRESSURE: 124 MMHG | WEIGHT: 223 LBS | OXYGEN SATURATION: 98 % | BODY MASS INDEX: 37.15 KG/M2 | HEIGHT: 65 IN | RESPIRATION RATE: 17 BRPM | TEMPERATURE: 97.1 F

## 2020-03-04 DIAGNOSIS — E11.9 TYPE 2 DIABETES MELLITUS WITHOUT COMPLICATION, UNSPECIFIED WHETHER LONG TERM INSULIN USE (HCC): Primary | ICD-10-CM

## 2020-03-04 DIAGNOSIS — E78.5 HYPERLIPIDEMIA, UNSPECIFIED HYPERLIPIDEMIA TYPE: Chronic | ICD-10-CM

## 2020-03-04 DIAGNOSIS — E66.9 OBESITY (BMI 30-39.9): ICD-10-CM

## 2020-03-04 DIAGNOSIS — I10 ESSENTIAL HYPERTENSION: Chronic | ICD-10-CM

## 2020-03-04 PROCEDURE — 1160F RVW MEDS BY RX/DR IN RCRD: CPT | Performed by: FAMILY MEDICINE

## 2020-03-04 PROCEDURE — 1036F TOBACCO NON-USER: CPT | Performed by: FAMILY MEDICINE

## 2020-03-04 PROCEDURE — 2022F DILAT RTA XM EVC RTNOPTHY: CPT | Performed by: FAMILY MEDICINE

## 2020-03-04 PROCEDURE — 3074F SYST BP LT 130 MM HG: CPT | Performed by: FAMILY MEDICINE

## 2020-03-04 PROCEDURE — 3008F BODY MASS INDEX DOCD: CPT | Performed by: FAMILY MEDICINE

## 2020-03-04 PROCEDURE — 3078F DIAST BP <80 MM HG: CPT | Performed by: FAMILY MEDICINE

## 2020-03-04 PROCEDURE — 99214 OFFICE O/P EST MOD 30 MIN: CPT | Performed by: FAMILY MEDICINE

## 2020-03-04 RX ORDER — FINASTERIDE 5 MG/1
5 TABLET, FILM COATED ORAL DAILY
COMMUNITY
Start: 2020-01-27

## 2020-03-04 NOTE — PATIENT INSTRUCTIONS
Here for recheck and diabetes and has HTN and also lipids are stable  Await cmp and HGA1C not done for today's visit  Lose weight as directed via diet and exercise  Recheck in 3/1/2 months with labs  Flu shot UTD  Await labs for hx of low potassium   Potassium rich foods to help increase potassium with spinach and OJ or banana

## 2020-03-04 NOTE — PROGRESS NOTES
Assessment/Plan:  Chief Complaint   Patient presents with    Follow-up     Pt presents for a 3 month f/u  Patient Instructions   Here for recheck and diabetes and has HTN and also lipids are stable  Await cmp and HGA1C not done for today's visit  Lose weight as directed via diet and exercise  Recheck in 3/1/2 months with labs  Flu shot UTD  Await labs for hx of low potassium  Potassium rich foods to help increase potassium with spinach and OJ or banana             No problem-specific Assessment & Plan notes found for this encounter  Diagnoses and all orders for this visit:    Type 2 diabetes mellitus without complication, unspecified whether long term insulin use (HCC)  -     Comprehensive metabolic panel; Future  -     Hemoglobin A1C  -     Comprehensive metabolic panel; Future  -     Hemoglobin A1C    Essential hypertension    Hyperlipidemia, unspecified hyperlipidemia type    Obesity (BMI 30-39  9)    Other orders  -     finasteride (PROSCAR) 5 mg tablet; Take 5 mg by mouth daily          Subjective:      Patient ID: Roxanne Brenner  is a 69 y o  male  HPI    The following portions of the patient's history were reviewed and updated as appropriate: allergies, current medications, past family history, past medical history, past social history, past surgical history and problem list     Review of Systems   Constitutional:        Obesity   HENT: Negative  Eyes: Negative  Respiratory: Negative  Cardiovascular: Negative  Gastrointestinal: Negative  Endocrine: Negative  Genitourinary: Negative  Musculoskeletal: Negative  Skin: Negative  Allergic/Immunologic: Negative  Neurological: Negative  Hematological: Negative  Psychiatric/Behavioral: Negative            Objective:      /66 (BP Location: Left arm, Patient Position: Sitting, Cuff Size: Adult)   Pulse 77   Temp (!) 97 1 °F (36 2 °C) (Temporal)   Resp 17   Ht 5' 5" (1 651 m)   Wt 101 kg (223 lb)   SpO2 98% BMI 37 11 kg/m²          Physical Exam   Constitutional: He is oriented to person, place, and time  He appears well-developed and well-nourished  obesity   HENT:   Head: Normocephalic and atraumatic  Right Ear: External ear normal    Left Ear: External ear normal    Nose: Nose normal    Mouth/Throat: Oropharynx is clear and moist    Eyes: Pupils are equal, round, and reactive to light  Conjunctivae and EOM are normal    Neck: Normal range of motion  Neck supple  Cardiovascular: Normal rate, regular rhythm, normal heart sounds and intact distal pulses  Pulses are no weak pulses  Pulses:       Dorsalis pedis pulses are 2+ on the right side, and 2+ on the left side  Posterior tibial pulses are 2+ on the right side, and 2+ on the left side  Pulmonary/Chest: Effort normal and breath sounds normal    Musculoskeletal: Normal range of motion  Feet:   Right Foot:   Skin Integrity: Negative for ulcer, skin breakdown, erythema, warmth, callus or dry skin  Left Foot:   Skin Integrity: Negative for ulcer, skin breakdown, erythema, warmth, callus or dry skin  Neurological: He is alert and oriented to person, place, and time  He has normal reflexes  Skin: Skin is warm and dry  Psychiatric: He has a normal mood and affect  His behavior is normal        Patient's shoes and socks removed  Right Foot/Ankle   Right Foot Inspection  Skin Exam: skin normal and skin intact no dry skin, no warmth, no callus, no erythema, no maceration, no abnormal color, no pre-ulcer, no ulcer and no callus                          Toe Exam: ROM and strength within normal limits  Sensory   Vibration: intact  Proprioception: intact   Monofilament testing: intact  Vascular  Capillary refills: < 3 seconds  The right DP pulse is 2+  The right PT pulse is 2+       Left Foot/Ankle  Left Foot Inspection  Skin Exam: skin normal and skin intactno dry skin, no warmth, no erythema, no maceration, normal color, no pre-ulcer, no ulcer and no callus                         Toe Exam: ROM and strength within normal limits                   Sensory   Vibration: intact  Proprioception: intact  Monofilament: intact  Vascular  Capillary refills: < 3 seconds  The left DP pulse is 2+  The left PT pulse is 2+  Assign Risk Category:  No deformity present; No loss of protective sensation;  No weak pulses       Risk: 0

## 2020-03-05 ENCOUNTER — APPOINTMENT (OUTPATIENT)
Dept: LAB | Facility: CLINIC | Age: 75
End: 2020-03-05
Payer: COMMERCIAL

## 2020-03-05 DIAGNOSIS — I10 ESSENTIAL HYPERTENSION: Chronic | ICD-10-CM

## 2020-03-05 DIAGNOSIS — E78.5 HYPERLIPIDEMIA, UNSPECIFIED HYPERLIPIDEMIA TYPE: Chronic | ICD-10-CM

## 2020-03-05 DIAGNOSIS — E11.9 TYPE 2 DIABETES MELLITUS WITHOUT COMPLICATION, UNSPECIFIED WHETHER LONG TERM INSULIN USE (HCC): Chronic | ICD-10-CM

## 2020-03-05 DIAGNOSIS — E87.6 HYPOKALEMIA: ICD-10-CM

## 2020-03-05 LAB
ALBUMIN SERPL BCP-MCNC: 3.5 G/DL (ref 3.5–5)
ALP SERPL-CCNC: 112 U/L (ref 46–116)
ALT SERPL W P-5'-P-CCNC: 49 U/L (ref 12–78)
ANION GAP SERPL CALCULATED.3IONS-SCNC: 7 MMOL/L (ref 4–13)
AST SERPL W P-5'-P-CCNC: 39 U/L (ref 5–45)
BILIRUB SERPL-MCNC: 0.4 MG/DL (ref 0.2–1)
BUN SERPL-MCNC: 13 MG/DL (ref 5–25)
CALCIUM SERPL-MCNC: 8.9 MG/DL (ref 8.3–10.1)
CHLORIDE SERPL-SCNC: 108 MMOL/L (ref 100–108)
CO2 SERPL-SCNC: 28 MMOL/L (ref 21–32)
CREAT SERPL-MCNC: 0.79 MG/DL (ref 0.6–1.3)
EST. AVERAGE GLUCOSE BLD GHB EST-MCNC: 128 MG/DL
GFR SERPL CREATININE-BSD FRML MDRD: 89 ML/MIN/1.73SQ M
GLUCOSE P FAST SERPL-MCNC: 106 MG/DL (ref 65–99)
HBA1C MFR BLD: 6.1 %
POTASSIUM SERPL-SCNC: 3.3 MMOL/L (ref 3.5–5.3)
PROT SERPL-MCNC: 7.7 G/DL (ref 6.4–8.2)
SODIUM SERPL-SCNC: 143 MMOL/L (ref 136–145)

## 2020-03-05 PROCEDURE — 36415 COLL VENOUS BLD VENIPUNCTURE: CPT | Performed by: FAMILY MEDICINE

## 2020-03-05 PROCEDURE — 83036 HEMOGLOBIN GLYCOSYLATED A1C: CPT | Performed by: FAMILY MEDICINE

## 2020-03-05 PROCEDURE — 3044F HG A1C LEVEL LT 7.0%: CPT | Performed by: PHYSICIAN ASSISTANT

## 2020-03-05 PROCEDURE — 80053 COMPREHEN METABOLIC PANEL: CPT

## 2020-03-10 NOTE — RESULT ENCOUNTER NOTE
Tried calling Pt again with the same response  Will send Pt letter to contact the office for results

## 2020-03-13 DIAGNOSIS — I10 HTN (HYPERTENSION), BENIGN: ICD-10-CM

## 2020-03-13 RX ORDER — METOPROLOL TARTRATE 100 MG/1
100 TABLET ORAL 2 TIMES DAILY
Qty: 60 TABLET | Refills: 4 | Status: SHIPPED | OUTPATIENT
Start: 2020-03-13 | End: 2020-06-04

## 2020-03-13 NOTE — TELEPHONE ENCOUNTER
Patient has not seen Dr Mervat Jean Baptiste in [place]    Can he have a refill of Metoprolol sent to [address]

## 2020-03-19 ENCOUNTER — TELEPHONE (OUTPATIENT)
Dept: FAMILY MEDICINE CLINIC | Facility: CLINIC | Age: 75
End: 2020-03-19

## 2020-03-19 NOTE — TELEPHONE ENCOUNTER
Dr Wills pt was given results,  Pt informed  Of abnormal results  Pt aware sugars stable and also potasium low and recommend potasium rich foods OJ and bananas to help daily  Pt informed re check cmp and HGA1C in 3 months appointment already scheduled  Pt is eating spinach and it eating at least 2 bananas each day

## 2020-03-20 ENCOUNTER — OFFICE VISIT (OUTPATIENT)
Dept: SLEEP CENTER | Facility: CLINIC | Age: 75
End: 2020-03-20
Payer: COMMERCIAL

## 2020-03-20 VITALS
WEIGHT: 227.6 LBS | HEIGHT: 66 IN | HEART RATE: 53 BPM | DIASTOLIC BLOOD PRESSURE: 68 MMHG | SYSTOLIC BLOOD PRESSURE: 118 MMHG | BODY MASS INDEX: 36.58 KG/M2

## 2020-03-20 DIAGNOSIS — E66.01 CLASS 2 SEVERE OBESITY DUE TO EXCESS CALORIES WITH SERIOUS COMORBIDITY AND BODY MASS INDEX (BMI) OF 37.0 TO 37.9 IN ADULT (HCC): ICD-10-CM

## 2020-03-20 DIAGNOSIS — G47.33 OSA (OBSTRUCTIVE SLEEP APNEA): Primary | Chronic | ICD-10-CM

## 2020-03-20 PROCEDURE — 2022F DILAT RTA XM EVC RTNOPTHY: CPT | Performed by: PSYCHIATRY & NEUROLOGY

## 2020-03-20 PROCEDURE — 1160F RVW MEDS BY RX/DR IN RCRD: CPT | Performed by: PSYCHIATRY & NEUROLOGY

## 2020-03-20 PROCEDURE — 3074F SYST BP LT 130 MM HG: CPT | Performed by: PSYCHIATRY & NEUROLOGY

## 2020-03-20 PROCEDURE — 3044F HG A1C LEVEL LT 7.0%: CPT | Performed by: PSYCHIATRY & NEUROLOGY

## 2020-03-20 PROCEDURE — 3078F DIAST BP <80 MM HG: CPT | Performed by: PSYCHIATRY & NEUROLOGY

## 2020-03-20 PROCEDURE — 99214 OFFICE O/P EST MOD 30 MIN: CPT | Performed by: PSYCHIATRY & NEUROLOGY

## 2020-03-20 PROCEDURE — 3008F BODY MASS INDEX DOCD: CPT | Performed by: PSYCHIATRY & NEUROLOGY

## 2020-03-20 PROCEDURE — 1036F TOBACCO NON-USER: CPT | Performed by: PSYCHIATRY & NEUROLOGY

## 2020-03-20 NOTE — PATIENT INSTRUCTIONS
1  Continue AutoPAP using a minimum pressure 5 maximum pressure of 20 cm of water  2  Consult with technician for instruction of changing modifier settings  3  Provide a prescription for new supplies to last over the next 12 months  4  Return in 1 year for routine re-evaluation  5  Contact [facility] if any problems arise prior to that time  Nursing Support:  When: Monday through Friday 7A-5PM except holidays  Where: Our direct line is [phone]  If you are having a true emergency please call 911  In the event that the line is busy or it is after hours please leave a voice message and we will return your call  Please speak clearly, leaving your full name, birth date, best number to reach you and the reason for your call  Medication refills: We will need the name of the medication, the dosage, the ordering provider, whether you get a 30 or 90 day refill, and the pharmacy name and address  Medications will be ordered by the provider only  Nurses cannot call in prescriptions  Please allow 7 days for medication refills  Physician requested updates: If your provider requested that you call with an update after starting medication, please be ready to provide us the medication and dosage, what time you take your medication, the time you attempt to fall asleep, time you fall asleep, when you wake up, and what time you get out of bed  Sleep Study Results: We will contact you with sleep study results and/or next steps after the physician has reviewed your testing

## 2020-03-20 NOTE — PROGRESS NOTES
Review of Systems      Genitourinary difficulty with erection   Cardiology ankle/leg swelling   Gastrointestinal none   Neurology numbness/tingling of an extremity and balance problems   Constitutional claustrophobia and weight change   Integumentary rash or dry skin   Psychiatry none   Musculoskeletal joint pain and back pain   Pulmonary none   ENT throat clearing and ringing in ears   Endocrine none   Hematological none

## 2020-03-20 NOTE — PROGRESS NOTES
Progress Note - [address]  69 y o  male   :1945, MRN: [id number]  3/20/2020          Follow Up Evaluation / Problem:     Obstructive Sleep Apnea  Obesity      Thank you for the opportunity of participating in the evaluation and care of this patient in the Sleep Clinic at [facility]  HPI: Fang Mancini  is a 69y o  year old male  He has a history of obstructive sleep apnea has been using an AutoPAP with good results  Current Outpatient Medications:     acetaminophen (TYLENOL) 325 mg tablet, Take 2 tablets (650 mg total) by mouth every 6 (six) hours as needed for mild pain, Disp: , Rfl:     amLODIPine (NORVASC) 10 mg tablet, TAKE 1 TABLET BY MOUTH TWICE A DAY, Disp: 180 tablet, Rfl: 2    aspirin 81 mg chewable tablet, Chew 1 tablet daily, Disp: 30 tablet, Rfl: 0    atorvastatin (LIPITOR) 80 mg tablet, TAKE 1 TABLET BY MOUTH EVERY DAY, Disp: 90 tablet, Rfl: 5    B-D ULTRAFINE III SHORT PEN 31G X 8 MM MISC, USE AS DIRECTED , Disp: 100 each, Rfl: 3    B-D ULTRAFINE III SHORT PEN 31G X 8 MM MISC, USE AS DIRECTED , Disp: 100 each, Rfl: 5    finasteride (PROSCAR) 5 mg tablet, Take 5 mg by mouth daily, Disp: , Rfl:     insulin glargine (LANTUS) 100 units/mL subcutaneous injection, Inject 28 Units under the skin daily at bedtime Dx: Diabetes E11 9, dispense pens, Disp: 10 mL, Rfl: 0    insulin lispro (HumaLOG) 100 units/mL injection, Inject 8 Units under the skin 3 (three) times a day before meals Dx: Diabetes E11 9  Dispense pens (Patient taking differently: Inject 8 Units under the skin 2 (two) times a day before meals Dx: Diabetes E11 9   Dispense pens Before breakfast and dinner ), Disp: 8 mL, Rfl: 0    lisinopril-hydrochlorothiazide (PRINZIDE,ZESTORETIC) 20-12 5 MG per tablet, Take 1 tablet by mouth daily, Disp: 90 tablet, Rfl: 4    metoprolol tartrate (LOPRESSOR) 100 mg tablet, Take 1 tablet (100 mg total) by mouth 2 (two) times a day, Disp: 60 tablet, Rfl: 4    HUMALOG KWIKPEN 100 units/mL injection pen, INJECT 8 UNIT 3 TIMES DAILY BEFORE MEALS (Patient not taking: Reported on 3/4/2020), Disp: 15 pen, Rfl: 5    LANTUS SOLOSTAR 100 units/mL injection pen, INJECT 28 UNIT BEDTIME (Patient not taking: Reported on 9/5/2019), Disp: 15 pen, Rfl: 5    [place] Sleepiness Scale  Sitting and reading: Slight chance of dozing  Watching TV: Slight chance of dozing  Sitting, inactive in a public place (e g  a theatre or a meeting): Would never doze  As a passenger in a car for an hour without a break: Slight chance of dozing  Lying down to rest in the afternoon when circumstances permit: Would never doze  Sitting and talking to someone: Would never doze  Sitting quietly after a lunch without alcohol: Slight chance of dozing  In a car, while stopped for a few minutes in traffic: Would never doze  Total score: 4              Vitals:    03/20/20 1000   BP: 118/68   Pulse: (!) 53   Weight: 103 kg (227 lb 9 6 oz)   Height: 5' 6" (1 676 m)       Body mass index is 36 74 kg/m²  Neck Circumference: 19       EPWORTH SLEEPINESS SCORE  Total score: 4      Past History Since Last Sleep Center Visit:     He is currently using an AutoPAP to deliver a minimum pressure of 5 and maximum pressure of 20 cm of water  He is tolerating pressure quite well  He finds that he occasionally has a very small week at the corner of his mouth  Average leak time per day is only 2 minutes and 54 seconds  He is complaining of a dry mouth  We talked about changing his humidifier setting, however, he tells me that he is uncertain as to how to do this      The review of systems and following portions of the patient's history were reviewed and updated as appropriate: allergies, current medications, past family history, past medical history, past social history, past surgical history, and problem list         OBJECTIVE    PAP Pressure: Nasal AutoPAP using a lower limit of 5 cm and an upper limit of 20 cm of water pressure  Type of mask used: full face  DME Provider: [employer]        Physical Exam:     General Appearance:   Alert, cooperative, no distress, appears stated age, obese     Head:   Normocephalic, without obvious abnormality, atraumatic     Eyes:   PERRL, conjunctiva/corneas clear, EOM's intact          Nose:  Nares normal, septum midline, no drainage or sinus tenderness     Neck:  Supple, symmetrical, trachea midline, no adenopathy; Thyroid: No enlargement, tenderness or nodules; no carotid bruit or JVD     Lungs:      Clear to auscultation bilaterally, respirations unlabored     Heart:   Regular rate and rhythm, S1 and S2 normal, no murmur, rub or gallop       Extremities:  Extremities normal, atraumatic, no cyanosis or edema     Pulses:  2+ and symmetric all extremities     Skin:  Skin color, texture, turgor normal, no rashes or lesions       Neurologic:  CNII-XII intact  Normal strength, sensation throughout       ASSESSMENT / PLAN    1  KARIN (obstructive sleep apnea)     2  Class 2 severe obesity due to excess calories with serious comorbidity and body mass index (BMI) of 37 0 to 37 9 in adult [facility])             Counseling / Coordination of Care  Total clinic time spent today 40 minutes  Greater than 50% of total time was spent with the patient and / or family counseling and / or coordination of care  A description of the counseling / coordination of care:     Impressions, Diagnostic results, Prognosis, Instructions for management, Patient and family education and Importance of compliance with treatment    The following instructions have been given to the patient today:    Patient Instructions   1  Continue AutoPAP using a minimum pressure 5 maximum pressure of 20 cm of water  2  Consult with technician for instruction of changing modifier settings  3   Provide a prescription for new supplies to last over the next 12 months  4  Return in 1 year for routine re-evaluation  5  Contact [facility] if any problems arise prior to that time  Nursing Support:  When: Monday through Friday 7A-5PM except holidays  Where: Our direct line is [phone]  If you are having a true emergency please call 911  In the event that the line is busy or it is after hours please leave a voice message and we will return your call  Please speak clearly, leaving your full name, birth date, best number to reach you and the reason for your call  Medication refills: We will need the name of the medication, the dosage, the ordering provider, whether you get a 30 or 90 day refill, and the pharmacy name and address  Medications will be ordered by the provider only  Nurses cannot call in prescriptions  Please allow 7 days for medication refills  Physician requested updates: If your provider requested that you call with an update after starting medication, please be ready to provide us the medication and dosage, what time you take your medication, the time you attempt to fall asleep, time you fall asleep, when you wake up, and what time you get out of bed  Sleep Study Results: We will contact you with sleep study results and/or next steps after the physician has reviewed your testing            Gilbert Linder

## 2020-04-01 ENCOUNTER — APPOINTMENT (OUTPATIENT)
Dept: LAB | Facility: CLINIC | Age: 75
End: 2020-04-01
Payer: COMMERCIAL

## 2020-04-01 DIAGNOSIS — Z98.890 S/P ASCENDING AORTIC ANEURYSM REPAIR: Chronic | ICD-10-CM

## 2020-04-01 DIAGNOSIS — Z86.79 H/O REPAIR OF DISSECTING ANEURYSM OF DESCENDING THORACIC AORTA: Chronic | ICD-10-CM

## 2020-04-01 DIAGNOSIS — Z86.79 S/P ASCENDING AORTIC ANEURYSM REPAIR: Chronic | ICD-10-CM

## 2020-04-01 DIAGNOSIS — I71.01 CHRONIC THORACIC AORTIC DISSECTION (HCC): ICD-10-CM

## 2020-04-01 DIAGNOSIS — Z98.890 H/O REPAIR OF DISSECTING ANEURYSM OF DESCENDING THORACIC AORTA: Chronic | ICD-10-CM

## 2020-04-01 LAB
BUN SERPL-MCNC: 11 MG/DL (ref 5–25)
CREAT SERPL-MCNC: 0.79 MG/DL (ref 0.6–1.3)
GFR SERPL CREATININE-BSD FRML MDRD: 89 ML/MIN/1.73SQ M

## 2020-04-01 PROCEDURE — 84520 ASSAY OF UREA NITROGEN: CPT

## 2020-04-01 PROCEDURE — 36415 COLL VENOUS BLD VENIPUNCTURE: CPT

## 2020-04-01 PROCEDURE — 82565 ASSAY OF CREATININE: CPT

## 2020-04-06 ENCOUNTER — HOSPITAL ENCOUNTER (OUTPATIENT)
Dept: CT IMAGING | Facility: HOSPITAL | Age: 75
Discharge: HOME | End: 2020-04-06
Payer: COMMERCIAL

## 2020-04-06 DIAGNOSIS — Z98.890 H/O REPAIR OF DISSECTING ANEURYSM OF DESCENDING THORACIC AORTA: ICD-10-CM

## 2020-04-06 DIAGNOSIS — Z86.79 H/O REPAIR OF DISSECTING ANEURYSM OF DESCENDING THORACIC AORTA: ICD-10-CM

## 2020-04-06 DIAGNOSIS — Z98.890 H/O REPAIR OF DISSECTING ANEURYSM OF DESCENDING THORACIC AORTA: Chronic | ICD-10-CM

## 2020-04-06 DIAGNOSIS — Z98.890 S/P ASCENDING AORTIC ANEURYSM REPAIR: ICD-10-CM

## 2020-04-06 DIAGNOSIS — Z86.79 H/O REPAIR OF DISSECTING ANEURYSM OF DESCENDING THORACIC AORTA: Chronic | ICD-10-CM

## 2020-04-06 DIAGNOSIS — I71.01 CHRONIC THORACIC AORTIC DISSECTION (HCC): ICD-10-CM

## 2020-04-06 DIAGNOSIS — Z86.79 S/P ASCENDING AORTIC ANEURYSM REPAIR: ICD-10-CM

## 2020-04-06 PROCEDURE — 71275 CT ANGIOGRAPHY CHEST: CPT

## 2020-04-06 PROCEDURE — 74174 CTA ABD&PLVS W/CONTRAST: CPT

## 2020-04-06 RX ADMIN — IOHEXOL 100 ML: 350 INJECTION, SOLUTION INTRAVENOUS at 14:40

## 2020-04-17 ENCOUNTER — TELEMEDICINE (OUTPATIENT)
Dept: CARDIAC SURGERY | Facility: CLINIC | Age: 75
End: 2020-04-17
Payer: COMMERCIAL

## 2020-04-17 DIAGNOSIS — I71.01 CHRONIC THORACIC AORTIC DISSECTION (HCC): Primary | ICD-10-CM

## 2020-04-17 PROCEDURE — 1160F RVW MEDS BY RX/DR IN RCRD: CPT | Performed by: THORACIC SURGERY (CARDIOTHORACIC VASCULAR SURGERY)

## 2020-04-17 PROCEDURE — 99213 OFFICE O/P EST LOW 20 MIN: CPT | Performed by: THORACIC SURGERY (CARDIOTHORACIC VASCULAR SURGERY)

## 2020-06-01 DIAGNOSIS — E11.9 TYPE 2 DIABETES MELLITUS WITHOUT COMPLICATION (HCC): ICD-10-CM

## 2020-06-03 ENCOUNTER — HOSPITAL ENCOUNTER (OUTPATIENT)
Dept: NON INVASIVE DIAGNOSTICS | Facility: CLINIC | Age: 75
Discharge: HOME | End: 2020-06-03
Payer: COMMERCIAL

## 2020-06-03 DIAGNOSIS — Z95.828 S/P VASCULAR BYPASS: Chronic | ICD-10-CM

## 2020-06-03 DIAGNOSIS — I71.01 CHRONIC THORACIC AORTIC DISSECTION (HCC): ICD-10-CM

## 2020-06-03 DIAGNOSIS — Z86.79 H/O REPAIR OF DISSECTING ANEURYSM OF DESCENDING THORACIC AORTA: Chronic | ICD-10-CM

## 2020-06-03 DIAGNOSIS — Z98.890 H/O REPAIR OF DISSECTING ANEURYSM OF DESCENDING THORACIC AORTA: Chronic | ICD-10-CM

## 2020-06-03 PROCEDURE — 93880 EXTRACRANIAL BILAT STUDY: CPT

## 2020-06-03 PROCEDURE — 93880 EXTRACRANIAL BILAT STUDY: CPT | Performed by: SURGERY

## 2020-06-04 DIAGNOSIS — I10 HTN (HYPERTENSION), BENIGN: ICD-10-CM

## 2020-06-04 RX ORDER — METOPROLOL TARTRATE 100 MG/1
TABLET ORAL
Qty: 180 TABLET | Refills: 1 | Status: SHIPPED | OUTPATIENT
Start: 2020-06-04 | End: 2020-12-15 | Stop reason: SDUPTHER

## 2020-06-10 ENCOUNTER — OFFICE VISIT (OUTPATIENT)
Dept: FAMILY MEDICINE CLINIC | Facility: CLINIC | Age: 75
End: 2020-06-10
Payer: COMMERCIAL

## 2020-06-10 VITALS
SYSTOLIC BLOOD PRESSURE: 118 MMHG | WEIGHT: 224.6 LBS | RESPIRATION RATE: 16 BRPM | DIASTOLIC BLOOD PRESSURE: 70 MMHG | OXYGEN SATURATION: 98 % | TEMPERATURE: 98.5 F | BODY MASS INDEX: 36.1 KG/M2 | HEIGHT: 66 IN | HEART RATE: 80 BPM

## 2020-06-10 DIAGNOSIS — E78.5 HYPERLIPIDEMIA, UNSPECIFIED HYPERLIPIDEMIA TYPE: Chronic | ICD-10-CM

## 2020-06-10 DIAGNOSIS — I10 ESSENTIAL HYPERTENSION: Chronic | ICD-10-CM

## 2020-06-10 DIAGNOSIS — C61 MALIGNANT NEOPLASM OF PROSTATE (HCC): ICD-10-CM

## 2020-06-10 DIAGNOSIS — IMO0002 UNCONTROLLED TYPE 2 DIABETES MELLITUS WITH COMPLICATION: Primary | ICD-10-CM

## 2020-06-10 PROCEDURE — 1101F PT FALLS ASSESS-DOCD LE1/YR: CPT | Performed by: FAMILY MEDICINE

## 2020-06-10 PROCEDURE — 3074F SYST BP LT 130 MM HG: CPT | Performed by: FAMILY MEDICINE

## 2020-06-10 PROCEDURE — 1036F TOBACCO NON-USER: CPT | Performed by: FAMILY MEDICINE

## 2020-06-10 PROCEDURE — 3078F DIAST BP <80 MM HG: CPT | Performed by: FAMILY MEDICINE

## 2020-06-10 PROCEDURE — 1160F RVW MEDS BY RX/DR IN RCRD: CPT | Performed by: FAMILY MEDICINE

## 2020-06-10 PROCEDURE — 3008F BODY MASS INDEX DOCD: CPT | Performed by: FAMILY MEDICINE

## 2020-06-10 PROCEDURE — 2022F DILAT RTA XM EVC RTNOPTHY: CPT | Performed by: FAMILY MEDICINE

## 2020-06-10 PROCEDURE — 3288F FALL RISK ASSESSMENT DOCD: CPT | Performed by: FAMILY MEDICINE

## 2020-06-10 PROCEDURE — 3044F HG A1C LEVEL LT 7.0%: CPT | Performed by: FAMILY MEDICINE

## 2020-06-10 PROCEDURE — 99214 OFFICE O/P EST MOD 30 MIN: CPT | Performed by: FAMILY MEDICINE

## 2020-06-15 ENCOUNTER — TELEPHONE (OUTPATIENT)
Dept: ADMINISTRATIVE | Facility: HOSPITAL | Age: 75
End: 2020-06-15

## 2020-06-16 ENCOUNTER — OFFICE VISIT (OUTPATIENT)
Dept: VASCULAR SURGERY | Facility: CLINIC | Age: 75
End: 2020-06-16
Payer: COMMERCIAL

## 2020-06-16 VITALS
HEIGHT: 66 IN | TEMPERATURE: 98.1 F | DIASTOLIC BLOOD PRESSURE: 86 MMHG | WEIGHT: 227 LBS | HEART RATE: 86 BPM | SYSTOLIC BLOOD PRESSURE: 136 MMHG | BODY MASS INDEX: 36.48 KG/M2

## 2020-06-16 DIAGNOSIS — E11.9 TYPE 2 DIABETES MELLITUS WITHOUT COMPLICATION, UNSPECIFIED WHETHER LONG TERM INSULIN USE (HCC): Chronic | ICD-10-CM

## 2020-06-16 DIAGNOSIS — I71.2 AORTIC ARCH ANEURYSM (HCC): ICD-10-CM

## 2020-06-16 DIAGNOSIS — I71.01 CHRONIC THORACIC AORTIC DISSECTION (HCC): Primary | ICD-10-CM

## 2020-06-16 DIAGNOSIS — Z86.79 S/P ASCENDING AORTIC ANEURYSM REPAIR: Chronic | ICD-10-CM

## 2020-06-16 DIAGNOSIS — Z86.79 H/O REPAIR OF DISSECTING ANEURYSM OF DESCENDING THORACIC AORTA: Chronic | ICD-10-CM

## 2020-06-16 DIAGNOSIS — Z95.828 S/P VASCULAR BYPASS: Chronic | ICD-10-CM

## 2020-06-16 DIAGNOSIS — E78.5 HYPERLIPIDEMIA, UNSPECIFIED HYPERLIPIDEMIA TYPE: Chronic | ICD-10-CM

## 2020-06-16 DIAGNOSIS — I48.0 PAROXYSMAL ATRIAL FIBRILLATION (HCC): Chronic | ICD-10-CM

## 2020-06-16 DIAGNOSIS — Z98.890 S/P ASCENDING AORTIC ANEURYSM REPAIR: Chronic | ICD-10-CM

## 2020-06-16 DIAGNOSIS — I10 ESSENTIAL HYPERTENSION: Chronic | ICD-10-CM

## 2020-06-16 DIAGNOSIS — Z98.890 H/O REPAIR OF DISSECTING ANEURYSM OF DESCENDING THORACIC AORTA: Chronic | ICD-10-CM

## 2020-06-16 PROCEDURE — 1160F RVW MEDS BY RX/DR IN RCRD: CPT | Performed by: PHYSICIAN ASSISTANT

## 2020-06-16 PROCEDURE — 1036F TOBACCO NON-USER: CPT | Performed by: PHYSICIAN ASSISTANT

## 2020-06-16 PROCEDURE — 3075F SYST BP GE 130 - 139MM HG: CPT | Performed by: PHYSICIAN ASSISTANT

## 2020-06-16 PROCEDURE — 99214 OFFICE O/P EST MOD 30 MIN: CPT | Performed by: PHYSICIAN ASSISTANT

## 2020-06-16 PROCEDURE — 2022F DILAT RTA XM EVC RTNOPTHY: CPT | Performed by: PHYSICIAN ASSISTANT

## 2020-06-16 PROCEDURE — 3079F DIAST BP 80-89 MM HG: CPT | Performed by: PHYSICIAN ASSISTANT

## 2020-06-16 PROCEDURE — 3008F BODY MASS INDEX DOCD: CPT | Performed by: PHYSICIAN ASSISTANT

## 2020-06-16 PROCEDURE — 3044F HG A1C LEVEL LT 7.0%: CPT | Performed by: PHYSICIAN ASSISTANT

## 2020-06-16 RX ORDER — DOCUSATE SODIUM 100 MG/1
100 CAPSULE, LIQUID FILLED ORAL 2 TIMES DAILY
COMMUNITY
End: 2021-12-28

## 2020-07-14 DIAGNOSIS — I10 HTN (HYPERTENSION), BENIGN: ICD-10-CM

## 2020-07-14 RX ORDER — LISINOPRIL AND HYDROCHLOROTHIAZIDE 20; 12.5 MG/1; MG/1
TABLET ORAL
Qty: 180 TABLET | Refills: 3 | Status: SHIPPED | OUTPATIENT
Start: 2020-07-14 | End: 2021-07-19

## 2020-07-29 LAB
LEFT EYE DIABETIC RETINOPATHY: NORMAL
RIGHT EYE DIABETIC RETINOPATHY: NORMAL

## 2020-08-07 DIAGNOSIS — I10 HTN (HYPERTENSION), BENIGN: ICD-10-CM

## 2020-08-10 DIAGNOSIS — E11.9 TYPE 2 DIABETES MELLITUS WITHOUT COMPLICATION, UNSPECIFIED WHETHER LONG TERM INSULIN USE (HCC): ICD-10-CM

## 2020-08-10 DIAGNOSIS — E11.9 TYPE 2 DIABETES MELLITUS WITHOUT COMPLICATION (HCC): ICD-10-CM

## 2020-08-10 RX ORDER — PEN NEEDLE, DIABETIC 31 GX5/16"
NEEDLE, DISPOSABLE MISCELLANEOUS
Qty: 100 EACH | Refills: 0 | Status: SHIPPED | OUTPATIENT
Start: 2020-08-10 | End: 2020-10-22 | Stop reason: SDUPTHER

## 2020-08-10 RX ORDER — INSULIN GLARGINE 100 [IU]/ML
INJECTION, SOLUTION SUBCUTANEOUS
Qty: 5 PEN | Refills: 17 | Status: SHIPPED | OUTPATIENT
Start: 2020-08-10 | End: 2021-11-01

## 2020-08-11 RX ORDER — AMLODIPINE BESYLATE 10 MG/1
10 TABLET ORAL 2 TIMES DAILY
Qty: 180 TABLET | Refills: 2 | OUTPATIENT
Start: 2020-08-11

## 2020-08-11 NOTE — TELEPHONE ENCOUNTER
Pt needs appt  Rejected amlodipine refill request   Plz let pt know to get from PCP if he doesn't plan to f/u     Thx    RP

## 2020-08-17 DIAGNOSIS — I10 HTN (HYPERTENSION), BENIGN: ICD-10-CM

## 2020-08-17 RX ORDER — AMLODIPINE BESYLATE 10 MG/1
10 TABLET ORAL 2 TIMES DAILY
Qty: 180 TABLET | Refills: 2 | Status: SHIPPED | OUTPATIENT
Start: 2020-08-17 | End: 2021-05-17 | Stop reason: SDUPTHER

## 2020-10-22 DIAGNOSIS — E11.9 TYPE 2 DIABETES MELLITUS WITHOUT COMPLICATION (HCC): ICD-10-CM

## 2020-10-22 RX ORDER — PEN NEEDLE, DIABETIC 31 GX5/16"
NEEDLE, DISPOSABLE MISCELLANEOUS
Qty: 100 EACH | Refills: 5 | Status: SHIPPED | OUTPATIENT
Start: 2020-10-22 | End: 2021-10-18

## 2020-10-28 ENCOUNTER — TELEPHONE (OUTPATIENT)
Dept: FAMILY MEDICINE CLINIC | Facility: CLINIC | Age: 75
End: 2020-10-28

## 2020-10-28 DIAGNOSIS — E11.9 TYPE 2 DIABETES MELLITUS WITHOUT COMPLICATION, UNSPECIFIED WHETHER LONG TERM INSULIN USE (HCC): Primary | Chronic | ICD-10-CM

## 2020-11-02 ENCOUNTER — APPOINTMENT (OUTPATIENT)
Dept: LAB | Facility: CLINIC | Age: 75
End: 2020-11-02
Payer: COMMERCIAL

## 2020-11-02 ENCOUNTER — OFFICE VISIT (OUTPATIENT)
Dept: FAMILY MEDICINE CLINIC | Facility: CLINIC | Age: 75
End: 2020-11-02
Payer: COMMERCIAL

## 2020-11-02 VITALS
BODY MASS INDEX: 37.61 KG/M2 | OXYGEN SATURATION: 98 % | HEIGHT: 66 IN | DIASTOLIC BLOOD PRESSURE: 70 MMHG | HEART RATE: 70 BPM | SYSTOLIC BLOOD PRESSURE: 132 MMHG | TEMPERATURE: 97.3 F | WEIGHT: 234 LBS

## 2020-11-02 DIAGNOSIS — Z23 FLU VACCINE NEED: Primary | ICD-10-CM

## 2020-11-02 DIAGNOSIS — I10 ESSENTIAL HYPERTENSION: Chronic | ICD-10-CM

## 2020-11-02 DIAGNOSIS — C61 PROSTATE CANCER (HCC): Chronic | ICD-10-CM

## 2020-11-02 DIAGNOSIS — E11.9 TYPE 2 DIABETES MELLITUS WITHOUT COMPLICATION, UNSPECIFIED WHETHER LONG TERM INSULIN USE (HCC): ICD-10-CM

## 2020-11-02 DIAGNOSIS — E66.9 OBESITY (BMI 30-39.9): ICD-10-CM

## 2020-11-02 DIAGNOSIS — IMO0002 UNCONTROLLED TYPE 2 DIABETES MELLITUS WITH COMPLICATION: ICD-10-CM

## 2020-11-02 DIAGNOSIS — E78.5 HYPERLIPIDEMIA, UNSPECIFIED HYPERLIPIDEMIA TYPE: Chronic | ICD-10-CM

## 2020-11-02 DIAGNOSIS — I48.0 PAROXYSMAL ATRIAL FIBRILLATION (HCC): Chronic | ICD-10-CM

## 2020-11-02 LAB
ALBUMIN SERPL BCP-MCNC: 3.8 G/DL (ref 3.5–5)
ALP SERPL-CCNC: 111 U/L (ref 46–116)
ALT SERPL W P-5'-P-CCNC: 58 U/L (ref 12–78)
ANION GAP SERPL CALCULATED.3IONS-SCNC: 2 MMOL/L (ref 4–13)
AST SERPL W P-5'-P-CCNC: 34 U/L (ref 5–45)
BILIRUB SERPL-MCNC: 0.63 MG/DL (ref 0.2–1)
BUN SERPL-MCNC: 11 MG/DL (ref 5–25)
CALCIUM SERPL-MCNC: 9.3 MG/DL (ref 8.3–10.1)
CHLORIDE SERPL-SCNC: 107 MMOL/L (ref 100–108)
CO2 SERPL-SCNC: 32 MMOL/L (ref 21–32)
CREAT SERPL-MCNC: 0.77 MG/DL (ref 0.6–1.3)
EST. AVERAGE GLUCOSE BLD GHB EST-MCNC: 148 MG/DL
GFR SERPL CREATININE-BSD FRML MDRD: 89 ML/MIN/1.73SQ M
GLUCOSE P FAST SERPL-MCNC: 105 MG/DL (ref 65–99)
HBA1C MFR BLD: 6.8 %
POTASSIUM SERPL-SCNC: 3.6 MMOL/L (ref 3.5–5.3)
PROT SERPL-MCNC: 8.2 G/DL (ref 6.4–8.2)
SODIUM SERPL-SCNC: 141 MMOL/L (ref 136–145)

## 2020-11-02 PROCEDURE — 3008F BODY MASS INDEX DOCD: CPT | Performed by: FAMILY MEDICINE

## 2020-11-02 PROCEDURE — 83036 HEMOGLOBIN GLYCOSYLATED A1C: CPT | Performed by: FAMILY MEDICINE

## 2020-11-02 PROCEDURE — G0439 PPPS, SUBSEQ VISIT: HCPCS | Performed by: FAMILY MEDICINE

## 2020-11-02 PROCEDURE — 3725F SCREEN DEPRESSION PERFORMED: CPT | Performed by: FAMILY MEDICINE

## 2020-11-02 PROCEDURE — 99214 OFFICE O/P EST MOD 30 MIN: CPT | Performed by: FAMILY MEDICINE

## 2020-11-02 PROCEDURE — 80053 COMPREHEN METABOLIC PANEL: CPT

## 2020-11-02 PROCEDURE — 1170F FXNL STATUS ASSESSED: CPT | Performed by: FAMILY MEDICINE

## 2020-11-02 PROCEDURE — 3075F SYST BP GE 130 - 139MM HG: CPT | Performed by: FAMILY MEDICINE

## 2020-11-02 PROCEDURE — 1125F AMNT PAIN NOTED PAIN PRSNT: CPT | Performed by: FAMILY MEDICINE

## 2020-11-02 PROCEDURE — G0008 ADMIN INFLUENZA VIRUS VAC: HCPCS

## 2020-11-02 PROCEDURE — 36415 COLL VENOUS BLD VENIPUNCTURE: CPT | Performed by: FAMILY MEDICINE

## 2020-11-02 PROCEDURE — 3044F HG A1C LEVEL LT 7.0%: CPT | Performed by: FAMILY MEDICINE

## 2020-11-02 PROCEDURE — 1036F TOBACCO NON-USER: CPT | Performed by: FAMILY MEDICINE

## 2020-11-02 PROCEDURE — 1160F RVW MEDS BY RX/DR IN RCRD: CPT | Performed by: FAMILY MEDICINE

## 2020-11-02 PROCEDURE — 3078F DIAST BP <80 MM HG: CPT | Performed by: FAMILY MEDICINE

## 2020-11-02 PROCEDURE — 90662 IIV NO PRSV INCREASED AG IM: CPT

## 2020-11-05 DIAGNOSIS — E78.5 DYSLIPIDEMIA: ICD-10-CM

## 2020-11-05 RX ORDER — ATORVASTATIN CALCIUM 80 MG/1
TABLET, FILM COATED ORAL
Qty: 90 TABLET | Refills: 5 | Status: SHIPPED | OUTPATIENT
Start: 2020-11-05 | End: 2021-11-17

## 2020-11-13 ENCOUNTER — TELEPHONE (OUTPATIENT)
Dept: FAMILY MEDICINE CLINIC | Facility: CLINIC | Age: 75
End: 2020-11-13

## 2020-12-15 DIAGNOSIS — I10 HTN (HYPERTENSION), BENIGN: ICD-10-CM

## 2020-12-15 RX ORDER — METOPROLOL TARTRATE 100 MG/1
100 TABLET ORAL 2 TIMES DAILY
Qty: 180 TABLET | Refills: 1 | Status: SHIPPED | OUTPATIENT
Start: 2020-12-15 | End: 2021-06-10

## 2021-02-08 ENCOUNTER — OFFICE VISIT (OUTPATIENT)
Dept: FAMILY MEDICINE CLINIC | Facility: CLINIC | Age: 76
End: 2021-02-08
Payer: COMMERCIAL

## 2021-02-08 VITALS
SYSTOLIC BLOOD PRESSURE: 122 MMHG | OXYGEN SATURATION: 98 % | DIASTOLIC BLOOD PRESSURE: 78 MMHG | HEIGHT: 64 IN | BODY MASS INDEX: 40.26 KG/M2 | WEIGHT: 235.8 LBS | HEART RATE: 84 BPM | TEMPERATURE: 97.6 F

## 2021-02-08 DIAGNOSIS — E78.5 HYPERLIPIDEMIA, UNSPECIFIED HYPERLIPIDEMIA TYPE: Chronic | ICD-10-CM

## 2021-02-08 DIAGNOSIS — I48.0 PAROXYSMAL ATRIAL FIBRILLATION (HCC): Chronic | ICD-10-CM

## 2021-02-08 DIAGNOSIS — E11.9 TYPE 2 DIABETES MELLITUS WITHOUT COMPLICATION, UNSPECIFIED WHETHER LONG TERM INSULIN USE (HCC): Primary | Chronic | ICD-10-CM

## 2021-02-08 DIAGNOSIS — Z12.5 SCREENING FOR PROSTATE CANCER: ICD-10-CM

## 2021-02-08 DIAGNOSIS — I10 ESSENTIAL HYPERTENSION: Chronic | ICD-10-CM

## 2021-02-08 PROCEDURE — 3078F DIAST BP <80 MM HG: CPT | Performed by: FAMILY MEDICINE

## 2021-02-08 PROCEDURE — 3074F SYST BP LT 130 MM HG: CPT | Performed by: FAMILY MEDICINE

## 2021-02-08 PROCEDURE — 3008F BODY MASS INDEX DOCD: CPT | Performed by: FAMILY MEDICINE

## 2021-02-08 PROCEDURE — 1036F TOBACCO NON-USER: CPT | Performed by: FAMILY MEDICINE

## 2021-02-08 PROCEDURE — 99214 OFFICE O/P EST MOD 30 MIN: CPT | Performed by: FAMILY MEDICINE

## 2021-02-08 PROCEDURE — 1160F RVW MEDS BY RX/DR IN RCRD: CPT | Performed by: FAMILY MEDICINE

## 2021-02-08 NOTE — PROGRESS NOTES
Assessment/Plan:  Chief Complaint   Patient presents with    Follow-up     14 wk follow up      Patient Instructions   Follow-up (14 wk follow up ), elevate legs to help with lower extremity edema, possibly from amlodipine, elevate legs as directed  Get covid vaccination when available  HTN stable  Low cholesterol diet and low sugar diet encouraged and f-up with urology for hx of prostate cancer  No problem-specific Assessment & Plan notes found for this encounter  Diagnoses and all orders for this visit:    Essential hypertension    Hyperlipidemia, unspecified hyperlipidemia type  -     Comprehensive metabolic panel; Future  -     Lipid Panel with Direct LDL reflex; Future    Type 2 diabetes mellitus without complication, unspecified whether long term insulin use (HCC)  -     Comprehensive metabolic panel; Future  -     Hemoglobin A1C  -     Microalbumin / creatinine urine ratio    Paroxysmal atrial fibrillation (HCC)    Screening for prostate cancer    Other orders  -     Cancel: Occult Blood, Fecal Immunochemical; Future          Subjective:      Patient ID: Jenny Starr  is a 77 y o  male  Follow-up (14 wk follow up )      The following portions of the patient's history were reviewed and updated as appropriate: allergies, current medications, past family history, past medical history, past social history, past surgical history and problem list     Review of Systems   Constitutional:        Obesity   HENT: Negative  Eyes: Negative  Respiratory: Negative  Cardiovascular: Negative  Gastrointestinal: Negative  Endocrine: Negative  Genitourinary: Negative  Musculoskeletal: Negative  Skin: Negative  Allergic/Immunologic: Negative  Neurological: Negative  Hematological: Negative  Psychiatric/Behavioral: Negative            Objective:      /78 (BP Location: Left arm, Patient Position: Sitting, Cuff Size: Large)   Pulse 84   Temp 97 6 °F (36 4 °C) (Temporal)  5' 4" (1 626 m)   Wt 107 kg (235 lb 12 8 oz)   SpO2 98%   BMI 40 47 kg/m²          Physical Exam  Constitutional:       Appearance: He is well-developed  Comments: obesity   HENT:      Head: Normocephalic and atraumatic  Right Ear: External ear normal       Left Ear: External ear normal       Nose: Nose normal    Eyes:      Conjunctiva/sclera: Conjunctivae normal       Pupils: Pupils are equal, round, and reactive to light  Neck:      Musculoskeletal: Normal range of motion and neck supple  Cardiovascular:      Rate and Rhythm: Normal rate and regular rhythm  Heart sounds: Normal heart sounds  Pulmonary:      Effort: Pulmonary effort is normal       Breath sounds: Normal breath sounds  Musculoskeletal: Normal range of motion  Skin:     General: Skin is warm and dry  Neurological:      Mental Status: He is alert and oriented to person, place, and time  Deep Tendon Reflexes: Reflexes are normal and symmetric     Psychiatric:         Behavior: Behavior normal

## 2021-02-22 ENCOUNTER — APPOINTMENT (OUTPATIENT)
Dept: LAB | Facility: CLINIC | Age: 76
End: 2021-02-22
Payer: COMMERCIAL

## 2021-02-22 DIAGNOSIS — E11.9 TYPE 2 DIABETES MELLITUS WITHOUT COMPLICATION, UNSPECIFIED WHETHER LONG TERM INSULIN USE (HCC): Chronic | ICD-10-CM

## 2021-02-22 DIAGNOSIS — E78.5 HYPERLIPIDEMIA, UNSPECIFIED HYPERLIPIDEMIA TYPE: Chronic | ICD-10-CM

## 2021-02-22 LAB
ALBUMIN SERPL BCP-MCNC: 3.8 G/DL (ref 3.5–5)
ALP SERPL-CCNC: 96 U/L (ref 46–116)
ALT SERPL W P-5'-P-CCNC: 35 U/L (ref 12–78)
ANION GAP SERPL CALCULATED.3IONS-SCNC: 3 MMOL/L (ref 4–13)
AST SERPL W P-5'-P-CCNC: 30 U/L (ref 5–45)
BILIRUB SERPL-MCNC: 0.72 MG/DL (ref 0.2–1)
BUN SERPL-MCNC: 12 MG/DL (ref 5–25)
CALCIUM SERPL-MCNC: 9.2 MG/DL (ref 8.3–10.1)
CHLORIDE SERPL-SCNC: 100 MMOL/L (ref 100–108)
CHOLEST SERPL-MCNC: 90 MG/DL (ref 50–200)
CO2 SERPL-SCNC: 33 MMOL/L (ref 21–32)
CREAT SERPL-MCNC: 0.87 MG/DL (ref 0.6–1.3)
CREAT UR-MCNC: 218 MG/DL
EST. AVERAGE GLUCOSE BLD GHB EST-MCNC: 160 MG/DL
GFR SERPL CREATININE-BSD FRML MDRD: 85 ML/MIN/1.73SQ M
GLUCOSE P FAST SERPL-MCNC: 140 MG/DL (ref 65–99)
HBA1C MFR BLD: 7.2 %
HDLC SERPL-MCNC: 38 MG/DL
LDLC SERPL CALC-MCNC: 39 MG/DL (ref 0–100)
MICROALBUMIN UR-MCNC: 45.5 MG/L (ref 0–20)
MICROALBUMIN/CREAT 24H UR: 21 MG/G CREATININE (ref 0–30)
POTASSIUM SERPL-SCNC: 3.3 MMOL/L (ref 3.5–5.3)
PROT SERPL-MCNC: 7.8 G/DL (ref 6.4–8.2)
SODIUM SERPL-SCNC: 136 MMOL/L (ref 136–145)
TRIGL SERPL-MCNC: 66 MG/DL

## 2021-02-22 PROCEDURE — 3051F HG A1C>EQUAL 7.0%<8.0%: CPT | Performed by: FAMILY MEDICINE

## 2021-02-22 PROCEDURE — 82043 UR ALBUMIN QUANTITATIVE: CPT | Performed by: FAMILY MEDICINE

## 2021-02-22 PROCEDURE — 82570 ASSAY OF URINE CREATININE: CPT | Performed by: FAMILY MEDICINE

## 2021-02-22 PROCEDURE — 80061 LIPID PANEL: CPT

## 2021-02-22 PROCEDURE — 36415 COLL VENOUS BLD VENIPUNCTURE: CPT | Performed by: FAMILY MEDICINE

## 2021-02-22 PROCEDURE — 3060F POS MICROALBUMINURIA REV: CPT | Performed by: FAMILY MEDICINE

## 2021-02-22 PROCEDURE — 80053 COMPREHEN METABOLIC PANEL: CPT

## 2021-02-22 PROCEDURE — 83036 HEMOGLOBIN GLYCOSYLATED A1C: CPT | Performed by: FAMILY MEDICINE

## 2021-02-23 DIAGNOSIS — E11.9 TYPE 2 DIABETES MELLITUS WITHOUT COMPLICATION, UNSPECIFIED WHETHER LONG TERM INSULIN USE (HCC): Chronic | ICD-10-CM

## 2021-02-23 DIAGNOSIS — E87.6 LOW BLOOD POTASSIUM: ICD-10-CM

## 2021-02-23 DIAGNOSIS — I10 ESSENTIAL HYPERTENSION: Primary | Chronic | ICD-10-CM

## 2021-04-10 ENCOUNTER — HOSPITAL ENCOUNTER (OUTPATIENT)
Dept: CT IMAGING | Facility: HOSPITAL | Age: 76
Discharge: HOME | End: 2021-04-10
Attending: THORACIC SURGERY (CARDIOTHORACIC VASCULAR SURGERY)
Payer: COMMERCIAL

## 2021-04-10 DIAGNOSIS — I71.01 CHRONIC THORACIC AORTIC DISSECTION (HCC): ICD-10-CM

## 2021-04-10 PROCEDURE — 71275 CT ANGIOGRAPHY CHEST: CPT

## 2021-04-10 PROCEDURE — 74174 CTA ABD&PLVS W/CONTRAST: CPT

## 2021-04-10 RX ADMIN — IOHEXOL 100 ML: 350 INJECTION, SOLUTION INTRAVENOUS at 09:50

## 2021-04-12 ENCOUNTER — HOSPITAL ENCOUNTER (OUTPATIENT)
Dept: NON INVASIVE DIAGNOSTICS | Facility: HOSPITAL | Age: 76
Discharge: HOME | End: 2021-04-12
Payer: COMMERCIAL

## 2021-04-12 DIAGNOSIS — I71.01 CHRONIC THORACIC AORTIC DISSECTION (HCC): ICD-10-CM

## 2021-04-12 PROCEDURE — 93306 TTE W/DOPPLER COMPLETE: CPT | Performed by: INTERNAL MEDICINE

## 2021-04-12 PROCEDURE — 93306 TTE W/DOPPLER COMPLETE: CPT

## 2021-04-16 ENCOUNTER — OFFICE VISIT (OUTPATIENT)
Dept: CARDIAC SURGERY | Facility: CLINIC | Age: 76
End: 2021-04-16
Payer: COMMERCIAL

## 2021-04-16 VITALS
BODY MASS INDEX: 41.23 KG/M2 | HEART RATE: 63 BPM | HEIGHT: 64 IN | TEMPERATURE: 96.8 F | OXYGEN SATURATION: 97 % | WEIGHT: 241.5 LBS | SYSTOLIC BLOOD PRESSURE: 130 MMHG | DIASTOLIC BLOOD PRESSURE: 70 MMHG | RESPIRATION RATE: 18 BRPM

## 2021-04-16 DIAGNOSIS — Z95.828 S/P VASCULAR BYPASS: Chronic | ICD-10-CM

## 2021-04-16 DIAGNOSIS — Z98.890 H/O REPAIR OF DISSECTING ANEURYSM OF DESCENDING THORACIC AORTA: Chronic | ICD-10-CM

## 2021-04-16 DIAGNOSIS — Z86.79 S/P ASCENDING AORTIC ANEURYSM REPAIR: Chronic | ICD-10-CM

## 2021-04-16 DIAGNOSIS — Z98.890 S/P ASCENDING AORTIC ANEURYSM REPAIR: Chronic | ICD-10-CM

## 2021-04-16 DIAGNOSIS — I71.01 CHRONIC THORACIC AORTIC DISSECTION (HCC): Primary | ICD-10-CM

## 2021-04-16 DIAGNOSIS — Z86.79 H/O REPAIR OF DISSECTING ANEURYSM OF DESCENDING THORACIC AORTA: Chronic | ICD-10-CM

## 2021-04-16 PROCEDURE — 99215 OFFICE O/P EST HI 40 MIN: CPT | Performed by: NURSE PRACTITIONER

## 2021-04-16 NOTE — PROGRESS NOTES
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  Patient had imaging last April with stable appearance of his thoracic aorta and telemedicine follow up with our office  Upon interview patient reports no significant changes in his health status over the past year  He monitors is BP at home daily (alternating times during the day) and keeps a record  His SBP is consistently 100-120  He is on Amlodipine, Metoprolol and Lisinopril/HCTZ  He denies chest pain, back pain, abdominal pain, extremity pain  He reports mild, chronic SOB,  He has persistent LE edema, right > left and LUE edema as well  Past Medical History:  Past Medical History:   Diagnosis Date    Arthritis     CPAP (continuous positive airway pressure) dependence     Diabetes ([address] )     Edema     Hyperlipidemia     Hypertension     Prostate cancer ([address] )     prostate    Sleep apnea     [facility]rebrum[facility]     Wears glasses          Past Surgical History:   Past Surgical History:   Procedure Laterality Date    CARDIAC SURGERY      CIRCUMCISION      COLONOSCOPY      IR CEREBRAL ANGIOGRAPHY / INTERVENTION  8/13/2018    IR TEVAR  9/19/2018    NE ASCEND AORTA GRAFT W ROOT REPLACMENT  VALVE CONDUIT/CORON RECONSTRUCT N/A 9/6/2017    Procedure: ASCENDING AORTIC REPAIR WITH A 26MM  GELWEAVE STRAIGHT GRAFT AND HEMIARCH WITH 8MM SIDE ARM BRANCH;  Surgeon: Brooklynn Madrigal MD;  [facility];  Service: Cardiac Surgery    NE ENDOVASC TAA REINCL SUBCL N/A 9/19/2018    Procedure: REPAIR ANEURSYM THORACIC ENDOVASCUALR DESCENDING AORTIC ANEURYSM (TEVAR); Surgeon: Tania Hoff MD;  [facility];  Service: Cardiac Surgery    NV EXPLOR POSTOP BLEED,INFEC,CLOT-CHST N/A 9/6/2017    Procedure: RE-EXPLORATION MEDIASTERNAL CAVITY FOR POST-OP BLEED (BRING BACK); Surgeon: Jemma Mendoza MD;  [facility];  Service: Cardiac Surgery    NV VEIN BYPASS GRAFT,CAROTID-BRACHIAL Left 8/13/2018    Procedure: BYPASS CAROTID SUBCLAVIAN WITH EMBOLIZATION OF PROXIMAL SUBCLAVIAN ARTERY  ;  Surgeon: Carito Mitchell MD;  [facility];  Service: Vascular         Family History:  Family History   Problem Relation Age of Onset    Stroke Mother         complications    Hypertension Mother    Paredes Arthritis Mother     No Known Problems Father          Social History:    Social History     Substance and Sexual Activity   Alcohol Use Yes    Frequency: 2-3 times a week    Drinks per session: 1 or 2    Binge frequency: Less than monthly    Comment: social beer or two daily     Social History     Substance and Sexual Activity   Drug Use No     Social History     Tobacco Use   Smoking Status Never Smoker   Smokeless Tobacco Never Used         Home Medications:   Prior to Admission medications    Medication Sig Start Date End Date Taking?  Authorizing Provider   acetaminophen (TYLENOL) 325 mg tablet Take 2 tablets (650 mg total) by mouth every 6 (six) hours as needed for mild pain 8/14/18  Yes Jem Herzog PA-C   amLODIPine (NORVASC) 10 mg tablet Take 1 tablet (10 mg total) by mouth 2 (two) times a day 8/17/20  Yes Brennan Hensley DO   aspirin 81 mg chewable tablet Chew 1 tablet daily 9/14/17  Yes LV Hoff MD   atorvastatin (LIPITOR) 80 mg tablet TAKE 1 TABLET BY MOUTH EVERY DAY 11/5/20  Yes Lei Grove DO   B-D ULTRAFINE III SHORT PEN 31G X 8 MM MISC USE AS DIRECTED  5/13/18  Yes Brennan Hensley DO   finasteride (PROSCAR) 5 mg tablet Take 5 mg by mouth daily 1/27/20  Yes Historical Provider, MD   HUMALOG KWIKPEN 100 units/mL injection pen INJECT 8 UNIT 3 TIMES DAILY BEFORE MEALS 1/9/20  Yes Marquita Celaya DO   insulin glargine (LANTUS) 100 units/mL subcutaneous injection Inject 28 Units under the skin daily at bedtime Dx: Diabetes E11 9, dispense pens 9/14/17  Yes Manas Perez MD   insulin lispro (HumaLOG) 100 units/mL injection Inject 8 Units under the skin 3 (three) times a day before meals Dx: Diabetes E11 9   Dispense pens 9/14/17  Yes LV Perez MD   Insulin Pen Needle (B-D ULTRAFINE III SHORT PEN) 31G X 8 MM MISC USE AS DIRECTED 10/22/20  Yes Marquita Celaya DO   Lantus SoloStar 100 units/mL injection pen INJECT 28 UNIT BEDTIME 8/10/20  Yes Marquita Celaya DO   lisinopril-hydrochlorothiazide (PRINZIDE,ZESTORETIC) 20-12 5 MG per tablet TAKE 2 TABLETS BY MOUTH EVERY DAY 7/14/20  Yes Lei Grove DO   metoprolol tartrate (LOPRESSOR) 100 mg tablet Take 1 tablet (100 mg total) by mouth 2 (two) times a day 12/15/20  Yes Marquita Celaya DO   docusate sodium (COLACE) 100 mg capsule Take 100 mg by mouth 2 (two) times a day    Historical Provider, MD       Allergies:  No Known Allergies    Review of Systems:   Review of Systems - History obtained from chart review and the patient  General ROS: negative  Psychological ROS: negative  Ophthalmic ROS: positive for - uses glasses  ENT ROS: negative  Allergy and Immunology ROS: negative  Hematological and Lymphatic ROS: negative  Endocrine ROS: negative  Respiratory ROS: no cough, shortness of breath, or wheezing  Cardiovascular ROS: positive for - dyspnea on exertion and edema  negative for - chest pain, loss of consciousness, murmur, orthopnea, palpitations, paroxysmal nocturnal dyspnea or rapid heart rate  Gastrointestinal ROS: no abdominal pain, change in bowel habits, or black or bloody stools  Genito-Urinary ROS: no dysuria, trouble voiding, or hematuria  Musculoskeletal ROS: negative  Neurological ROS: no TIA or stroke symptoms  Dermatological ROS: negative    Vital Signs:   Vitals:    04/16/21 1041 04/16/21 1044   BP: 114/80 130/70   BP Location: Left arm Right arm   Patient Position: Sitting Sitting   Cuff Size: Adult Adult   Pulse: 63    Resp: 18    Temp: (!) 96 8 °F (36 °C)    TempSrc: Tympanic    SpO2: 97%    Weight: 110 kg (241 lb 8 oz)    Height: 5' 4" (1 626 m)        Physical Exam:  General: Alert, oriented, obese, no acute distress  HEENT/NECK:  PERRLA  No jugular venous distention  Cardiac:Irregular rhythm, brief IRENE  Carotid arteries: 1+ pulses, no bruits  Pulmonary:  Breath sounds clear bilaterally  Abdomen:  Non-tender, Non-distended  Positive bowel sounds  Upper extremities: 2+ radial pulses; brisk capillary refill, mild edema RUE  Lower extremities: Extremities warm/dry  PT/DP pulses 1+ bilaterally  3+ edema B/L; R>L  Neuro: Alert and oriented X 3  Sensation is grossly intact  No focal deficits  Musculoskeletal: MAEE, no deficits, stable gait  Skin: Warm/Dry, without rashes or lesions  Lab Results:   Lab Results   Component Value Date     10/07/2015    SODIUM 136 02/22/2021    K 3 3 (L) 02/22/2021     02/22/2021    CO2 33 (H) 02/22/2021    ANIONGAP 8 10/07/2015    AGAP 3 (L) 02/22/2021    BUN 12 02/22/2021    CREATININE 0 87 02/22/2021    GLUC 103 09/21/2018    GLUF 140 (H) 02/22/2021    CALCIUM 9 2 02/22/2021    AST 30 02/22/2021    ALT 35 02/22/2021    ALKPHOS 96 02/22/2021    PROT 7 5 10/07/2015    TP 7 8 02/22/2021    BILITOT 0 65 10/07/2015    TBILI 0 72 02/22/2021    EGFR 85 02/22/2021     Lab Results   Component Value Date    HGBA1C 7 2 (H) 02/22/2021     Lab Results   Component Value Date    CKTOTAL 72 01/15/2014    TROPONINI 0 03 09/06/2017       Imaging Studies:     CTA Chest/Abdomen/Pelvis: 4/10/21  IMPRESSION:  1    Interval mild enlargement of the false lumen surrounding the distal aortic arch with faint contrast enhancement on delayed phase images is concerning for endoleak  2   Stable positioning of the endograft in the descending thoracic aorta  3   Stable residual dissection involving the abdominal aorta with normal perfusion of the visceral organs  4   Patent left common carotid to left subclavian artery bypass  5   Stable coronary artery aneurysm  Echocardiogram: 4/12/21  EF 60%  Mild MR  AV trileaflet, No AS, No AI  Aortic root upper limit of normal size    I have personally reviewed pertinent films in PACS     PCP &  Cardiology notes reviewed     Assessment:  Patient Active Problem List    Diagnosis Date Noted    Edema     Encounter for postoperative care 10/18/2018    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 (TEVAR) 09/19/2018    Descending [facility] ) 09/07/2018    S/P vascular bypass 08/14/2018    Adenomatous polyp of descending colon 07/02/2018    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) 03/12/2018    Paroxys[facility] fibrillation [facility] ) 09/11/2017    Chronic thoracic aortic dissection ([facility] ) 09/06/2017    S/P ascending aortic aneurysm repair 09/06/2017    Hypokalemia 09/06/2017    Prostate cancer ([facility] )     Hypertension     Diabetes mellitus ([facility] )     Hyperlipidemia 06/07/2012         Plan:    CTA c/a/p performed prior to this visit demonstrates stable appearance of the thoracic aorta and surgical repair  These findings were confirmed and shared with the patient today  Patients BP is well controlled  His Amlodipine may be contributing to his LE edema  Suggested he discuss with his PCP and/or Cardiologist and maybe another antihypertensive can be substituted  Follow-up monitoring is the treatment plan    Arrangements have been made for future surveillance to be completed with CTA chest/abdomen/pelvis in 1 year  Inell Clause  was comfortable with our recommendations, and his questions were answered to his satisfaction  Aortic Aneurysm Instructions were provided to the patient as follows:    1  No lifting more than 50 pounds  Regular aerobic exercise permitted and recommended  2  Maintain a controlled blood pressure with a goal of less than 140/80  3  Follow up in Aortic Clinic as recommended with radiology follow up as instructed  4  Report to the ER or call 911 immediately with the following signs / symptoms: sudden onset of back pain, chest pain or shortness of breath        SIGNATURE: MAUREEN Newberry  DATE: April 16, 2021  TIME: 11:14 AM

## 2021-04-16 NOTE — LETTER
April 16, 2021     Deonte Glez MD  [facility]  [address]  [address] [place] [address]    Patient: Darrell Godfrey  YOB: 1945   Date of Visit: 4/16/2021       Dear Dr Shanta Knott:    Thank you for referring Charol Carrel to me for evaluation  Below are my notes for this consultation  If you have questions, please do not hesitate to call me  I look forward to following your patient along with you  Sincerely,        Davida Kearney MD        CC: DO Js Connors CRNP  4/16/2021 11:31 AM  Attested  Aortic Clinic  Darrell Godfrey  77 y o  male MRN: [id number]    Reason for Consult / Principal Problem: follow up s/p Ascending aortic repair and TEVAR    History of Present Illness: Darrell Godfrey  is a 77y o  year old male who presents today for ongoing surveillance s/p Ascending aortic repair due to Type A aortic dissection September 2017; Carotid-subclavian bypass w/ embolization of proximal subclavian August 2018 followed by TEVAR September 2018 due to aneurysmal dilatation of the descending thoracic aorta from chronic dissection  Patient had imaging last April with stable appearance of his thoracic aorta and telemedicine follow up with our office  Upon interview patient reports no significant changes in his health status over the past year  He monitors is BP at home daily (alternating times during the day) and keeps a record  His SBP is consistently 100-120  He is on Amlodipine, Metoprolol and Lisinopril/HCTZ  He denies chest pain, back pain, abdominal pain, extremity pain  He reports mild, chronic SOB,  He has persistent LE edema, right > left and LUE edema as well       Past Medical History:  Past Medical History:   Diagnosis Date    Arthritis     CPAP (continuous positive airway pressure) dependence     Diabetes [facility] )     Edema     Hyperlipidemia     Hypertension     Prostate cancer ([facility] )     prostate    Sleep apnea     Stroke (cerebrum) (HCC)     Wears glasses Past Surgical History:   Past Surgical History:   Procedure Laterality Date    CARDIAC SURGERY      CIRCUMCISION      COLONOSCOPY      IR CEREBRAL ANGIOGRAPHY / INTERVENTION  8/13/2018    IR TEVAR  9/19/2018    ID ASCEND AORTA GRAFT W ROOT REPLACMENT  VALVE CONDUIT/CORON RECONSTRUCT N/A 9/6/2017    Procedure: ASCENDING AORTIC REPAIR WITH A 26MM  GELWEAVE STRAIGHT GRAFT AND HEMIARCH WITH 8MM SIDE ARM BRANCH;  Surgeon: Baltazar Alonso MD;  [facility];  Service: Cardiac Surgery    ID ENDOVASC TAA REINCL SUBCL N/A 9/19/2018    Procedure: REPAIR ANEURSYM THORACIC ENDOVASCUALR DESCENDING AORTIC ANEURYSM (TEVAR); Surgeon: Phylicia Escobar MD;  [facility];  Service: Cardiac Surgery    ID EXPLOR POSTOP BLEED,INFEC,CLOT-CHST N/A 9/6/2017    Procedure: RE-EXPLORATION MEDIASTERNAL CAVITY FOR POST-OP BLEED (BRING BACK); Surgeon: Baltazar Alonso MD;  [facility];  Service: Cardiac Surgery    ID VEIN BYPASS GRAFT,CAROTID-BRACHIAL Left 8/13/2018    Procedure: BYPASS CAROTID SUBCLAVIAN WITH EMBOLIZATION OF PROXIMAL SUBCLAVIAN ARTERY  ;  Surgeon: Daniela Beck MD;  [facility];  Service: Vascular         Family History:  Family History   Problem Relation Age of Onset    Stroke Mother         complications    Hypertension Mother    Teetee Courser Arthritis Mother     No Known Problems Father          Social History:    Social History     Substance and Sexual Activity   Alcohol Use Yes    Frequency: 2-3 times a week    Drinks per session: 1 or 2    Binge frequency: Less than monthly    Comment: social beer or two daily     Social History     Substance and Sexual Activity   Drug Use No     Social History     Tobacco Use   Smoking Status Never Smoker   Smokeless Tobacco Never Used         Home Medications:   Prior to Admission medications    Medication Sig Start Date End Date Taking?  Authorizing Provider   acetaminophen (TYLENOL) 325 mg tablet Take 2 tablets (650 mg total) by mouth every 6 (six) hours as needed for mild pain 8/14/18  Yes Enoch Cottrell PA-C   amLODIPine (NORVASC) 10 mg tablet Take 1 tablet (10 mg total) by mouth 2 (two) times a day 8/17/20  Yes Raffaele Prim, DO   aspirin 81 mg chewable tablet Chew 1 tablet daily 9/14/17  Yes Samantha Mcmahan MD   atorvastatin (LIPITOR) 80 mg tablet TAKE 1 TABLET BY MOUTH EVERY DAY 11/5/20  Yes Lei Grove, DO   B-D ULTRAFINE III SHORT PEN 31G X 8 MM MISC USE AS DIRECTED  5/13/18  Yes Raffaele Prim, DO   finasteride (PROSCAR) 5 mg tablet Take 5 mg by mouth daily 1/27/20  Yes Historical Provider, MD GARCESALOG KWIKPEN 100 units/mL injection pen INJECT 8 UNIT 3 TIMES DAILY BEFORE MEALS 1/9/20  Yes Raffaele Prim, DO   insulin glargine (LANTUS) 100 units/mL subcutaneous injection Inject 28 Units under the skin daily at bedtime Dx: Diabetes E11 9, dispense pens 9/14/17  Yes Samantha Mcmahan MD   insulin lispro (HumaLOG) 100 units/mL injection Inject 8 Units under the skin 3 (three) times a day before meals Dx: Diabetes E11 9   Dispense pens 9/14/17  Yes LV Mcmahan MD   Insulin Pen Needle (B-D ULTRAFINE III SHORT PEN) 31G X 8 MM MISC USE AS DIRECTED 10/22/20  Yes Raffaele Prim, DO   Lantus SoloStar 100 units/mL injection pen INJECT 28 UNIT BEDTIME 8/10/20  Yes Raffaele Prim, DO   lisinopril-hydrochlorothiazide (PRINZIDE,ZESTORETIC) 20-12 5 MG per tablet TAKE 2 TABLETS BY MOUTH EVERY DAY 7/14/20  Yes Lei Grove, DO   metoprolol tartrate (LOPRESSOR) 100 mg tablet Take 1 tablet (100 mg total) by mouth 2 (two) times a day 12/15/20  Yes Raffaele Prim, DO   docusate sodium (COLACE) 100 mg capsule Take 100 mg by mouth 2 (two) times a day    Historical Provider, MD       Allergies:  No Known Allergies    Review of Systems:   Review of Systems - History obtained from chart review and the patient  General ROS: negative  Psychological ROS: negative  Ophthalmic ROS: positive for - uses glasses  ENT ROS: negative  Allergy and Immunology ROS: negative  Hematological and Lymphatic ROS: negative  Endocrine ROS: negative  Respiratory ROS: no cough, shortness of breath, or wheezing  Cardiovascular ROS: positive for - dyspnea on exertion and edema  negative for - chest pain, loss of consciousness, murmur, orthopnea, palpitations, paroxysmal nocturnal dyspnea or rapid heart rate  Gastrointestinal ROS: no abdominal pain, change in bowel habits, or black or bloody stools  Genito-Urinary ROS: no dysuria, trouble voiding, or hematuria  Musculoskeletal ROS: negative  Neurological ROS: no TIA or stroke symptoms  Dermatological ROS: negative    Vital Signs:   Vitals:    04/16/21 1041 04/16/21 1044   BP: 114/80 130/70   BP Location: Left arm Right arm   Patient Position: Sitting Sitting   Cuff Size: Adult Adult   Pulse: 63    Resp: 18    Temp: (!) 96 8 °F (36 °C)    TempSrc: Tympanic    SpO2: 97%    Weight: 110 kg (241 lb 8 oz)    Height: 5' 4" (1 626 m)        Physical Exam:  General: Alert, oriented, obese, no acute distress  HEENT/NECK:  PERRLA  No jugular venous distention  Cardiac:Irregular rhythm, brief IRENE  Carotid arteries: 1+ pulses, no bruits  Pulmonary:  Breath sounds clear bilaterally  Abdomen:  Non-tender, Non-distended  Positive bowel sounds  Upper extremities: 2+ radial pulses; brisk capillary refill, mild edema RUE  Lower extremities: Extremities warm/dry  PT/DP pulses 1+ bilaterally  3+ edema B/L; R>L  Neuro: Alert and oriented X 3  Sensation is grossly intact  No focal deficits  Musculoskeletal: MAEE, no deficits, stable gait  Skin: Warm/Dry, without rashes or lesions      Lab Results:   Lab Results   Component Value Date     10/07/2015    SODIUM 136 02/22/2021    K 3 3 (L) 02/22/2021     02/22/2021    CO2 33 (H) 02/22/2021    ANIONGAP 8 10/07/2015    AGAP 3 (L) 02/22/2021    BUN 12 02/22/2021    CREATININE 0 87 02/22/2021    GLUC 103 09/21/2018    GLUF 140 (H) 02/22/2021    CALCIUM 9 2 02/22/2021    AST 30 02/22/2021    ALT 35 02/22/2021    ALKPHOS 96 02/22/2021    PROT 7 5 10/07/2015    TP 7 8 02/22/2021    BILITOT 0 65 10/07/2015    TBILI 0 72 02/22/2021    EGFR 85 02/22/2021     Lab Results   Component Value Date    HGBA1C 7 2 (H) 02/22/2021     Lab Results   Component Value Date    CKTOTAL 72 01/15/2014    TROPONINI 0 03 09/06/2017       Imaging Studies:     CTA Chest/Abdomen/Pelvis: 4/10/21  IMPRESSION:  1  Interval mild enlargement of the false lumen surrounding the distal aortic arch with faint contrast enhancement on delayed phase images is concerning for endoleak  2   Stable positioning of the endograft in the descending thoracic aorta  3   Stable residual dissection involving the abdominal aorta with normal perfusion of the visceral organs  4   Patent left common carotid to left subclavian artery bypass  5   Stable coronary artery aneurysm      Echocardiogram: 4/12/21  EF 60%  Mild MR  AV trileaflet, No AS, No AI  Aortic root upper limit of normal size    I have personally reviewed pertinent films in PACS     PCP &  Cardiology notes reviewed     Assessment:  Patient Active Problem List    Diagnosis Date Noted    Edema     Encounter for postoperative care 10/18/2018    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 (TEVAR) 09/19/2018    Descending aortic aneurysm ([facility] ) 09/07/2018    S/P vascular bypass 08/14/2018    Adenomatous polyp of descending colon 07/02/2018    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 04/20/2018    KARIN (obstructive sleep apnea) 03/12/2018    Class 2 severe obesity due to excess calories with serious comorbidity and body mass index (BMI) of 37 0 to 37 9 in adult [facility]) 03/12/2018    Paroxysmal atrial fibrillation ([facility] ) 09/11/2017    Chronic thoracic aortic dissection ([facility] ) 09/06/2017    S/P ascending aortic aneurysm repair 09/06/2017    Hypokalemia 09/06/2017    Prostate cancer ([facility] )     Hypertension     Diabetes mellitus ([facility]     Hyperlipidemia 06/07/2012         Plan:    CTA c/a/p performed prior to this visit demonstrates stable appearance of the thoracic aorta and surgical repair  These findings were confirmed and shared with the patient today  Patients BP is well controlled  His Amlodipine may be contributing to his LE edema  Suggested he discuss with his PCP and/or Cardiologist and maybe another antihypertensive can be substituted  Follow-up monitoring is the treatment plan  Arrangements have been made for future surveillance to be completed with CTA chest/abdomen/pelvis in 1 year  Tara Cruz  was comfortable with our recommendations, and his questions were answered to his satisfaction  Aortic Aneurysm Instructions were provided to the patient as follows:    1  No lifting more than 50 pounds  Regular aerobic exercise permitted and recommended  2  Maintain a controlled blood pressure with a goal of less than 140/80  3  Follow up in Aortic Clinic as recommended with radiology follow up as instructed  4  Report to the ER or call 911 immediately with the following signs / symptoms: sudden onset of back pain, chest pain or shortness of breath  MAUREEN Whitman  DATE: April 16, 2021  TIME: 11:14 AM  Attestation signed by Cristina Wilkes MD at 4/16/2021 11:40 AM:  Pt seen and examined with MAUREEN  I agree with the above assessment and plan  Sushma Colunga returns for routine aortic surveillance  He is doing well and his blood pressure is very well managed  He has developed significant peripheral edema  I suggested f/u with his PCP for possible addition of diuretics to his medication regimen  His CT scan demostrates stable ascending aortic repair, stable L carotid-subclavian bypass, and stable TEVAR position  There is of course endoleak at the distal landing zone, which is dissected  There is mild enlargement of the proximal DTA      I've recommended one year f/u for aortic surveillance  I recommended COVID-19 vaccination as well        Mikie Shaw MD  DATE: April 16, 2021  TIME: 11:35 AM

## 2021-04-29 ENCOUNTER — OFFICE VISIT (OUTPATIENT)
Dept: SLEEP CENTER | Facility: CLINIC | Age: 76
End: 2021-04-29
Payer: COMMERCIAL

## 2021-04-29 VITALS
DIASTOLIC BLOOD PRESSURE: 58 MMHG | WEIGHT: 240 LBS | HEIGHT: 65 IN | SYSTOLIC BLOOD PRESSURE: 114 MMHG | BODY MASS INDEX: 39.99 KG/M2

## 2021-04-29 DIAGNOSIS — G47.33 OSA (OBSTRUCTIVE SLEEP APNEA): Primary | ICD-10-CM

## 2021-04-29 PROCEDURE — 1036F TOBACCO NON-USER: CPT | Performed by: PHYSICIAN ASSISTANT

## 2021-04-29 PROCEDURE — 3074F SYST BP LT 130 MM HG: CPT | Performed by: PHYSICIAN ASSISTANT

## 2021-04-29 PROCEDURE — 3078F DIAST BP <80 MM HG: CPT | Performed by: PHYSICIAN ASSISTANT

## 2021-04-29 PROCEDURE — 1160F RVW MEDS BY RX/DR IN RCRD: CPT | Performed by: PHYSICIAN ASSISTANT

## 2021-04-29 PROCEDURE — 99213 OFFICE O/P EST LOW 20 MIN: CPT | Performed by: PHYSICIAN ASSISTANT

## 2021-04-29 PROCEDURE — 3008F BODY MASS INDEX DOCD: CPT | Performed by: PHYSICIAN ASSISTANT

## 2021-04-29 NOTE — PROGRESS NOTES
Progress Note - Sleep Medicine  Marlene Warner  77 y o  male MRN: [id number]       Impression & Plan:     KARIN (obstructive sleep apnea)  1  Continue titratable CPAP for hours asleep change auto pressures to  5-15  2  Follow-up in One year  3  Call with questions  4  Continue supply refills as dictated by DME company      Problem List Items Addressed This Visit        Respiratory    KARIN (obstructive sleep apnea) - Primary (Chronic)     5  Continue titratable CPAP for hours asleep change auto pressures to  5-15  6  Follow-up in One year  7  Call with questions  8  Continue supply refills as dictated by DME company         Relevant Orders    PAP DME Pressure Change            ______________________________________________________________________    HPI:    Marleen Warner  presents today for follow-up of obstructive sleep apnea  He has here for CPAP clinic  He has been wearing his CPAP continuously without difficulties  He denies any daytime fatigue  He has been compliant with the CPAP  He denies any daytime headache  He otherwise is feeling very well and offers no complaints  He tolerates the CPAP        Review of Systems:  Review of Systems     Please see JOVAN STEWARD       Social history updates:  Social History     Tobacco Use   Smoking Status Never Smoker   Smokeless Tobacco Never Used     Social History     Socioeconomic History    Marital status:       Spouse name: Not on file    Number of children: Not on file    Years of education: Not on file    Highest education level: Not on file   Occupational History     Comment: retired   Social Needs    Financial resource strain: Not on file    Food insecurity     Worry: Not on file     Inability: Not on file   [employer] needs     Medical: Not on file     Non-medical: Not on file   Tobacco Use    Smoking status: Never Smoker    Smokeless tobacco: Never Used   Substance and Sexual Activity    Alcohol use: Yes     Frequency: 2-3 times a week Drinks per session: 1 or 2     Binge frequency: Less than monthly     Comment: social beer or two daily    Drug use: No    Sexual activity: Never     Comment: denied hx of sexually active high risk   Lifestyle    Physical activity     Days per week: Not on file     Minutes per session: Not on file    Stress: Not on file   Relationships    Social connections     Talks on phone: Not on file     Gets together: Not on file     Attends Yazdanism service: Not on file     Active member of club or organization: Not on file     Attends meetings of clubs or organizations: Not on file     Relationship status: Not on file    Intimate partner violence     Fear of current or ex partner: Not on file     Emotionally abused: Not on file     Physically abused: Not on file     Forced sexual activity: Not on file   Other Topics Concern    Not on file   Social History Narrative            1 serving caffeine/day    Less than high school diploma    Person living alone    Participate in moderate activities both inside and outside of the home       PhysicalExamination:  Vitals:   /58   Ht 5' 5" (1 651 m)   Wt 109 kg (240 lb)   BMI 39 94 kg/m²     Vital signs reviewed, nursing notes reviewed    Physical Exam  Gen: Awake, alert, oriented x 3, no acute distress  HEENT: Mucous membranes moist, no oral lesions, no thrush  NECK: No accessory muscle use, JVP not elevated  Cardiac: Regular, single S1, single S2, no murmurs, no rubs, no gallops  Lungs:  Breath sounds are decreased bilaterally throughout all lung fields without any wheezes, rales, rhonchi  Abdomen: normoactive bowel sounds, soft nontender, nondistended, no rebound or rigidity, no guarding  Extremities: no cyanosis, no clubbing, no edema    Diagnostic Data:  Labs: I personally reviewed the most recent laboratory data pertinent to today's visit  not applicable    I have personally reviewed pertinent lab results  , ABG: No results found for: PHART, NHS1QCJ, PO2ART, BZH5UED, U8OXAJTT, BEART, SOURCE, BNP: No results found for: BNP, CBC: No results found for: WBC, HGB, HCT, MCV, PLT, ADJUSTEDWBC, MCH, MCHC, RDW, MPV, NRBC, CMP: No results found for: SODIUM, K, CL, CO2, ANIONGAP, BUN, CREATININE, GLUCOSE, CALCIUM, AST, ALT, ALKPHOS, PROT, BILITOT, EGFR, PT/INR: No results found for: PT, INR, Troponin: No results found for: TROPONINI  Lab Results   Component Value Date    WBC 13 46 (H) 09/21/2018    HGB 12 3 09/21/2018    HCT 37 1 09/21/2018    MCV 88 09/21/2018     09/21/2018     Lab Results   Component Value Date    GLUCOSE 153 (H) 08/13/2018    CALCIUM 9 2 02/22/2021     10/07/2015    K 3 3 (L) 02/22/2021    CO2 33 (H) 02/22/2021     02/22/2021    BUN 12 02/22/2021    CREATININE 0 87 02/22/2021     No results found for: IGE  Lab Results   Component Value Date    ALT 35 02/22/2021    AST 30 02/22/2021    ALKPHOS 96 02/22/2021    BILITOT 0 65 10/07/2015     No results found for: IRON, TIBC, FERRITIN  No results found for: [id number]  No results found for: FOLATE      Compliance Data:  Type of CPAP:  Auto CPAP : 5-20                                   Percent usage: 100%                                   Average time used: 6 hrs 14 min                                  Time in large leak: 12 min                                   Residual AHI: 4 9                                          Mary Singh PA-C

## 2021-04-29 NOTE — PROGRESS NOTES
Review of Systems      Genitourinary none   Cardiology ankle/leg swelling   Gastrointestinal none   Neurology none   Constitutional none   Integumentary none   Psychiatry none   Musculoskeletal muscle aches   Pulmonary shortness of breath with activity   ENT ringing in ears   Endocrine none   Hematological none

## 2021-04-29 NOTE — ASSESSMENT & PLAN NOTE
1  Continue titratable CPAP for hours asleep change auto pressures to  5-15  2  Follow-up in One year  3  Call with questions  4   Continue supply refills as dictated by DME company

## 2021-05-17 DIAGNOSIS — I10 HTN (HYPERTENSION), BENIGN: ICD-10-CM

## 2021-05-17 RX ORDER — AMLODIPINE BESYLATE 10 MG/1
10 TABLET ORAL 2 TIMES DAILY
Qty: 180 TABLET | Refills: 2 | Status: SHIPPED | OUTPATIENT
Start: 2021-05-17 | End: 2022-02-09

## 2021-06-04 NOTE — PROGRESS NOTES
[facility]  4-month follow-up    Subjective     Chaperone: accepts  Araceli is accompanied by Brother = Thierry.    Patient ID:     History and detail provided by: Patient, Sibling and previous records    No chief complaint on file.    HPI    Araceli is a 71-year-old woman with cerebral palsy and an intellectual disability, who lives in a house in [place] with her caregiver, Denita.   Her twin sister, Carmen,  peacefully at home in bed on 2019. Araceli was seven minutes older than her sister.      2 brothers:  Justin is in [place].  Thierry lives in [place].      Bears fan, Cubs fan  Loves the news, politics  Avid reader, loves biographies and magazines     Araceli has good appetite  Off omeprazole, no stomach pain  Likes meats, eats fish  Not a lot of veggies or fruit  casseroles and soups     Mood is good  Mentation is good, memory is good  Sleeping well  No pain     Breathing well  No shortness of breath, no cough  No Covid-19 exposure since at home with caregiver  Got both COVID-19 vaccines    Excellent memory!     In 2003, her Aunt Sonu, who lived down the street, passed away after about a year of physical decline in her health. In 2003, Araceli started having episodes of anxiety in which she would \"get all worked up.\" She was pacing, had decreased sleep, and was throwing things out. She could not keep her train of thought. She saw a psychiatrist, Dr. Obando, who started her on medications and this helped much with getting her focused and helping her with sleep. She still sees Dr. Obando and is currently taking Depakote and Abilify, since 5 years ago noted that trifluoperazine as discontinued and trazodone is no longer on her list, also previously on Seroquel. Dr. Obando previously asked me to prescribe since she is stable - Abilify 5 mg and Depakote E.R. 500 mg. If there are concerns, I would recommend going back to Dr. Obando.     We have been following Araceli  Progress Note - Cardiothoracic Surgery   Nell De La Cruz  71 y o  male MRN: [id number]  Unit/Bed#: [facility] 401-01 Encounter: [id number]  Tyep A dissection  S/P replacement of  ascending aorta/ hemiarch; POD # 6      24 Hour Events: Patient seen and examined  C/o discomfort from the beds  Patient remains in a fib 110s this AM, overnight up to 140s-160s  Remains on heparin gtt  Cards recommends decreasing lasix as could be driving tachycardia  Endo following, cont on insulin today  Patient denies CP, SOB, palpitations, lightheadedness, dizziness, syncope, nausea or vomiting  Medications:   Scheduled Meds:  amiodarone 200 mg Oral TID With Meals   aspirin 81 mg Oral Daily   atorvastatin 80 mg Oral Daily With Dinner   docusate sodium 100 mg Oral BID   furosemide 40 mg Intravenous Q8H   insulin glargine 40 Units Subcutaneous HS   insulin lispro 1-6 Units Subcutaneous HS   insulin lispro 15 Units Subcutaneous TID With Meals   insulin lispro 2-12 Units Subcutaneous TID AC   lisinopril 5 mg Oral Daily   metoprolol tartrate 50 mg Oral Q8H   OLANZapine 2 5 mg Oral HS   pantoprazole 40 mg Oral Early Morning   polyethylene glycol 17 g Oral Daily   potassium chloride 20 mEq Oral BID     Continuous Infusions:  heparin (porcine) 3-20 Units/kg/hr (Order-Specific) Last Rate: 10 1 Units/kg/hr (09/12/17 0700)     PRN Meds:   acetaminophen    bisacodyl    ondansetron    oxyCODONE-acetaminophen    oxyCODONE-acetaminophen    Vitals:   Vitals:    09/11/17 1845 09/11/17 2253 09/12/17 0244 09/12/17 0632   BP: 141/77 133/87 148/77    Pulse: 104 (!) 110 72    Resp: 18 18 18    Temp: 98 1 °F (36 7 °C) 98 4 °F (36 9 °C) 98 5 °F (36 9 °C)    TempSrc: Oral Oral Oral    SpO2: 94% 95% 97%    Weight:    109 kg (240 lb 4 8 oz)       Telemetry: Atrial Fibrillation;  Heart Rate: 113, rates 140-160s overnight     Respiratory:   SpO2: SpO2: 97 %, SpO2 Device: O2 Device: Nasal cannula; 2 LPM    Intake/Output:   I/O       09/10 0701 - 09/11 0700 09/11 0701 - 09/12 0700 09/12 0701 - 09/13 0700    P  O  840 1135     I V  (mL/kg) 1024 7 (9 3) 118 (1 1)     IV Piggyback 550 350     Total Intake(mL/kg) 2414 7 (22) 1603 (14 7)     Urine (mL/kg/hr) 2455 (0 9) 1605 (0 6)     Stool 0 (0)      Chest Tube       Total Output 2455 1605      Net -40 3 -2             Unmeasured Stool Occurrence 2 x            Chest tube Output:    Removed  Weights:   Weight (last 2 days)     Date/Time   Weight    09/12/17 0632  109 (240 3)    09/11/17 0500  110 (242 73)    09/10/17 0553  108 (238 32)            Admit weight:      Results: 113    Results from last 7 days  Lab Units 09/12/17  0459 09/11/17  0508 09/10/17  0502   WBC Thousand/uL 14 06* 15 91* 20 51*   HEMOGLOBIN g/dL 11 4* 12 2 12 3   HEMATOCRIT % 35 0* 36 1* 36 4*   PLATELETS Thousands/uL 235 205 187       Results from last 7 days  Lab Units 09/12/17  0459 09/11/17  1154 09/11/17  0508  09/10/17  0533   SODIUM mmol/L 146*  --  145  --  144   POTASSIUM mmol/L 3 3* 4 1 3 4*  < > 3 4*   CHLORIDE mmol/L 108  --  107  --  104   CO2 mmol/L 30  --  29  --  29   BUN mg/dL 28*  --  32*  --  25   CREATININE mg/dL 0 86  --  0 96  --  0 94   GLUCOSE RANDOM mg/dL 96  --  157*  --  202*   CALCIUM mg/dL 7 2*  --  6 8*  --  6 9*   < > = values in this interval not displayed  Results from last 7 days  Lab Units 09/12/17  0459 09/11/17  1154 09/11/17  0508  09/10/17  0502   INR  1 50*  --  1 36*  --  1 26*   PTT seconds 113* 72* 83*  < >  --    < > = values in this interval not displayed  Coumadin mg   7 5 2 5  2 5           Point of care glucose:  96 - 93    Studies:  No new       Invasive Lines/Tubes:  Invasive Devices     Central Venous Catheter Line            CVC Central Lines 09/06/17 Triple 5 days                Physical Exam:    HEENT/NECK:  PERRLA  No jugular venous distention  Cardiac: irregularly irregular rate and rhythm  No rubs/murmurs/gallops  Pulmonary:  Breath sounds CTA bilaterally    Abdomen: for hypertension. She is taking hydrochlorothiazide now at 12.5 mg daily (previously 25 mg daily) and previously lisinopril. She has not had any chest pain, shortness of breath, or headache.     We have also diagnosed her with high cholesterol and she was taking omega-3 fish oil but no longer. She reportedly eats a very healthy diet. She does not exercise much because of problems with balance, depth perception, and vision problems. Most of her balance problems are outside of the house and also going up stairs outside the house.     urinary incontinence at night - wears adult diapers at night  No concern of bowel movements    Review of Systems   All other systems reviewed and are negative.       ALLERGIES:  No Known Allergies    Current Outpatient Medications   Medication Sig Dispense Refill   • ARIPiprazole (ABILIFY) 5 MG tablet TAKE ONE TABLET BY MOUTH ONE TIME DAILY  90 tablet 0   • hydrochlorothiazide (HYDRODIURIL) 25 MG tablet TAKE 0.5 (HALF) TABLET BY MOUTH DAILY. 30 tablet 1   • divalproex (DEPAKOTE ER) 500 MG 24 hr ER tablet TAKE ONE TABLET BY MOUTH ONE TIME DAILY     90 tablet 0   • Cholecalciferol (VITAMIN D) 2000 units capsule Take 2 capsules by mouth daily. 30 capsule 11     No current facility-administered medications for this visit.       Past Medical History:   Diagnosis Date   • Cerebral palsy (CMS/HCC) 04/04/2020   • Depression    • Essential (primary) hypertension        Past Surgical History:   Procedure Laterality Date   • Inguinal hernia repair     • Upper gastrointestinal endoscopy         Social History     Tobacco Use   • Smoking status: Never Smoker   • Smokeless tobacco: Never Used   Substance Use Topics   • Alcohol use: Never   • Drug use: Never       Family History   Problem Relation Age of Onset   • Hypertension Mother    • Stroke Mother    • Cancer, Pancreatic Father    • Cancer, Colon Neg Hx    • Cancer, Esophageal Neg Hx    • Stomach Cancer Neg Hx    • Ulcerative Colitis Neg Hx         Objective   There were no vitals taken for this visit.  Physical Exam  Vitals and nursing note reviewed.   Constitutional:       Appearance: Normal appearance. She is underweight.      Comments: Sitting in wheelchair   HENT:      Head: Normocephalic and atraumatic.      Right Ear: Hearing, tympanic membrane, ear canal and external ear normal.      Left Ear: Hearing, tympanic membrane, ear canal and external ear normal.      Ears:      Comments: Minimal earwax     Nose: Nose normal.   Eyes:      General: Lids are normal. No scleral icterus.     Comments: eyeglasses   Cardiovascular:      Rate and Rhythm: Normal rate and regular rhythm.      Heart sounds: No murmur.   Pulmonary:      Effort: Pulmonary effort is normal.      Breath sounds: Normal breath sounds and air entry.   Musculoskeletal:      Right lower leg: No edema.      Left lower leg: No edema.   Neurological:      General: No focal deficit present.      Mental Status: She is alert.      Motor: Weakness and abnormal muscle tone present. No tremor or seizure activity.      Comments: c/w intellectual disability   Psychiatric:         Mood and Affect: Mood normal.         Speech: She is communicative.         Behavior: Behavior normal. Behavior is cooperative.         Assessment   Problem List Items Addressed This Visit        Behavioral    Depression       Nervous    Cerebral palsy (CMS/HCC)    Relevant Orders    CBC WITH DIFFERENTIAL    COMPREHENSIVE METABOLIC PANEL       Digestive    Vitamin D deficiency    Relevant Orders    VITAMIN D -25 HYDROXY       Endocrine    Hypercholesterolemia    Relevant Orders    LIPID PANEL WITH REFLEX    Hypothyroidism - Primary    Relevant Orders    FREE T4    THYROID STIMULATING HORMONE       Other    Encounter for therapeutic drug monitoring    Relevant Orders    CBC WITH DIFFERENTIAL    COMPREHENSIVE METABOLIC PANEL    Weight loss    Relevant Orders    GLYCOHEMOGLOBIN    Need for vaccination    Relevant Orders     Non-tender, Non-distended  Positive bowel sounds  Incisions: Sternum is stable  Incision is clean, dry, and intact  Lower extremities: Extremities warm/dry  Radial/PT/DP pulses 2+ bilaterally  Trace edema B/L  Neuro: Alert and oriented X 3  Sensation is grossly intact  No focal deficits  Skin: Warm/Dry, without rashes or lesions  Assessment:  Patient Active Problem List   Diagnosis    Prostate cancer    Hypertension    Diabetes    Acute thoracic aortic dissection    Acute blood loss anemia    s/p aortic arch repair    Hypokalemia    Atrial fibrillation    Anticoagulated on Coumadin       Tyep A dissection  S/P replacement of  ascending aorta/ hemiarch; POD # 6    Plan:    1  Cardiac:   Atrial Fibrillation; HR/BP well-controlled  Lopressor 50 mg q 8     Lisinopril 5 mg daily    Heparin bridge to coumadin - INR 1 5 today, dose 7 5 mg coumadin  Continue ASA and Statin therapy  Epicardial pacing wires out  Maintain central IV access today for meds  Continue DVT prophylaxis    2  Pulmonary:   Oxygen-dependent pulmonary insufficiency: Continue incentive spirometry/coughing/deep breathing exercises  Wean supplemental oxygen as tolerated for saturation > 90%  Chest tubes have been discontinued  spo2 this AM 70s-80s, try different spot for      3  Renal:   Intake/Output net: - 2 mL/24 hours  Continue diuresis   Lasix 40 mg IV q 8, decrease to daily   Potassium Chloride 20 mEq PO q daily, increase to 40 BID    Replete K today   Post op Creatinine stable; Follow up labs prn    4  Neuro:  Neurologically intact; No active issues  Incisional pain well-controlled; Continue prn Percocet    5  GI:  Tolerating TLC 2 3 gm sodium diet, with consistent carbohydrate modifier  Maintain 1800 mL daily fluid restriction   Continue stool softeners and prn suppository  Continue GI prophylaxis    6  Endo:    History of diabetes; Continue SQ insulin therapy as directed by endocrinology physician    Insulin administration PNEUMOCOCCAL CONJUGATE 13 VALENT VACC(PREVNAR-13)          Off omeprazole  Weight is stable     Reviewed 7/13/2020 GI Dr. Delaney note - f/u CT pancreas was ordered  8/5/2020 report, compared to 4/8/2020 showed normal pancreas, 2 liver lesions 1 likely benign unchanged since 2012 likely AV malformation or fistula, and a new likely pseudolesion  Saw GI Dr. Delaney again on 11/11/2020 - note reviewed, no further follow-up is indicated from the GI perspective.    I recommended a follow-up with general surgery  to consider gallbladder resection; she was seen by Dr. Rizo during the hospital stay in April 2020. Discussed this again today, deferred by family as she is doing well; will continue to monitor     Continue present medications  And vitamin D 4000 IU daily     6/25/2020 labs reviewed: Ca19-9, LFTs, CBC, creatinine  2019 normal celiac test    Labs were ordered as above  Off fish oil, lipids ordered    She got her 2 COVID vaccines  Will give Prevnar-13 today    Not much earwax on examination today, will re-assess next time    Schedule follow up: early October for 4-month medication review and flu shot    So De Dios MD       For more information about Down syndrome, please take a look at our Resource page on our website:  [email or web address]    Discussed treatment options, plan with patient/caregiver and all questions answered. Patient/caregiver verbalized understanding and agreement with plan.   The patient/caregiver was instructed to call if any questions or problems about concerns addressed today.    Total time required for this encounter: 33 minutes including pre-charting/evaluation of medical records, review of results, face-to-face contact with patient, medical decision making, discussion with patient and/or caregiver, and documentation.   teaching for home therapy ordered  insulint eachign today     7  Hematology:   Post-operative acute blood loss anemia; Hemoglobin and hematocrit stable; trend prn    8   Disposition:  Follow daily PT/OT recommendations regarding home vs  rehab when medically cleared for discharge, Anticipate discharge to home once heparin gtt theraputic      VTE Pharmacologic Prophylaxis: Heparin  VTE Mechanical Prophylaxis: sequential compression device    Collaborative rounds completed with ALPHONSE Mcgarry , and katty RN    SIGNATURE: Justyna Hill PA-C  DATE: September 12, 2017  TIME: 7:54 AM

## 2021-06-08 ENCOUNTER — OFFICE VISIT (OUTPATIENT)
Dept: FAMILY MEDICINE CLINIC | Facility: CLINIC | Age: 76
End: 2021-06-08
Payer: COMMERCIAL

## 2021-06-08 VITALS
HEIGHT: 65 IN | BODY MASS INDEX: 39.85 KG/M2 | DIASTOLIC BLOOD PRESSURE: 64 MMHG | HEART RATE: 68 BPM | TEMPERATURE: 97.8 F | WEIGHT: 239.2 LBS | OXYGEN SATURATION: 97 % | SYSTOLIC BLOOD PRESSURE: 110 MMHG

## 2021-06-08 DIAGNOSIS — E78.5 HYPERLIPIDEMIA, UNSPECIFIED HYPERLIPIDEMIA TYPE: Chronic | ICD-10-CM

## 2021-06-08 DIAGNOSIS — Z12.12 SCREENING FOR COLORECTAL CANCER: ICD-10-CM

## 2021-06-08 DIAGNOSIS — Z12.11 SCREENING FOR COLORECTAL CANCER: ICD-10-CM

## 2021-06-08 DIAGNOSIS — I10 ESSENTIAL HYPERTENSION: Chronic | ICD-10-CM

## 2021-06-08 DIAGNOSIS — E11.9 TYPE 2 DIABETES MELLITUS WITHOUT COMPLICATION, UNSPECIFIED WHETHER LONG TERM INSULIN USE (HCC): Primary | ICD-10-CM

## 2021-06-08 DIAGNOSIS — I48.0 PAROXYSMAL ATRIAL FIBRILLATION (HCC): Chronic | ICD-10-CM

## 2021-06-08 PROCEDURE — 3074F SYST BP LT 130 MM HG: CPT | Performed by: FAMILY MEDICINE

## 2021-06-08 PROCEDURE — 3078F DIAST BP <80 MM HG: CPT | Performed by: FAMILY MEDICINE

## 2021-06-08 PROCEDURE — 1160F RVW MEDS BY RX/DR IN RCRD: CPT | Performed by: FAMILY MEDICINE

## 2021-06-08 PROCEDURE — 1036F TOBACCO NON-USER: CPT | Performed by: FAMILY MEDICINE

## 2021-06-08 PROCEDURE — 99214 OFFICE O/P EST MOD 30 MIN: CPT | Performed by: FAMILY MEDICINE

## 2021-06-08 PROCEDURE — 3725F SCREEN DEPRESSION PERFORMED: CPT | Performed by: FAMILY MEDICINE

## 2021-06-08 PROCEDURE — 3008F BODY MASS INDEX DOCD: CPT | Performed by: FAMILY MEDICINE

## 2021-06-08 NOTE — PROGRESS NOTES
Assessment/Plan:  Chief Complaint   Patient presents with    Follow-up     4 month check      Patient Instructions   Follow-up (14 wk follow up ),  elevate legs to help with lower extremity edema, possibly from amlodipine, elevate legs as directed  Patient not interested in  covid vaccination today  HTN stable  Low cholesterol diet and low sugar diet encouraged and f-up with urology for hx of prostate cancer  F-up with cardiothoracic surgeon as directed for hx of aortic repair  Stay well hydrated and monitor for ticks  Did not get labs for today's office visit  Get labs as directed prior to next office visit  No problem-specific Assessment & Plan notes found for this encounter  Diagnoses and all orders for this visit:    Type 2 diabetes mellitus without complication, unspecified whether long term insulin use (HCC)  -     Hemoglobin A1C ([facility]); Future  -     Ambulatory Referral to Ophthalmology; Future  -     Comprehensive metabolic panel; Future  -     Hemoglobin A1C    Screening for colorectal cancer  -     Ambulatory referral to Gastroenterology; Future    Essential hypertension  -     Comprehensive metabolic panel; Future    Hyperlipidemia, unspecified hyperlipidemia type  -     Comprehensive metabolic panel; Future    Paroxysmal atrial fibrillation (HCC)          Subjective:      Patient ID: Sabas Jackson  is a 77 y o  male  Follow-up (4 month check ) here for recheck and doing well and sees urology and cardiothoracic surgeon as directed for hx of ascending aortic repair  The following portions of the patient's history were reviewed and updated as appropriate: allergies, current medications, past family history, past medical history, past social history, past surgical history and problem list     Review of Systems   Constitutional: Negative  HENT: Negative  Eyes: Negative  Respiratory: Negative  Cardiovascular: Negative  Gastrointestinal: Negative  Endocrine: Negative  Genitourinary: Negative  Musculoskeletal: Negative  Skin: Negative  Allergic/Immunologic: Negative  Neurological: Negative  Hematological: Negative  Psychiatric/Behavioral: Negative  Objective:      /64   Pulse 68   Temp 97 8 °F (36 6 °C) (Temporal)   Ht 5' 5" (1 651 m)   Wt 109 kg (239 lb 3 2 oz)   SpO2 97%   BMI 39 80 kg/m²          Physical Exam  Constitutional:       Appearance: He is well-developed  He is obese  HENT:      Head: Normocephalic and atraumatic  Right Ear: External ear normal       Left Ear: External ear normal       Nose: Nose normal    Eyes:      Conjunctiva/sclera: Conjunctivae normal       Pupils: Pupils are equal, round, and reactive to light  Neck:      Musculoskeletal: Normal range of motion and neck supple  Cardiovascular:      Rate and Rhythm: Normal rate and regular rhythm  Heart sounds: Normal heart sounds  Pulmonary:      Effort: Pulmonary effort is normal       Breath sounds: Normal breath sounds  Musculoskeletal: Normal range of motion  Skin:     General: Skin is warm and dry  Neurological:      Mental Status: He is alert and oriented to person, place, and time  Deep Tendon Reflexes: Reflexes are normal and symmetric     Psychiatric:         Behavior: Behavior normal

## 2021-06-08 NOTE — PROGRESS NOTES
Patient's shoes and socks removed  Right Foot/Ankle   Right Foot Inspection  Skin Exam: skin normal, skin intact, dry skin, callus and callus no warmth, no erythema, no maceration, no abnormal color, no pre-ulcer and no ulcer                          Toe Exam: swelling  Sensory       Monofilament testing: intact  Vascular  Capillary refills: elevated  The right DP pulse is 1+  The right PT pulse is 1+  Left Foot/Ankle  Left Foot Inspection  Skin Exam: skin normal, skin intact, dry skin and callusno warmth, no erythema, no maceration, normal color, no pre-ulcer and no ulcer                         Toe Exam: swelling                   Sensory       Monofilament: intact  Vascular  Capillary refills: elevated  The left DP pulse is 1+  The left PT pulse is 1+  Assign Risk Category:  No deformity present;  No loss of protective sensation; Weak pulses       Risk: 0

## 2021-06-08 NOTE — PATIENT INSTRUCTIONS
Follow-up (14 wk follow up ),  elevate legs to help with lower extremity edema, possibly from amlodipine, elevate legs as directed  Patient not interested in  covid vaccination today  HTN stable  Low cholesterol diet and low sugar diet encouraged and f-up with urology for hx of prostate cancer  F-up with cardiothoracic surgeon as directed for hx of aortic repair  Stay well hydrated and monitor for ticks  Did not get labs for today's office visit  Get labs as directed prior to next office visit

## 2021-06-10 DIAGNOSIS — I10 HTN (HYPERTENSION), BENIGN: ICD-10-CM

## 2021-06-10 RX ORDER — METOPROLOL TARTRATE 100 MG/1
TABLET ORAL
Qty: 180 TABLET | Refills: 1 | Status: SHIPPED | OUTPATIENT
Start: 2021-06-10 | End: 2021-12-03

## 2021-06-16 ENCOUNTER — HOSPITAL ENCOUNTER (OUTPATIENT)
Dept: NON INVASIVE DIAGNOSTICS | Facility: CLINIC | Age: 76
Discharge: HOME | End: 2021-06-16
Payer: COMMERCIAL

## 2021-06-16 DIAGNOSIS — I71.01 CHRONIC THORACIC AORTIC DISSECTION (HCC): ICD-10-CM

## 2021-06-16 DIAGNOSIS — Z95.828 S/P VASCULAR BYPASS: Chronic | ICD-10-CM

## 2021-06-16 PROCEDURE — 93880 EXTRACRANIAL BILAT STUDY: CPT | Performed by: SURGERY

## 2021-06-16 PROCEDURE — 93880 EXTRACRANIAL BILAT STUDY: CPT

## 2021-07-14 ENCOUNTER — OFFICE VISIT (OUTPATIENT)
Dept: VASCULAR SURGERY | Facility: CLINIC | Age: 76
End: 2021-07-14
Payer: COMMERCIAL

## 2021-07-14 VITALS
HEART RATE: 83 BPM | HEIGHT: 65 IN | SYSTOLIC BLOOD PRESSURE: 120 MMHG | DIASTOLIC BLOOD PRESSURE: 72 MMHG | BODY MASS INDEX: 39.99 KG/M2 | WEIGHT: 240 LBS

## 2021-07-14 DIAGNOSIS — I71.2 AORTIC ARCH ANEURYSM (HCC): ICD-10-CM

## 2021-07-14 DIAGNOSIS — Z95.828 S/P VASCULAR BYPASS: Chronic | ICD-10-CM

## 2021-07-14 DIAGNOSIS — I71.01 CHRONIC THORACIC AORTIC DISSECTION (HCC): Primary | ICD-10-CM

## 2021-07-14 DIAGNOSIS — E78.5 HYPERLIPIDEMIA, UNSPECIFIED HYPERLIPIDEMIA TYPE: Chronic | ICD-10-CM

## 2021-07-14 DIAGNOSIS — I10 ESSENTIAL HYPERTENSION: Chronic | ICD-10-CM

## 2021-07-14 DIAGNOSIS — Z98.890 S/P ASCENDING AORTIC ANEURYSM REPAIR: Chronic | ICD-10-CM

## 2021-07-14 DIAGNOSIS — Z98.890 H/O REPAIR OF DISSECTING ANEURYSM OF DESCENDING THORACIC AORTA: ICD-10-CM

## 2021-07-14 DIAGNOSIS — Z86.79 H/O REPAIR OF DISSECTING ANEURYSM OF DESCENDING THORACIC AORTA: ICD-10-CM

## 2021-07-14 DIAGNOSIS — Z86.79 S/P ASCENDING AORTIC ANEURYSM REPAIR: Chronic | ICD-10-CM

## 2021-07-14 PROCEDURE — 3074F SYST BP LT 130 MM HG: CPT | Performed by: PHYSICIAN ASSISTANT

## 2021-07-14 PROCEDURE — 99214 OFFICE O/P EST MOD 30 MIN: CPT | Performed by: PHYSICIAN ASSISTANT

## 2021-07-14 PROCEDURE — 3078F DIAST BP <80 MM HG: CPT | Performed by: PHYSICIAN ASSISTANT

## 2021-07-14 PROCEDURE — 1036F TOBACCO NON-USER: CPT | Performed by: PHYSICIAN ASSISTANT

## 2021-07-14 PROCEDURE — 3008F BODY MASS INDEX DOCD: CPT | Performed by: PHYSICIAN ASSISTANT

## 2021-07-14 PROCEDURE — 1160F RVW MEDS BY RX/DR IN RCRD: CPT | Performed by: PHYSICIAN ASSISTANT

## 2021-07-14 NOTE — PROGRESS NOTES
Assessment/Plan:    Chronic thoracic aortic dissection [facility])  78-year-old male nonsmoker w/HTN, HLD, PAF on no anticoagulation, type 2 DM, prostate cancer, type A aortic dissection, s/p ascending aortic repair with hemiarch reconstruction by Dr Suraj Haines 9/6/2017 and subsequent chronic thoracic aortic dissection w/aneurysmal dilatation of arch/proximal descending thoracic aorta, s/p L CCA-subclavian bypass w/[place]-Behzad and embolization of proximal L subclavian artery by Dr Shad Kaur 8/13/2018 followed by TEVAR 9/19/2018 by Chirag Camacho/Cassidy presents for review of surveillance imaging and risk factor modification   -carotid duplex  6/16/2021 reviewed and stable/unchanged with widely patent L CCA-subclavian bypass with no significant carotid stenosis  -CTA 4/10/2021 reviewed w/patent L CCA -subclavian bypass, good endograft position but slight increase in false lumen size from 3 1-4 3 with concern for endoleak  stability of chronic dissection extending to the distal abdominal aorta at the celiac artery  Celiac, SMA, CONNOR right renal artery patent and originate off the true lumen with left renal artery originating off the false lumen and patent     -Patient remains asymptomatic  +palpable L radial, ulnar and bilateral distal pulses  -continue surveillance L CCA-subclavian bypass with carotid duplex  Will obtain carotid duplex in 1 year  -continue follow-up with cardiac surgery in 1 year for postoperative surveillance of TEVAR and chronic dissection    Repeat CTA planned for 1 year  -return to office with Dr Shad Kaur in 1 year with carotid duplex for routine follow-up  -continue aspirin and statin therapy  -instructed to contact the office with new concerns or symptoms    Hypertension  -BP well controlled  -continue current medical regimen  -management per PCP    Hyperlipidemia  -stable  -continue statin therapy  -management per PCP       Diagnoses and all orders for this visit:    Chronic thoracic aortic dissection (HCC)  -     VAS carotid complete study; Future    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HCC)  -     VAS carotid complete study; Future    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  -     VAS carotid complete study; Future    Essential hypertension    Hyperlipidemia, unspecified hyperlipidemia type    S/P ascending aortic aneurysm repair    S/P vascular bypass          Subjective:      Patient ID: Jenna Tyler  is a 77 y o  male  Patient presents today for his one year [facility] follow up to review the CV done on 6/16/21  He denies all TIA/CVA symptoms  Patient states he has no concerns or any new symptoms at this time  He does report RLE x 1 year and does elevate his legs  Patient is taking ASA 81 and Atorvastatin  23-year-old male nonsmoker w/HTN, HLD, PAF on no anticoagulation, type 2 DM, prostate cancer, type A aortic dissection, s/p ascending aortic repair with hemiarch reconstruction by Dr Ilana Can 9/6/2017 and subsequent chronic thoracic aortic dissection w/aneurysmal dilatation of arch/proximal descending thoracic aorta, s/p L CCA-subclavian bypass w/[place]-Behzad and embolization of proximal L subclavian artery by Dr Shar Amador 8/13/2018 followed by TEVAR 9/19/2018 by Chirag Camacho/Cassidy presents for review of surveillance imaging and risk factor modification  Patient last seen in the office 6/16/2020 and continues to do well from a vascular surgery standpoint  Recent carotid duplex  6/16/2021 reviewed and stable/unchanged with widely patent L CCA-subclavian bypass with no significant carotid stenosis  CTA 4/10/2021 reviewed w/patent L CCA -subclavian bypass, good endograft position but slight increase in false lumen size from 3 1-4 3 with concern for endoleak  stability of chronic dissection extending to the distal abdominal aorta at the celiac artery    Celiac, SMA, CONNOR right renal artery patent and originate off the true lumen with left renal artery originating off the false lumen and patent  Patient denies left upper extremity claudication, rest pain, tissue loss, weakness, numbness or skin temperature/color changes  Patient remains on aspirin and statin  Patient seen by cardiac surgery 4/10/2021 with recommendations for repeat CTA in 1 year  The following portions of the patient's history were reviewed and updated as appropriate: allergies, current medications, past family history, past medical history, past social history, past surgical history and problem list     Review of Systems   Constitutional: Negative  HENT: Negative  Eyes: Positive for visual disturbance ("floaters")  Respiratory: Positive for cough  Cardiovascular: Positive for leg swelling  Gastrointestinal: Positive for diarrhea  Endocrine: Negative  Genitourinary: Negative  Musculoskeletal: Positive for back pain, gait problem and neck pain  Skin: Negative  Allergic/Immunologic: Negative  Hematological: Bruises/bleeds easily  Psychiatric/Behavioral: The patient is nervous/anxious  I have reviewed and made appropriate changes to the review of systems input by the medical assistant      Vitals:    07/14/21 1330 07/14/21 1332   BP: 122/70 120/72   BP Location: Left arm Right arm   Patient Position: Sitting Sitting   Cuff Size: Adult Adult   Pulse: 83    Weight: 109 kg (240 lb)    Height: 5' 5" (1 651 m)        Patient Active Problem List   Diagnosis    Prostate cancer ([facility] [address]    Hypertension    Diabetes mellitus ([facility] [address]    Chronic thoracic aortic dissection (HCC)    S/P ascending aortic aneurysm repair    Hypokalemia    Paroxysmal atrial fibrillation (HCC)    KARIN (obstructive sleep apnea)    Class 2 severe obesity due to excess calories with serious comorbidity and body mass index (BMI) of 37 0 to 37 9 in adult [facility])    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HCC)    Hyperlipidemia    Adenomatous polyp of descending colon    S/P vascular bypass    Descending aortic aneurysm (HCC)    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 (TEVAR)    Encounter for postoperative care    Edema       Past Surgical History:   Procedure Laterality Date    CARDIAC SURGERY      CIRCUMCISION      COLONOSCOPY      IR CEREBRAL ANGIOGRAPHY / INTERVENTION  8/13/2018    IR TEVAR  9/19/2018    RI ASCEND AORTA GRAFT W ROOT REPLACMENT  VALVE CONDUIT/CORON RECONSTRUCT N/A 9/6/2017    Procedure: ASCENDING AORTIC REPAIR WITH A 26MM  GELWEAVE STRAIGHT GRAFT AND HEMIARCH WITH 8MM SIDE ARM BRANCH;  Surgeon: Odette Parikh MD;  [facility];  Service: Cardiac Surgery    RI ENDOVASC TAA REINCL SUBCL N/A 9/19/2018    Procedure: REPAIR ANEURSYM THORACIC ENDOVASCUALR DESCENDING AORTIC ANEURYSM (TEVAR); Surgeon: Luz Smith MD;  [facility];  Service: Cardiac Surgery    RI EXPLOR POSTOP BLEED,INFEC,CLOT-CHST N/A 9/6/2017    Procedure: RE-EXPLORATION MEDIASTERNAL CAVITY FOR POST-OP BLEED (BRING BACK); Surgeon: Odette Parikh MD;  [facility];  Service: Cardiac Surgery    RI VEIN BYPASS GRAFT,CAROTID-BRACHIAL Left 8/13/2018    Procedure: BYPASS CAROTID SUBCLAVIAN WITH EMBOLIZATION OF PROXIMAL SUBCLAVIAN ARTERY  ;  Surgeon: Jayla Thrasher MD;  [facility];  Service: Vascular       Family History   Problem Relation Age of Onset    Stroke Mother         complications    Hypertension Mother    Johnson Trent Arthritis Mother     No Known Problems Father        Social History     Socioeconomic History    Marital status:       Spouse name: Not on file    Number of children: Not on file    Years of education: Not on file    Highest education level: Not on file   Occupational History     Comment: retired   Tobacco Use    Smoking status: Never Smoker    Smokeless tobacco: Never Used   Vaping Use    Vaping Use: Never used   Substance and Sexual Activity    Alcohol use: Yes     Comment: social beer or two daily    Drug use: No    Sexual activity: Never     Comment: denied hx of sexually active high risk   Other Topics Concern    Not on file   Social History Narrative            1 serving caffeine/day    Less than high school diploma    Person living alone    Participate in moderate activities both inside and outside of the home     Social Determinants of Health     Financial Resource Strain:     Difficulty of Paying Living Expenses:    Food Insecurity:     Worried About Running Out of Food in the Last Year:     [address] in the Last Year:    Transportation Needs:     Lack of Transportation (Medical):      Lack of Transportation (Non-Medical):    Physical Activity:     Days of Exercise per Week:     Minutes of Exercise per Session:    Stress:     Feeling of Stress :    Social Connections:     Frequency of Communication with Friends and Family:     Frequency of Social Gatherings with Friends and Family:     Attends Mu-ism Services:     Active Member of Clubs or Organizations:     Attends Club or Organization Meetings:     Marital Status:    Intimate Partner Violence:     Fear of Current or Ex-Partner:     Emotionally Abused:     Physically Abused:     Sexually Abused:        No Known Allergies      Current Outpatient Medications:     acetaminophen (TYLENOL) 325 mg tablet, Take 2 tablets (650 mg total) by mouth every 6 (six) hours as needed for mild pain, Disp: , Rfl:     amLODIPine (NORVASC) 10 mg tablet, Take 1 tablet (10 mg total) by mouth 2 (two) times a day, Disp: 180 tablet, Rfl: 2    aspirin 81 mg chewable tablet, Chew 1 tablet daily, Disp: 30 tablet, Rfl: 0    atorvastatin (LIPITOR) 80 mg tablet, TAKE 1 TABLET BY MOUTH EVERY DAY, Disp: 90 tablet, Rfl: 5    B-D ULTRAFINE III SHORT PEN 31G X 8 MM MISC, USE AS DIRECTED , Disp: 100 each, Rfl: 3    finasteride (PROSCAR) 5 mg tablet, Take 5 mg by mouth daily, Disp: , Rfl:     HUMALOG KWIKPEN 100 units/mL injection pen, INJECT 8 UNIT 3 TIMES DAILY BEFORE MEALS, Disp: 15 pen, Rfl: 5    insulin glargine (LANTUS) 100 units/mL subcutaneous injection, Inject 28 Units under the skin daily at bedtime Dx: Diabetes E11 9, dispense pens, Disp: 10 mL, Rfl: 0    Insulin Pen Needle (B-D ULTRAFINE III SHORT PEN) 31G X 8 MM MISC, USE AS DIRECTED, Disp: 100 each, Rfl: 5    lisinopril-hydrochlorothiazide (PRINZIDE,ZESTORETIC) 20-12 5 MG per tablet, TAKE 2 TABLETS BY MOUTH EVERY DAY, Disp: 180 tablet, Rfl: 3    metoprolol tartrate (LOPRESSOR) 100 mg tablet, TAKE 1 TABLET BY MOUTH TWICE A DAY, Disp: 180 tablet, Rfl: 1    docusate sodium (COLACE) 100 mg capsule, Take 100 mg by mouth 2 (two) times a day (Patient not taking: Reported on 7/14/2021), Disp: , Rfl:     insulin lispro (HumaLOG) 100 units/mL injection, Inject 8 Units under the skin 3 (three) times a day before meals Dx: Diabetes E11 9  Dispense pens (Patient not taking: Reported on 7/14/2021), Disp: 8 mL, Rfl: 0    Lantus SoloStar 100 units/mL injection pen, INJECT 28 UNIT BEDTIME (Patient not taking: Reported on 7/14/2021), Disp: 5 pen, Rfl: 17    Objective:  Imaging Study:  Carotid duplex 6/16/21:   imaging study reviewed and as described above  See full report below:   Right                      Impression  PSV  EDV (cm/s)  Ratio    Dist  ICA                               49           8   0 71    Mid  ICA                               108          18   1 55    Prox   ICA                  1 - 49%      46           9   0 66    Dist CCA                                65          12           Mid CCA                                 70          13   0 87    Prox CCA                                81          12           Ext Carotid                            103           0   1 48    Prox Vert                               47           0           Subclavian                             140           0              Left                       Impression  PSV  EDV (cm/s)  Ratio    Dist  ICA                               44           9   0 39    Inflow Anastomosis 325                       Mid  ICA                                49          12   0 43    Prox  ICA                  1 - 49%      46          10   0 41    Dist CCA                                81           0           Graft Mid  3rd                         262                       Mid CCA                                113          10   0 73    Prox CCA                               155           0           Graft Outflow Anastomosis              212                       Ext Carotid                             81           0   0 72    Prox Vert                               23           6           Subclavian                             149           0                    CONCLUSION:     Impression  RIGHT:  There is <50% stenosis noted in the internal carotid artery  Plaque is  heterogenous and smooth  Vertebral artery flow is antegrade  There is no significant subclavian artery disease  LEFT:  Patent common carotid to subclavian artery bypass graft  There is no evidence of arterial disease in the internal carotid artery and  external carotid artery     Vertebral artery flow is antegrade  In comparison to the study of 6/3/2020 , there is no significant change in the  disease process  /72 (BP Location: Right arm, Patient Position: Sitting, Cuff Size: Adult)   Pulse 83   Ht 5' 5" (1 651 m)   Wt 109 kg (240 lb)   BMI 39 94 kg/m²          Physical Exam  Vitals and nursing note reviewed  Constitutional:       General: He is not in acute distress  Appearance: He is well-developed  HENT:      Head: Normocephalic and atraumatic  Eyes:      General: No scleral icterus  Conjunctiva/sclera: Conjunctivae normal       Pupils: Pupils are equal, round, and reactive to light  Neck:      Thyroid: No thyromegaly  Vascular: No carotid bruit or JVD  Trachea: No tracheal deviation  Comments: Well-healed left infraclavicular surgical scar   +soft bruit over Left infraclavicular space  Cardiovascular:      Rate and Rhythm: Normal rate and regular rhythm  Pulses:           Carotid pulses are 2+ on the right side and 2+ on the left side  Radial pulses are 2+ on the right side and 2+ on the left side  Heart sounds: S1 normal and S2 normal  Murmur (soft II/VI systolic murmur auscultated at right sternal border and radiating to bilateral carotids) heard  No friction rub  No gallop  No S3 sounds  Pulmonary:      Effort: No respiratory distress  Breath sounds: Normal breath sounds  No stridor  No wheezing, rhonchi or rales  Abdominal:      General: Bowel sounds are normal  There is no distension or abdominal bruit  Palpations: Abdomen is soft  There is no mass or pulsatile mass  Tenderness: There is no abdominal tenderness  There is no rebound  Musculoskeletal:         General: No deformity  Normal range of motion  Cervical back: Normal range of motion and neck supple  Right lower le+ Edema present  Left lower le+ Edema present  Comments: Left upper extremity warm, pink, well perfused and motor and sensory intact  Muscle strength 5/5  Brisk capillary refill  2+ radial pulses bilaterally  No cyanosis, pallor tissue loss left hand  Skin:     General: Skin is warm and dry  Capillary Refill: Capillary refill takes less than 2 seconds  Coloration: Skin is not pale  Findings: No erythema or lesion  Neurological:      General: No focal deficit present  Mental Status: He is alert and oriented to person, place, and time  Sensory: No sensory deficit  Motor: No weakness     Psychiatric:         Mood and Affect: Mood normal

## 2021-07-14 NOTE — PATIENT INSTRUCTIONS
-your recent carotid ultrasound demonstrates widely patent left carotid to subclavian artery bypass graft  No significant narrowing of the carotid arteries on either side    -recommend continuing surveillance with carotid ultrasound yearly  -return to office in 1 year with Dr Emigdio Muse for routine follow-up  -continue aspirin and atorvastatin  -please contact the office in the interim with any questions, concerns or new symptoms  -continue follow-up with cardiac surgery as directed

## 2021-07-14 NOTE — ASSESSMENT & PLAN NOTE
73-year-old male nonsmoker w/HTN, HLD, PAF on no anticoagulation, type 2 DM, prostate cancer, type A aortic dissection, s/p ascending aortic repair with hemiarch reconstruction by Dr Ilana Can 9/6/2017 and subsequent chronic thoracic aortic dissection w/aneurysmal dilatation of arch/proximal descending thoracic aorta, s/p L CCA-subclavian bypass w/[place]-Behzad and embolization of proximal L subclavian artery by Dr Shar Amador 8/13/2018 followed by TEVAR 9/19/2018 by Chirag Camacho/Cassidy presents for review of surveillance imaging and risk factor modification   -carotid duplex  6/16/2021 reviewed and stable/unchanged with widely patent L CCA-subclavian bypass with no significant carotid stenosis  -CTA 4/10/2021 reviewed w/patent L CCA -subclavian bypass, good endograft position but slight increase in false lumen size from 3 1-4 3 with concern for endoleak  stability of chronic dissection extending to the distal abdominal aorta at the celiac artery  Celiac, SMA, CONNOR right renal artery patent and originate off the true lumen with left renal artery originating off the false lumen and patent     -Patient remains asymptomatic  +palpable L radial, ulnar and bilateral distal pulses  -continue surveillance L CCA-subclavian bypass with carotid duplex  Will obtain carotid duplex in 1 year  -continue follow-up with cardiac surgery in 1 year for postoperative surveillance of TEVAR and chronic dissection    Repeat CTA planned for 1 year  -return to office with Dr Shar Amador in 1 year with carotid duplex for routine follow-up  -continue aspirin and statin therapy  -instructed to contact the office with new concerns or symptoms

## 2021-07-17 DIAGNOSIS — I10 HTN (HYPERTENSION), BENIGN: ICD-10-CM

## 2021-07-19 RX ORDER — LISINOPRIL AND HYDROCHLOROTHIAZIDE 20; 12.5 MG/1; MG/1
TABLET ORAL
Qty: 180 TABLET | Refills: 3 | Status: SHIPPED | OUTPATIENT
Start: 2021-07-19 | End: 2022-07-20

## 2021-09-13 ENCOUNTER — APPOINTMENT (OUTPATIENT)
Dept: LAB | Facility: CLINIC | Age: 76
End: 2021-09-13
Payer: COMMERCIAL

## 2021-09-13 DIAGNOSIS — E78.5 HYPERLIPIDEMIA, UNSPECIFIED HYPERLIPIDEMIA TYPE: Chronic | ICD-10-CM

## 2021-09-13 DIAGNOSIS — I10 ESSENTIAL HYPERTENSION: Chronic | ICD-10-CM

## 2021-09-13 DIAGNOSIS — E11.9 TYPE 2 DIABETES MELLITUS WITHOUT COMPLICATION, UNSPECIFIED WHETHER LONG TERM INSULIN USE (HCC): ICD-10-CM

## 2021-09-13 LAB
ALBUMIN SERPL BCP-MCNC: 3.3 G/DL (ref 3.5–5)
ALP SERPL-CCNC: 88 U/L (ref 46–116)
ALT SERPL W P-5'-P-CCNC: 28 U/L (ref 12–78)
ANION GAP SERPL CALCULATED.3IONS-SCNC: 7 MMOL/L (ref 4–13)
AST SERPL W P-5'-P-CCNC: 18 U/L (ref 5–45)
BILIRUB SERPL-MCNC: 0.83 MG/DL (ref 0.2–1)
BUN SERPL-MCNC: 12 MG/DL (ref 5–25)
CALCIUM ALBUM COR SERPL-MCNC: 9.7 MG/DL (ref 8.3–10.1)
CALCIUM SERPL-MCNC: 9.1 MG/DL (ref 8.3–10.1)
CHLORIDE SERPL-SCNC: 98 MMOL/L (ref 100–108)
CO2 SERPL-SCNC: 29 MMOL/L (ref 21–32)
CREAT SERPL-MCNC: 0.76 MG/DL (ref 0.6–1.3)
EST. AVERAGE GLUCOSE BLD GHB EST-MCNC: 166 MG/DL
GFR SERPL CREATININE-BSD FRML MDRD: 89 ML/MIN/1.73SQ M
GLUCOSE P FAST SERPL-MCNC: 144 MG/DL (ref 65–99)
HBA1C MFR BLD: 7.4 %
POTASSIUM SERPL-SCNC: 3 MMOL/L (ref 3.5–5.3)
PROT SERPL-MCNC: 7.6 G/DL (ref 6.4–8.2)
SODIUM SERPL-SCNC: 134 MMOL/L (ref 136–145)

## 2021-09-13 PROCEDURE — 83036 HEMOGLOBIN GLYCOSYLATED A1C: CPT | Performed by: FAMILY MEDICINE

## 2021-09-13 PROCEDURE — 3051F HG A1C>EQUAL 7.0%<8.0%: CPT | Performed by: FAMILY MEDICINE

## 2021-09-13 PROCEDURE — 80053 COMPREHEN METABOLIC PANEL: CPT

## 2021-09-13 PROCEDURE — 36415 COLL VENOUS BLD VENIPUNCTURE: CPT | Performed by: FAMILY MEDICINE

## 2021-09-14 ENCOUNTER — OFFICE VISIT (OUTPATIENT)
Dept: FAMILY MEDICINE CLINIC | Facility: CLINIC | Age: 76
End: 2021-09-14
Payer: COMMERCIAL

## 2021-09-14 VITALS
TEMPERATURE: 97 F | DIASTOLIC BLOOD PRESSURE: 62 MMHG | HEART RATE: 67 BPM | SYSTOLIC BLOOD PRESSURE: 110 MMHG | HEIGHT: 65 IN | BODY MASS INDEX: 39.32 KG/M2 | WEIGHT: 236 LBS | OXYGEN SATURATION: 97 %

## 2021-09-14 DIAGNOSIS — E87.6 LOW SERUM POTASSIUM: ICD-10-CM

## 2021-09-14 DIAGNOSIS — R60.9 EDEMA, UNSPECIFIED TYPE: ICD-10-CM

## 2021-09-14 DIAGNOSIS — I48.0 PAROXYSMAL ATRIAL FIBRILLATION (HCC): Chronic | ICD-10-CM

## 2021-09-14 DIAGNOSIS — E11.9 TYPE 2 DIABETES MELLITUS WITHOUT COMPLICATION, UNSPECIFIED WHETHER LONG TERM INSULIN USE (HCC): ICD-10-CM

## 2021-09-14 DIAGNOSIS — Z12.11 SCREENING FOR COLORECTAL CANCER: ICD-10-CM

## 2021-09-14 DIAGNOSIS — Z12.12 SCREENING FOR COLORECTAL CANCER: ICD-10-CM

## 2021-09-14 DIAGNOSIS — E78.5 HYPERLIPIDEMIA, UNSPECIFIED HYPERLIPIDEMIA TYPE: Chronic | ICD-10-CM

## 2021-09-14 DIAGNOSIS — I10 ESSENTIAL HYPERTENSION: Primary | Chronic | ICD-10-CM

## 2021-09-14 PROCEDURE — 3008F BODY MASS INDEX DOCD: CPT | Performed by: FAMILY MEDICINE

## 2021-09-14 PROCEDURE — 99214 OFFICE O/P EST MOD 30 MIN: CPT | Performed by: FAMILY MEDICINE

## 2021-09-14 PROCEDURE — 3074F SYST BP LT 130 MM HG: CPT | Performed by: FAMILY MEDICINE

## 2021-09-14 PROCEDURE — 1036F TOBACCO NON-USER: CPT | Performed by: FAMILY MEDICINE

## 2021-09-14 PROCEDURE — 3078F DIAST BP <80 MM HG: CPT | Performed by: FAMILY MEDICINE

## 2021-09-14 PROCEDURE — 1160F RVW MEDS BY RX/DR IN RCRD: CPT | Performed by: FAMILY MEDICINE

## 2021-09-14 NOTE — PROGRESS NOTES
Assessment/Plan:  Chief Complaint   Patient presents with    Follow-up     14 week check     Patient Instructions   Follow-up (14 wk follow up ),  elevate legs to help with lower extremity edema, possibly from amlodipine, elevate legs as directed  Patient not interested in covid vaccination today  HTN stable  Low cholesterol diet and low sugar diet encouraged and f-up with urology for hx of prostate cancer  F-up with cardiothoracic surgeon as directed for hx of aortic repair last seen in July 2021  Get labs as directed prior to next office visit  Diabetes stable and HGA1C at 7 4  Take potassium from daily foods as it was low recently  No problem-specific Assessment & Plan notes found for this encounter  Diagnoses and all orders for this visit:    Essential hypertension    Type 2 diabetes mellitus without complication, unspecified whether long term insulin use (HCC)  -     Hemoglobin A1C ([facility]); Future  -     Comprehensive metabolic panel; Future    Hyperlipidemia, unspecified hyperlipidemia type    Edema, unspecified type  -     Ambulatory referral to PT/OT lymphedema therapy; Future    Paroxysmal atrial fibrillation (HCC)    Screening for colorectal cancer  -     Ambulatory referral to Gastroenterology; Future    Low serum potassium          Subjective:      Patient ID: Ava Romeo  is a 77 y o  male  Follow-up (14 week check)      The following portions of the patient's history were reviewed and updated as appropriate: allergies, current medications, past family history, past medical history, past social history, past surgical history and problem list     Review of Systems   Constitutional: Negative  HENT: Negative  Eyes: Negative  Respiratory: Negative  Cardiovascular: Negative  Gastrointestinal: Negative  Endocrine: Negative  Genitourinary: Negative  Musculoskeletal: Negative      Skin:        Swelling in legs b/l right worse than left Allergic/Immunologic: Negative  Neurological: Negative  Hematological: Negative  Psychiatric/Behavioral: Negative  Objective:      /62 (BP Location: Left arm, Patient Position: Sitting, Cuff Size: Large)   Pulse 67   Temp (!) 97 °F (36 1 °C) (Temporal)   Ht 5' 4 75" (1 645 m)   Wt 107 kg (236 lb)   SpO2 97%   BMI 39 58 kg/m²          Physical Exam  Constitutional:       Appearance: He is well-developed  HENT:      Head: Normocephalic and atraumatic  Right Ear: External ear normal       Left Ear: External ear normal       Nose: Nose normal    Eyes:      Conjunctiva/sclera: Conjunctivae normal       Pupils: Pupils are equal, round, and reactive to light  Cardiovascular:      Rate and Rhythm: Normal rate and regular rhythm  Heart sounds: Normal heart sounds  Pulmonary:      Effort: Pulmonary effort is normal       Breath sounds: Normal breath sounds  Musculoskeletal:         General: Swelling present  Normal range of motion  Cervical back: Normal range of motion and neck supple  Right lower leg: Edema present  Left lower leg: Edema present  Skin:     General: Skin is warm and dry  Neurological:      Mental Status: He is alert and oriented to person, place, and time  Deep Tendon Reflexes: Reflexes are normal and symmetric     Psychiatric:         Behavior: Behavior normal

## 2021-09-14 NOTE — PROGRESS NOTES
BMI Counseling: Body mass index is 39 58 kg/m²  The BMI is above normal  Nutrition recommendations include reducing portion sizes, decreasing overall calorie intake, 3-5 servings of fruits/vegetables daily, reducing fast food intake, consuming healthier snacks, decreasing soda and/or juice intake, moderation in carbohydrate intake, increasing intake of lean protein, reducing intake of saturated fat and trans fat and reducing intake of cholesterol  Exercise recommendations include exercising 3-5 times per week

## 2021-09-14 NOTE — PATIENT INSTRUCTIONS
Follow-up (14 wk follow up ),  elevate legs to help with lower extremity edema, possibly from amlodipine, elevate legs as directed  Patient not interested in covid vaccination today  HTN stable  Low cholesterol diet and low sugar diet encouraged and f-up with urology for hx of prostate cancer  F-up with cardiothoracic surgeon as directed for hx of aortic repair last seen in July 2021  Get labs as directed prior to next office visit  Diabetes stable and HGA1C at 7 4  Take potassium from daily foods as it was low recently

## 2021-10-18 DIAGNOSIS — E11.9 TYPE 2 DIABETES MELLITUS WITHOUT COMPLICATION (HCC): ICD-10-CM

## 2021-10-18 RX ORDER — PEN NEEDLE, DIABETIC 31 GX5/16"
NEEDLE, DISPOSABLE MISCELLANEOUS
Qty: 100 EACH | Refills: 5 | Status: SHIPPED | OUTPATIENT
Start: 2021-10-18

## 2021-10-19 RX ORDER — INSULIN GLARGINE 100 [IU]/ML
28 INJECTION, SOLUTION SUBCUTANEOUS
Qty: 10 ML | Refills: 0 | Status: CANCELLED | OUTPATIENT
Start: 2021-10-19

## 2021-10-30 DIAGNOSIS — E11.9 TYPE 2 DIABETES MELLITUS WITHOUT COMPLICATION, UNSPECIFIED WHETHER LONG TERM INSULIN USE (HCC): ICD-10-CM

## 2021-11-01 RX ORDER — INSULIN GLARGINE 100 [IU]/ML
INJECTION, SOLUTION SUBCUTANEOUS
Qty: 15 ML | Refills: 17 | Status: SHIPPED | OUTPATIENT
Start: 2021-11-01

## 2021-11-12 DIAGNOSIS — E78.5 DYSLIPIDEMIA: ICD-10-CM

## 2021-11-17 RX ORDER — ATORVASTATIN CALCIUM 80 MG/1
TABLET, FILM COATED ORAL
Qty: 90 TABLET | Refills: 5 | Status: SHIPPED | OUTPATIENT
Start: 2021-11-17 | End: 2022-04-26 | Stop reason: SDUPTHER

## 2021-12-02 ENCOUNTER — APPOINTMENT (OUTPATIENT)
Dept: LAB | Facility: CLINIC | Age: 76
End: 2021-12-02
Payer: COMMERCIAL

## 2021-12-02 DIAGNOSIS — E11.9 TYPE 2 DIABETES MELLITUS WITHOUT COMPLICATION, UNSPECIFIED WHETHER LONG TERM INSULIN USE (HCC): ICD-10-CM

## 2021-12-02 LAB
ALBUMIN SERPL BCP-MCNC: 3.4 G/DL (ref 3.5–5)
ALP SERPL-CCNC: 84 U/L (ref 46–116)
ALT SERPL W P-5'-P-CCNC: 38 U/L (ref 12–78)
ANION GAP SERPL CALCULATED.3IONS-SCNC: 7 MMOL/L (ref 4–13)
AST SERPL W P-5'-P-CCNC: 26 U/L (ref 5–45)
BILIRUB SERPL-MCNC: 0.9 MG/DL (ref 0.2–1)
BUN SERPL-MCNC: 13 MG/DL (ref 5–25)
CALCIUM ALBUM COR SERPL-MCNC: 10.5 MG/DL (ref 8.3–10.1)
CALCIUM SERPL-MCNC: 10 MG/DL (ref 8.3–10.1)
CHLORIDE SERPL-SCNC: 101 MMOL/L (ref 100–108)
CO2 SERPL-SCNC: 30 MMOL/L (ref 21–32)
CREAT SERPL-MCNC: 0.89 MG/DL (ref 0.6–1.3)
EST. AVERAGE GLUCOSE BLD GHB EST-MCNC: 177 MG/DL
GFR SERPL CREATININE-BSD FRML MDRD: 84 ML/MIN/1.73SQ M
GLUCOSE P FAST SERPL-MCNC: 150 MG/DL (ref 65–99)
HBA1C MFR BLD: 7.8 %
POTASSIUM SERPL-SCNC: 3.1 MMOL/L (ref 3.5–5.3)
PROT SERPL-MCNC: 7.6 G/DL (ref 6.4–8.2)
SODIUM SERPL-SCNC: 138 MMOL/L (ref 136–145)

## 2021-12-02 PROCEDURE — 80053 COMPREHEN METABOLIC PANEL: CPT

## 2021-12-02 PROCEDURE — 36415 COLL VENOUS BLD VENIPUNCTURE: CPT

## 2021-12-02 PROCEDURE — 3051F HG A1C>EQUAL 7.0%<8.0%: CPT | Performed by: FAMILY MEDICINE

## 2021-12-02 PROCEDURE — 83036 HEMOGLOBIN GLYCOSYLATED A1C: CPT

## 2021-12-03 DIAGNOSIS — I10 HTN (HYPERTENSION), BENIGN: ICD-10-CM

## 2021-12-03 RX ORDER — METOPROLOL TARTRATE 100 MG/1
TABLET ORAL
Qty: 180 TABLET | Refills: 1 | Status: SHIPPED | OUTPATIENT
Start: 2021-12-03 | End: 2022-06-09

## 2021-12-28 ENCOUNTER — CONSULT (OUTPATIENT)
Dept: GASTROENTEROLOGY | Facility: MEDICAL CENTER | Age: 76
End: 2021-12-28
Payer: COMMERCIAL

## 2021-12-28 VITALS
HEART RATE: 72 BPM | WEIGHT: 243 LBS | DIASTOLIC BLOOD PRESSURE: 73 MMHG | SYSTOLIC BLOOD PRESSURE: 118 MMHG | TEMPERATURE: 97.4 F | BODY MASS INDEX: 40.75 KG/M2

## 2021-12-28 DIAGNOSIS — Z12.11 SCREEN FOR COLON CANCER: Primary | ICD-10-CM

## 2021-12-28 PROCEDURE — 99203 OFFICE O/P NEW LOW 30 MIN: CPT | Performed by: PHYSICIAN ASSISTANT

## 2021-12-29 ENCOUNTER — OFFICE VISIT (OUTPATIENT)
Dept: FAMILY MEDICINE CLINIC | Facility: CLINIC | Age: 76
End: 2021-12-29
Payer: COMMERCIAL

## 2021-12-29 VITALS
BODY MASS INDEX: 40.15 KG/M2 | SYSTOLIC BLOOD PRESSURE: 124 MMHG | WEIGHT: 241 LBS | TEMPERATURE: 96.4 F | OXYGEN SATURATION: 98 % | HEART RATE: 65 BPM | HEIGHT: 65 IN | DIASTOLIC BLOOD PRESSURE: 68 MMHG

## 2021-12-29 DIAGNOSIS — E78.5 HYPERLIPIDEMIA, UNSPECIFIED HYPERLIPIDEMIA TYPE: Chronic | ICD-10-CM

## 2021-12-29 DIAGNOSIS — I10 PRIMARY HYPERTENSION: Chronic | ICD-10-CM

## 2021-12-29 DIAGNOSIS — R60.9 EDEMA, UNSPECIFIED TYPE: ICD-10-CM

## 2021-12-29 DIAGNOSIS — Z23 FLU VACCINE NEED: ICD-10-CM

## 2021-12-29 DIAGNOSIS — I48.0 PAROXYSMAL ATRIAL FIBRILLATION (HCC): Chronic | ICD-10-CM

## 2021-12-29 DIAGNOSIS — E87.6 LOW SERUM POTASSIUM: ICD-10-CM

## 2021-12-29 DIAGNOSIS — E11.9 TYPE 2 DIABETES MELLITUS WITHOUT COMPLICATION, UNSPECIFIED WHETHER LONG TERM INSULIN USE (HCC): Primary | Chronic | ICD-10-CM

## 2021-12-29 PROCEDURE — 1160F RVW MEDS BY RX/DR IN RCRD: CPT | Performed by: FAMILY MEDICINE

## 2021-12-29 PROCEDURE — 3008F BODY MASS INDEX DOCD: CPT | Performed by: FAMILY MEDICINE

## 2021-12-29 PROCEDURE — 99214 OFFICE O/P EST MOD 30 MIN: CPT | Performed by: FAMILY MEDICINE

## 2021-12-29 PROCEDURE — 1036F TOBACCO NON-USER: CPT | Performed by: FAMILY MEDICINE

## 2021-12-29 PROCEDURE — 3078F DIAST BP <80 MM HG: CPT | Performed by: FAMILY MEDICINE

## 2021-12-29 PROCEDURE — 90662 IIV NO PRSV INCREASED AG IM: CPT | Performed by: FAMILY MEDICINE

## 2021-12-29 PROCEDURE — 3074F SYST BP LT 130 MM HG: CPT | Performed by: FAMILY MEDICINE

## 2021-12-29 PROCEDURE — G0008 ADMIN INFLUENZA VIRUS VAC: HCPCS | Performed by: FAMILY MEDICINE

## 2022-01-19 ENCOUNTER — VBI (OUTPATIENT)
Dept: ADMINISTRATIVE | Facility: OTHER | Age: 77
End: 2022-01-19

## 2022-01-28 ENCOUNTER — VBI (OUTPATIENT)
Dept: ADMINISTRATIVE | Facility: OTHER | Age: 77
End: 2022-01-28

## 2022-01-28 NOTE — TELEPHONE ENCOUNTER
01/28/22 12:52 PM     See documentation in the [employer]       Already closed in [place]

## 2022-02-09 DIAGNOSIS — I10 HTN (HYPERTENSION), BENIGN: ICD-10-CM

## 2022-02-09 RX ORDER — AMLODIPINE BESYLATE 10 MG/1
TABLET ORAL
Qty: 180 TABLET | Refills: 2 | Status: SHIPPED | OUTPATIENT
Start: 2022-02-09

## 2022-03-25 DIAGNOSIS — E11.9 TYPE 2 DIABETES MELLITUS WITHOUT COMPLICATION (HCC): ICD-10-CM

## 2022-03-25 RX ORDER — PEN NEEDLE, DIABETIC 31 GX5/16"
NEEDLE, DISPOSABLE MISCELLANEOUS 2 TIMES DAILY
Qty: 100 EACH | Refills: 3 | Status: SHIPPED | OUTPATIENT
Start: 2022-03-25

## 2022-04-26 DIAGNOSIS — E78.5 DYSLIPIDEMIA: ICD-10-CM

## 2022-04-26 RX ORDER — ATORVASTATIN CALCIUM 80 MG/1
80 TABLET, FILM COATED ORAL DAILY
Qty: 90 TABLET | Refills: 5 | Status: SHIPPED | OUTPATIENT
Start: 2022-04-26 | End: 2022-05-19 | Stop reason: SDUPTHER

## 2022-05-19 DIAGNOSIS — E78.5 DYSLIPIDEMIA: ICD-10-CM

## 2022-05-19 RX ORDER — ATORVASTATIN CALCIUM 80 MG/1
80 TABLET, FILM COATED ORAL DAILY
Qty: 90 TABLET | Refills: 1 | Status: SHIPPED | OUTPATIENT
Start: 2022-05-19

## 2022-06-03 ENCOUNTER — TELEPHONE (OUTPATIENT)
Dept: FAMILY MEDICINE CLINIC | Facility: CLINIC | Age: 77
End: 2022-06-03

## 2022-06-09 DIAGNOSIS — I10 HTN (HYPERTENSION), BENIGN: ICD-10-CM

## 2022-06-09 RX ORDER — METOPROLOL TARTRATE 100 MG/1
TABLET ORAL
Qty: 180 TABLET | Refills: 1 | Status: SHIPPED | OUTPATIENT
Start: 2022-06-09

## 2022-07-01 ENCOUNTER — TELEPHONE (OUTPATIENT)
Dept: ADMINISTRATIVE | Facility: OTHER | Age: 77
End: 2022-07-01

## 2022-07-01 NOTE — TELEPHONE ENCOUNTER
07/01/22 11:24 AM    The patient was called and a message was left to call the PCP office regarding an open order  Thank you    Juan C Alvarez [facility]

## 2022-07-19 ENCOUNTER — HOSPITAL ENCOUNTER (OUTPATIENT)
Dept: NON INVASIVE DIAGNOSTICS | Facility: CLINIC | Age: 77
Discharge: HOME | End: 2022-07-19
Payer: COMMERCIAL

## 2022-07-19 DIAGNOSIS — Z98.890 H/O REPAIR OF DISSECTING ANEURYSM OF DESCENDING THORACIC AORTA: ICD-10-CM

## 2022-07-19 DIAGNOSIS — I71.22 AORTIC ARCH ANEURYSM: ICD-10-CM

## 2022-07-19 DIAGNOSIS — I10 HTN (HYPERTENSION), BENIGN: ICD-10-CM

## 2022-07-19 DIAGNOSIS — Z86.79 H/O REPAIR OF DISSECTING ANEURYSM OF DESCENDING THORACIC AORTA: ICD-10-CM

## 2022-07-19 DIAGNOSIS — I71.019 CHRONIC THORACIC AORTIC DISSECTION: ICD-10-CM

## 2022-07-19 PROCEDURE — 93880 EXTRACRANIAL BILAT STUDY: CPT

## 2022-07-19 PROCEDURE — 93880 EXTRACRANIAL BILAT STUDY: CPT | Performed by: SURGERY

## 2022-07-20 ENCOUNTER — TELEPHONE (OUTPATIENT)
Dept: FAMILY MEDICINE CLINIC | Facility: CLINIC | Age: 77
End: 2022-07-20

## 2022-07-20 RX ORDER — LISINOPRIL AND HYDROCHLOROTHIAZIDE 20; 12.5 MG/1; MG/1
TABLET ORAL
Qty: 180 TABLET | Refills: 3 | Status: SHIPPED | OUTPATIENT
Start: 2022-07-20

## 2022-07-20 NOTE — TELEPHONE ENCOUNTER
I called and spoke with the Pt as he is due for Medicare Wellness  Pt verbalized understanding, appt scheduled

## 2022-08-03 DIAGNOSIS — E11.9 TYPE 2 DIABETES MELLITUS WITHOUT COMPLICATION, UNSPECIFIED WHETHER LONG TERM INSULIN USE (HCC): Primary | ICD-10-CM

## 2022-08-04 ENCOUNTER — OFFICE VISIT (OUTPATIENT)
Dept: VASCULAR SURGERY | Facility: CLINIC | Age: 77
End: 2022-08-04
Payer: COMMERCIAL

## 2022-08-04 VITALS
DIASTOLIC BLOOD PRESSURE: 70 MMHG | OXYGEN SATURATION: 97 % | WEIGHT: 234 LBS | SYSTOLIC BLOOD PRESSURE: 112 MMHG | TEMPERATURE: 99.6 F | BODY MASS INDEX: 38.99 KG/M2 | HEART RATE: 53 BPM | HEIGHT: 65 IN

## 2022-08-04 DIAGNOSIS — R60.9 EDEMA, UNSPECIFIED TYPE: ICD-10-CM

## 2022-08-04 DIAGNOSIS — I71.9 DESCENDING AORTIC ANEURYSM (HCC): Chronic | ICD-10-CM

## 2022-08-04 DIAGNOSIS — I71.019 CHRONIC THORACIC AORTIC DISSECTION: Primary | ICD-10-CM

## 2022-08-04 DIAGNOSIS — E11.9 TYPE 2 DIABETES MELLITUS WITHOUT COMPLICATION, UNSPECIFIED WHETHER LONG TERM INSULIN USE (HCC): ICD-10-CM

## 2022-08-04 PROCEDURE — 3074F SYST BP LT 130 MM HG: CPT | Performed by: SURGERY

## 2022-08-04 PROCEDURE — 3078F DIAST BP <80 MM HG: CPT | Performed by: SURGERY

## 2022-08-04 PROCEDURE — 99214 OFFICE O/P EST MOD 30 MIN: CPT | Performed by: SURGERY

## 2022-08-04 RX ORDER — BLOOD SUGAR DIAGNOSTIC
STRIP MISCELLANEOUS
Qty: 100 STRIP | Refills: 3 | Status: SHIPPED | OUTPATIENT
Start: 2022-08-04

## 2022-08-04 RX ORDER — BLOOD SUGAR DIAGNOSTIC
STRIP MISCELLANEOUS
Qty: 100 STRIP | Refills: 3 | Status: SHIPPED | OUTPATIENT
Start: 2022-08-04 | End: 2022-08-04 | Stop reason: SDUPTHER

## 2022-08-04 NOTE — PROGRESS NOTES
Assessment/Plan:    Chronic thoracic aortic dissection (HCC)  History of aortic arch reconstruction and subsequent debranching with left carotid subclavian bypass and TEVAR  He appears to be doing well in this regard with wide patency of his bypass  He is due for a follow-up CT angiogram which our office will aid in scheduling  Following this results will be reviewed by CT surgery and vascular for any further recommendations  Edema  Bilateral lower extremity swelling with underlying history of AFib and obesity  We discussed some of the potential etiologies and from a vascular standpoint I have advised use of compressive stockings and continued medical follow-up  Diagnoses and all orders for this visit:    Chronic thoracic aortic dissection (HCC)  -     VAS carotid complete study; Future  -     CTA chest abdomen pelvis w wo contrast; Future    Descending aortic aneurysm (HCC)  -     VAS carotid complete study; Future  -     CTA chest abdomen pelvis w wo contrast; Future    Edema, unspecified type  -     Compression Stocking          Subjective:      Patient ID: Cole Srivastava  is a 69 y o  male  Patient presents today as a 1 year follow up/ CV results  Cv was done on 7/19/22  Pt is asymptomatic at this time  Legs tend to swell up, Right is worse than left elevates his legs at the end of the day  He currently takes Asa 81 mg and Atorvastatin 80 mg  He is a non smoker  61-year-old with history of type a aortic dissection with hemiarch reconstruction followed by debranching of the left subclavian artery with left common carotid to subclavian artery bypass, embolization of the proximal subclavian artery and TEVAR 09/19/2018  On evaluation today he states he is doing well with no specific complaints other than bilateral lower extremity swelling  Of note he does not use compressive stockings  He denies any chest pain, shortness of breath    He denies any focal neurologic symptoms which would be consistent with TIA, CVA or amaurosis fugax  Carotid duplex 07/19/2022 with no evidence of significant stenosis in either carotid artery  The left common carotid artery to subclavian artery bypass is widely patent with no blood pressure gradient  CT angiogram 04/10/2021 with a mild increase in false lumen size with question of a small endoleak  The following portions of the patient's history were reviewed and updated as appropriate: allergies, current medications, past family history, past medical history, past social history, past surgical history and problem list     Past Medical History:  Past Medical History:   Diagnosis Date    Arthritis     CPAP (continuous positive airway pressure) dependence     Diabetes ([address] )     Edema     Hyperlipidemia     Hypertension     Prostate cancer ([address] )     prostate    Sleep apnea     Stroke (cerebrum) ([address] )     Wears glasses        Past Surgical History:  Past Surgical History:   Procedure Laterality Date    CARDIAC SURGERY      CIRCUMCISION      COLONOSCOPY      IR CEREBRAL ANGIOGRAPHY / INTERVENTION  8/13/2018    IR TEVAR  9/19/2018    MS ASCEND AORTA GRAFT W ROOT REPLACMENT  VALVE CONDUIT/CORON RECONSTRUCT N/A 9/6/2017    Procedure: ASCENDING AORTIC REPAIR WITH A 26MM  GELWEAVE STRAIGHT GRAFT AND HEMIARCH WITH 8MM SIDE ARM BRANCH;  Surgeon: Chayito Black MD;  [facility];  Service: Cardiac Surgery    MS ENDOVASC TAA REINCL SUBCL N/A 9/19/2018    Procedure: REPAIR ANEURSYM THORACIC ENDOVASCUALR DESCENDING AORTIC ANEURYSM (TEVAR); Surgeon: Luh Cid MD;  [facility];  Service: Cardiac Surgery    MS EXPLOR POSTOP BLEED,INFEC,CLOT-CHST N/A 9/6/2017    Procedure: RE-EXPLORATION MEDIASTERNAL CAVITY FOR POST-OP BLEED (BRING BACK);   Surgeon: Chayiot Black MD;  [facility];  Service: Cardiac Surgery    MS VEIN BYPASS GRAFT,CAROTID-BRACHIAL Left 8/13/2018    Procedure: BYPASS CAROTID SUBCLAVIAN WITH EMBOLIZATION OF PROXIMAL SUBCLAVIAN ARTERY  ;  Surgeon: Ivan Shook MD;  [facility];  Service: Vascular       Social History:  Social History     Substance and Sexual Activity   Alcohol Use Yes    Comment: social beer or two daily     Social History     Substance and Sexual Activity   Drug Use No     Social History     Tobacco Use   Smoking Status Never Smoker   Smokeless Tobacco Never Used       Family History:  Family History   Problem Relation Age of Onset    Stroke Mother         complications    Hypertension Mother    Ardeth Needs Arthritis Mother     No Known Problems Father        Allergies:  No Known Allergies    Medications:    Current Outpatient Medications:     acetaminophen (TYLENOL) 325 mg tablet, Take 2 tablets (650 mg total) by mouth every 6 (six) hours as needed for mild pain, Disp: , Rfl:     amLODIPine (NORVASC) 10 mg tablet, TAKE 1 TABLET BY MOUTH TWICE A DAY, Disp: 180 tablet, Rfl: 2    aspirin 81 mg chewable tablet, Chew 1 tablet daily, Disp: 30 tablet, Rfl: 0    atorvastatin (LIPITOR) 80 mg tablet, Take 1 tablet (80 mg total) by mouth in the morning , Disp: 90 tablet, Rfl: 1    B-D ULTRAFINE III SHORT PEN 31G X 8 MM MISC, USE AS DIRECTED, Disp: 100 each, Rfl: 5    finasteride (PROSCAR) 5 mg tablet, Take 5 mg by mouth daily, Disp: , Rfl:     glucose blood (OneTouch Verio) test strip, Use to check blood sugar, Disp: 100 strip, Rfl: 3    HUMALOG KWIKPEN 100 units/mL injection pen, INJECT 8 UNIT 3 TIMES DAILY BEFORE MEALS, Disp: 15 pen, Rfl: 5    insulin glargine (LANTUS) 100 units/mL subcutaneous injection, Inject 28 Units under the skin daily at bedtime Dx: Diabetes E11 9, dispense pens, Disp: 10 mL, Rfl: 0    Insulin Pen Needle (B-D ULTRAFINE III SHORT PEN) 31G X 8 MM MISC, Inject under the skin 2 (two) times a day Use as directed, Disp: 100 each, Rfl: 3    lisinopril-hydrochlorothiazide (PRINZIDE,ZESTORETIC) 20-12 5 MG per tablet, TAKE 2 TABLETS BY MOUTH EVERY DAY, Disp: 180 tablet, Rfl: 3   metoprolol tartrate (LOPRESSOR) 100 mg tablet, TAKE 1 TABLET BY MOUTH TWICE A DAY, Disp: 180 tablet, Rfl: 1    insulin lispro (HumaLOG) 100 units/mL injection, Inject 8 Units under the skin 3 (three) times a day before meals Dx: Diabetes E11 9  Dispense pens (Patient not taking: Reported on 8/4/2022), Disp: 8 mL, Rfl: 0    Lantus SoloStar 100 units/mL injection pen, INJECT 28 UNIT BEDTIME (Patient not taking: No sig reported), Disp: 15 mL, Rfl: 17    Vitals:  /70 (08/04/22 1320)    Temp 99 6 °F (37 6 °C) (08/04/22 1309)    Pulse (!) 53 (08/04/22 1309)   Resp      SpO2 97 % (08/04/22 1309)      Lab Results and Cultures:   CBC with diff:   Lab Results   Component Value Date    WBC 13 46 (H) 09/21/2018    HGB 12 3 09/21/2018    HCT 37 1 09/21/2018    MCV 88 09/21/2018     09/21/2018    MCH 29 3 09/21/2018    MCHC 33 2 09/21/2018    RDW 14 6 09/21/2018    MPV 10 0 09/21/2018    NRBC 0 09/11/2018   ,   BMP/CMP:  Lab Results   Component Value Date     10/07/2015    K 3 1 (L) 12/02/2021    K 3 6 10/07/2015     12/02/2021     10/07/2015    CO2 30 12/02/2021    CO2 29 08/13/2018    ANIONGAP 8 10/07/2015    BUN 13 12/02/2021    BUN 12 10/07/2015    CREATININE 0 89 12/02/2021    CREATININE 0 65 10/07/2015    GLUCOSE 153 (H) 08/13/2018    GLUCOSE 182 (H) 10/07/2015    CALCIUM 10 0 12/02/2021    CALCIUM 9 0 10/07/2015    AST 26 12/02/2021    AST 30 10/07/2015    ALT 38 12/02/2021    ALT 57 10/07/2015    ALKPHOS 84 12/02/2021    ALKPHOS 57 10/07/2015    PROT 7 5 10/07/2015    BILITOT 0 65 10/07/2015    EGFR 84 12/02/2021    EGFR >60 0 08/28/2016   ,   Lipid Panel:   Lab Results   Component Value Date    CHOL 167 10/07/2015   ,   Coags:   Lab Results   Component Value Date    PTT 31 09/11/2018    PTT 27 01/16/2014    INR 1 03 09/11/2018    INR 1 06 01/16/2014   ,       Review of Systems   Constitutional: Negative  HENT: Negative  Eyes: Negative  Respiratory: Negative      Cardiovascular: Positive for leg swelling  Gastrointestinal: Negative  Endocrine: Negative  Genitourinary: Negative  Musculoskeletal: Positive for back pain and neck pain  Skin: Negative  Allergic/Immunologic: Negative  Neurological: Negative  Hematological: Negative  Psychiatric/Behavioral: The patient is nervous/anxious  Depression          Objective:      /70 (BP Location: Right arm, Patient Position: Sitting, Cuff Size: Standard)   Pulse (!) 53   Temp 99 6 °F (37 6 °C)   Ht 5' 4 5" (1 638 m)   Wt 106 kg (234 lb)   SpO2 97%   BMI 39 55 kg/m²          Physical Exam  Constitutional:       Appearance: He is well-developed  HENT:      Head: Normocephalic and atraumatic  Eyes:      Pupils: Pupils are equal, round, and reactive to light  Neck:      Vascular: Carotid bruit (Bilateral bruit  Suspect transmitted cardiac murmur) present  No JVD  Cardiovascular:      Rate and Rhythm: Normal rate and regular rhythm  Pulses:           Carotid pulses are 2+ on the right side with bruit and 2+ on the left side with bruit  Radial pulses are 2+ on the right side and 2+ on the left side  Heart sounds: Murmur heard  Systolic murmur is present  Pulmonary:      Effort: Pulmonary effort is normal  No respiratory distress  Breath sounds: Normal breath sounds  Musculoskeletal:         General: Swelling ( bilateral 2/3+) present  No tenderness  Normal range of motion  Cervical back: Normal range of motion and neck supple  Skin:     General: Skin is warm and dry  Neurological:      General: No focal deficit present  Mental Status: He is alert and oriented to person, place, and time  Sensory: No sensory deficit  Motor: No weakness        Gait: Gait normal    Psychiatric:         Mood and Affect: Mood normal

## 2022-08-04 NOTE — LETTER
August 4, 2022     [facility], [address] [place] [address] [facility]     Patient: Emelia Garcia  YOB: 1945   Date of Visit: 8/4/2022       Dear Dr Kaylee Murry: Thank you for referring Raina Bueno to me for evaluation  Below are the relevant portions of my assessment and plan of care  Chronic thoracic aortic dissection (HCC)  History of aortic arch reconstruction and subsequent debranching with left carotid subclavian bypass and TEVAR  He appears to be doing well in this regard with wide patency of his bypass  He is due for a follow-up CT angiogram which our office will aid in scheduling  Following this results will be reviewed by CT surgery and vascular for any further recommendations  Edema  Bilateral lower extremity swelling with underlying history of AFib and obesity  We discussed some of the potential etiologies and from a vascular standpoint I have advised use of compressive stockings and continued medical follow-up  If you have questions, please do not hesitate to call me  I look forward to following Kay Pichardo along with you           Sincerely,        Domenic Torres MD        CC: No Recipients

## 2022-08-04 NOTE — ASSESSMENT & PLAN NOTE
Bilateral lower extremity swelling with underlying history of AFib and obesity  We discussed some of the potential etiologies and from a vascular standpoint I have advised use of compressive stockings and continued medical follow-up

## 2022-08-04 NOTE — ASSESSMENT & PLAN NOTE
History of aortic arch reconstruction and subsequent debranching with left carotid subclavian bypass and TEVAR  He appears to be doing well in this regard with wide patency of his bypass  He is due for a follow-up CT angiogram which our office will aid in scheduling  Following this results will be reviewed by CT surgery and vascular for any further recommendations

## 2022-08-04 NOTE — PATIENT INSTRUCTIONS
Chronic thoracic aortic dissection (HCC)  History of aortic arch reconstruction and subsequent debranching with left carotid subclavian bypass and TEVAR  He appears to be doing well in this regard with wide patency of his bypass  He is due for a follow-up CT angiogram which our office will aid in scheduling  Following this results will be reviewed by CT surgery and vascular for any further recommendations  Edema  Bilateral lower extremity swelling with underlying history of AFib and obesity  We discussed some of the potential etiologies and from a vascular standpoint I have advised use of compressive stockings and continued medical follow-up

## 2022-08-22 ENCOUNTER — APPOINTMENT (OUTPATIENT)
Dept: LAB | Facility: CLINIC | Age: 77
End: 2022-08-22
Payer: COMMERCIAL

## 2022-08-22 DIAGNOSIS — E78.5 HYPERLIPIDEMIA, UNSPECIFIED HYPERLIPIDEMIA TYPE: Chronic | ICD-10-CM

## 2022-08-22 DIAGNOSIS — E11.9 TYPE 2 DIABETES MELLITUS WITHOUT COMPLICATION, UNSPECIFIED WHETHER LONG TERM INSULIN USE (HCC): Chronic | ICD-10-CM

## 2022-08-22 DIAGNOSIS — E87.6 LOW SERUM POTASSIUM: ICD-10-CM

## 2022-08-22 LAB
ALBUMIN SERPL BCP-MCNC: 3.4 G/DL (ref 3.5–5)
ALP SERPL-CCNC: 89 U/L (ref 46–116)
ALT SERPL W P-5'-P-CCNC: 30 U/L (ref 12–78)
ANION GAP SERPL CALCULATED.3IONS-SCNC: 6 MMOL/L (ref 4–13)
AST SERPL W P-5'-P-CCNC: 32 U/L (ref 5–45)
BILIRUB SERPL-MCNC: 0.69 MG/DL (ref 0.2–1)
BUN SERPL-MCNC: 11 MG/DL (ref 5–25)
CALCIUM ALBUM COR SERPL-MCNC: 9.6 MG/DL (ref 8.3–10.1)
CALCIUM SERPL-MCNC: 9.1 MG/DL (ref 8.3–10.1)
CHLORIDE SERPL-SCNC: 97 MMOL/L (ref 96–108)
CHOLEST SERPL-MCNC: 99 MG/DL
CO2 SERPL-SCNC: 30 MMOL/L (ref 21–32)
CREAT SERPL-MCNC: 0.9 MG/DL (ref 0.6–1.3)
CREAT UR-MCNC: 97.1 MG/DL
EST. AVERAGE GLUCOSE BLD GHB EST-MCNC: 180 MG/DL
GFR SERPL CREATININE-BSD FRML MDRD: 82 ML/MIN/1.73SQ M
GLUCOSE P FAST SERPL-MCNC: 154 MG/DL (ref 65–99)
HBA1C MFR BLD: 7.9 %
HDLC SERPL-MCNC: 42 MG/DL
LDLC SERPL CALC-MCNC: 42 MG/DL (ref 0–100)
MICROALBUMIN UR-MCNC: 9.2 MG/L (ref 0–20)
MICROALBUMIN/CREAT 24H UR: 9 MG/G CREATININE (ref 0–30)
POTASSIUM SERPL-SCNC: 3.5 MMOL/L (ref 3.5–5.3)
PROT SERPL-MCNC: 7.8 G/DL (ref 6.4–8.4)
SODIUM SERPL-SCNC: 133 MMOL/L (ref 135–147)
TRIGL SERPL-MCNC: 73 MG/DL

## 2022-08-22 PROCEDURE — 80061 LIPID PANEL: CPT

## 2022-08-22 PROCEDURE — 3061F NEG MICROALBUMINURIA REV: CPT | Performed by: FAMILY MEDICINE

## 2022-08-22 PROCEDURE — 80053 COMPREHEN METABOLIC PANEL: CPT

## 2022-08-30 ENCOUNTER — OFFICE VISIT (OUTPATIENT)
Dept: FAMILY MEDICINE CLINIC | Facility: CLINIC | Age: 77
End: 2022-08-30
Payer: COMMERCIAL

## 2022-08-30 VITALS
HEART RATE: 60 BPM | HEIGHT: 65 IN | DIASTOLIC BLOOD PRESSURE: 68 MMHG | RESPIRATION RATE: 18 BRPM | SYSTOLIC BLOOD PRESSURE: 126 MMHG | BODY MASS INDEX: 38.99 KG/M2 | WEIGHT: 234 LBS | OXYGEN SATURATION: 97 % | TEMPERATURE: 96.7 F

## 2022-08-30 DIAGNOSIS — Z00.00 MEDICARE ANNUAL WELLNESS VISIT, SUBSEQUENT: ICD-10-CM

## 2022-08-30 DIAGNOSIS — Z12.5 SCREENING FOR PROSTATE CANCER: ICD-10-CM

## 2022-08-30 DIAGNOSIS — E78.5 HYPERLIPIDEMIA, UNSPECIFIED HYPERLIPIDEMIA TYPE: Chronic | ICD-10-CM

## 2022-08-30 DIAGNOSIS — R60.9 EDEMA, UNSPECIFIED TYPE: ICD-10-CM

## 2022-08-30 DIAGNOSIS — I10 PRIMARY HYPERTENSION: Chronic | ICD-10-CM

## 2022-08-30 DIAGNOSIS — Z00.00 HEALTH CARE MAINTENANCE: Primary | ICD-10-CM

## 2022-08-30 DIAGNOSIS — Z23 ENCOUNTER FOR IMMUNIZATION: ICD-10-CM

## 2022-08-30 DIAGNOSIS — E11.9 TYPE 2 DIABETES MELLITUS WITHOUT COMPLICATION, UNSPECIFIED WHETHER LONG TERM INSULIN USE (HCC): ICD-10-CM

## 2022-08-30 PROCEDURE — 3725F SCREEN DEPRESSION PERFORMED: CPT | Performed by: FAMILY MEDICINE

## 2022-08-30 PROCEDURE — 3288F FALL RISK ASSESSMENT DOCD: CPT | Performed by: FAMILY MEDICINE

## 2022-08-30 PROCEDURE — 1101F PT FALLS ASSESS-DOCD LE1/YR: CPT | Performed by: FAMILY MEDICINE

## 2022-08-30 PROCEDURE — 1125F AMNT PAIN NOTED PAIN PRSNT: CPT | Performed by: FAMILY MEDICINE

## 2022-08-30 PROCEDURE — 1170F FXNL STATUS ASSESSED: CPT | Performed by: FAMILY MEDICINE

## 2022-08-30 PROCEDURE — G0439 PPPS, SUBSEQ VISIT: HCPCS | Performed by: FAMILY MEDICINE

## 2022-08-30 PROCEDURE — 99214 OFFICE O/P EST MOD 30 MIN: CPT | Performed by: FAMILY MEDICINE

## 2022-08-30 PROCEDURE — 1160F RVW MEDS BY RX/DR IN RCRD: CPT | Performed by: FAMILY MEDICINE

## 2022-08-30 NOTE — PROGRESS NOTES
Diabetic Foot Exam    Patient's shoes and socks removed  Right Foot/Ankle   Right Foot Inspection  Skin Exam: skin normal, skin intact, dry skin, callus and callus  No warmth, no erythema, no maceration, no abnormal color, no pre-ulcer and no ulcer  Toe Exam: ROM and strength within normal limits and swelling  Sensory   Vibration: intact  Proprioception: intact  Monofilament testing: intact    Vascular  Capillary refills: < 3 seconds  The right DP pulse is 2+  The right PT pulse is 2+  Left Foot/Ankle  Left Foot Inspection  Skin Exam: skin normal, skin intact, dry skin and callus  No warmth, no erythema, no maceration, normal color, no pre-ulcer and no ulcer  Toe Exam: ROM and strength within normal limits and swelling  Sensory   Vibration: intact  Proprioception: intact  Monofilament testing: intact    Vascular  Capillary refills: < 3 seconds  The left DP pulse is 2+  The left PT pulse is 2+  Assign Risk Category  No deformity present  No loss of protective sensation  No weak pulses  Risk: 0   Assessment and Plan:     Problem List Items Addressed This Visit        Endocrine    Diabetes mellitus ([facility] [address] ) (Chronic)    Relevant Orders    Hemoglobin A1C    Comprehensive metabolic panel      Other Visit Diagnoses     Encounter for immunization               Preventive health issues were discussed with patient, and age appropriate screening tests were ordered as noted in patient's After Visit Summary  Personalized health advice and appropriate referrals for health education or preventive services given if needed, as noted in patient's After Visit Summary  History of Present Illness:     Patient presents for a Medicare Wellness Visit    Here for recheck and AWV, no new complaints  Doing well otherwise  No cp or sob, or ha        Patient Care Team:  Nathen Penn DO as PCP - [place] [id number], DO as PCP - [address] [place] [address] (RTE)  DO Isael Morfin MD Brigid Bos MD Lia Collins MD Noble Comfort, MD Thurman America, DO (Cardiology)  Yue Cabrales PA-C (Vascular Surgery)     Review of Systems:     Review of Systems   Constitutional: Negative  HENT: Negative  Eyes: Negative  Respiratory: Negative  Cardiovascular: Negative  Gastrointestinal: Negative  Endocrine: Negative  Genitourinary: Negative  Musculoskeletal: Negative  Skin: Negative  Allergic/Immunologic: Negative  Neurological: Negative  Hematological: Negative  Psychiatric/Behavioral: Negative  Problem List:     Patient Active Problem List   Diagnosis    Prostate cancer ([address] )    Hypertension    Diabetes mellitus ([address] )    Chronic thoracic aortic dissection (HCC)    S/P ascending aortic aneurysm repair    Hypokalemia    Paroxysmal atrial fibrillation (HCC)    KARIN (obstructive sleep apnea)    Class 2 severe obesity due to excess calories with serious comorbidity and body mass index (BMI) of 37 0 to 37 9 in [facility])    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HCC)    Hyperlipidemia    Screen for colon cancer    Adenomatous polyp of descending colon    S/P vascular bypass    Descending aortic aneurysm (HCC)    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 (TEVAR)    Encounter for postoperative care    Edema      Past Medical and Surgical History:     Past Medical History:   Diagnosis Date    Arthritis     CPAP (continuous positive airway pressure) dependence     Diabetes ([address] )     Edema     Hyperlipidemia     Hypertension     Prostate cancer ([address] )     prostate    Sleep apnea     Stroke (cerebrum) ([address] )     Wears glasses      Past Surgical History:   Procedure Laterality Date    CARDIAC SURGERY      CIRCUMCISION      COLONOSCOPY      IR CEREBRAL ANGIOGRAPHY / INTERVENTION  8/13/2018    IR TEVAR  9/19/2018    DE ASCEND AORTA GRAFT W ROOT REPLACMENT  VALVE CONDUIT/CORON RECONSTRUCT N/A 9/6/2017    Procedure: ASCENDING AORTIC REPAIR WITH A 26MM  GELWEAVE STRAIGHT GRAFT AND HEMIARCH WITH 8MM SIDE ARM BRANCH;  Surgeon: Miroslava Cortés MD;  [facility];  Service: Cardiac Surgery    ID ENDOVASC TAA REINCL SUBCL N/A 9/19/2018    Procedure: REPAIR ANEURSYM THORACIC ENDOVASCUALR DESCENDING AORTIC ANEURYSM (TEVAR); Surgeon: Maged Weinstein MD;  [facility];  Service: Cardiac Surgery    ID EXPLOR POSTOP BLEED,INFEC,CLOT-CHST N/A 9/6/2017    Procedure: RE-EXPLORATION MEDIASTERNAL CAVITY FOR POST-OP BLEED (BRING BACK); Surgeon: Miroslava Cortés MD;  [facility];  Service: Cardiac Surgery    ID VEIN BYPASS GRAFT,CAROTID-BRACHIAL Left 8/13/2018    Procedure: BYPASS CAROTID SUBCLAVIAN WITH EMBOLIZATION OF PROXIMAL SUBCLAVIAN ARTERY  ;  Surgeon: Jared Jeffery MD;  [facility];  Service: Vascular      Family History:     Family History   Problem Relation Age of Onset    Stroke Mother         complications    Hypertension Mother    [facility] Arthritis Mother     No Known Problems Father       Social History:     Social History     Socioeconomic History    Marital status:       Spouse name: None    Number of children: None    Years of education: None    Highest education level: None   Occupational History     Comment: retired   Tobacco Use    Smoking status: Never Smoker    Smokeless tobacco: Never Used   Vaping Use    Vaping Use: Never used   Substance and Sexual Activity    Alcohol use: Yes     Comment: social beer or two daily    Drug use: No    Sexual activity: Never     Comment: denied hx of sexually active high risk   Other Topics Concern    None   Social History Narrative            1 serving caffeine/day    Less than high school diploma    Person living alone    Participate in moderate activities both inside and outside of the home     Social Determinants of Health     Financial Resource Strain: Medium Risk    Difficulty of Paying Living Expenses: Somewhat hard   Food Insecurity: Not on file   Transportation Needs: No Transportation Needs    Lack of Transportation (Medical): No    Lack of Transportation (Non-Medical): No   Physical Activity: Not on file   Stress: Not on file   Social Connections: Not on file   Intimate Partner Violence: Not on file   Housing Stability: Not on file      Medications and Allergies:     Current Outpatient Medications   Medication Sig Dispense Refill    acetaminophen (TYLENOL) 325 mg tablet Take 2 tablets (650 mg total) by mouth every 6 (six) hours as needed for mild pain      amLODIPine (NORVASC) 10 mg tablet TAKE 1 TABLET BY MOUTH TWICE A  tablet 2    aspirin 81 mg chewable tablet Chew 1 tablet daily 30 tablet 0    atorvastatin (LIPITOR) 80 mg tablet Take 1 tablet (80 mg total) by mouth in the morning  90 tablet 1    B-D ULTRAFINE III SHORT PEN 31G X 8 MM MISC USE AS DIRECTED 100 each 5    finasteride (PROSCAR) 5 mg tablet Take 5 mg by mouth daily      glucose blood (OneTouch Verio) test strip Use to check blood sugar 100 strip 3    HUMALOG KWIKPEN 100 units/mL injection pen INJECT 8 UNIT 3 TIMES DAILY BEFORE MEALS 15 pen 5    insulin glargine (LANTUS) 100 units/mL subcutaneous injection Inject 28 Units under the skin daily at bedtime Dx: Diabetes E11 9, dispense pens 10 mL 0    Insulin Pen Needle (B-D ULTRAFINE III SHORT PEN) 31G X 8 MM MISC Inject under the skin 2 (two) times a day Use as directed 100 each 3    lisinopril-hydrochlorothiazide (PRINZIDE,ZESTORETIC) 20-12 5 MG per tablet TAKE 2 TABLETS BY MOUTH EVERY  tablet 3    metoprolol tartrate (LOPRESSOR) 100 mg tablet TAKE 1 TABLET BY MOUTH TWICE A  tablet 1    insulin lispro (HumaLOG) 100 units/mL injection Inject 8 Units under the skin 3 (three) times a day before meals Dx: Diabetes E11 9   Dispense pens (Patient not taking: No sig reported) 8 mL 0    Lantus SoloStar 100 units/mL injection pen INJECT 28 UNIT BEDTIME (Patient not taking: No sig reported) 15 mL 17 No current facility-administered medications for this visit  No Known Allergies   Immunizations:     Immunization History   Administered Date(s) Administered    INFLUENZA 12/19/2006, 11/29/2018, 12/29/2021    Influenza Split High Dose Preservative Free IM 09/20/2012, 09/23/2013, 09/26/2014, 10/14/2015, 10/26/2016, 11/21/2017    Influenza, high dose seasonal 0 7 mL 11/29/2018, 12/05/2019, 11/02/2020, 12/29/2021      Health Maintenance:         Topic Date Due    Hepatitis C Screening  Completed         Topic Date Due    COVID-19 Vaccine (1) Never done    Pneumococcal Vaccine: 65+ Years (1 - PCV) Never done    Influenza Vaccine (1) 09/01/2022      Medicare Screening Tests and Risk Assessments:     Kay Pichardo is here for his Initial Wellness visit  Last Medicare Wellness visit information reviewed, patient interviewed and updates made to the record today  Health Risk Assessment:   Patient rates overall health as good  Patient feels that their physical health rating is same  Patient is satisfied with their life  Eyesight was rated as slightly worse  Hearing was rated as slightly worse  Patient feels that their emotional and mental health rating is same  Patients states they are never, rarely angry  Patient states they are sometimes unusually tired/fatigued  Pain experienced in the last 7 days has been some  Patient's pain rating has been 2/10  Patient states that he has experienced no weight loss or gain in last 6 months  Depression Screening:   PHQ-2 Score: 0      Fall Risk Screening: In the past year, patient has experienced: no history of falling in past year      Home Safety:  Patient does not have trouble with stairs inside or outside of their home  Patient has working smoke alarms and has working carbon monoxide detector  Home safety hazards include: none  Nutrition:   Current diet is Diabetic  Medications:   Patient is currently taking over-the-counter supplements   OTC medications include: see medication list  Patient is not able to manage medications  Activities of Daily Living (ADLs)/Instrumental Activities of Daily Living (IADLs):   Walk and transfer into and out of bed and chair?: Yes  Dress and groom yourself?: Yes    Bathe or shower yourself?: Yes    Feed yourself? Yes  Do your laundry/housekeeping?: Yes  Manage your money, pay your bills and track your expenses?: Yes  Make your own meals?: Yes    Do your own shopping?: Yes    Previous Hospitalizations:   Any hospitalizations or ED visits within the last 12 months?: No      Advance Care Planning:   Living will: No    Durable POA for healthcare: No    Advanced directive: No      Comments: Needs living will and advance directives    PREVENTIVE SCREENINGS      Cardiovascular Screening:    General: Screening Not Indicated and History Lipid Disorder      Diabetes Screening:     General: Screening Not Indicated and History Diabetes      Prostate Cancer Screening:    General: History Prostate Cancer and Screening Not Indicated      Lung Cancer Screening:     General: Screening Not Indicated      Hepatitis C Screening:    General: Screening Current    Screening, Brief Intervention, and Referral to Treatment (SBIRT)    Screening  Typical number of drinks in a day: 1  Typical number of drinks in a week: 1  Interpretation: Low risk drinking behavior  Single Item Drug Screening:  How often have you used an illegal drug (including marijuana) or a prescription medication for non-medical reasons in the past year? never    Single Item Drug Screen Score: 0  Interpretation: Negative screen for possible drug use disorder    No exam data present     Physical Exam:     /68 (BP Location: Left arm, Patient Position: Sitting, Cuff Size: Large)   Pulse 60   Temp (!) 96 7 °F (35 9 °C) (Temporal)   Resp 18   Ht 5' 4 5" (1 638 m)   Wt 106 kg (234 lb)   SpO2 97%   BMI 39 55 kg/m²     Physical Exam  Vitals and nursing note reviewed  Constitutional:       Appearance: He is well-developed  He is obese  HENT:      Head: Normocephalic and atraumatic  Eyes:      Conjunctiva/sclera: Conjunctivae normal    Cardiovascular:      Rate and Rhythm: Normal rate and regular rhythm  Pulses: no weak pulses          Dorsalis pedis pulses are 2+ on the right side and 2+ on the left side  Posterior tibial pulses are 2+ on the right side and 2+ on the left side  Heart sounds: No murmur heard  Pulmonary:      Effort: Pulmonary effort is normal  No respiratory distress  Breath sounds: Normal breath sounds  Abdominal:      Palpations: Abdomen is soft  Tenderness: There is no abdominal tenderness  Musculoskeletal:         General: Normal range of motion  Cervical back: Neck supple  Feet:      Right foot:      Skin integrity: Callus and dry skin present  No ulcer, skin breakdown, erythema or warmth  Left foot:      Skin integrity: Callus and dry skin present  No ulcer, skin breakdown, erythema or warmth  Skin:     General: Skin is warm and dry  Capillary Refill: Capillary refill takes less than 2 seconds  Neurological:      General: No focal deficit present  Mental Status: He is alert and oriented to person, place, and time  Psychiatric:         Mood and Affect: Mood normal          Behavior: Behavior normal          Thought Content:  Thought content normal          Judgment: Judgment normal           Sheliah People, DO

## 2022-09-01 ENCOUNTER — HOSPITAL ENCOUNTER (OUTPATIENT)
Dept: CT IMAGING | Facility: HOSPITAL | Age: 77
Discharge: HOME | End: 2022-09-01
Attending: SURGERY
Payer: COMMERCIAL

## 2022-09-01 DIAGNOSIS — I71.9 DESCENDING AORTIC ANEURYSM (HCC): ICD-10-CM

## 2022-09-01 DIAGNOSIS — I71.019 CHRONIC THORACIC AORTIC DISSECTION: ICD-10-CM

## 2022-09-01 PROCEDURE — 74174 CTA ABD&PLVS W/CONTRAST: CPT

## 2022-09-01 PROCEDURE — G1004 CDSM NDSC: HCPCS

## 2022-09-01 PROCEDURE — 71275 CT ANGIOGRAPHY CHEST: CPT

## 2022-09-01 RX ADMIN — IOHEXOL 100 ML: 350 INJECTION, SOLUTION INTRAVENOUS at 13:07

## 2022-09-16 ENCOUNTER — OFFICE VISIT (OUTPATIENT)
Dept: CARDIAC SURGERY | Facility: CLINIC | Age: 77
End: 2022-09-16
Payer: COMMERCIAL

## 2022-09-16 VITALS
TEMPERATURE: 97.1 F | HEART RATE: 52 BPM | BODY MASS INDEX: 40.72 KG/M2 | DIASTOLIC BLOOD PRESSURE: 61 MMHG | WEIGHT: 238.5 LBS | OXYGEN SATURATION: 98 % | HEIGHT: 64 IN | SYSTOLIC BLOOD PRESSURE: 117 MMHG

## 2022-09-16 DIAGNOSIS — I71.9 DESCENDING AORTIC ANEURYSM (HCC): Chronic | ICD-10-CM

## 2022-09-16 DIAGNOSIS — Z86.79 H/O REPAIR OF DISSECTING ANEURYSM OF DESCENDING THORACIC AORTA: Chronic | ICD-10-CM

## 2022-09-16 DIAGNOSIS — Z95.828 S/P VASCULAR BYPASS: Chronic | ICD-10-CM

## 2022-09-16 DIAGNOSIS — Z98.890 H/O REPAIR OF DISSECTING ANEURYSM OF DESCENDING THORACIC AORTA: Chronic | ICD-10-CM

## 2022-09-16 DIAGNOSIS — Z98.890 S/P ASCENDING AORTIC ANEURYSM REPAIR: Primary | Chronic | ICD-10-CM

## 2022-09-16 DIAGNOSIS — Z86.79 S/P ASCENDING AORTIC ANEURYSM REPAIR: Primary | Chronic | ICD-10-CM

## 2022-09-16 DIAGNOSIS — I71.019 CHRONIC THORACIC AORTIC DISSECTION: ICD-10-CM

## 2022-09-16 PROCEDURE — 99214 OFFICE O/P EST MOD 30 MIN: CPT | Performed by: NURSE PRACTITIONER

## 2022-09-16 PROCEDURE — 3074F SYST BP LT 130 MM HG: CPT | Performed by: NURSE PRACTITIONER

## 2022-09-16 PROCEDURE — 3078F DIAST BP <80 MM HG: CPT | Performed by: NURSE PRACTITIONER

## 2022-09-16 PROCEDURE — 1160F RVW MEDS BY RX/DR IN RCRD: CPT | Performed by: NURSE PRACTITIONER

## 2022-09-16 NOTE — PROGRESS NOTES
Aortic Clinic  Marco Antonio Ferguson  69 y o  male MRN: [id number]      History of Present Illness: Marco Antonio Ferguson  is a 69y o  year old male who presents to aortic clinic today for routine f/u s/p emergent ascedning aortic repair due to acute Type A aortic dissection (Sept 2017); Left carotid - subclavian bypass w/ embolization of prox subclavian (August 2018) followed by TEVAR (Sept 2018) due to aneurysmal dilatation of descending thoracic aorta from chronic dissection  Marco Antonio Ferguson  was last evaluated in our office in April 2021 and at that time imaging demonstrated stable appearance of ascending aortic repair, stable carotid-subclavian bypass and stable TEAVR position  Upon interview today patient reports he has been well  He denies chest pain, SOB, lightheadedness, palpitations, new or unusual back pain  He has chronic LE edema and reports some arthralgias which he attributes to "getting older "  BP remains well controlled  He does not smoke  He follows up regularly with his PCP and Vascular surgery  Past Medical History:  Past Medical History:   Diagnosis Date    Arthritis     CPAP (continuous positive airway pressure) dependence     Diabetes ([address] )     Edema     Hyperlipidemia     Hypertension     Prostate cancer ([address] )     prostate    Sleep apnea     Stroke (cerebrum) ([address] )     Wears glasses          Past Surgical History:   Past Surgical History:   Procedure Laterality Date    CARDIAC SURGERY      CIRCUMCISION      COLONOSCOPY      IR CEREBRAL ANGIOGRAPHY / INTERVENTION  8/13/2018    IR TEVAR  9/19/2018    LA ASCEND AORTA GRAFT W ROOT REPLACMENT  VALVE CONDUIT/CORON RECONSTRUCT N/A 9/6/2017    Procedure: ASCENDING AORTIC REPAIR WITH A 26MM  GELWEAVE STRAIGHT GRAFT AND HEMIARCH WITH 8MM SIDE ARM BRANCH;  Surgeon: Miroslava Cortés MD;  [facility];  Service: Cardiac Surgery    LA ENDOVASC TAA REINCL SUBCL N/A 9/19/2018    Procedure: REPAIR ANEURSYM THORACIC ENDOVASCUALR DESCENDING AORTIC ANEURYSM (TEVAR); Surgeon: Rubina Medina MD;  [facility];  Service: Cardiac Surgery    MI EXPLOR POSTOP BLEED,INFEC,CLOT-CHST N/A 9/6/2017    Procedure: RE-EXPLORATION MEDIASTERNAL CAVITY FOR POST-OP BLEED (BRING BACK); Surgeon: Deonte Angel MD;  [facility];  Service: Cardiac Surgery    MI VEIN BYPASS GRAFT,CAROTID-BRACHIAL Left 8/13/2018    Procedure: BYPASS CAROTID SUBCLAVIAN WITH EMBOLIZATION OF PROXIMAL SUBCLAVIAN ARTERY  ;  Surgeon: Enrique Arreola MD;  [facility];  Service: Vascular         Family History:  Family History   Problem Relation Age of Onset    Stroke Mother         complications    Hypertension Mother    Alonso Veras Arthritis Mother     No Known Problems Father          Social History:    Social History     Substance and Sexual Activity   Alcohol Use Yes    Comment: social beer or two daily     Social History     Substance and Sexual Activity   Drug Use No     Social History     Tobacco Use   Smoking Status Never Smoker   Smokeless Tobacco Never Used         Home Medications:   Prior to Admission medications    Medication Sig Start Date End Date Taking? Authorizing Provider   acetaminophen (TYLENOL) 325 mg tablet Take 2 tablets (650 mg total) by mouth every 6 (six) hours as needed for mild pain 8/14/18  Yes Maryanne Hardin PA-C   amLODIPine (NORVASC) 10 mg tablet TAKE 1 TABLET BY MOUTH TWICE A DAY 2/9/22  Yes Marvin Cunningham DO   aspirin 81 mg chewable tablet Chew 1 tablet daily 9/14/17  Yes LV Medina MD   atorvastatin (LIPITOR) 80 mg tablet Take 1 tablet (80 mg total) by mouth in the morning   5/19/22  Yes MAUREEN Barrientos-D ULTRAFINE III SHORT PEN 31G X 8 MM MISC USE AS DIRECTED 10/18/21  Yes Marvin Cunningham DO   finasteride (PROSCAR) 5 mg tablet Take 5 mg by mouth daily 1/27/20  Yes Historical Provider, MD   glucose blood (OneTouch Verio) test strip Use to check blood sugar 8/4/22  Yes DO DRISS Murray KWIKPEN 100 units/mL injection pen INJECT 8 UNIT 3 TIMES DAILY BEFORE MEALS 1/9/20  Yes Darlys Knee, DO   insulin glargine (LANTUS) 100 units/mL subcutaneous injection Inject 28 Units under the skin daily at bedtime Dx: Diabetes E11 9, dispense pens 9/14/17  Yes Carline Botello MD   insulin lispro (HumaLOG) 100 units/mL injection Inject 8 Units under the skin 3 (three) times a day before meals Dx: Diabetes E11 9   Dispense pens 9/14/17  Yes LV Botello MD   Insulin Pen Needle (B-D ULTRAFINE III SHORT PEN) 31G X 8 MM MISC Inject under the skin 2 (two) times a day Use as directed 3/25/22  Yes Radhika Knee, DO   lisinopril-hydrochlorothiazide (PRINZIDE,ZESTORETIC) 20-12 5 MG per tablet TAKE 2 TABLETS BY MOUTH EVERY DAY 7/20/22  Yes Rujul Grove, DO   metoprolol tartrate (LOPRESSOR) 100 mg tablet TAKE 1 TABLET BY MOUTH TWICE A DAY 6/9/22  Yes Radhika Knee, DO   Lantus SoloStar 100 units/mL injection pen INJECT 28 UNIT BEDTIME  Patient not taking: No sig reported 11/1/21   Darlys Knee, DO       Allergies:  No Known Allergies    Review of Systems:   Review of Systems - History obtained from chart review and the patient  General ROS: negative  Psychological ROS: negative  Ophthalmic ROS: negative  ENT ROS: negative  Allergy and Immunology ROS: negative  Hematological and Lymphatic ROS: negative  Endocrine ROS: negative  Breast ROS: negative  Respiratory ROS: no cough, shortness of breath, or wheezing  Cardiovascular ROS: positive for - edema  negative for - chest pain, dyspnea on exertion, irregular heartbeat, loss of consciousness, orthopnea, palpitations or paroxysmal nocturnal dyspnea  Gastrointestinal ROS: no abdominal pain, change in bowel habits, or black or bloody stools  Genito-Urinary ROS: no dysuria, trouble voiding, or hematuria  Musculoskeletal ROS: positive for - arthralgias  Neurological ROS: no TIA or stroke symptoms  Dermatological ROS: negative    Vital Signs:   Vitals: 09/16/22 0944 09/16/22 0947   BP: 115/57 117/61   BP Location: Left arm Right arm   Patient Position: Sitting Sitting   Cuff Size: Large Large   Pulse: (!) 52    Temp: (!) 97 1 °F (36 2 °C)    TempSrc: Tympanic    SpO2: 98%    Weight: 108 kg (238 lb 8 oz)    Height: 5' 4" (1 626 m)        Physical Exam:  General: Alert, oriented, obese,no acute distress  HEENT/NECK:  PERRLA  No jugular venous distention  Cardiac:Regular rate and rhythm, no murmurs rubs or gallops  Carotid arteries: 2+ pulses, Left bruit  Pulmonary:  Breath sounds clear bilaterally  Abdomen:  Non-tender, Non-distended  Positive bowel sounds  Upper extremities: 2+ radial pulses; brisk capillary refill  Lower extremities: Extremities warm/dry  PT/DP pulses 1+ bilaterally  3+ edema B/L  Neuro: Alert and oriented X 3  Sensation is grossly intact  No focal deficits  Musculoskeletal: MAEE, stable gait  Skin: Warm/Dry, without rashes or lesions      Lab Results:   Lab Results   Component Value Date    WBC 13 46 (H) 09/21/2018    HGB 12 3 09/21/2018    HCT 37 1 09/21/2018    MCV 88 09/21/2018     09/21/2018     Lab Results   Component Value Date     10/07/2015    SODIUM 133 (L) 08/22/2022    K 3 5 08/22/2022    CL 97 08/22/2022    CO2 30 08/22/2022    ANIONGAP 8 10/07/2015    AGAP 6 08/22/2022    BUN 11 08/22/2022    CREATININE 0 90 08/22/2022    GLUC 103 09/21/2018    GLUF 154 (H) 08/22/2022    CALCIUM 9 1 08/22/2022    AST 32 08/22/2022    ALT 30 08/22/2022    ALKPHOS 89 08/22/2022    PROT 7 5 10/07/2015    TP 7 8 08/22/2022    BILITOT 0 65 10/07/2015    TBILI 0 69 08/22/2022    EGFR 82 08/22/2022     Lab Results   Component Value Date    CHOLESTEROL 99 08/22/2022    CHOLESTEROL 90 02/22/2021    CHOLESTEROL 96 12/03/2019     Lab Results   Component Value Date    HDL 42 08/22/2022    HDL 38 (L) 02/22/2021    HDL 46 12/03/2019     Lab Results   Component Value Date    TRIG 73 08/22/2022    TRIG 66 02/22/2021    TRIG 44 12/03/2019 Lab Results   Component Value Date    [place] 64 09/11/2018     Lab Results   Component Value Date    HGBA1C 7 9 (H) 08/22/2022     Lab Results   Component Value Date    CKTOTAL 72 01/15/2014    TROPONINI 0 03 09/06/2017       Imaging Studies:     CTA Chest/Abd/Pelvis: 9/1/22    Vascular:  1  Stable postsurgical changes related to ascending aortic replacement, hemiarch reconstruction, TEVAR, and left carotid-subclavian arterial bypass  2   Stable caliber of the vascular malformation arising from the proximal left anterior descending coronary artery  3   Stable extent of the dissection flap extending through the mid abdominal aorta  4   Stable fusiform aneurysmal dilatation of the right common iliac artery  Carotid duplex: 7/19/22    RIGHT:  There is <50% stenosis in the internal carotid artery  Plaque is heterogenous  and smooth  Vertebral artery flow is antegrade  There is no significant subclavian artery disease  LEFT:  Patent common carotid to subclavian artery bypass graft  There is <50% stenosis noted in the internal carotid artery  Plaque is  heterogenous and smooth  Vertebral artery flow is antegrade  Echocardiogram: 4/12/22    LEFT VENTRICLE:  Systolic function was normal  Ejection fraction was estimated to be 60 %  There were no regional wall motion abnormalities    Wall thickness was mildly increased      LEFT ATRIUM:  The atrium was mildly to moderately dilated      RIGHT ATRIUM:  The atrium was mildly dilated      MITRAL VALVE:  There was mild regurgitation      TRICUSPID VALVE:  There was mild regurgitation        I have personally reviewed pertinent films in PACS    PCP and Vascular Surgery notes reviewed    Assessment:  Patient Active Problem List    Diagnosis Date Noted    Edema     Encounter for postoperative care 10/18/2018    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 (TEVAR) 09/19/2018    Descending aortic aneurysm ([address] ) 09/07/2018    S/P vascular bypass 08/14/2018    Screen for colon cancer 07/02/2018    Adenomatous polyp of descending colon 07/02/2018    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    KARIN (obstructive sleep apnea) 03/12/2018    Class 2 severe obesity due to excess calories with serious comorbidity and body mass index (BMI) of 37 0 to 37 9 in adult [facility]) 03/12/2018    Paroxysmal atrial fibrillation ([facility] ) 09/11/2017    Chronic thoracic aortic dissection ([facility] ) 09/06/2017    S/P ascending aortic aneurysm repair 09/06/2017    Hypokalemia 09/06/2017    Prostate cancer ([place] [address] )     Hypertension     Diabetes mellitus ([place] [address] )     Hyperlipidemia 06/07/2012         Impression/Plan:    H/O Type A Aortic Dissection s/p emergent repair (Sept 2017)  Descending thoracic aortic aneurysm s/p L Carotid-Subclavian Bypass (Aug 2018) and TEVAR (Sept 2018)  CTA  imaging performed prior to this visit demonstrates stable post op appearance of the ascending aortic repair, L Carotid-Subclavian bypass and TEVAR position  These findings were confirmed and shared with the patient today  Continue f/u with repeat CTA c/a/p and visit in aortic clinic in 1 year  Continue optimal HTN and HLD management  Marlene Warner  was comfortable with our recommendations, and her questions were answered to her satisfaction  Aortic Aneurysm Instructions were provided to the patient as follows:    1  No lifting more than 50 pounds  Regular aerobic exercise permitted and recommended  2  Maintain a controlled blood pressure with a goal of less than 120/80  3  Follow up in Aortic Clinic as recommended with radiology follow up as instructed  4  Report to the ER or call 911 immediately with the following signs / symptoms: sudden onset of back pain, chest pain or shortness of breath    5    Routine referral to gastroenterology for colonoscopy screening was not indicated, as the patient is over 77years old    SIGNATURE: MAUREEN Gambino  DATE: September 16, 2022  TIME: 10:15 AM

## 2022-10-12 PROBLEM — Z12.11 SCREEN FOR COLON CANCER: Status: RESOLVED | Noted: 2018-07-02 | Resolved: 2022-10-12

## 2022-10-26 ENCOUNTER — OFFICE VISIT (OUTPATIENT)
Dept: FAMILY MEDICINE CLINIC | Facility: CLINIC | Age: 77
End: 2022-10-26
Payer: COMMERCIAL

## 2022-10-26 VITALS
BODY MASS INDEX: 40.63 KG/M2 | HEART RATE: 57 BPM | OXYGEN SATURATION: 96 % | WEIGHT: 238 LBS | SYSTOLIC BLOOD PRESSURE: 118 MMHG | HEIGHT: 64 IN | DIASTOLIC BLOOD PRESSURE: 70 MMHG | TEMPERATURE: 96.6 F

## 2022-10-26 DIAGNOSIS — Z23 NEED FOR INFLUENZA VACCINATION: ICD-10-CM

## 2022-10-26 DIAGNOSIS — C61 PROSTATE CANCER (HCC): Chronic | ICD-10-CM

## 2022-10-26 DIAGNOSIS — E78.5 HYPERLIPIDEMIA, UNSPECIFIED HYPERLIPIDEMIA TYPE: Chronic | ICD-10-CM

## 2022-10-26 DIAGNOSIS — I10 PRIMARY HYPERTENSION: Chronic | ICD-10-CM

## 2022-10-26 DIAGNOSIS — Z98.890 S/P ASCENDING AORTIC ANEURYSM REPAIR: Chronic | ICD-10-CM

## 2022-10-26 DIAGNOSIS — E11.9 TYPE 2 DIABETES MELLITUS WITHOUT COMPLICATION, UNSPECIFIED WHETHER LONG TERM INSULIN USE (HCC): ICD-10-CM

## 2022-10-26 DIAGNOSIS — Z23 ENCOUNTER FOR IMMUNIZATION: ICD-10-CM

## 2022-10-26 DIAGNOSIS — Z01.818 PREOP EXAMINATION: Primary | ICD-10-CM

## 2022-10-26 DIAGNOSIS — E66.01 CLASS 3 SEVERE OBESITY DUE TO EXCESS CALORIES WITH BODY MASS INDEX (BMI) OF 40.0 TO 44.9 IN ADULT, UNSPECIFIED WHETHER SERIOUS COMORBIDITY PRESENT (HCC): ICD-10-CM

## 2022-10-26 DIAGNOSIS — H26.9 CATARACT OF RIGHT EYE, UNSPECIFIED CATARACT TYPE: ICD-10-CM

## 2022-10-26 DIAGNOSIS — Z86.79 S/P ASCENDING AORTIC ANEURYSM REPAIR: Chronic | ICD-10-CM

## 2022-10-26 DIAGNOSIS — Z95.828 S/P VASCULAR BYPASS: Chronic | ICD-10-CM

## 2022-10-26 DIAGNOSIS — I48.0 PAROXYSMAL ATRIAL FIBRILLATION (HCC): Chronic | ICD-10-CM

## 2022-10-26 DIAGNOSIS — H26.9 CATARACT OF LEFT EYE, UNSPECIFIED CATARACT TYPE: ICD-10-CM

## 2022-10-26 PROBLEM — E11.65 TYPE 2 DIABETES MELLITUS WITH HYPERGLYCEMIA, UNSPECIFIED WHETHER LONG TERM INSULIN USE (HCC): Status: ACTIVE | Noted: 2022-10-26

## 2022-10-26 PROCEDURE — 90662 IIV NO PRSV INCREASED AG IM: CPT

## 2022-10-26 PROCEDURE — G0008 ADMIN INFLUENZA VIRUS VAC: HCPCS

## 2022-10-26 PROCEDURE — 99214 OFFICE O/P EST MOD 30 MIN: CPT | Performed by: FAMILY MEDICINE

## 2022-10-26 NOTE — PROGRESS NOTES
Name: Maryanne Morton  : 1945      MRN: [id number]  Encounter Provider: Melodie Cranker, DO  Encounter Date: 10/26/2022   Encounter department: [address] [place] [address] PRIMARY CARE  Chief Complaint   Patient presents with   • Pre-op Exam     Pt having left cataract surgery on  with Dr Deirdre Ramirez      Patient Instructions   Patient is medically cleared for left eye cataract surgery by Dr Deirdre Ramirez on 22 and right eye cataract surgery 22  Flu shot today  HTN, diabetes and a-fib stable  Call if any problems  Low cholesterol diet and low sugar diet encouraged  Assessment & Plan     1  Preop examination    2  Encounter for immunization    3  Type 2 diabetes mellitus without complication, unspecified whether long term insulin use (HCC)  Comments:  stable on meds    4  Paroxysmal atrial fibrillation (HCC)  Comments:  stable and on baby aspirin    5  Primary hypertension    6  Hyperlipidemia, unspecified hyperlipidemia type    7  S/P vascular bypass    8  S/P ascending aortic aneurysm repair    9  Prostate cancer [facility])  Comments:  sees urology    10  Need for influenza vaccination  -     influenza vaccine, high-dose, PF 0 7 mL (FLUZONE HIGH-DOSE)    11  Class 3 severe obesity due to excess calories with body mass index (BMI) of 40 0 to 44 9 in adult, unspecified whether serious comorbidity present ([facility] )  Comments:  lose weight as directed to get BMI lower than 25  12  Cataract of left eye, unspecified cataract type    13  Cataract of right eye, unspecified cataract type           Subjective      Pre-op Exam (Pt having left cataract surgery on  with Dr Deirdre Ramirez ) No fever or cp or sob  Here for b/l cataracts and surgery scheduled for lft eye cataract 22 and right eye cataract 22  No cp or sob, or ha and also no fever  Review of Systems   Constitutional: Negative  HENT: Negative  Eyes: Positive for visual disturbance (blurry vision b/l)  Left eye cataract  Right eye cataract   Respiratory: Negative  Cardiovascular: Negative  Gastrointestinal: Negative  Endocrine: Negative  Genitourinary: Negative  Musculoskeletal: Negative  Skin: Negative  Allergic/Immunologic: Negative  Neurological: Negative  Hematological: Negative  Psychiatric/Behavioral: Negative  Current Outpatient Medications on File Prior to Visit   Medication Sig   • acetaminophen (TYLENOL) 325 mg tablet Take 2 tablets (650 mg total) by mouth every 6 (six) hours as needed for mild pain   • amLODIPine (NORVASC) 10 mg tablet TAKE 1 TABLET BY MOUTH TWICE A DAY   • aspirin 81 mg chewable tablet Chew 1 tablet daily   • atorvastatin (LIPITOR) 80 mg tablet Take 1 tablet (80 mg total) by mouth in the morning  • B-D ULTRAFINE III SHORT PEN 31G X 8 MM MISC USE AS DIRECTED   • finasteride (PROSCAR) 5 mg tablet Take 5 mg by mouth daily   • glucose blood (OneTouch Verio) test strip Use to check blood sugar   • HUMALOG KWIKPEN 100 units/mL injection pen INJECT 8 UNIT 3 TIMES DAILY BEFORE MEALS   • insulin glargine (LANTUS) 100 units/mL subcutaneous injection Inject 28 Units under the skin daily at bedtime Dx: Diabetes E11 9, dispense pens   • insulin lispro (HumaLOG) 100 units/mL injection Inject 8 Units under the skin 3 (three) times a day before meals Dx: Diabetes E11 9   Dispense pens   • Insulin Pen Needle (B-D ULTRAFINE III SHORT PEN) 31G X 8 MM MISC Inject under the skin 2 (two) times a day Use as directed   • lisinopril-hydrochlorothiazide (PRINZIDE,ZESTORETIC) 20-12 5 MG per tablet TAKE 2 TABLETS BY MOUTH EVERY DAY   • metoprolol tartrate (LOPRESSOR) 100 mg tablet TAKE 1 TABLET BY MOUTH TWICE A DAY   • Lantus SoloStar 100 units/mL injection pen INJECT 28 UNIT BEDTIME (Patient not taking: No sig reported)       Objective     /70 (BP Location: Left arm, Patient Position: Sitting, Cuff Size: Large)   Pulse 57   Temp (!) 96 6 °F (35 9 °C) (Temporal)   Ht 5' 4" (1 626 m) Wt 108 kg (238 lb)   SpO2 96%   BMI 40 85 kg/m²     Physical Exam  Constitutional:       Appearance: He is well-developed  HENT:      Head: Normocephalic and atraumatic  Right Ear: External ear normal       Left Ear: External ear normal       Nose: Nose normal    Eyes:      Extraocular Movements: Extraocular movements intact  Conjunctiva/sclera: Conjunctivae normal       Pupils: Pupils are equal, round, and reactive to light  Comments: Left eye cataract, right eye cataract   Cardiovascular:      Rate and Rhythm: Normal rate and regular rhythm  Heart sounds: Normal heart sounds  Pulmonary:      Effort: Pulmonary effort is normal       Breath sounds: Normal breath sounds  Abdominal:      General: Abdomen is flat  Bowel sounds are normal       Palpations: Abdomen is soft  Musculoskeletal:         General: Normal range of motion  Cervical back: Normal range of motion and neck supple  Skin:     General: Skin is warm and dry  Neurological:      Mental Status: He is alert and oriented to person, place, and time  Deep Tendon Reflexes: Reflexes are normal and symmetric     Psychiatric:         Behavior: Behavior normal        Bud De Jesus DO

## 2022-10-26 NOTE — PATIENT INSTRUCTIONS
Patient is medically cleared for left eye cataract surgery by Dr Jo-Ann Haile on 11/2/22 and right eye cataract surgery 11/16/22  Flu shot today  HTN, diabetes and a-fib stable  Call if any problems  Low cholesterol diet and low sugar diet encouraged

## 2022-11-10 DIAGNOSIS — I10 HTN (HYPERTENSION), BENIGN: ICD-10-CM

## 2022-11-10 RX ORDER — AMLODIPINE BESYLATE 10 MG/1
TABLET ORAL
Qty: 180 TABLET | Refills: 2 | Status: SHIPPED | OUTPATIENT
Start: 2022-11-10

## 2022-11-15 DIAGNOSIS — E11.9 TYPE 2 DIABETES MELLITUS WITHOUT COMPLICATION, UNSPECIFIED WHETHER LONG TERM INSULIN USE (HCC): ICD-10-CM

## 2022-11-15 DIAGNOSIS — E78.5 DYSLIPIDEMIA: ICD-10-CM

## 2022-11-15 DIAGNOSIS — I10 HTN (HYPERTENSION), BENIGN: ICD-10-CM

## 2022-11-15 RX ORDER — ATORVASTATIN CALCIUM 80 MG/1
80 TABLET, FILM COATED ORAL DAILY
Qty: 90 TABLET | Refills: 1 | Status: SHIPPED | OUTPATIENT
Start: 2022-11-15

## 2022-11-15 RX ORDER — METOPROLOL TARTRATE 100 MG/1
TABLET ORAL
Qty: 180 TABLET | Refills: 1 | Status: SHIPPED | OUTPATIENT
Start: 2022-11-15

## 2022-11-15 RX ORDER — INSULIN GLARGINE 100 [IU]/ML
INJECTION, SOLUTION SUBCUTANEOUS
Qty: 15 ML | Refills: 17 | Status: SHIPPED | OUTPATIENT
Start: 2022-11-15

## 2022-11-29 ENCOUNTER — APPOINTMENT (OUTPATIENT)
Dept: LAB | Facility: CLINIC | Age: 77
End: 2022-11-29

## 2022-11-29 DIAGNOSIS — R39.12 WEAK URINARY STREAM: ICD-10-CM

## 2022-11-29 DIAGNOSIS — Z85.46 PERSONAL HISTORY OF MALIGNANT NEOPLASM OF PROSTATE: ICD-10-CM

## 2022-11-29 DIAGNOSIS — R39.14 FEELING OF INCOMPLETE BLADDER EMPTYING: ICD-10-CM

## 2022-11-29 LAB — PSA SERPL-MCNC: 0.1 NG/ML (ref 0–4)

## 2023-03-01 ENCOUNTER — OFFICE VISIT (OUTPATIENT)
Dept: FAMILY MEDICINE CLINIC | Facility: CLINIC | Age: 78
End: 2023-03-01

## 2023-03-01 VITALS
TEMPERATURE: 96.3 F | HEART RATE: 57 BPM | RESPIRATION RATE: 16 BRPM | SYSTOLIC BLOOD PRESSURE: 132 MMHG | HEIGHT: 65 IN | BODY MASS INDEX: 38.65 KG/M2 | OXYGEN SATURATION: 97 % | DIASTOLIC BLOOD PRESSURE: 80 MMHG | WEIGHT: 232 LBS

## 2023-03-01 DIAGNOSIS — R60.9 EDEMA, UNSPECIFIED TYPE: ICD-10-CM

## 2023-03-01 DIAGNOSIS — Z23 ENCOUNTER FOR IMMUNIZATION: ICD-10-CM

## 2023-03-01 DIAGNOSIS — I10 PRIMARY HYPERTENSION: Chronic | ICD-10-CM

## 2023-03-01 DIAGNOSIS — E66.01 CLASS 3 SEVERE OBESITY DUE TO EXCESS CALORIES WITH BODY MASS INDEX (BMI) OF 40.0 TO 44.9 IN ADULT, UNSPECIFIED WHETHER SERIOUS COMORBIDITY PRESENT (HCC): ICD-10-CM

## 2023-03-01 DIAGNOSIS — I71.9 DESCENDING AORTIC ANEURYSM (HCC): Chronic | ICD-10-CM

## 2023-03-01 DIAGNOSIS — C61 PROSTATE CANCER (HCC): ICD-10-CM

## 2023-03-01 DIAGNOSIS — E11.65 TYPE 2 DIABETES MELLITUS WITH HYPERGLYCEMIA, UNSPECIFIED WHETHER LONG TERM INSULIN USE (HCC): ICD-10-CM

## 2023-03-01 DIAGNOSIS — E78.5 HYPERLIPIDEMIA, UNSPECIFIED HYPERLIPIDEMIA TYPE: Chronic | ICD-10-CM

## 2023-03-01 DIAGNOSIS — I48.0 PAROXYSMAL ATRIAL FIBRILLATION (HCC): Chronic | ICD-10-CM

## 2023-03-01 DIAGNOSIS — E78.5 DYSLIPIDEMIA: ICD-10-CM

## 2023-03-01 DIAGNOSIS — E11.9 TYPE 2 DIABETES MELLITUS WITHOUT COMPLICATION, UNSPECIFIED WHETHER LONG TERM INSULIN USE (HCC): Primary | ICD-10-CM

## 2023-03-01 DIAGNOSIS — I71.019 CHRONIC THORACIC AORTIC DISSECTION: ICD-10-CM

## 2023-03-01 LAB — SL AMB POCT HEMOGLOBIN AIC: 8.2 (ref ?–6.5)

## 2023-03-01 RX ORDER — ATORVASTATIN CALCIUM 80 MG/1
80 TABLET, FILM COATED ORAL DAILY
Qty: 90 TABLET | Refills: 1 | Status: SHIPPED | OUTPATIENT
Start: 2023-03-01

## 2023-03-01 NOTE — PROGRESS NOTES
Name: Ryann Dietz  : 1945      MRN: [id number]  Encounter Provider: Eleonora Huffman DO  Encounter Date: 3/1/2023   Encounter department: [address] [place] [address]  Chief Complaint   Patient presents with   • Follow-up     6 mo diabetic follow up  Pt would like to discuss Edema  Patient Instructions     Here for recheck and rec low sugar diet and exercise as tolerated  HgA1C at 8 2  Rec f-up with Cardiology as directed for a-fib and HTN and f-up with Vascular Surgery for hx of thoracic aortic aneurysm and descending aortic aneurysm  Edema present and uses compression stockings  Lipids stable  No cp or sob, or ha  Prevnar 20 today and recheck in 4 months with labs  Try to avoid sugar in coffee and in diet  Type 2 Diabetes Management for Adults   AMBULATORY CARE:   Type 2 diabetes  is a disease that affects how your body uses glucose (sugar)  Either your body cannot make enough insulin, or it cannot use the insulin correctly  It is important to keep diabetes controlled to prevent damage to your heart, blood vessels, and other organs  Management will help you feel well and enjoy your daily activities  Your diabetes care team providers can help you make a plan to fit diabetes care into your schedule  Your plan can change over time to fit your needs and your family's needs  Have someone call your local emergency number (911 in the [address] [facility]) if:   • You cannot be woken  • You have signs of diabetic ketoacidosis:     ? confusion, fatigue    ? vomiting    ? rapid heartbeat    ? fruity smelling breath    ? extreme thirst    ? dry mouth and skin    • You have any of the following signs of a heart attack:      ?  Squeezing, pressure, or pain in your chest    ? You may  also have any of the following:     - Discomfort or pain in your back, neck, jaw, stomach, or arm    - Shortness of breath    - Nausea or vomiting    - Lightheadedness or a sudden cold sweat    • You have any of the following signs of a stroke:      ? Numbness or drooping on one side of your face     ? Weakness in an arm or leg    ? Confusion or difficulty speaking    ? Dizziness, a severe headache, or vision loss    Call your doctor or diabetes care team provider if:   • You have a sore or wound that will not heal     • You have a change in the amount you urinate  • Your blood sugar levels are higher than your target goals  • You often have lower blood sugar levels than your target goals  • Your skin is red, dry, warm, or swollen  • You have trouble coping with diabetes, or you feel anxious or depressed  • You have questions or concerns about your condition or care  What you need to know about high blood sugar levels:  High blood sugar levels may not cause any symptoms  You may feel more thirsty or urinate more often than usual  Over time, high blood sugar levels can damage your nerves, blood vessels, tissues, and organs  The following can increase your blood sugar levels:  • Large meals or large amounts of carbohydrates at one time    • Less physical activity    • Stress    • Illness    • A lower dose of diabetes medicine or insulin, or a late dose    What you need to know about low blood sugar levels:  Symptoms include feeling shaky, dizzy, irritable, or confused  You can prevent symptoms by keeping your blood sugar levels from going too low  • Treat a low blood sugar level right away:      ? Drink 4 ounces of juice or have 1 tube of glucose gel  ? Check your blood sugar level again 10 to 15 minutes later  ? When the level goes back to normal, eat a meal or snack to prevent another decrease  • Keep glucose gel, raisins, or hard candy with you at all times to treat a low blood sugar level  • Your blood sugar level can get too low if you take diabetes medicine or insulin and do not eat enough food  • If you use insulin, check your blood sugar level before you exercise        ? If your blood sugar level is below 100 mg/dL, eat 4 crackers or 2 ounces of raisins, or drink 4 ounces of juice  ? Check your level every 30 minutes if you exercise longer than 1 hour  ? You may need a snack during or after exercise  What you can do to manage your blood sugar levels:   • Check your blood sugar levels as directed and as needed  Several items are available to use to check your levels  You may need to check by testing a drop of blood in a glucose monitor  You may instead be given a continuous glucose monitoring (CGM) device  The device is worn at all times  The CGM checks your blood sugar level every 5 minutes  It sends results to an electronic device such as a smart phone  A CGM can be used with or without an insulin pump  You and your diabetes care team providers will decide on the best method for you  The goal for blood sugar levels before meals  is between 80 and 130 mg/dL and 2 hours after eating  is lower than 180 mg/dL  • Make healthy food choices  Work with a dietitian to develop a meal plan that works for you and your schedule  A dietitian can help you learn how to eat the right amount of carbohydrates during your meals and snacks  Carbohydrates can raise your blood sugar level if you eat too many at one time  Examples of foods that contain carbohydrates are breads, cereals, rice, pasta, and sweets  • Eat high-fiber foods as directed  Fiber helps improve blood sugar levels  Fiber also lowers your risk for heart disease and other problems diabetes can cause  Examples of high-fiber foods include vegetables, whole-grain bread, and beans such as saravia beans  Your dietitian can tell you how much fiber to have each day  • Get regular physical activity  Physical activity can help you get to your target blood sugar level goal and manage your weight  Get at least 150 minutes of moderate to vigorous aerobic physical activity each week   Do not miss more than 2 days in a row  Do not sit longer than 30 minutes at a time  Your healthcare provider can help you create an activity plan  The plan can include the best activities for you and can help you build your strength and endurance  • Maintain a healthy weight  Ask your team what a healthy weight is for you  A healthy weight can help you control diabetes and prevent heart disease  Ask your team to help you create a weight loss plan, if needed  Weight loss can help make a difference in managing diabetes  Your team will help you set a weight-loss goal, such as 10 to 15 pounds, or 5% of your extra weight  Together you and your team can set manageable weight loss goals  • Take your diabetes medicine or insulin as directed  You may need diabetes medicine, insulin, or both to help control your blood sugar levels  Your healthcare provider will teach you how and when to take your diabetes medicine or insulin  You will also be taught about side effects oral diabetes medicine can cause  Insulin may be injected or given through a pump or pen  You and your providers will decide on the best method for you:    ? An insulin pump  is an implanted device that gives your insulin 24 hours a day  An insulin pump prevents the need for multiple insulin injections in a day  ? An insulin pen  is a device prefilled with the right amount of insulin  ? You and your family members will be taught how to draw up and give insulin  if this is the best method for you  Your providers will also teach you how to dispose of needles and syringes  ? You will learn how much insulin you need  and when to give it  You will be taught when not to give insulin  You will also be taught what to do if your blood sugar level drops too low  This may happen if you take insulin and do not eat the right amount of carbohydrates  More ways to manage type 2 diabetes:   • Wear medical alert identification    Wear medical alert jewelry or carry a card that says you have diabetes  Ask your provider where to get these items  • Do not smoke  Nicotine and other chemicals in cigarettes and cigars can cause lung and blood vessel damage  It also makes it more difficult to manage your diabetes  Ask your provider for information if you currently smoke and need help to quit  Do not use e-cigarettes or smokeless tobacco in place of cigarettes or to help you quit  They still contain nicotine  • Check your feet each day for cuts, scratches, calluses, or other wounds  Look for redness and swelling, and feel for warmth  Wear shoes that fit well  Check your shoes for rocks or other objects that can hurt your feet  Do not walk barefoot or wear shoes without socks  Wear cotton socks to help keep your feet dry  • Ask about vaccines you may need  You have a higher risk for serious illness if you get the flu, pneumonia, COVID-19, or hepatitis  Ask your provider if you should get vaccines to prevent these or other diseases, and when to get the vaccines  • Talk to your provider if you become stressed about diabetes care  Sometimes being able to fit diabetes care into your life can cause increased stress  The stress can cause you not to take care of yourself properly  Your care team providers can help by offering tips about self-care  Your providers may suggest you talk to a mental health provider who can listen and offer help with self-care issues  • Have your A1c checked as directed  Your provider may check your A1c every 3 months, or 2 times each year if your diabetes is controlled  An A1c test shows the average amount of sugar in your blood over the past 2 to 3 months  Your provider will tell you what your A1c level should be  • Have screening tests as directed  Your provider may recommend screening for complications of diabetes and other conditions that may develop   Some screenings may begin right away and some may happen within the first 5 years of diagnosis:    ? Examples of diabetes complications  include kidney problems, high cholesterol, high blood pressure, blood vessel problems, eye problems, and sleep apnea  ? You may be screened for a low vitamin B level  if you take oral diabetes medicine for a long time  ? Women of childbearing years may be screened  for polycystic ovarian syndrome (PCOS)  Follow up with your doctor or diabetes care team providers as directed: You may need to have blood tests done before your follow-up visit  The test results will show if changes need to be made in your treatment or self-care  Talk to your provider if you cannot afford your medicine  Write down your questions so you remember to ask them during your visits  © Copyright [employer] 2022 Information is for End User's use only and may not be sold, redistributed or otherwise used for commercial purposes  The above information is an  only  It is not intended as medical advice for individual conditions or treatments  Talk to your doctor, nurse or pharmacist before following any medical regimen to see if it is safe and effective for you  Type 2 Diabetes Management for Adults   AMBULATORY CARE:   Type 2 diabetes  is a disease that affects how your body uses glucose (sugar)  Either your body cannot make enough insulin, or it cannot use the insulin correctly  It is important to keep diabetes controlled to prevent damage to your heart, blood vessels, and other organs  Management will help you feel well and enjoy your daily activities  Your diabetes care team providers can help you make a plan to fit diabetes care into your schedule  Your plan can change over time to fit your needs and your family's needs  Have someone call your local emergency number (911 in the [address] [facility]) if:   • You cannot be woken  • You have signs of diabetic ketoacidosis:     ? confusion, fatigue    ? vomiting    ?  rapid heartbeat    ? fruity smelling breath    ? extreme thirst    ? dry mouth and skin    • You have any of the following signs of a heart attack:      ? Squeezing, pressure, or pain in your chest    ? You may  also have any of the following:     - Discomfort or pain in your back, neck, jaw, stomach, or arm    - Shortness of breath    - Nausea or vomiting    - Lightheadedness or a sudden cold sweat    • You have any of the following signs of a stroke:      ? Numbness or drooping on one side of your face     ? Weakness in an arm or leg    ? Confusion or difficulty speaking    ? Dizziness, a severe headache, or vision loss    Call your doctor or diabetes care team provider if:   • You have a sore or wound that will not heal     • You have a change in the amount you urinate  • Your blood sugar levels are higher than your target goals  • You often have lower blood sugar levels than your target goals  • Your skin is red, dry, warm, or swollen  • You have trouble coping with diabetes, or you feel anxious or depressed  • You have questions or concerns about your condition or care  What you need to know about high blood sugar levels:  High blood sugar levels may not cause any symptoms  You may feel more thirsty or urinate more often than usual  Over time, high blood sugar levels can damage your nerves, blood vessels, tissues, and organs  The following can increase your blood sugar levels:  • Large meals or large amounts of carbohydrates at one time    • Less physical activity    • Stress    • Illness    • A lower dose of diabetes medicine or insulin, or a late dose    What you need to know about low blood sugar levels:  Symptoms include feeling shaky, dizzy, irritable, or confused  You can prevent symptoms by keeping your blood sugar levels from going too low  • Treat a low blood sugar level right away:      ? Drink 4 ounces of juice or have 1 tube of glucose gel  ? Check your blood sugar level again 10 to 15 minutes later  ?  When the level goes back to normal, eat a meal or snack to prevent another decrease  • Keep glucose gel, raisins, or hard candy with you at all times to treat a low blood sugar level  • Your blood sugar level can get too low if you take diabetes medicine or insulin and do not eat enough food  • If you use insulin, check your blood sugar level before you exercise  ? If your blood sugar level is below 100 mg/dL, eat 4 crackers or 2 ounces of raisins, or drink 4 ounces of juice  ? Check your level every 30 minutes if you exercise longer than 1 hour  ? You may need a snack during or after exercise  What you can do to manage your blood sugar levels:   • Check your blood sugar levels as directed and as needed  Several items are available to use to check your levels  You may need to check by testing a drop of blood in a glucose monitor  You may instead be given a continuous glucose monitoring (CGM) device  The device is worn at all times  The CGM checks your blood sugar level every 5 minutes  It sends results to an electronic device such as a smart phone  A CGM can be used with or without an insulin pump  You and your diabetes care team providers will decide on the best method for you  The goal for blood sugar levels before meals  is between 80 and 130 mg/dL and 2 hours after eating  is lower than 180 mg/dL  • Make healthy food choices  Work with a dietitian to develop a meal plan that works for you and your schedule  A dietitian can help you learn how to eat the right amount of carbohydrates during your meals and snacks  Carbohydrates can raise your blood sugar level if you eat too many at one time  Examples of foods that contain carbohydrates are breads, cereals, rice, pasta, and sweets  • Eat high-fiber foods as directed  Fiber helps improve blood sugar levels  Fiber also lowers your risk for heart disease and other problems diabetes can cause   Examples of high-fiber foods include vegetables, whole-grain bread, and beans such as saravia beans  Your dietitian can tell you how much fiber to have each day  • Get regular physical activity  Physical activity can help you get to your target blood sugar level goal and manage your weight  Get at least 150 minutes of moderate to vigorous aerobic physical activity each week  Do not miss more than 2 days in a row  Do not sit longer than 30 minutes at a time  Your healthcare provider can help you create an activity plan  The plan can include the best activities for you and can help you build your strength and endurance  • Maintain a healthy weight  Ask your team what a healthy weight is for you  A healthy weight can help you control diabetes and prevent heart disease  Ask your team to help you create a weight loss plan, if needed  Weight loss can help make a difference in managing diabetes  Your team will help you set a weight-loss goal, such as 10 to 15 pounds, or 5% of your extra weight  Together you and your team can set manageable weight loss goals  • Take your diabetes medicine or insulin as directed  You may need diabetes medicine, insulin, or both to help control your blood sugar levels  Your healthcare provider will teach you how and when to take your diabetes medicine or insulin  You will also be taught about side effects oral diabetes medicine can cause  Insulin may be injected or given through a pump or pen  You and your providers will decide on the best method for you:    ? An insulin pump  is an implanted device that gives your insulin 24 hours a day  An insulin pump prevents the need for multiple insulin injections in a day  ? An insulin pen  is a device prefilled with the right amount of insulin  ? You and your family members will be taught how to draw up and give insulin  if this is the best method for you  Your providers will also teach you how to dispose of needles and syringes  ?  You will learn how much insulin you need  and when to give it  You will be taught when not to give insulin  You will also be taught what to do if your blood sugar level drops too low  This may happen if you take insulin and do not eat the right amount of carbohydrates  More ways to manage type 2 diabetes:   • Wear medical alert identification  Wear medical alert jewelry or carry a card that says you have diabetes  Ask your provider where to get these items  • Do not smoke  Nicotine and other chemicals in cigarettes and cigars can cause lung and blood vessel damage  It also makes it more difficult to manage your diabetes  Ask your provider for information if you currently smoke and need help to quit  Do not use e-cigarettes or smokeless tobacco in place of cigarettes or to help you quit  They still contain nicotine  • Check your feet each day for cuts, scratches, calluses, or other wounds  Look for redness and swelling, and feel for warmth  Wear shoes that fit well  Check your shoes for rocks or other objects that can hurt your feet  Do not walk barefoot or wear shoes without socks  Wear cotton socks to help keep your feet dry  • Ask about vaccines you may need  You have a higher risk for serious illness if you get the flu, pneumonia, COVID-19, or hepatitis  Ask your provider if you should get vaccines to prevent these or other diseases, and when to get the vaccines  • Talk to your provider if you become stressed about diabetes care  Sometimes being able to fit diabetes care into your life can cause increased stress  The stress can cause you not to take care of yourself properly  Your care team providers can help by offering tips about self-care  Your providers may suggest you talk to a mental health provider who can listen and offer help with self-care issues  • Have your A1c checked as directed  Your provider may check your A1c every 3 months, or 2 times each year if your diabetes is controlled   An A1c test shows the average amount of sugar in your blood over the past 2 to 3 months  Your provider will tell you what your A1c level should be  • Have screening tests as directed  Your provider may recommend screening for complications of diabetes and other conditions that may develop  Some screenings may begin right away and some may happen within the first 5 years of diagnosis:    ? Examples of diabetes complications  include kidney problems, high cholesterol, high blood pressure, blood vessel problems, eye problems, and sleep apnea  ? You may be screened for a low vitamin B level  if you take oral diabetes medicine for a long time  ? Women of childbearing years may be screened  for polycystic ovarian syndrome (PCOS)  Follow up with your doctor or diabetes care team providers as directed: You may need to have blood tests done before your follow-up visit  The test results will show if changes need to be made in your treatment or self-care  Talk to your provider if you cannot afford your medicine  Write down your questions so you remember to ask them during your visits  © Copyright [employer] 2022 Information is for End User's use only and may not be sold, redistributed or otherwise used for commercial purposes  The above information is an  only  It is not intended as medical advice for individual conditions or treatments  Talk to your doctor, nurse or pharmacist before following any medical regimen to see if it is safe and effective for you  Assessment & Plan     1  Type 2 diabetes mellitus without complication, unspecified whether long term insulin use (HCC)  -     POCT hemoglobin A1c  -     Hemoglobin A1C; Future; Expected date: 06/29/2023  -     Comprehensive metabolic panel; Future; Expected date: 06/29/2023  -     Lipid Panel with Direct LDL reflex; Future; Expected date: 07/01/2023    2   Paroxysmal atrial fibrillation (HCC)  Comments:  on baby aspirin and stable and sees Cardiology as directed    3  Primary hypertension  -     Comprehensive metabolic panel; Future; Expected date: 06/29/2023    4  Hyperlipidemia, unspecified hyperlipidemia type  -     Comprehensive metabolic panel; Future; Expected date: 06/29/2023    5  Descending aortic aneurysm [facility])  Comments:  sees vascular surgery as directed    6  Chronic thoracic aortic dissection    7  Edema, unspecified type    8  Dyslipidemia  -     atorvastatin (LIPITOR) 80 mg tablet; Take 1 tablet (80 mg total) by mouth daily    9  Encounter for immunization  -     Pneumococcal Conjugate Vaccine 20-valent (PCV20)    10  Type 2 diabetes mellitus with hyperglycemia, unspecified whether long term insulin use ([facility] )    11  Class 3 severe obesity due to excess calories with body mass index (BMI) of 40 0 to 44 9 in adult, unspecified whether serious comorbidity present ([facility] )    12  Prostate cancer ([facility] )           Subjective      Here for recheck and doing well and taking all meds as directed for hx of diabetes type 2 and a-fib and HTN and hyperlipidemia  Sees Vascular for thoracic aortic aneurysm hx of and descending aortic aneurysm  No cp or sob, or ha  HgA1C elevated at 8 2  Has some lower extremity edema and uses compression stockings as directed  Review of Systems   Constitutional: Negative  HENT: Negative  Eyes: Negative  Respiratory: Negative  Cardiovascular: Positive for leg swelling  Gastrointestinal: Negative  Endocrine: Negative  Genitourinary: Negative  Musculoskeletal: Negative  Skin: Negative  Allergic/Immunologic: Negative  Neurological: Negative  Hematological: Negative  Psychiatric/Behavioral: Negative          Current Outpatient Medications on File Prior to Visit   Medication Sig   • acetaminophen (TYLENOL) 325 mg tablet Take 2 tablets (650 mg total) by mouth every 6 (six) hours as needed for mild pain   • amLODIPine (NORVASC) 10 mg tablet TAKE 1 TABLET BY MOUTH TWICE A DAY   • aspirin 81 mg chewable tablet Chew 1 tablet daily   • B-D ULTRAFINE III SHORT PEN 31G X 8 MM MISC USE AS DIRECTED   • finasteride (PROSCAR) 5 mg tablet Take 5 mg by mouth daily   • glucose blood (OneTouch Verio) test strip Use to check blood sugar   • HUMALOG KWIKPEN 100 units/mL injection pen INJECT 8 UNIT 3 TIMES DAILY BEFORE MEALS   • insulin glargine (LANTUS) 100 units/mL subcutaneous injection Inject 28 Units under the skin daily at bedtime Dx: Diabetes E11 9, dispense pens   • insulin lispro (HumaLOG) 100 units/mL injection Inject 8 Units under the skin 3 (three) times a day before meals Dx: Diabetes E11 9  Dispense pens   • Insulin Pen Needle (B-D ULTRAFINE III SHORT PEN) 31G X 8 MM MISC Inject under the skin 2 (two) times a day Use as directed   • Lantus SoloStar 100 units/mL SOPN INJECT 28 UNIT BEDTIME   • lisinopril-hydrochlorothiazide (PRINZIDE,ZESTORETIC) 20-12 5 MG per tablet TAKE 2 TABLETS BY MOUTH EVERY DAY   • metoprolol tartrate (LOPRESSOR) 100 mg tablet TAKE 1 TABLET BY MOUTH TWICE A DAY   • [DISCONTINUED] atorvastatin (LIPITOR) 80 mg tablet TAKE 1 TABLET (80 MG TOTAL) BY MOUTH IN THE MORNING  Objective     /80 (BP Location: Left arm, Patient Position: Sitting, Cuff Size: Large)   Pulse 57   Temp (!) 96 3 °F (35 7 °C) (Temporal)   Resp 16   Ht 5' 5" (1 651 m)   Wt 105 kg (232 lb)   SpO2 97%   BMI 38 61 kg/m²     Physical Exam  Constitutional:       Appearance: He is well-developed  HENT:      Head: Normocephalic and atraumatic  Eyes:      Conjunctiva/sclera: Conjunctivae normal       Pupils: Pupils are equal, round, and reactive to light  Cardiovascular:      Rate and Rhythm: Normal rate and regular rhythm  Heart sounds: Normal heart sounds  Pulmonary:      Effort: Pulmonary effort is normal       Breath sounds: Normal breath sounds  Musculoskeletal:         General: Normal range of motion  Cervical back: Normal range of motion and neck supple     Skin:     General: Skin is warm and dry  Neurological:      Mental Status: He is alert and oriented to person, place, and time  Deep Tendon Reflexes: Reflexes are normal and symmetric     Psychiatric:         Behavior: Behavior normal        Ingris Carpio,

## 2023-03-01 NOTE — PROGRESS NOTES
Diabetic Foot Exam    Patient's shoes and socks removed  Right Foot/Ankle   Right Foot Inspection  Skin Exam: skin normal, skin intact, dry skin, callus and callus  No warmth, no erythema, no maceration, no abnormal color, no pre-ulcer and no ulcer  Toe Exam: ROM and strength within normal limits  Sensory   Monofilament testing: intact    Left Foot/Ankle  Left Foot Inspection  Skin Exam: skin normal, skin intact and callus  No warmth, no erythema, no maceration, normal color, no pre-ulcer and no ulcer  Toe Exam: ROM and strength within normal limits       Sensory   Monofilament testing: intact

## 2023-03-19 DIAGNOSIS — E11.9 TYPE 2 DIABETES MELLITUS WITHOUT COMPLICATION (HCC): ICD-10-CM

## 2023-03-20 RX ORDER — PEN NEEDLE, DIABETIC 31 GX5/16"
NEEDLE, DISPOSABLE MISCELLANEOUS
Qty: 100 EACH | Refills: 3 | Status: SHIPPED | OUTPATIENT
Start: 2023-03-20

## 2023-06-26 DIAGNOSIS — I10 HTN (HYPERTENSION), BENIGN: ICD-10-CM

## 2023-06-26 RX ORDER — METOPROLOL TARTRATE 100 MG/1
100 TABLET ORAL 2 TIMES DAILY
Qty: 180 TABLET | Refills: 1 | Status: SHIPPED | OUTPATIENT
Start: 2023-06-26

## 2023-07-06 ENCOUNTER — OFFICE VISIT (OUTPATIENT)
Dept: FAMILY MEDICINE CLINIC | Facility: CLINIC | Age: 78
End: 2023-07-06
Payer: COMMERCIAL

## 2023-07-06 VITALS
TEMPERATURE: 96.2 F | RESPIRATION RATE: 16 BRPM | WEIGHT: 231.6 LBS | HEIGHT: 65 IN | DIASTOLIC BLOOD PRESSURE: 70 MMHG | HEART RATE: 66 BPM | BODY MASS INDEX: 38.59 KG/M2 | SYSTOLIC BLOOD PRESSURE: 124 MMHG | OXYGEN SATURATION: 97 %

## 2023-07-06 DIAGNOSIS — I10 PRIMARY HYPERTENSION: Chronic | ICD-10-CM

## 2023-07-06 DIAGNOSIS — Z86.79 H/O REPAIR OF DISSECTING ANEURYSM OF DESCENDING THORACIC AORTA: Chronic | ICD-10-CM

## 2023-07-06 DIAGNOSIS — I89.0 LYMPHEDEMA ASSOCIATED WITH OBESITY: ICD-10-CM

## 2023-07-06 DIAGNOSIS — I48.0 PAROXYSMAL ATRIAL FIBRILLATION (HCC): Chronic | ICD-10-CM

## 2023-07-06 DIAGNOSIS — E11.65 TYPE 2 DIABETES MELLITUS WITH HYPERGLYCEMIA, UNSPECIFIED WHETHER LONG TERM INSULIN USE (HCC): Primary | ICD-10-CM

## 2023-07-06 DIAGNOSIS — Z98.890 H/O REPAIR OF DISSECTING ANEURYSM OF DESCENDING THORACIC AORTA: Chronic | ICD-10-CM

## 2023-07-06 DIAGNOSIS — E78.5 HYPERLIPIDEMIA, UNSPECIFIED HYPERLIPIDEMIA TYPE: Chronic | ICD-10-CM

## 2023-07-06 DIAGNOSIS — I71.019 CHRONIC THORACIC AORTIC DISSECTION (HCC): ICD-10-CM

## 2023-07-06 DIAGNOSIS — E66.9 LYMPHEDEMA ASSOCIATED WITH OBESITY: ICD-10-CM

## 2023-07-06 DIAGNOSIS — E66.9 OBESITY (BMI 30-39.9): ICD-10-CM

## 2023-07-06 LAB
LEFT EYE DIABETIC RETINOPATHY: ABNORMAL
LEFT EYE IMAGE QUALITY: ABNORMAL
LEFT EYE MACULAR EDEMA: ABNORMAL
LEFT EYE OTHER RETINOPATHY: ABNORMAL
RIGHT EYE DIABETIC RETINOPATHY: ABNORMAL
RIGHT EYE IMAGE QUALITY: ABNORMAL
RIGHT EYE MACULAR EDEMA: ABNORMAL
RIGHT EYE OTHER RETINOPATHY: ABNORMAL
SEVERITY (EYE EXAM): ABNORMAL
SL AMB POCT HEMOGLOBIN AIC: 7.4 (ref ?–6.5)

## 2023-07-06 PROCEDURE — 92250 FUNDUS PHOTOGRAPHY W/I&R: CPT | Performed by: FAMILY MEDICINE

## 2023-07-06 PROCEDURE — 83036 HEMOGLOBIN GLYCOSYLATED A1C: CPT | Performed by: FAMILY MEDICINE

## 2023-07-06 PROCEDURE — 99214 OFFICE O/P EST MOD 30 MIN: CPT | Performed by: FAMILY MEDICINE

## 2023-07-06 NOTE — PROGRESS NOTES
Name: Polly Vazquez. : 1945      MRN: [id number]  Encounter Provider: Wen Huynh DO  Encounter Date: 2023   Encounter department: [address] [place] [address]  Chief Complaint   Patient presents with   • Follow-up     4 month follow up for diabetes      Patient Instructions        Here for recheck and rec low sugar diet and exercise as tolerated. HgA1C at 8.2. 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 Edema present and uses compression stockings. Lipids stable. Recheck in 4 months with labs. Try to avoid sugar in coffee and in diet. Patient would like to see specialist for his lymphedema. Type 2 Diabetes Management for Adults   AMBULATORY CARE:   Type 2 diabetes  is a disease that affects how your body uses glucose (sugar). Either your body cannot make enough insulin, or it cannot use the insulin correctly. It is important to keep diabetes controlled to prevent damage to your heart, blood vessels, and other organs. Management will help you feel well and enjoy your daily activities. Your diabetes care team providers can help you make a plan to fit diabetes care into your schedule. Your plan can change over time to fit your needs and your family's needs. Have someone call your local emergency number (911 in the [address]) if:   • You cannot be woken. • You have signs of diabetic ketoacidosis:     ? confusion, fatigue    ? vomiting    ? rapid heartbeat    ? fruity smelling breath    ? extreme thirst    ? dry mouth and skin    • You have any of the following signs of a heart attack:      ?  Squeezing, pressure, or pain in your chest    ? You may  also have any of the following:     - Discomfort or pain in your back, neck, jaw, stomach, or arm    - Shortness of breath    - Nausea or vomiting    - Lightheadedness or a sudden cold sweat    • You have any of the following signs of a stroke:      ? Numbness or drooping on one side of your face     ? Weakness in an arm or leg    ? Confusion or difficulty speaking    ? Dizziness, a severe headache, or vision loss    Call your doctor or diabetes care team provider if:   • You have a sore or wound that will not heal.    • You have a change in the amount you urinate. • Your blood sugar levels are higher than your target goals. • You often have lower blood sugar levels than your target goals. • Your skin is red, dry, warm, or swollen. • You have trouble coping with diabetes, or you feel anxious or depressed. • You have questions or concerns about your condition or care. What you need to know about high blood sugar levels:  High blood sugar levels may not cause any symptoms. You may feel more thirsty or urinate more often than usual. Over time, high blood sugar levels can damage your nerves, blood vessels, tissues, and organs. The following can increase your blood sugar levels:  • Large meals or large amounts of carbohydrates at one time    • Less physical activity    • Stress    • Illness    • A lower dose of diabetes medicine or insulin, or a late dose    What you need to know about low blood sugar levels:  Symptoms include feeling shaky, dizzy, irritable, or confused. You can prevent symptoms by keeping your blood sugar levels from going too low. • Treat a low blood sugar level right away:      ? Drink 4 ounces of juice or have 1 tube of glucose gel. ? Check your blood sugar level again 10 to 15 minutes later. ? When the level goes back to normal, eat a meal or snack to prevent another decrease. • Keep glucose gel, raisins, or hard candy with you at all times to treat a low blood sugar level. • Your blood sugar level can get too low if you take diabetes medicine or insulin and do not eat enough food. • If you use insulin, check your blood sugar level before you exercise.       ? If your blood sugar level is below 100 mg/dL, eat 4 crackers or 2 ounces of raisins, or drink 4 ounces of juice. ? Check your level every 30 minutes if you exercise longer than 1 hour. ? You may need a snack during or after exercise. What you can do to manage your blood sugar levels:   • Check your blood sugar levels as directed and as needed. Several items are available to use to check your levels. You may need to check by testing a drop of blood in a glucose monitor. You may instead be given a continuous glucose monitoring (CGM) device. The device is worn at all times. The CGM checks your blood sugar level every 5 minutes. It sends results to an electronic device such as a smart phone. A CGM can be used with or without an insulin pump. You and your diabetes care team providers will decide on the best method for you. The goal for blood sugar levels before meals  is between 80 and 130 mg/dL and 2 hours after eating  is lower than 180 mg/dL. • Make healthy food choices. Work with a dietitian to develop a meal plan that works for you and your schedule. A dietitian can help you learn how to eat the right amount of carbohydrates during your meals and snacks. Carbohydrates can raise your blood sugar level if you eat too many at one time. Examples of foods that contain carbohydrates are breads, cereals, rice, pasta, and sweets. • Eat high-fiber foods as directed. Fiber helps improve blood sugar levels. Fiber also lowers your risk for heart disease and other problems diabetes can cause. Examples of high-fiber foods include vegetables, whole-grain bread, and beans such as saravia beans. Your dietitian can tell you how much fiber to have each day. • Get regular physical activity. Physical activity can help you get to your target blood sugar level goal and manage your weight. Get at least 150 minutes of moderate to vigorous aerobic physical activity each week. Do not miss more than 2 days in a row.  Do not sit longer than 30 minutes at a time. Your healthcare provider can help you create an activity plan. The plan can include the best activities for you and can help you build your strength and endurance. • Maintain a healthy weight. Ask your team what a healthy weight is for you. A healthy weight can help you control diabetes and prevent heart disease. Ask your team to help you create a weight loss plan, if needed. Weight loss can help make a difference in managing diabetes. Your team will help you set a weight-loss goal, such as 10 to 15 pounds, or 5% of your extra weight. Together you and your team can set manageable weight loss goals. • Take your diabetes medicine or insulin as directed. You may need diabetes medicine, insulin, or both to help control your blood sugar levels. Your healthcare provider will teach you how and when to take your diabetes medicine or insulin. You will also be taught about side effects oral diabetes medicine can cause. Insulin may be injected or given through a pump or pen. You and your providers will decide on the best method for you:    ? An insulin pump  is an implanted device that gives your insulin 24 hours a day. An insulin pump prevents the need for multiple insulin injections in a day. ? An insulin pen  is a device prefilled with the right amount of insulin. ? You and your family members will be taught how to draw up and give insulin  if this is the best method for you. Your providers will also teach you how to dispose of needles and syringes. ? You will learn how much insulin you need  and when to give it. You will be taught when not to give insulin. You will also be taught what to do if your blood sugar level drops too low. This may happen if you take insulin and do not eat the right amount of carbohydrates. More ways to manage type 2 diabetes:   • Wear medical alert identification.   Wear medical alert jewelry or carry a card that says you have diabetes. Ask your provider where to get these items. • Do not smoke. Nicotine and other chemicals in cigarettes and cigars can cause lung and blood vessel damage. It also makes it more difficult to manage your diabetes. Ask your provider for information if you currently smoke and need help to quit. Do not use e-cigarettes or smokeless tobacco in place of cigarettes or to help you quit. They still contain nicotine. • Check your feet each day for cuts, scratches, calluses, or other wounds. Look for redness and swelling, and feel for warmth. Wear shoes that fit well. Check your shoes for rocks or other objects that can hurt your feet. Do not walk barefoot or wear shoes without socks. Wear cotton socks to help keep your feet dry. • Ask about vaccines you may need. You have a higher risk for serious illness if you get the flu, pneumonia, COVID-19, or hepatitis. Ask your provider if you should get vaccines to prevent these or other diseases, and when to get the vaccines. • Talk to your provider if you become stressed about diabetes care. Sometimes being able to fit diabetes care into your life can cause increased stress. The stress can cause you not to take care of yourself properly. Your care team providers can help by offering tips about self-care. Your providers may suggest you talk to a mental health provider who can listen and offer help with self-care issues. • Have your A1c checked as directed. Your provider may check your A1c every 3 months, or 2 times each year if your diabetes is controlled. An A1c test shows the average amount of sugar in your blood over the past 2 to 3 months. Your provider will tell you what your A1c level should be. • Have screening tests as directed. Your provider may recommend screening for complications of diabetes and other conditions that may develop.  Some screenings may begin right away and some may happen within the first 5 years of diagnosis:    ? Examples of diabetes complications  include kidney problems, high cholesterol, high blood pressure, blood vessel problems, eye problems, and sleep apnea. ? You may be screened for a low vitamin B level  if you take oral diabetes medicine for a long time. ? Women of childbearing years may be screened  for polycystic ovarian syndrome (PCOS). Follow up with your doctor or diabetes care team providers as directed: You may need to have blood tests done before your follow-up visit. The test results will show if changes need to be made in your treatment or self-care. Talk to your provider if you cannot afford your medicine. Write down your questions so you remember to ask them during your visits. © Copyright [employer] 2022 Information is for End User's use only and may not be sold, redistributed or otherwise used for commercial purposes. The above information is an  only. It is not intended as medical advice for individual conditions or treatments. Talk to your doctor, nurse or pharmacist before following any medical regimen to see if it is safe and effective for you. Type 2 Diabetes Management for Adults   AMBULATORY CARE:   Type 2 diabetes  is a disease that affects how your body uses glucose (sugar). Either your body cannot make enough insulin, or it cannot use the insulin correctly. It is important to keep diabetes controlled to prevent damage to your heart, blood vessels, and other organs. Management will help you feel well and enjoy your daily activities. Your diabetes care team providers can help you make a plan to fit diabetes care into your schedule. Your plan can change over time to fit your needs and your family's needs. Have someone call your local emergency number (911 in the [address]) if:   • You cannot be woken. • You have signs of diabetic ketoacidosis:     ? confusion, fatigue    ? vomiting    ?  rapid heartbeat    ? fruity smelling breath    ? extreme thirst    ? dry mouth and skin    • You have any of the following signs of a heart attack:      ? Squeezing, pressure, or pain in your chest    ? You may  also have any of the following:     - Discomfort or pain in your back, neck, jaw, stomach, or arm    - Shortness of breath    - Nausea or vomiting    - Lightheadedness or a sudden cold sweat    • You have any of the following signs of a stroke:      ? Numbness or drooping on one side of your face     ? Weakness in an arm or leg    ? Confusion or difficulty speaking    ? Dizziness, a severe headache, or vision loss    Call your doctor or diabetes care team provider if:   • You have a sore or wound that will not heal.    • You have a change in the amount you urinate. • Your blood sugar levels are higher than your target goals. • You often have lower blood sugar levels than your target goals. • Your skin is red, dry, warm, or swollen. • You have trouble coping with diabetes, or you feel anxious or depressed. • You have questions or concerns about your condition or care. What you need to know about high blood sugar levels:  High blood sugar levels may not cause any symptoms. You may feel more thirsty or urinate more often than usual. Over time, high blood sugar levels can damage your nerves, blood vessels, tissues, and organs. The following can increase your blood sugar levels:  • Large meals or large amounts of carbohydrates at one time    • Less physical activity    • Stress    • Illness    • A lower dose of diabetes medicine or insulin, or a late dose    What you need to know about low blood sugar levels:  Symptoms include feeling shaky, dizzy, irritable, or confused. You can prevent symptoms by keeping your blood sugar levels from going too low. • Treat a low blood sugar level right away:      ? Drink 4 ounces of juice or have 1 tube of glucose gel. ? Check your blood sugar level again 10 to 15 minutes later. ?  When the level goes back to normal, eat a meal or snack to prevent another decrease. • Keep glucose gel, raisins, or hard candy with you at all times to treat a low blood sugar level. • Your blood sugar level can get too low if you take diabetes medicine or insulin and do not eat enough food. • If you use insulin, check your blood sugar level before you exercise. ? If your blood sugar level is below 100 mg/dL, eat 4 crackers or 2 ounces of raisins, or drink 4 ounces of juice. ? Check your level every 30 minutes if you exercise longer than 1 hour. ? You may need a snack during or after exercise. What you can do to manage your blood sugar levels:   • Check your blood sugar levels as directed and as needed. Several items are available to use to check your levels. You may need to check by testing a drop of blood in a glucose monitor. You may instead be given a continuous glucose monitoring (CGM) device. The device is worn at all times. The CGM checks your blood sugar level every 5 minutes. It sends results to an electronic device such as a smart phone. A CGM can be used with or without an insulin pump. You and your diabetes care team providers will decide on the best method for you. The goal for blood sugar levels before meals  is between 80 and 130 mg/dL and 2 hours after eating  is lower than 180 mg/dL. • Make healthy food choices. Work with a dietitian to develop a meal plan that works for you and your schedule. A dietitian can help you learn how to eat the right amount of carbohydrates during your meals and snacks. Carbohydrates can raise your blood sugar level if you eat too many at one time. Examples of foods that contain carbohydrates are breads, cereals, rice, pasta, and sweets. • Eat high-fiber foods as directed. Fiber helps improve blood sugar levels. Fiber also lowers your risk for heart disease and other problems diabetes can cause.  Examples of high-fiber foods include vegetables, whole-grain bread, and beans such as saravia beans. Your dietitian can tell you how much fiber to have each day. • Get regular physical activity. Physical activity can help you get to your target blood sugar level goal and manage your weight. Get at least 150 minutes of moderate to vigorous aerobic physical activity each week. Do not miss more than 2 days in a row. Do not sit longer than 30 minutes at a time. Your healthcare provider can help you create an activity plan. The plan can include the best activities for you and can help you build your strength and endurance. • Maintain a healthy weight. Ask your team what a healthy weight is for you. A healthy weight can help you control diabetes and prevent heart disease. Ask your team to help you create a weight loss plan, if needed. Weight loss can help make a difference in managing diabetes. Your team will help you set a weight-loss goal, such as 10 to 15 pounds, or 5% of your extra weight. Together you and your team can set manageable weight loss goals. • Take your diabetes medicine or insulin as directed. You may need diabetes medicine, insulin, or both to help control your blood sugar levels. Your healthcare provider will teach you how and when to take your diabetes medicine or insulin. You will also be taught about side effects oral diabetes medicine can cause. Insulin may be injected or given through a pump or pen. You and your providers will decide on the best method for you:    ? An insulin pump  is an implanted device that gives your insulin 24 hours a day. An insulin pump prevents the need for multiple insulin injections in a day. ? An insulin pen  is a device prefilled with the right amount of insulin. ? You and your family members will be taught how to draw up and give insulin  if this is the best method for you. Your providers will also teach you how to dispose of needles and syringes. ?  You will learn how much insulin you need  and when to give it. You will be taught when not to give insulin. You will also be taught what to do if your blood sugar level drops too low. This may happen if you take insulin and do not eat the right amount of carbohydrates. More ways to manage type 2 diabetes:   • Wear medical alert identification. Wear medical alert jewelry or carry a card that says you have diabetes. Ask your provider where to get these items. • Do not smoke. Nicotine and other chemicals in cigarettes and cigars can cause lung and blood vessel damage. It also makes it more difficult to manage your diabetes. Ask your provider for information if you currently smoke and need help to quit. Do not use e-cigarettes or smokeless tobacco in place of cigarettes or to help you quit. They still contain nicotine. • Check your feet each day for cuts, scratches, calluses, or other wounds. Look for redness and swelling, and feel for warmth. Wear shoes that fit well. Check your shoes for rocks or other objects that can hurt your feet. Do not walk barefoot or wear shoes without socks. Wear cotton socks to help keep your feet dry. • Ask about vaccines you may need. You have a higher risk for serious illness if you get the flu, pneumonia, COVID-19, or hepatitis. Ask your provider if you should get vaccines to prevent these or other diseases, and when to get the vaccines. • Talk to your provider if you become stressed about diabetes care. Sometimes being able to fit diabetes care into your life can cause increased stress. The stress can cause you not to take care of yourself properly. Your care team providers can help by offering tips about self-care. Your providers may suggest you talk to a mental health provider who can listen and offer help with self-care issues. • Have your A1c checked as directed. Your provider may check your A1c every 3 months, or 2 times each year if your diabetes is controlled.  An A1c test shows the average amount of sugar in your blood over the past 2 to 3 months. Your provider will tell you what your A1c level should be. • Have screening tests as directed. Your provider may recommend screening for complications of diabetes and other conditions that may develop. Some screenings may begin right away and some may happen within the first 5 years of diagnosis:    ? Examples of diabetes complications  include kidney problems, high cholesterol, high blood pressure, blood vessel problems, eye problems, and sleep apnea. ? You may be screened for a low vitamin B level  if you take oral diabetes medicine for a long time. ? Women of childbearing years may be screened  for polycystic ovarian syndrome (PCOS). Follow up with your doctor or diabetes care team providers as directed: You may need to have blood tests done before your follow-up visit. The test results will show if changes need to be made in your treatment or self-care. Talk to your provider if you cannot afford your medicine. Write down your questions so you remember to ask them during your visits. © Copyright [employer] 2022 Information is for End User's use only and may not be sold, redistributed or otherwise used for commercial purposes. The above information is an  only. It is not intended as medical advice for individual conditions or treatments. Talk to your doctor, nurse or pharmacist before following any medical regimen to see if it is safe and effective for you. Assessment & Plan     1. Type 2 diabetes mellitus with hyperglycemia, unspecified whether long term insulin use (HCC)  -     IRIS Diabetic eye exam  -     POCT hemoglobin A1c  -     Albumin / creatinine urine ratio; Future; Expected date: 11/03/2023  -     Comprehensive metabolic panel; Future; Expected date: 11/03/2023  -     Hemoglobin A1C; Future; Expected date: 11/03/2023    2.  Primary hypertension  -     Comprehensive metabolic panel; Future; Expected date: 11/03/2023    3. Hyperlipidemia, unspecified hyperlipidemia type  -     Comprehensive metabolic panel; Future; Expected date: 11/03/2023  -     Lipid Panel with Direct LDL reflex; Future    4. Obesity (BMI 30-39.9)    5. Chronic thoracic aortic dissection (HCC)  Comments:  sees vascular surgery as directed. 6. 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 (TEVAR)    7. Paroxysmal atrial fibrillation (HCC)  Comments:  stable           Subjective      Follow-up (4 month follow up for diabetes ). Hga1c much better. No cp or sob, or ha. Takes all meds as directed and would like to see lymphedema specialist.     Review of Systems   Constitutional: Negative. HENT: Negative. Eyes: Negative. Respiratory: Negative. Cardiovascular: Negative. Gastrointestinal: Negative. Endocrine: Negative. Genitourinary: Negative. Musculoskeletal: Negative. Skin: Negative. Allergic/Immunologic: Negative. Neurological: Negative. Hematological: Negative. Psychiatric/Behavioral: Negative.         Current Outpatient Medications on File Prior to Visit   Medication Sig   • amLODIPine (NORVASC) 10 mg tablet TAKE 1 TABLET BY MOUTH TWICE A DAY   • aspirin 81 mg chewable tablet Chew 1 tablet daily   • atorvastatin (LIPITOR) 80 mg tablet Take 1 tablet (80 mg total) by mouth daily   • B-D ULTRAFINE III SHORT PEN 31G X 8 MM MISC USE AS DIRECTED   • B-D ULTRAFINE III SHORT PEN 31G X 8 MM MISC USE 2 TIMES DAILY AS DIRECTED   • finasteride (PROSCAR) 5 mg tablet Take 5 mg by mouth daily   • glucose blood (OneTouch Verio) test strip Use to check blood sugar   • HUMALOG KWIKPEN 100 units/mL injection pen INJECT 8 UNIT 3 TIMES DAILY BEFORE MEALS   • insulin glargine (LANTUS) 100 units/mL subcutaneous injection Inject 28 Units under the skin daily at bedtime Dx: Diabetes E11.9, dispense pens   • insulin lispro (HumaLOG) 100 units/mL injection Inject 8 Units under the skin 3 (three) times a day before meals Dx: Diabetes E11.9. Dispense pens   • Lantus SoloStar 100 units/mL SOPN INJECT 28 UNIT BEDTIME   • lisinopril-hydrochlorothiazide (PRINZIDE,ZESTORETIC) 20-12.5 MG per tablet TAKE 2 TABLETS BY MOUTH EVERY DAY   • metoprolol tartrate (LOPRESSOR) 100 mg tablet Take 1 tablet (100 mg total) by mouth 2 (two) times a day   • [DISCONTINUED] acetaminophen (TYLENOL) 325 mg tablet Take 2 tablets (650 mg total) by mouth every 6 (six) hours as needed for mild pain (Patient not taking: Reported on 7/6/2023)       Objective     /70   Pulse 66   Temp (!) 96.2 °F (35.7 °C) (Temporal)   Resp 16   Ht 5' 4.57" (1.64 m)   Wt 105 kg (231 lb 9.6 oz)   SpO2 97%   BMI 39.06 kg/m²     Physical Exam  Constitutional:       Appearance: He is well-developed. He is obese. HENT:      Head: Normocephalic and atraumatic. Eyes:      Conjunctiva/sclera: Conjunctivae normal.      Pupils: Pupils are equal, round, and reactive to light. Cardiovascular:      Rate and Rhythm: Normal rate and regular rhythm. Heart sounds: Normal heart sounds. Pulmonary:      Effort: Pulmonary effort is normal.      Breath sounds: Normal breath sounds. Musculoskeletal:         General: Normal range of motion. Cervical back: Normal range of motion and neck supple. Skin:     General: Skin is warm and dry. Neurological:      Mental Status: He is alert and oriented to person, place, and time. Deep Tendon Reflexes: Reflexes are normal and symmetric.    Psychiatric:         Behavior: Behavior normal.       David Walker, DO

## 2023-07-06 NOTE — PATIENT INSTRUCTIONS
Here for recheck and rec low sugar diet and exercise as tolerated. HgA1C at 8.2. 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 Edema present and uses compression stockings. Lipids stable. Recheck in 4 months with labs. Try to avoid sugar in coffee and in diet. Patient would like to see specialist for his lymphedema. Use compression stockings as directed for hx of edema. Type 2 Diabetes Management for Adults   AMBULATORY CARE:   Type 2 diabetes  is a disease that affects how your body uses glucose (sugar). Either your body cannot make enough insulin, or it cannot use the insulin correctly. It is important to keep diabetes controlled to prevent damage to your heart, blood vessels, and other organs. Management will help you feel well and enjoy your daily activities. Your diabetes care team providers can help you make a plan to fit diabetes care into your schedule. Your plan can change over time to fit your needs and your family's needs. Have someone call your local emergency number (911 in the [address]) if:   You cannot be woken.     You have signs of diabetic ketoacidosis:     confusion, fatigue    vomiting    rapid heartbeat    fruity smelling breath    extreme thirst    dry mouth and skin    You have any of the following signs of a heart attack:      Squeezing, pressure, or pain in your chest    You may  also have any of the following:     Discomfort or pain in your back, neck, jaw, stomach, or arm    Shortness of breath    Nausea or vomiting    Lightheadedness or a sudden cold sweat    You have any of the following signs of a stroke:      Numbness or drooping on one side of your face     Weakness in an arm or leg    Confusion or difficulty speaking    Dizziness, a severe headache, or vision loss    Call your doctor or diabetes care team provider if:   You have a sore or wound that will not heal.    You have a change in the amount you urinate. Your blood sugar levels are higher than your target goals. You often have lower blood sugar levels than your target goals. Your skin is red, dry, warm, or swollen. You have trouble coping with diabetes, or you feel anxious or depressed. You have questions or concerns about your condition or care. What you need to know about high blood sugar levels:  High blood sugar levels may not cause any symptoms. You may feel more thirsty or urinate more often than usual. Over time, high blood sugar levels can damage your nerves, blood vessels, tissues, and organs. The following can increase your blood sugar levels:  Large meals or large amounts of carbohydrates at one time    Less physical activity    Stress    Illness    A lower dose of diabetes medicine or insulin, or a late dose    What you need to know about low blood sugar levels:  Symptoms include feeling shaky, dizzy, irritable, or confused. You can prevent symptoms by keeping your blood sugar levels from going too low. Treat a low blood sugar level right away:      Drink 4 ounces of juice or have 1 tube of glucose gel. Check your blood sugar level again 10 to 15 minutes later. When the level goes back to normal, eat a meal or snack to prevent another decrease. Keep glucose gel, raisins, or hard candy with you at all times to treat a low blood sugar level. Your blood sugar level can get too low if you take diabetes medicine or insulin and do not eat enough food. If you use insulin, check your blood sugar level before you exercise. If your blood sugar level is below 100 mg/dL, eat 4 crackers or 2 ounces of raisins, or drink 4 ounces of juice. Check your level every 30 minutes if you exercise longer than 1 hour. You may need a snack during or after exercise. What you can do to manage your blood sugar levels:   Check your blood sugar levels as directed and as needed.   Several items are available to use to check your levels. You may need to check by testing a drop of blood in a glucose monitor. You may instead be given a continuous glucose monitoring (CGM) device. The device is worn at all times. The CGM checks your blood sugar level every 5 minutes. It sends results to an electronic device such as a smart phone. A CGM can be used with or without an insulin pump. You and your diabetes care team providers will decide on the best method for you. The goal for blood sugar levels before meals  is between 80 and 130 mg/dL and 2 hours after eating  is lower than 180 mg/dL. Make healthy food choices. Work with a dietitian to develop a meal plan that works for you and your schedule. A dietitian can help you learn how to eat the right amount of carbohydrates during your meals and snacks. Carbohydrates can raise your blood sugar level if you eat too many at one time. Examples of foods that contain carbohydrates are breads, cereals, rice, pasta, and sweets. Eat high-fiber foods as directed. Fiber helps improve blood sugar levels. Fiber also lowers your risk for heart disease and other problems diabetes can cause. Examples of high-fiber foods include vegetables, whole-grain bread, and beans such as saravia beans. Your dietitian can tell you how much fiber to have each day. Get regular physical activity. Physical activity can help you get to your target blood sugar level goal and manage your weight. Get at least 150 minutes of moderate to vigorous aerobic physical activity each week. Do not miss more than 2 days in a row. Do not sit longer than 30 minutes at a time. Your healthcare provider can help you create an activity plan. The plan can include the best activities for you and can help you build your strength and endurance. Maintain a healthy weight. Ask your team what a healthy weight is for you.  A healthy weight can help you control diabetes and prevent heart disease. Ask your team to help you create a weight loss plan, if needed. Weight loss can help make a difference in managing diabetes. Your team will help you set a weight-loss goal, such as 10 to 15 pounds, or 5% of your extra weight. Together you and your team can set manageable weight loss goals. Take your diabetes medicine or insulin as directed. You may need diabetes medicine, insulin, or both to help control your blood sugar levels. Your healthcare provider will teach you how and when to take your diabetes medicine or insulin. You will also be taught about side effects oral diabetes medicine can cause. Insulin may be injected or given through a pump or pen. You and your providers will decide on the best method for you: An insulin pump  is an implanted device that gives your insulin 24 hours a day. An insulin pump prevents the need for multiple insulin injections in a day. An insulin pen  is a device prefilled with the right amount of insulin. You and your family members will be taught how to draw up and give insulin  if this is the best method for you. Your providers will also teach you how to dispose of needles and syringes. You will learn how much insulin you need  and when to give it. You will be taught when not to give insulin. You will also be taught what to do if your blood sugar level drops too low. This may happen if you take insulin and do not eat the right amount of carbohydrates. More ways to manage type 2 diabetes:   Wear medical alert identification. Wear medical alert jewelry or carry a card that says you have diabetes. Ask your provider where to get these items. Do not smoke. Nicotine and other chemicals in cigarettes and cigars can cause lung and blood vessel damage. It also makes it more difficult to manage your diabetes. Ask your provider for information if you currently smoke and need help to quit.  Do not use e-cigarettes or smokeless tobacco in place of cigarettes or to help you quit. They still contain nicotine. Check your feet each day for cuts, scratches, calluses, or other wounds. Look for redness and swelling, and feel for warmth. Wear shoes that fit well. Check your shoes for rocks or other objects that can hurt your feet. Do not walk barefoot or wear shoes without socks. Wear cotton socks to help keep your feet dry. Ask about vaccines you may need. You have a higher risk for serious illness if you get the flu, pneumonia, COVID-19, or hepatitis. Ask your provider if you should get vaccines to prevent these or other diseases, and when to get the vaccines. Talk to your provider if you become stressed about diabetes care. Sometimes being able to fit diabetes care into your life can cause increased stress. The stress can cause you not to take care of yourself properly. Your care team providers can help by offering tips about self-care. Your providers may suggest you talk to a mental health provider who can listen and offer help with self-care issues. Have your A1c checked as directed. Your provider may check your A1c every 3 months, or 2 times each year if your diabetes is controlled. An A1c test shows the average amount of sugar in your blood over the past 2 to 3 months. Your provider will tell you what your A1c level should be. Have screening tests as directed. Your provider may recommend screening for complications of diabetes and other conditions that may develop. Some screenings may begin right away and some may happen within the first 5 years of diagnosis:    Examples of diabetes complications  include kidney problems, high cholesterol, high blood pressure, blood vessel problems, eye problems, and sleep apnea. You may be screened for a low vitamin B level  if you take oral diabetes medicine for a long time. Women of childbearing years may be screened  for polycystic ovarian syndrome (PCOS).     Follow up with your doctor or diabetes care team providers as directed: You may need to have blood tests done before your follow-up visit. The test results will show if changes need to be made in your treatment or self-care. Talk to your provider if you cannot afford your medicine. Write down your questions so you remember to ask them during your visits. © Copyright [employer] 2022 Information is for End User's use only and may not be sold, redistributed or otherwise used for commercial purposes. The above information is an  only. It is not intended as medical advice for individual conditions or treatments. Talk to your doctor, nurse or pharmacist before following any medical regimen to see if it is safe and effective for you. Type 2 Diabetes Management for Adults   AMBULATORY CARE:   Type 2 diabetes  is a disease that affects how your body uses glucose (sugar). Either your body cannot make enough insulin, or it cannot use the insulin correctly. It is important to keep diabetes controlled to prevent damage to your heart, blood vessels, and other organs. Management will help you feel well and enjoy your daily activities. Your diabetes care team providers can help you make a plan to fit diabetes care into your schedule. Your plan can change over time to fit your needs and your family's needs. Have someone call your local emergency number (911 in the [address]) if:   You cannot be woken.     You have signs of diabetic ketoacidosis:     confusion, fatigue    vomiting    rapid heartbeat    fruity smelling breath    extreme thirst    dry mouth and skin    You have any of the following signs of a heart attack:      Squeezing, pressure, or pain in your chest    You may  also have any of the following:     Discomfort or pain in your back, neck, jaw, stomach, or arm    Shortness of breath    Nausea or vomiting    Lightheadedness or a sudden cold sweat    You have any of the following signs of a stroke:      Numbness or drooping on one side of your face     Weakness in an arm or leg    Confusion or difficulty speaking    Dizziness, a severe headache, or vision loss    Call your doctor or diabetes care team provider if:   You have a sore or wound that will not heal.    You have a change in the amount you urinate. Your blood sugar levels are higher than your target goals. You often have lower blood sugar levels than your target goals. Your skin is red, dry, warm, or swollen. You have trouble coping with diabetes, or you feel anxious or depressed. You have questions or concerns about your condition or care. What you need to know about high blood sugar levels:  High blood sugar levels may not cause any symptoms. You may feel more thirsty or urinate more often than usual. Over time, high blood sugar levels can damage your nerves, blood vessels, tissues, and organs. The following can increase your blood sugar levels:  Large meals or large amounts of carbohydrates at one time    Less physical activity    Stress    Illness    A lower dose of diabetes medicine or insulin, or a late dose    What you need to know about low blood sugar levels:  Symptoms include feeling shaky, dizzy, irritable, or confused. You can prevent symptoms by keeping your blood sugar levels from going too low. Treat a low blood sugar level right away:      Drink 4 ounces of juice or have 1 tube of glucose gel. Check your blood sugar level again 10 to 15 minutes later. When the level goes back to normal, eat a meal or snack to prevent another decrease. Keep glucose gel, raisins, or hard candy with you at all times to treat a low blood sugar level. Your blood sugar level can get too low if you take diabetes medicine or insulin and do not eat enough food. If you use insulin, check your blood sugar level before you exercise. If your blood sugar level is below 100 mg/dL, eat 4 crackers or 2 ounces of raisins, or drink 4 ounces of juice.     Check your level every 30 minutes if you exercise longer than 1 hour. You may need a snack during or after exercise. What you can do to manage your blood sugar levels:   Check your blood sugar levels as directed and as needed. Several items are available to use to check your levels. You may need to check by testing a drop of blood in a glucose monitor. You may instead be given a continuous glucose monitoring (CGM) device. The device is worn at all times. The CGM checks your blood sugar level every 5 minutes. It sends results to an electronic device such as a smart phone. A CGM can be used with or without an insulin pump. You and your diabetes care team providers will decide on the best method for you. The goal for blood sugar levels before meals  is between 80 and 130 mg/dL and 2 hours after eating  is lower than 180 mg/dL. Make healthy food choices. Work with a dietitian to develop a meal plan that works for you and your schedule. A dietitian can help you learn how to eat the right amount of carbohydrates during your meals and snacks. Carbohydrates can raise your blood sugar level if you eat too many at one time. Examples of foods that contain carbohydrates are breads, cereals, rice, pasta, and sweets. Eat high-fiber foods as directed. Fiber helps improve blood sugar levels. Fiber also lowers your risk for heart disease and other problems diabetes can cause. Examples of high-fiber foods include vegetables, whole-grain bread, and beans such as saravia beans. Your dietitian can tell you how much fiber to have each day. Get regular physical activity. Physical activity can help you get to your target blood sugar level goal and manage your weight. Get at least 150 minutes of moderate to vigorous aerobic physical activity each week. Do not miss more than 2 days in a row. Do not sit longer than 30 minutes at a time. Your healthcare provider can help you create an activity plan.  The plan can include the best activities for you and can help you build your strength and endurance. Maintain a healthy weight. Ask your team what a healthy weight is for you. A healthy weight can help you control diabetes and prevent heart disease. Ask your team to help you create a weight loss plan, if needed. Weight loss can help make a difference in managing diabetes. Your team will help you set a weight-loss goal, such as 10 to 15 pounds, or 5% of your extra weight. Together you and your team can set manageable weight loss goals. Take your diabetes medicine or insulin as directed. You may need diabetes medicine, insulin, or both to help control your blood sugar levels. Your healthcare provider will teach you how and when to take your diabetes medicine or insulin. You will also be taught about side effects oral diabetes medicine can cause. Insulin may be injected or given through a pump or pen. You and your providers will decide on the best method for you: An insulin pump  is an implanted device that gives your insulin 24 hours a day. An insulin pump prevents the need for multiple insulin injections in a day. An insulin pen  is a device prefilled with the right amount of insulin. You and your family members will be taught how to draw up and give insulin  if this is the best method for you. Your providers will also teach you how to dispose of needles and syringes. You will learn how much insulin you need  and when to give it. You will be taught when not to give insulin. You will also be taught what to do if your blood sugar level drops too low. This may happen if you take insulin and do not eat the right amount of carbohydrates. More ways to manage type 2 diabetes:   Wear medical alert identification. Wear medical alert jewelry or carry a card that says you have diabetes. Ask your provider where to get these items. Do not smoke.   Nicotine and other chemicals in cigarettes and cigars can cause lung and blood vessel damage. It also makes it more difficult to manage your diabetes. Ask your provider for information if you currently smoke and need help to quit. Do not use e-cigarettes or smokeless tobacco in place of cigarettes or to help you quit. They still contain nicotine. Check your feet each day for cuts, scratches, calluses, or other wounds. Look for redness and swelling, and feel for warmth. Wear shoes that fit well. Check your shoes for rocks or other objects that can hurt your feet. Do not walk barefoot or wear shoes without socks. Wear cotton socks to help keep your feet dry. Ask about vaccines you may need. You have a higher risk for serious illness if you get the flu, pneumonia, COVID-19, or hepatitis. Ask your provider if you should get vaccines to prevent these or other diseases, and when to get the vaccines. Talk to your provider if you become stressed about diabetes care. Sometimes being able to fit diabetes care into your life can cause increased stress. The stress can cause you not to take care of yourself properly. Your care team providers can help by offering tips about self-care. Your providers may suggest you talk to a mental health provider who can listen and offer help with self-care issues. Have your A1c checked as directed. Your provider may check your A1c every 3 months, or 2 times each year if your diabetes is controlled. An A1c test shows the average amount of sugar in your blood over the past 2 to 3 months. Your provider will tell you what your A1c level should be. Have screening tests as directed. Your provider may recommend screening for complications of diabetes and other conditions that may develop.  Some screenings may begin right away and some may happen within the first 5 years of diagnosis:    Examples of diabetes complications  include kidney problems, high cholesterol, high blood pressure, blood vessel problems, eye problems, and sleep apnea. You may be screened for a low vitamin B level  if you take oral diabetes medicine for a long time. Women of childbearing years may be screened  for polycystic ovarian syndrome (PCOS). Follow up with your doctor or diabetes care team providers as directed: You may need to have blood tests done before your follow-up visit. The test results will show if changes need to be made in your treatment or self-care. Talk to your provider if you cannot afford your medicine. Write down your questions so you remember to ask them during your visits. © Copyright [facility] 2022 Information is for End User's use only and may not be sold, redistributed or otherwise used for commercial purposes. The above information is an  only. It is not intended as medical advice for individual conditions or treatments. Talk to your doctor, nurse or pharmacist before following any medical regimen to see if it is safe and effective for you.

## 2023-08-04 ENCOUNTER — HOSPITAL ENCOUNTER (OUTPATIENT)
Dept: NON INVASIVE DIAGNOSTICS | Facility: HOSPITAL | Age: 78
Discharge: HOME | End: 2023-08-04
Attending: SURGERY
Payer: COMMERCIAL

## 2023-08-04 DIAGNOSIS — I71.019 CHRONIC THORACIC AORTIC DISSECTION (HCC): ICD-10-CM

## 2023-08-04 DIAGNOSIS — I71.9 DESCENDING AORTIC ANEURYSM (HCC): Chronic | ICD-10-CM

## 2023-08-04 PROCEDURE — 93880 EXTRACRANIAL BILAT STUDY: CPT

## 2023-08-07 PROCEDURE — 93880 EXTRACRANIAL BILAT STUDY: CPT | Performed by: SURGERY

## 2023-08-18 ENCOUNTER — TRANSCRIBE ORDERS (OUTPATIENT)
Dept: CARDIAC SURGERY | Facility: CLINIC | Age: 78
End: 2023-08-18

## 2023-08-18 DIAGNOSIS — Z86.79 S/P ASCENDING AORTIC ANEURYSM REPAIR: Primary | Chronic | ICD-10-CM

## 2023-08-18 DIAGNOSIS — Z98.890 S/P ASCENDING AORTIC ANEURYSM REPAIR: Primary | Chronic | ICD-10-CM

## 2023-08-18 DIAGNOSIS — I10 HTN (HYPERTENSION), BENIGN: ICD-10-CM

## 2023-08-18 RX ORDER — LISINOPRIL AND HYDROCHLOROTHIAZIDE 20; 12.5 MG/1; MG/1
TABLET ORAL
Qty: 180 TABLET | Refills: 3 | OUTPATIENT
Start: 2023-08-18

## 2023-08-20 DIAGNOSIS — I10 HTN (HYPERTENSION), BENIGN: ICD-10-CM

## 2023-08-21 RX ORDER — AMLODIPINE BESYLATE 10 MG/1
TABLET ORAL
Qty: 180 TABLET | Refills: 2 | Status: ON HOLD | OUTPATIENT
Start: 2023-08-21

## 2023-08-30 ENCOUNTER — APPOINTMENT (OUTPATIENT)
Dept: LAB | Facility: CLINIC | Age: 78
End: 2023-08-30
Payer: COMMERCIAL

## 2023-08-30 DIAGNOSIS — Z98.890 S/P ASCENDING AORTIC ANEURYSM REPAIR: Chronic | ICD-10-CM

## 2023-08-30 DIAGNOSIS — Z86.79 S/P ASCENDING AORTIC ANEURYSM REPAIR: Chronic | ICD-10-CM

## 2023-08-30 LAB
BUN SERPL-MCNC: 9 MG/DL (ref 5–25)
CREAT SERPL-MCNC: 0.68 MG/DL (ref 0.6–1.3)
GFR SERPL CREATININE-BSD FRML MDRD: 92 ML/MIN/1.73SQ M

## 2023-08-30 PROCEDURE — 82565 ASSAY OF CREATININE: CPT

## 2023-08-30 PROCEDURE — 84520 ASSAY OF UREA NITROGEN: CPT

## 2023-08-30 PROCEDURE — 36415 COLL VENOUS BLD VENIPUNCTURE: CPT

## 2023-09-03 ENCOUNTER — HOSPITAL ENCOUNTER (INPATIENT)
Facility: HOSPITAL | Age: 78
LOS: 1 days | Discharge: HOME | End: 2023-09-05
Attending: EMERGENCY MEDICINE | Admitting: STUDENT IN AN ORGANIZED HEALTH CARE EDUCATION/TRAINING PROGRAM
Payer: COMMERCIAL

## 2023-09-03 ENCOUNTER — APPOINTMENT (EMERGENCY)
Dept: CT IMAGING | Facility: HOSPITAL | Age: 78
End: 2023-09-03
Payer: COMMERCIAL

## 2023-09-03 ENCOUNTER — APPOINTMENT (OUTPATIENT)
Dept: RADIOLOGY | Facility: HOSPITAL | Age: 78
End: 2023-09-03
Payer: COMMERCIAL
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
## 2023-09-03 DIAGNOSIS — S09.90XA CLOSED HEAD INJURY, INITIAL ENCOUNTER: ICD-10-CM

## 2023-09-03 DIAGNOSIS — I48.91 A-FIB (HCC): ICD-10-CM

## 2023-09-03 DIAGNOSIS — G47.33 OSA (OBSTRUCTIVE SLEEP APNEA): Primary | Chronic | ICD-10-CM

## 2023-09-03 DIAGNOSIS — R26.2 AMBULATORY DYSFUNCTION: ICD-10-CM

## 2023-09-03 DIAGNOSIS — L08.9 BEDBUG BITE WITH INFECTION: ICD-10-CM

## 2023-09-03 DIAGNOSIS — T14.8XXA BEDBUG BITE WITH INFECTION: ICD-10-CM

## 2023-09-03 DIAGNOSIS — S80.02XA CONTUSION OF LEFT KNEE, INITIAL ENCOUNTER: ICD-10-CM

## 2023-09-03 DIAGNOSIS — S01.511A LIP LACERATION, INITIAL ENCOUNTER: ICD-10-CM

## 2023-09-03 DIAGNOSIS — Q82.0 HEREDITARY LYMPHEDEMA: ICD-10-CM

## 2023-09-03 PROBLEM — E87.1 HYPONATREMIA: Status: ACTIVE | Noted: 2023-09-03

## 2023-09-03 PROBLEM — B88.8 INFESTATION BY BED BUG: Status: ACTIVE | Noted: 2023-09-03

## 2023-09-03 LAB
2HR DELTA HS TROPONIN: 2 NG/L
4HR DELTA HS TROPONIN: 1 NG/L
ALBUMIN SERPL BCP-MCNC: 3.8 G/DL (ref 3.5–5)
ALP SERPL-CCNC: 82 U/L (ref 34–104)
ALT SERPL W P-5'-P-CCNC: 18 U/L (ref 7–52)
ANION GAP SERPL CALCULATED.3IONS-SCNC: 10 MMOL/L
AST SERPL W P-5'-P-CCNC: 23 U/L (ref 13–39)
BASOPHILS # BLD AUTO: 0.01 THOUSANDS/ÂΜL (ref 0–0.1)
BASOPHILS NFR BLD AUTO: 0 % (ref 0–1)
BILIRUB SERPL-MCNC: 0.57 MG/DL (ref 0.2–1)
BUN SERPL-MCNC: 10 MG/DL (ref 5–25)
CALCIUM SERPL-MCNC: 8.8 MG/DL (ref 8.4–10.2)
CARDIAC TROPONIN I PNL SERPL HS: 7 NG/L
CARDIAC TROPONIN I PNL SERPL HS: 8 NG/L
CARDIAC TROPONIN I PNL SERPL HS: 9 NG/L
CHLORIDE SERPL-SCNC: 100 MMOL/L (ref 96–108)
CO2 SERPL-SCNC: 22 MMOL/L (ref 21–32)
CREAT SERPL-MCNC: 0.73 MG/DL (ref 0.6–1.3)
EOSINOPHIL # BLD AUTO: 0.16 THOUSAND/ÂΜL (ref 0–0.61)
EOSINOPHIL NFR BLD AUTO: 2 % (ref 0–6)
ERYTHROCYTE [DISTWIDTH] IN BLOOD BY AUTOMATED COUNT: 13.5 % (ref 11.6–15.1)
GFR SERPL CREATININE-BSD FRML MDRD: 89 ML/MIN/1.73SQ M
GLUCOSE SERPL-MCNC: 163 MG/DL (ref 65–140)
GLUCOSE SERPL-MCNC: 172 MG/DL (ref 65–140)
HCT VFR BLD AUTO: 37 % (ref 36.5–49.3)
HGB BLD-MCNC: 12.3 G/DL (ref 12–17)
IMM GRANULOCYTES # BLD AUTO: 0.08 THOUSAND/UL (ref 0–0.2)
IMM GRANULOCYTES NFR BLD AUTO: 1 % (ref 0–2)
LYMPHOCYTES # BLD AUTO: 1.15 THOUSANDS/ÂΜL (ref 0.6–4.47)
LYMPHOCYTES NFR BLD AUTO: 13 % (ref 14–44)
MAGNESIUM SERPL-MCNC: 1.7 MG/DL (ref 1.9–2.7)
MCH RBC QN AUTO: 29.5 PG (ref 26.8–34.3)
MCHC RBC AUTO-ENTMCNC: 33.2 G/DL (ref 31.4–37.4)
MCV RBC AUTO: 89 FL (ref 82–98)
MONOCYTES # BLD AUTO: 0.55 THOUSAND/ÂΜL (ref 0.17–1.22)
MONOCYTES NFR BLD AUTO: 6 % (ref 4–12)
NEUTROPHILS # BLD AUTO: 6.9 THOUSANDS/ÂΜL (ref 1.85–7.62)
NEUTS SEG NFR BLD AUTO: 78 % (ref 43–75)
NRBC BLD AUTO-RTO: 0 /100 WBCS
PLATELET # BLD AUTO: 205 THOUSANDS/UL (ref 149–390)
PMV BLD AUTO: 8.6 FL (ref 8.9–12.7)
POTASSIUM SERPL-SCNC: 3.1 MMOL/L (ref 3.5–5.3)
PROT SERPL-MCNC: 7.2 G/DL (ref 6.4–8.4)
RBC # BLD AUTO: 4.17 MILLION/UL (ref 3.88–5.62)
SODIUM SERPL-SCNC: 132 MMOL/L (ref 135–147)
WBC # BLD AUTO: 8.85 THOUSAND/UL (ref 4.31–10.16)

## 2023-09-03 PROCEDURE — 73560 X-RAY EXAM OF KNEE 1 OR 2: CPT

## 2023-09-03 PROCEDURE — 83735 ASSAY OF MAGNESIUM: CPT | Performed by: INTERNAL MEDICINE

## 2023-09-03 PROCEDURE — 70450 CT HEAD/BRAIN W/O DYE: CPT

## 2023-09-03 PROCEDURE — 90715 TDAP VACCINE 7 YRS/> IM: CPT | Performed by: PHYSICIAN ASSISTANT

## 2023-09-03 PROCEDURE — 93005 ELECTROCARDIOGRAM TRACING: CPT

## 2023-09-03 PROCEDURE — G1004 CDSM NDSC: HCPCS

## 2023-09-03 PROCEDURE — 70486 CT MAXILLOFACIAL W/O DYE: CPT

## 2023-09-03 PROCEDURE — 99222 1ST HOSP IP/OBS MODERATE 55: CPT | Performed by: INTERNAL MEDICINE

## 2023-09-03 PROCEDURE — 82948 REAGENT STRIP/BLOOD GLUCOSE: CPT

## 2023-09-03 PROCEDURE — 99285 EMERGENCY DEPT VISIT HI MDM: CPT | Performed by: PHYSICIAN ASSISTANT

## 2023-09-03 PROCEDURE — 72125 CT NECK SPINE W/O DYE: CPT

## 2023-09-03 PROCEDURE — 90471 IMMUNIZATION ADMIN: CPT

## 2023-09-03 PROCEDURE — 84484 ASSAY OF TROPONIN QUANT: CPT | Performed by: INTERNAL MEDICINE

## 2023-09-03 PROCEDURE — 85025 COMPLETE CBC W/AUTO DIFF WBC: CPT | Performed by: PHYSICIAN ASSISTANT

## 2023-09-03 PROCEDURE — 84484 ASSAY OF TROPONIN QUANT: CPT | Performed by: PHYSICIAN ASSISTANT

## 2023-09-03 PROCEDURE — 99285 EMERGENCY DEPT VISIT HI MDM: CPT

## 2023-09-03 PROCEDURE — 80053 COMPREHEN METABOLIC PANEL: CPT | Performed by: PHYSICIAN ASSISTANT

## 2023-09-03 PROCEDURE — 71045 X-RAY EXAM CHEST 1 VIEW: CPT

## 2023-09-03 PROCEDURE — 36415 COLL VENOUS BLD VENIPUNCTURE: CPT | Performed by: PHYSICIAN ASSISTANT

## 2023-09-03 RX ORDER — ACETAMINOPHEN 325 MG/1
650 TABLET ORAL EVERY 6 HOURS PRN
Status: DISCONTINUED | OUTPATIENT
Start: 2023-09-03 | End: 2023-09-05 | Stop reason: HOSPADM

## 2023-09-03 RX ORDER — AMOXICILLIN 500 MG/1
500 CAPSULE ORAL EVERY 8 HOURS SCHEDULED
Status: DISCONTINUED | OUTPATIENT
Start: 2023-09-03 | End: 2023-09-03

## 2023-09-03 RX ORDER — FINASTERIDE 5 MG/1
5 TABLET, FILM COATED ORAL DAILY
Status: DISCONTINUED | OUTPATIENT
Start: 2023-09-04 | End: 2023-09-05 | Stop reason: HOSPADM

## 2023-09-03 RX ORDER — LIDOCAINE HYDROCHLORIDE 20 MG/ML
15 SOLUTION OROPHARYNGEAL 4 TIMES DAILY PRN
Status: DISCONTINUED | OUTPATIENT
Start: 2023-09-03 | End: 2023-09-05 | Stop reason: HOSPADM

## 2023-09-03 RX ORDER — LIDOCAINE HYDROCHLORIDE 10 MG/ML
5 INJECTION, SOLUTION EPIDURAL; INFILTRATION; INTRACAUDAL; PERINEURAL ONCE
Status: COMPLETED | OUTPATIENT
Start: 2023-09-03 | End: 2023-09-03

## 2023-09-03 RX ORDER — ATORVASTATIN CALCIUM 80 MG/1
80 TABLET, FILM COATED ORAL
Status: DISCONTINUED | OUTPATIENT
Start: 2023-09-04 | End: 2023-09-05 | Stop reason: HOSPADM

## 2023-09-03 RX ORDER — POTASSIUM CHLORIDE 20 MEQ/1
40 TABLET, EXTENDED RELEASE ORAL ONCE
Status: COMPLETED | OUTPATIENT
Start: 2023-09-03 | End: 2023-09-03

## 2023-09-03 RX ORDER — METOPROLOL TARTRATE 100 MG/1
100 TABLET ORAL 2 TIMES DAILY
Status: DISCONTINUED | OUTPATIENT
Start: 2023-09-03 | End: 2023-09-05 | Stop reason: HOSPADM

## 2023-09-03 RX ORDER — LISINOPRIL 20 MG/1
20 TABLET ORAL DAILY
Status: DISCONTINUED | OUTPATIENT
Start: 2023-09-04 | End: 2023-09-05 | Stop reason: HOSPADM

## 2023-09-03 RX ORDER — INSULIN LISPRO 100 [IU]/ML
5 INJECTION, SOLUTION INTRAVENOUS; SUBCUTANEOUS
Status: DISCONTINUED | OUTPATIENT
Start: 2023-09-04 | End: 2023-09-05 | Stop reason: HOSPADM

## 2023-09-03 RX ORDER — AMOXICILLIN 500 MG/1
500 CAPSULE ORAL EVERY 8 HOURS SCHEDULED
Status: DISCONTINUED | OUTPATIENT
Start: 2023-09-03 | End: 2023-09-05 | Stop reason: HOSPADM

## 2023-09-03 RX ORDER — AMLODIPINE BESYLATE 10 MG/1
10 TABLET ORAL 2 TIMES DAILY
Status: DISCONTINUED | OUTPATIENT
Start: 2023-09-03 | End: 2023-09-05 | Stop reason: HOSPADM

## 2023-09-03 RX ORDER — ONDANSETRON 2 MG/ML
4 INJECTION INTRAMUSCULAR; INTRAVENOUS EVERY 6 HOURS PRN
Status: DISCONTINUED | OUTPATIENT
Start: 2023-09-03 | End: 2023-09-05 | Stop reason: HOSPADM

## 2023-09-03 RX ORDER — ASPIRIN 81 MG/1
81 TABLET, CHEWABLE ORAL DAILY
Status: DISCONTINUED | OUTPATIENT
Start: 2023-09-04 | End: 2023-09-05 | Stop reason: HOSPADM

## 2023-09-03 RX ORDER — INSULIN GLARGINE 100 [IU]/ML
20 INJECTION, SOLUTION SUBCUTANEOUS
Status: DISCONTINUED | OUTPATIENT
Start: 2023-09-03 | End: 2023-09-05 | Stop reason: HOSPADM

## 2023-09-03 RX ADMIN — TETANUS TOXOID, REDUCED DIPHTHERIA TOXOID AND ACELLULAR PERTUSSIS VACCINE, ADSORBED 0.5 ML: 5; 2.5; 8; 8; 2.5 SUSPENSION INTRAMUSCULAR at 17:56

## 2023-09-03 RX ADMIN — Medication 15 ML: at 18:02

## 2023-09-03 RX ADMIN — POTASSIUM CHLORIDE 40 MEQ: 1500 TABLET, EXTENDED RELEASE ORAL at 20:41

## 2023-09-03 RX ADMIN — INSULIN GLARGINE 20 UNITS: 100 INJECTION, SOLUTION SUBCUTANEOUS at 21:44

## 2023-09-03 RX ADMIN — METOPROLOL TARTRATE 100 MG: 100 TABLET, FILM COATED ORAL at 21:43

## 2023-09-03 RX ADMIN — AMLODIPINE BESYLATE 10 MG: 10 TABLET ORAL at 21:43

## 2023-09-03 RX ADMIN — LIDOCAINE HYDROCHLORIDE 5 ML: 10 INJECTION, SOLUTION EPIDURAL; INFILTRATION; INTRACAUDAL at 20:41

## 2023-09-03 RX ADMIN — AMOXICILLIN 500 MG: 500 CAPSULE ORAL at 22:41

## 2023-09-03 NOTE — ED NOTES
Patient's sister, Germain Suazo, called requesting update. Patient agreeable for this RN to provide update.  Sister is asking for updates when CT scan results and if patient will be admitted to hospital.     [id number] Radha Marques RN  09/03/23 [id number]

## 2023-09-03 NOTE — ED PROVIDER NOTES
History  Chief Complaint   Patient presents with   • Fall     Pt brought in via EMS after tripping over shoes in store and falling down. Small laceration to upper lip, left side. Also swelling noted on the left cheek. Patient denies LOC, no headstrike, -thinners. Patient c/o pain in the lip and cheek. No back/neck pain. Pt presents to the ED after he fell - was walking out of a store and tripped "I tripped over my own crocks" hit his face - lac to upper lip. complaning only of facial pain. Later admitted to left knee pain. No other pain, no CP or SOB, or abdominal pian, nausea, dizziness, blurred vision. Pt admits he was unsteady and has been falling more. Prior to Admission Medications   Prescriptions Last Dose Informant Patient Reported? Taking? B-D ULTRAFINE III SHORT PEN 31G X 8 MM MISC  Self No No   Sig: USE AS DIRECTED   B-D ULTRAFINE III SHORT PEN 31G X 8 MM MISC   No No   Sig: USE 2 TIMES DAILY AS DIRECTED   HUMALOG KWIKPEN 100 units/mL injection pen  Self No No   Sig: INJECT 8 UNIT 3 TIMES DAILY BEFORE MEALS   Lantus SoloStar 100 units/mL SOPN   No No   Sig: INJECT 28 UNIT BEDTIME   amLODIPine (NORVASC) 10 mg tablet   No No   Sig: TAKE 1 TABLET BY MOUTH TWICE A DAY   aspirin 81 mg chewable tablet  Self No No   Sig: Chew 1 tablet daily   atorvastatin (LIPITOR) 80 mg tablet   No No   Sig: Take 1 tablet (80 mg total) by mouth daily   finasteride (PROSCAR) 5 mg tablet  Self Yes No   Sig: Take 5 mg by mouth daily   glucose blood (OneTouch Verio) test strip  Self No No   Sig: Use to check blood sugar   insulin glargine (LANTUS) 100 units/mL subcutaneous injection  Self No No   Sig: Inject 28 Units under the skin daily at bedtime Dx: Diabetes E11.9, dispense pens   insulin lispro (HumaLOG) 100 units/mL injection  Self No No   Sig: Inject 8 Units under the skin 3 (three) times a day before meals Dx: Diabetes E11.9.  Dispense pens   lisinopril-hydrochlorothiazide (PRINZIDE,ZESTORETIC) 20-12.5 MG per tablet  Self No No   Sig: TAKE 2 TABLETS BY MOUTH EVERY DAY   metoprolol tartrate (LOPRESSOR) 100 mg tablet   No No   Sig: Take 1 tablet (100 mg total) by mouth 2 (two) times a day      Facility-Administered Medications: None       Past Medical History:   Diagnosis Date   • Arthritis    • CPAP (continuous positive airway pressure) dependence    • Diabetes (HCC)    • Edema    • Hyperlipidemia    • Hypertension    • Prostate cancer [facility])     prostate   • Sleep apnea    • Stroke (cerebrum) (HCC)    • Wears glasses        Past Surgical History:   Procedure Laterality Date   • CARDIAC SURGERY     • CIRCUMCISION     • COLONOSCOPY     • IR CEREBRAL ANGIOGRAPHY / INTERVENTION  8/13/2018   • IR TEVAR  9/19/2018   • TN AS-AORT GRF W/CARD BYP & AORTIC ROOT RPLCMT N/A 9/6/2017    Procedure: ASCENDING AORTIC REPAIR WITH A 26MM  GELWEAVE STRAIGHT GRAFT AND HEMIARCH WITH 8MM SIDE ARM BRANCH;  Surgeon: Yamila Chanel MD;  [facility];  Service: Cardiac Surgery   • TN BYPASS W/VEIN CAROTID-BRACHIAL Left 8/13/2018    Procedure: BYPASS CAROTID SUBCLAVIAN WITH EMBOLIZATION OF PROXIMAL SUBCLAVIAN ARTERY. ;  Surgeon: Eduard Stein MD;  Location[facility];  Service: Vascular   • TN EVASC RPR DTA COVERAGE ART ORIGIN 1ST ENDOPROSTH N/A 9/19/2018    Procedure: REPAIR ANEURSYM THORACIC ENDOVASCUALR DESCENDING AORTIC ANEURYSM (TEVAR); Surgeon: Stef Fisher MD;  Location: [facility];  Service: Cardiac Surgery   • TN EXPL PO HEMRRG THROMBOSIS/INFCTJ [id number] [facility] [address] [facility] N/A 9/6/2017    Procedure: RE-EXPLORATION MEDIASTERNAL CAVITY FOR POST-OP BLEED (BRING BACK); Surgeon: Yamila Chanel MD;  Location[facility];  Service: Cardiac Surgery       Family History   Problem Relation Age of Onset   • Stroke Mother         complications   • Hypertension Mother    • Arthritis Mother    • No Known Problems Father      I have reviewed and agree with the history as documented.     E-Cigarette/Vaping   • E-Cigarette Use Never User E-Cigarette/Vaping Substances   • Nicotine No    • THC No    • CBD No    • Flavoring No    • Other No    • Unknown No      Social History     Tobacco Use   • Smoking status: Never   • Smokeless tobacco: Never   Vaping Use   • Vaping Use: Never used   Substance Use Topics   • Alcohol use: Yes     Comment: social beer or two daily   • Drug use: No       Review of Systems   Constitutional: Negative for fever. Respiratory: Negative. Cardiovascular: Positive for leg swelling. Negative for chest pain and palpitations. Leg swelling is chronic - not worse   Gastrointestinal: Negative. Musculoskeletal: Positive for arthralgias and gait problem. Neurological: Positive for headaches. Negative for numbness. All other systems reviewed and are negative. Physical Exam  Physical Exam  Vitals and nursing note reviewed. Constitutional:       Appearance: He is well-developed. HENT:      Head: Normocephalic. Right Ear: Tympanic membrane, ear canal and external ear normal.      Left Ear: Tympanic membrane, ear canal and external ear normal.      Mouth/Throat:        Comments: 1.5 cm lac extends to vermilion border - left upper lip   No dental tenderness or gingival lac. / no dental trauma. Eyes:      Conjunctiva/sclera: Conjunctivae normal.   Cardiovascular:      Rate and Rhythm: Normal rate and regular rhythm. Heart sounds: Normal heart sounds. Pulmonary:      Effort: Pulmonary effort is normal.      Breath sounds: Normal breath sounds. Abdominal:      General: Bowel sounds are normal.      Palpations: Abdomen is soft. Musculoskeletal:         General: Normal range of motion. Cervical back: Normal range of motion and neck supple. Left knee: Ecchymosis present. Normal range of motion. Tenderness present. Right lower leg: Edema present. Left lower leg: Edema present.         Legs:       Comments: + pitting edema - bilateral - pt states this is chronic Lymphadenopathy:      Cervical: No cervical adenopathy. Skin:     General: Skin is warm. Findings: No rash. Neurological:      Mental Status: He is alert and oriented to person, place, and time. Motor: No abnormal muscle tone.       Coordination: Coordination normal.   Psychiatric:         Behavior: Behavior normal.         Vital Signs  ED Triage Vitals   Temperature Pulse Respirations Blood Pressure SpO2   09/03/23 1731 09/03/23 1722 09/03/23 1732 09/03/23 1722 09/03/23 1722   98.1 °F (36.7 °C) 76 20 118/66 97 %      Temp Source Heart Rate Source Patient Position - Orthostatic VS BP Location FiO2 (%)   09/03/23 1731 09/03/23 1722 09/03/23 1722 09/03/23 1722 --   Oral Monitor Lying Right arm       Pain Score       --                  Vitals:    09/03/23 1722 09/03/23 2045 09/03/23 2136   BP: 118/66 137/67 136/80   Pulse: 76 93 85   Patient Position - Orthostatic VS: Lying Lying          Visual Acuity      ED Medications  Medications   Lidocaine Viscous HCl (XYLOCAINE) 2 % mucosal solution 15 mL (15 mL Swish & Spit Given 9/3/23 1802)   amLODIPine (NORVASC) tablet 10 mg (10 mg Oral Given 9/3/23 2143)   aspirin chewable tablet 81 mg (has no administration in time range)   atorvastatin (LIPITOR) tablet 80 mg (has no administration in time range)   finasteride (PROSCAR) tablet 5 mg (has no administration in time range)   insulin lispro (HumaLOG) 100 units/mL subcutaneous injection 5 Units (has no administration in time range)   insulin glargine (LANTUS) subcutaneous injection 20 Units 0.2 mL (20 Units Subcutaneous Given 9/3/23 2144)   metoprolol tartrate (LOPRESSOR) tablet 100 mg (100 mg Oral Given 9/3/23 2143)   ondansetron (ZOFRAN) injection 4 mg (has no administration in time range)   acetaminophen (TYLENOL) tablet 650 mg (has no administration in time range)   lisinopril (ZESTRIL) tablet 20 mg (has no administration in time range)   tetanus-diphtheria-acellular pertussis (BOOSTRIX) IM injection 0.5 mL (0.5 mL Intramuscular Given 9/3/23 1756)   potassium chloride (K-DUR,KLOR-CON) CR tablet 40 mEq (40 mEq Oral Given 9/3/23 2041)   lidocaine (PF) (XYLOCAINE-MPF) 1 % injection 5 mL (5 mL Infiltration Given by Other 9/3/23 2041)       Diagnostic Studies  Results Reviewed     Procedure Component Value Units Date/Time    HS Troponin I 2hr [[id number]]  (Normal) Collected: 09/03/23 2044    Lab Status: Final result Specimen: Blood from Arm, Left Updated: 09/03/23 2120     hs TnI 2hr 9 ng/L      Delta 2hr hsTnI 2 ng/L     Sodium, urine, random [[id number]]     Lab Status: No result Specimen: Urine     Osmolality, urine [[id number]]     Lab Status: No result Specimen: Urine     Magnesium [[id number]]  (Abnormal) Collected: 09/03/23 1754    Lab Status: Final result Specimen: Blood from Arm, Left Updated: 09/03/23 2004     Magnesium 1.7 mg/dL     HS Troponin I 4hr [[id number]]     Lab Status: No result Specimen: Blood     HS Troponin 0hr (reflex protocol) [[id number]]  (Normal) Collected: 09/03/23 1754    Lab Status: Final result Specimen: Blood from Arm, Left Updated: 09/03/23 1824     hs TnI 0hr 7 ng/L     Comprehensive metabolic panel [[id number]]  (Abnormal) Collected: 09/03/23 1754    Lab Status: Final result Specimen: Blood from Arm, Left Updated: 09/03/23 1817     Sodium 132 mmol/L      Potassium 3.1 mmol/L      Chloride 100 mmol/L      CO2 22 mmol/L      ANION GAP 10 mmol/L      BUN 10 mg/dL      Creatinine 0.73 mg/dL      Glucose 163 mg/dL      Calcium 8.8 mg/dL      AST 23 U/L      ALT 18 U/L      Alkaline Phosphatase 82 U/L      Total Protein 7.2 g/dL      Albumin 3.8 g/dL      Total Bilirubin 0.57 mg/dL      eGFR 89 ml/min/1.73sq m     Narrative:      [place] guidelines for Chronic Kidney Disease (CKD):   •  Stage 1 with normal or high GFR (GFR > 90 mL/min/1.73 square meters)  •  Stage 2 Mild CKD (GFR = 60-89 mL/min/1.73 square meters)  •  Stage 3A Moderate CKD (GFR = 45-59 mL/min/1.73 square meters)  •  Stage 3B Moderate CKD (GFR = 30-44 mL/min/1.73 square meters)  •  Stage 4 Severe CKD (GFR = 15-29 mL/min/1.73 square meters)  •  Stage 5 End Stage CKD (GFR <15 mL/min/1.73 square meters)  Note: GFR calculation is accurate only with a steady state creatinine    CBC and differential [[id number]]  (Abnormal) Collected: 09/03/23 1754    Lab Status: Final result Specimen: Blood from Arm, Left Updated: 09/03/23 1804     WBC 8.85 Thousand/uL      RBC 4.17 Million/uL      Hemoglobin 12.3 g/dL      Hematocrit 37.0 %      MCV 89 fL      MCH 29.5 pg      MCHC 33.2 g/dL      RDW 13.5 %      MPV 8.6 fL      Platelets 460 Thousands/uL      nRBC 0 /100 WBCs      Neutrophils Relative 78 %      Immat GRANS % 1 %      Lymphocytes Relative 13 %      Monocytes Relative 6 %      Eosinophils Relative 2 %      Basophils Relative 0 %      Neutrophils Absolute 6.90 Thousands/µL      Immature Grans Absolute 0.08 Thousand/uL      Lymphocytes Absolute 1.15 Thousands/µL      Monocytes Absolute 0.55 Thousand/µL      Eosinophils Absolute 0.16 Thousand/µL      Basophils Absolute 0.01 Thousands/µL                  CT head without contrast   Final Result by Jennifer Knutson DO (09/03 1859)      No acute intracranial abnormality. Chronic microangiopathic changes. Workstation performed: [id number]         CT facial bones without contrast   Final Result by Jennifer Knutson DO (09/03 1906)      Left facial soft tissue swelling/premaxillary infraorbital swelling noted from recent trauma. Workstation performed: [id number]         CT cervical spine without contrast   Final Result by Jennifer Knutson DO (09/03 1902)      No cervical spine fracture or traumatic malalignment. Degenerative changes are noted throughout the cervical spine.                   Workstation performed: [id number]         XR ribs with pa chest min 3 views LEFT    (Results Pending)   XR chest portable    (Results Pending)   XR knee 1 or 2 vw left    (Results Pending)              Procedures  ECG 12 Lead Documentation Only    Date/Time: 9/3/2023 10:01 PM    Performed by: Pj Awad PA-C  Authorized by: Pj Awad PA-C    Indications / Diagnosis:  Dizziness/fall  Patient location:  ED  Previous ECG:     Previous ECG:  Compared to current    Comparison ECG info:  2018- sinus rhythm, was a fib 2017    Similarity:  Changes noted  Rate:     ECG rate:  82    ECG rate assessment: normal    Rhythm:     Rhythm: atrial fibrillation    Ectopy:     Ectopy: none    QRS:     QRS axis:  Normal  Conduction:     Conduction: normal    ST segments:     ST segments:  Normal  T waves:     T waves: normal               ED Course  ED Course as of 09/03/23 2212   Sun Sep 03, 2023   1919 hs TnI 0hr: 7  Not sig elivated    1919 Potassium(!): 3.1  Mildly low   1943 Spoke with vinicius yatesise - not safe at home, - row home - lots of steps - falling a lot - having difficulty ambulating - will admit - PT/OT eval and possible placement. SBIRT 20yo+    Flowsheet Row Most Recent Value   Initial Alcohol Screen: US AUDIT-C     1. How often do you have a drink containing alcohol? 4 Filed at: 09/03/2023 1720   2. How many drinks containing alcohol do you have on a typical day you are drinking? 1 Filed at: 09/03/2023 1720   3b. FEMALE Any Age, or MALE 65+: How often do you have 4 or more drinks on one occassion? 2 Filed at: 09/03/2023 1720   Audit-C Score 7 Filed at: 09/03/2023 1720   Full Alcohol Screen: US AUDIT    4. How often during the last year have you found that you were not able to stop drinking once you had started? 1 Filed at: 09/03/2023 1720   5. How often during past year have you failed to do what was normally expected of you because of drinking? 0 Filed at: 09/03/2023 1720   6. How often in past year have you needed a first drink in the morning to get yourself going after a heavy drinking session?   0 Filed at: 09/03/2023 1720   7. How often in past year have you had feeling of guilt or remorse after drinking? 0 Filed at: 09/03/2023 1720   8. How often in past year have you been unable to remember what happened night before because you had been drinking? 0 Filed at: 09/03/2023 1720   9. Have you or someone else been injured as a result of your drinking? 0 Filed at: 09/03/2023 1720   10. Has a relative, friend, doctor or other health worker been concerned about your drinking and suggested you cut down?  0 Filed at: 09/03/2023 1720   AUDIT Total Score 8 Filed at: 09/03/2023 1720   RENEE: How many times in the past year have you. .. Used an illegal drug or used a prescription medication for non-medical reasons? Never Filed at: 09/03/2023 1720                    Medical Decision Making  Pt felt - may have been dizzy, will get EKG  + facial trauma, - will need to repair lip lac. - sudha lget ct for fx/head bleed  Labs for anemia, electrolyte abnormality   Knee pain - will get xray  Bed bugs found - discussed w pt and family     A-fib [facility]): acute illness or injury  Ambulatory dysfunction: acute illness or injury  Bedbug bite with infection: acute illness or injury  Closed head injury, initial encounter: acute illness or injury  Contusion of left knee, initial encounter: acute illness or injury  Fall, initial encounter: acute illness or injury  Lip laceration, initial encounter: acute illness or injury  Amount and/or Complexity of Data Reviewed  Independent Historian: EMS     Details: Daughter also provided history reports that he lives in a multilevel row home and has had been having progressively worsening difficulty getting around his house and has been following with increasing frequency. She feels that he is not safe to go back home and is worried about him. Will admit for PT and OT evaluation and evaluation for safe discharge. External Data Reviewed: labs, radiology, ECG and notes. Labs: ordered.  Decision-making details documented in ED Course. Radiology: ordered and independent interpretation performed. ECG/medicine tests: ordered and independent interpretation performed. Risk  Prescription drug management. Decision regarding hospitalization. Diagnosis or treatment significantly limited by social determinants of health. Disposition  Final diagnoses:   Fall, initial encounter   Closed head injury, initial encounter   Lip laceration, initial encounter   Ambulatory dysfunction   Contusion of left knee, initial encounter   Bedbug bite with infection   [facility])     Time reflects when diagnosis was documented in both MDM as applicable and the Disposition within this note     Time User Action Codes Description Comment    9/3/2023  7:53 PM Chitra Tyler [Z41. HKMD] Fall, initial encounter     9/3/2023  7:53 PM Chitra Tyler [S09.90XA] Closed head injury, initial encounter     9/3/2023  7:53 PM Chitra Tyler [S01.511A] Lip laceration, initial encounter     9/3/2023  7:53 PM Chitra Tyler [R26.2] Ambulatory dysfunction     9/3/2023  7:53 PM Chitra Tyler [S80.02XA] Contusion of left knee, initial encounter     9/3/2023  9:06 PM Chitra Tyler [T14. 8XXA,  L08.9] Bedbug bite with infection     9/3/2023  9:33 PM Chitra Tyler [I48.91] [facility])       ED Disposition     ED Disposition   Admit    Condition   Stable    Date/Time   Sun Sep 3, 2023  7:53 PM    Comment   Case was discussed with diane  and the patient's admission status was agreed to be Admission Status: observation status to the service of Dr. Bull Hughes  .            Follow-up Information    None         Current Discharge Medication List      CONTINUE these medications which have NOT CHANGED    Details   amLODIPine (NORVASC) 10 mg tablet TAKE 1 TABLET BY MOUTH TWICE A DAY  Qty: 180 tablet, Refills: 2    Associated Diagnoses: HTN (hypertension), benign      aspirin 81 mg chewable tablet Chew 1 tablet daily  Qty: 30 tablet, Refills: 0      atorvastatin (LIPITOR) 80 mg tablet Take 1 tablet (80 mg total) by mouth daily  Qty: 90 tablet, Refills: 1    Associated Diagnoses: Dyslipidemia      !! B-D ULTRAFINE III SHORT PEN 31G X 8 MM MISC USE AS DIRECTED  Qty: 100 each, Refills: 5    Associated Diagnoses: Type 2 diabetes mellitus without complication ([address])      ! ! B-D ULTRAFINE III SHORT PEN 31G X 8 MM MISC USE 2 TIMES DAILY AS DIRECTED  Qty: 100 each, Refills: 3    Associated Diagnoses: Type 2 diabetes mellitus without complication (HCC)      finasteride (PROSCAR) 5 mg tablet Take 5 mg by mouth daily      glucose blood (OneTouch Verio) test strip Use to check blood sugar  Qty: 100 strip, Refills: 3    Associated Diagnoses: Type 2 diabetes mellitus without complication, unspecified whether long term insulin use (HCC)      HUMALOG KWIKPEN 100 units/mL injection pen INJECT 8 UNIT 3 TIMES DAILY BEFORE MEALS  Qty: 15 pen, Refills: 5    Comments: DX Code Needed  . Associated Diagnoses: Type 2 diabetes mellitus without complication, unspecified whether long term insulin use (HCC)      insulin glargine (LANTUS) 100 units/mL subcutaneous injection Inject 28 Units under the skin daily at bedtime Dx: Diabetes E11.9, dispense pens  Qty: 10 mL, Refills: 0    Comments: PLEASE DISPENSE PENS      insulin lispro (HumaLOG) 100 units/mL injection Inject 8 Units under the skin 3 (three) times a day before meals Dx: Diabetes E11.9. Dispense pens  Qty: 8 mL, Refills: 0    Comments: Please dispense pens      Lantus SoloStar 100 units/mL SOPN INJECT 28 UNIT BEDTIME  Qty: 15 mL, Refills: 17    Associated Diagnoses: Type 2 diabetes mellitus without complication, unspecified whether long term insulin use (HCC)      lisinopril-hydrochlorothiazide (PRINZIDE,ZESTORETIC) 20-12.5 MG per tablet TAKE 2 TABLETS BY MOUTH EVERY DAY  Qty: 180 tablet, Refills: 3    Comments: DX Code Needed  NEEDS REFILLS.   Associated Diagnoses: HTN (hypertension), benign      metoprolol tartrate (LOPRESSOR) 100 mg tablet Take 1 tablet (100 mg total) by mouth 2 (two) times a day  Qty: 180 tablet, Refills: 1    Associated Diagnoses: HTN (hypertension), benign       !! - Potential duplicate medications found. Please discuss with provider. No discharge procedures on file.     PDMP Review     None          ED Provider  Electronically Signed by           Cornell Infante PA-C  09/03/23 4739

## 2023-09-03 NOTE — Clinical Note
Alma Rosa accompanied Christopher Alford to the emergency department on 9/3/2023. Return date if applicable: If you have any questions or concerns, please don't hesitate to call.       Rudy Saldivar MD

## 2023-09-04 LAB
ANION GAP SERPL CALCULATED.3IONS-SCNC: 6 MMOL/L
BUN SERPL-MCNC: 8 MG/DL (ref 5–25)
CALCIUM SERPL-MCNC: 8.5 MG/DL (ref 8.4–10.2)
CHLORIDE SERPL-SCNC: 103 MMOL/L (ref 96–108)
CO2 SERPL-SCNC: 27 MMOL/L (ref 21–32)
CREAT SERPL-MCNC: 0.6 MG/DL (ref 0.6–1.3)
ERYTHROCYTE [DISTWIDTH] IN BLOOD BY AUTOMATED COUNT: 13.6 % (ref 11.6–15.1)
GFR SERPL CREATININE-BSD FRML MDRD: 96 ML/MIN/1.73SQ M
GLUCOSE P FAST SERPL-MCNC: 134 MG/DL (ref 65–99)
GLUCOSE SERPL-MCNC: 119 MG/DL (ref 65–140)
GLUCOSE SERPL-MCNC: 134 MG/DL (ref 65–140)
GLUCOSE SERPL-MCNC: 136 MG/DL (ref 65–140)
GLUCOSE SERPL-MCNC: 158 MG/DL (ref 65–140)
GLUCOSE SERPL-MCNC: 159 MG/DL (ref 65–140)
HCT VFR BLD AUTO: 35 % (ref 36.5–49.3)
HGB BLD-MCNC: 11.9 G/DL (ref 12–17)
MCH RBC QN AUTO: 29.9 PG (ref 26.8–34.3)
MCHC RBC AUTO-ENTMCNC: 34 G/DL (ref 31.4–37.4)
MCV RBC AUTO: 88 FL (ref 82–98)
OSMOLALITY UR: 293 MMOL/KG
PLATELET # BLD AUTO: 213 THOUSANDS/UL (ref 149–390)
PMV BLD AUTO: 8.9 FL (ref 8.9–12.7)
POTASSIUM SERPL-SCNC: 3.2 MMOL/L (ref 3.5–5.3)
RBC # BLD AUTO: 3.98 MILLION/UL (ref 3.88–5.62)
SODIUM 24H UR-SCNC: 113 MOL/L
SODIUM SERPL-SCNC: 136 MMOL/L (ref 135–147)
WBC # BLD AUTO: 8.56 THOUSAND/UL (ref 4.31–10.16)

## 2023-09-04 PROCEDURE — 97163 PT EVAL HIGH COMPLEX 45 MIN: CPT

## 2023-09-04 PROCEDURE — 94002 VENT MGMT INPAT INIT DAY: CPT

## 2023-09-04 PROCEDURE — 83935 ASSAY OF URINE OSMOLALITY: CPT | Performed by: INTERNAL MEDICINE

## 2023-09-04 PROCEDURE — 99232 SBSQ HOSP IP/OBS MODERATE 35: CPT | Performed by: STUDENT IN AN ORGANIZED HEALTH CARE EDUCATION/TRAINING PROGRAM

## 2023-09-04 PROCEDURE — 82948 REAGENT STRIP/BLOOD GLUCOSE: CPT

## 2023-09-04 PROCEDURE — 85027 COMPLETE CBC AUTOMATED: CPT | Performed by: INTERNAL MEDICINE

## 2023-09-04 PROCEDURE — 84300 ASSAY OF URINE SODIUM: CPT | Performed by: INTERNAL MEDICINE

## 2023-09-04 PROCEDURE — 94760 N-INVAS EAR/PLS OXIMETRY 1: CPT

## 2023-09-04 PROCEDURE — 80048 BASIC METABOLIC PNL TOTAL CA: CPT | Performed by: INTERNAL MEDICINE

## 2023-09-04 RX ORDER — INSULIN LISPRO 100 [IU]/ML
1-5 INJECTION, SOLUTION INTRAVENOUS; SUBCUTANEOUS
Status: DISCONTINUED | OUTPATIENT
Start: 2023-09-04 | End: 2023-09-05 | Stop reason: HOSPADM

## 2023-09-04 RX ADMIN — METOPROLOL TARTRATE 100 MG: 100 TABLET, FILM COATED ORAL at 17:07

## 2023-09-04 RX ADMIN — AMOXICILLIN 500 MG: 500 CAPSULE ORAL at 22:42

## 2023-09-04 RX ADMIN — LISINOPRIL 20 MG: 20 TABLET ORAL at 08:39

## 2023-09-04 RX ADMIN — AMOXICILLIN 500 MG: 500 CAPSULE ORAL at 05:07

## 2023-09-04 RX ADMIN — METOPROLOL TARTRATE 100 MG: 100 TABLET, FILM COATED ORAL at 08:40

## 2023-09-04 RX ADMIN — INSULIN LISPRO 5 UNITS: 100 INJECTION, SOLUTION INTRAVENOUS; SUBCUTANEOUS at 17:08

## 2023-09-04 RX ADMIN — AMLODIPINE BESYLATE 10 MG: 10 TABLET ORAL at 08:40

## 2023-09-04 RX ADMIN — INSULIN LISPRO 5 UNITS: 100 INJECTION, SOLUTION INTRAVENOUS; SUBCUTANEOUS at 12:30

## 2023-09-04 RX ADMIN — INSULIN GLARGINE 20 UNITS: 100 INJECTION, SOLUTION SUBCUTANEOUS at 22:43

## 2023-09-04 RX ADMIN — AMOXICILLIN 500 MG: 500 CAPSULE ORAL at 14:34

## 2023-09-04 RX ADMIN — ATORVASTATIN CALCIUM 80 MG: 80 TABLET, FILM COATED ORAL at 17:07

## 2023-09-04 RX ADMIN — INSULIN LISPRO 1 UNITS: 100 INJECTION, SOLUTION INTRAVENOUS; SUBCUTANEOUS at 22:43

## 2023-09-04 RX ADMIN — FINASTERIDE 5 MG: 5 TABLET, FILM COATED ORAL at 08:40

## 2023-09-04 RX ADMIN — AMLODIPINE BESYLATE 10 MG: 10 TABLET ORAL at 17:07

## 2023-09-04 RX ADMIN — ASPIRIN 81 MG 81 MG: 81 TABLET ORAL at 08:40

## 2023-09-04 RX ADMIN — INSULIN LISPRO 5 UNITS: 100 INJECTION, SOLUTION INTRAVENOUS; SUBCUTANEOUS at 08:41

## 2023-09-04 NOTE — ASSESSMENT & PLAN NOTE
He was wearing krocks when he tripped and fell on the way to the store. His face hit the ground and he ended up with a lip laceration that was sutured in the ed  He denies loc, no dizziness prior to the fall  He has been having ambulatory dysfunction prior to this also due to his knees.    Will consult pt/ot for eval

## 2023-09-04 NOTE — UTILIZATION REVIEW
Initial Clinical Review    Admission: Date/Time/Statement: [email or web address] UPGRADED TO INPT [email or web address] D/T AMBULATORY DYSFUNCTION AND NEED FOR PT/OT AND PLACEMENT POST DISCHARGE. Admission Orders (From admission, onward)     Ordered        09/04/23 1012  Inpatient Admission  Once            09/03/23 1950  Place in Observation  Once                      Orders Placed This Encounter                                  • Inpatient Admission     Standing Status:   Standing     Number of Occurrences:   1     Order Specific Question:   Level of Care     Answer:   Med Surg [16]     Order Specific Question:   Estimated length of stay     Answer:   More than 2 Midnights     Order Specific Question:   Certification     Answer:   I certify that inpatient services are medically necessary for this patient for a duration of greater than two midnights. See H&P and MD Progress Notes for additional information about the patient's course of treatment. ED Arrival Information     Expected   -    Arrival   9/3/2023 17:15    Acuity   Urgent            Means of arrival   Stretcher    Escorted by   [facility] ([address] [place] [address])    Service   Hospitalist    Admission type   Emergency            Arrival complaint   fall           Chief Complaint   Patient presents with   • Fall     Pt brought in via EMS after tripping over shoes in store and falling down. Small laceration to upper lip, left side. Also swelling noted on the left cheek. Patient denies LOC, no headstrike, -thinners. Patient c/o pain in the lip and cheek. No back/neck pain. Initial Presentation: 69 y.o. male with a pmhx of paf, htn, diabetes, mario alberto, BED BUG INFESTATION, and repair of thoracic aneurysm, to ED by ems admitted observation d/t ambulatory dysfunction. wearing krocks when he tripped and fell on the way to the store. His face hit the ground and he ended up with a lip laceration that was sutured in the ED. denies loc, no dizziness prior to the fall.  been having ambulatory dysfunction prior to this also due to his knees. chronic lower ext edema. facial laceration repair. Used dissolvable suture for repair. Sodium 132. urine sodium and osmolality. Amoxicillin. SSI. Metoprolol. norvasc and lisinopril. Hold hctz. PT/OT. 9/4 UPGRADED TO INPT. No events overnight. Denies any pain, dizziness, sob or nausea/vomiting. Amoxicillin. SSI. Metoprolol. norvasc and lisinopril. Hold hctz. PT/OT. PT evaluation recommend home health.           ED Triage Vitals   Temperature Pulse Respirations Blood Pressure SpO2   09/03/23 1731 09/03/23 1722 09/03/23 1732 09/03/23 1722 09/03/23 1722   98.1 °F (36.7 °C) 76 20 118/66 97 %      Temp Source Heart Rate Source Patient Position - Orthostatic VS BP Location FiO2 (%)   09/03/23 1731 09/03/23 1722 09/03/23 1722 09/03/23 1722 --   Oral Monitor Lying Right arm       Pain Score       09/03/23 2214       5          Wt Readings from Last 1 Encounters:   07/06/23 105 kg (231 lb 9.6 oz)     Additional Vital Signs:   09/04/23 08:39:05 -- 87 -- 125/67 86 99 % -- --   09/04/23 07:21:08 98.5 °F (36.9 °C) 78 18 123/66 85 94 % -- --   09/03/23 21:36:51 -- 85 -- 136/80 99 95 % -- --   09/03/23 2045 -- 93 24 Abnormal  137/67 96 98 % None (Room air) Lying   09/03/23 1732 -- -- 20 -- -- -- -- --   09/03/23 1731 98.1 °F (36.7 °C) -- -- -- -- -- -- --   09/03/23 1725 -- -- -- -- -- -- None (Room air) --   09/03/23 1722 -- 76 -- 118/66 -- 97 % None (Room air) Lying       Pertinent Labs/Diagnostic Test Results:   9/3 EKG:  Performed by: Pj Awad PA-C   Authorized by: Pj Awad PA-C     Indications / Diagnosis:  Dizziness/fall   Patient location:  ED   Previous ECG:     Previous ECG:  Compared to current     Comparison ECG info:  2018- sinus rhythm, was a fib 2017     Similarity:  Changes noted   Rate:     ECG rate:  82     ECG rate assessment: normal     Rhythm:     Rhythm: atrial fibrillation     Ectopy:     Ectopy: none     QRS:     QRS axis:  Normal Conduction:     Conduction: normal     ST segments:     ST segments:  Normal   T waves:     T waves: normal        CT head without contrast   Final Result by Jennifer Knutson DO (09/03 1859)      No acute intracranial abnormality. Chronic microangiopathic changes. Workstation performed: [id number]         CT facial bones without contrast   Final Result by Jennifer Knutson DO (09/03 1906)      Left facial soft tissue swelling/premaxillary infraorbital swelling noted from recent trauma. Workstation performed: [id number]         CT cervical spine without contrast   Final Result by Jennifer Knutson DO (09/03 1902)      No cervical spine fracture or traumatic malalignment. Degenerative changes are noted throughout the cervical spine.                   Workstation performed: [id number]         XR chest portable    (Results Pending)   XR knee 1 or 2 vw left    (Results Pending)         Results from last 7 days   Lab Units 09/04/23  0519 09/03/23  1754   WBC Thousand/uL 8.56 8.85   HEMOGLOBIN g/dL 11.9* 12.3   HEMATOCRIT % 35.0* 37.0   PLATELETS Thousands/uL 213 205   NEUTROS ABS Thousands/µL  --  6.90         Results from last 7 days   Lab Units 09/04/23  0519 09/03/23  1754 08/30/23  0939   SODIUM mmol/L 136 132*  --    POTASSIUM mmol/L 3.2* 3.1*  --    CHLORIDE mmol/L 103 100  --    CO2 mmol/L 27 22  --    ANION GAP mmol/L 6 10  --    BUN mg/dL 8 10 9   CREATININE mg/dL 0.60 0.73 0.68   EGFR ml/min/1.73sq m 96 89 92   CALCIUM mg/dL 8.5 8.8  --    MAGNESIUM mg/dL  --  1.7*  --      Results from last 7 days   Lab Units 09/03/23  1754   AST U/L 23   ALT U/L 18   ALK PHOS U/L 82   TOTAL PROTEIN g/dL 7.2   ALBUMIN g/dL 3.8   TOTAL BILIRUBIN mg/dL 0.57     Results from last 7 days   Lab Units 09/04/23  1229 09/04/23  0838 09/03/23  2135   POC GLUCOSE mg/dl 119 159* 172*     Results from last 7 days   Lab Units 09/04/23  0519 09/03/23  1754   GLUCOSE RANDOM mg/dL 134 163* Results from last 7 days   Lab Units 09/03/23  2245 09/03/23 2044 09/03/23  1754   HS TNI 0HR ng/L  --   --  7   HS TNI 2HR ng/L  --  9  --    HSTNI D2 ng/L  --  2  --    HS TNI 4HR ng/L 8  --   --    HSTNI D4 ng/L 1  --   --        Results from last 7 days   Lab Units 09/04/23  0523   SODIUM UR  113       ED Treatment:   Medication Administration from 09/03/2023 1715 to 09/03/2023 2118       Date/Time Order Dose Route Action Action by Comments     09/03/2023 1756 EDT tetanus-diphtheria-acellular pertussis (BOOSTRIX) IM injection 0.5 mL 0.5 mL Intramuscular Given       09/03/2023 1802 EDT Lidocaine Viscous HCl (XYLOCAINE) 2 % mucosal solution 15 mL 15 mL Swish & Spit Given       09/03/2023 2041 EDT potassium chloride (K-DUR,KLOR-CON) CR tablet 40 mEq 40 mEq Oral Given       09/03/2023 2041 EDT lidocaine (PF) (XYLOCAINE-MPF) 1 % injection 5 mL 5 mL Infiltration Given by Other          Past Medical History:   Diagnosis Date   • Arthritis    • CPAP (continuous positive airway pressure) dependence    • Diabetes ([address])    • Edema    • Hyperlipidemia    • Hypertension    • Prostate cancer ([address])     prostate   • Sleep apnea    • Stroke (cerebrum) (HCC)    • Wears glasses      Present on Admission:  • Hypertension  • Diabetes mellitus ([address])  • Paroxysmal atrial fibrillation (HCC)  • KARIN (obstructive sleep apnea)      Admitting Diagnosis: Relational problem due to medical condition [Z63.8]  Lip laceration, initial encounter [S01.511A]  Closed head injury, initial encounter [S09.90XA]  Contusion of left knee, initial encounter [S80.02XA]  Ambulatory dysfunction [R26.2]  Bedbug bite with infection [T14. 8XXA, L08.9]  Age/Sex: 69 y.o. male  Admission Orders:  Scheduled Medications:  amLODIPine, 10 mg, Oral, BID  amoxicillin, 500 mg, Oral, Q8H DEMETRIA  aspirin, 81 mg, Oral, Daily  atorvastatin, 80 mg, Oral, Daily With Dinner  finasteride, 5 mg, Oral, Daily  insulin glargine, 20 Units, Subcutaneous, HS  insulin lispro, 5 Units, Subcutaneous, TID With Meals  lisinopril, 20 mg, Oral, Daily  metoprolol tartrate, 100 mg, Oral, BID      PRN Meds:  acetaminophen, 650 mg, Oral, Q6H PRN  Lidocaine Viscous HCl, 15 mL, Swish & Spit, 4x Daily PRN 9/3 X 1  ondansetron, 4 mg, Intravenous, Q6H PRN    SCD  PT/OT  OOB  CONS [employer] DIET        Network Utilization Review Department  ATTENTION: Please call with any questions or concerns to [phone] and carefully listen to the prompts so that you are directed to the right person. All voicemails are confidential.  Jeevan Brock all requests for admission clinical reviews, approved or denied determinations and any other requests to dedicated fax number below belonging to the campus where the patient is receiving treatment.  List of dedicated fax numbers for the Facilities:  [place] DENIALS (Administrative/Medical Necessity) [phone] 2303 [facility] (Maternity/NICU/Pediatrics) [phone]   [address] [place] [address] Drive [phone]   [facility] [address] [facility] [phone] 1505 [facility] 207 [facility] [address] [place] [address] Street [id number] [place] [address] [phone]   [address] [facility] [address] 1300 [facility] [address] [place] [address] [phone]

## 2023-09-04 NOTE — PLAN OF CARE
Problem: PAIN - ADULT  Goal: Verbalizes/displays adequate comfort level or baseline comfort level  Description: Interventions:  - Encourage patient to monitor pain and request assistance  - Assess pain using appropriate pain scale  - Administer analgesics based on type and severity of pain and evaluate response  - Implement non-pharmacological measures as appropriate and evaluate response  - Consider cultural and social influences on pain and pain management  - Notify physician/advanced practitioner if interventions unsuccessful or patient reports new pain  Outcome: Progressing     Problem: INFECTION - ADULT  Goal: Absence or prevention of progression during hospitalization  Description: INTERVENTIONS:  - Assess and monitor for signs and symptoms of infection  - Monitor lab/diagnostic results  - Monitor all insertion sites, i.e. indwelling lines, tubes, and drains  - Monitor endotracheal if appropriate and nasal secretions for changes in amount and color  - [place] appropriate cooling/warming therapies per order  - Administer medications as ordered  - Instruct and encourage patient and family to use good hand hygiene technique  - Identify and instruct in appropriate isolation precautions for identified infection/condition  Outcome: Progressing  Goal: Absence of fever/infection during neutropenic period  Description: INTERVENTIONS:  - Monitor WBC    Outcome: Progressing     Problem: SAFETY ADULT  Goal: Patient will remain free of falls  Description: INTERVENTIONS:  - Educate patient/family on patient safety including physical limitations  - Instruct patient to call for assistance with activity   - Consult OT/PT to assist with strengthening/mobility   - Keep Call bell within reach  - Keep bed low and locked with side rails adjusted as appropriate  - Keep care items and personal belongings within reach  - Initiate and maintain comfort rounds  - Make Fall Risk Sign visible to staff  - Offer Toileting every 2 Hours, in advance of need  - Initiate/Maintain bed alarm  - Obtain necessary fall risk management equipment  - Apply yellow socks and bracelet for high fall risk patients  - Consider moving patient to room near nurses station  Outcome: Progressing  Goal: Maintain or return to baseline ADL function  Description: INTERVENTIONS:  -  Assess patient's ability to carry out ADLs; assess patient's baseline for ADL function and identify physical deficits which impact ability to perform ADLs (bathing, care of mouth/teeth, toileting, grooming, dressing, etc.)  - Assess/evaluate cause of self-care deficits   - Assess range of motion  - Assess patient's mobility; develop plan if impaired  - Assess patient's need for assistive devices and provide as appropriate  - Encourage maximum independence but intervene and supervise when necessary  - Involve family in performance of ADLs  - Assess for home care needs following discharge   - Consider OT consult to assist with ADL evaluation and planning for discharge  - Provide patient education as appropriate  Outcome: Progressing  Goal: Maintains/Returns to pre admission functional level  Description: INTERVENTIONS:  - Perform BMAT or MOVE assessment daily.   - Set and communicate daily mobility goal to care team and patient/family/caregiver. - Collaborate with rehabilitation services on mobility goals if consulted  - Perform Range of Motion 3 times a day. - Reposition patient every 2 hours.   - Dangle patient 3 times a day  - Stand patient 3 times a day  - Ambulate patient 3 times a day  - Out of bed to chair 3 times a day   - Out of bed for meals 3 times a day  - Out of bed for toileting  - Record patient progress and toleration of activity level   Outcome: Progressing     Problem: DISCHARGE PLANNING  Goal: Discharge to home or other facility with appropriate resources  Description: INTERVENTIONS:  - Identify barriers to discharge w/patient and caregiver  - Arrange for needed discharge resources and transportation as appropriate  - Identify discharge learning needs (meds, wound care, etc.)  - Arrange for interpretive services to assist at discharge as needed  - Refer to Case Management Department for coordinating discharge planning if the patient needs post-hospital services based on physician/advanced practitioner order or complex needs related to functional status, cognitive ability, or social support system  Outcome: Progressing     Problem: Knowledge Deficit  Goal: Patient/family/caregiver demonstrates understanding of disease process, treatment plan, medications, and discharge instructions  Description: Complete learning assessment and assess knowledge base.   Interventions:  - Provide teaching at level of understanding  - Provide teaching via preferred learning methods  Outcome: Progressing     Problem: CARDIOVASCULAR - ADULT  Goal: Maintains optimal cardiac output and hemodynamic stability  Description: INTERVENTIONS:  - Monitor I/O, vital signs and rhythm  - Monitor for S/S and trends of decreased cardiac output  - Administer and titrate ordered vasoactive medications to optimize hemodynamic stability  - Assess quality of pulses, skin color and temperature  - Assess for signs of decreased coronary artery perfusion  - Instruct patient to report change in severity of symptoms  Outcome: Progressing  Goal: Absence of cardiac dysrhythmias or at baseline rhythm  Description: INTERVENTIONS:  - Continuous cardiac monitoring, vital signs, obtain 12 lead EKG if ordered  - Administer antiarrhythmic and heart rate control medications as ordered  - Monitor electrolytes and administer replacement therapy as ordered  Outcome: Progressing     Problem: SKIN/TISSUE INTEGRITY - ADULT  Goal: Skin Integrity remains intact(Skin Breakdown Prevention)  Description: Assess:  -Perform Farooq assessment   -Clean and moisturize skin  -Inspect skin when repositioning, toileting, and assisting with ADLS  -Assess under medical devices  -Assess extremities for adequate circulation and sensation     Bed Management:  -Have minimal linens on bed & keep smooth, unwrinkled  -Change linens as needed when moist or perspiring  -Avoid sitting or lying in one position for more than 2 hours while in bed  -Keep HOB at 30 degrees     Toileting:  -Offer bedside commode  -Assess for incontinence   -Use incontinent care products after each incontinent episode     Activity:  -Mobilize patient 3 times a day  -Encourage activity and walks on unit  -Encourage or provide ROM exercises   -Turn and reposition patient every 2 Hours  -Use appropriate equipment to lift or move patient in bed  -Instruct/ Assist with weight shifting when out of bed in chair  -Consider limitation of chair time 2 hour intervals    Skin Care:  -Avoid use of baby powder, tape, friction and shearing, hot water or constrictive clothing  -Relieve pressure over bony prominences  -Do not massage red bony areas    Next Steps:  -Teach patient strategies to minimize risks   -Consider consults to  interdisciplinary teams  Outcome: Progressing  Goal: Incision(s), wounds(s) or drain site(s) healing without S/S of infection  Description: INTERVENTIONS  - Assess and document dressing, incision, wound bed, drain sites and surrounding tissue  - Provide patient and family education  - Perform skin care/dressing changes  Outcome: Progressing  Goal: Pressure injury heals and does not worsen  Description: Interventions:  - Implement low air loss mattress or specialty surface (Criteria met)  - Apply silicone foam dressing  - Instruct/assist with weight shifting minutes when in chair   - Limit chair time to 2 hour intervals  - Use special pressure reducing interventions  when in chair   - Apply fecal or urinary incontinence containment device   - Perform passive or active ROM  - Turn and reposition patient & offload bony prominences every 2 hours   - Utilize friction reducing device or surface for transfers   - Consider consults to  interdisciplinary teams  - Use incontinent care products after each incontinent episode  - Consider nutrition services referral as needed  Outcome: Progressing     Problem: MUSCULOSKELETAL - ADULT  Goal: Maintain or return mobility to safest level of function  Description: INTERVENTIONS:  - Assess patient's ability to carry out ADLs; assess patient's baseline for ADL function and identify physical deficits which impact ability to perform ADLs (bathing, care of mouth/teeth, toileting, grooming, dressing, etc.)  - Assess/evaluate cause of self-care deficits   - Assess range of motion  - Assess patient's mobility  - Assess patient's need for assistive devices and provide as appropriate  - Encourage maximum independence but intervene and supervise when necessary  - Involve family in performance of ADLs  - Assess for home care needs following discharge   - Consider OT consult to assist with ADL evaluation and planning for discharge  - Provide patient education as appropriate  Outcome: Progressing  Goal: Maintain proper alignment of affected body part  Description: INTERVENTIONS:  - Support, maintain and protect limb and body alignment  - Provide patient/ family with appropriate education  Outcome: Progressing     Problem: Prexisting or High Potential for Compromised Skin Integrity  Goal: Skin integrity is maintained or improved  Description: INTERVENTIONS:  - Identify patients at risk for skin breakdown  - Assess and monitor skin integrity  - Assess and monitor nutrition and hydration status  - Monitor labs   - Assess for incontinence   - Turn and reposition patient  - Assist with mobility/ambulation  - Relieve pressure over bony prominences  - Avoid friction and shearing  - Provide appropriate hygiene as needed including keeping skin clean and dry  - Evaluate need for skin moisturizer/barrier cream  - Collaborate with interdisciplinary team   - Patient/family teaching  - Consider wound care consult   Outcome: Progressing     Problem: MOBILITY - ADULT  Goal: Maintain or return to baseline ADL function  Description: INTERVENTIONS:  -  Assess patient's ability to carry out ADLs; assess patient's baseline for ADL function and identify physical deficits which impact ability to perform ADLs (bathing, care of mouth/teeth, toileting, grooming, dressing, etc.)  - Assess/evaluate cause of self-care deficits   - Assess range of motion  - Assess patient's mobility; develop plan if impaired  - Assess patient's need for assistive devices and provide as appropriate  - Encourage maximum independence but intervene and supervise when necessary  - Involve family in performance of ADLs  - Assess for home care needs following discharge   - Consider OT consult to assist with ADL evaluation and planning for discharge  - Provide patient education as appropriate  Outcome: Progressing  Goal: Maintains/Returns to pre admission functional level  Description: INTERVENTIONS:  - Perform BMAT or MOVE assessment daily.   - Set and communicate daily mobility goal to care team and patient/family/caregiver. - Collaborate with rehabilitation services on mobility goals if consulted  - Perform Range of Motion 3 times a day. - Reposition patient every 2 hours.   - Dangle patient 3 times a day  - Stand patient 3 times a day  - Ambulate patient 3 times a day  - Out of bed to chair 3 times a day   - Out of bed for meals 3 times a day  - Out of bed for toileting  - Record patient progress and toleration of activity level   Outcome: Progressing     Problem: METABOLIC, FLUID AND ELECTROLYTES - ADULT  Goal: Electrolytes maintained within normal limits  Description: INTERVENTIONS:  - Monitor labs and assess patient for signs and symptoms of electrolyte imbalances  - Administer electrolyte replacement as ordered  - Monitor response to electrolyte replacements, including repeat lab results as appropriate  - Instruct patient on fluid and nutrition as appropriate  Outcome: Progressing  Goal: Glucose maintained within target range  Description: INTERVENTIONS:  - Monitor Blood Glucose as ordered  - Assess for signs and symptoms of hyperglycemia and hypoglycemia  - Administer ordered medications to maintain glucose within target range  - Assess nutritional intake and initiate nutrition service referral as needed  Outcome: Progressing

## 2023-09-04 NOTE — ASSESSMENT & PLAN NOTE
Lab Results   Component Value Date    HGBA1C 7.4 (A) 07/06/2023       Recent Labs     09/03/23  2135 09/04/23  0838 09/04/23  1229   POCGLU 172* 159* 119       Blood Sugar Average: Last 72 hrs:  (P) 150write for insulin sliding scale  Continue lispro with meals but reduce from 8 units to 5 units tid  Continue lantus but reduce to 20 units from 28 units  BG controlled, continue same

## 2023-09-04 NOTE — PLAN OF CARE
Problem: PAIN - ADULT  Goal: Verbalizes/displays adequate comfort level or baseline comfort level  Description: Interventions:  - Encourage patient to monitor pain and request assistance  - Assess pain using appropriate pain scale  - Administer analgesics based on type and severity of pain and evaluate response  - Implement non-pharmacological measures as appropriate and evaluate response  - Consider cultural and social influences on pain and pain management  - Notify physician/advanced practitioner if interventions unsuccessful or patient reports new pain  Outcome: Progressing     Problem: INFECTION - ADULT  Goal: Absence or prevention of progression during hospitalization  Description: INTERVENTIONS:  - Assess and monitor for signs and symptoms of infection  - Monitor lab/diagnostic results  - Monitor all insertion sites, i.e. indwelling lines, tubes, and drains  - Monitor endotracheal if appropriate and nasal secretions for changes in amount and color  - Pike appropriate cooling/warming therapies per order  - Administer medications as ordered  - Instruct and encourage patient and family to use good hand hygiene technique  - Identify and instruct in appropriate isolation precautions for identified infection/condition  Outcome: Progressing  Goal: Absence of fever/infection during neutropenic period  Description: INTERVENTIONS:  - Monitor WBC    Outcome: Progressing     Problem: SAFETY ADULT  Goal: Patient will remain free of falls  Description: INTERVENTIONS:  - Educate patient/family on patient safety including physical limitations  - Instruct patient to call for assistance with activity   - Consult OT/PT to assist with strengthening/mobility   - Keep Call bell within reach  - Keep bed low and locked with side rails adjusted as appropriate  - Keep care items and personal belongings within reach  - Initiate and maintain comfort rounds  - Make Fall Risk Sign visible to staff  - Offer Toileting every  Hours, in advance of need  - Initiate/Maintain alarm  - Obtain necessary fall risk management equipment:   - Apply yellow socks and bracelet for high fall risk patients  - Consider moving patient to room near nurses station  Outcome: Progressing  Goal: Maintain or return to baseline ADL function  Description: INTERVENTIONS:  -  Assess patient's ability to carry out ADLs; assess patient's baseline for ADL function and identify physical deficits which impact ability to perform ADLs (bathing, care of mouth/teeth, toileting, grooming, dressing, etc.)  - Assess/evaluate cause of self-care deficits   - Assess range of motion  - Assess patient's mobility; develop plan if impaired  - Assess patient's need for assistive devices and provide as appropriate  - Encourage maximum independence but intervene and supervise when necessary  - Involve family in performance of ADLs  - Assess for home care needs following discharge   - Consider OT consult to assist with ADL evaluation and planning for discharge  - Provide patient education as appropriate  Outcome: Progressing  Goal: Maintains/Returns to pre admission functional level  Description: INTERVENTIONS:  - Perform BMAT or MOVE assessment daily.   - Set and communicate daily mobility goal to care team and patient/family/caregiver. - Collaborate with rehabilitation services on mobility goals if consulted  - Perform Range of Motion  times a day. - Reposition patient every  hours.   - Dangle patient  times a day  - Stand patient  times a day  - Ambulate patient  times a day  - Out of bed to chair  times a day   - Out of bed for meals  times a day  - Out of bed for toileting  - Record patient progress and toleration of activity level   Outcome: Progressing     Problem: DISCHARGE PLANNING  Goal: Discharge to home or other facility with appropriate resources  Description: INTERVENTIONS:  - Identify barriers to discharge w/patient and caregiver  - Arrange for needed discharge resources and transportation as appropriate  - Identify discharge learning needs (meds, wound care, etc.)  - Arrange for interpretive services to assist at discharge as needed  - Refer to Case Management Department for coordinating discharge planning if the patient needs post-hospital services based on physician/advanced practitioner order or complex needs related to functional status, cognitive ability, or social support system  Outcome: Progressing     Problem: Knowledge Deficit  Goal: Patient/family/caregiver demonstrates understanding of disease process, treatment plan, medications, and discharge instructions  Description: Complete learning assessment and assess knowledge base.   Interventions:  - Provide teaching at level of understanding  - Provide teaching via preferred learning methods  Outcome: Progressing     Problem: SKIN/TISSUE INTEGRITY - ADULT  Goal: Skin Integrity remains intact(Skin Breakdown Prevention)  Description: Assess:  -Perform Farooq assessment every   -Clean and moisturize skin every   -Inspect skin when repositioning, toileting, and assisting with ADLS  -Assess under medical devices such as  every   -Assess extremities for adequate circulation and sensation     Bed Management:  -Have minimal linens on bed & keep smooth, unwrinkled  -Change linens as needed when moist or perspiring  -Avoid sitting or lying in one position for more than hours while in bed  -Keep HOB at degrees     Toileting:  -Offer bedside commode  -Assess for incontinence every   -Use incontinent care products after each incontinent episode such as     Activity:  -Mobilize patient  times a day  -Encourage activity and walks on unit  -Encourage or provide ROM exercises   -Turn and reposition patient every  Hours  -Use appropriate equipment to lift or move patient in bed  -Instruct/ Assist with weight shifting every  when out of bed in chair  -Consider limitation of chair time  hour intervals    Skin Care:  -Avoid use of baby powder, tape, friction and shearing, hot water or constrictive clothing  -Relieve pressure over bony prominences using   -Do not massage red bony areas    Next Steps:  -Teach patient strategies to minimize risks such as    -Consider consults to  interdisciplinary teams such as   Outcome: Progressing  Goal: Incision(s), wounds(s) or drain site(s) healing without S/S of infection  Description: INTERVENTIONS  - Assess and document dressing, incision, wound bed, drain sites and surrounding tissue  - Provide patient and family education  - Perform skin care/dressing changes every   Outcome: Progressing  Goal: Pressure injury heals and does not worsen  Description: Interventions:  - Implement low air loss mattress or specialty surface (Criteria met)  - Apply silicone foam dressing  - Instruct/assist with weight shifting every  minutes when in chair   - Limit chair time to  hour intervals  - Use special pressure reducing interventions such as  when in chair   - Apply fecal or urinary incontinence containment device   - Perform passive or active ROM every   - Turn and reposition patient & offload bony prominences every  hours   - Utilize friction reducing device or surface for transfers   - Consider consults to  interdisciplinary teams such as   - Use incontinent care products after each incontinent episode such as  - Consider nutrition services referral as needed  Outcome: Progressing     Problem: CARDIOVASCULAR - ADULT  Goal: Maintains optimal cardiac output and hemodynamic stability  Description: INTERVENTIONS:  - Monitor I/O, vital signs and rhythm  - Monitor for S/S and trends of decreased cardiac output  - Administer and titrate ordered vasoactive medications to optimize hemodynamic stability  - Assess quality of pulses, skin color and temperature  - Assess for signs of decreased coronary artery perfusion  - Instruct patient to report change in severity of symptoms  Outcome: Progressing  Goal: Absence of cardiac dysrhythmias or at baseline rhythm  Description: INTERVENTIONS:  - Continuous cardiac monitoring, vital signs, obtain 12 lead EKG if ordered  - Administer antiarrhythmic and heart rate control medications as ordered  - Monitor electrolytes and administer replacement therapy as ordered  Outcome: Progressing

## 2023-09-04 NOTE — ASSESSMENT & PLAN NOTE
BP controlled  Hold hctz  Continue lopressor norvasc and lisinopril Patient seen today via telehealth by agreement and consent of patient in light of current COVID-19 pandemic. I used video conferencing during the visit. The patient encounter is appropriate and reasonable under the circumstances given the patient's particular presentation at this time. The patient has been advised of the followin) the potential risks and limitations of this mode of treatment (including but not limited to the absence of in-person examination); 2) the right to refuse telehealth services at any point without affecting the right to future care; 3) the right to receive in-person services, included immediately after this consultation if an urgent need arises; 4) information, including identifiable images or information from this telehealth consult, will only be shared in accordance with HIPPA regulations. Any and all of the patient's and/or patient's family member's questions on this issue have been answered. The patient has verbally consented to be treated via telehealth services. The patient has also been advised to contact this office for worsening conditions or problems, and seek emergency medical treatment and/or call 911 if the patient deems either necessary.

## 2023-09-04 NOTE — PHYSICAL THERAPY NOTE
Physical Therapy Evaluation:    2 forms of pt ID verified:name,birthdate and pt ID criscelet    Patient's Name: Rahel Giraldo. Admitting Diagnosis  Relational problem due to medical condition [Z63.8]  Lip laceration, initial encounter [S01.511A]  Closed head injury, initial encounter [S09.90XA]  Contusion of left knee, initial encounter [S80.02XA]  Ambulatory dysfunction [R26.2]  Bedbug bite with infection [T14. 8XXA, L08.9]    Problem List  Patient Active Problem List   Diagnosis    Prostate cancer ([address])    Hypertension    Diabetes mellitus ([address])    Chronic thoracic aortic dissection (HCC)    S/P ascending aortic aneurysm repair    Hypokalemia    Paroxysmal atrial fibrillation (HCC)    KARIN (obstructive sleep apnea)    Class 2 severe obesity due to excess calories with serious comorbidity and body mass index (BMI) of 37.0 to 37.9 in adult [facility])    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HCC)    Hyperlipidemia    Adenomatous polyp of descending colon    S/P vascular bypass    Descending aortic aneurysm ([address])    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 (TEVAR)    Encounter for postoperative care    Edema    Type 2 diabetes mellitus with hyperglycemia, unspecified whether long term insulin use ([address])    Ambulatory dysfunction    Infestation by bed bug    Hyponatremia       Past Medical History  Past Medical History:   Diagnosis Date    Arthritis     CPAP (continuous positive airway pressure) dependence     Diabetes ([address])     Edema     Hyperlipidemia     Hypertension     Prostate cancer ([address])     prostate    Sleep apnea     Stroke (cerebrum) ([address])     Wears glasses        Past Surgical History  Past Surgical History:   Procedure Laterality Date    CARDIAC SURGERY      CIRCUMCISION      COLONOSCOPY      IR CEREBRAL ANGIOGRAPHY / INTERVENTION  8/13/2018    IR TEVAR  9/19/2018    TN AS-AORT GRF W/CARD BYP & AORTIC ROOT RPLCMT N/A 9/6/2017    Procedure: ASCENDING AORTIC REPAIR WITH A 26MM  GELWEAVE STRAIGHT GRAFT AND HEMIARCH WITH 8MM SIDE ARM BRANCH;  Surgeon: Ramona Mendoza MD;  [facility];  Service: Cardiac Surgery    LA BYPASS W/VEIN CAROTID-BRACHIAL Left 8/13/2018    Procedure: BYPASS CAROTID SUBCLAVIAN WITH EMBOLIZATION OF PROXIMAL SUBCLAVIAN ARTERY. ;  Surgeon: Hailey Campos MD;  [facility];  Service: Vascular    LA EVASC RPR DTA COVERAGE ART ORIGIN 1ST ENDOPROSTH N/A 9/19/2018    Procedure: REPAIR ANEURSYM THORACIC ENDOVASCUALR DESCENDING AORTIC ANEURYSM (TEVAR); Surgeon: Stone Rizzo MD;  [facility];  Service: Cardiac Surgery    LA EXPL PO HEMRRG THROMBOSIS/INFCTJ [facility] N/A 9/6/2017    Procedure: RE-EXPLORATION MEDIASTERNAL CAVITY FOR POST-OP BLEED (BRING BACK); Surgeon: Ramona Mendoza MD;  [facility];  Service: Cardiac Surgery          09/04/23 1030   PT Last Visit   PT Visit Date 09/04/23   Note Type   Note type Evaluation   Pain Assessment   Pain Assessment Tool 0-10   Pain Score 4   Pain Location/Orientation Orientation: Left; Location: Leg;Location: Face   Effect of Pain on Daily Activities mobility and LLE    Hospital Pain Intervention(s) Repositioned; Ambulation/increased activity; Elevated; Emotional support; Rest   Restrictions/Precautions   Other Precautions Impulsive; Chair Alarm; Bed Alarm;Multiple lines;Telemetry; Fall Risk;Pain;Hard of hearing   Home Living   Type of [address] [place] [address] Two level;Stairs to enter without rails;Bed/bath upstairs  (1 NADIA in the back)   Home Equipment   (no DME PTA)   Additional Comments pt lives with brother in [place] [facility], 1 NADIA,no use of DME PTA,reports x2 recent falls,reports being (I) with ADLs, IADLs and functional mobility PTA   Prior Function   Level of [place] Independent with ADLs; Independent with functional mobility; Independent with IADLS   Lives With Family  (brother)   Rebecca Maciel Help From Family   IADLs Independent with driving; Independent with meal prep; Independent with medication management  (per pt)   Falls in the last 6 months 1 to 4  (pt reports x2 recent falls)   Comments ecchymosis and edema observed L facial area and L knee   General   Additional Pertinent History s/p fall, facial laceration, ambulation dysfunction   Family/Caregiver Present No   Cognition   Overall Cognitive Status Impaired   Arousal/Participation Cooperative   Orientation Level Oriented to person;Oriented to place;Oriented to time;Oriented to situation   Following Commands Follows one step commands without difficulty   Subjective   Subjective Pt supine in bed resting comfortably; pt willing and agreeable to work with PT and to participate in therapy intervention; "I can walk, let me show you". RLE Assessment   RLE Assessment   (at least 4+/5 grossly throughout)   LLE Assessment   LLE Assessment   (at least 4/5 grossly throughout)   Coordination   Movements are Fluid and Coordinated 0   Coordination and Movement Description ataxic and unsteady at times, prefers to wear B Croc style shoes that are a fall risk, h/o falls   Sensation [facility]   Light Touch   RLE Light Touch Grossly intact   LLE Light Touch Grossly intact   Bed Mobility   Supine to Sit 5  Supervision   Additional items Assist x 1;Verbal cues   Sit to Supine 5  Supervision   Additional items Assist x 1;Verbal cues   Transfers   Sit to Stand 5  Supervision   Additional items Assist x 1;Bedrails   Stand to Sit 5  Supervision   Additional items Assist x 1;Bedrails   Ambulation/Elevation   Gait pattern Antalgic;Narrow CARLI; Forward Flexion; Shuffling; Foward flexed; Short stride; Ataxia   Gait Assistance 5  Supervision   Additional items Assist x 1;Verbal cues   Assistive Device None  (recommend trial of SPC during future gait training assessments)   Distance 80 feet without use of DME on tile and hardwood juanito; no LOB noted and/or observed during gait trial.   Balance   Static Sitting Fair +   Dynamic Sitting Fair   Static Standing Fair   Dynamic Standing Fair   Ambulatory Fair   Endurance Deficit   Endurance Deficit Yes   Endurance Deficit Description pain   Activity Tolerance   Activity Tolerance Patient limited by fatigue;Patient limited by pain  (fair)   Nurse Made Aware yes   Assessment   Prognosis Fair   Problem List Decreased strength;Decreased endurance; Impaired balance;Decreased mobility; Impaired judgement;Decreased safety awareness; Obesity; Decreased skin integrity;Pain   Assessment Pt is a 70 yo male [facility]* s/p fall with facial laceration, ambulation dysfunction. Pt lives with brother in 2 , 1 NADIA, no use of DME PTA, reports x2 recent falls,(I) with mobility, ADLs and IADLs. Pt currently is not at functional mobility baseline, needs A for mobility, ataxic and unsteady gait at times, multiple lines, fall risk. Pt demonstrates limited mobility and gait 2* dec BLE strength, dec endurance, dec balance, unsafe footwear, fall risk, ataxic and unsteady gait and movement pattern,inc pain and needs S for bed mobility, GT and TT.  Pt would cont to benefit from skilled inpt PT services to maximize functional independence and to dec caregiver burden upon being DC from the hospital.   Barriers to Discharge Inaccessible home environment  (2  and 1 [facility])   Goals   Patient Goals to get home   STG Expiration Date 09/14/23   Short Term Goal #1 in 7-10 days:  (1) Pt will be able to ambulate greater than 150 feet with use of appropriate and safe DME on various surfaces needing S to mod (I) level of A without LOB in order to A pt to return to PLOF, (2) activity tolerance:45 mins/45mins, (3) pt will be able to perform sit to stand transfers needing mod (I) level of A to and from various surfaces consistently in order to return to PLOF, (4) pt will be able to perform BM needing mod (I) level of A to A pt to return to PLOF, (5) (I) with BLE therapeutic ex HEP in various positions to A pt to inc balance,strength,mobility,endurance and to A to dec pain, (6) inc balance 1/2 grade in order to dec fall risk, (7) pt will be able to go up and down 1 FF and single curb step needing S level of A in order to navigate NADIA and 2 SH as able and as needed prior to D/C, (8) cont to provide pt and pt family education for safe D/C planning, (9) inc BLE strength 1/2 to 1 full grade in order to A pt to inc balance,strength,mobility,endurance and to A to dec pain   PT Treatment Day 0   Plan   Treatment/Interventions Functional transfer training;LE strengthening/ROM; Elevations; Therapeutic exercise; Endurance training;Patient/family training;Equipment eval/education; Bed mobility;Gait training;Spoke to nursing   PT Frequency 2-3x/wk   Recommendation   PT Discharge Recommendation Home with home health rehabilitation   Equipment Recommended Other (Comment)  (RW vs SPC TBD)   AM-PAC Basic Mobility Inpatient   Turning in Flat Bed Without Bedrails 4   Lying on Back to Sitting on Edge of Flat Bed Without Bedrails 4   Moving Bed to Chair 4   Standing Up From Chair Using Arms 4   Walk in Room 3   Climb 3-5 Stairs With Railing 3   Basic Mobility Inpatient Raw Score 22   Basic Mobility Standardized Score 47.4   Highest Level Of Mobility   JH-HLM Goal 7: Walk 25 feet or more   JH-HLM Achieved 7: Walk 25 feet or more         @Julita Lama, PT, DPT@

## 2023-09-04 NOTE — ASSESSMENT & PLAN NOTE
He was wearing krocks when he tripped and fell on the way to the store. His face hit the ground and he ended up with a lip laceration that was sutured in the ed  He denies loc, no dizziness prior to the fall  CT imaging neg for acute process, noted left facial periorbital edema  Did require stitching, noted to be absorbing.  On amoxicillin for prophylaxis  PT recommend home with home health with rehab

## 2023-09-04 NOTE — QUICK NOTE
Overnight: 09/03/23:    Mary WALTON, updated me regarding facial laceration repair. Used dissolvable suture for repair, if they do not dissolve can be removed in a week. Will start prophylactic amoxicillin 500mg q8h for 5 days.

## 2023-09-04 NOTE — ASSESSMENT & PLAN NOTE
Sodium 132  Has chronic lower ext edema  Will hold hctz.  Check bmp in am.   Obtain urine sodium and osmolality

## 2023-09-04 NOTE — ED NOTES
Pts daughter in law called looking for pt, transferred to [address] [place] [address] , RN  09/04/23 1024

## 2023-09-04 NOTE — PROGRESS NOTES
[facility]  Progress Note  Name: Cherise Mccullough I  MRN: [id number]  Unit/Bed#: [place] 2 -01 I Date of Admission: 9/3/2023   Date of Service: 9/4/2023 I Hospital Day: 0    Assessment/Plan   Hyponatremia  Assessment & Plan  Resolved  Hold hctz    Infestation by bed bug  Assessment & Plan  Decontaminated    KARIN (obstructive sleep apnea)  Assessment & Plan  Continue cpap qhs    Paroxysmal atrial fibrillation (HCC)  Assessment & Plan  Continue metoprolol  Not on AC prior to arrival, likely due to fall risk    Diabetes mellitus [facility])  Assessment & Plan  Lab Results   Component Value Date    HGBA1C 7.4 (A) 07/06/2023       Recent Labs     09/03/23  2135 09/04/23  0838 09/04/23  1229   POCGLU 172* 159* 119       Blood Sugar Average: Last 72 hrs:  (P) 150write for insulin sliding scale  Continue lispro with meals but reduce from 8 units to 5 units tid  Continue lantus but reduce to 20 units from 28 units  BG controlled, continue same    Hypertension  Assessment & Plan  BP controlled  Hold hctz  Continue lopressor norvasc and lisinopril     * Ambulatory dysfunction  Assessment & Plan  He was wearing krocks when he tripped and fell on the way to the store. His face hit the ground and he ended up with a lip laceration that was sutured in the ed  He denies loc, no dizziness prior to the fall  CT imaging neg for acute process, noted left facial periorbital edema  PT recommend home with home health with rehab           VTE Pharmacologic Prophylaxis:   Pharmacologic: none  Mechanical VTE Prophylaxis in Place: Yes    Patient Centered Rounds: I have performed bedside rounds with nursing staff today.     Current Length of Stay: 0 day(s)    Current Patient Status: Inpatient   Certification Statement: The patient will continue to require additional inpatient hospital stay due to PT evaluation recommend home health    Discharge Plan: home tomorrow    Code Status: Level 1 - Full Code      Subjective: No events overnight. Denies any pain, dizziness, sob or nausea/vomiting. Objective:     Vitals:   Temp (24hrs), Av.3 °F (36.8 °C), Min:98.1 °F (36.7 °C), Max:98.5 °F (36.9 °C)    Temp:  [98.1 °F (36.7 °C)-98.5 °F (36.9 °C)] 98.5 °F (36.9 °C)  HR:  [76-93] 87  Resp:  [18-24] 18  BP: (118-137)/(66-80) 125/67  SpO2:  [94 %-99 %] 99 %  There is no height or weight on file to calculate BMI. Input and Output Summary (last 24 hours):     No intake or output data in the 24 hours ending 23 1431    Physical Exam:     Physical Exam  Vitals and nursing note reviewed. HENT:      Head: Normocephalic. Eyes:      Conjunctiva/sclera: Conjunctivae normal.   Cardiovascular:      Rate and Rhythm: Normal rate. Pulmonary:      Effort: Pulmonary effort is normal.      Breath sounds: No wheezing. Abdominal:      General: Bowel sounds are normal.      Palpations: Abdomen is soft. Musculoskeletal:         General: Swelling present. Right lower leg: Edema present. Left lower leg: Edema present. Skin:     General: Skin is warm. Neurological:      General: No focal deficit present. Mental Status: He is alert. Mental status is at baseline.        Additional Data:     Labs:    Results from last 7 days   Lab Units 23  0519 23  1754   WBC Thousand/uL 8.56 8.85   HEMOGLOBIN g/dL 11.9* 12.3   HEMATOCRIT % 35.0* 37.0   PLATELETS Thousands/uL 213 205   NEUTROS PCT %  --  78*   LYMPHS PCT %  --  13*   MONOS PCT %  --  6   EOS PCT %  --  2     Results from last 7 days   Lab Units 23  0519 23  1754   SODIUM mmol/L 136 132*   POTASSIUM mmol/L 3.2* 3.1*   CHLORIDE mmol/L 103 100   CO2 mmol/L 27 22   BUN mg/dL 8 10   CREATININE mg/dL 0.60 0.73   ANION GAP mmol/L 6 10   CALCIUM mg/dL 8.5 8.8   ALBUMIN g/dL  --  3.8   TOTAL BILIRUBIN mg/dL  --  0.57   ALK PHOS U/L  --  82   ALT U/L  --  18   AST U/L  --  23   GLUCOSE RANDOM mg/dL 134 163*         Results from last 7 days   Lab Units 09/04/23  1229 09/04/23  0838 09/03/23  2135   POC GLUCOSE mg/dl 119 159* 172*                   * I Have Reviewed All Lab Data Listed Above. * Additional Pertinent Lab Tests Reviewed: [address] Admission Reviewed    Imaging:    CT facial bones without contrast    Result Date: 9/3/2023  Impression: Left facial soft tissue swelling/premaxillary infraorbital swelling noted from recent trauma. Workstation performed: [id number]     CT cervical spine without contrast    Result Date: 9/3/2023  Impression: No cervical spine fracture or traumatic malalignment. Degenerative changes are noted throughout the cervical spine. Workstation performed: [id number]     CT head without contrast    Result Date: 9/3/2023  Impression: No acute intracranial abnormality. Chronic microangiopathic changes.  Workstation performed: [id number]       Recent Cultures (last 7 days):           Last 24 Hours Medication List:   Current Facility-Administered Medications   Medication Dose Route Frequency Provider Last Rate   • acetaminophen  650 mg Oral Q6H PRN Christy Beck, DO     • amLODIPine  10 mg Oral BID Gretcehn Rooney DO     • amoxicillin  500 mg Oral [facility] Camilo Casillas PA-C     • aspirin  81 mg Oral Daily Christy Richmondron, DO     • atorvastatin  80 mg Oral Daily With Eileen-Christophe, DO     • finasteride  5 mg Oral Daily Christy Basilioron, DO     • insulin glargine  20 Units Subcutaneous HS Christy Beck, DO     • insulin lispro  1-5 Units Subcutaneous HS Mehnaz Covington MD     • insulin lispro  5 Units Subcutaneous TID With Meals Christy Beck, DO     • Lidocaine Viscous HCl  15 mL Swish & Spit 4x Daily PRN Crhisty Beck DO     • lisinopril  20 mg Oral Daily Christy Beck, DO     • metoprolol tartrate  100 mg Oral BID Christy Beck, DO     • ondansetron  4 mg Intravenous Q6H PRN Gretchen Rooney DO          Today, Patient Was Seen By: Mehnaz Covington MD    ** Please Note: Dictation voice to text software may have been used in the creation of this document.  **

## 2023-09-04 NOTE — H&P
[facility]  H&P  Name: Lesia Sheikh. 69 y.o. male I MRN: [id number]  Unit/Bed#: ED-12 I Date of Admission: 9/3/2023   Date of Service: 9/3/2023 I Hospital Day: 0      Assessment/Plan   * Ambulatory dysfunction  Assessment & Plan  He was wearing krocks when he tripped and fell on the way to the store. His face hit the ground and he ended up with a lip laceration that was sutured in the ed  He denies loc, no dizziness prior to the fall  He has been having ambulatory dysfunction prior to this also due to his knees. Will consult pt/ot for eval     Hyponatremia  Assessment & Plan  Sodium 132  Has chronic lower ext edema  Will hold hctz. Check bmp in am.   Obtain urine sodium and osmolality     Infestation by bed bug  Assessment & Plan  Currently being decontaminated     KARIN (obstructive sleep apnea)  Assessment & Plan  Continue cpap qhs    Paroxysmal atrial fibrillation (HCC)  Assessment & Plan  Continue metoprolol  Pt is not on ac as outpt. Likely due to ambulatory dysfunction     Diabetes mellitus [facility])  Assessment & Plan  Lab Results   Component Value Date    HGBA1C 7.4 (A) 07/06/2023       No results for input(s): "POCGLU" in the last 72 hours. Blood Sugar Average: Last 72 hrs:  write for insulin sliding scale  Continue lispro with meals but reduce from 8 units to 5 units tid  Continue lantus but reduce to 20 units from 28 units. Hypertension  Assessment & Plan  Stable. Will continue metoprolol  Hold hctz  Continue norvasc and lisinopril             VTE Prophylaxis: Pharmacologic VTE Prophylaxis contraindicated due to due to fall  / sequential compression device   Code Status: full code       Anticipated Length of Stay:  Patient will be admitted on an Observation basis with an anticipated length of stay of  Less than 2 midnights.        Chief Complaint:   fall    History of Present Illness:    Lesia Sheikh. is a 69 y.o. male with a pmhx of paf, htn, diabetes, karin, and repair of thoracic aneurysm had a mechanical fall while walking today. He left his house to go to the store and was wearing krocks when he tripped and fell. He landed on his face and had a laceration of his lip that was sutured in the ed. He denies loc, no dizziness, no chest pain. His daughter confirms that he has been having progressive difficulty with ambulating. She is worried that he is not safe at home. Review of Systems:    Review of Systems   Constitutional: Positive for activity change. HENT: Negative. Eyes: Negative. Respiratory: Negative. Cardiovascular: Negative. Gastrointestinal: Negative. Endocrine: Negative. Genitourinary: Negative. Musculoskeletal: Negative. Skin: Negative. Allergic/Immunologic: Negative. Neurological: Negative. Hematological: Negative. Psychiatric/Behavioral: Negative.         Past Medical and Surgical History:     Past Medical History:   Diagnosis Date   • Arthritis    • CPAP (continuous positive airway pressure) dependence    • Diabetes ([address])    • Edema    • Hyperlipidemia    • Hypertension    • Prostate cancer [facility])     prostate   • Sleep apnea    • Stroke (cerebrum) ([facility])    • Wears glasses        Past Surgical History:   Procedure Laterality Date   • CARDIAC SURGERY     • CIRCUMCISION     • COLONOSCOPY     • IR CEREBRAL ANGIOGRAPHY / INTERVENTION  8/13/2018   • IR TEVAR  9/19/2018   • IA AS-AORT GRF W/CARD BYP & AORTIC ROOT RPLCMT N/A 9/6/2017    Procedure: ASCENDING AORTIC REPAIR WITH A 26MM  GELWEAVE STRAIGHT GRAFT AND HEMIARCH WITH 8MM SIDE ARM BRANCH;  Surgeon: Ron Huff MD;  Location[facility];  Service: Cardiac Surgery   • IA BYPASS W/VEIN CAROTID-BRACHIAL Left 8/13/2018    Procedure: BYPASS CAROTID SUBCLAVIAN WITH EMBOLIZATION OF PROXIMAL SUBCLAVIAN ARTERY. ;  Surgeon: Emelia Catalan MD;  Location[facility];  Service: Vascular   • IA EVASC RPR DTA COVERAGE ART ORIGIN 1ST ENDOPROSTH N/A 9/19/2018    Procedure: REPAIR ANEURSYM THORACIC ENDOVASCUALR DESCENDING AORTIC ANEURYSM (TEVAR); Surgeon: Tonja Sosa MD;  Location: [facility];  Service: Cardiac Surgery   • MS EXPL PO HEMRRG THROMBOSIS/INFCTJ [id number] [address] N/A 9/6/2017    Procedure: RE-EXPLORATION MEDIASTERNAL CAVITY FOR POST-OP BLEED (BRING BACK); Surgeon: Bindu Reyes MD;  [facility];  Service: Cardiac Surgery       Meds/Allergies:    Prior to Admission medications    Medication Sig Start Date End Date Taking? Authorizing Provider   amLODIPine (NORVASC) 10 mg tablet TAKE 1 TABLET BY MOUTH TWICE A DAY 8/21/23   MAUREEN Ly   aspirin 81 mg chewable tablet Chew 1 tablet daily 9/14/17   Tonja Sosa MD   atorvastatin (LIPITOR) 80 mg tablet Take 1 tablet (80 mg total) by mouth daily 3/1/23   Bean Drew DO   B-D ULTRAFINE III SHORT PEN 31G X 8 MM MISC USE AS DIRECTED 10/18/21   Bean Drew DO   B-D ULTRAFINE III SHORT PEN 31G X 8 MM MISC USE 2 TIMES DAILY AS DIRECTED 3/20/23   Bean Drew DO   finasteride (PROSCAR) 5 mg tablet Take 5 mg by mouth daily 1/27/20   Historical Provider, MD   glucose blood (OneTouch Verio) test strip Use to check blood sugar 8/4/22   Bean Drew DO   HUMALOG KWIKPEN 100 units/mL injection pen INJECT 8 UNIT 3 TIMES DAILY BEFORE MEALS 1/9/20   Bean Drew DO   insulin glargine (LANTUS) 100 units/mL subcutaneous injection Inject 28 Units under the skin daily at bedtime Dx: Diabetes E11.9, dispense pens 9/14/17   Tonja Sosa MD   insulin lispro (HumaLOG) 100 units/mL injection Inject 8 Units under the skin 3 (three) times a day before meals Dx: Diabetes E11.9.  Dispense pens 9/14/17   Tonja Sosa MD   Lantus SoloStar 100 units/mL SOPN INJECT 28 UNIT BEDTIME 11/15/22   Bean Drew DO   lisinopril-hydrochlorothiazide (PRINZIDE,ZESTORETIC) 20-12.5 MG per tablet TAKE 2 TABLETS BY MOUTH EVERY DAY 7/20/22   Lei rGove DO   metoprolol tartrate (LOPRESSOR) 100 mg tablet Take 1 tablet (100 mg total) by mouth 2 (two) times a day 6/26/23   Ariadne Vidal DO     I have reviewed home medications with patient personally. Allergies: No Known Allergies    Social History:     Marital Status:      Substance Use History:   Social History     Substance and Sexual Activity   Alcohol Use Yes    Comment: social beer or two daily     Social History     Tobacco Use   Smoking Status Never   Smokeless Tobacco Never     Social History     Substance and Sexual Activity   Drug Use No       Family History:    non-contributory    Physical Exam:     Vitals:   Blood Pressure: 118/66 (09/03/23 1722)  Pulse: 76 (09/03/23 1722)  Temperature: 98.1 °F (36.7 °C) (09/03/23 1731)  Temp Source: Oral (09/03/23 1731)  Respirations: 20 (09/03/23 1732)  SpO2: 97 % (09/03/23 1722)    Physical Exam  Constitutional:       Comments: Disheveled. HENT:      Head: Normocephalic. Mouth/Throat:      Comments: Lip laceration that was sutured. Musculoskeletal:      Right lower leg: Edema present. Left lower leg: Edema present. Neurological:      Mental Status: He is alert and oriented to person, place, and time. observed through window as pt was scheduled for decontamination for bed bugs and quarantined. Additional Data:     Lab Results: I have personally reviewed pertinent reports. Results from last 7 days   Lab Units 09/03/23  1754   WBC Thousand/uL 8.85   HEMOGLOBIN g/dL 12.3   HEMATOCRIT % 37.0   PLATELETS Thousands/uL 205   NEUTROS PCT % 78*   LYMPHS PCT % 13*   MONOS PCT % 6   EOS PCT % 2     Results from last 7 days   Lab Units 09/03/23  1754   POTASSIUM mmol/L 3.1*   CHLORIDE mmol/L 100   CO2 mmol/L 22   BUN mg/dL 10   CREATININE mg/dL 0.73   CALCIUM mg/dL 8.8   ALK PHOS U/L 82   ALT U/L 18   AST U/L 23           Imaging: I have personally reviewed pertinent reports.       CT facial bones without contrast    Result Date: 9/3/2023  Narrative: CT FACIAL BONES WITHOUT INTRAVENOUS CONTRAST INDICATION:   trauma. COMPARISON: None. TECHNIQUE:  Axial CT images were obtained through the facial bones with additional sagittal and coronal reconstructions. Radiation dose length product (DLP) for this visit:  359 mGy-cm . This examination, like all CT scans performed in the [facility], was performed utilizing techniques to minimize radiation dose exposure, including the use of iterative reconstruction and automated exposure control. IMAGE QUALITY:  Diagnostic. FINDINGS: FACIAL BONES:   No facial bone fracture identified. Normal alignment of the temporomandibular joints. No lytic or blastic lesion. ORBITS:  Orbital globes, optic nerves, and extraocular muscles appear symmetric and normal. There is no evidence of retrobulbar mass, abscess, or hematoma. Left cataract surgery. SINUSES:  Normal. SOFT TISSUES: Left facial and premaxillary/infraorbital soft tissue swelling identified. No facial bone fracture is seen. Impression: Left facial soft tissue swelling/premaxillary infraorbital swelling noted from recent trauma. Workstation performed: [id number]     CT cervical spine without contrast    Result Date: 9/3/2023  Narrative: CT CERVICAL SPINE - WITHOUT CONTRAST INDICATION:   Neck trauma (Age >= 65y) trauma. COMPARISON:  None. TECHNIQUE:  CT examination of the cervical spine was performed without intravenous contrast.  Contiguous axial images were obtained. Multiplanar 2D reformatted images were created from the source data. Radiation dose length product (DLP) for this visit:  692 mGy-cm . This examination, like all CT scans performed in the [facility], was performed utilizing techniques to minimize radiation dose exposure, including the use of iterative reconstruction and automated exposure control. IMAGE QUALITY:  Diagnostic. FINDINGS: ALIGNMENT:  Normal alignment of the cervical spine. No subluxation. VERTEBRAE:  No fracture.  DEGENERATIVE CHANGES: Mild diffuse degenerative disc disease. PREVERTEBRAL AND PARASPINAL SOFT TISSUES: Unremarkable THORACIC INLET:  Normal.     Impression: No cervical spine fracture or traumatic malalignment. Degenerative changes are noted throughout the cervical spine. Workstation performed: [id number]     CT head without contrast    Result Date: 9/3/2023  Narrative: CT BRAIN - WITHOUT CONTRAST INDICATION:   Head trauma, moderate-severe trauma. COMPARISON:  None. TECHNIQUE:  CT examination of the brain was performed. Multiplanar 2D reformatted images were created from the source data. Radiation dose length product (DLP) for this visit:  901 mGy-cm . This examination, like all CT scans performed in the [facility], was performed utilizing techniques to minimize radiation dose exposure, including the use of iterative reconstruction and automated exposure control. IMAGE QUALITY:  Diagnostic. FINDINGS: PARENCHYMA: Decreased attenuation is noted in periventricular and subcortical white matter demonstrating an appearance that is statistically most likely to represent moderate microangiopathic change. Chronic lacunar infarcts are seen in bilateral centrum  semiovale No CT signs of acute infarction. No intracranial mass, mass effect or midline shift. No acute parenchymal hemorrhage. VENTRICLES AND EXTRA-AXIAL SPACES:  Normal for the patient's age. VISUALIZED ORBITS: Normal visualized orbits. PARANASAL SINUSES: Normal visualized paranasal sinuses. CALVARIUM AND EXTRACRANIAL SOFT TISSUES:  Normal.     Impression: No acute intracranial abnormality. Chronic microangiopathic changes. Workstation performed: [id number]     EKG, Pathology, and Other Studies Reviewed on Admission:   · EKG: none    Epic / Care Everywhere Records Reviewed:  Yes

## 2023-09-04 NOTE — PLAN OF CARE
Problem: PHYSICAL THERAPY ADULT  Goal: Performs mobility at highest level of function for planned discharge setting. See evaluation for individualized goals. Description: Treatment/Interventions: Functional transfer training, LE strengthening/ROM, Elevations, Therapeutic exercise, Endurance training, Patient/family training, Equipment eval/education, Bed mobility, Gait training, Spoke to nursing  Equipment Recommended: Other (Comment) (RW vs SPC TBD)       See flowsheet documentation for full assessment, interventions and recommendations. Note: Prognosis: Fair  Problem List: Decreased strength, Decreased endurance, Impaired balance, Decreased mobility, Impaired judgement, Decreased safety awareness, Obesity, Decreased skin integrity, Pain  Assessment: Pt is a 68 yo male [facility]* s/p fall with facial laceration, ambulation dysfunction. Pt lives with brother in 2 , 1 NADIA, no use of DME PTA, reports x2 recent falls,(I) with mobility, ADLs and IADLs. Pt currently is not at functional mobility baseline, needs A for mobility, ataxic and unsteady gait at times, multiple lines, fall risk. Pt demonstrates limited mobility and gait 2* dec BLE strength, dec endurance, dec balance, unsafe footwear, fall risk, ataxic and unsteady gait and movement pattern,inc pain and needs S for bed mobility, GT and TT. Pt would cont to benefit from skilled inpt PT services to maximize functional independence and to dec caregiver burden upon being DC from the hospital.  Barriers to Discharge: Inaccessible home environment (2  and 1 NADIA)     PT Discharge Recommendation: Home with home health rehabilitation    See flowsheet documentation for full assessment.

## 2023-09-04 NOTE — ASSESSMENT & PLAN NOTE
Lab Results   Component Value Date    HGBA1C 7.4 (A) 07/06/2023       No results for input(s): "POCGLU" in the last 72 hours. Blood Sugar Average: Last 72 hrs:  write for insulin sliding scale  Continue lispro with meals but reduce from 8 units to 5 units tid  Continue lantus but reduce to 20 units from 28 units.

## 2023-09-05 VITALS
RESPIRATION RATE: 16 BRPM | OXYGEN SATURATION: 99 % | TEMPERATURE: 97.8 F | HEART RATE: 77 BPM | SYSTOLIC BLOOD PRESSURE: 117 MMHG | DIASTOLIC BLOOD PRESSURE: 69 MMHG

## 2023-09-05 DIAGNOSIS — I10 HTN (HYPERTENSION), BENIGN: ICD-10-CM

## 2023-09-05 DIAGNOSIS — E78.5 DYSLIPIDEMIA: ICD-10-CM

## 2023-09-05 LAB
ANION GAP SERPL CALCULATED.3IONS-SCNC: 7 MMOL/L
ATRIAL RATE: 87 BPM
ATRIAL RATE: 94 BPM
BUN SERPL-MCNC: 10 MG/DL (ref 5–25)
CALCIUM SERPL-MCNC: 8.9 MG/DL (ref 8.4–10.2)
CHLORIDE SERPL-SCNC: 102 MMOL/L (ref 96–108)
CO2 SERPL-SCNC: 28 MMOL/L (ref 21–32)
CREAT SERPL-MCNC: 0.62 MG/DL (ref 0.6–1.3)
GFR SERPL CREATININE-BSD FRML MDRD: 95 ML/MIN/1.73SQ M
GLUCOSE SERPL-MCNC: 120 MG/DL (ref 65–140)
GLUCOSE SERPL-MCNC: 145 MG/DL (ref 65–140)
GLUCOSE SERPL-MCNC: 151 MG/DL (ref 65–140)
POTASSIUM SERPL-SCNC: 3 MMOL/L (ref 3.5–5.3)
QRS AXIS: 61 DEGREES
QRS AXIS: 67 DEGREES
QRSD INTERVAL: 86 MS
QRSD INTERVAL: 90 MS
QT INTERVAL: 374 MS
QT INTERVAL: 392 MS
QTC INTERVAL: 457 MS
QTC INTERVAL: 479 MS
SODIUM SERPL-SCNC: 137 MMOL/L (ref 135–147)
T WAVE AXIS: 16 DEGREES
T WAVE AXIS: 25 DEGREES
VENTRICULAR RATE: 82 BPM
VENTRICULAR RATE: 99 BPM

## 2023-09-05 PROCEDURE — 82948 REAGENT STRIP/BLOOD GLUCOSE: CPT

## 2023-09-05 PROCEDURE — 80048 BASIC METABOLIC PNL TOTAL CA: CPT | Performed by: STUDENT IN AN ORGANIZED HEALTH CARE EDUCATION/TRAINING PROGRAM

## 2023-09-05 PROCEDURE — 94760 N-INVAS EAR/PLS OXIMETRY 1: CPT

## 2023-09-05 PROCEDURE — 97166 OT EVAL MOD COMPLEX 45 MIN: CPT

## 2023-09-05 PROCEDURE — 93010 ELECTROCARDIOGRAM REPORT: CPT | Performed by: INTERNAL MEDICINE

## 2023-09-05 PROCEDURE — 99239 HOSP IP/OBS DSCHRG MGMT >30: CPT | Performed by: PHYSICIAN ASSISTANT

## 2023-09-05 PROCEDURE — 94660 CPAP INITIATION&MGMT: CPT

## 2023-09-05 RX ORDER — AMOXICILLIN 500 MG/1
500 CAPSULE ORAL EVERY 8 HOURS SCHEDULED
Qty: 10 CAPSULE | Refills: 0 | Status: SHIPPED | OUTPATIENT
Start: 2023-09-05 | End: 2023-09-09

## 2023-09-05 RX ORDER — LISINOPRIL AND HYDROCHLOROTHIAZIDE 20; 12.5 MG/1; MG/1
TABLET ORAL
Qty: 180 TABLET | Refills: 3 | OUTPATIENT
Start: 2023-09-05

## 2023-09-05 RX ORDER — POTASSIUM CHLORIDE 20 MEQ/1
40 TABLET, EXTENDED RELEASE ORAL ONCE
Status: COMPLETED | OUTPATIENT
Start: 2023-09-05 | End: 2023-09-05

## 2023-09-05 RX ORDER — ATORVASTATIN CALCIUM 80 MG/1
80 TABLET, FILM COATED ORAL DAILY
Qty: 90 TABLET | Refills: 1 | Status: SHIPPED | OUTPATIENT
Start: 2023-09-05

## 2023-09-05 RX ADMIN — AMOXICILLIN 500 MG: 500 CAPSULE ORAL at 13:40

## 2023-09-05 RX ADMIN — ASPIRIN 81 MG 81 MG: 81 TABLET ORAL at 08:30

## 2023-09-05 RX ADMIN — METOPROLOL TARTRATE 100 MG: 100 TABLET, FILM COATED ORAL at 08:29

## 2023-09-05 RX ADMIN — INSULIN LISPRO 5 UNITS: 100 INJECTION, SOLUTION INTRAVENOUS; SUBCUTANEOUS at 08:31

## 2023-09-05 RX ADMIN — AMLODIPINE BESYLATE 10 MG: 10 TABLET ORAL at 08:29

## 2023-09-05 RX ADMIN — POTASSIUM CHLORIDE 40 MEQ: 1500 TABLET, EXTENDED RELEASE ORAL at 08:30

## 2023-09-05 RX ADMIN — FINASTERIDE 5 MG: 5 TABLET, FILM COATED ORAL at 08:30

## 2023-09-05 RX ADMIN — INSULIN LISPRO 5 UNITS: 100 INJECTION, SOLUTION INTRAVENOUS; SUBCUTANEOUS at 12:16

## 2023-09-05 RX ADMIN — LISINOPRIL 20 MG: 20 TABLET ORAL at 08:29

## 2023-09-05 RX ADMIN — AMOXICILLIN 500 MG: 500 CAPSULE ORAL at 05:23

## 2023-09-05 NOTE — CASE MANAGEMENT
Case Management Discharge Planning Note    Patient name Cherise Mccullough Location [place] 2 [facility] 2 Thuy Rosario* MRN [id number]  : 1945 Date 2023       Current Admission Date: 9/3/2023  Current Admission Diagnosis:Ambulatory dysfunction   Patient Active Problem List    Diagnosis Date Noted   • Ambulatory dysfunction 2023   • Infestation by bed bug 2023   • Hyponatremia 2023   • Type 2 diabetes mellitus with hyperglycemia, unspecified whether long term insulin use ([address]) 10/26/2022   • Edema    • Encounter for postoperative care 10/18/2018   • 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 (TEVAR) 2018   • Descending aortic aneurysm ([address]) 2018   • S/P vascular bypass 2018   • Adenomatous polyp of descending colon 2018   • 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   • KARIN (obstructive sleep apnea) 2018   • Class 2 severe obesity due to excess calories with serious comorbidity and body mass index (BMI) of 37.0 to 37.9 in adult ([address]) 2018   • Paroxysmal atrial fibrillation ([address]) 2017   • Chronic thoracic aortic dissection ([address]) 2017   • S/P ascending aortic aneurysm repair 2017   • Hypokalemia 2017   • Prostate cancer ([address])    • Hypertension    • Diabetes mellitus ([address])    • Hyperlipidemia 2012      LOS (days): 1  Geometric Mean LOS (GMLOS) (days):   Days to GMLOS:     OBJECTIVE:  Risk of Unplanned Readmission Score: 12.95         Current admission status: Inpatient   Preferred Pharmacy:   [address] [place] [address]  Phone: [phone] Fax: [phone]    Primary Care Provider: Vincent Gary DO    Primary Insurance: [facility]  Secondary Insurance:     DISCHARGE DETAILS:         Discharge Destination Plan[de-identified] Home        Additional Comments: PACARLOS reported pt is now refusing VNA and order was canc with [facility].  Pt wants to do OP Therapy now.

## 2023-09-05 NOTE — DISCHARGE SUMMARY
[facility]  Discharge- Lesia Sheikh. 1945, 69 y.o. male MRN: [id number]  Unit/Bed#: 1575 [facility] 2 -01 Encounter: [id number]  Primary Care Provider: Viola Hayden DO   Date and time admitted to hospital: 9/3/2023  5:16 PM    * Ambulatory dysfunction  Assessment & Plan  · He was wearing crocs when he tripped and fell on the way to the store. · His face hit the ground and he ended up with a lip laceration that was sutured in the ed  · He denies loc, no dizziness prior to the fall  · CT imaging neg for acute process, noted left facial periorbital edema  · Did require stitching, noted to be absorbing.  On amoxicillin for prophylaxis  · PT/OT recommending home with home rehab, this was arranged with case management, patient's daughter now requesting outpatient therapy, home health was discontinued and ambulatory referral was placed  · Patient notes he has a walker and a cane at home    Hyponatremia  Assessment & Plan  · Resolved    Infestation by bed bug  Assessment & Plan  · Decontaminated    KARIN (obstructive sleep apnea)  Assessment & Plan  · Continue cpap qhs    Paroxysmal atrial fibrillation (HCC)  Assessment & Plan  · Continue metoprolol  · Not on AC prior to arrival, likely due to fall risk    Diabetes mellitus [facility])  Assessment & Plan  Lab Results   Component Value Date    HGBA1C 7.4 (A) 07/06/2023       Recent Labs     09/04/23  1626 09/04/23  2104 09/05/23  0707 09/05/23  1102   POCGLU 136 158* 145* 151*       Continue lispro with meals but reduce from 8 units to 5 units tid while inpatient  Continue lantus but reduce to 20 units from 28 units  BG controlled, continue same  Resume home regimen at discharge    Hypertension  Assessment & Plan  · BP controlled during hospital stay  · Continue home regimen at discharge      Medical Problems     Resolved Problems  Date Reviewed: 9/5/2023   None       Discharging Physician / Practitioner: Allison Villagran PA-C  PCP: Dayana Charlton Romana Radon, DO  Admission Date:   Admission Orders (From admission, onward)     Ordered        09/04/23 1012  Inpatient Admission  Once            09/03/23 1950  Place in Observation  Once                      Discharge Date: 09/05/23    Consultations During Hospital Stay:  · None    Procedures Performed:   XR knee 1 or 2 vw left    Result Date: 9/5/2023  Impression: There appears to be a high riding patella, although this may be exaggerated due to inadequate flexion of the knee. Moderate tricompartmental knee osteoarthritis. XR chest portable    Result Date: 9/5/2023  Impression: No acute cardiopulmonary disease. CT facial bones without contrast    Result Date: 9/3/2023  Impression: Left facial soft tissue swelling/premaxillary infraorbital swelling noted from recent trauma. CT cervical spine without contrast    Result Date: 9/3/2023  Impression: No cervical spine fracture or traumatic malalignment. Degenerative changes are noted throughout the cervical spine. CT head without contrast    Result Date: 9/3/2023  Impression: No acute intracranial abnormality. Chronic microangiopathic changes. Significant Findings / Test Results:   · See above    Incidental Findings:   · See above      Test Results Pending at Discharge (will require follow up): · None     Outpatient Tests Requested:  · PCP follow-up    Complications: None    Reason for Admission: Mechanical fall    Hospital Course:   Pete Kocher. is a 69 y.o. male patient who originally presented to the hospital on 9/3/2023 due to mechanical fall. He presented after he tripped and fell resulting in a laceration to his lip. He was admitted and seen in consult by physical and Occupational Therapy. He was cleared for discharge home with home health, family reporting they would prefer outpatient therapy as they are working to clean the patient's house.   Daughter reports she is able to provide a ride for the patient multiple times weekly. Please see above list of diagnoses and related plan for additional information. Condition at Discharge: stable    Discharge Day Visit / Exam:   Subjective:  Patient notes he is doing well today. Denies any complaints. Agreeable for discharge home today. Vitals: Blood Pressure: 117/69 (09/05/23 0828)  Pulse: 77 (09/05/23 0828)  Temperature: 97.8 °F (36.6 °C) (09/05/23 0709)  Temp Source: Oral (09/04/23 2106)  Respirations: 16 (09/05/23 0709)  SpO2: 99 % (09/05/23 0828)  Exam:   Physical Exam  Vitals reviewed. Constitutional:       General: He is not in acute distress. HENT:      Head: Normocephalic and atraumatic. Eyes:      General: No scleral icterus. Conjunctiva/sclera: Conjunctivae normal.   Cardiovascular:      Rate and Rhythm: Normal rate and regular rhythm. Heart sounds: No murmur heard. Pulmonary:      Effort: Pulmonary effort is normal. No respiratory distress. Breath sounds: Normal breath sounds. Abdominal:      General: Bowel sounds are normal. There is no distension. Palpations: Abdomen is soft. Tenderness: There is no abdominal tenderness. Musculoskeletal:      Cervical back: Neck supple. Right lower leg: Edema present. Left lower leg: Edema present. Skin:     General: Skin is warm and dry. Neurological:      Mental Status: He is alert and oriented to person, place, and time. Psychiatric:         Mood and Affect: Mood normal.         Behavior: Behavior normal.          Discussion with Family: Updated  (daughter) via phone. Discharge instructions/Information to patient and family:   See after visit summary for information provided to patient and family. Provisions for Follow-Up Care:  See after visit summary for information related to follow-up care and any pertinent home health orders. Disposition:   Home    Planned Readmission: none     Discharge Statement:  I spent 45 minutes discharging the patient.  This time was spent on the day of discharge. I had direct contact with the patient on the day of discharge. Greater than 50% of the total time was spent examining patient, answering all patient questions, arranging and discussing plan of care with patient as well as directly providing post-discharge instructions. Additional time then spent on discharge activities. Discharge Medications:  See after visit summary for reconciled discharge medications provided to patient and/or family.       **Please Note: This note may have been constructed using a voice recognition system**

## 2023-09-05 NOTE — NURSING NOTE
Discharge paperwork reviewed with patient and daughter. All questions and concerns answered. IV and masimo removed. Patient accompanied by daughter and RN with belongings to car.

## 2023-09-05 NOTE — CASE MANAGEMENT
Case Management Discharge Planning Note    Patient name Maryam Díaz. 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) 2018   • Descending aortic aneurysm ([address]) 2018   • S/P vascular bypass 2018   • Adenomatous polyp of descending colon 2018   • 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   • KARIN (obstructive sleep apnea) 2018   • Class 2 severe obesity due to excess calories with serious comorbidity and body mass index (BMI) of 37.0 to 37.9 in adult ([address]) 2018   • Paroxysmal atrial fibrillation ([address]) 2017   • Chronic thoracic aortic dissection ([address]) 2017   • S/P ascending aortic aneurysm repair 2017   • Hypokalemia 2017   • Prostate cancer ([address])    • Hypertension    • Diabetes mellitus ([address])    • Hyperlipidemia 2012      LOS (days): 1  Geometric Mean LOS (GMLOS) (days):   Days to GMLOS:     OBJECTIVE:  Risk of Unplanned Readmission Score: 12.78         Current admission status: Inpatient   Preferred Pharmacy:   [facility] [address] [place] [address]  Phone: [phone] Fax: [phone]    Primary Care Provider: Ariadne Vidal DO    Primary Insurance: [facility]  Secondary Insurance:     DISCHARGE DETAILS:    Discharge planning discussed with[de-identified] pt  Freedom of Choice: Yes  Comments - Freedom of Choice: Pt agreeable to referrals made to [facility] for home PT.   CM contacted family/caregiver?: No- see comments (Pt will keep his dtr informed.)  Were Treatment Team discharge recommendations reviewed with patient/caregiver?: Yes  Did patient/caregiver verbalize understanding of patient care needs?: Yes  Were patient/caregiver advised of the risks associated with not following Treatment Team discharge recommendations?: Yes         Requested [address]         Is the patient interested in [facility] at discharge?: Yes  [facility] requested[de-identified] Physical [facility] Provider[de-identified] PCP  Home Health Services Needed[de-identified] Evaluate Functional Status and Safety, Gait/ADL Training, Strengthening/Theraputic Exercises to Improve Function  Homebound Criteria Met[de-identified] Requires the Assistance of Another Person for Safe Ambulation or to Leave the Home, Uses an Assist Device (i.e. cane, walker, etc)  Supporting Clincal Findings[de-identified] Limited Endurance, Fatigues Easliy in [employer]         Treatment Team Recommendation: Home with [address]  Discharge Destination Plan[de-identified] Home with [address]          Additional Comments: JEREMIAS stated she is planning on discharging pt today. PT is recommending [facility]me PT. Pt is agreeable to blanket referrals and will f/u with choice list. Sent message to PT as they are also recommending [facility]PC vs [facility]. Need to know what equipment to order. Pt stated his dtr can transport home.

## 2023-09-05 NOTE — ASSESSMENT & PLAN NOTE
Lab Results   Component Value Date    HGBA1C 7.4 (A) 07/06/2023       Recent Labs     09/04/23  1626 09/04/23  2104 09/05/23  0707 09/05/23  1102   POCGLU 136 158* 145* 151*       Continue lispro with meals but reduce from 8 units to 5 units tid while inpatient  Continue lantus but reduce to 20 units from 28 units  BG controlled, continue same  Resume home regimen at discharge

## 2023-09-05 NOTE — ASSESSMENT & PLAN NOTE
· He was wearing crocs when he tripped and fell on the way to the store. · His face hit the ground and he ended up with a lip laceration that was sutured in the ed  · He denies loc, no dizziness prior to the fall  · CT imaging neg for acute process, noted left facial periorbital edema  · Did require stitching, noted to be absorbing.  On amoxicillin for prophylaxis  · PT/OT recommending home with home rehab, this was arranged with case management, patient's daughter now requesting outpatient therapy, home health was discontinued and ambulatory referral was placed  · Patient notes he has a walker and a cane at home

## 2023-09-05 NOTE — PLAN OF CARE
Problem: PAIN - ADULT  Goal: Verbalizes/displays adequate comfort level or baseline comfort level  Description: Interventions:  - Encourage patient to monitor pain and request assistance  - Assess pain using appropriate pain scale  - Administer analgesics based on type and severity of pain and evaluate response  - Implement non-pharmacological measures as appropriate and evaluate response  - Consider cultural and social influences on pain and pain management  - Notify physician/advanced practitioner if interventions unsuccessful or patient reports new pain  Outcome: Progressing     Problem: INFECTION - ADULT  Goal: Absence or prevention of progression during hospitalization  Description: INTERVENTIONS:  - Assess and monitor for signs and symptoms of infection  - Monitor lab/diagnostic results  - Monitor all insertion sites, i.e. indwelling lines, tubes, and drains  - Monitor endotracheal if appropriate and nasal secretions for changes in amount and color  - [place] appropriate cooling/warming therapies per order  - Administer medications as ordered  - Instruct and encourage patient and family to use good hand hygiene technique  - Identify and instruct in appropriate isolation precautions for identified infection/condition  Outcome: Progressing  Goal: Absence of fever/infection during neutropenic period  Description: INTERVENTIONS:  - Monitor WBC    Outcome: Progressing     Problem: SAFETY ADULT  Goal: Patient will remain free of falls  Description: INTERVENTIONS:  - Educate patient/family on patient safety including physical limitations  - Instruct patient to call for assistance with activity   - Consult OT/PT to assist with strengthening/mobility   - Keep Call bell within reach  - Keep bed low and locked with side rails adjusted as appropriate  - Keep care items and personal belongings within reach  - Initiate and maintain comfort rounds  - Make Fall Risk Sign visible to staff  - Offer Toileting every 2 Hours, in advance of need  - Initiate/Maintain bed alarm  - Obtain necessary fall risk management equipment. - Apply yellow socks and bracelet for high fall risk patients  - Consider moving patient to room near nurses station  Outcome: Progressing  Goal: Maintain or return to baseline ADL function  Description: INTERVENTIONS:  -  Assess patient's ability to carry out ADLs; assess patient's baseline for ADL function and identify physical deficits which impact ability to perform ADLs (bathing, care of mouth/teeth, toileting, grooming, dressing, etc.)  - Assess/evaluate cause of self-care deficits   - Assess range of motion  - Assess patient's mobility; develop plan if impaired  - Assess patient's need for assistive devices and provide as appropriate  - Encourage maximum independence but intervene and supervise when necessary  - Involve family in performance of ADLs  - Assess for home care needs following discharge   - Consider OT consult to assist with ADL evaluation and planning for discharge  - Provide patient education as appropriate  Outcome: Progressing  Goal: Maintains/Returns to pre admission functional level  Description: INTERVENTIONS:  - Perform BMAT or MOVE assessment daily.   - Set and communicate daily mobility goal to care team and patient/family/caregiver. - Collaborate with rehabilitation services on mobility goals if consulted  - Perform Range of Motion 2 times a day. - Reposition patient every 2 hours.   - Dangle patient 2 times a day  - Stand patient 2 times a day  - Ambulate patient 2 times a day  - Out of bed to chair 2 times a day   - Out of bed for meals 2 times a day  - Out of bed for toileting  - Record patient progress and toleration of activity level   Outcome: Progressing     Problem: DISCHARGE PLANNING  Goal: Discharge to home or other facility with appropriate resources  Description: INTERVENTIONS:  - Identify barriers to discharge w/patient and caregiver  - Arrange for needed discharge resources and transportation as appropriate  - Identify discharge learning needs (meds, wound care, etc.)  - Arrange for interpretive services to assist at discharge as needed  - Refer to Case Management Department for coordinating discharge planning if the patient needs post-hospital services based on physician/advanced practitioner order or complex needs related to functional status, cognitive ability, or social support system  Outcome: Progressing     Problem: Knowledge Deficit  Goal: Patient/family/caregiver demonstrates understanding of disease process, treatment plan, medications, and discharge instructions  Description: Complete learning assessment and assess knowledge base.   Interventions:  - Provide teaching at level of understanding  - Provide teaching via preferred learning methods  Outcome: Progressing     Problem: CARDIOVASCULAR - ADULT  Goal: Maintains optimal cardiac output and hemodynamic stability  Description: INTERVENTIONS:  - Monitor I/O, vital signs and rhythm  - Monitor for S/S and trends of decreased cardiac output  - Administer and titrate ordered vasoactive medications to optimize hemodynamic stability  - Assess quality of pulses, skin color and temperature  - Assess for signs of decreased coronary artery perfusion  - Instruct patient to report change in severity of symptoms  Outcome: Progressing  Goal: Absence of cardiac dysrhythmias or at baseline rhythm  Description: INTERVENTIONS:  - Continuous cardiac monitoring, vital signs, obtain 12 lead EKG if ordered  - Administer antiarrhythmic and heart rate control medications as ordered  - Monitor electrolytes and administer replacement therapy as ordered  Outcome: Progressing     Problem: SKIN/TISSUE INTEGRITY - ADULT  Goal: Skin Integrity remains intact(Skin Breakdown Prevention)  Description: Assess:  -Perform Farooq assessment.  -Clean and moisturize skin.  -Inspect skin when repositioning, toileting, and assisting with ADLS  -Assess extremities for adequate circulation and sensation     Bed Management:  -Have minimal linens on bed & keep smooth, unwrinkled  -Change linens as needed when moist or perspiring  -Avoid sitting or lying in one position for more than 2 hours while in bed  -Keep HOB at 30 degrees     Toileting:  -Offer bedside commode  -Assess for incontinence. -Use incontinent care products after each incontinent episode. Activity:  -Mobilize patient 2 times a day  -Encourage activity and walks on unit  -Encourage or provide ROM exercises   -Turn and reposition patient every 2 Hours  -Use appropriate equipment to lift or move patient in bed  -Instruct/ Assist with weight shifting every 2 when out of bed in chair  -Consider limitation of chair time 2 hour intervals    Skin Care:  -Avoid use of baby powder, tape, friction and shearing, hot water or constrictive clothing  -Relieve pressure over bony prominences.  -Do not massage red bony areas    Next Steps:  -Teach patient strategies to minimize risks. -Consider consults to  interdisciplinary teams. Outcome: Progressing  Goal: Incision(s), wounds(s) or drain site(s) healing without S/S of infection  Description: INTERVENTIONS  - Assess and document dressing, incision, wound bed, drain sites and surrounding tissue  - Provide patient and family education  - Perform skin care/dressing changes.   Outcome: Progressing  Goal: Pressure injury heals and does not worsen  Description: Interventions:  - Implement low air loss mattress or specialty surface (Criteria met)  - Apply silicone foam dressing  - Instruct/assist with weight shifting every 2 minutes when in chair   - Limit chair time to 2 hour intervals  - Use special pressure reducing interventions when in chair   - Apply fecal or urinary incontinence containment device   - Perform passive or active ROM/  - Turn and reposition patient & offload bony prominences every 2 hours   - Utilize friction reducing device or surface for transfers   - Consider consults to  interdisciplinary teams.  - Use incontinent care products after each incontinent episode.   - Consider nutrition services referral as needed  Outcome: Progressing     Problem: MUSCULOSKELETAL - ADULT  Goal: Maintain or return mobility to safest level of function  Description: INTERVENTIONS:  - Assess patient's ability to carry out ADLs; assess patient's baseline for ADL function and identify physical deficits which impact ability to perform ADLs (bathing, care of mouth/teeth, toileting, grooming, dressing, etc.)  - Assess/evaluate cause of self-care deficits   - Assess range of motion  - Assess patient's mobility  - Assess patient's need for assistive devices and provide as appropriate  - Encourage maximum independence but intervene and supervise when necessary  - Involve family in performance of ADLs  - Assess for home care needs following discharge   - Consider OT consult to assist with ADL evaluation and planning for discharge  - Provide patient education as appropriate  Outcome: Progressing  Goal: Maintain proper alignment of affected body part  Description: INTERVENTIONS:  - Support, maintain and protect limb and body alignment  - Provide patient/ family with appropriate education  Outcome: Progressing     Problem: Prexisting or High Potential for Compromised Skin Integrity  Goal: Skin integrity is maintained or improved  Description: INTERVENTIONS:  - Identify patients at risk for skin breakdown  - Assess and monitor skin integrity  - Assess and monitor nutrition and hydration status  - Monitor labs   - Assess for incontinence   - Turn and reposition patient  - Assist with mobility/ambulation  - Relieve pressure over bony prominences  - Avoid friction and shearing  - Provide appropriate hygiene as needed including keeping skin clean and dry  - Evaluate need for skin moisturizer/barrier cream  - Collaborate with interdisciplinary team   - Patient/family teaching  - Consider wound care consult   Outcome: Progressing     Problem: MOBILITY - ADULT  Goal: Maintain or return to baseline ADL function  Description: INTERVENTIONS:  -  Assess patient's ability to carry out ADLs; assess patient's baseline for ADL function and identify physical deficits which impact ability to perform ADLs (bathing, care of mouth/teeth, toileting, grooming, dressing, etc.)  - Assess/evaluate cause of self-care deficits   - Assess range of motion  - Assess patient's mobility; develop plan if impaired  - Assess patient's need for assistive devices and provide as appropriate  - Encourage maximum independence but intervene and supervise when necessary  - Involve family in performance of ADLs  - Assess for home care needs following discharge   - Consider OT consult to assist with ADL evaluation and planning for discharge  - Provide patient education as appropriate  Outcome: Progressing  Goal: Maintains/Returns to pre admission functional level  Description: INTERVENTIONS:  - Perform BMAT or MOVE assessment daily.   - Set and communicate daily mobility goal to care team and patient/family/caregiver. - Collaborate with rehabilitation services on mobility goals if consulted  - Perform Range of Motion 2 times a day. - Reposition patient every 2 hours.   - Dangle patient 2 times a day  - Stand patient 2 times a day  - Ambulate patient 2 times a day  - Out of bed to chair 2 times a day   - Out of bed for meals 2 times a day  - Out of bed for toileting  - Record patient progress and toleration of activity level   Outcome: Progressing     Problem: METABOLIC, FLUID AND ELECTROLYTES - ADULT  Goal: Electrolytes maintained within normal limits  Description: INTERVENTIONS:  - Monitor labs and assess patient for signs and symptoms of electrolyte imbalances  - Administer electrolyte replacement as ordered  - Monitor response to electrolyte replacements, including repeat lab results as appropriate  - Instruct patient on fluid and nutrition as appropriate  Outcome: Progressing  Goal: Glucose maintained within target range  Description: INTERVENTIONS:  - Monitor Blood Glucose as ordered  - Assess for signs and symptoms of hyperglycemia and hypoglycemia  - Administer ordered medications to maintain glucose within target range  - Assess nutritional intake and initiate nutrition service referral as needed  Outcome: Progressing

## 2023-09-05 NOTE — OCCUPATIONAL THERAPY NOTE
Occupational Therapy Evaluation     Patient Name: Rody Snell Today's Date: 9/5/2023  Problem List  Principal Problem:    Ambulatory dysfunction  Active Problems:    Hypertension    Diabetes mellitus (HCC)    Paroxysmal atrial fibrillation (HCC)    KARIN (obstructive sleep apnea)    Infestation by bed bug    Hyponatremia    Past Medical History  Past Medical History:   Diagnosis Date    Arthritis     CPAP (continuous positive airway pressure) dependence     Diabetes ([address])     Edema     Hyperlipidemia     Hypertension     Prostate cancer ([address])     prostate    Sleep apnea     Stroke (cerebrum) ([address])     Wears glasses      Past Surgical History  Past Surgical History:   Procedure Laterality Date    CARDIAC SURGERY      CIRCUMCISION      COLONOSCOPY      IR CEREBRAL ANGIOGRAPHY / INTERVENTION  8/13/2018    IR TEVAR  9/19/2018    [place] AS-AORT GRF W/CARD BYP & AORTIC ROOT RPLCMT N/A 9/6/2017    Procedure: ASCENDING AORTIC REPAIR WITH A 26MM  GELWEAVE STRAIGHT GRAFT AND HEMIARCH WITH 8MM SIDE ARM BRANCH;  Surgeon: Arnette Dancer, MD;  [facility];  Service: Cardiac Surgery    [place] BYPASS W/VEIN CAROTID-BRACHIAL Left 8/13/2018    Procedure: BYPASS CAROTID SUBCLAVIAN WITH EMBOLIZATION OF PROXIMAL SUBCLAVIAN ARTERY. ;  Surgeon: Love Khan MD;  [facility];  Service: Vascular    [place] EVASC RPR DTA COVERAGE ART ORIGIN 1ST ENDOPROSTH N/A 9/19/2018    Procedure: REPAIR ANEURSYM THORACIC ENDOVASCUALR DESCENDING AORTIC ANEURYSM (TEVAR); Surgeon: Pavan Plunkett MD;  [facility];  Service: Cardiac Surgery    [place] EXPL PO HEMRRG THROMBOSIS/INFCTJ [id number] [facility] [address] N/A 9/6/2017    Procedure: RE-EXPLORATION MEDIASTERNAL CAVITY FOR POST-OP BLEED (BRING BACK);   Surgeon: Arnette Dancer, MD;  [facility];  Service: Cardiac Surgery           09/05/23 1056   OT Last Visit   OT Visit Date 09/05/23   Note Type   Note type Evaluation   Pain Assessment   Pain Assessment Tool 0-10   Pain Score No Pain Restrictions/Precautions   Other Precautions Impulsive; Chair Alarm; Bed Alarm; Fall Risk;Pain;Multiple lines;Telemetry;Hard of hearing   Home Living   Type of [facility] Two level;Stairs to enter without rails; Able to live on main level with bedroom/bathroom; Performs ADLs on one level  (flight to bedroom)   Bathroom Shower/Tub Tub/shower unit  (small lip to step over)   Bathroom Toilet Raised   Bathroom Equipment Grab bars in shower   [address] Walker;Cane  (from [employer])   Additional Comments pt reports independent w/ cooking/cleaning/driving   Prior Function   Level of [place] Independent with ADLs; Independent with functional mobility; Independent with IADLS   Lives With Family  (brother)   Tri Job Help From Family   IADLs Independent with driving; Independent with meal prep; Independent with medication management   Falls in the last 6 months 1 to 4   Vocational Retired   Comments pt reports tripped on sidewalk in 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   Lifestyle   Autonomy per pt independent w/ ADLs, independent w/ functional transfers and mobility w/ no AD, independent w/ IADLs, driving   Reciprocal Relationships brother   Service to Others retired   Intrinsic Gratification watching tv   Subjective   Subjective "I am 78, I think I do good for that"   ADL   Where Assessed Chair   Eating Assistance 6  Modified independent   Grooming Assistance 6  Modified Independent   UB Bathing Assistance 6  Modified Independent   LB Bathing Assistance 5  Supervision/Setup   UB Dressing Assistance 6  Modified independent   [facility] 4  Minimal Assistance   LB Dressing Deficit Setup;Steadying; Requires assistive device for steadying;Supervision/safety;Verbal cueing; Increased time to complete; Don/doff R sock  (assist R sock)   Toileting Assistance  5  Supervision/Setup   Functional Assistance 5  Supervision/Setup   Bed Mobility   Supine to Sit 6  Modified independent   Additional items HOB elevated; Increased time required   Sit to Supine 6  Modified independent   Additional items Increased time required;Verbal cues   Additional Comments increased time to completes   Transfers   Sit to Stand 5  Supervision   Additional items Bedrails   Stand to Sit 5  Supervision   Additional items Increased time required; Bedrails   Toilet transfer 5  Supervision   Additional items Increased time required   Additional Comments cues for positioning   Functional Mobility   Functional Mobility 5  Supervision   Additional Comments assist x1 w/ increased time and no LOB, close supervision w/o AD, improved balance w/ RW   Balance   Static Sitting Normal   Dynamic Sitting Good   Static Standing Fair +   Dynamic Standing Fair   Ambulatory Fair   Activity Tolerance   Activity Tolerance Patient limited by fatigue;Patient tolerated treatment well   Medical Staff Made Aware PA [address]   Nurse Made Aware appropriate to see per RN   RUE Assessment   RUE Assessment WFL  (4/5)   LUE Assessment   LUE Assessment WFL  (4/5)   Hand Function   Gross Motor Coordination Functional   Fine Motor Coordination Functional   Sensation   Light Touch No apparent deficits   Proprioception   Proprioception No apparent deficits   Vision-Basic Assessment   Current Vision Wears glasses all the time   Vision - Complex Assessment   Ocular Range of Motion Intact   Acuity Able to read clock/calendar on wall without difficulty   Perception   Inattention/Neglect Appears intact   Cognition   Overall Cognitive Status [facility]   Arousal/Participation Alert; Responsive; Cooperative   Attention Within functional limits   Orientation Level Oriented to person;Oriented to place;Oriented to time;Oriented to situation;Oriented X4   Memory Decreased recall of precautions   Following Commands Follows one step commands without difficulty   Comments pleasant and cooperative, receptive to safety education; educated on wearing more supportive footwear when outside and inside the home, verbalized understanding   Assessment   Limitation Decreased ADL status; Decreased UE strength;Decreased Safe judgement during ADL;Decreased endurance;Decreased cognition;Decreased high-level ADLs; Decreased self-care trans   Prognosis Good   Assessment Pt is a 69 y.o. male seen for OT evaluation s/p admit to [facility] on 9/3/2023 w/ Ambulatory dysfunction, fall outside w/ edema and ecchymosis area of left cheek of face and eye. Comorbidities affecting pt's functional performance at time of assessment include: hyponatremia, KARIN, paroxysmal atrial fibrillation, DM, HTN. Personal factors affecting pt at time of IE include:infestation by bed bug, decreased safety, fall risk, stairs in home. Prior to admission, pt was living w/ brother and reports independent w/ ADLs, independent w/ functional transfers and mobility w/ no AD, independent w/ IADLs, driving. Upon evaluation: Pt requires MOD I bed mobility, supervision sit<>stand w/ VCs for hand placement, supervision functional mobility w/ no AD and use of RW, MIN assist to don R sock(discussed benefit on sock aide, reports wears slip on educated on wearing more supportive shoes), setup UB ADLs, MOD I toileting 2* the following deficits impacting occupational performance: decreased strength and endurance, impaired balance, impaired activity tolerance, fall risk. Pt to benefit from continued skilled OT tx while in the hospital to address deficits as defined above and maximize level of functional independence w ADL's and functional mobility. Occupational Performance areas to address include: grooming, bathing/shower, toilet hygiene, dressing, health maintenance, functional mobility and clothing management. From OT standpoint, recommendation at time of d/c would be home w/ HOME OT and family support. Pt receptive to fall prevention education techniques. The patient's raw score on the AM-PAC Daily Activity Inpatient Short Form is 22.  A raw score of greater than or equal to 19 suggests the patient may benefit from discharge to home. Please refer to the recommendation of the Occupational Therapist for safe discharge planning. Goals   Patient Goals "to go home"   LTG Time Frame 10-14   Long Term Goal please see below goals   Plan   Treatment Interventions ADL retraining; Endurance training;UE strengthening/ROM; Functional transfer training;Cognitive reorientation;Patient/family training;Equipment evaluation/education; Compensatory technique education; Energy conservation; Activityengagement   Goal Expiration Date 09/19/23   OT Frequency 1-2x/wk   Recommendation   OT Discharge Recommendation Home with home health rehabilitation  (HOME OT and family support/assist)   Equipment Recommended   (rolling walker)   Additional Comments  educated pt on home safety, removal of throw rugs, clear pathways, environmental setup, use of rolling walker or SPC in home or community and use of supportive footwear   AM-PAC Daily Activity Inpatient   Lower Body Dressing 3   Bathing 3   Toileting 4   Upper Body Dressing 4   Grooming 4   Eating 4   Daily Activity Raw Score 22   Daily Activity Standardized Score (Calc for Raw Score >=11) 47. 1   AM-PAC Applied Cognition Inpatient   Following a Speech/Presentation 4   Understanding Ordinary Conversation 4   Taking Medications 4   Remembering Where Things Are Placed or Put Away 4   Remembering List of 4-5 Errands 3   Taking Care of Complicated Tasks 3   Applied Cognition Raw Score 22   Applied Cognition Standardized Score 47.83   End of Consult   Education Provided Yes   Patient Position at End of Consult Bed/Chair alarm activated; All needs within reach; Supine   Nurse Communication Nurse aware of consult     Occupational Therapy Goals to be met in 10-14 days:  1) Pt will improve activity tolerance to G for 30 min txment sessions to enhance ADLs  2) Pt will complete ADLs/self care w/ mod I   3) Pt will complete toileting w/ mod I w/ G hygiene/thoroughness using DME PRN  4) Pt will improve functional transfers on/off all surfaces using DME PRN w/ G balance/safety including toileting w/ mod I  5) Pt will improve fx'l mobility during I/ADl/leisure tasks using DME PRN w/ g balance/safety w/ mod I  6) Pt will engage in ongoing cognitive assessment w/ G participation to A w/ safe d/c planning/recommendations  7) Pt will demonstrate G carryover of pt/caregiver education and training as appropriate w/ mod I  w/ G tolerance  8) Pt will demonstrate 100% carryover of LHAE for LB ADLs/self care and leisure s/p skilled education w/ mod I and G participation  9) Pt will demonstrate improved standing tolerance to 3-5 minutes during functional tasks w/ good - dynamic standing balance to enhance ADL performance  10) Pt will demonstrate improved b/l UE strength by 1 MMT grade to enhance ADLS and functional transfers  11) Pt will recall/identify 3 fall prevention education strategies to enhance safety in the home      Documentation completed by: Zion Pitts MS, OTR/L

## 2023-09-05 NOTE — RESTORATIVE TECHNICIAN NOTE
Restorative Technician Note      Patient Name: Agustín Solo. Restorative Tech Visit Date: 09/05/23  Note Type: Mobility  Patient Position Upon Consult: Supine  Activity Performed: Ambulated  Patient Position at End of Consult: Supine;  All needs within reach

## 2023-09-05 NOTE — CASE MANAGEMENT
Case Management Discharge Planning Note    Patient name Daisha Underwood.   Location [facility] 2 Juanpablo Handler* MRN [id number]  : 1945 Date 2023       Current Admission Date: 9/3/2023  Current Admission Diagnosis:Ambulatory dysfunction   Patient Active Problem List    Diagnosis Date Noted   • Ambulatory dysfunction 2023   • Infestation by bed bug 2023   • Hyponatremia 2023   • Type 2 diabetes mellitus with hyperglycemia, unspecified whether long term insulin use ([address]) 10/26/2022   • Edema    • Encounter for postoperative care 10/18/2018   • 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 (TEVAR) 2018   • Descending aortic aneurysm ([address]) 2018   • S/P vascular bypass 2018   • Adenomatous polyp of descending colon 2018   • 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   • KARIN (obstructive sleep apnea) 2018   • Class 2 severe obesity due to excess calories with serious comorbidity and body mass index (BMI) of 37.0 to 37.9 in adult ([address]) 2018   • Paroxysmal atrial fibrillation ([address]) 2017   • Chronic thoracic aortic dissection ([address]) 2017   • S/P ascending aortic aneurysm repair 2017   • Hypokalemia 2017   • Prostate cancer ([address])    • Hypertension    • Diabetes mellitus ([address])    • Hyperlipidemia 2012      LOS (days): 1  Geometric Mean LOS (GMLOS) (days):   Days to GMLOS:     OBJECTIVE:  Risk of Unplanned Readmission Score: 12.78         Current admission status: Inpatient   Preferred Pharmacy:   [facility] [address] [place] [id number]  Phone: [phone] Fax: [phone]    Primary Care Provider: Wesley Mcmahan DO    Primary Insurance: [facility]  Secondary Insurance:     DISCHARGE DETAILS:                                5 [facility] Agency Name[de-identified] [facility] External Referral Reason (only applicable if external [facility] name selected): Scheduling access issues            Additional Comments: PT is recommending RW and this was delivered to patient. Gave pt the choice list and he selected [facility] and this was rec in Aidin.

## 2023-09-05 NOTE — PLAN OF CARE
Problem: PAIN - ADULT  Goal: Verbalizes/displays adequate comfort level or baseline comfort level  Description: Interventions:  - Encourage patient to monitor pain and request assistance  - Assess pain using appropriate pain scale  - Administer analgesics based on type and severity of pain and evaluate response  - Implement non-pharmacological measures as appropriate and evaluate response  - Consider cultural and social influences on pain and pain management  - Notify physician/advanced practitioner if interventions unsuccessful or patient reports new pain  Outcome: Progressing     Problem: INFECTION - ADULT  Goal: Absence or prevention of progression during hospitalization  Description: INTERVENTIONS:  - Assess and monitor for signs and symptoms of infection  - Monitor lab/diagnostic results  - Monitor all insertion sites, i.e. indwelling lines, tubes, and drains  - Monitor endotracheal if appropriate and nasal secretions for changes in amount and color  - [place] appropriate cooling/warming therapies per order  - Administer medications as ordered  - Instruct and encourage patient and family to use good hand hygiene technique  - Identify and instruct in appropriate isolation precautions for identified infection/condition  Outcome: Progressing  Goal: Absence of fever/infection during neutropenic period  Description: INTERVENTIONS:  - Monitor WBC    Outcome: Progressing     Problem: SAFETY ADULT  Goal: Patient will remain free of falls  Description: INTERVENTIONS:  - Educate patient/family on patient safety including physical limitations  - Instruct patient to call for assistance with activity   - Consult OT/PT to assist with strengthening/mobility   - Keep Call bell within reach  - Keep bed low and locked with side rails adjusted as appropriate  - Keep care items and personal belongings within reach  - Initiate and maintain comfort rounds  - Make Fall Risk Sign visible to staff  - Offer Toileting every 2 Hours, in advance of need  - Initiate/Maintain bed alarm  - Obtain necessary fall risk management equipment  - Apply yellow socks and bracelet for high fall risk patients  - Consider moving patient to room near nurses station  Outcome: Progressing  Goal: Maintain or return to baseline ADL function  Description: INTERVENTIONS:  -  Assess patient's ability to carry out ADLs; assess patient's baseline for ADL function and identify physical deficits which impact ability to perform ADLs (bathing, care of mouth/teeth, toileting, grooming, dressing, etc.)  - Assess/evaluate cause of self-care deficits   - Assess range of motion  - Assess patient's mobility; develop plan if impaired  - Assess patient's need for assistive devices and provide as appropriate  - Encourage maximum independence but intervene and supervise when necessary  - Involve family in performance of ADLs  - Assess for home care needs following discharge   - Consider OT consult to assist with ADL evaluation and planning for discharge  - Provide patient education as appropriate  Outcome: Progressing  Goal: Maintains/Returns to pre admission functional level  Description: INTERVENTIONS:  - Perform BMAT or MOVE assessment daily.   - Set and communicate daily mobility goal to care team and patient/family/caregiver. - Collaborate with rehabilitation services on mobility goals if consulted  - Perform Range of Motion 3 times a day. - Reposition patient every 2 hours.   - Dangle patient 3 times a day  - Stand patient 3 times a day  - Ambulate patient 3 times a day  - Out of bed to chair 3 times a day   - Out of bed for meals 3 times a day  - Out of bed for toileting  - Record patient progress and toleration of activity level   Outcome: Progressing     Problem: DISCHARGE PLANNING  Goal: Discharge to home or other facility with appropriate resources  Description: INTERVENTIONS:  - Identify barriers to discharge w/patient and caregiver  - Arrange for needed discharge resources and transportation as appropriate  - Identify discharge learning needs (meds, wound care, etc.)  - Arrange for interpretive services to assist at discharge as needed  - Refer to Case Management Department for coordinating discharge planning if the patient needs post-hospital services based on physician/advanced practitioner order or complex needs related to functional status, cognitive ability, or social support system  Outcome: Progressing     Problem: Knowledge Deficit  Goal: Patient/family/caregiver demonstrates understanding of disease process, treatment plan, medications, and discharge instructions  Description: Complete learning assessment and assess knowledge base.   Interventions:  - Provide teaching at level of understanding  - Provide teaching via preferred learning methods  Outcome: Progressing     Problem: CARDIOVASCULAR - ADULT  Goal: Maintains optimal cardiac output and hemodynamic stability  Description: INTERVENTIONS:  - Monitor I/O, vital signs and rhythm  - Monitor for S/S and trends of decreased cardiac output  - Administer and titrate ordered vasoactive medications to optimize hemodynamic stability  - Assess quality of pulses, skin color and temperature  - Assess for signs of decreased coronary artery perfusion  - Instruct patient to report change in severity of symptoms  Outcome: Progressing  Goal: Absence of cardiac dysrhythmias or at baseline rhythm  Description: INTERVENTIONS:  - Continuous cardiac monitoring, vital signs, obtain 12 lead EKG if ordered  - Administer antiarrhythmic and heart rate control medications as ordered  - Monitor electrolytes and administer replacement therapy as ordered  Outcome: Progressing     Problem: SKIN/TISSUE INTEGRITY - ADULT  Goal: Skin Integrity remains intact(Skin Breakdown Prevention)  Description: Assess:  -Perform [place] assessment  -Clean and moisturize skin  -Inspect skin when repositioning, toileting, and assisting with ADLS  -Assess under medical devices  -Assess extremities for adequate circulation and sensation     Bed Management:  -Have minimal linens on bed & keep smooth, unwrinkled  -Change linens as needed when moist or perspiring  -Avoid sitting or lying in one position for more than 2 hours while in bed  -Keep HOB at 30 degrees     Toileting:  -Offer bedside commode  -Assess for incontinence  -Use incontinent care products after each incontinent episode    Activity:  -Mobilize patient 3 times a day  -Encourage activity and walks on unit  -Encourage or provide ROM exercises   -Turn and reposition patient every 2 Hours  -Use appropriate equipment to lift or move patient in bed  -Instruct/ Assist with weight shifting when out of bed in chair  -Consider limitation of chair time 2 hour intervals    Skin Care:  -Avoid use of baby powder, tape, friction and shearing, hot water or constrictive clothing  -Relieve pressure over bony prominences  -Do not massage red bony areas    Next Steps:  -Teach patient strategies to minimize risks    -Consider consults to  interdisciplinary teams  Outcome: Progressing  Goal: Incision(s), wounds(s) or drain site(s) healing without S/S of infection  Description: INTERVENTIONS  - Assess and document dressing, incision, wound bed, drain sites and surrounding tissue  - Provide patient and family education  - Perform skin care/dressing changes  Outcome: Progressing  Goal: Pressure injury heals and does not worsen  Description: Interventions:  - Implement low air loss mattress or specialty surface (Criteria met)  - Apply silicone foam dressing  - Instruct/assist with weight shifting  minutes when in chair   - Limit chair time to 2 hour intervals  - Use special pressure reducing interventions when in chair   - Apply fecal or urinary incontinence containment device   - Perform passive or active ROM  - Turn and reposition patient & offload bony prominences every 2 hours   - Utilize friction reducing device or surface for transfers   - Consider consults to  interdisciplinary teams  - Use incontinent care products after each incontinent episode  - Consider nutrition services referral as needed  Outcome: Progressing     Problem: MUSCULOSKELETAL - ADULT  Goal: Maintain or return mobility to safest level of function  Description: INTERVENTIONS:  - Assess patient's ability to carry out ADLs; assess patient's baseline for ADL function and identify physical deficits which impact ability to perform ADLs (bathing, care of mouth/teeth, toileting, grooming, dressing, etc.)  - Assess/evaluate cause of self-care deficits   - Assess range of motion  - Assess patient's mobility  - Assess patient's need for assistive devices and provide as appropriate  - Encourage maximum independence but intervene and supervise when necessary  - Involve family in performance of ADLs  - Assess for home care needs following discharge   - Consider OT consult to assist with ADL evaluation and planning for discharge  - Provide patient education as appropriate  Outcome: Progressing  Goal: Maintain proper alignment of affected body part  Description: INTERVENTIONS:  - Support, maintain and protect limb and body alignment  - Provide patient/ family with appropriate education  Outcome: Progressing     Problem: Prexisting or High Potential for Compromised Skin Integrity  Goal: Skin integrity is maintained or improved  Description: INTERVENTIONS:  - Identify patients at risk for skin breakdown  - Assess and monitor skin integrity  - Assess and monitor nutrition and hydration status  - Monitor labs   - Assess for incontinence   - Turn and reposition patient  - Assist with mobility/ambulation  - Relieve pressure over bony prominences  - Avoid friction and shearing  - Provide appropriate hygiene as needed including keeping skin clean and dry  - Evaluate need for skin moisturizer/barrier cream  - Collaborate with interdisciplinary team   - Patient/family teaching  - Consider wound care consult   Outcome: Progressing     Problem: MOBILITY - ADULT  Goal: Maintain or return to baseline ADL function  Description: INTERVENTIONS:  -  Assess patient's ability to carry out ADLs; assess patient's baseline for ADL function and identify physical deficits which impact ability to perform ADLs (bathing, care of mouth/teeth, toileting, grooming, dressing, etc.)  - Assess/evaluate cause of self-care deficits   - Assess range of motion  - Assess patient's mobility; develop plan if impaired  - Assess patient's need for assistive devices and provide as appropriate  - Encourage maximum independence but intervene and supervise when necessary  - Involve family in performance of ADLs  - Assess for home care needs following discharge   - Consider OT consult to assist with ADL evaluation and planning for discharge  - Provide patient education as appropriate  Outcome: Progressing  Goal: Maintains/Returns to pre admission functional level  Description: INTERVENTIONS:  - Perform BMAT or MOVE assessment daily.   - Set and communicate daily mobility goal to care team and patient/family/caregiver. - Collaborate with rehabilitation services on mobility goals if consulted  - Perform Range of Motion 3 times a day. - Reposition patient every 2 hours.   - Dangle patient 3 times a day  - Stand patient 3 times a day  - Ambulate patient 3 times a day  - Out of bed to chair 3 times a day   - Out of bed for meals 3 times a day  - Out of bed for toileting  - Record patient progress and toleration of activity level   Outcome: Progressing     Problem: METABOLIC, FLUID AND ELECTROLYTES - ADULT  Goal: Electrolytes maintained within normal limits  Description: INTERVENTIONS:  - Monitor labs and assess patient for signs and symptoms of electrolyte imbalances  - Administer electrolyte replacement as ordered  - Monitor response to electrolyte replacements, including repeat lab results as appropriate  - Instruct patient on fluid and nutrition as appropriate  Outcome: Progressing  Goal: Glucose maintained within target range  Description: INTERVENTIONS:  - Monitor Blood Glucose as ordered  - Assess for signs and symptoms of hyperglycemia and hypoglycemia  - Administer ordered medications to maintain glucose within target range  - Assess nutritional intake and initiate nutrition service referral as needed  Outcome: Progressing

## 2023-09-05 NOTE — UTILIZATION REVIEW
Date: 9/6   Day 3: Has surpassed a 2nd midnight with active treatments and services, which include therapy evals for poss rehab placement d/t ambulatory dysfunction s/p fall, hyponatremia. See initial review completed by Victoriano Barakat on 9/4.

## 2023-09-05 NOTE — PLAN OF CARE
Problem: OCCUPATIONAL THERAPY ADULT  Goal: Performs self-care activities at highest level of function for planned discharge setting. See evaluation for individualized goals. Description: Treatment Interventions: ADL retraining, Endurance training, UE strengthening/ROM, Functional transfer training, Cognitive reorientation, Patient/family training, Equipment evaluation/education, Compensatory technique education, Energy conservation, Activityengagement  Equipment Recommended:  (rolling walker)       See flowsheet documentation for full assessment, interventions and recommendations. 9/5/2023 1123 by Opal Beaver OT  Outcome: Progressing  Note: Limitation: Decreased ADL status, Decreased UE strength, Decreased Safe judgement during ADL, Decreased endurance, Decreased cognition, Decreased high-level ADLs, Decreased self-care trans  Prognosis: Good  Assessment: Pt is a 69 y.o. male seen for OT evaluation s/p admit to [facility] on 9/3/2023 w/ Ambulatory dysfunction, fall outside w/ edema and ecchymosis area of left cheek of face and eye. Comorbidities affecting pt's functional performance at time of assessment include: hyponatremia, KARIN, paroxysmal atrial fibrillation, DM, HTN. Personal factors affecting pt at time of IE include:infestation by bed bug, decreased safety, fall risk, stairs in home. Prior to admission, pt was living w/ brother and reports independent w/ ADLs, independent w/ functional transfers and mobility w/ no AD, independent w/ IADLs, driving.  Upon evaluation: Pt requires MOD I bed mobility, supervision sit<>stand w/ VCs for hand placement, supervision functional mobility w/ no AD and use of RW, MIN assist to don R sock(discussed benefit on sock aide, reports wears slip on educated on wearing more supportive shoes), setup UB ADLs, MOD I toileting 2* the following deficits impacting occupational performance: decreased strength and endurance, impaired balance, impaired activity tolerance, fall risk. Pt to benefit from continued skilled OT tx while in the hospital to address deficits as defined above and maximize level of functional independence w ADL's and functional mobility. Occupational Performance areas to address include: grooming, bathing/shower, toilet hygiene, dressing, health maintenance, functional mobility and clothing management. From OT standpoint, recommendation at time of d/c would be home w/ HOME OT and family support. Pt receptive to fall prevention education techniques. The patient's raw score on the AM-PAC Daily Activity Inpatient Short Form is 22. A raw score of greater than or equal to 19 suggests the patient may benefit from discharge to home. Please refer to the recommendation of the Occupational Therapist for safe discharge planning. OT Discharge Recommendation: Home with home health rehabilitation (HOME OT and family support/assist)       9/5/2023 1123 by Samuel Davila OT  Note: Limitation: Decreased ADL status, Decreased UE strength, Decreased Safe judgement during ADL, Decreased endurance, Decreased cognition, Decreased high-level ADLs, Decreased self-care trans  Prognosis: Good  Assessment: Pt is a 69 y.o. male seen for OT evaluation s/p admit to [facility] on 9/3/2023 w/ Ambulatory dysfunction, fall outside w/ edema and ecchymosis area of left cheek of face and eye. Comorbidities affecting pt's functional performance at time of assessment include: hyponatremia, KARIN, paroxysmal atrial fibrillation, DM, HTN. Personal factors affecting pt at time of IE include:infestation by bed bug, decreased safety, fall risk, stairs in home. Prior to admission, pt was living w/ brother and reports independent w/ ADLs, independent w/ functional transfers and mobility w/ no AD, independent w/ IADLs, driving.  Upon evaluation: Pt requires MOD I bed mobility, supervision sit<>stand w/ VCs for hand placement, supervision functional mobility w/ no AD and use of RW, MIN assist to don R sock(discussed benefit on sock aide, reports wears slip on educated on wearing more supportive shoes), setup UB ADLs, MOD I toileting 2* the following deficits impacting occupational performance: decreased strength and endurance, impaired balance, impaired activity tolerance, fall risk. Pt to benefit from continued skilled OT tx while in the hospital to address deficits as defined above and maximize level of functional independence w ADL's and functional mobility. Occupational Performance areas to address include: grooming, bathing/shower, toilet hygiene, dressing, health maintenance, functional mobility and clothing management. From OT standpoint, recommendation at time of d/c would be home w/ HOME OT and family support. Pt receptive to fall prevention education techniques. The patient's raw score on the AM-PAC Daily Activity Inpatient Short Form is 22. A raw score of greater than or equal to 19 suggests the patient may benefit from discharge to home. Please refer to the recommendation of the Occupational Therapist for safe discharge planning.      OT Discharge Recommendation: Home with home health rehabilitation (HOME OT and family support/assist)

## 2023-09-06 ENCOUNTER — TRANSITIONAL CARE MANAGEMENT (OUTPATIENT)
Dept: FAMILY MEDICINE CLINIC | Facility: CLINIC | Age: 78
End: 2023-09-06

## 2023-09-06 NOTE — UTILIZATION REVIEW
NOTIFICATION OF ADMISSION DISCHARGE   This is a Notification of Discharge from [facility]. Please be advised that this patient has been discharge from our facility. Below you will find the admission and discharge date and time including the patient’s disposition. UTILIZATION REVIEW CONTACT:  Brisa Alonso  Utilization   Network Utilization Review Department  Phone: [phone] carefully listen to the prompts. All voicemails are confidential.  Email: [email or web address]     ADMISSION INFORMATION  PRESENTATION DATE: 9/3/2023  5:16 PM  OBERVATION ADMISSION DATE:   INPATIENT ADMISSION DATE: 9/4/23 10:12 AM   DISCHARGE DATE: 9/5/2023  2:45 PM   DISPOSITION:Home with Home Health Care    IMPORTANT INFORMATION:  Send all requests for admission clinical reviews, approved or denied determinations and any other requests to dedicated fax number below belonging to the campus where the patient is receiving treatment.  List of dedicated fax numbers:  [place] DENIALS (Administrative/Medical Necessity) [phone] [address] [facility] (Maternity/NICU/Pediatrics) [phone]   [facility] [phone]   [facility] [phone] [address] [phone]   [address] [place] [address] [phone]   [facility] [phone]   [address] [place] [address] [address] [place] [address] [phone]   [address] [place] [address] [phone] [address] [facility] [phone] [address] [facility] [address] [facility] [phone]

## 2023-09-07 LAB
DME PARACHUTE DELIVERY DATE ACTUAL: NORMAL
DME PARACHUTE DELIVERY DATE REQUESTED: NORMAL
DME PARACHUTE ITEM DESCRIPTION: NORMAL
DME PARACHUTE ORDER STATUS: NORMAL
DME PARACHUTE SUPPLIER NAME: NORMAL
DME PARACHUTE SUPPLIER PHONE: NORMAL

## 2023-09-12 ENCOUNTER — DOCUMENTATION (OUTPATIENT)
Dept: SLEEP CENTER | Facility: CLINIC | Age: 78
End: 2023-09-12

## 2023-09-20 ENCOUNTER — OFFICE VISIT (OUTPATIENT)
Dept: VASCULAR SURGERY | Facility: CLINIC | Age: 78
End: 2023-09-20
Payer: COMMERCIAL

## 2023-09-20 VITALS
SYSTOLIC BLOOD PRESSURE: 112 MMHG | HEIGHT: 65 IN | DIASTOLIC BLOOD PRESSURE: 60 MMHG | HEART RATE: 80 BPM | OXYGEN SATURATION: 97 % | WEIGHT: 227 LBS | BODY MASS INDEX: 37.82 KG/M2

## 2023-09-20 DIAGNOSIS — I71.019 CHRONIC THORACIC AORTIC DISSECTION (HCC): Primary | ICD-10-CM

## 2023-09-20 PROCEDURE — 99214 OFFICE O/P EST MOD 30 MIN: CPT | Performed by: SURGERY

## 2023-09-20 NOTE — PATIENT INSTRUCTIONS
Chronic thoracic aortic dissection (HCC)  Type a aortic dissection status post repair of ascending aorta with subsequent left carotid to subclavian artery bypass and thoracic stent graft repair of the distal aortic arch and thoracic aorta. We discussed the findings on recent carotid duplex and CT angiogram from 1 year ago. The bypass is widely patent and at that time the residual dissection of the distal thoracic aorta and abdominal aorta was stable. He is due for follow-up CT angiogram which had been ordered by CT surgery but unfortunately not performed secondary to recent hospitalization. This will be rescheduled prior to his follow-up with CT surgery. We will also plan standard duplex follow-up of his carotid subclavian bypass in 1 year.

## 2023-09-20 NOTE — ASSESSMENT & PLAN NOTE
Type a aortic dissection status post repair of ascending aorta with subsequent left carotid to subclavian artery bypass and thoracic stent graft repair of the distal aortic arch and thoracic aorta. We discussed the findings on recent carotid duplex and CT angiogram from 1 year ago. The bypass is widely patent and at that time the residual dissection of the distal thoracic aorta and abdominal aorta was stable. He is due for follow-up CT angiogram which had been ordered by CT surgery but unfortunately not performed secondary to recent hospitalization. This will be rescheduled prior to his follow-up with CT surgery. We will also plan standard duplex follow-up of his carotid subclavian bypass in 1 year.

## 2023-09-20 NOTE — PROGRESS NOTES
Assessment/Plan:    Chronic thoracic aortic dissection (HCC)  Type a aortic dissection status post repair of ascending aorta with subsequent left carotid to subclavian artery bypass and thoracic stent graft repair of the distal aortic arch and thoracic aorta. We discussed the findings on recent carotid duplex and CT angiogram from 1 year ago. The bypass is widely patent and at that time the residual dissection of the distal thoracic aorta and abdominal aorta was stable. He is due for follow-up CT angiogram which had been ordered by CT surgery but unfortunately not performed secondary to recent hospitalization. This will be rescheduled prior to his follow-up with CT surgery. We will also plan standard duplex follow-up of his carotid subclavian bypass in 1 year. Diagnoses and all orders for this visit:    Chronic thoracic aortic dissection (HCC)  -     VAS carotid complete study; Future          Subjective:      Patient ID: Shannon Calderon. is a 69 y.o. male. Patient presents for review of CV done 8/4/2023. He denies TIA/CVA symptoms. He is taking ASA 81 mg and Atorvastatin. 80-year-old with history of type a aortic dissection following open repair 9/6/2017. 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 He now presents following recent carotid duplex. Of note he was admitted to the hospital 9/3/2023 following a fall. He states he lost balance and did not lose consciousness. On evaluation today he states overall he is doing well and walking with a cane. He denies any focal neurologic symptoms. Carotid duplex 8/4/2023 with less than 50% bilateral carotid stenosis. The left common carotid artery to subclavian artery bypass is widely patent. CT angiogram 9/1/2022 shows widely patent carotid subclavian bypass and thoracic stent graft.   Residual flow within the dissection below the stent graft in the upper abdomen with flow to the left renal artery via the false lumen. This remains stable without significant aneurysmal degeneration. The following portions of the patient's history were reviewed and updated as appropriate: allergies, current medications, past family history, past medical history, past social history, past surgical history and problem list     Past Medical History:  Past Medical History:   Diagnosis Date   • Arthritis    • CPAP (continuous positive airway pressure) dependence    • Diabetes ([address])    • Edema    • Hyperlipidemia    • Hypertension    • Prostate cancer [facility])     prostate   • Sleep apnea    • Stroke (cerebrum) ([address])    • Wears glasses        Past Surgical History:  Past Surgical History:   Procedure Laterality Date   • CARDIAC SURGERY     • CIRCUMCISION     • COLONOSCOPY     • IR CEREBRAL ANGIOGRAPHY / INTERVENTION  8/13/2018   • IR TEVAR  9/19/2018   • CT AS-AORT GRF W/CARD BYP & AORTIC ROOT RPLCMT N/A 9/6/2017    Procedure: ASCENDING AORTIC REPAIR WITH A 26MM  GELWEAVE STRAIGHT GRAFT AND HEMIARCH WITH 8MM SIDE ARM BRANCH;  Surgeon: Ron Huff MD;  [facility];  Service: Cardiac Surgery   • CT BYPASS W/VEIN CAROTID-BRACHIAL Left 8/13/2018    Procedure: BYPASS CAROTID SUBCLAVIAN WITH EMBOLIZATION OF PROXIMAL SUBCLAVIAN ARTERY. ;  Surgeon: Emelia Catalan MD;  [facility];  Service: Vascular   • CT EVASC RPR DTA COVERAGE ART ORIGIN 1ST ENDOPROSTH N/A 9/19/2018    Procedure: REPAIR ANEURSYM THORACIC ENDOVASCUALR DESCENDING AORTIC ANEURYSM (TEVAR); Surgeon: René Molina MD;  [facility];  Service: Cardiac Surgery   • CT EXPL PO HEMRRG THROMBOSIS/INFCTJ [id number] [address] N/A 9/6/2017    Procedure: RE-EXPLORATION MEDIASTERNAL CAVITY FOR POST-OP BLEED (BRING BACK);   Surgeon: Ron Huff MD;  [facility];  Service: Cardiac Surgery       Social History:  Social History     Substance and Sexual Activity   Alcohol Use Yes    Comment: social beer or two daily     Social History     Substance and Sexual Activity   Drug Use No     Social History     Tobacco Use   Smoking Status Never   Smokeless Tobacco Never       Family History:  Family History   Problem Relation Age of Onset   • Stroke Mother         complications   • Hypertension Mother    • Arthritis Mother    • No Known Problems Father        Allergies:  No Known Allergies    Medications:    Current Outpatient Medications:   •  amLODIPine (NORVASC) 10 mg tablet, TAKE 1 TABLET BY MOUTH TWICE A DAY, Disp: 180 tablet, Rfl: 2  •  aspirin 81 mg chewable tablet, Chew 1 tablet daily, Disp: 30 tablet, Rfl: 0  •  atorvastatin (LIPITOR) 80 mg tablet, TAKE 1 TABLET BY MOUTH EVERY DAY, Disp: 90 tablet, Rfl: 1  •  B-D ULTRAFINE III SHORT PEN 31G X 8 MM MISC, USE AS DIRECTED, Disp: 100 each, Rfl: 5  •  B-D ULTRAFINE III SHORT PEN 31G X 8 MM MISC, USE 2 TIMES DAILY AS DIRECTED, Disp: 100 each, Rfl: 3  •  finasteride (PROSCAR) 5 mg tablet, Take 5 mg by mouth daily, Disp: , Rfl:   •  glucose blood (OneTouch Verio) test strip, Use to check blood sugar, Disp: 100 strip, Rfl: 3  •  HUMALOG KWIKPEN 100 units/mL injection pen, INJECT 8 UNIT 3 TIMES DAILY BEFORE MEALS, Disp: 15 pen, Rfl: 5  •  insulin glargine (LANTUS) 100 units/mL subcutaneous injection, Inject 28 Units under the skin daily at bedtime Dx: Diabetes E11.9, dispense pens, Disp: 10 mL, Rfl: 0  •  insulin lispro (HumaLOG) 100 units/mL injection, Inject 8 Units under the skin 3 (three) times a day before meals Dx: Diabetes E11.9.  Dispense pens, Disp: 8 mL, Rfl: 0  •  Lantus SoloStar 100 units/mL SOPN, INJECT 28 UNIT BEDTIME, Disp: 15 mL, Rfl: 17  •  lisinopril-hydrochlorothiazide (PRINZIDE,ZESTORETIC) 20-12.5 MG per tablet, TAKE 2 TABLETS BY MOUTH EVERY DAY, Disp: 180 tablet, Rfl: 3  •  metoprolol tartrate (LOPRESSOR) 100 mg tablet, Take 1 tablet (100 mg total) by mouth 2 (two) times a day, Disp: 180 tablet, Rfl: 1    Vitals:  /60 (09/20/23 1314)    Temp Pulse 80 (09/20/23 1314)   Resp      SpO2 97 % (09/20/23 1314)      Lab Results and Cultures:   CBC with diff:   Lab Results   Component Value Date    WBC 8.56 09/04/2023    HGB 11.9 (L) 09/04/2023    HCT 35.0 (L) 09/04/2023    MCV 88 09/04/2023     09/04/2023    RBC 3.98 09/04/2023    MCH 29.9 09/04/2023    MCHC 34.0 09/04/2023    RDW 13.6 09/04/2023    MPV 8.9 09/04/2023    NRBC 0 09/03/2023   ,   BMP/CMP:  Lab Results   Component Value Date     10/07/2015    K 3.0 (L) 09/05/2023    K 3.6 10/07/2015     09/05/2023     10/07/2015    CO2 28 09/05/2023    CO2 29 08/13/2018    ANIONGAP 8 10/07/2015    BUN 10 09/05/2023    BUN 12 10/07/2015    CREATININE 0.62 09/05/2023    CREATININE 0.65 10/07/2015    GLUCOSE 153 (H) 08/13/2018    GLUCOSE 182 (H) 10/07/2015    CALCIUM 8.9 09/05/2023    CALCIUM 9.0 10/07/2015    AST 23 09/03/2023    AST 30 10/07/2015    ALT 18 09/03/2023    ALT 57 10/07/2015    ALKPHOS 82 09/03/2023    ALKPHOS 57 10/07/2015    PROT 7.5 10/07/2015    BILITOT 0.65 10/07/2015    EGFR 95 09/05/2023    EGFR >60.0 08/28/2016   ,   Lipid Panel:   Lab Results   Component Value Date    CHOL 167 10/07/2015   ,   Coags:   Lab Results   Component Value Date    PTT 31 09/11/2018    PTT 27 01/16/2014    INR 1.03 09/11/2018    INR 1.06 01/16/2014   ,     . Review of Systems   Constitutional: Negative. HENT: Negative. Eyes: Negative. Negative for visual disturbance. Respiratory: Negative. Cardiovascular: Negative. Gastrointestinal: Negative. Endocrine: Negative. Genitourinary: Negative. Musculoskeletal: Negative. Skin: Negative. Allergic/Immunologic: Negative. Neurological: Negative. Negative for dizziness, facial asymmetry, speech difficulty, weakness, light-headedness and headaches. Hematological: Negative. Psychiatric/Behavioral: Negative.           Objective:      /60 (BP Location: Left arm, Patient Position: Sitting, Cuff Size: Standard) Pulse 80   Ht 5' 4.57" (1.64 m)   Wt 103 kg (227 lb)   SpO2 97%   BMI 38.28 kg/m²          Physical Exam  Constitutional:       Appearance: He is well-developed. HENT:      Head: Normocephalic and atraumatic. Eyes:      Pupils: Pupils are equal, round, and reactive to light. Neck:      Vascular: Carotid bruit present. No JVD. Cardiovascular:      Rate and Rhythm: Normal rate and regular rhythm. Pulses:           Carotid pulses are 2+ on the right side and 2+ on the left side. Radial pulses are 2+ on the right side and 2+ on the left side. Heart sounds: Murmur heard. Pulmonary:      Effort: Pulmonary effort is normal. No respiratory distress. Breath sounds: Normal breath sounds. Musculoskeletal:         General: No tenderness. Normal range of motion. Cervical back: Normal range of motion and neck supple. Skin:     General: Skin is warm and dry. Neurological:      General: No focal deficit present. Mental Status: He is alert and oriented to person, place, and time. Sensory: No sensory deficit. Motor: No weakness. Gait: Gait abnormal (Uses a cane).    Psychiatric:         Mood and Affect: Mood normal.

## 2023-09-20 NOTE — LETTER
September 20, 2023     [place], [address] [facility] [facility]    Patient: Jenna Catherine. YOB: 1945   Date of Visit: 9/20/2023       Dear Dr. Sebas Calero: Thank you for referring Ronnie Waterman to me for evaluation. Below are the relevant portions of my assessment and plan of care. Chronic thoracic aortic dissection (HCC)  Type a aortic dissection status post repair of ascending aorta with subsequent left carotid to subclavian artery bypass and thoracic stent graft repair of the distal aortic arch and thoracic aorta. We discussed the findings on recent carotid duplex and CT angiogram from 1 year ago. The bypass is widely patent and at that time the residual dissection of the distal thoracic aorta and abdominal aorta was stable. He is due for follow-up CT angiogram which had been ordered by CT surgery but unfortunately not performed secondary to recent hospitalization. This will be rescheduled prior to his follow-up with CT surgery. We will also plan standard duplex follow-up of his carotid subclavian bypass in 1 year. If you have questions, please do not hesitate to call me. I look forward to following Markel Weeks along with you.          Sincerely,        Ange Moreno MD        CC: Tonja Sosa MD

## 2023-09-21 ENCOUNTER — HOSPITAL ENCOUNTER (OUTPATIENT)
Dept: CT IMAGING | Facility: HOSPITAL | Age: 78
Discharge: HOME | End: 2023-09-21
Attending: THORACIC SURGERY (CARDIOTHORACIC VASCULAR SURGERY)
Payer: COMMERCIAL

## 2023-09-21 DIAGNOSIS — Z98.890 S/P ASCENDING AORTIC ANEURYSM REPAIR: Chronic | ICD-10-CM

## 2023-09-21 DIAGNOSIS — Z86.79 S/P ASCENDING AORTIC ANEURYSM REPAIR: Chronic | ICD-10-CM

## 2023-09-21 PROCEDURE — G1004 CDSM NDSC: HCPCS

## 2023-09-21 PROCEDURE — 74174 CTA ABD&PLVS W/CONTRAST: CPT

## 2023-09-21 PROCEDURE — 71275 CT ANGIOGRAPHY CHEST: CPT

## 2023-09-21 RX ADMIN — IOHEXOL 100 ML: 350 INJECTION, SOLUTION INTRAVENOUS at 11:17

## 2023-09-29 ENCOUNTER — OFFICE VISIT (OUTPATIENT)
Dept: CARDIAC SURGERY | Facility: CLINIC | Age: 78
End: 2023-09-29
Payer: COMMERCIAL

## 2023-09-29 VITALS
WEIGHT: 227 LBS | HEIGHT: 64 IN | HEART RATE: 64 BPM | DIASTOLIC BLOOD PRESSURE: 58 MMHG | OXYGEN SATURATION: 98 % | BODY MASS INDEX: 38.76 KG/M2 | SYSTOLIC BLOOD PRESSURE: 112 MMHG

## 2023-09-29 DIAGNOSIS — Z86.79 H/O REPAIR OF DISSECTING ANEURYSM OF DESCENDING THORACIC AORTA: Chronic | ICD-10-CM

## 2023-09-29 DIAGNOSIS — Z98.890 S/P ASCENDING AORTIC ANEURYSM REPAIR: Primary | Chronic | ICD-10-CM

## 2023-09-29 DIAGNOSIS — Z98.890 H/O REPAIR OF DISSECTING ANEURYSM OF DESCENDING THORACIC AORTA: Chronic | ICD-10-CM

## 2023-09-29 DIAGNOSIS — Z86.79 S/P ASCENDING AORTIC ANEURYSM REPAIR: Primary | Chronic | ICD-10-CM

## 2023-09-29 DIAGNOSIS — Z95.828 S/P VASCULAR BYPASS: Chronic | ICD-10-CM

## 2023-09-29 PROCEDURE — 99214 OFFICE O/P EST MOD 30 MIN: CPT | Performed by: THORACIC SURGERY (CARDIOTHORACIC VASCULAR SURGERY)

## 2023-09-29 NOTE — PROGRESS NOTES
Aortic Surveillance - Cardiothoracic Surgery   Rody Records. 69 y.o. male MRN: [id number]    History of Present Illness: Rody Moss. is a 69y.o. year old male who presents to the aortic clinic for a 1 year follow-up with scan. Past cardiac sig for: ASCENDING AORTIC REPAIR WITH A 26MM  GELWEAVE STRAIGHT GRAFT AND HEMIARCH WITH 8MM SIDE ARM BRANCH 9/6/2017 for acute type A aortic dissection, BYPASS CAROTID SUBCLAVIAN WITH EMBOLIZATION OF PROXIMAL SUBCLAVIAN ARTERY 8/13/2018 in anticipation of TEVAR need for landing zone & REPAIR ANEURSYM THORACIC ENDOVASCUALR DESCENDING AORTIC ANEURYSM 9/19/2018. His last visit was on 9/16/2022 which the aortic repairs & residual DTA/AA dissection were found to be stable. Upon radiologic examination today, the aortic repairs as well as residual DTA/AA dissection are found to be stable w/o endoleak or pseudoaneurysm formation. He admits to abiding by his lifting & exertion restrictions since his last visit. He is active & reports walking daily. He denies chest pain, palpitations, SOB, SHERIDAN, LE edema b/l, back pain, arm pain, numbness/tingling/paresthesias in UE b/l, HA, lightheadedness, dizziness, presyncopal symptoms, or syncopal events today or since his last visit. Changes to his medical history since his last visit consist of ED visit due to fall requiring stitches to lip/face & stay in hospital til bed bug infestation tx at home. He denies any family history of aortic aneurysms or sudden cardiac death. He sees Dr. Katrina Sheikh in the past ([facility] 2020) as his cardiologist. Now appears to have Dr. Elizabeth Tinoco (PCP) to manage his cardiac medications including Norvasc 10mg, Lisinopril-HCTZ 20-12.5mg, Lopressor 100mg. Denies tobacco use, ETOH use, or drug use of any kind.     Past Medical History:  Past Medical History:   Diagnosis Date   • Ambulatory dysfunction     RW & cane use   • Aortic dissection (HCC)     type A & B, s/p ascending aortic replcament, s/p left carotid-SCA bypass, s/p TEVAR   • Arthritis    • Diabetes ([address])     tyep 2, insulin dependent   • History of colonic polyps    • History of CVA (cerebrovascular accident)     cerebellar stroke   • Hyperlipidemia    • Hypertension    • Infestation by bed bug     in past, s/p decontamination   • Obesity    • KARIN on CPAP    • PAF (paroxysmal atrial fibrillation) ([address])     in past, no AC presently   • Prostate cancer ([address])     prostate   • Wears glasses      Past Surgical History:   Past Surgical History:   Procedure Laterality Date   • CARDIAC SURGERY     • CIRCUMCISION     • COLONOSCOPY     • IR CEREBRAL ANGIOGRAPHY / INTERVENTION  8/13/2018   • IR TEVAR  9/19/2018   • VT AS-AORT GRF W/CARD BYP & AORTIC ROOT RPLCMT N/A 9/6/2017    Procedure: ASCENDING AORTIC REPAIR WITH A 26MM  GELWEAVE STRAIGHT GRAFT AND HEMIARCH WITH 8MM SIDE ARM BRANCH;  Surgeon: Marie Salamanca MD;  [facility];  Service: Cardiac Surgery   • VT BYPASS W/VEIN CAROTID-BRACHIAL Left 8/13/2018    Procedure: BYPASS CAROTID SUBCLAVIAN WITH EMBOLIZATION OF PROXIMAL SUBCLAVIAN ARTERY. ;  Surgeon: Anette Saint, MD;  [facility];  Service: Vascular   • VT EVASC RPR DTA COVERAGE ART ORIGIN 1ST ENDOPROSTH N/A 9/19/2018    Procedure: REPAIR ANEURSYM THORACIC ENDOVASCUALR DESCENDING AORTIC ANEURYSM (TEVAR); Surgeon: Vikki Upton MD;  [facility];  Service: Cardiac Surgery   • VT EXPL PO HEMRRG THROMBOSIS/INFCTJ [id number] N/A 9/6/2017    Procedure: RE-EXPLORATION MEDIASTERNAL CAVITY FOR POST-OP BLEED (BRING BACK);   Surgeon: Marie Salamanca MD;  [facility];  Service: Cardiac Surgery     Family History:  Family History   Problem Relation Age of Onset   • Stroke Mother         complications   • Hypertension Mother    • Arthritis Mother    • No Known Problems Father      Social History:  Social History     Substance and Sexual Activity   Alcohol Use Yes    Comment: social beer or two daily     Social History     Substance and Sexual Activity   Drug Use No     Social History     Tobacco Use   Smoking Status Never   Smokeless Tobacco Never       Home Medications:   Prior to Admission medications    Medication Sig Start Date End Date Taking? Authorizing Provider   amLODIPine (NORVASC) 10 mg tablet TAKE 1 TABLET BY MOUTH TWICE A DAY 8/21/23   MAUREEN Hardin   aspirin 81 mg chewable tablet Chew 1 tablet daily 9/14/17   Palomo Brown MD   atorvastatin (LIPITOR) 80 mg tablet TAKE 1 TABLET BY MOUTH EVERY DAY 9/5/23   MAUREEN Hardin   B-D ULTRAFINE III SHORT PEN 31G X 8 MM MISC USE AS DIRECTED 10/18/21   Isma Clark, DO   B-D ULTRAFINE III SHORT PEN 31G X 8 MM MISC USE 2 TIMES DAILY AS DIRECTED 3/20/23   sIma Clark, DO   finasteride (PROSCAR) 5 mg tablet Take 5 mg by mouth daily 1/27/20   Historical Provider, MD   glucose blood (OneTouch Verio) test strip Use to check blood sugar 8/4/22   Isma Clark, DO   HUMALOG KWIKPEN 100 units/mL injection pen INJECT 8 UNIT 3 TIMES DAILY BEFORE MEALS 1/9/20   Isma Clark, DO   insulin glargine (LANTUS) 100 units/mL subcutaneous injection Inject 28 Units under the skin daily at bedtime Dx: Diabetes E11.9, dispense pens 9/14/17   Palomo Brown MD   insulin lispro (HumaLOG) 100 units/mL injection Inject 8 Units under the skin 3 (three) times a day before meals Dx: Diabetes E11.9. Dispense pens 9/14/17   Palomo Brown MD   Lantus SoloStar 100 units/mL SOPN INJECT 28 UNIT BEDTIME 11/15/22   Isma Clark, DO   lisinopril-hydrochlorothiazide (PRINZIDE,ZESTORETIC) 20-12.5 MG per tablet TAKE 2 TABLETS BY MOUTH EVERY DAY 7/20/22   Lei Grove, DO   metoprolol tartrate (LOPRESSOR) 100 mg tablet Take 1 tablet (100 mg total) by mouth 2 (two) times a day 6/26/23   Isma Presume, DO       Allergies:  No Known Allergies    Review of Systems:     Review of Systems   Constitutional: Negative. Negative for activity change, diaphoresis, fatigue and unexpected weight change. Respiratory: Negative. Negative for chest tightness, shortness of breath and wheezing. Cardiovascular: Positive for leg swelling. Negative for chest pain and palpitations. Gastrointestinal: Negative. Negative for blood in stool, constipation, diarrhea, nausea and vomiting. Genitourinary: Negative. Musculoskeletal: Negative. Negative for arthralgias, back pain, gait problem and myalgias. Skin: Negative. Neurological: Negative. Negative for dizziness, syncope, weakness, light-headedness, numbness and headaches. Hematological: Negative. Does not bruise/bleed easily. All other systems reviewed and are negative. Vital Signs:     Vitals:    09/29/23 1250 09/29/23 1258   BP: 123/67 112/58   BP Location: Left arm Right arm   Patient Position: Sitting Sitting   Cuff Size: Large Large   Pulse: 64    SpO2: 98%    Weight: 103 kg (227 lb)    Height: 5' 4" (1.626 m)        Physical Exam:     Physical Exam  Constitutional:       General: He is not in acute distress. Appearance: Normal appearance. He is well-developed. He is obese. He is not ill-appearing or toxic-appearing. Comments: Sitting on exam table in NAD   HENT:      Head: Normocephalic and atraumatic. Neck:      Vascular: No carotid bruit or JVD. Trachea: Trachea normal.   Cardiovascular:      Rate and Rhythm: Normal rate and regular rhythm. Chest Wall: PMI is not displaced. Heart sounds: S1 normal and S2 normal. Murmur heard. Systolic murmur is present with a grade of 2/6. No friction rub. No gallop. No S3 or S4 sounds. Comments: No heaves/lifts on palpation, occasional dropped beat auscultated  Pulmonary:      Effort: Pulmonary effort is normal. No accessory muscle usage or respiratory distress. Breath sounds: Normal breath sounds. No wheezing, rhonchi or rales. Abdominal:      General: Bowel sounds are normal. There is no distension. Palpations: Abdomen is not rigid. There is no mass. Tenderness: There is no abdominal tenderness. There is no guarding or rebound. Musculoskeletal:      Cervical back: Normal range of motion and neck supple. Skin:     General: Skin is warm and dry. Comments: Lymphedema to b/l LE; sternum stable w/ deep inspiration/coughing; sternotomy incision healed & C/D/I w/o evidence erythema or drainage   Neurological:      Mental Status: He is alert and oriented to person, place, and time. Cranial Nerves: No cranial nerve deficit. Sensory: No sensory deficit. Comments: No focal deficits         Lab Results:               Invalid input(s): "LABGLOM"      Lab Results   Component Value Date    HGBA1C 7.4 (A) 07/06/2023     Lab Results   Component Value Date    CKTOTAL 72 01/15/2014    TROPONINI 0.03 09/06/2017       Imaging Studies:     CTA Chest/Abdomen/Pelvis 9/21/2023: stable post-op changes, patent endograft, patent jump graft from L carotid to SCA    Carotid US 8/4/2023: <50% ICA stenosis b/l, antegrade flow b/l, no subclavian disease b/l    I have personally reviewed pertinent reports.    and I have personally reviewed pertinent films in PACS    Assessment:  Patient Active Problem List    Diagnosis Date Noted   • Ambulatory dysfunction 09/03/2023   • Infestation by bed bug 09/03/2023   • Hyponatremia 09/03/2023   • Type 2 diabetes mellitus with hyperglycemia, unspecified whether long term insulin use ([address]) 10/26/2022   • Edema    • Encounter for postoperative care 10/18/2018   • 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 (TEVAR) 09/19/2018   • Descending aortic aneurysm ([address]) 09/07/2018   • S/P vascular bypass 08/14/2018   • Adenomatous polyp of descending colon 07/02/2018   • 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   • KARIN (obstructive sleep apnea) 03/12/2018   • Class 2 severe obesity due to excess calories with serious comorbidity and body mass index (BMI) of 37.0 to 37.9 in adult [facility]) 03/12/2018   • Paroxysmal atrial fibrillation ([address]) 09/11/2017   • Chronic thoracic aortic dissection ([address]) 09/06/2017   • S/P ascending aortic aneurysm repair 09/06/2017   • Hypokalemia 09/06/2017   • Prostate cancer [facility])    • Hypertension    • Diabetes mellitus ([address])    • Hyperlipidemia 06/07/2012     S/p ascending aortic replacement; Ongoing aortic surveillance  S/p TEVAR; Ongoing aortic survaillance    Plan:     CTA chest/abdomen/pelvis performed prior to the visit today was reviewed. Aortic interventions as well as residual dissection appear stable w/o endoleak or pseudoaneurysm formation  These findings were confirmed and shared with the patient today. As there has been no interval enlargement since 9/2022,  follow-up monitoring is the treatment plan. Arrangements have been made for future surveillance to be completed with CTA chest/abdomen/pelvis in 2 Years. His PCP should consider TTE for his new cardiac murmur. Lynsey Milligan. does not meet the following Ascending aortic aneurysm surgical triggers:    * 4.5 cm or > in the setting of + FH of rupture or dissection  * 5 cm with moderate or worse aortic valve disease  *  5.5 cm regardless of aortic valve function and without genetically associated aortic disease   *  Aortic growth > 4mm / year  *  Bicuspid aortic valve with valve dysfunction enough to meet indications for surgery and concomitant ascending aortic aneurysm 4.5 cm or >  * 4.5 cm or > in setting of existing connective tissue disease of Marfan's syndrome, Kevan-Danlos syndrome or familiar aneurysms syndrome      Lynsey Stephensandrei. was comfortable with our recommendations, and their questions were answered to their satisfaction. Thank you for allowing us to participate in the care of this patient. Aortic Aneurysm Instructions were provided to the patient as follows:    1. No lifting more than 50 pounds  2. Maintain a controlled blood pressure with a goal of 120/80.   3. Follow up in Aortic Clinic as recommended with radiology follow up as instructed  4. Report to the ER or call 911 immediately with the following signs / symptoms: sudden onset of back pain, chest pain or shortness of breath.     Routine referral to gastroenterology for colonoscopy screening was not indicated, as the patient is over 77years old    SIGNATURE: Maris Bustamante PA-C  DATE: 09/29/23  TIME: 1:16 PM

## 2023-09-29 NOTE — PATIENT INSTRUCTIONS
Education was provided in regards to the followin.)  Maintaining good blood pressure control and taking antihypertensive medications as prescribed. 2.)  No strenuous activity involving lifting more than 50lbs. 3.)  Awareness of the warning symptoms of aortic dissection such as chest pain, back pain, shortness of breath, lightheadedness or dizziness and to seek immediate medical attention at the nearest ER.  4.)  The importance of continued surveillance with visits in the Aortic Disease clinic and obtaining CT Scans as recommended. 5.) Importance of avoiding tobacco products.

## 2023-12-23 DIAGNOSIS — I10 HTN (HYPERTENSION), BENIGN: ICD-10-CM

## 2023-12-26 RX ORDER — METOPROLOL TARTRATE 100 MG/1
100 TABLET ORAL 2 TIMES DAILY
Qty: 180 TABLET | Refills: 1 | Status: SHIPPED | OUTPATIENT
Start: 2023-12-26

## 2023-12-29 DIAGNOSIS — E11.9 TYPE 2 DIABETES MELLITUS WITHOUT COMPLICATION, UNSPECIFIED WHETHER LONG TERM INSULIN USE (HCC): ICD-10-CM

## 2024-01-02 RX ORDER — INSULIN GLARGINE 100 [IU]/ML
INJECTION, SOLUTION SUBCUTANEOUS
Qty: 15 ML | Refills: 17 | Status: SHIPPED | OUTPATIENT
Start: 2024-01-02

## 2024-02-27 ENCOUNTER — APPOINTMENT (OUTPATIENT)
Dept: LAB | Facility: CLINIC | Age: 79
End: 2024-02-27
Payer: COMMERCIAL

## 2024-02-27 DIAGNOSIS — R39.12 WEAK URINARY STREAM: ICD-10-CM

## 2024-02-27 DIAGNOSIS — Z85.46 PERSONAL HISTORY OF MALIGNANT NEOPLASM OF PROSTATE: ICD-10-CM

## 2024-02-27 DIAGNOSIS — R35.1 NOCTURIA: ICD-10-CM

## 2024-02-27 LAB — PSA SERPL-MCNC: 0.06 NG/ML (ref 0–4)

## 2024-02-27 PROCEDURE — G0103 PSA SCREENING: HCPCS

## 2024-02-27 PROCEDURE — 36415 COLL VENOUS BLD VENIPUNCTURE: CPT

## 2024-04-18 DIAGNOSIS — E11.9 TYPE 2 DIABETES MELLITUS WITHOUT COMPLICATION, UNSPECIFIED WHETHER LONG TERM INSULIN USE (HCC): ICD-10-CM

## 2024-04-18 DIAGNOSIS — E11.9 TYPE 2 DIABETES MELLITUS WITHOUT COMPLICATION (HCC): ICD-10-CM

## 2024-04-18 RX ORDER — PEN NEEDLE, DIABETIC 31 GX5/16"
NEEDLE, DISPOSABLE MISCELLANEOUS
Qty: 100 EACH | Refills: 0 | Status: CANCELLED | OUTPATIENT
Start: 2024-04-18

## 2024-04-18 RX ORDER — PEN NEEDLE, DIABETIC 31 GX5/16"
NEEDLE, DISPOSABLE MISCELLANEOUS
Qty: 60 EACH | Refills: 0 | Status: SHIPPED | OUTPATIENT
Start: 2024-04-18

## 2024-04-18 NOTE — TELEPHONE ENCOUNTER
Reason for call:   [x] Refill   [] Prior Auth  [] Other:     Office:   [x] PCP/Provider -   [] Specialty/Provider -     Medication:     Humalog Kwikpen 100 units/mL injection pen. Inject 8 units 3x daily before meals #15 pen     B-D Ultrafine lll Short Pen 31G X 8 MM MISC. Use as directed #100 each     Pharmacy: [facility]/pharmacy #[id number]  ISABELLE STEIN - [address] [place] [address] [phone]     Does the patient have enough for 3 days?   [] Yes   [x] No - Send as HP to POD

## 2024-04-18 NOTE — TELEPHONE ENCOUNTER
Removed order for pen needles, last ordered 10/18/21. Replaced with more current prescription and directions, last ordered 03/20/23

## 2024-04-22 RX ORDER — INSULIN LISPRO 100 [IU]/ML
8 INJECTION, SOLUTION INTRAVENOUS; SUBCUTANEOUS
Qty: 15 ML | Refills: 5 | Status: SHIPPED | OUTPATIENT
Start: 2024-04-22

## 2024-05-15 DIAGNOSIS — I10 HTN (HYPERTENSION), BENIGN: ICD-10-CM

## 2024-05-15 DIAGNOSIS — E78.5 DYSLIPIDEMIA: ICD-10-CM

## 2024-05-15 RX ORDER — AMLODIPINE BESYLATE 10 MG/1
TABLET ORAL
Qty: 180 TABLET | Refills: 0 | Status: SHIPPED | OUTPATIENT
Start: 2024-05-15

## 2024-05-15 RX ORDER — ATORVASTATIN CALCIUM 80 MG/1
80 TABLET, FILM COATED ORAL DAILY
Qty: 90 TABLET | Refills: 0 | Status: SHIPPED | OUTPATIENT
Start: 2024-05-15

## 2024-05-24 DIAGNOSIS — E11.9 TYPE 2 DIABETES MELLITUS WITHOUT COMPLICATION (HCC): ICD-10-CM

## 2024-05-25 RX ORDER — PEN NEEDLE, DIABETIC 31 GX5/16"
NEEDLE, DISPOSABLE MISCELLANEOUS
Qty: 60 EACH | Refills: 0 | Status: SHIPPED | OUTPATIENT
Start: 2024-05-25

## 2024-06-20 DIAGNOSIS — I10 HTN (HYPERTENSION), BENIGN: ICD-10-CM

## 2024-06-20 RX ORDER — METOPROLOL TARTRATE 100 MG/1
100 TABLET ORAL 2 TIMES DAILY
Qty: 30 TABLET | Refills: 0 | Status: SHIPPED | OUTPATIENT
Start: 2024-06-20

## 2024-06-24 DIAGNOSIS — I10 HTN (HYPERTENSION), BENIGN: ICD-10-CM

## 2024-06-24 DIAGNOSIS — E78.5 DYSLIPIDEMIA: ICD-10-CM

## 2024-06-25 RX ORDER — AMLODIPINE BESYLATE 10 MG/1
TABLET ORAL
Qty: 180 TABLET | Refills: 0 | Status: SHIPPED | OUTPATIENT
Start: 2024-06-25

## 2024-06-25 RX ORDER — ATORVASTATIN CALCIUM 80 MG/1
80 TABLET, FILM COATED ORAL DAILY
Qty: 90 TABLET | Refills: 0 | Status: SHIPPED | OUTPATIENT
Start: 2024-06-25

## 2024-07-02 ENCOUNTER — APPOINTMENT (OUTPATIENT)
Dept: LAB | Facility: CLINIC | Age: 79
End: 2024-07-02
Payer: COMMERCIAL

## 2024-07-02 ENCOUNTER — OFFICE VISIT (OUTPATIENT)
Dept: FAMILY MEDICINE CLINIC | Facility: CLINIC | Age: 79
End: 2024-07-02
Payer: MEDICARE

## 2024-07-02 VITALS
DIASTOLIC BLOOD PRESSURE: 68 MMHG | SYSTOLIC BLOOD PRESSURE: 110 MMHG | TEMPERATURE: 97.9 F | BODY MASS INDEX: 38.96 KG/M2 | HEIGHT: 64 IN | HEART RATE: 66 BPM | OXYGEN SATURATION: 98 %

## 2024-07-02 DIAGNOSIS — I10 HTN (HYPERTENSION), BENIGN: ICD-10-CM

## 2024-07-02 DIAGNOSIS — Z86.79 H/O REPAIR OF DISSECTING ANEURYSM OF DESCENDING THORACIC AORTA: Chronic | ICD-10-CM

## 2024-07-02 DIAGNOSIS — I10 PRIMARY HYPERTENSION: Chronic | ICD-10-CM

## 2024-07-02 DIAGNOSIS — Z12.11 SCREENING FOR COLON CANCER: ICD-10-CM

## 2024-07-02 DIAGNOSIS — Z00.00 HEALTH CARE MAINTENANCE: ICD-10-CM

## 2024-07-02 DIAGNOSIS — R60.9 EDEMA, UNSPECIFIED TYPE: ICD-10-CM

## 2024-07-02 DIAGNOSIS — Z00.00 MEDICARE ANNUAL WELLNESS VISIT, SUBSEQUENT: Primary | ICD-10-CM

## 2024-07-02 DIAGNOSIS — E11.65 TYPE 2 DIABETES MELLITUS WITH HYPERGLYCEMIA, UNSPECIFIED WHETHER LONG TERM INSULIN USE (HCC): ICD-10-CM

## 2024-07-02 DIAGNOSIS — E78.5 HYPERLIPIDEMIA, UNSPECIFIED HYPERLIPIDEMIA TYPE: Chronic | ICD-10-CM

## 2024-07-02 DIAGNOSIS — E66.09 CLASS 2 OBESITY DUE TO EXCESS CALORIES WITH BODY MASS INDEX (BMI) OF 38.0 TO 38.9 IN ADULT, UNSPECIFIED WHETHER SERIOUS COMORBIDITY PRESENT: ICD-10-CM

## 2024-07-02 DIAGNOSIS — I48.0 PAROXYSMAL ATRIAL FIBRILLATION (HCC): ICD-10-CM

## 2024-07-02 DIAGNOSIS — C61 PROSTATE CANCER (HCC): ICD-10-CM

## 2024-07-02 DIAGNOSIS — I48.0 PAROXYSMAL ATRIAL FIBRILLATION (HCC): Chronic | ICD-10-CM

## 2024-07-02 DIAGNOSIS — E78.5 HYPERLIPIDEMIA, UNSPECIFIED HYPERLIPIDEMIA TYPE: ICD-10-CM

## 2024-07-02 DIAGNOSIS — G47.33 OSA (OBSTRUCTIVE SLEEP APNEA): Chronic | ICD-10-CM

## 2024-07-02 DIAGNOSIS — Z98.890 H/O REPAIR OF DISSECTING ANEURYSM OF DESCENDING THORACIC AORTA: Chronic | ICD-10-CM

## 2024-07-02 LAB
ALBUMIN SERPL BCG-MCNC: 4.1 G/DL (ref 3.5–5)
ALP SERPL-CCNC: 84 U/L (ref 34–104)
ALT SERPL W P-5'-P-CCNC: 15 U/L (ref 7–52)
ANION GAP SERPL CALCULATED.3IONS-SCNC: 7 MMOL/L (ref 4–13)
AST SERPL W P-5'-P-CCNC: 22 U/L (ref 13–39)
BASOPHILS # BLD AUTO: 0.02 THOUSANDS/ÂΜL (ref 0–0.1)
BASOPHILS NFR BLD AUTO: 0 % (ref 0–1)
BILIRUB SERPL-MCNC: 0.82 MG/DL (ref 0.2–1)
BUN SERPL-MCNC: 13 MG/DL (ref 5–25)
CALCIUM SERPL-MCNC: 9.2 MG/DL (ref 8.4–10.2)
CHLORIDE SERPL-SCNC: 103 MMOL/L (ref 96–108)
CHOLEST SERPL-MCNC: 109 MG/DL
CO2 SERPL-SCNC: 29 MMOL/L (ref 21–32)
CREAT SERPL-MCNC: 0.77 MG/DL (ref 0.6–1.3)
CREAT UR-MCNC: 153.8 MG/DL
EOSINOPHIL # BLD AUTO: 0.34 THOUSAND/ÂΜL (ref 0–0.61)
EOSINOPHIL NFR BLD AUTO: 4 % (ref 0–6)
ERYTHROCYTE [DISTWIDTH] IN BLOOD BY AUTOMATED COUNT: 15.1 % (ref 11.6–15.1)
EST. AVERAGE GLUCOSE BLD GHB EST-MCNC: 151 MG/DL
GFR SERPL CREATININE-BSD FRML MDRD: 86 ML/MIN/1.73SQ M
GLUCOSE P FAST SERPL-MCNC: 79 MG/DL (ref 65–99)
HBA1C MFR BLD: 6.9 %
HCT VFR BLD AUTO: 40.1 % (ref 36.5–49.3)
HDLC SERPL-MCNC: 40 MG/DL
HGB BLD-MCNC: 13.1 G/DL (ref 12–17)
IMM GRANULOCYTES # BLD AUTO: 0.02 THOUSAND/UL (ref 0–0.2)
IMM GRANULOCYTES NFR BLD AUTO: 0 % (ref 0–2)
LDLC SERPL CALC-MCNC: 53 MG/DL (ref 0–100)
LYMPHOCYTES # BLD AUTO: 1.24 THOUSANDS/ÂΜL (ref 0.6–4.47)
LYMPHOCYTES NFR BLD AUTO: 14 % (ref 14–44)
MCH RBC QN AUTO: 28.7 PG (ref 26.8–34.3)
MCHC RBC AUTO-ENTMCNC: 32.7 G/DL (ref 31.4–37.4)
MCV RBC AUTO: 88 FL (ref 82–98)
MICROALBUMIN UR-MCNC: 65.9 MG/L
MICROALBUMIN/CREAT 24H UR: 43 MG/G CREATININE (ref 0–30)
MONOCYTES # BLD AUTO: 0.58 THOUSAND/ÂΜL (ref 0.17–1.22)
MONOCYTES NFR BLD AUTO: 7 % (ref 4–12)
NEUTROPHILS # BLD AUTO: 6.72 THOUSANDS/ÂΜL (ref 1.85–7.62)
NEUTS SEG NFR BLD AUTO: 75 % (ref 43–75)
NRBC BLD AUTO-RTO: 0 /100 WBCS
PLATELET # BLD AUTO: 228 THOUSANDS/UL (ref 149–390)
PMV BLD AUTO: 10.3 FL (ref 8.9–12.7)
POTASSIUM SERPL-SCNC: 4.2 MMOL/L (ref 3.5–5.3)
PROT SERPL-MCNC: 7.7 G/DL (ref 6.4–8.4)
RBC # BLD AUTO: 4.57 MILLION/UL (ref 3.88–5.62)
SODIUM SERPL-SCNC: 139 MMOL/L (ref 135–147)
T4 FREE SERPL-MCNC: 0.7 NG/DL (ref 0.61–1.12)
TRIGL SERPL-MCNC: 80 MG/DL
TSH SERPL DL<=0.05 MIU/L-ACNC: 7.81 UIU/ML (ref 0.45–4.5)
WBC # BLD AUTO: 8.92 THOUSAND/UL (ref 4.31–10.16)

## 2024-07-02 PROCEDURE — 84443 ASSAY THYROID STIM HORMONE: CPT

## 2024-07-02 PROCEDURE — 80061 LIPID PANEL: CPT

## 2024-07-02 PROCEDURE — G0439 PPPS, SUBSEQ VISIT: HCPCS | Performed by: FAMILY MEDICINE

## 2024-07-02 PROCEDURE — 85025 COMPLETE CBC W/AUTO DIFF WBC: CPT

## 2024-07-02 PROCEDURE — 82043 UR ALBUMIN QUANTITATIVE: CPT

## 2024-07-02 PROCEDURE — 82570 ASSAY OF URINE CREATININE: CPT

## 2024-07-02 PROCEDURE — 80053 COMPREHEN METABOLIC PANEL: CPT

## 2024-07-02 PROCEDURE — 36415 COLL VENOUS BLD VENIPUNCTURE: CPT

## 2024-07-02 PROCEDURE — 83036 HEMOGLOBIN GLYCOSYLATED A1C: CPT

## 2024-07-02 PROCEDURE — 84439 ASSAY OF FREE THYROXINE: CPT

## 2024-07-02 PROCEDURE — 99214 OFFICE O/P EST MOD 30 MIN: CPT | Performed by: FAMILY MEDICINE

## 2024-07-02 RX ORDER — LISINOPRIL AND HYDROCHLOROTHIAZIDE 20; 12.5 MG/1; MG/1
2 TABLET ORAL DAILY
Qty: 180 TABLET | Refills: 3 | Status: SHIPPED | OUTPATIENT
Start: 2024-07-02

## 2024-07-02 NOTE — PATIENT INSTRUCTIONS
Medicare Preventive Visit Patient Instructions  Thank you for completing your Welcome to Medicare Visit or Medicare Annual Wellness Visit today. Your next wellness visit will be due in one year (7/3/2025).  The screening/preventive services that you may require over the next 5-10 years are detailed below. Some tests may not apply to you based off risk factors and/or age. Screening tests ordered at today's visit but not completed yet may show as past due. Also, please note that scanned in results may not display below.  Preventive Screenings:  Service Recommendations Previous Testing/Comments   Colorectal Cancer Screening  Colonoscopy    Fecal Occult Blood Test (FOBT)/Fecal Immunochemical Test (FIT)  Fecal DNA/Cologuard Test  Flexible Sigmoidoscopy Age: 45-75 years old   Colonoscopy: every 10 years (May be performed more frequently if at higher risk)  OR  FOBT/FIT: every 1 year  OR  Cologuard: every 3 years  OR  Sigmoidoscopy: every 5 years  Screening may be recommended earlier than age 45 if at higher risk for colorectal cancer. Also, an individualized decision between you and your healthcare provider will decide whether screening between the ages of 76-85 would be appropriate. Colonoscopy: Not on file  FOBT/FIT: Not on file  Cologuard: Not on file  Sigmoidoscopy: Not on file          Prostate Cancer Screening Individualized decision between patient and health care provider in men between ages of 55-69   Medicare will cover every 12 months beginning on the day after your 50th birthday PSA: 0.06 ng/mL     History Prostate Cancer  Screening Not Indicated     Hepatitis C Screening Once for adults born between 1945 and 1965  More frequently in patients at high risk for Hepatitis C Hep C Antibody: 09/04/2019    Screening Current   Diabetes Screening 1-2 times per year if you're at risk for diabetes or have pre-diabetes Fasting glucose: 134 mg/dL (9/4/2023)  A1C: 7.4 (7/6/2023)  Screening Not Indicated  History Diabetes    Cholesterol Screening Once every 5 years if you don't have a lipid disorder. May order more often based on risk factors. Lipid panel: 08/22/2022  Screening Not Indicated  History Lipid Disorder      Other Preventive Screenings Covered by Medicare:  Abdominal Aortic Aneurysm (AAA) Screening: covered once if your at risk. You're considered to be at risk if you have a family history of AAA or a male between the age of 65-75 who smoking at least 100 cigarettes in your lifetime.  Lung Cancer Screening: covers low dose CT scan once per year if you meet all of the following conditions: (1) Age 55-77; (2) No signs or symptoms of lung cancer; (3) Current smoker or have quit smoking within the last 15 years; (4) You have a tobacco smoking history of at least 20 pack years (packs per day x number of years you smoked); (5) You get a written order from a healthcare provider.  Glaucoma Screening: covered annually if you're considered high risk: (1) You have diabetes OR (2) Family history of glaucoma OR (3)  aged 50 and older OR (4)  American aged 65 and older  Osteoporosis Screening: covered every 2 years if you meet one of the following conditions: (1) Have a vertebral abnormality; (2) On glucocorticoid therapy for more than 3 months; (3) Have primary hyperparathyroidism; (4) On osteoporosis medications and need to assess response to drug therapy.  HIV Screening: covered annually if you're between the age of 15-65. Also covered annually if you are younger than 15 and older than 65 with risk factors for HIV infection. For pregnant patients, it is covered up to 3 times per pregnancy.    Immunizations:  Immunization Recommendations   Influenza Vaccine Annual influenza vaccination during flu season is recommended for all persons aged >= 6 months who do not have contraindications   Pneumococcal Vaccine   * Pneumococcal conjugate vaccine = PCV13 (Prevnar 13), PCV15 (Vaxneuvance), PCV20 (Prevnar 20)  *  Pneumococcal polysaccharide vaccine = PPSV23 (Pneumovax) Adults 19-63 yo with certain risk factors or if 65+ yo  If never received any pneumonia vaccine: recommend Prevnar 20 (PCV20)  Give PCV20 if previously received 1 dose of PCV13 or PPSV23   Hepatitis B Vaccine 3 dose series if at intermediate or high risk (ex: diabetes, end stage renal disease, liver disease)   Respiratory syncytial virus (RSV) Vaccine - COVERED BY MEDICARE PART D  * RSVPreF3 (Arexvy) CDC recommends that adults 60 years of age and older may receive a single dose of RSV vaccine using shared clinical decision-making (SCDM)   Tetanus (Td) Vaccine - COST NOT COVERED BY MEDICARE PART B Following completion of primary series, a booster dose should be given every 10 years to maintain immunity against tetanus. Td may also be given as tetanus wound prophylaxis.   Tdap Vaccine - COST NOT COVERED BY MEDICARE PART B Recommended at least once for all adults. For pregnant patients, recommended with each pregnancy.   Shingles Vaccine (Shingrix) - COST NOT COVERED BY MEDICARE PART B  2 shot series recommended in those 19 years and older who have or will have weakened immune systems or those 50 years and older     Health Maintenance Due:      Topic Date Due    Hepatitis C Screening  Completed     Immunizations Due:      Topic Date Due    COVID-19 Vaccine (1 - 2023-24 season) Never done    Influenza Vaccine (1) 09/01/2024     Advance Directives   What are advance directives?  Advance directives are legal documents that state your wishes and plans for medical care. These plans are made ahead of time in case you lose your ability to make decisions for yourself. Advance directives can apply to any medical decision, such as the treatments you want, and if you want to donate organs.   What are the types of advance directives?  There are many types of advance directives, and each state has rules about how to use them. You may choose a combination of any of the  following:  Living will:  This is a written record of the treatment you want. You can also choose which treatments you do not want, which to limit, and which to stop at a certain time. This includes surgery, medicine, IV fluid, and tube feedings.   Durable power of  for healthcare (DPAHC):  This is a written record that states who you want to make healthcare choices for you when you are unable to make them for yourself. This person, called a proxy, is usually a family member or a friend. You may choose more than 1 proxy.  Do not resuscitate (DNR) order:  A DNR order is used in case your heart stops beating or you stop breathing. It is a request not to have certain forms of treatment, such as CPR. A DNR order may be included in other types of advance directives.  Medical directive:  This covers the care that you want if you are in a coma, near death, or unable to make decisions for yourself. You can list the treatments you want for each condition. Treatment may include pain medicine, surgery, blood transfusions, dialysis, IV or tube feedings, and a ventilator (breathing machine).  Values history:  This document has questions about your views, beliefs, and how you feel and think about life. This information can help others choose the care that you would choose.  Why are advance directives important?  An advance directive helps you control your care. Although spoken wishes may be used, it is better to have your wishes written down. Spoken wishes can be misunderstood, or not followed. Treatments may be given even if you do not want them. An advance directive may make it easier for your family to make difficult choices about your care.   Weight Management   Why it is important to manage your weight:  Being overweight increases your risk of health conditions such as heart disease, high blood pressure, type 2 diabetes, and certain types of cancer. It can also increase your risk for osteoarthritis, sleep apnea, and  other respiratory problems. Aim for a slow, steady weight loss. Even a small amount of weight loss can lower your risk of health problems.  How to lose weight safely:  A safe and healthy way to lose weight is to eat fewer calories and get regular exercise. You can lose up about 1 pound a week by decreasing the number of calories you eat by 500 calories each day.   Healthy meal plan for weight management:  A healthy meal plan includes a variety of foods, contains fewer calories, and helps you stay healthy. A healthy meal plan includes the following:  Eat whole-grain foods more often.  A healthy meal plan should contain fiber. Fiber is the part of grains, fruits, and vegetables that is not broken down by your body. Whole-grain foods are healthy and provide extra fiber in your diet. Some examples of whole-grain foods are whole-wheat breads and pastas, oatmeal, brown rice, and bulgur.  Eat a variety of vegetables every day.  Include dark, leafy greens such as spinach, kale, carmen greens, and mustard greens. Eat yellow and orange vegetables such as carrots, sweet potatoes, and winter squash.   Eat a variety of fruits every day.  Choose fresh or canned fruit (canned in its own juice or light syrup) instead of juice. Fruit juice has very little or no fiber.  Eat low-fat dairy foods.  Drink fat-free (skim) milk or 1% milk. Eat fat-free yogurt and low-fat cottage cheese. Try low-fat cheeses such as mozzarella and other reduced-fat cheeses.  Choose meat and other protein foods that are low in fat.  Choose beans or other legumes such as split peas or lentils. Choose fish, skinless poultry (chicken or turkey), or lean cuts of red meat (beef or pork). Before you cook meat or poultry, cut off any visible fat.   Use less fat and oil.  Try baking foods instead of frying them. Add less fat, such as margarine, sour cream, regular salad dressing and mayonnaise to foods. Eat fewer high-fat foods. Some examples of high-fat foods  include french fries, doughnuts, ice cream, and cakes.  Eat fewer sweets.  Limit foods and drinks that are high in sugar. This includes candy, cookies, regular soda, and sweetened drinks.  Exercise:  Exercise at least 30 minutes per day on most days of the week. Some examples of exercise include walking, biking, dancing, and swimming. You can also fit in more physical activity by taking the stairs instead of the elevator or parking farther away from stores. Ask your healthcare provider about the best exercise plan for you.      © Copyright [employer] 2018 Information is for End User's use only and may not be sold, redistributed or otherwise used for commercial purposes. All illustrations and images included in CareNotes® are the copyrighted property of [employer] or [employer]          AWV is UTD and home is safe and needs advanced care planning and needs updated labs now and in 6 months. Lose weight to get BMI lower than 25 and refilled lisinopril HCT. Elevate legs for hx of edema in lower legs. Rec sunscreen and monitor for ticks. See urology as directed for hx of prostate cancer.

## 2024-07-02 NOTE — PROGRESS NOTES
Diabetic Foot Exam    Patient's shoes and socks removed.    Right Foot/Ankle   Right Foot Inspection  Skin Exam: skin normal and dry skin. No warmth, no callus, no erythema, no maceration, no abnormal color, no pre-ulcer, no ulcer and no callus.     Toe Exam: ROM and strength within normal limits, swelling and tenderness.     Sensory   Vibration: intact  Proprioception: intact  Monofilament testing: intact    Vascular  Capillary refills: < 3 seconds  The right DP pulse is 2+. The right PT pulse is 2+.     Left Foot/Ankle  Left Foot Inspection  Skin Exam: skin normal and dry skin. No warmth, no erythema, no maceration, normal color, no pre-ulcer, no ulcer and no callus.     Toe Exam: ROM and strength within normal limits, swelling and tenderness.     Sensory   Vibration: intact  Proprioception: intact  Monofilament testing: intact    Vascular  Capillary refills: < 3 seconds  The left DP pulse is 2+. The left PT pulse is 2+.     Assign Risk Category  No deformity present  No loss of protective sensation    Ambulatory Visit  Name: Paulino Ortega JrMaxx      : 1945      MRN: [id number]  Encounter Provider: Ledy Borrego DO  Encounter Date: 2024   Encounter department: [facility] PRIMARY CARE  Chief Complaint   Patient presents with    Medicare Wellness Visit     Patient Instructions   Medicare Preventive Visit Patient Instructions  Thank you for completing your Welcome to Medicare Visit or Medicare Annual Wellness Visit today. Your next wellness visit will be due in one year (7/3/2025).  The screening/preventive services that you may require over the next 5-10 years are detailed below. Some tests may not apply to you based off risk factors and/or age. Screening tests ordered at today's visit but not completed yet may show as past due. Also, please note that scanned in results may not display below.  Preventive Screenings:  Service Recommendations Previous Testing/Comments   Colorectal Cancer  Screening  Colonoscopy    Fecal Occult Blood Test (FOBT)/Fecal Immunochemical Test (FIT)  Fecal DNA/Cologuard Test  Flexible Sigmoidoscopy Age: 45-75 years old   Colonoscopy: every 10 years (May be performed more frequently if at higher risk)  OR  FOBT/FIT: every 1 year  OR  Cologuard: every 3 years  OR  Sigmoidoscopy: every 5 years  Screening may be recommended earlier than age 45 if at higher risk for colorectal cancer. Also, an individualized decision between you and your healthcare provider will decide whether screening between the ages of 76-85 would be appropriate. Colonoscopy: Not on file  FOBT/FIT: Not on file  Cologuard: Not on file  Sigmoidoscopy: Not on file          Prostate Cancer Screening Individualized decision between patient and health care provider in men between ages of 55-69   Medicare will cover every 12 months beginning on the day after your 50th birthday PSA: 0.06 ng/mL     History Prostate Cancer  Screening Not Indicated     Hepatitis C Screening Once for adults born between 1945 and 1965  More frequently in patients at high risk for Hepatitis C Hep C Antibody: 09/04/2019    Screening Current   Diabetes Screening 1-2 times per year if you're at risk for diabetes or have pre-diabetes Fasting glucose: 134 mg/dL (9/4/2023)  A1C: 7.4 (7/6/2023)  Screening Not Indicated  History Diabetes   Cholesterol Screening Once every 5 years if you don't have a lipid disorder. May order more often based on risk factors. Lipid panel: 08/22/2022  Screening Not Indicated  History Lipid Disorder      Other Preventive Screenings Covered by Medicare:  Abdominal Aortic Aneurysm (AAA) Screening: covered once if your at risk. You're considered to be at risk if you have a family history of AAA or a male between the age of 65-75 who smoking at least 100 cigarettes in your lifetime.  Lung Cancer Screening: covers low dose CT scan once per year if you meet all of the following conditions: (1) Age 55-77; (2) No signs or  symptoms of lung cancer; (3) Current smoker or have quit smoking within the last 15 years; (4) You have a tobacco smoking history of at least 20 pack years (packs per day x number of years you smoked); (5) You get a written order from a healthcare provider.  Glaucoma Screening: covered annually if you're considered high risk: (1) You have diabetes OR (2) Family history of glaucoma OR (3)  aged 50 and older OR (4)  American aged 65 and older  Osteoporosis Screening: covered every 2 years if you meet one of the following conditions: (1) Have a vertebral abnormality; (2) On glucocorticoid therapy for more than 3 months; (3) Have primary hyperparathyroidism; (4) On osteoporosis medications and need to assess response to drug therapy.  HIV Screening: covered annually if you're between the age of 15-65. Also covered annually if you are younger than 15 and older than 65 with risk factors for HIV infection. For pregnant patients, it is covered up to 3 times per pregnancy.    Immunizations:  Immunization Recommendations   Influenza Vaccine Annual influenza vaccination during flu season is recommended for all persons aged >= 6 months who do not have contraindications   Pneumococcal Vaccine   * Pneumococcal conjugate vaccine = PCV13 (Prevnar 13), PCV15 (Vaxneuvance), PCV20 (Prevnar 20)  * Pneumococcal polysaccharide vaccine = PPSV23 (Pneumovax) Adults 19-65 yo with certain risk factors or if 65+ yo  If never received any pneumonia vaccine: recommend Prevnar 20 (PCV20)  Give PCV20 if previously received 1 dose of PCV13 or PPSV23   Hepatitis B Vaccine 3 dose series if at intermediate or high risk (ex: diabetes, end stage renal disease, liver disease)   Respiratory syncytial virus (RSV) Vaccine - COVERED BY MEDICARE PART D  * RSVPreF3 (Arexvy) CDC recommends that adults 60 years of age and older may receive a single dose of RSV vaccine using shared clinical decision-making (SCDM)   Tetanus (Td) Vaccine -  COST NOT COVERED BY MEDICARE PART B Following completion of primary series, a booster dose should be given every 10 years to maintain immunity against tetanus. Td may also be given as tetanus wound prophylaxis.   Tdap Vaccine - COST NOT COVERED BY MEDICARE PART B Recommended at least once for all adults. For pregnant patients, recommended with each pregnancy.   Shingles Vaccine (Shingrix) - COST NOT COVERED BY MEDICARE PART B  2 shot series recommended in those 19 years and older who have or will have weakened immune systems or those 50 years and older     Health Maintenance Due:      Topic Date Due    Hepatitis C Screening  Completed     Immunizations Due:      Topic Date Due    COVID-19 Vaccine (1 - 2023-24 season) Never done    Influenza Vaccine (1) 09/01/2024     Advance Directives   What are advance directives?  Advance directives are legal documents that state your wishes and plans for medical care. These plans are made ahead of time in case you lose your ability to make decisions for yourself. Advance directives can apply to any medical decision, such as the treatments you want, and if you want to donate organs.   What are the types of advance directives?  There are many types of advance directives, and each state has rules about how to use them. You may choose a combination of any of the following:  Living will:  This is a written record of the treatment you want. You can also choose which treatments you do not want, which to limit, and which to stop at a certain time. This includes surgery, medicine, IV fluid, and tube feedings.   Durable power of  for healthcare (DPAHC):  This is a written record that states who you want to make healthcare choices for you when you are unable to make them for yourself. This person, called a proxy, is usually a family member or a friend. You may choose more than 1 proxy.  Do not resuscitate (DNR) order:  A DNR order is used in case your heart stops beating or you  stop breathing. It is a request not to have certain forms of treatment, such as CPR. A DNR order may be included in other types of advance directives.  Medical directive:  This covers the care that you want if you are in a coma, near death, or unable to make decisions for yourself. You can list the treatments you want for each condition. Treatment may include pain medicine, surgery, blood transfusions, dialysis, IV or tube feedings, and a ventilator (breathing machine).  Values history:  This document has questions about your views, beliefs, and how you feel and think about life. This information can help others choose the care that you would choose.  Why are advance directives important?  An advance directive helps you control your care. Although spoken wishes may be used, it is better to have your wishes written down. Spoken wishes can be misunderstood, or not followed. Treatments may be given even if you do not want them. An advance directive may make it easier for your family to make difficult choices about your care.   Weight Management   Why it is important to manage your weight:  Being overweight increases your risk of health conditions such as heart disease, high blood pressure, type 2 diabetes, and certain types of cancer. It can also increase your risk for osteoarthritis, sleep apnea, and other respiratory problems. Aim for a slow, steady weight loss. Even a small amount of weight loss can lower your risk of health problems.  How to lose weight safely:  A safe and healthy way to lose weight is to eat fewer calories and get regular exercise. You can lose up about 1 pound a week by decreasing the number of calories you eat by 500 calories each day.   Healthy meal plan for weight management:  A healthy meal plan includes a variety of foods, contains fewer calories, and helps you stay healthy. A healthy meal plan includes the following:  Eat whole-grain foods more often.  A healthy meal plan should contain  fiber. Fiber is the part of grains, fruits, and vegetables that is not broken down by your body. Whole-grain foods are healthy and provide extra fiber in your diet. Some examples of whole-grain foods are whole-wheat breads and pastas, oatmeal, brown rice, and bulgur.  Eat a variety of vegetables every day.  Include dark, leafy greens such as spinach, kale, carmen greens, and mustard greens. Eat yellow and orange vegetables such as carrots, sweet potatoes, and winter squash.   Eat a variety of fruits every day.  Choose fresh or canned fruit (canned in its own juice or light syrup) instead of juice. Fruit juice has very little or no fiber.  Eat low-fat dairy foods.  Drink fat-free (skim) milk or 1% milk. Eat fat-free yogurt and low-fat cottage cheese. Try low-fat cheeses such as mozzarella and other reduced-fat cheeses.  Choose meat and other protein foods that are low in fat.  Choose beans or other legumes such as split peas or lentils. Choose fish, skinless poultry (chicken or turkey), or lean cuts of red meat (beef or pork). Before you cook meat or poultry, cut off any visible fat.   Use less fat and oil.  Try baking foods instead of frying them. Add less fat, such as margarine, sour cream, regular salad dressing and mayonnaise to foods. Eat fewer high-fat foods. Some examples of high-fat foods include french fries, doughnuts, ice cream, and cakes.  Eat fewer sweets.  Limit foods and drinks that are high in sugar. This includes candy, cookies, regular soda, and sweetened drinks.  Exercise:  Exercise at least 30 minutes per day on most days of the week. Some examples of exercise include walking, biking, dancing, and swimming. You can also fit in more physical activity by taking the stairs instead of the elevator or parking farther away from stores. Ask your healthcare provider about the best exercise plan for you.      © Copyright [employer] 2018 Information is for End User's use only and may not be sold,  redistributed or otherwise used for commercial purposes. All illustrations and images included in CareNotes® are the copyrighted property of [employer] or e[employer] by [employer]          AWV is UTD and home is safe and needs advanced care planning and needs updated labs now and in 6 months. Lose weight to get BMI lower than 25 and refilled lisinopril HCT. Elevate legs for hx of edema in lower legs. Rec sunscreen and monitor for ticks. See urology as directed for hx of prostate cancer.     Assessment & Plan   1. Medicare annual wellness visit, subsequent  2. Health care maintenance  3. Type 2 diabetes mellitus with hyperglycemia, unspecified whether long term insulin use (HCC)  Comments:  needs updated labs now and in 6 months  Orders:  -     Albumin / creatinine urine ratio; Future; Expected date: 07/02/2024  -     Comprehensive metabolic panel; Future; Expected date: 07/02/2024  -     Hemoglobin A1C; Future; Expected date: 07/02/2024  -     Albumin / creatinine urine ratio; Standing  -     Comprehensive metabolic panel; Standing  -     Hemoglobin A1C; Standing  -     CBC and differential; Future; Expected date: 07/02/2024  -     Lipid Panel with Direct LDL reflex; Future; Expected date: 07/02/2024  -     TSH, 3rd generation with Free T4 reflex; Future; Expected date: 07/02/2024  4. HTN (hypertension), benign  Comments:  stable on meds  Orders:  -     Comprehensive metabolic panel; Future; Expected date: 07/02/2024  -     Comprehensive metabolic panel; Standing  -     lisinopril-hydrochlorothiazide (PRINZIDE,ZESTORETIC) 20-12.5 MG per tablet; Take 2 tablets by mouth daily  5. Paroxysmal atrial fibrillation (HCC)  Comments:  stable  Orders:  -     CBC and differential; Future; Expected date: 07/02/2024  -     TSH, 3rd generation with Free T4 reflex; Future; Expected date: 07/02/2024  6. Hyperlipidemia, unspecified hyperlipidemia type  Comments:  takes med and low cholesterol diet encouraged  Orders:  -      Comprehensive metabolic panel; Future; Expected date: 07/02/2024  -     Comprehensive metabolic panel; Standing  -     Lipid Panel with Direct LDL reflex; Future; Expected date: 07/02/2024  -     TSH, 3rd generation with Free T4 reflex; Future; Expected date: 07/02/2024  7. Edema, unspecified type  Comments:  elevate legs when sitting  Orders:  -     Comprehensive metabolic panel; Future; Expected date: 07/02/2024  -     TSH, 3rd generation with Free T4 reflex; Future; Expected date: 07/02/2024  8. 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 KARIN (obstructive sleep apnea)  Comments:  stable  10. Prostate cancer (HCC)  Comments:  sees urology for monitoring  11. Class 2 obesity due to excess calories with body mass index (BMI) of 38.0 to 38.9 in adult, unspecified whether serious comorbidity present  Comments:  lose weight to get BMI lower than 25  12. Screening for colon cancer  -     Cologuard       Preventive health issues were discussed with patient, and age appropriate screening tests were ordered as noted in patient's After Visit Summary. Personalized health advice and appropriate referrals for health education or preventive services given if needed, as noted in patient's After Visit Summary.    History of Present Illness     Here for AWBV and diabetes and recheck and neds advanced care planning and needs refill of BP med. Some lower extremity edema. Takes all meds as directed       Patient Care Team:  Ledy Borrego DO as PCP - General  Ledy Borrego DO as PCP - PCP-[facility] (E)  DO Taqueria Terry MD J Raymond Fitzpatrick, MD Hugh Daniel O'Donnell, MD Christopher Alia, MD Rujul Patel, DO (Cardiology)  Rhina De La Cruz PA-C (Vascular Surgery)    Review of Systems   Constitutional: Negative.    HENT: Negative.     Eyes: Negative.    Respiratory: Negative.     Cardiovascular: Negative.    Gastrointestinal:  Negative.    Endocrine: Negative.    Genitourinary: Negative.    Musculoskeletal: Negative.    Skin: Negative.    Allergic/Immunologic: Negative.    Neurological: Negative.    Hematological: Negative.    Psychiatric/Behavioral: Negative.       Medical History Reviewed by provider this encounter:  Tobacco  Allergies  Meds  Problems  Med Hx  Surg Hx  Fam Hx       Annual Wellness Visit Questionnaire   Paulino is here for his Subsequent Wellness visit.     Health Risk Assessment:   Patient rates overall health as good. Patient feels that their physical health rating is slightly worse. Patient is satisfied with their life. Eyesight was rated as slightly worse. Hearing was rated as same. Patient feels that their emotional and mental health rating is same. Patients states they are never, rarely angry. Patient states they are sometimes unusually tired/fatigued. Pain experienced in the last 7 days has been some. Patient's pain rating has been 5/10. Patient states that he has experienced no weight loss or gain in last 6 months.     Depression Screening:   PHQ-2 Score: 0      Fall Risk Screening:   In the past year, patient has experienced: no history of falling in past year      Home Safety:  Patient has trouble with stairs inside or outside of their home. Patient has working smoke alarms and has working carbon monoxide detector. Home safety hazards include: none.     Nutrition:   Current diet is Diabetic.     Medications:   Patient is not currently taking any over-the-counter supplements. Patient is able to manage medications.     Activities of Daily Living (ADLs)/Instrumental Activities of Daily Living (IADLs):   Walk and transfer into and out of bed and chair?: Yes  Dress and groom yourself?: Yes    Bathe or shower yourself?: Yes    Feed yourself? Yes  Do your laundry/housekeeping?: Yes  Manage your money, pay your bills and track your expenses?: Yes  Make your own meals?: Yes    Do your own shopping?: Yes    Previous  "Hospitalizations:   Any hospitalizations or ED visits within the last 12 months?: No      Advance Care Planning:   Living will: No    Durable POA for healthcare: No    Advanced directive: No      Cognitive Screening:   Provider or family/friend/caregiver concerned regarding cognition?: No    PREVENTIVE SCREENINGS      Cardiovascular Screening:    General: Screening Not Indicated and History Lipid Disorder      Diabetes Screening:     General: Screening Not Indicated and History Diabetes      Prostate Cancer Screening:    General: History Prostate Cancer and Screening Not Indicated      Lung Cancer Screening:     General: Screening Not Indicated      Hepatitis C Screening:    General: Screening Current    Screening, Brief Intervention, and Referral to Treatment (SBIRT)    Screening    Typical number of drinks in a week: 6    Single Item Drug Screening:  How often have you used an illegal drug (including marijuana) or a prescription medication for non-medical reasons in the past year? never    Single Item Drug Screen Score: 0  Interpretation: Negative screen for possible drug use disorder    Social Determinants of Health     Financial Resource Strain: Medium Risk (8/30/2022)    Overall Financial Resource Strain (CARDIA)     Difficulty of Paying Living Expenses: Somewhat hard   Transportation Needs: No Transportation Needs (8/30/2022)    PRAPARE - Transportation     Lack of Transportation (Medical): No     Lack of Transportation (Non-Medical): No     No results found.    Objective     /68   Pulse 66   Temp 97.9 °F (36.6 °C)   Ht 5' 4\" (1.626 m)   SpO2 98%   BMI 38.96 kg/m²     Physical Exam  Constitutional:       Appearance: He is well-developed. He is obese.   HENT:      Head: Normocephalic and atraumatic.      Right Ear: External ear normal.      Left Ear: External ear normal.      Nose: Nose normal.      Mouth/Throat:      Mouth: Mucous membranes are moist.   Eyes:      Conjunctiva/sclera: Conjunctivae " normal.      Pupils: Pupils are equal, round, and reactive to light.   Cardiovascular:      Rate and Rhythm: Normal rate and regular rhythm.      Pulses: Normal pulses.           Dorsalis pedis pulses are 2+ on the right side and 2+ on the left side.        Posterior tibial pulses are 2+ on the right side and 2+ on the left side.      Heart sounds: Normal heart sounds.   Pulmonary:      Effort: Pulmonary effort is normal.      Breath sounds: Normal breath sounds.   Musculoskeletal:         General: Normal range of motion.      Cervical back: Normal range of motion and neck supple.      Right lower leg: Edema present.      Left lower leg: Edema present.   Feet:      Right foot:      Skin integrity: Dry skin present. No ulcer, skin breakdown, erythema, warmth or callus.      Left foot:      Skin integrity: Dry skin present. No ulcer, skin breakdown, erythema, warmth or callus.   Skin:     General: Skin is warm and dry.      Capillary Refill: Capillary refill takes less than 2 seconds.   Neurological:      General: No focal deficit present.      Mental Status: He is alert and oriented to person, place, and time. Mental status is at baseline.      Deep Tendon Reflexes: Reflexes are normal and symmetric.   Psychiatric:         Mood and Affect: Mood normal.         Behavior: Behavior normal.         Thought Content: Thought content normal.         Judgment: Judgment normal.       Administrative Statements   I have spent a total time of 30 minutes in caring for this patient on the day of the visit/encounter including Diagnostic results, Prognosis, Risks and benefits of tx options, Instructions for management, Patient and family education, Importance of tx compliance, Risk factor reductions, Impressions, Counseling / Coordination of care, Documenting in the medical record, Reviewing / ordering tests, medicine, procedures  , and Obtaining or reviewing history  .

## 2024-07-03 ENCOUNTER — TELEPHONE (OUTPATIENT)
Dept: FAMILY MEDICINE CLINIC | Facility: CLINIC | Age: 79
End: 2024-07-03

## 2024-07-03 DIAGNOSIS — R79.89 ELEVATED TSH: Primary | ICD-10-CM

## 2024-07-03 DIAGNOSIS — I10 HTN (HYPERTENSION), BENIGN: ICD-10-CM

## 2024-07-03 RX ORDER — METOPROLOL TARTRATE 100 MG/1
100 TABLET ORAL 2 TIMES DAILY
Qty: 200 TABLET | Refills: 1 | Status: SHIPPED | OUTPATIENT
Start: 2024-07-03

## 2024-07-03 NOTE — TELEPHONE ENCOUNTER
----- Message from Ledy Borrego DO sent at 7/2/2024  6:42 PM EDT -----  Please call the patient regarding his abnormal result. TSH is high, and will need to see endo for elevated tsh. However his T4 free is wnl. Does he currently see and endocrinologist? If not he will need to see endo at [facility].

## 2024-07-03 NOTE — TELEPHONE ENCOUNTER
I called and spoke with the patient and made him aware, he does not currently follow up with Endo, referral placed and he will schedule an appt.

## 2024-08-06 DIAGNOSIS — E11.9 TYPE 2 DIABETES MELLITUS WITHOUT COMPLICATION (HCC): ICD-10-CM

## 2024-08-07 RX ORDER — PEN NEEDLE, DIABETIC 31 GX5/16"
NEEDLE, DISPOSABLE MISCELLANEOUS
Qty: 60 EACH | Refills: 2 | Status: SHIPPED | OUTPATIENT
Start: 2024-08-07

## 2024-09-29 DIAGNOSIS — I10 HTN (HYPERTENSION), BENIGN: ICD-10-CM

## 2024-09-29 RX ORDER — METOPROLOL TARTRATE 100 MG
100 TABLET ORAL 2 TIMES DAILY
Qty: 180 TABLET | Refills: 2 | Status: SHIPPED | OUTPATIENT
Start: 2024-09-29

## 2024-10-31 NOTE — PATIENT INSTRUCTIONS

## 2024-11-06 DIAGNOSIS — E78.5 DYSLIPIDEMIA: ICD-10-CM

## 2024-11-06 DIAGNOSIS — I10 HTN (HYPERTENSION), BENIGN: ICD-10-CM

## 2024-11-06 RX ORDER — ATORVASTATIN CALCIUM 80 MG/1
80 TABLET, FILM COATED ORAL DAILY
Qty: 90 TABLET | Refills: 1 | Status: SHIPPED | OUTPATIENT
Start: 2024-11-06

## 2024-11-06 RX ORDER — AMLODIPINE BESYLATE 10 MG/1
TABLET ORAL
Qty: 180 TABLET | Refills: 1 | Status: SHIPPED | OUTPATIENT
Start: 2024-11-06

## 2025-01-09 ENCOUNTER — RESULTS FOLLOW-UP (OUTPATIENT)
Dept: FAMILY MEDICINE CLINIC | Facility: CLINIC | Age: 80
End: 2025-01-09

## 2025-01-09 ENCOUNTER — OFFICE VISIT (OUTPATIENT)
Dept: FAMILY MEDICINE CLINIC | Facility: CLINIC | Age: 80
End: 2025-01-09
Payer: COMMERCIAL

## 2025-01-09 VITALS
TEMPERATURE: 97.6 F | HEIGHT: 64 IN | WEIGHT: 224 LBS | OXYGEN SATURATION: 96 % | DIASTOLIC BLOOD PRESSURE: 88 MMHG | HEART RATE: 87 BPM | BODY MASS INDEX: 38.24 KG/M2 | SYSTOLIC BLOOD PRESSURE: 144 MMHG

## 2025-01-09 DIAGNOSIS — E78.5 HYPERLIPIDEMIA, UNSPECIFIED HYPERLIPIDEMIA TYPE: Chronic | ICD-10-CM

## 2025-01-09 DIAGNOSIS — I10 PRIMARY HYPERTENSION: Chronic | ICD-10-CM

## 2025-01-09 DIAGNOSIS — E66.9 OBESITY (BMI 30-39.9): ICD-10-CM

## 2025-01-09 DIAGNOSIS — I71.019 CHRONIC THORACIC AORTIC DISSECTION (HCC): ICD-10-CM

## 2025-01-09 DIAGNOSIS — E11.65 TYPE 2 DIABETES MELLITUS WITH HYPERGLYCEMIA, UNSPECIFIED WHETHER LONG TERM INSULIN USE (HCC): Primary | ICD-10-CM

## 2025-01-09 DIAGNOSIS — Z23 ENCOUNTER FOR IMMUNIZATION: ICD-10-CM

## 2025-01-09 DIAGNOSIS — I48.0 PAROXYSMAL ATRIAL FIBRILLATION (HCC): Chronic | ICD-10-CM

## 2025-01-09 LAB
LEFT EYE DIABETIC RETINOPATHY: ABNORMAL
LEFT EYE IMAGE QUALITY: ABNORMAL
LEFT EYE MACULAR EDEMA: ABNORMAL
LEFT EYE OTHER RETINOPATHY: ABNORMAL
RIGHT EYE DIABETIC RETINOPATHY: ABNORMAL
RIGHT EYE IMAGE QUALITY: ABNORMAL
RIGHT EYE MACULAR EDEMA: ABNORMAL
RIGHT EYE OTHER RETINOPATHY: ABNORMAL
SEVERITY (EYE EXAM): ABNORMAL
SL AMB POCT HEMOGLOBIN AIC: 6.5 (ref ?–6.5)

## 2025-01-09 PROCEDURE — 83036 HEMOGLOBIN GLYCOSYLATED A1C: CPT | Performed by: FAMILY MEDICINE

## 2025-01-09 PROCEDURE — G0008 ADMIN INFLUENZA VIRUS VAC: HCPCS

## 2025-01-09 PROCEDURE — 90662 IIV NO PRSV INCREASED AG IM: CPT

## 2025-01-09 PROCEDURE — 99214 OFFICE O/P EST MOD 30 MIN: CPT | Performed by: FAMILY MEDICINE

## 2025-01-09 NOTE — ASSESSMENT & PLAN NOTE
Lab Results   Component Value Date    HGBA1C 6.5 01/09/2025     Orders:    IRIS Diabetic eye exam    POCT hemoglobin A1c    Albumin / creatinine urine ratio; Future    Comprehensive metabolic panel; Future    Hemoglobin A1C; Future    Lipid Panel with Direct LDL reflex; Future    CBC and differential; Future

## 2025-01-09 NOTE — ASSESSMENT & PLAN NOTE
Low cholesterol diet as directed.   Orders:    Comprehensive metabolic panel; Future    Lipid Panel with Direct LDL reflex; Future

## 2025-01-09 NOTE — ASSESSMENT & PLAN NOTE
Stable and on baby aspirin  Orders:    Comprehensive metabolic panel; Future    CBC and differential; Future

## 2025-01-09 NOTE — TELEPHONE ENCOUNTER
Pt advised ,has appt with dr orourke , also saw them recently so I sent over a eye exam form to see if we can get the records

## 2025-01-09 NOTE — PROGRESS NOTES
Diabetic Foot Exam    Patient's shoes and socks removed.    Right Foot/Ankle   Right Foot Inspection  Skin Exam: skin normal, skin intact and dry skin. No warmth, no callus, no erythema, no maceration, no abnormal color, no pre-ulcer, no ulcer and no callus.     Toe Exam: ROM and strength within normal limits and swelling.     Sensory   Vibration: intact  Proprioception: intact  Monofilament testing: intact    Vascular  Capillary refills: elevated  The right DP pulse is 1+. The right PT pulse is 1+.     Left Foot/Ankle  Left Foot Inspection  Skin Exam: skin normal, skin intact and dry skin. No warmth, no erythema, no maceration, normal color, no pre-ulcer, no ulcer and no callus.     Toe Exam: ROM and strength within normal limits and swelling.     Sensory   Vibration: intact  Proprioception: intact  Monofilament testing: intact    Vascular  Capillary refills: < 3 seconds  The left DP pulse is 1+. The left PT pulse is 1+.     Assign Risk Category  No deformity present  Loss of protective sensation  No weak pulses  Risk: 1

## 2025-01-09 NOTE — PROGRESS NOTES
Name: Paulino Ortega Jr.      : 1945      MRN: [id number]  Encounter Provider: Ledy Borrego DO  Encounter Date: 2025   Encounter department: [facility]  :  Chief Complaint   Patient presents with    Diabetes     Follow up, pt states he has been feeling weak for about a month, having loose stools       Patient Instructions   Here for diabetes check up and hga1c at 6.5 and will rec taking all meds as directed and recheck in 6 months with labs. Rec low sugar and low cholesterol diet and take BP med and other meds as directed for cholesterol and diabetes. Flu shot given today. Overall stable and BP and BS stable and will see urology as directed for hx of prostate cancer.   Patient Education     Type 2 diabetes   The Basics   Written by the doctors and editors at [facility]   What is type 2 diabetes? -- This is a disorder that disrupts the way the body uses sugar. It is sometimes called type 2 diabetes mellitus.  All of the cells in the body need sugar to work normally. Sugar gets into the cells with the help of a hormone called insulin. Insulin is made by the pancreas, an organ in the belly. If there is not enough insulin, or if cells in the body don't respond normally to insulin, sugar builds up in the blood. That is what happens to people with diabetes.  There are 2 different types of diabetes:   In type 1 diabetes, the pancreas makes little or no insulin.   In type 2 diabetes, the pancreas still makes some insulin, but the cells in the body stop responding normally. Eventually, the pancreas cannot make enough insulin to keep up.  Having excess body weight or obesity increases a person's risk of developing type 2 diabetes. But people without excess body weight can get diabetes, too.  What are the symptoms of type 2 diabetes? -- Type 2 diabetes usually causes no symptoms. When symptoms do happen, they include:   Needing to urinate often   Intense thirst   Blurry vision  Can  "diabetes lead to other health problems? -- Yes. Type 2 diabetes might not make you feel sick. But if it is not managed, it can lead to serious problems over time, such as:   Heart attacks   Strokes   Kidney disease   Vision problems (or even blindness)   Pain or loss of feeling in the hands and feet   Needing to have fingers, toes, or other body parts removed (amputated)  How do I know if I have type 2 diabetes? -- Your doctor or nurse can do a blood test. There are 2 tests that can be used for this. Both involve measuring the amount of sugar in your blood, called your \"blood sugar\" or \"blood glucose\":   One of the tests measures your blood sugar at the time the blood sample is taken. This test is done in the morning. You can't eat or drink anything except water for at least 8 hours before the test.   The other test shows what your average blood sugar has been for the past 2 to 3 months. This blood test is called \"hemoglobin A1C\" or just \"A1C.\" It can be checked at any time of the day, even if you have recently eaten.  How is type 2 diabetes treated? -- The goals of treatment are to manage your blood sugar and lower the risk of future problems that can happen in people with diabetes.  Treatment might include:   Lifestyle changes - This is an important part of managing diabetes. It includes eating healthy foods and getting plenty of physical activity.   Medicines - There are a few medicines that help lower blood sugar. Some people need to take pills that help the body make more insulin or that help insulin do its job. Others need insulin shots.  Depending on what medicines you take, you might need to check your blood sugar regularly at home. But not everyone with type 2 diabetes needs to do this. Your doctor or nurse will tell you if you should be checking your blood sugar, and when and how to do this.  Sometimes, people with type 2 diabetes also need medicines to help prevent problems caused by the disease. For " "instance, medicines used to lower blood pressure can reduce the chances of a heart attack or stroke.   General medical care - It's also important to take care of other areas of your health. This includes watching your blood pressure and cholesterol levels. You should also get certain vaccines, such as vaccines to protect against the flu and coronavirus disease 2019 (\"COVID-19\"). Some people also need a vaccine to prevent pneumonia.  Can type 2 diabetes be prevented? -- Yes. To lower your chances of getting type 2 diabetes, the most important thing you can do is eat a healthy diet and get plenty of physical activity. This can help you lose weight if you are overweight. But eating well and being active are also good for your overall health. Even gentle activity, like walking, has benefits.  If you smoke, quitting can also lower your risk of type 2 diabetes. Quitting smoking can be difficult, but your doctor or nurse can help.  All topics are updated as new evidence becomes available and our peer review process is complete.  This topic retrieved from [employer] on: Apr 24, 2024.  Topic 27245 Version 23.0  Release: 32.3.2 - C32.113  © 2024 UpToDate, Inc. and/or its affiliates. All rights reserved.  Consumer Information Use and Disclaimer   Disclaimer: This generalized information is a limited summary of diagnosis, treatment, and/or medication information. It is not meant to be comprehensive and should be used as a tool to help the user understand and/or assess potential diagnostic and treatment options. It does NOT include all information about conditions, treatments, medications, side effects, or risks that may apply to a specific patient. It is not intended to be medical advice or a substitute for the medical advice, diagnosis, or treatment of a health care provider based on the health care provider's examination and assessment of a patient's specific and unique circumstances. Patients must speak with a health care " provider for complete information about their health, medical questions, and treatment options, including any risks or benefits regarding use of medications. This information does not endorse any treatments or medications as safe, effective, or approved for treating a specific patient. UpToDate, Inc. and its affiliates disclaim any warranty or liability relating to this information or the use thereof.The use of this information is governed by the Terms of Use, available at https://www.[employer].com/en/know/clinical-effectiveness-terms. 2024© UpToDate, Inc. and its affiliates and/or licensors. All rights reserved.  Copyright   © 2024 UpToDate, Inc. and/or its affiliates. All rights reserved.    Patient Education     Type 2 diabetes   The Basics   Written by the doctors and editors at [employer]   What is type 2 diabetes? -- This is a disorder that disrupts the way the body uses sugar. It is sometimes called type 2 diabetes mellitus.  All of the cells in the body need sugar to work normally. Sugar gets into the cells with the help of a hormone called insulin. Insulin is made by the pancreas, an organ in the belly. If there is not enough insulin, or if cells in the body don't respond normally to insulin, sugar builds up in the blood. That is what happens to people with diabetes.  There are 2 different types of diabetes:   In type 1 diabetes, the pancreas makes little or no insulin.   In type 2 diabetes, the pancreas still makes some insulin, but the cells in the body stop responding normally. Eventually, the pancreas cannot make enough insulin to keep up.  Having excess body weight or obesity increases a person's risk of developing type 2 diabetes. But people without excess body weight can get diabetes, too.  What are the symptoms of type 2 diabetes? -- Type 2 diabetes usually causes no symptoms. When symptoms do happen, they include:   Needing to urinate often   Intense thirst   Blurry vision  Can diabetes lead to  "other health problems? -- Yes. Type 2 diabetes might not make you feel sick. But if it is not managed, it can lead to serious problems over time, such as:   Heart attacks   Strokes   Kidney disease   Vision problems (or even blindness)   Pain or loss of feeling in the hands and feet   Needing to have fingers, toes, or other body parts removed (amputated)  How do I know if I have type 2 diabetes? -- Your doctor or nurse can do a blood test. There are 2 tests that can be used for this. Both involve measuring the amount of sugar in your blood, called your \"blood sugar\" or \"blood glucose\":   One of the tests measures your blood sugar at the time the blood sample is taken. This test is done in the morning. You can't eat or drink anything except water for at least 8 hours before the test.   The other test shows what your average blood sugar has been for the past 2 to 3 months. This blood test is called \"hemoglobin A1C\" or just \"A1C.\" It can be checked at any time of the day, even if you have recently eaten.  How is type 2 diabetes treated? -- The goals of treatment are to manage your blood sugar and lower the risk of future problems that can happen in people with diabetes.  Treatment might include:   Lifestyle changes - This is an important part of managing diabetes. It includes eating healthy foods and getting plenty of physical activity.   Medicines - There are a few medicines that help lower blood sugar. Some people need to take pills that help the body make more insulin or that help insulin do its job. Others need insulin shots.  Depending on what medicines you take, you might need to check your blood sugar regularly at home. But not everyone with type 2 diabetes needs to do this. Your doctor or nurse will tell you if you should be checking your blood sugar, and when and how to do this.  Sometimes, people with type 2 diabetes also need medicines to help prevent problems caused by the disease. For instance, medicines " "used to lower blood pressure can reduce the chances of a heart attack or stroke.   General medical care - It's also important to take care of other areas of your health. This includes watching your blood pressure and cholesterol levels. You should also get certain vaccines, such as vaccines to protect against the flu and coronavirus disease 2019 (\"COVID-19\"). Some people also need a vaccine to prevent pneumonia.  Can type 2 diabetes be prevented? -- Yes. To lower your chances of getting type 2 diabetes, the most important thing you can do is eat a healthy diet and get plenty of physical activity. This can help you lose weight if you are overweight. But eating well and being active are also good for your overall health. Even gentle activity, like walking, has benefits.  If you smoke, quitting can also lower your risk of type 2 diabetes. Quitting smoking can be difficult, but your doctor or nurse can help.  All topics are updated as new evidence becomes available and our peer review process is complete.  This topic retrieved from [employer] on: Apr 24, 2024.  Topic 73095 Version 23.0  Release: 32.3.2 - C32.113  © 2024 UpToDate, Inc. and/or its affiliates. All rights reserved.  Consumer Information Use and Disclaimer   Disclaimer: This generalized information is a limited summary of diagnosis, treatment, and/or medication information. It is not meant to be comprehensive and should be used as a tool to help the user understand and/or assess potential diagnostic and treatment options. It does NOT include all information about conditions, treatments, medications, side effects, or risks that may apply to a specific patient. It is not intended to be medical advice or a substitute for the medical advice, diagnosis, or treatment of a health care provider based on the health care provider's examination and assessment of a patient's specific and unique circumstances. Patients must speak with a health care provider for complete " information about their health, medical questions, and treatment options, including any risks or benefits regarding use of medications. This information does not endorse any treatments or medications as safe, effective, or approved for treating a specific patient. UpToDate, Inc. and its affiliates disclaim any warranty or liability relating to this information or the use thereof.The use of this information is governed by the Terms of Use, available at https://www.[employer].com/en/know/clinical-effectiveness-terms. 2024© UpToDate, Inc. and its affiliates and/or licensors. All rights reserved.  Copyright   © 2024 UpToDate, Inc. and/or its affiliates. All rights reserved.      Assessment & Plan  Type 2 diabetes mellitus with hyperglycemia, unspecified whether long term insulin use (HCC)    Lab Results   Component Value Date    HGBA1C 6.5 01/09/2025     Orders:    IRIS Diabetic eye exam    POCT hemoglobin A1c    Albumin / creatinine urine ratio; Future    Comprehensive metabolic panel; Future    Hemoglobin A1C; Future    Lipid Panel with Direct LDL reflex; Future    CBC and differential; Future    Primary hypertension  Stable on med  Orders:    Comprehensive metabolic panel; Future    Paroxysmal atrial fibrillation (HCC)  Stable and on baby aspirin  Orders:    Comprehensive metabolic panel; Future    CBC and differential; Future    Hyperlipidemia, unspecified hyperlipidemia type  Low cholesterol diet as directed.   Orders:    Comprehensive metabolic panel; Future    Lipid Panel with Direct LDL reflex; Future    Obesity (BMI 30-39.9)  Lose weight to get BMI lower than 25         Encounter for immunization    Orders:    influenza vaccine, high-dose, PF 0.5 mL (Fluzone High Dose)    Chronic thoracic aortic dissection (HCC)                History of Present Illness     Here for recheck and diabetic follow up. Overall doing well and takes all meds and is here for diabetes and med check and overall doing well and hga1c at  "6.5. Patient needs diabetic eye exam and needs flu shot today.     Diabetes      Review of Systems   Constitutional: Negative.    HENT: Negative.     Eyes: Negative.    Respiratory: Negative.     Cardiovascular: Negative.    Gastrointestinal: Negative.    Endocrine: Negative.    Genitourinary: Negative.    Musculoskeletal: Negative.    Skin: Negative.    Allergic/Immunologic: Negative.    Neurological: Negative.    Hematological: Negative.    Psychiatric/Behavioral: Negative.         Objective   /88   Pulse 87   Temp 97.6 °F (36.4 °C) (Temporal)   Ht 5' 4\" (1.626 m)   Wt 102 kg (224 lb)   SpO2 96%   BMI 38.45 kg/m²      Physical Exam  Constitutional:       Appearance: He is well-developed. He is obese.   HENT:      Head: Normocephalic and atraumatic.      Right Ear: External ear normal.      Left Ear: External ear normal.      Nose: Nose normal.   Eyes:      Conjunctiva/sclera: Conjunctivae normal.      Pupils: Pupils are equal, round, and reactive to light.   Cardiovascular:      Rate and Rhythm: Normal rate and regular rhythm.      Pulses: Normal pulses.      Heart sounds: Normal heart sounds.   Pulmonary:      Effort: Pulmonary effort is normal.      Breath sounds: Normal breath sounds.   Musculoskeletal:         General: Normal range of motion.      Cervical back: Normal range of motion and neck supple.   Skin:     General: Skin is warm and dry.      Capillary Refill: Capillary refill takes less than 2 seconds.   Neurological:      General: No focal deficit present.      Mental Status: He is alert and oriented to person, place, and time. Mental status is at baseline.      Deep Tendon Reflexes: Reflexes are normal and symmetric.   Psychiatric:         Mood and Affect: Mood normal.         Behavior: Behavior normal.         Thought Content: Thought content normal.         Judgment: Judgment normal.       Administrative Statements   I have spent a total time of 31 minutes in caring for this patient on " the day of the visit/encounter including Diagnostic results, Prognosis, Risks and benefits of tx options, Instructions for management, Patient and family education, Importance of tx compliance, Risk factor reductions, Impressions, Counseling / Coordination of care, Documenting in the medical record, Reviewing / ordering tests, medicine, procedures  , and Obtaining or reviewing history  .

## 2025-01-09 NOTE — TELEPHONE ENCOUNTER
----- Message from Ledy Borrego DO sent at 1/9/2025  2:12 PM EST -----  Please call the patient regarding his abnormal result. Right eye indeterminate for diabetic retinopathy and should see his eye doctor for diabetic eye exams

## 2025-01-09 NOTE — PATIENT INSTRUCTIONS
Here for diabetes check up and hga1c at 6.5 and will rec taking all meds as directed and recheck in 6 months with labs. Rec low sugar and low cholesterol diet and take BP med and other meds as directed for cholesterol and diabetes. Flu shot given today. Overall stable and BP and BS stable and will see urology as directed for hx of prostate cancer.   Patient Education     Type 2 diabetes   The Basics   Written by the doctors and editors at [facility]   What is type 2 diabetes? -- This is a disorder that disrupts the way the body uses sugar. It is sometimes called type 2 diabetes mellitus.  All of the cells in the body need sugar to work normally. Sugar gets into the cells with the help of a hormone called insulin. Insulin is made by the pancreas, an organ in the belly. If there is not enough insulin, or if cells in the body don't respond normally to insulin, sugar builds up in the blood. That is what happens to people with diabetes.  There are 2 different types of diabetes:   In type 1 diabetes, the pancreas makes little or no insulin.   In type 2 diabetes, the pancreas still makes some insulin, but the cells in the body stop responding normally. Eventually, the pancreas cannot make enough insulin to keep up.  Having excess body weight or obesity increases a person's risk of developing type 2 diabetes. But people without excess body weight can get diabetes, too.  What are the symptoms of type 2 diabetes? -- Type 2 diabetes usually causes no symptoms. When symptoms do happen, they include:   Needing to urinate often   Intense thirst   Blurry vision  Can diabetes lead to other health problems? -- Yes. Type 2 diabetes might not make you feel sick. But if it is not managed, it can lead to serious problems over time, such as:   Heart attacks   Strokes   Kidney disease   Vision problems (or even blindness)   Pain or loss of feeling in the hands and feet   Needing to have fingers, toes, or other body parts removed  "(amputated)  How do I know if I have type 2 diabetes? -- Your doctor or nurse can do a blood test. There are 2 tests that can be used for this. Both involve measuring the amount of sugar in your blood, called your \"blood sugar\" or \"blood glucose\":   One of the tests measures your blood sugar at the time the blood sample is taken. This test is done in the morning. You can't eat or drink anything except water for at least 8 hours before the test.   The other test shows what your average blood sugar has been for the past 2 to 3 months. This blood test is called \"hemoglobin A1C\" or just \"A1C.\" It can be checked at any time of the day, even if you have recently eaten.  How is type 2 diabetes treated? -- The goals of treatment are to manage your blood sugar and lower the risk of future problems that can happen in people with diabetes.  Treatment might include:   Lifestyle changes - This is an important part of managing diabetes. It includes eating healthy foods and getting plenty of physical activity.   Medicines - There are a few medicines that help lower blood sugar. Some people need to take pills that help the body make more insulin or that help insulin do its job. Others need insulin shots.  Depending on what medicines you take, you might need to check your blood sugar regularly at home. But not everyone with type 2 diabetes needs to do this. Your doctor or nurse will tell you if you should be checking your blood sugar, and when and how to do this.  Sometimes, people with type 2 diabetes also need medicines to help prevent problems caused by the disease. For instance, medicines used to lower blood pressure can reduce the chances of a heart attack or stroke.   General medical care - It's also important to take care of other areas of your health. This includes watching your blood pressure and cholesterol levels. You should also get certain vaccines, such as vaccines to protect against the flu and coronavirus disease " "2019 (\"COVID-19\"). Some people also need a vaccine to prevent pneumonia.  Can type 2 diabetes be prevented? -- Yes. To lower your chances of getting type 2 diabetes, the most important thing you can do is eat a healthy diet and get plenty of physical activity. This can help you lose weight if you are overweight. But eating well and being active are also good for your overall health. Even gentle activity, like walking, has benefits.  If you smoke, quitting can also lower your risk of type 2 diabetes. Quitting smoking can be difficult, but your doctor or nurse can help.  All topics are updated as new evidence becomes available and our peer review process is complete.  This topic retrieved from [employer] on: Apr 24, 2024.  Topic 74496 Version 23.0  Release: 32.3.2 - C32.113  © 2024 UpToDate, Inc. and/or its affiliates. All rights reserved.  Consumer Information Use and Disclaimer   Disclaimer: This generalized information is a limited summary of diagnosis, treatment, and/or medication information. It is not meant to be comprehensive and should be used as a tool to help the user understand and/or assess potential diagnostic and treatment options. It does NOT include all information about conditions, treatments, medications, side effects, or risks that may apply to a specific patient. It is not intended to be medical advice or a substitute for the medical advice, diagnosis, or treatment of a health care provider based on the health care provider's examination and assessment of a patient's specific and unique circumstances. Patients must speak with a health care provider for complete information about their health, medical questions, and treatment options, including any risks or benefits regarding use of medications. This information does not endorse any treatments or medications as safe, effective, or approved for treating a specific patient. UpToDate, Inc. and its affiliates disclaim any warranty or liability relating to " this information or the use thereof.The use of this information is governed by the Terms of Use, available at https://www.wolterskluwer.com/en/know/clinical-effectiveness-terms. 2024© UpToDate, Inc. and its affiliates and/or licensors. All rights reserved.  Copyright   © 2024 UpToDate, Inc. and/or its affiliates. All rights reserved.    Patient Education     Type 2 diabetes   The Basics   Written by the doctors and editors at [employer]   What is type 2 diabetes? -- This is a disorder that disrupts the way the body uses sugar. It is sometimes called type 2 diabetes mellitus.  All of the cells in the body need sugar to work normally. Sugar gets into the cells with the help of a hormone called insulin. Insulin is made by the pancreas, an organ in the belly. If there is not enough insulin, or if cells in the body don't respond normally to insulin, sugar builds up in the blood. That is what happens to people with diabetes.  There are 2 different types of diabetes:   In type 1 diabetes, the pancreas makes little or no insulin.   In type 2 diabetes, the pancreas still makes some insulin, but the cells in the body stop responding normally. Eventually, the pancreas cannot make enough insulin to keep up.  Having excess body weight or obesity increases a person's risk of developing type 2 diabetes. But people without excess body weight can get diabetes, too.  What are the symptoms of type 2 diabetes? -- Type 2 diabetes usually causes no symptoms. When symptoms do happen, they include:   Needing to urinate often   Intense thirst   Blurry vision  Can diabetes lead to other health problems? -- Yes. Type 2 diabetes might not make you feel sick. But if it is not managed, it can lead to serious problems over time, such as:   Heart attacks   Strokes   Kidney disease   Vision problems (or even blindness)   Pain or loss of feeling in the hands and feet   Needing to have fingers, toes, or other body parts removed (amputated)  How do I  "know if I have type 2 diabetes? -- Your doctor or nurse can do a blood test. There are 2 tests that can be used for this. Both involve measuring the amount of sugar in your blood, called your \"blood sugar\" or \"blood glucose\":   One of the tests measures your blood sugar at the time the blood sample is taken. This test is done in the morning. You can't eat or drink anything except water for at least 8 hours before the test.   The other test shows what your average blood sugar has been for the past 2 to 3 months. This blood test is called \"hemoglobin A1C\" or just \"A1C.\" It can be checked at any time of the day, even if you have recently eaten.  How is type 2 diabetes treated? -- The goals of treatment are to manage your blood sugar and lower the risk of future problems that can happen in people with diabetes.  Treatment might include:   Lifestyle changes - This is an important part of managing diabetes. It includes eating healthy foods and getting plenty of physical activity.   Medicines - There are a few medicines that help lower blood sugar. Some people need to take pills that help the body make more insulin or that help insulin do its job. Others need insulin shots.  Depending on what medicines you take, you might need to check your blood sugar regularly at home. But not everyone with type 2 diabetes needs to do this. Your doctor or nurse will tell you if you should be checking your blood sugar, and when and how to do this.  Sometimes, people with type 2 diabetes also need medicines to help prevent problems caused by the disease. For instance, medicines used to lower blood pressure can reduce the chances of a heart attack or stroke.   General medical care - It's also important to take care of other areas of your health. This includes watching your blood pressure and cholesterol levels. You should also get certain vaccines, such as vaccines to protect against the flu and coronavirus disease 2019 (\"COVID-19\"). Some " people also need a vaccine to prevent pneumonia.  Can type 2 diabetes be prevented? -- Yes. To lower your chances of getting type 2 diabetes, the most important thing you can do is eat a healthy diet and get plenty of physical activity. This can help you lose weight if you are overweight. But eating well and being active are also good for your overall health. Even gentle activity, like walking, has benefits.  If you smoke, quitting can also lower your risk of type 2 diabetes. Quitting smoking can be difficult, but your doctor or nurse can help.  All topics are updated as new evidence becomes available and our peer review process is complete.  This topic retrieved from [employer] on: Apr 24, 2024.  Topic 93249 Version 23.0  Release: 32.3.2 - C32.113  © 2024 UpToDate, Inc. and/or its affiliates. All rights reserved.  Consumer Information Use and Disclaimer   Disclaimer: This generalized information is a limited summary of diagnosis, treatment, and/or medication information. It is not meant to be comprehensive and should be used as a tool to help the user understand and/or assess potential diagnostic and treatment options. It does NOT include all information about conditions, treatments, medications, side effects, or risks that may apply to a specific patient. It is not intended to be medical advice or a substitute for the medical advice, diagnosis, or treatment of a health care provider based on the health care provider's examination and assessment of a patient's specific and unique circumstances. Patients must speak with a health care provider for complete information about their health, medical questions, and treatment options, including any risks or benefits regarding use of medications. This information does not endorse any treatments or medications as safe, effective, or approved for treating a specific patient. UpToDate, Inc. and its affiliates disclaim any warranty or liability relating to this information or the  use thereof.The use of this information is governed by the Terms of Use, available at https://www.wolterskluwer.com/en/know/clinical-effectiveness-terms. 2024© UpToDate, Inc. and its affiliates and/or licensors. All rights reserved.  Copyright   © 2024 "[employer] Inc. and/or its affiliates. All rights reserved.

## 2025-02-11 DIAGNOSIS — E11.9 TYPE 2 DIABETES MELLITUS WITHOUT COMPLICATION, UNSPECIFIED WHETHER LONG TERM INSULIN USE (HCC): ICD-10-CM

## 2025-02-11 RX ORDER — INSULIN GLARGINE 100 [IU]/ML
INJECTION, SOLUTION SUBCUTANEOUS
Qty: 15 ML | Refills: 17 | Status: SHIPPED | OUTPATIENT
Start: 2025-02-11

## 2025-04-30 DIAGNOSIS — E11.9 TYPE 2 DIABETES MELLITUS WITHOUT COMPLICATION (HCC): ICD-10-CM

## 2025-04-30 RX ORDER — PEN NEEDLE, DIABETIC 31 GX5/16"
NEEDLE, DISPOSABLE MISCELLANEOUS
Qty: 100 EACH | Refills: 2 | Status: SHIPPED | OUTPATIENT
Start: 2025-04-30

## 2025-05-22 ENCOUNTER — RA CDI HCC (OUTPATIENT)
Dept: OTHER | Facility: HOSPITAL | Age: 80
End: 2025-05-22

## 2025-05-22 NOTE — PROGRESS NOTES
HCC coding opportunities       Chart reviewed, no opportunity found: CHART REVIEWED, NO OPPORTUNITY FOUND   I71.019 - PL and BPA. Assessed and documented on 1/9/25    E66.01 on PL and BPA    [facility] AUDIT       Patients Insurance     Medicare Insurance: [employer] Medicare Advantage

## 2025-06-09 ENCOUNTER — OFFICE VISIT (OUTPATIENT)
Dept: FAMILY MEDICINE CLINIC | Facility: CLINIC | Age: 80
End: 2025-06-09
Payer: COMMERCIAL

## 2025-06-09 ENCOUNTER — RESULTS FOLLOW-UP (OUTPATIENT)
Dept: FAMILY MEDICINE CLINIC | Facility: CLINIC | Age: 80
End: 2025-06-09

## 2025-06-09 ENCOUNTER — NURSE TRIAGE (OUTPATIENT)
Age: 80
End: 2025-06-09

## 2025-06-09 VITALS
HEART RATE: 78 BPM | TEMPERATURE: 99.2 F | RESPIRATION RATE: 16 BRPM | OXYGEN SATURATION: 98 % | HEIGHT: 64 IN | DIASTOLIC BLOOD PRESSURE: 70 MMHG | SYSTOLIC BLOOD PRESSURE: 118 MMHG | BODY MASS INDEX: 35.34 KG/M2 | WEIGHT: 207 LBS

## 2025-06-09 DIAGNOSIS — E66.9 OBESITY (BMI 30-39.9): ICD-10-CM

## 2025-06-09 DIAGNOSIS — R07.81 RIB PAIN ON LEFT SIDE: ICD-10-CM

## 2025-06-09 DIAGNOSIS — E11.9 TYPE 2 DIABETES MELLITUS WITHOUT COMPLICATION, UNSPECIFIED WHETHER LONG TERM INSULIN USE (HCC): Chronic | ICD-10-CM
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
## 2025-06-09 DIAGNOSIS — S09.90XA INJURY OF HEAD, INITIAL ENCOUNTER: ICD-10-CM

## 2025-06-09 DIAGNOSIS — I10 PRIMARY HYPERTENSION: Primary | Chronic | ICD-10-CM

## 2025-06-09 LAB
LEFT EYE DIABETIC RETINOPATHY: ABNORMAL
LEFT EYE IMAGE QUALITY: ABNORMAL
LEFT EYE MACULAR EDEMA: ABNORMAL
LEFT EYE OTHER RETINOPATHY: ABNORMAL
RIGHT EYE DIABETIC RETINOPATHY: ABNORMAL
RIGHT EYE IMAGE QUALITY: ABNORMAL
RIGHT EYE MACULAR EDEMA: ABNORMAL
RIGHT EYE OTHER RETINOPATHY: ABNORMAL
SEVERITY (EYE EXAM): ABNORMAL

## 2025-06-09 PROCEDURE — 92250 FUNDUS PHOTOGRAPHY W/I&R: CPT | Performed by: FAMILY MEDICINE

## 2025-06-09 PROCEDURE — 99214 OFFICE O/P EST MOD 30 MIN: CPT | Performed by: FAMILY MEDICINE

## 2025-06-09 NOTE — TELEPHONE ENCOUNTER
Regarding: Fell in back yard today 6/9/25  ----- Message from Kriss AWAN sent at 6/9/2025 12:27 PM EDT -----  Patient calling to cancel appointment he stated he had today with Dr. Borrego. Patient states that he fell in his back yard this morning and cannot make it to the appointment. I informed patient that he is not scheduled for today but has an annual wellness visit on 7/15/25. I told patient I could have him speak with a nurse since he fell. Patient stated he did not think he needed to and declined. I told patient I would have clinical staff call him back in a little while. Patient seemed slightly confused during call. Please contact patient when possible, [phone].

## 2025-06-09 NOTE — ASSESSMENT & PLAN NOTE
Stable on meds  Lab Results   Component Value Date    HGBA1C 6.5 01/09/2025       Orders:    [facility] Diabetic eye exam

## 2025-06-09 NOTE — PROGRESS NOTES
Name: Paulino Ortega Jr.      : 1945      MRN: [id number]  Encounter Provider: Ledy Borrego DO  Encounter Date: 2025   Encounter department: [facility] PRIMARY CARE  Chief Complaint   Patient presents with    Fall     fall 2 days ago with head strike, ribs are sore.     Patient Instructions   Here for s/p fall and will rec checking xray left ribs s/p fall and rec tylenol prn pain and rec taking all meds as directed for HTN and diabetes.     Assessment & Plan  Primary hypertension  Stable on med       Fall on same level, initial encounter  Off balance and fell to ground and injured left mid back and rib area and check xray left ribs  Orders:    XR ribs left w pa chest min 3 views; Future    Rib pain on left side  Check xray left ribs  Orders:    XR ribs left w pa chest min 3 views; Future    Injury of head, initial encounter  Stable and with LOC or any sensory motor deficits today       Type 2 diabetes mellitus without complication, unspecified whether long term insulin use (HCC)  Stable on meds  Lab Results   Component Value Date    HGBA1C 6.5 2025       Orders:    [facility] Diabetic eye exam    Obesity (BMI 30-39.9)  Lose weight to get BMI lower than 25.               History of Present Illness     Fall (fall 2 days ago with head strike, ribs are sore.) patient was off balance and fell and injured left mid back area along ribs and landed on floor and hit head posterior on ground. Not too much in pain and did not LOC.     Fall      Review of Systems   Constitutional: Negative.    HENT: Negative.     Eyes: Negative.    Respiratory: Negative.     Cardiovascular: Negative.    Gastrointestinal: Negative.    Endocrine: Negative.    Genitourinary: Negative.    Musculoskeletal:  Positive for back pain (left mid back pain along ribs).   Skin: Negative.    Allergic/Immunologic: Negative.    Neurological: Negative.    Hematological: Negative.    Psychiatric/Behavioral: Negative.       Past  "Medical History[1]  Past Surgical History[2]  Family History[3]  Social History[4]  Medications[5]  No Known Allergies  Immunization History   Administered Date(s) Administered    INFLUENZA 12/19/2006, 11/29/2018, 12/29/2021, 10/26/2022    Influenza Split High Dose Preservative Free IM 09/20/2012, 09/23/2013, 09/26/2014, 10/14/2015, 10/26/2016, 11/21/2017, 01/09/2025    Influenza, high dose seasonal 0.7 mL 11/29/2018, 12/05/2019, 11/02/2020, 12/29/2021, 10/26/2022    Pneumococcal Conjugate Vaccine 20-valent (Pcv20), Polysace 03/01/2023    Tdap 09/03/2023     Objective   /70   Pulse 78   Temp 99.2 °F (37.3 °C) (Tympanic)   Resp 16   Ht 5' 4\" (1.626 m)   Wt 93.9 kg (207 lb)   SpO2 98%   BMI 35.53 kg/m²     Physical Exam  Constitutional:       Appearance: He is well-developed. He is obese.   HENT:      Head: Normocephalic and atraumatic.      Right Ear: External ear normal.      Left Ear: External ear normal.      Nose: Nose normal.      Mouth/Throat:      Mouth: Mucous membranes are moist.     Eyes:      Conjunctiva/sclera: Conjunctivae normal.      Pupils: Pupils are equal, round, and reactive to light.       Cardiovascular:      Rate and Rhythm: Normal rate and regular rhythm.      Pulses: Normal pulses.      Heart sounds: Normal heart sounds.   Pulmonary:      Effort: Pulmonary effort is normal.      Breath sounds: Normal breath sounds.   Abdominal:      General: Abdomen is flat.      Palpations: Abdomen is soft.     Musculoskeletal:         General: Normal range of motion.      Cervical back: Normal range of motion and neck supple.     Skin:     General: Skin is warm and dry.      Capillary Refill: Capillary refill takes less than 2 seconds.     Neurological:      General: No focal deficit present.      Mental Status: He is alert and oriented to person, place, and time. Mental status is at baseline.      Deep Tendon Reflexes: Reflexes are normal and symmetric.     Psychiatric:         Mood and Affect: " Mood normal.         Behavior: Behavior normal.         Thought Content: Thought content normal.         Judgment: Judgment normal.       Administrative Statements   I have spent a total time of 30 minutes in caring for this patient on the day of the visit/encounter including Diagnostic results, Prognosis, Risks and benefits of tx options, Instructions for management, Patient and family education, Importance of tx compliance, Risk factor reductions, Impressions, Counseling / Coordination of care, Documenting in the medical record, Reviewing/placing orders in the medical record (including tests, medications, and/or procedures), and Obtaining or reviewing history  .       [1]   Past Medical History:  Diagnosis Date    Ambulatory dysfunction     RW & cane use    Aortic dissection (HCC)     type A & B, s/p ascending aortic replcament, s/p left carotid-SCA bypass, s/p TEVAR    Arthritis     Diabetes (HCC)     tyep 2, insulin dependent    History of colonic polyps     History of CVA (cerebrovascular accident)     cerebellar stroke    Hyperlipidemia     Hypertension     Infestation by bed bug     in past, s/p decontamination    Obesity     KARIN on CPAP     PAF (paroxysmal atrial fibrillation) (HCC)     in past, no AC presently    Prostate cancer (HCC)     prostate    Wears glasses    [2]   Past Surgical History:  Procedure Laterality Date    CARDIAC SURGERY      CIRCUMCISION      COLONOSCOPY      IR CEREBRAL ANGIOGRAPHY / INTERVENTION  8/13/2018    IR TEVAR  9/19/2018    W[place] AS-AORT GRF W/CARD BYP & AORTIC ROOT RPLCMT N/A 9/6/2017    Procedure: ASCENDING AORTIC REPAIR WITH A 26MM  GELWEAVE STRAIGHT GRAFT AND HEMIARCH WITH 8MM SIDE ARM BRANCH;  Surgeon: LV Benjamin MD;  Location: [facility];  Service: Cardiac Surgery    WY BYPASS W/VEIN CAROTID-BRACHIAL Left 8/13/2018    Procedure: BYPASS CAROTID SUBCLAVIAN WITH EMBOLIZATION OF PROXIMAL SUBCLAVIAN ARTERY. ;  Surgeon: Girma Camacho MD;  Location: [facility];  Service:  Vascular    WI EVASC RPR DTA COVERAGE ART ORIGIN 1ST ENDOPROSTH N/A 9/19/2018    Procedure: REPAIR ANEURSYM THORACIC ENDOVASCUALR DESCENDING AORTIC ANEURYSM (TEVAR);  Surgeon: LV Benjamin MD;  Location: [facility];  Service: Cardiac Surgery    WI EXPL PO HEMRRG THROMBOSIS/INFCTJ CH N/A 9/6/2017    Procedure: RE-EXPLORATION MEDIASTERNAL CAVITY FOR POST-OP BLEED (BRING BACK);  Surgeon: LV Benjamin MD;  Location: [facility];  Service: Cardiac Surgery   [3]   Family History  Problem Relation Name Age of Onset    Stroke Mother          complications    Hypertension Mother      Arthritis Mother      No Known Problems Father     [4]   Social History  Tobacco Use    Smoking status: Never    Smokeless tobacco: Never   Vaping Use    Vaping status: Never Used   Substance and Sexual Activity    Alcohol use: Yes     Comment: social beer or two daily    Drug use: No    Sexual activity: Never     Comment: denied hx of sexually active high risk   [5]   Current Outpatient Medications on File Prior to Visit   Medication Sig    amLODIPine (NORVASC) 10 mg tablet TAKE 1 TABLET BY MOUTH TWICE A DAY    aspirin 81 mg chewable tablet Chew 1 tablet daily    atorvastatin (LIPITOR) 80 mg tablet TAKE 1 TABLET BY MOUTH EVERY DAY    B-D ULTRAFINE III SHORT PEN 31G X 8 MM MISC USE AS DIRECTED    B-D ULTRAFINE III SHORT PEN 31G X 8 MM MISC USE 2 TIMES DAILY AS DIRECTED    finasteride (PROSCAR) 5 mg tablet Take 5 mg by mouth in the morning.    glucose blood (OneTouch Verio) test strip Use to check blood sugar    insulin glargine (LANTUS) 100 units/mL subcutaneous injection Inject 28 Units under the skin daily at bedtime Dx: Diabetes E11.9, dispense pens    Insulin Glargine Solostar (Lantus SoloStar) 100 UNIT/ML SOPN INJECT 28 UNIT BELOW THE SKIN AT BEDTIME    insulin lispro (HumaLOG KwikPen) 100 units/mL injection pen Inject 8 Units under the skin 3 (three) times a day with meals    insulin lispro (HumaLOG) 100 units/mL injection  Inject 8 Units under the skin 3 (three) times a day before meals Dx: Diabetes E11.9. Dispense pens    lisinopril-hydrochlorothiazide (PRINZIDE,ZESTORETIC) 20-12.5 MG per tablet Take 2 tablets by mouth daily    metoprolol tartrate (LOPRESSOR) 100 mg tablet TAKE 1 TABLET BY MOUTH TWICE A DAY

## 2025-06-09 NOTE — PATIENT INSTRUCTIONS
Here for s/p fall and will rec checking xray left ribs s/p fall and rec tylenol prn pain and rec taking all meds as directed for HTN and diabetes.

## 2025-06-09 NOTE — TELEPHONE ENCOUNTER
"REASON FOR CONVERSATION: Fall    SYMPTOMS: Had a fall from standing 2 days ago.  Hit the back of his head and right side.  Reports that he had a lump to posterior head that has resolved but area is still sore and tender.  Reports balance issues and headaches.  Also reports soreness to right rib area where he landed as well.    OTHER HEALTH INFORMATION: Patient told [facility] that he fell today and called to cancel an appointment that he was not scheduled for.  He told this RN that the fall was 2 days ago.  Possibly confused?   Is on baby aspirin daily.    PROTOCOL DISPOSITION: See Today in Office    CARE ADVICE PROVIDED: Scheduled for appointment today at 3:30 pm    PRACTICE FOLLOW-UP: None needed at this time.    Reason for Disposition   Patient is confused or is an unreliable provider of information (e.g., dementia, severe intellectual disability, alcohol intoxication)    Additional Information   Patient has a concerning injury to a specific part of the body (e.g., chest, leg, head)    Answer Assessment - Initial Assessment Questions  1. MECHANISM: \"How did the fall happen?\"      Tripped coming out of his car over a shrub. Fell onto pathway  2. DOMESTIC VIOLENCE AND ELDER ABUSE SCREENING: \"Did you fall because someone pushed you or tried to hurt you?\" If Yes, ask: \"Are you safe now?\"      Denies  3. ONSET: \"When did the fall happen?\" (e.g., minutes, hours, or days ago)      2 days ago  4. LOCATION: \"What part of the body hit the ground?\" (e.g., back, buttocks, head, hips, knees, hands, head, stomach)      Rib area on right side , back of head  5. INJURY: \"Did you hurt (injure) yourself when you fell?\" If Yes, ask: \"What did you injure? Tell me more about this?\" (e.g., body area; type of injury; pain severity)\"      States that he had a lump on the back of his head but this is gone now. Very small scrapes on fingers.  6. PAIN: \"Is there any pain?\" If Yes, ask: \"How bad is the pain?\" (e.g., Scale 0-10; or none, mild,      " " Yes, states that his right side is sore, back of head is sore  7. SIZE: For cuts, bruises, or swelling, ask: \"How large is it?\" (e.g., inches or centimeters)       Very small scrapes on fingers  9. OTHER SYMPTOMS: \"Do you have any other symptoms?\" (e.g., dizziness, fever, weakness; new-onset or worsening).       Reports he is off balance, headaches  10. CAUSE: \"What do you think caused the fall (or falling)?\" (e.g., dizzy spell, tripped)        States that he tripped over a shrub    Answer Assessment - Initial Assessment Questions  1. MECHANISM: \"How did the injury happen?\" For falls, ask: \"What height did you fall from?\" and \"What surface did you fall against?\"       Fell from standing onto walkway   2. ONSET: \"When did the injury happen?\" (e.g., minutes, hours ago)       2 days ago  3. NEUROLOGIC SYMPTOMS: \"Was there any loss of consciousness?\" \"Are there any other neurological symptoms?\"       Denies LOC- States that he feels off balance at times, headaches  4. MENTAL STATUS: \"Does the person know who they are, who you are, and where they are?\"       Yes  5. LOCATION: \"What part of the head was hit?\"       Posterior   6. SCALP APPEARANCE: \"What does the scalp look like? Is it bleeding now?\" If Yes, ask: \"Is it difficult to stop?\"       Denies any open areas  7. SIZE: For cuts, bruises, or swelling, ask: \"How large is it?\" (e.g., inches or centimeters)       States that he had a \"lump\" but that it is gone now  8. PAIN: \"Is there any pain?\" If Yes, ask: \"How bad is it?\" (Scale 0-10; or none, mild, moderate, severe)      Area is tender/sore  9. TETANUS: For any breaks in the skin, ask: \"When was the last tetanus booster?\"      NA  10. BLOOD THINNERS: \"Do you take any blood thinners?\" (e.g., aspirin, clopidogrel / Plavix, coumadin, heparin). Notes: Other strong blood thinners include: Arixtra (fondaparinux), Eliquis (apixaban), Pradaxa (dabigatran), and Xarelto (rivaroxaban).        Baby aspirin  11. OTHER SYMPTOMS: " "\"Do you have any other symptoms?\" (e.g., neck pain, vomiting)        Also has right side pain from fall    Protocols used: Falls and Falling-Adult-[place], Head Injury-Adult-[place]    "

## 2025-06-24 DIAGNOSIS — I10 HTN (HYPERTENSION), BENIGN: ICD-10-CM

## 2025-06-24 RX ORDER — LISINOPRIL AND HYDROCHLOROTHIAZIDE 12.5; 2 MG/1; MG/1
2 TABLET ORAL DAILY
Qty: 180 TABLET | Refills: 0 | Status: SHIPPED | OUTPATIENT
Start: 2025-06-24

## 2025-07-15 ENCOUNTER — OFFICE VISIT (OUTPATIENT)
Dept: FAMILY MEDICINE CLINIC | Facility: CLINIC | Age: 80
End: 2025-07-15
Payer: COMMERCIAL

## 2025-07-15 ENCOUNTER — RESULTS FOLLOW-UP (OUTPATIENT)
Dept: FAMILY MEDICINE CLINIC | Facility: CLINIC | Age: 80
End: 2025-07-15

## 2025-07-15 VITALS
TEMPERATURE: 96.7 F | OXYGEN SATURATION: 97 % | SYSTOLIC BLOOD PRESSURE: 130 MMHG | HEIGHT: 64 IN | BODY MASS INDEX: 35.85 KG/M2 | HEART RATE: 70 BPM | DIASTOLIC BLOOD PRESSURE: 80 MMHG | WEIGHT: 210 LBS

## 2025-07-15 DIAGNOSIS — I10 PRIMARY HYPERTENSION: Chronic | ICD-10-CM

## 2025-07-15 DIAGNOSIS — E66.9 OBESITY (BMI 30-39.9): ICD-10-CM

## 2025-07-15 DIAGNOSIS — E78.5 DYSLIPIDEMIA: ICD-10-CM

## 2025-07-15 DIAGNOSIS — E11.9 TYPE 2 DIABETES MELLITUS WITHOUT COMPLICATION, UNSPECIFIED WHETHER LONG TERM INSULIN USE (HCC): ICD-10-CM

## 2025-07-15 DIAGNOSIS — Z00.00 MEDICARE ANNUAL WELLNESS VISIT, SUBSEQUENT: Primary | ICD-10-CM

## 2025-07-15 DIAGNOSIS — Z12.5 SCREENING FOR PROSTATE CANCER: ICD-10-CM

## 2025-07-15 DIAGNOSIS — Z00.00 HEALTH CARE MAINTENANCE: ICD-10-CM

## 2025-07-15 PROCEDURE — G0439 PPPS, SUBSEQ VISIT: HCPCS | Performed by: FAMILY MEDICINE

## 2025-07-15 PROCEDURE — 99214 OFFICE O/P EST MOD 30 MIN: CPT | Performed by: FAMILY MEDICINE

## 2025-07-15 PROCEDURE — 83036 HEMOGLOBIN GLYCOSYLATED A1C: CPT | Performed by: FAMILY MEDICINE

## 2025-07-29 DIAGNOSIS — Z95.828 S/P VASCULAR BYPASS: ICD-10-CM

## 2025-07-29 DIAGNOSIS — Z98.890 S/P ASCENDING AORTIC ANEURYSM REPAIR: ICD-10-CM

## 2025-07-29 DIAGNOSIS — Z86.79 S/P ASCENDING AORTIC ANEURYSM REPAIR: ICD-10-CM

## 2025-07-29 DIAGNOSIS — Z98.890 H/O REPAIR OF DISSECTING ANEURYSM OF DESCENDING THORACIC AORTA: Primary | ICD-10-CM

## 2025-07-29 DIAGNOSIS — Z86.79 H/O REPAIR OF DISSECTING ANEURYSM OF DESCENDING THORACIC AORTA: Primary | ICD-10-CM

## 2025-08-07 DIAGNOSIS — E11.9 TYPE 2 DIABETES MELLITUS WITHOUT COMPLICATION, UNSPECIFIED WHETHER LONG TERM INSULIN USE (HCC): ICD-10-CM

## 2025-08-09 RX ORDER — INSULIN DEGLUDEC 100 U/ML
28 INJECTION, SOLUTION SUBCUTANEOUS DAILY
Qty: 25.2 ML | Refills: 0 | Status: SHIPPED | OUTPATIENT
Start: 2025-08-09

## (undated) DEVICE — SUT PROLENE 5-0 C-1/C-1 36 IN 8321H

## (undated) DEVICE — PACK PBDS AORTIC ANEURYSM RF

## (undated) DEVICE — STERNAL WIRE

## (undated) DEVICE — STERILE MAJOR GENERAL PACK: Brand: CARDINAL HEALTH

## (undated) DEVICE — SUT PROLENE 3-0 SH 36 IN 8522H

## (undated) DEVICE — SUT GORE-TEX CV-6 TT-9 6K02B

## (undated) DEVICE — MEDI-VAC YANK SUCT HNDL W/TPRD BULBOUS TIP: Brand: CARDINAL HEALTH

## (undated) DEVICE — FLOSEAL MATRIX IS INDICATED IN SURGICAL PROCEDURES (OTHER THAN IN OPHTHALMIC) AS AN ADJUNCT TO HEMOSTASIS WHEN CONTROL OF BLEEDING BY LIGATURE OR CONVENTIONALPROCEDURES IS INEFFECTIVE OR IMPRACTICAL.: Brand: FLOSEAL HEMOSTATIC MATRIX

## (undated) DEVICE — 3000CC GUARDIAN II: Brand: GUARDIAN

## (undated) DEVICE — GLOVE SRG BIOGEL ECLIPSE 8

## (undated) DEVICE — FLEXOR, CHECK-FLO, INTRODUCER ANSEL MODIFICATION - WITH HIGH-FLEX DILATOR AND HYDROPHILIC COATING: Brand: FLEXOR

## (undated) DEVICE — SUT SILK 2-0 18 IN A185H

## (undated) DEVICE — SHEATH INTRO DRY SEAL 24FR 33CM

## (undated) DEVICE — SUT PROLENE 4-0 RB-1/RB-1 36 IN 8557H

## (undated) DEVICE — THERMOFLECT BLANKET, L, 25EA                               TS THERMOFLECT BLANKET, 48" X 84", SILVER, 5/BG, 5 BG/CS NW: Brand: THERMOFLECT

## (undated) DEVICE — GLOVE SRG BIOGEL ORTHOPEDIC 7.5

## (undated) DEVICE — SYRINGE 1ML SLIP TIP

## (undated) DEVICE — PACK VALVE PBDS

## (undated) DEVICE — 1200CC GUARDIAN II: Brand: GUARDIAN

## (undated) DEVICE — PLEDGET CARDIO PTFE 9.5 X 4.8 SOFT LF (6EA/PK)

## (undated) DEVICE — ANGIOGRAPHIC CATHETER: Brand: IMAGER™ II

## (undated) DEVICE — INTENDED FOR TISSUE SEPARATION, AND OTHER PROCEDURES THAT REQUIRE A SHARP SURGICAL BLADE TO PUNCTURE OR CUT.: Brand: BARD-PARKER SAFETY BLADES SIZE 15, STERILE

## (undated) DEVICE — LIGACLIP MCA MULTIPLE CLIP APPLIERS, 20 MEDIUM CLIPS: Brand: LIGACLIP

## (undated) DEVICE — HEMOCLIP CARTRIDGE LRG

## (undated) DEVICE — SUT VICRYL PLUS 1 CTB-1 36 IN VCPB947H

## (undated) DEVICE — SUT ETHIBOND 2-0 SH-1/SH-1 30 IN X763H

## (undated) DEVICE — ADHESIVE SKN CLSR HISTOACRYL FLEX 0.5ML LF

## (undated) DEVICE — PINNACLE INTRODUCER SHEATH: Brand: PINNACLE

## (undated) DEVICE — SUCTION CATH 18 FR

## (undated) DEVICE — GLOVE INDICATOR PI UNDERGLOVE SZ 8 BLUE

## (undated) DEVICE — BOWL: 16OZ PEELPOUCH 75/CS 16/PLT: Brand: MEDEGEN MEDICAL PRODUCTS, LLC

## (undated) DEVICE — SUT PROLENE 4-0 SH 36 IN 8521H

## (undated) DEVICE — SUT PROLENE 4-0 D-SPECIAL CUSTOM KIT D7160

## (undated) DEVICE — AORTIC PUNCH 4.4 MM DISP

## (undated) DEVICE — SURGICEL NU-KNIT 3 X 4

## (undated) DEVICE — VESSEL LOOPS X-RAY DETECTABLE: Brand: DEROYAL

## (undated) DEVICE — DRAPE SURGIKIT SADDLE BAG

## (undated) DEVICE — SUT MONOCRYL 4-0 PS-2 18 IN Y496G

## (undated) DEVICE — THYROID SHEET: Brand: CONVERTORS

## (undated) DEVICE — OASIS DRAIN, SINGLE, INLINE & ATS COMPATIBLE: Brand: OASIS

## (undated) DEVICE — STERILE ICS CARDIOVASCULAR PK: Brand: CARDINAL HEALTH

## (undated) DEVICE — SUT ETHIBOND 2-0 SH/SH 36 IN X523H

## (undated) DEVICE — CHLORAPREP HI-LITE 26ML ORANGE

## (undated) DEVICE — FLUID MANAGEMENT KIT - IR

## (undated) DEVICE — SUT VICRYL 2 TP-1 27 IN J849G

## (undated) DEVICE — MICROPUNCTURE 401

## (undated) DEVICE — SYRINGE 20ML LL

## (undated) DEVICE — 32 FR RIGHT ANGLE – SOFT PVC CATHETER: Brand: PVC THORACIC CATHETERS

## (undated) DEVICE — MICROPUNCTURE 501

## (undated) DEVICE — INTENDED FOR TISSUE SEPARATION, AND OTHER PROCEDURES THAT REQUIRE A SHARP SURGICAL BLADE TO PUNCTURE OR CUT.: Brand: BARD-PARKER ® CARBON RIB-BACK BLADES

## (undated) DEVICE — CAROTID ARTERY SHUNT KIT,RADIOPAQUE LINE, STRAIGHT: Brand: ARGYLE

## (undated) DEVICE — PINNACLE R/O II INTRODUCER SHEATH WITH RADIOPAQUE MARKER: Brand: PINNACLE

## (undated) DEVICE — DILATOR: Brand: COOK

## (undated) DEVICE — ANTIBACTERIAL UNDYED BRAIDED (POLYGLACTIN 910), SYNTHETIC ABSORBABLE SUTURE: Brand: COATED VICRYL

## (undated) DEVICE — COVER PROBE INTRAOPERATIVE 6 X 96 IN

## (undated) DEVICE — NEEDLE 25GA X 1 IN SAFETY GLIDE

## (undated) DEVICE — SUT PDS PLUS 1 CTB 36 IN PDPB359T

## (undated) DEVICE — CATH ANGIO 4FR 70CM  2CM SEGMENT ACCU-VU

## (undated) DEVICE — BONE WAX WHITE: Brand: BONE WAX WHITE

## (undated) DEVICE — Device

## (undated) DEVICE — TRAY FOLEY 16FR URIMETER SURESTEP

## (undated) DEVICE — STERI STRIP 1/2 INCH

## (undated) DEVICE — SUT MONOCRYL 3-0 SH 27 IN Y416H

## (undated) DEVICE — 3M™ IOBAN™ 2 ANTIMICROBIAL INCISE DRAPE 6640EZ: Brand: IOBAN™ 2

## (undated) DEVICE — SYRINGE KIT,PACKAGED,,150FT,MK 7(ANGIO-ARTERION, 150ML SYR KIT W/QFT,MC)(60729385): Brand: MEDRAD® MARK 7 ARTERION DISPOSABLE SYRINGE 150 ML WITH QUICK FILL TUBE

## (undated) DEVICE — SUT SILK 2-0 SH CR/8 18 IN C012D

## (undated) DEVICE — LIGACLIP MCA MULTIPLE CLIP APPLIERS, 20 SMALL CLIPS: Brand: LIGACLIP

## (undated) DEVICE — CATH BAL DIL TRI-LOBE TAG LARGE 26-42 MM

## (undated) DEVICE — X-RAY DETECTABLE SPONGES,16 PLY: Brand: VISTEC

## (undated) DEVICE — EVERGRIP INSERT SET 86MM: Brand: FOGARTY EVERGRIP

## (undated) DEVICE — TOWEL SET X-RAY

## (undated) DEVICE — BAG DECANTER

## (undated) DEVICE — SILVER-COATED ANTIBACTERIAL BARRIER DRESSING: Brand: ACTICOAT SURGIC 10X12CM 5PK US

## (undated) DEVICE — DRESSING ALLEVYN LIFE SACRAL 6.75 X 6.5 IN

## (undated) DEVICE — SUT MONOCRYL 2-0 CT-1 27 IN Y339H

## (undated) DEVICE — BLANKET HYPOTHERMIA ADULT GAYMAR

## (undated) DEVICE — CATH DIAG 5FR .035 70CM PIG CSC 20

## (undated) DEVICE — NON-DEHP HIGH FLOW RATE EXTENSION SET, MALE LUER LOCK ADAPTER

## (undated) DEVICE — PROXIMATE PLUS MD MULTI-DIRECTIONAL RELEASE SKIN STAPLERS CONTAINS 35 STAINLESS STEEL STAPLES APPROXIMATE CLOSED DIMENSIONS: 6.9MM X 3.9MM WIDE: Brand: PROXIMATE

## (undated) DEVICE — SURGICEL 4 X 8

## (undated) DEVICE — STOPCOCK 4 WAY LL HIGH PRESSURE

## (undated) DEVICE — SCD SEQUENTIAL COMPRESSION COMFORT SLEEVE MEDIUM KNEE LENGTH: Brand: KENDALL SCD

## (undated) DEVICE — SILVER-COATED ANTIBACTERIAL BARRIER DRESSING: Brand: ACTICOAT SURGIC 10X25CM 5PK US

## (undated) DEVICE — PACK CAROTID PBDS

## (undated) DEVICE — PADDING,UNDERCAST,COTTON, 4"X4YD STERILE: Brand: MEDLINE

## (undated) DEVICE — SUT POLYESTER TAPE D-G 8618-00

## (undated) DEVICE — GUIDEWIRE LUNDERQUIST TSCMG-35-300-LESDC

## (undated) DEVICE — LIGHT HANDLE COVER SLEEVE DISP BLUE STELLAR

## (undated) DEVICE — TRAY FOLEY 16FR SURESTEP TEMP SENS URIMETER STAT LOK

## (undated) DEVICE — EVERGRIP INSERT SET 61MM: Brand: FOGARTY EVERGRIP

## (undated) DEVICE — RECIP.STERNUM SAW BLADE 34/7.5/0.7MM: Brand: AESCULAP

## (undated) DEVICE — REM POLYHESIVE ADULT PATIENT RETURN ELECTRODE: Brand: VALLEYLAB

## (undated) DEVICE — SUT SILK 2 60 IN SA8H

## (undated) DEVICE — SYRINGE 50ML LL

## (undated) DEVICE — INFUSER BAG 500ML

## (undated) DEVICE — SNAP KOVER: Brand: UNBRANDED

## (undated) DEVICE — 2000CC GUARDIAN II: Brand: GUARDIAN

## (undated) DEVICE — 3M™ TEGADERM™ CHG DRESSING 25/CARTON 4 CARTONS/CASE 1659: Brand: TEGADERM™

## (undated) DEVICE — CATH STRAIGHT RED RUBBER 20 FR

## (undated) DEVICE — PROVE COVER: Brand: UNBRANDED

## (undated) DEVICE — IV SET 15 DROP STERILE 0/Y GRAVITY

## (undated) DEVICE — GAUZE SPONGES,16 PLY: Brand: CURITY

## (undated) DEVICE — 32 FR STRAIGHT – SOFT PVC CATHETER: Brand: PVC THORACIC CATHETERS

## (undated) DEVICE — SUT SILK 0 CT-1 30 IN 424H

## (undated) DEVICE — SUT SILK 3-0 18 IN A184H

## (undated) DEVICE — SUT PROLENE 4-0 BB 36 IN 8581H

## (undated) DEVICE — 3M™ STERI-STRIP™ REINFORCED ADHESIVE SKIN CLOSURES, R1547, 1/2 IN X 4 IN (12 MM X 100 MM), 6 STRIPS/ENVELOPE: Brand: 3M™ STERI-STRIP™

## (undated) DEVICE — SUT GORTEX CV-6 TTC-9 30 IN 6M02A MICROPOROUS COATED

## (undated) DEVICE — DRAPE PROBE NEO-PROBE/ULTRASOUND

## (undated) DEVICE — INTENDED FOR TISSUE SEPARATION, AND OTHER PROCEDURES THAT REQUIRE A SHARP SURGICAL BLADE TO PUNCTURE OR CUT.: Brand: BARD-PARKER SAFETY BLADES SIZE 11, STERILE

## (undated) DEVICE — SUT MONOCRYL 3-0 PS-2 18 IN Y497G